# Patient Record
Sex: MALE | Race: WHITE | NOT HISPANIC OR LATINO | URBAN - METROPOLITAN AREA
[De-identification: names, ages, dates, MRNs, and addresses within clinical notes are randomized per-mention and may not be internally consistent; named-entity substitution may affect disease eponyms.]

---

## 2017-04-09 ENCOUNTER — INPATIENT (INPATIENT)
Facility: HOSPITAL | Age: 82
LOS: 2 days | Discharge: HOME HEALTH | DRG: 309 | End: 2017-04-12
Attending: FAMILY MEDICINE | Admitting: HOSPITALIST
Payer: MEDICARE

## 2017-04-09 VITALS
HEART RATE: 61 BPM | DIASTOLIC BLOOD PRESSURE: 82 MMHG | HEIGHT: 68 IN | TEMPERATURE: 99 F | OXYGEN SATURATION: 97 % | RESPIRATION RATE: 16 BRPM | WEIGHT: 149.91 LBS | SYSTOLIC BLOOD PRESSURE: 183 MMHG

## 2017-04-09 DIAGNOSIS — E03.9 HYPOTHYROIDISM, UNSPECIFIED: ICD-10-CM

## 2017-04-09 DIAGNOSIS — F41.9 ANXIETY DISORDER, UNSPECIFIED: ICD-10-CM

## 2017-04-09 DIAGNOSIS — C85.90 NON-HODGKIN LYMPHOMA, UNSPECIFIED, UNSPECIFIED SITE: ICD-10-CM

## 2017-04-09 DIAGNOSIS — R42 DIZZINESS AND GIDDINESS: ICD-10-CM

## 2017-04-09 DIAGNOSIS — C61 MALIGNANT NEOPLASM OF PROSTATE: ICD-10-CM

## 2017-04-09 DIAGNOSIS — I10 ESSENTIAL (PRIMARY) HYPERTENSION: ICD-10-CM

## 2017-04-09 DIAGNOSIS — I48.91 UNSPECIFIED ATRIAL FIBRILLATION: ICD-10-CM

## 2017-04-09 DIAGNOSIS — Z29.9 ENCOUNTER FOR PROPHYLACTIC MEASURES, UNSPECIFIED: ICD-10-CM

## 2017-04-09 DIAGNOSIS — N17.9 ACUTE KIDNEY FAILURE, UNSPECIFIED: ICD-10-CM

## 2017-04-09 DIAGNOSIS — B02.9 ZOSTER WITHOUT COMPLICATIONS: ICD-10-CM

## 2017-04-09 DIAGNOSIS — Z96.653 PRESENCE OF ARTIFICIAL KNEE JOINT, BILATERAL: Chronic | ICD-10-CM

## 2017-04-09 DIAGNOSIS — Z96.619 PRESENCE OF UNSPECIFIED ARTIFICIAL SHOULDER JOINT: Chronic | ICD-10-CM

## 2017-04-09 LAB
ALBUMIN SERPL ELPH-MCNC: 3.5 G/DL — SIGNIFICANT CHANGE UP (ref 3.3–5)
ALP SERPL-CCNC: 54 U/L — SIGNIFICANT CHANGE UP (ref 40–120)
ALT FLD-CCNC: 17 U/L — SIGNIFICANT CHANGE UP (ref 12–78)
ANION GAP SERPL CALC-SCNC: 10 MMOL/L — SIGNIFICANT CHANGE UP (ref 5–17)
APTT BLD: 48.8 SEC — HIGH (ref 27.5–37.4)
AST SERPL-CCNC: 22 U/L — SIGNIFICANT CHANGE UP (ref 15–37)
BASOPHILS # BLD AUTO: 0.1 K/UL — SIGNIFICANT CHANGE UP (ref 0–0.2)
BASOPHILS NFR BLD AUTO: 1.8 % — SIGNIFICANT CHANGE UP (ref 0–2)
BILIRUB SERPL-MCNC: 0.8 MG/DL — SIGNIFICANT CHANGE UP (ref 0.2–1.2)
BUN SERPL-MCNC: 30 MG/DL — HIGH (ref 7–23)
CALCIUM SERPL-MCNC: 8.9 MG/DL — SIGNIFICANT CHANGE UP (ref 8.5–10.1)
CHLORIDE SERPL-SCNC: 106 MMOL/L — SIGNIFICANT CHANGE UP (ref 96–108)
CK SERPL-CCNC: 26 U/L — SIGNIFICANT CHANGE UP (ref 26–308)
CO2 SERPL-SCNC: 29 MMOL/L — SIGNIFICANT CHANGE UP (ref 22–31)
CREAT SERPL-MCNC: 1.6 MG/DL — HIGH (ref 0.5–1.3)
EOSINOPHIL # BLD AUTO: 0.1 K/UL — SIGNIFICANT CHANGE UP (ref 0–0.5)
EOSINOPHIL NFR BLD AUTO: 1.3 % — SIGNIFICANT CHANGE UP (ref 0–6)
GLUCOSE SERPL-MCNC: 95 MG/DL — SIGNIFICANT CHANGE UP (ref 70–99)
HCT VFR BLD CALC: 45.1 % — SIGNIFICANT CHANGE UP (ref 39–50)
HGB BLD-MCNC: 14.8 G/DL — SIGNIFICANT CHANGE UP (ref 13–17)
INR BLD: 4.58 RATIO — HIGH (ref 0.88–1.16)
LYMPHOCYTES # BLD AUTO: 1.9 K/UL — SIGNIFICANT CHANGE UP (ref 1–3.3)
LYMPHOCYTES # BLD AUTO: 34.7 % — SIGNIFICANT CHANGE UP (ref 13–44)
MCHC RBC-ENTMCNC: 32.4 PG — SIGNIFICANT CHANGE UP (ref 27–34)
MCHC RBC-ENTMCNC: 32.8 GM/DL — SIGNIFICANT CHANGE UP (ref 32–36)
MCV RBC AUTO: 98.8 FL — SIGNIFICANT CHANGE UP (ref 80–100)
MONOCYTES # BLD AUTO: 0.4 K/UL — SIGNIFICANT CHANGE UP (ref 0–0.9)
MONOCYTES NFR BLD AUTO: 7 % — SIGNIFICANT CHANGE UP (ref 1–9)
NEUTROPHILS # BLD AUTO: 3 K/UL — SIGNIFICANT CHANGE UP (ref 1.8–7.4)
NEUTROPHILS NFR BLD AUTO: 55.1 % — SIGNIFICANT CHANGE UP (ref 43–77)
PLATELET # BLD AUTO: 157 K/UL — SIGNIFICANT CHANGE UP (ref 150–400)
POTASSIUM SERPL-MCNC: 3.7 MMOL/L — SIGNIFICANT CHANGE UP (ref 3.5–5.3)
POTASSIUM SERPL-SCNC: 3.7 MMOL/L — SIGNIFICANT CHANGE UP (ref 3.5–5.3)
PROT SERPL-MCNC: 5.7 G/DL — LOW (ref 6–8.3)
PROTHROM AB SERPL-ACNC: 51.5 SEC — HIGH (ref 9.8–12.7)
RBC # BLD: 4.56 M/UL — SIGNIFICANT CHANGE UP (ref 4.2–5.8)
RBC # FLD: 13.8 % — SIGNIFICANT CHANGE UP (ref 10.3–14.5)
SODIUM SERPL-SCNC: 145 MMOL/L — SIGNIFICANT CHANGE UP (ref 135–145)
TROPONIN I SERPL-MCNC: <.015 NG/ML — SIGNIFICANT CHANGE UP (ref 0.01–0.04)
TSH SERPL-MCNC: 1.61 UIU/ML — SIGNIFICANT CHANGE UP (ref 0.36–3.74)
WBC # BLD: 5.4 K/UL — SIGNIFICANT CHANGE UP (ref 3.8–10.5)
WBC # FLD AUTO: 5.4 K/UL — SIGNIFICANT CHANGE UP (ref 3.8–10.5)

## 2017-04-09 PROCEDURE — 70450 CT HEAD/BRAIN W/O DYE: CPT | Mod: 26

## 2017-04-09 PROCEDURE — 99285 EMERGENCY DEPT VISIT HI MDM: CPT

## 2017-04-09 PROCEDURE — 93010 ELECTROCARDIOGRAM REPORT: CPT

## 2017-04-09 PROCEDURE — 71010: CPT | Mod: 26

## 2017-04-09 PROCEDURE — 99223 1ST HOSP IP/OBS HIGH 75: CPT | Mod: AI,GC

## 2017-04-09 RX ORDER — ONDANSETRON 8 MG/1
4 TABLET, FILM COATED ORAL EVERY 8 HOURS
Qty: 0 | Refills: 0 | Status: DISCONTINUED | OUTPATIENT
Start: 2017-04-09 | End: 2017-04-12

## 2017-04-09 RX ORDER — SODIUM CHLORIDE 9 MG/ML
1000 INJECTION, SOLUTION INTRAVENOUS
Qty: 0 | Refills: 0 | Status: DISCONTINUED | OUTPATIENT
Start: 2017-04-09 | End: 2017-04-12

## 2017-04-09 RX ORDER — METOCLOPRAMIDE HCL 10 MG
10 TABLET ORAL ONCE
Qty: 0 | Refills: 0 | Status: COMPLETED | OUTPATIENT
Start: 2017-04-09 | End: 2017-04-09

## 2017-04-09 RX ORDER — SODIUM CHLORIDE 9 MG/ML
1000 INJECTION INTRAMUSCULAR; INTRAVENOUS; SUBCUTANEOUS ONCE
Qty: 0 | Refills: 0 | Status: COMPLETED | OUTPATIENT
Start: 2017-04-09 | End: 2017-04-09

## 2017-04-09 RX ORDER — LEVOTHYROXINE SODIUM 125 MCG
50 TABLET ORAL DAILY
Qty: 0 | Refills: 0 | Status: DISCONTINUED | OUTPATIENT
Start: 2017-04-09 | End: 2017-04-12

## 2017-04-09 RX ORDER — FLUOROMETHOLONE 1 MG/ML
1 SOLUTION/ DROPS OPHTHALMIC EVERY 8 HOURS
Qty: 0 | Refills: 0 | Status: DISCONTINUED | OUTPATIENT
Start: 2017-04-09 | End: 2017-04-12

## 2017-04-09 RX ORDER — BACITRACIN 500 [USP'U]/G
1 OINTMENT OPHTHALMIC AT BEDTIME
Qty: 0 | Refills: 0 | Status: DISCONTINUED | OUTPATIENT
Start: 2017-04-09 | End: 2017-04-10

## 2017-04-09 RX ORDER — MECLIZINE HCL 12.5 MG
25 TABLET ORAL ONCE
Qty: 0 | Refills: 0 | Status: COMPLETED | OUTPATIENT
Start: 2017-04-09 | End: 2017-04-09

## 2017-04-09 RX ORDER — CARVEDILOL PHOSPHATE 80 MG/1
12.5 CAPSULE, EXTENDED RELEASE ORAL EVERY 12 HOURS
Qty: 0 | Refills: 0 | Status: DISCONTINUED | OUTPATIENT
Start: 2017-04-09 | End: 2017-04-10

## 2017-04-09 RX ORDER — SODIUM CHLORIDE 9 MG/ML
1000 INJECTION INTRAMUSCULAR; INTRAVENOUS; SUBCUTANEOUS
Qty: 0 | Refills: 0 | Status: DISCONTINUED | OUTPATIENT
Start: 2017-04-09 | End: 2017-04-09

## 2017-04-09 RX ORDER — ONDANSETRON 8 MG/1
4 TABLET, FILM COATED ORAL ONCE
Qty: 0 | Refills: 0 | Status: COMPLETED | OUTPATIENT
Start: 2017-04-09 | End: 2017-04-09

## 2017-04-09 RX ORDER — AMLODIPINE BESYLATE 2.5 MG/1
5 TABLET ORAL DAILY
Qty: 0 | Refills: 0 | Status: DISCONTINUED | OUTPATIENT
Start: 2017-04-09 | End: 2017-04-12

## 2017-04-09 RX ORDER — METOCLOPRAMIDE HCL 10 MG
10 TABLET ORAL EVERY 8 HOURS
Qty: 0 | Refills: 0 | Status: DISCONTINUED | OUTPATIENT
Start: 2017-04-09 | End: 2017-04-09

## 2017-04-09 RX ORDER — MECLIZINE HCL 12.5 MG
25 TABLET ORAL EVERY 8 HOURS
Qty: 0 | Refills: 0 | Status: DISCONTINUED | OUTPATIENT
Start: 2017-04-09 | End: 2017-04-12

## 2017-04-09 RX ADMIN — SODIUM CHLORIDE 1000 MILLILITER(S): 9 INJECTION INTRAMUSCULAR; INTRAVENOUS; SUBCUTANEOUS at 13:54

## 2017-04-09 RX ADMIN — ONDANSETRON 4 MILLIGRAM(S): 8 TABLET, FILM COATED ORAL at 14:00

## 2017-04-09 RX ADMIN — Medication 10 MILLIGRAM(S): at 16:49

## 2017-04-09 RX ADMIN — Medication 25 MILLIGRAM(S): at 14:39

## 2017-04-09 RX ADMIN — ONDANSETRON 4 MILLIGRAM(S): 8 TABLET, FILM COATED ORAL at 20:46

## 2017-04-09 RX ADMIN — ONDANSETRON 4 MILLIGRAM(S): 8 TABLET, FILM COATED ORAL at 13:54

## 2017-04-09 NOTE — H&P ADULT - ATTENDING COMMENTS
89 y.o M w/Pmhx of Afib( on coumadin), anxiety, HTN, hypothyroidism, hx of prostate ca( s/p radiation), hx of chronic diarrhea, basal cell carcinoma( s/p removal), and lymphoma comes in for vertigo, r/o CVA , on doxycycline for possible bacterial superinfection of the rash. Plan as above discussed at length with PGY1. INR ordered, since on coumadin. Recommend optho evaluation if symptoms get worse. Will monitor BP closely. since high in ER, probably because of fluids. Added Norvasc, since HCTZ held secondary to LETICIA. 89 y.o M w/Pmhx of Afib( on coumadin), anxiety, HTN, hypothyroidism, hx of prostate ca( s/p radiation), hx of chronic diarrhea, basal cell carcinoma( s/p removal), and lymphoma comes in for vertigo, r/o CVA , on doxycycline for possible bacterial superinfection of the rash. Plan as above discussed at length with PGY1. INR ordered, since on coumadin. Recommend optho/ Dermatology  evaluation if symptoms get worse. Will monitor BP closely. since high in ER, probably because of fluids and stress. Added Norvasc, since HCTZ held secondary to LETICIA. 89 y.o M w/Pmhx of Afib( on coumadin), anxiety, HTN, hypothyroidism, hx of prostate ca( s/p radiation), hx of chronic diarrhea, basal cell carcinoma( s/p removal), and lymphoma comes in for vertigo, r/o CVA , on doxycycline for possible bacterial superinfection of the rash. Plan as above discussed at length with PGY1. INR ordered, since on coumadin. Recommend optho/ Dermatology  evaluation if symptoms get worse. Will monitor BP closely. since high in ER, probably because of fluids and stress. Added Norvasc, since HCTZ held secondary to LETICIA. Per daughter has h/o chronic diarrhea/ Bowel incontinence,  and has been seen by GI and colorectal surgeon as out patient. Symptoms appear to be stable at the moment.

## 2017-04-09 NOTE — H&P ADULT - PROBLEM SELECTOR PLAN 3
-likely 2/2 infection vs medication side effect  -continue with IVF  -osmolarity study and bmp in am  -consult renal  -likely 2/2 dehydration vs ATN secondary to medication side effect  -continue with IVF  -osmolarity study and bmp in am  -consult renal

## 2017-04-09 NOTE — H&P ADULT - NSHPPHYSICALEXAM_GEN_ALL_CORE
General: Well developed, well nourished, NAD  HEENT: PERRLA, EOMI bl, multiple vesicular crest rashes on Lt forehead, erythematous Lt eye lids and redness of Lt conjunctiva, basal cell carcinoma lesion on top of head   Neck: Supple, nontender, no mass  Neurology: A&Ox3, nonfocal, CN II-XII grossly intact, sensation intact, no gait abnormalities, no nuchal righty   Respiratory: CTA B/L, No W/R/R  CV: RRR, +S1/S2, no murmurs, rubs or gallops  Abdominal: Soft, NT, ND +BSx4  Extremities: No C/C/E, + peripheral pulses  MSK: Normal ROM, no joint erythema or warmth, no joint swelling   Skin: warm, dry, normal color, no rash or abnormal lesions

## 2017-04-09 NOTE — ED ADULT NURSE NOTE - OBJECTIVE STATEMENT
Pt alert and oriented, came to ED with c/o weakness, dizziness, nausea, no active vomiting, afebrile, denies any diarrhea, labs draw and sent, shingles noted to Left side of face, non-weeping, MD aware, placed on cardiac monitor, EKG complete, advised on plan of care/hand hygiene, verbalized understanding, prepared for CT, awaiting dispo

## 2017-04-09 NOTE — H&P ADULT - PROBLEM SELECTOR PLAN 4
-Chronic stable  -recheck INR  -if the INR in therapeutic range or under therapeutic range will c/w home coumadin   -c/w carvedilol  -tele monitor

## 2017-04-09 NOTE — H&P ADULT - ASSESSMENT
89 y.o M w/Pmhx of Afib( on coumadin), anxiety, HTN, hypothyroidism, hx of prostate ca( s/p radiation), hx of chronic diarrhea, basal cell carcinoma( s/p removal), and lymphoma comes in for vertigo, r.o CVA

## 2017-04-09 NOTE — H&P ADULT - NSHPSOCIALHISTORY_GEN_ALL_CORE
Marriage status:   Living condition: live by himself   Physical condition: no walker, no O2 needed  no smoking, social drinker, no recreational drug using  Immunization:       no flushot,                            no  Pneumonia shot,                             no shingle shot

## 2017-04-09 NOTE — H&P ADULT - HISTORY OF PRESENT ILLNESS
89 y.o M w/Pmhx of Afib( on coumadin), anxiety, HTN, hypothyroidism, hx of prostate ca( s/p radiation), hx of chronic diarrhea, basal cell carcinoma( s/p removal), and lymphoma comes in for vertigo. Pt had Lt forehead shingle 10 days ago and was seeing by derm and ophthalmologist and finsihed 1 weeks course of valacyclovir and was also put on cipro and later switch to doxy( 1 days ago) superimposed bacteria infection of his Lt forehead and Lt eyelids. Per Pt the ophthalmologist examed pt and did not think that the shingle was spread to eye tissue. Pt reported this morning, after he woke up, he felt the jan  was spinning around him. He also felt nausea no vomiting. Denies of fever/chills/vision change/cp/sob/palpitation/LOC/ nucha rigidity.   Pt had similar episode 1 yr ago and which was last short period and was told by PCP to drink more water. Pt reported decrease fluid and food intake this 2 weeks 2/2 loss of appetite and lost about 8 pounds during last month.    In ED, pt was hypertensive aroud 180s/80s. Cr elevated at 1.5 and BUN elevated at 30, CT head negative for acute infract. CXR no acute finding  Was given 1x meclizine, 2 x reglan, and 1 x zofran, and 2x bolus NS. ECG showed sinus rhythms with 1st degrees' HB and RBBB consulted neuro( ) 89 y.o M w/Pmhx of Afib( on coumadin), anxiety, HTN, hypothyroidism, hx of prostate ca( s/p radiation), hx of chronic diarrhea, basal cell carcinoma( s/p removal), and lymphoma comes in for vertigo. Pt had Lt forehead shingle 10 days ago and was seeing by derm and ophthalmologist and finsihed 1 weeks course of valacyclovir and was also put on cipro and later switch to doxy( 1 days ago) superimposed bacteria infection of his Lt forehead and Lt eyelids. Per Pt the ophthalmologist examed pt and did not think that the shingle was spread to eye tissue. Pt reported this morning, after he woke up, he felt the jan  was spinning around him. He also felt nausea no vomiting. Denies of fever/chills/vision change/cp/sob/palpitation/LOC/ nucha rigidity.   Pt had similar episode 1 yr ago and which was last short period and was told by PCP to drink more water. Pt reported decrease fluid and food intake this 2 weeks 2/2 loss of appetite and lost about 8 pounds during last month.    In ED, pt was hypertensive aroud 180s/80s. Cr elevated at 1.5 and BUN elevated at 30, TSH wnl, cardiac enzyme wnlx1. CT head negative for acute infract. CXR no acute finding  Was given 1x meclizine, 2 x reglan and 1 x Zofran and 2x bolus NS. ECG showed sinus rhythms with 1st degrees' HB and RBBB consulted neuro( )

## 2017-04-09 NOTE — H&P ADULT - PROBLEM SELECTOR PLAN 5
-c/w carvedilol and start norvasc for better BP control  -hold HCTZ-TRIAMTERENE 2/2 LETICIA -c/w carvedilol and start Norvasc for better BP control  -hold HCTZ-TRIAMTERENE 2/2 LETICIA

## 2017-04-09 NOTE — ED PROVIDER NOTE - OBJECTIVE STATEMENT
89 male presents to ER with daughter states patient awoke this morning feeling nausea, dizziness, vomiting, worse when getting up and moving head, better with lying down, having difficulty walking. Patient finished course of valtrex yesterday for shingles of forehead.

## 2017-04-09 NOTE — CONSULT NOTE ADULT - SUBJECTIVE AND OBJECTIVE BOX
89 years old male admitted with vertigo, nausea, poor oral intake, noted elevated creatinine, possible CKD but no known baseline creatinine at this time. Developed left forehead shingle 10 days ago and was was treated with 1 week of Valacyclovir and Cipro. Pt. reported decrease fluids and food intake for 2 weeks. Has been taking daily Advil. No voiding c/o. No hematuria.    PAST MEDICAL & SURGICAL HISTORY:  Chronic diarrhea  Prostate CA  Basal cell carcinoma  Non-Hodgkins Lymphoma  Anxiety  Hypothyroid  Hypertension  A-fib  H/O shoulder replacement  S/P bilateral unicompartmental knee replacement    FAMILY HISTORY:  No pertinent family history in first degree relatives    No Known Allergies    MEDICATIONS  (STANDING):  amLODIPine   Tablet 5milliGRAM(s) Oral daily  meclizine 25milliGRAM(s) Oral every 8 hours  carvedilol 12.5milliGRAM(s) Oral every 12 hours  BACItracin   Ophthalmic Ointment 1Application(s) Left EYE at bedtime  fluorometholone 0.1% Suspension 1Drop(s) Left EYE every 8 hours  levothyroxine 50MICROGram(s) Oral daily  doxycycline IVPB 100milliGRAM(s) IV Intermittent every 12 hours  sodium chloride 0.45%. 1000milliLiter(s) IV Continuous <Continuous>    MEDICATIONS  (PRN):  ondansetron Injectable 4milliGRAM(s) IV Push every 8 hours PRN Nausea and/or Vomiting    I&O's Detail    Vital Signs Last 24 Hrs  T(C): 36.8, Max: 37.2 (04-09 @ 11:41)  T(F): 98.2, Max: 98.9 (04-09 @ 11:41)  HR: 62 (60 - 62)  BP: 141/89 (141/89 - 183/82)  BP(mean): --  RR: 16 (16 - 16)  SpO2: 99% (97% - 99%)    PHYSICAL EXAM:  General: NAD, on stretcher in E.R. AAO X3, conversant, c/o nausea  Extensive confluent crusted herpetic lesions left forehead  Supine, no JVD  Respiratory: b/l air entry, clear  Cardiovascular: S1 S2 irregular  Gastrointestinal: abdomen soft, not tender, no distended, no bladder distension  Extremities: no edema  No skin rash      CAPILLARY BLOOD GLUCOSE    04-09    145  |  106  |  30<H>  ----------------------------<  95  3.7   |  29  |  1.60<H>    Ca    8.9      09 Apr 2017 12:44    TPro  5.7<L>  /  Alb  3.5  /  TBili  0.8  /  DBili  x   /  AST  22  /  ALT  17  /  AlkPhos  54  04-09    Hemoglobin: 14.8 g/dL (04-09 @ 12:44)  Hematocrit: 45.1 % (04-09 @ 12:44)  Potassium, Serum: 3.7 mmol/L (04-09 @ 12:44)  Blood Urea Nitrogen, Serum: 30 mg/dL (04-09 @ 12:44)  Calcium, Total Serum: 8.9 mg/dL (04-09 @ 12:44)      Creatinine, Serum: 1.60 (04-09 @ 12:44)    CBC Full  -  ( 09 Apr 2017 12:44 )  WBC Count : 5.4 K/uL  Hemoglobin : 14.8 g/dL  Hematocrit : 45.1 %  Platelet Count - Automated : 157 K/uL  Mean Cell Volume : 98.8 fl  Mean Cell Hemoglobin : 32.4 pg  Mean Cell Hemoglobin Concentration : 32.8 gm/dL  Auto Neutrophil # : 3.0 K/uL  Auto Lymphocyte # : 1.9 K/uL  Auto Monocyte # : 0.4 K/uL  Auto Eosinophil # : 0.1 K/uL  Auto Basophil # : 0.1 K/uL  Auto Neutrophil % : 55.1 %  Auto Lymphocyte % : 34.7 %  Auto Monocyte % : 7.0 %  Auto Eosinophil % : 1.3 %  Auto Basophil % : 1.8 %

## 2017-04-09 NOTE — H&P ADULT - NSHPREVIEWOFSYSTEMS_GEN_ALL_CORE
CONSTITUTIONAL: No weakness, fevers or chills  EYES/ENT: No visual changes;  + vertigo, no throat pain, rashes at the Lt forehead and itchiness of Lt forehead and eye   NECK: No pain or stiffness  RESPIRATORY: No cough, wheezing, hemoptysis; No shortness of breath  CARDIOVASCULAR: No chest pain or palpitations  GASTROINTESTINAL: No abdominal or epigastric pain. + nausea, no vomiting, or hematemesis; chronic diarrhea, no constipation. No melena or hematochezia.  GENITOURINARY: No dysuria, frequency or hematuria  NEUROLOGICAL: No numbness or weakness  SKIN: rashes at the Lt forehead and itchiness of Lt forehead and eye  and lesions of the top of head  All other review of systems is negative unless indicated above.

## 2017-04-09 NOTE — H&P ADULT - PROBLEM SELECTOR PLAN 2
-s/p course or valacyclovir  -hold abx for now  -c/w bacitracin opth oiment and fluorometholone drop   -consult ID( ) -s/p course or valacyclovir  -on Po doxycycline for possible bacterial superinfection of the rash/ ID Consult called. Will Switch to IV doxycycline since nauseous. No vision changes.   -c/w bacitracin opth oiment and fluorometholone drop   -consult ID( )

## 2017-04-09 NOTE — H&P ADULT - PMH
A-fib    Anxiety    Basal cell carcinoma    Chronic diarrhea    Hypertension    Hypothyroid    Lymphoma    Prostate CA

## 2017-04-09 NOTE — CONSULT NOTE ADULT - ASSESSMENT
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  Agree with IVF 1/2 NS @ 75 CC/hr. Monitor renal indices. Avoid NSAIDS and nephrotoxins. Dose all meds for GFR 30 CC/min. Renal/bladder sono.  Patient's daughter will find out about the baseline creatinine.    Will follow with you.  Thank you!

## 2017-04-09 NOTE — H&P ADULT - PROBLEM SELECTOR PLAN 1
-? dehydration r/o CVA  -Admit to TELE  -IVF  -Meclizine TID for Vertigo  -Zofran prn for n/v  -neuro check Q4hr  -MRI/MRA in AM  - Consulted Neuro( )

## 2017-04-09 NOTE — H&P ADULT - PROBLEM SELECTOR PLAN 10
-possible coumadin for DVT PPX  -clear fluid diet  -aspiration precaution, fall precaution, ambulate with assistant

## 2017-04-10 ENCOUNTER — TRANSCRIPTION ENCOUNTER (OUTPATIENT)
Age: 82
End: 2017-04-10

## 2017-04-10 DIAGNOSIS — B02.22 POSTHERPETIC TRIGEMINAL NEURALGIA: ICD-10-CM

## 2017-04-10 LAB
ALBUMIN SERPL ELPH-MCNC: 2.9 G/DL — LOW (ref 3.3–5)
ALP SERPL-CCNC: 44 U/L — SIGNIFICANT CHANGE UP (ref 40–120)
ALT FLD-CCNC: 16 U/L — SIGNIFICANT CHANGE UP (ref 12–78)
ANION GAP SERPL CALC-SCNC: 8 MMOL/L — SIGNIFICANT CHANGE UP (ref 5–17)
AST SERPL-CCNC: 20 U/L — SIGNIFICANT CHANGE UP (ref 15–37)
BILIRUB SERPL-MCNC: 0.8 MG/DL — SIGNIFICANT CHANGE UP (ref 0.2–1.2)
BUN SERPL-MCNC: 30 MG/DL — HIGH (ref 7–23)
CALCIUM SERPL-MCNC: 7.9 MG/DL — LOW (ref 8.5–10.1)
CHLORIDE SERPL-SCNC: 111 MMOL/L — HIGH (ref 96–108)
CO2 SERPL-SCNC: 26 MMOL/L — SIGNIFICANT CHANGE UP (ref 22–31)
CREAT SERPL-MCNC: 1.3 MG/DL — SIGNIFICANT CHANGE UP (ref 0.5–1.3)
GLUCOSE SERPL-MCNC: 80 MG/DL — SIGNIFICANT CHANGE UP (ref 70–99)
HCT VFR BLD CALC: 38.4 % — LOW (ref 39–50)
HGB BLD-MCNC: 12.6 G/DL — LOW (ref 13–17)
INR BLD: 5.21 RATIO — CRITICAL HIGH (ref 0.88–1.16)
MAGNESIUM SERPL-MCNC: 1.8 MG/DL — SIGNIFICANT CHANGE UP (ref 1.8–2.4)
MCHC RBC-ENTMCNC: 32.4 PG — SIGNIFICANT CHANGE UP (ref 27–34)
MCHC RBC-ENTMCNC: 32.8 GM/DL — SIGNIFICANT CHANGE UP (ref 32–36)
MCV RBC AUTO: 98.8 FL — SIGNIFICANT CHANGE UP (ref 80–100)
OSMOLALITY SERPL: 307 MOS/KG — HIGH (ref 275–300)
OSMOLALITY UR: 675 MOS/KG — SIGNIFICANT CHANGE UP (ref 50–1200)
PHOSPHATE SERPL-MCNC: 2.1 MG/DL — LOW (ref 2.5–4.5)
PLATELET # BLD AUTO: 153 K/UL — SIGNIFICANT CHANGE UP (ref 150–400)
POTASSIUM SERPL-MCNC: 3.4 MMOL/L — LOW (ref 3.5–5.3)
POTASSIUM SERPL-SCNC: 3.4 MMOL/L — LOW (ref 3.5–5.3)
PROT SERPL-MCNC: 4.8 G/DL — LOW (ref 6–8.3)
PROTHROM AB SERPL-ACNC: 58.7 SEC — HIGH (ref 9.8–12.7)
RBC # BLD: 3.88 M/UL — LOW (ref 4.2–5.8)
RBC # FLD: 14 % — SIGNIFICANT CHANGE UP (ref 10.3–14.5)
SODIUM SERPL-SCNC: 145 MMOL/L — SIGNIFICANT CHANGE UP (ref 135–145)
SODIUM UR-SCNC: 76 MMOL/L — SIGNIFICANT CHANGE UP
WBC # BLD: 7.1 K/UL — SIGNIFICANT CHANGE UP (ref 3.8–10.5)
WBC # FLD AUTO: 7.1 K/UL — SIGNIFICANT CHANGE UP (ref 3.8–10.5)

## 2017-04-10 PROCEDURE — 99233 SBSQ HOSP IP/OBS HIGH 50: CPT

## 2017-04-10 PROCEDURE — 70551 MRI BRAIN STEM W/O DYE: CPT | Mod: 26

## 2017-04-10 PROCEDURE — 99223 1ST HOSP IP/OBS HIGH 75: CPT

## 2017-04-10 RX ORDER — CARVEDILOL PHOSPHATE 80 MG/1
6.25 CAPSULE, EXTENDED RELEASE ORAL EVERY 12 HOURS
Qty: 0 | Refills: 0 | Status: DISCONTINUED | OUTPATIENT
Start: 2017-04-10 | End: 2017-04-11

## 2017-04-10 RX ORDER — BACITRACIN 500 [USP'U]/G
1 OINTMENT OPHTHALMIC THREE TIMES A DAY
Qty: 0 | Refills: 0 | Status: DISCONTINUED | OUTPATIENT
Start: 2017-04-10 | End: 2017-04-12

## 2017-04-10 RX ORDER — POTASSIUM PHOSPHATE, MONOBASIC POTASSIUM PHOSPHATE, DIBASIC 236; 224 MG/ML; MG/ML
15 INJECTION, SOLUTION INTRAVENOUS ONCE
Qty: 0 | Refills: 0 | Status: COMPLETED | OUTPATIENT
Start: 2017-04-10 | End: 2017-04-10

## 2017-04-10 RX ADMIN — SODIUM CHLORIDE 75 MILLILITER(S): 9 INJECTION, SOLUTION INTRAVENOUS at 00:24

## 2017-04-10 RX ADMIN — SODIUM CHLORIDE 75 MILLILITER(S): 9 INJECTION, SOLUTION INTRAVENOUS at 11:32

## 2017-04-10 RX ADMIN — FLUOROMETHOLONE 1 DROP(S): 1 SOLUTION/ DROPS OPHTHALMIC at 06:59

## 2017-04-10 RX ADMIN — SODIUM CHLORIDE 75 MILLILITER(S): 9 INJECTION, SOLUTION INTRAVENOUS at 23:52

## 2017-04-10 RX ADMIN — ONDANSETRON 4 MILLIGRAM(S): 8 TABLET, FILM COATED ORAL at 08:19

## 2017-04-10 RX ADMIN — Medication 25 MILLIGRAM(S): at 23:51

## 2017-04-10 RX ADMIN — Medication 25 MILLIGRAM(S): at 08:20

## 2017-04-10 RX ADMIN — BACITRACIN 1 APPLICATION(S): 500 OINTMENT OPHTHALMIC at 21:37

## 2017-04-10 RX ADMIN — FLUOROMETHOLONE 1 DROP(S): 1 SOLUTION/ DROPS OPHTHALMIC at 13:18

## 2017-04-10 RX ADMIN — FLUOROMETHOLONE 1 DROP(S): 1 SOLUTION/ DROPS OPHTHALMIC at 21:37

## 2017-04-10 RX ADMIN — Medication 25 MILLIGRAM(S): at 15:46

## 2017-04-10 RX ADMIN — Medication 50 MICROGRAM(S): at 06:40

## 2017-04-10 RX ADMIN — POTASSIUM PHOSPHATE, MONOBASIC POTASSIUM PHOSPHATE, DIBASIC 63.75 MILLIMOLE(S): 236; 224 INJECTION, SOLUTION INTRAVENOUS at 11:32

## 2017-04-10 NOTE — DISCHARGE NOTE ADULT - PLAN OF CARE
resolved -likely 2/2 bradycardia, 1st degree AV block, RBBB  -d/c carvedilol per cardio due to bradycardia per cardiologist  -possible EP study outpatient  -meclizine prn? -resolving -seen by Dr. Morrison (ID) and no concern for active infection, lesions crusted, no signs of superimposed bacterial infection  -finished course of acyclovir, not on any antibiotics  -cont bacitracin opth ointment and fluorometholone drop -likely 2/2 dehydration improved stable -paroxysmal  -continue coumadin and check INR as outpatient -continue home med xanax 0.25mg BID -continue home med synthroid -continue home med norvasc 5mg po  -d/c carvedilol due to bradycardia per cardiologist -likely 2/2 bradycardia, 1st degree AV block, RBBB  -d/c carvedilol per cardio due to bradycardia   -possible EP study vs holter monitor as an outpatient -paroxysmal  -Hold Coumadin tonight and tomorrow night. Follow up with Dr. Joshi's office Friday and have your INR checked for appropriate dosing. -dose of norvasc increased to 10 mg daily now. Prescription was sent.   -d/c carvedilol due to bradycardia per cardiologist

## 2017-04-10 NOTE — ED ADULT NURSE REASSESSMENT NOTE - NS ED NURSE REASSESS COMMENT FT1
patient up and alert in bed, eating jello and drinking water.   patient refused most of morning medications, Dr Floyd aware.   patient denies chest pain, dizziness, nausea, palpitations.   IV intact.
patient denies dizziness, headache, blurred vision, vision changes, shortness of breath, chest pain, palpitations. respirations even and unlabored. IV intact.  patient urinated 300cc.
received patient from off going RN Moises Amador ( RN ) .  patient presents alert and oriented X4. patient presents with shingles rash on left side of face and redness to left eye, off going SYLVAIN De Leon states this is how patient presented to ED.  patient denies dizziness, headache, blurred vision, vision changes, shortness of breath, chest pain, palpitations. respirations even and unlabored. IV intact.

## 2017-04-10 NOTE — DISCHARGE NOTE ADULT - MEDICATION SUMMARY - MEDICATIONS TO TAKE
I will START or STAY ON the medications listed below when I get home from the hospital:    alfuzosin 10 mg oral tablet, extended release  -- 1 tab(s) by mouth once a day  -- Indication: For BPH    warfarin 5 mg oral tablet  -- HOLD COUMADIN TONIGHT 4/12 and tomorrow 4/13. HAVE YOUR INR CHECKED AND DOSE ADJUSTED BY YOUR DOCTOR FRIDAY 4/14  -- Indication: For Afib    ALPRAZolam 0.25 mg oral tablet  --  by mouth 2 times a day  -- Indication: For Anxiety    amLODIPine 10 mg oral tablet  -- 1 tab(s) by mouth once a day  -- It is very important that you take or use this exactly as directed.  Do not skip doses or discontinue unless directed by your doctor.  Some non-prescription drugs may aggravate your condition.  Read all labels carefully.  If a warning appears, check with your doctor before taking.    -- Indication: For Htn    bacitracin 500 units/g ophthalmic ointment  -- 1 application to each affected eye once a day (at bedtime)  -- Indication: For Blepharitis    fluorometholone 0.1% ophthalmic suspension  -- 1 drop(s) to each affected eye every 8 hours  -- Indication: For Blepharitis    levothyroxine 50 mcg (0.05 mg) oral capsule  -- 1 cap(s) by mouth once a day  -- Indication: For Hypothyroid

## 2017-04-10 NOTE — DISCHARGE NOTE ADULT - CARE PROVIDER_API CALL
Jj Joshi (MD), Cardiovascular Disease; Internal Medicine  [address] [place] [address]  Phone: [phone]  Fax: [phone]

## 2017-04-10 NOTE — ED ADULT NURSE REASSESSMENT NOTE - GENERAL PATIENT STATE
comfortable appearance
cooperative/comfortable appearance

## 2017-04-10 NOTE — PROGRESS NOTE ADULT - PROBLEM SELECTOR PLAN 1
Likely secondary to bradycardia, 1st degree AV block, RBBB.  Decrease dose of Carvedilol, Cardiology ([place]) consulted.  MRI/MRA unremarkable.  Continue Meclizine as needed.  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

## 2017-04-10 NOTE — ED ADULT NURSE REASSESSMENT NOTE - PSYCHOSOCIAL WDL
Alert and oriented x 3, normal mood and affect, no apparent risk to self or others.

## 2017-04-10 NOTE — PROGRESS NOTE ADULT - ASSESSMENT
89 y.o M w/Pmhx of Afib( on Coumadin), anxiety, HTN, hypothyroidism, hx of prostate CA (s/p radiation), hx of chronic diarrhea, basal cell carcinoma (s/p removal), and lymphoma comes in for vertigo.

## 2017-04-10 NOTE — DISCHARGE NOTE ADULT - ADDITIONAL INSTRUCTIONS
-follow up with primary care physician within 1 week. Check INR as outpatient. -follow up with Dr. Joshi's office on Friday 4/14 for INR check and adjustment of Coumadin dose.

## 2017-04-10 NOTE — PROGRESS NOTE ADULT - SUBJECTIVE AND OBJECTIVE BOX
89 y.o M w/Pmhx of Afib( on coumadin), anxiety, HTN, hypothyroidism, hx of prostate ca( s/p radiation), hx of chronic diarrhea, basal cell carcinoma( s/p removal), and lymphoma comes in for vertigo. Recently treated for singles, seen by derm and ophthalmology and finished 1 week course of valacyclovir, and was treated for possible bacterial suprainfection of his Lt forehead and Lt eyelids. Patient reports persistent vertigo today. Denies any chest pain, palpitations.     REVIEW OF SYSTEMS:    CONSTITUTIONAL: No weakness, fevers or chills  EYES/ENT: No visual changes, no throat pain   RESPIRATORY: No cough, wheezing, hemoptysis; No shortness of breath  CARDIOVASCULAR: No chest pain or palpitations  GASTROINTESTINAL: No abdominal, nausea, vomiting, or hematemesis; No diarrhea or constipation. No melena or hematochezia.  GENITOURINARY: No dysuria, frequency or hematuria  NEUROLOGICAL: No numbness, or weakness, +dizziness  SKIN: Crusted zoster rash on forehead  All other review of systems is negative unless indicated above.    Vital Signs Last 24 Hrs  T(C): 36.6, Max: 37.3 (04-10 @ 06:30)  T(F): 97.8, Max: 99.2 (04-10 @ 06:30)  HR: 48 (48 - 64)  BP: 143/65 (138/43 - 155/69)  BP(mean): --  RR: 16 (16 - 18)  SpO2: 96% (96% - 99%)        PHYSICAL EXAM:     GENERAL: no acute distress  HEENT: NC/AT, EOMI, neck supple, MMM  RESPIRATORY: LCTAB/L, no rhonchi, rales, or wheezing  CARDIOVASCULAR: RRR, no murmurs, gallops, rubs  ABDOMINAL: soft, non-tender, non-distended, positive bowel sounds   EXTREMITIES: no clubbing, cyanosis, or edema  NEUROLOGICAL: alert and oriented x 3, non-focal  SKIN: +crusted zoster rash on left side of forehead  MUSCULOSKELETAL: no gross joint deformity                          12.6   7.1   )-----------( 153      ( 10 Apr 2017 06:05 )             38.4     04-10    145  |  111<H>  |  30<H>  ----------------------------<  80  3.4<L>   |  26  |  1.30    Ca    7.9<L>      10 Apr 2017 06:05  Phos  2.1     04-10  Mg     1.8     04-10  TPro  4.8<L>  /  Alb  2.9<L>  /  TBili  0.8  /  DBili  x   /  AST  20  /  ALT  16  /  AlkPhos  44  04-10      EKG:     Ventricular Rate 60 BPM    Atrial Rate 60 BPM    P-R Interval 288 ms    QRS Duration 148 ms    Q-T Interval 462 ms    QTC Calculation(Bezet) 462 ms    P Axis 81 degrees    R Axis -80 degrees    T Axis 85 degrees    Diagnosis Line Sinus rhythm with 1st degree AV block  Right bundle branch block  Left anterior fascicular block  *** Bifascicular block ***  Septal infarct  T wave abnormality, consider lateral ischemia        MEDICATIONS  (STANDING):  amLODIPine   Tablet 5milliGRAM(s) Oral daily  meclizine 25milliGRAM(s) Oral every 8 hours  carvedilol 12.5milliGRAM(s) Oral every 12 hours  BACItracin   Ophthalmic Ointment 1Application(s) Left EYE at bedtime  fluorometholone 0.1% Suspension 1Drop(s) Left EYE every 8 hours  levothyroxine 50MICROGram(s) Oral daily  sodium chloride 0.45%. 1000milliLiter(s) IV Continuous <Continuous>

## 2017-04-10 NOTE — ED ADULT NURSE REASSESSMENT NOTE - NURSING SKIN RASH DESCRIPTION
shingles rash on left side of face, redness of eye; prior RN Moises stated this is how the patient presented to the ED
shingles rash on face remains
shingles rash noted on left side of face

## 2017-04-10 NOTE — PROGRESS NOTE ADULT - PROBLEM SELECTOR PLAN 3
-? secondary to antiviral treatment vs dehydration.   Creatinine improving. Continue with IVF for now.  -osmolarity study and bmp in am  -consult renal

## 2017-04-10 NOTE — PROGRESS NOTE ADULT - SUBJECTIVE AND OBJECTIVE BOX
Neurology follow up note    LIZBETH [id number]      Interval History:    Patient still feels lighthead and sublte vertigo upon movements     MEDICATIONS    amLODIPine   Tablet 5milliGRAM(s) Oral daily  meclizine 25milliGRAM(s) Oral every 8 hours  ondansetron Injectable 4milliGRAM(s) IV Push every 8 hours PRN  carvedilol 12.5milliGRAM(s) Oral every 12 hours  BACItracin   Ophthalmic Ointment 1Application(s) Left EYE at bedtime  fluorometholone 0.1% Suspension 1Drop(s) Left EYE every 8 hours  levothyroxine 50MICROGram(s) Oral daily  sodium chloride 0.45%. 1000milliLiter(s) IV Continuous <Continuous>      Allergies    No Known Allergies    Intolerances        Height (cm): 172.7 (04-09 @ 11:41)  Weight (kg): 68 (04-09 @ 11:41)  BMI (kg/m2): 22.8 (04-09 @ 11:41)    Vital Signs Last 24 Hrs  T(C): 37.1, Max: 37.3 (04-10 @ 06:30)  T(F): 98.8, Max: 99.2 (04-10 @ 06:30)  HR: 57 (55 - 64)  BP: 138/43 (138/43 - 183/82)  BP(mean): --  RR: 16 (16 - 18)  SpO2: 98% (97% - 99%)      REVIEW OF SYSTEMS:     Constitutional: No fever, chills, fatigue, weakness  Eyes: no eye pain, visual disturbances, or discharge  ENT:  No difficulty hearing, tinnitus, vertigo; No sinus or throat pain  Neck: No pain or stiffness  Respiratory: No cough, dyspnea, wheezing   Cardiovascular: No chest pain, palpitations,   Gastrointestinal: + nausea, vomiting  No diarrhea or constipation.   Genitourinary: No dysuria, frequency, hematuria or incontinence  Neurological: No headaches,+ lightheadedness, + vertigo, numbness or tremors  Psychiatric: No depression, anxiety, mood swings or difficulty sleeping  Musculoskeletal: No joint pain or swelling; No muscle, back or extremity pain  Skin: No itching, burning, rashes or lesions   Lymph Nodes: No enlarged glands  Endocrine: No heat or cold intolerance; No hair loss   Allergy and Immunologic: No hives or eczema    On Neurological Examination:    Mental Status - Patient is alert, awake, oriented X3.       Follow simple commands  Follow complex commands      Speech -   Fluent                  Cranial Nerves - Pupils 3 mm equal and reactive to light,   extraocular eye movements intact.   smile symmetric  intact bilateral NLF    Motor Exam -   Right upper 4/5  Left upper 4/5  Right lower 3+/5  Left lower 3+/5      Muscle tone - is normal all over.  No asymmetry is seen.      Sensory    Bilateral intact to light touch      GENERAL Exam: Nontoxic , No Acute Distress + shingles forehead   	  HEENT:  normocephalic, atraumatic  		  LUNGS: Clear bilaterally    	  HEART: Normal S1S2   No murmur RRR        	  GI/ ABDOMEN:  Soft  Non tender    EXTREMITIES:   No Edema  No Clubbing  No Cyanosis No Edema    MUSCULOSKELETAL: Normal Range of Motion  	   SKIN: Normal  No Ecchymosis               LABS:  CBC Full  -  ( 10 Apr 2017 06:05 )  WBC Count : 7.1 K/uL  Hemoglobin : 12.6 g/dL  Hematocrit : 38.4 %  Platelet Count - Automated : 153 K/uL  Mean Cell Volume : 98.8 fl  Mean Cell Hemoglobin : 32.4 pg  Mean Cell Hemoglobin Concentration : 32.8 gm/dL  Auto Neutrophil # : x  Auto Lymphocyte # : x  Auto Monocyte # : x  Auto Eosinophil # : x  Auto Basophil # : x  Auto Neutrophil % : x  Auto Lymphocyte % : x  Auto Monocyte % : x  Auto Eosinophil % : x  Auto Basophil % : x      04-10    145  |  111<H>  |  30<H>  ----------------------------<  80  3.4<L>   |  26  |  1.30    Ca    7.9<L>      10 Apr 2017 06:05  Phos  2.1     04-10  Mg     1.8     04-10    TPro  4.8<L>  /  Alb  2.9<L>  /  TBili  0.8  /  DBili  x   /  AST  20  /  ALT  16  /  AlkPhos  44  04-10    Hemoglobin A1C:     LIVER FUNCTIONS - ( 10 Apr 2017 06:05 )  Alb: 2.9 g/dL / Pro: 4.8 g/dL / ALK PHOS: 44 U/L / ALT: 16 U/L / AST: 20 U/L / GGT: x           Vitamin B12   PT/INR - ( 10 Apr 2017 06:05 )   PT: 58.7 sec;   INR: 5.21 ratio         PTT - ( 09 Apr 2017 17:39 )  PTT:48.8 sec      RADIOLOGY    ANALYSIS AND PLAN:  This is an 89-year-old with episodes upon sitting up of lightheadedness, subtle vertigo, blurry vision, feeling warm, nauseous, and almost passing out versus passing out.    1.	Clinical impression is most likely syncopal event secondary to cerebral hypoperfusion with above constellation of symptoms of lightheadedness, blurry vision, feeling warm, nauseous, and weak all over.  .  2.	We will recommend telemetry evaluation to monitor the patient's atrial fibrillation.  3.	We will recommend to monitor the patient's systolic blood pressure.  4.	We will recommend to check orthostatics with the patient lying down and standing up.  5.	For positive subtle episodes of vertigo, appears to be mostly positional, suspect this could be inner ear dysfunction - h/o of shingles , vestibulopathy but in light of the patient having episodes of almost passing out, would recommend to rule out vertebrobasilar insufficiency.  I will plan for an MRI and MRA of the brain.  6.	For history of atrial fibrillation, continue anticoagulation with a goal INR between 2 and 3.  7.	For history of hypothyroidism, continue the patient on Synthroid.  8.	For subtle anxiety, continue the patient on Xanax.  9.	We will recommend fall precautions.  10.	We will recommend physical therapy evaluation.  11.	Spoke with daughter, Eli,   She understands my reasoning and thought process.  Her contact number is [phone] 4/10/17.        Physical therapy evaluation.  OOB to chair/ambulation with assistance only.  Advanced care planning was discussed with family.  Pain is accessed and addressed.  Pt was screened for signs of clinical depression. Appropriate referral made.  Risk of falls accessed. Fall prevention and plan of care was discussed with family.  Pt is screened for tobacco and alcohol use. Counseling was done for smoking and alcohol cessation.  Use of narcotic pain meds was dicussed. Pt is advised to use narcotic meds wisely and to refrain from over using them.  Plan of care was discussed with family. Questions answered.  Would continue to follow.  32 minutes spent on total encounter; more than 50% of the visit was spent counseling and/or coordinating care by the attending physician.

## 2017-04-10 NOTE — PROGRESS NOTE ADULT - PROBLEM SELECTOR PLAN 10
-no DVT ppx as pt currently has supratherapeutic INR.  -clear fluid diet  -aspiration precaution, fall precaution, ambulate with assistant

## 2017-04-10 NOTE — CONSULT NOTE ADULT - PROBLEM SELECTOR RECOMMENDATION 2
unclear if this is related to abx but see no further indication for abx so would stop.    Thank you for consulting us and involving us in the management of this most interesting and challenging case.     We will follow along in the care of this patient.

## 2017-04-10 NOTE — PROGRESS NOTE ADULT - SUBJECTIVE AND OBJECTIVE BOX
Patient seen in follow up for LETICIA, CKD 3; appears comfortable, no distress.    MEDICATIONS  (STANDING):  amLODIPine   Tablet 5milliGRAM(s) Oral daily  meclizine 25milliGRAM(s) Oral every 8 hours  carvedilol 12.5milliGRAM(s) Oral every 12 hours  BACItracin   Ophthalmic Ointment 1Application(s) Left EYE at bedtime  fluorometholone 0.1% Suspension 1Drop(s) Left EYE every 8 hours  levothyroxine 50MICROGram(s) Oral daily  sodium chloride 0.45%. 1000milliLiter(s) IV Continuous <Continuous>    MEDICATIONS  (PRN):  ondansetron Injectable 4milliGRAM(s) IV Push every 8 hours PRN Nausea and/or Vomiting    PHYSICAL EXAM:      T(C): 36.6, Max: 37.3 (04-10 @ 06:30)  HR: 48 (48 - 64)  BP: 143/65 (138/43 - 155/69)  RR: 16 (16 - 18)  SpO2: 96% (96% - 99%)  Wt(kg): --  HEENT: herpes zoster rash  Respiratory: clear anteriorly, decreased BS at bases  Cardiovascular: S1 S2  Gastrointestinal: soft NT ND +BS  Extremities:  1 + edema                                12.6   7.1   )-----------( 153      ( 10 Apr 2017 06:05 )             38.4     04-10    145  |  111<H>  |  30<H>  ----------------------------<  80  3.4<L>   |  26  |  1.30    Ca    7.9<L>      10 Apr 2017 06:05  Phos  2.1     04-10  Mg     1.8     04-10    TPro  4.8<L>  /  Alb  2.9<L>  /  TBili  0.8  /  DBili  x   /  AST  20  /  ALT  16  /  AlkPhos  44  04-10      LIVER FUNCTIONS - ( 10 Apr 2017 06:05 )  Alb: 2.9 g/dL / Pro: 4.8 g/dL / ALK PHOS: 44 U/L / ALT: 16 U/L / AST: 20 U/L / GGT: x             Assessment and Plan:  LETICIA, CKD 3; pre renal azotemia.  Creatinine from 12/2016 outpatient 1.6.  Judicious IVF, can d/c if po intake adequate.  Discussed with family.

## 2017-04-10 NOTE — DISCHARGE NOTE ADULT - CARE PLAN
Principal Discharge DX:	Vertigo  Goal:	resolved  Instructions for follow-up, activity and diet:	-likely 2/2 bradycardia, 1st degree AV block, RBBB  -d/c carvedilol per cardio due to bradycardia per cardiologist  -possible EP study outpatient  -meclizine prn?  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  Secondary Diagnosis:	Shingles  Goal:	-resolving  Instructions for follow-up, activity and diet:	-seen by Dr. Morrison (ID) and no concern for active infection, lesions crusted, no signs of superimposed bacterial infection  -finished course of acyclovir, not on any antibiotics  -cont bacitracin opth ointment and fluorometholone drop  Secondary Diagnosis:	LETICIA (acute kidney injury)  Goal:	improved  Instructions for follow-up, activity and diet:	-likely 2/2 dehydration  Secondary Diagnosis:	A-fib  Goal:	stable  Instructions for follow-up, activity and diet:	-paroxysmal  -continue coumadin and check INR as outpatient  Secondary Diagnosis:	Anxiety  Goal:	stable  Instructions for follow-up, activity and diet:	-continue home med xanax 0.25mg BID  Secondary Diagnosis:	Hypertension  Goal:	stable  Instructions for follow-up, activity and diet:	-continue home med norvasc 5mg po  -d/c carvedilol due to bradycardia per cardiologist  Secondary Diagnosis:	Hypothyroid  Goal:	stable  Instructions for follow-up, activity and diet:	-continue home med synthroid Principal Discharge DX:	Vertigo  Goal:	resolved  Instructions for follow-up, activity and diet:	-likely 2/2 bradycardia, 1st degree AV block, RBBB  -d/c carvedilol per cardio due to bradycardia   -possible EP study vs holter monitor as an outpatient  Secondary Diagnosis:	Shingles  Goal:	-resolving  Instructions for follow-up, activity and diet:	-seen by Dr. Morrison (ID) and no concern for active infection, lesions crusted, no signs of superimposed bacterial infection  -finished course of acyclovir, not on any antibiotics  -cont bacitracin opth ointment and fluorometholone drop  Secondary Diagnosis:	LETICIA (acute kidney injury)  Goal:	improved  Instructions for follow-up, activity and diet:	-likely 2/2 dehydration  Secondary Diagnosis:	A-fib  Goal:	stable  Instructions for follow-up, activity and diet:	-paroxysmal  -Hold Coumadin tonight and tomorrow night. Follow up with Dr. Joshi's office Friday and have your INR checked for appropriate dosing.  Secondary Diagnosis:	Anxiety  Goal:	stable  Instructions for follow-up, activity and diet:	-continue home med xanax 0.25mg BID  Secondary Diagnosis:	Hypertension  Goal:	stable  Instructions for follow-up, activity and diet:	-dose of norvasc increased to 10 mg daily now. Prescription was sent.   -d/c carvedilol due to bradycardia per cardiologist  Secondary Diagnosis:	Hypothyroid  Goal:	stable  Instructions for follow-up, activity and diet:	-continue home med synthroid

## 2017-04-10 NOTE — DISCHARGE NOTE ADULT - NS AS DC STROKE ED MATERIALS
Stroke Education Booklet/Call 911 for Stroke/Prescribed Medications/Atrial Fibrillation is a risk factor for strokes/Stroke Warning Signs and Symptoms/Risk Factors for Stroke/Need for Followup After Discharge Atrial Fibrillation is a risk factor for strokes

## 2017-04-10 NOTE — PROGRESS NOTE ADULT - ATTENDING COMMENTS
11) Supratherapeutic INR: Coumadin held, no signs of active bleeding. Monitor INR.  12) Hypophosphatemia: repleted, recheck in AM  13) Hypokalemia: repleted, recheck in AM

## 2017-04-10 NOTE — PROGRESS NOTE ADULT - PROBLEM SELECTOR PLAN 4
Paroxysmal  -Holding Coumadin in setting of supratherapeutic INR.  -c/w carvedilol at decreased dose with parameters 2/2 bradycardia.  -remote telemetry ordered

## 2017-04-10 NOTE — CONSULT NOTE ADULT - PROBLEM SELECTOR RECOMMENDATION 9
with this being fully scabbed over would not recommend contact precaustions or further treatment. Appearance consistent with healing and no obvious superinfection. Would not recommend abx

## 2017-04-10 NOTE — DISCHARGE NOTE ADULT - PATIENT PORTAL LINK FT
“You can access the [facility] Patient Portal, offered by [facility], by registering with the following website: [email or web address]”

## 2017-04-10 NOTE — DISCHARGE NOTE ADULT - MEDICATION SUMMARY - MEDICATIONS TO STOP TAKING
I will STOP taking the medications listed below when I get home from the hospital:    carvedilol 12.5 mg oral tablet  -- 1 tab(s) by mouth 2 times a day    triamterene-hydroCHLOROthiazide 37.5mg-25mg oral capsule  --  by mouth every other day    doxycycline hyclate 100 mg oral capsule  -- 1 cap(s) by mouth 2 times a day

## 2017-04-10 NOTE — CONSULT NOTE ADULT - SUBJECTIVE AND OBJECTIVE BOX
CHIEF COMPLAINT: Patient is a 89y old  Male who presents with a chief complaint of vertigo (10 Apr 2017 14:57)      HPI:  This is an 89 y.o man with a history of paroxysmal atrial fibrillation, on Coumadin for stroke risk reduction, anxiety, HTN, hypothyroidism, prostate cancer s/p radiation, chronic diarrhea, basal cell carcinoma( s/p removal), and lymphoma comes in for vertigo.  He reports that he gets very dizzy when changing position, such as standing up.  He reports that the world seems to spin around him.  He recently was treated for an episode of shingles on his forehead, and completed antimicrobial therapy for this.  He was also nauseous, but no vomiting.   fever/chills/vision change/cp/sob/palpitation/LOC/ nucha rigidity.  Interestingly, the patient had a fall a few months ago while walking on steps.  He suddenly felt dizzy and fell.  He does not recall losing consciousness.  He denies chest pain, dyspnea, PND, orthopnea, LE swelling.  He does not have headache.  He was found to have sinus bradycardia, a RBBB, LAFB, and first degree AVB.  He was never told of an abnormal EKG.  He is on Coreg 12.5  BID at home.  His thyroid was normal.    PAST MEDICAL & SURGICAL HISTORY:  Chronic diarrhea  Prostate CA  Basal cell carcinoma  Lymphoma  Anxiety  Hypothyroid  Hypertension  A-fib  H/O shoulder replacement  S/P bilateral unicompartmental knee replacement      SOCIAL HISTORY:  No tobacco, ethanol, or drug abuse.    FAMILY HISTORY:  No pertinent family history in first degree relatives    No family history of acute MI or sudden cardiac death.    MEDICATIONS  (STANDING):  amLODIPine   Tablet 5milliGRAM(s) Oral daily  meclizine 25milliGRAM(s) Oral every 8 hours  BACItracin   Ophthalmic Ointment 1Application(s) Left EYE at bedtime  fluorometholone 0.1% Suspension 1Drop(s) Left EYE every 8 hours  levothyroxine 50MICROGram(s) Oral daily  sodium chloride 0.45%. 1000milliLiter(s) IV Continuous <Continuous>  carvedilol 6.25milliGRAM(s) Oral every 12 hours    MEDICATIONS  (PRN):  ondansetron Injectable 4milliGRAM(s) IV Push every 8 hours PRN Nausea and/or Vomiting      Allergies    No Known Allergies      REVIEW OF SYSTEMS:    CONSTITUTIONAL: Weakness  EYES/ENT: No visual changes;  No vertigo or throat pain   NECK: No pain or stiffness  RESPIRATORY: No cough, wheezing, hemoptysis; No shortness of breath  CARDIOVASCULAR: See HPI  GASTROINTESTINAL: No abdominal pain. No nausea, vomiting, or hematemesis; No diarrhea or constipation. No melena or hematochezia.  GENITOURINARY: No dysuria, frequency or hematuria  NEUROLOGICAL: No numbness or weakness  SKIN: No itching or rash  All other review of systems is negative unless indicated above    VITAL SIGNS:   Vital Signs Last 24 Hrs  T(C): 36.6, Max: 37.3 (04-10 @ 06:30)  T(F): 97.8, Max: 99.2 (04-10 @ 06:30)  HR: 48 (48 - 64)  BP: 143/65 (138/43 - 155/69)  BP(mean): --  RR: 16 (16 - 18)  SpO2: 96% (96% - 99%)    I&O's Summary      On Exam:    Constitutional: NAD, alert and oriented x 3  Lungs:  Non-labored, breath sounds are clear bilaterally, No wheezing, rales or rhonchi  Cardiovascular: RRR. Bradycardia. S1 and S2 positive.  No murmurs, rubs, gallops or clicks  Gastrointestinal: Bowel Sounds present, soft, nontender.   Lymph: No peripheral edema. No cervical lymphadenopathy.  Neurological: Alert, no focal deficits  Skin: No rashes or ulcers   Psych:  Mood & affect appropriate.    LABS: All Labs Reviewed:                        12.6   7.1   )-----------( 153      ( 10 Apr 2017 06:05 )             38.4                         14.8   5.4   )-----------( 157      ( 09 Apr 2017 12:44 )             45.1     10 Apr 2017 06:05    145    |  111    |  30     ----------------------------<  80     3.4     |  26     |  1.30   09 Apr 2017 12:44    145    |  106    |  30     ----------------------------<  95     3.7     |  29     |  1.60     Ca    7.9        10 Apr 2017 06:05  Ca    8.9        09 Apr 2017 12:44  Phos  2.1       10 Apr 2017 06:05  Mg     1.8       10 Apr 2017 06:05    TPro  4.8    /  Alb  2.9    /  TBili  0.8    /  DBili  x      /  AST  20     /  ALT  16     /  AlkPhos  44     10 Apr 2017 06:05  TPro  5.7    /  Alb  3.5    /  TBili  0.8    /  DBili  x      /  AST  22     /  ALT  17     /  AlkPhos  54     09 Apr 2017 12:44    PT/INR - ( 10 Apr 2017 06:05 )   PT: 58.7 sec;   INR: 5.21 ratio         PTT - ( 09 Apr 2017 17:39 )  PTT:48.8 sec  CARDIAC MARKERS ( 09 Apr 2017 12:44 )  <.015 ng/mL / x     / 26 U/L / x     / x        04-09 @ 12:44  TSH: 1.61      RADIOLOGY:  EXAM:  MRA HEAD W O CONTRAST                            PROCEDURE DATE:  04/10/2017        INTERPRETATION:  History: Dizziness.    MRA brain, 3-D time-of-flight, no contrast.    Images are of diagnostic quality.  [place] [facility] [place] is competent. No aneurysms.  The basilar artery intracranial carotids, proximal anterior middle   posterior cerebral arteries patent smooth without EXAM:  PORTABLE CHEST URGENT                            PROCEDURE DATE:  04/09/2017        INTERPRETATION:  Chest, frontal portable view    HISTORY: Dizziness and chest pain    Comparison: 4/25    Findings:    The mediastinal cardiac silhouette is normal. The lungs are clear.   Osseous structures and soft tissues are unremarkable.    IMPRESSION: No acute findings.without significant   narrowing, filling defect or luminal irregularity.    Impression:    Unremarkable MRA.      EXAM:  MRI BRAIN W O CONTRAST                            PROCEDURE DATE:  04/10/2017        INTERPRETATION:  History: Vertigo.    MR brain multisequence. No contrast. Noncontrast head CT of 4/9/2017.    Age-appropriate global central cortical cerebral parenchymal volume loss   without disproportionate ventriculomegaly. Moderate scattered T2   prolongation signal supratentorial white matter consistent with chronic   ischemic gliotic change. No restricted diffusion to suggest acute or   subacute infarct. No bleed or edema. No mass effect.  Parasellar region not remarkable. Posterior fossa structures not   remarkable. Mucosal thickening maxillary sinuses. Polyp/retention cyst   right maxillary sinus.    Impression: Unremarkable brain for age on this noncontrast study. No   acute or focal pathology.       CXR: Negative      EKG:  Sinus bradycardia.  RBBB.  LAFB.  First degree AVB.    Assessment/Plan:  89y man with the above history with vertigo, a recent Zoster infection, an episode of dizziness and fall, and an EKG showing a RBBB, LAFB, first degree AVB:    - Admit to remote telemetry  - Monitor rhythm  - TSH normal  - Decrease carvedilol to 6.25 BID. Hold for HR less than 50.  We may need to reduce the dose further  - check 2 D echocardiogram  - Will need 30 day event monitor after discharge  - ? if this is related to recent Zoster infection.  Work up per primary team.    - Continue amlodipine 5 QD  - Neurology follow up  - To follow with you CHIEF COMPLAINT: Patient is a 89y old  Male who presents with a chief complaint of vertigo (10 Apr 2017 14:57)      HPI:  This is an 89 y.o man with a history of paroxysmal atrial fibrillation, on Coumadin for stroke risk reduction, anxiety, HTN, hypothyroidism, prostate cancer s/p radiation, chronic diarrhea, basal cell carcinoma( s/p removal), and lymphoma comes in for vertigo.  He reports that he gets very dizzy when changing position, such as standing up.  He reports that the world seems to spin around him.  He recently was treated for an episode of shingles on his forehead, and completed antimicrobial therapy for this.  He was also nauseous, but no vomiting.   fever/chills/vision change/cp/sob/palpitation/LOC/ nucha rigidity.  Interestingly, the patient had a fall a few months ago while walking on steps.  He suddenly felt dizzy and fell.  He does not recall losing consciousness.  He denies chest pain, dyspnea, PND, orthopnea, LE swelling.  He does not have headache.  He was found to have sinus bradycardia, a RBBB, LAFB, and first degree AVB.  He was never told of an abnormal EKG.  He is on Coreg 12.5  BID at home.  His thyroid was normal.    PAST MEDICAL & SURGICAL HISTORY:  Chronic diarrhea  Prostate CA  Basal cell carcinoma  Lymphoma  Anxiety  Hypothyroid  Hypertension  A-fib  H/O shoulder replacement  S/P bilateral unicompartmental knee replacement      SOCIAL HISTORY:  No tobacco, ethanol, or drug abuse.    FAMILY HISTORY:  No pertinent family history in first degree relatives    No family history of acute MI or sudden cardiac death.    MEDICATIONS  (STANDING):  amLODIPine   Tablet 5milliGRAM(s) Oral daily  meclizine 25milliGRAM(s) Oral every 8 hours  BACItracin   Ophthalmic Ointment 1Application(s) Left EYE at bedtime  fluorometholone 0.1% Suspension 1Drop(s) Left EYE every 8 hours  levothyroxine 50MICROGram(s) Oral daily  sodium chloride 0.45%. 1000milliLiter(s) IV Continuous <Continuous>  carvedilol 6.25milliGRAM(s) Oral every 12 hours    MEDICATIONS  (PRN):  ondansetron Injectable 4milliGRAM(s) IV Push every 8 hours PRN Nausea and/or Vomiting      Allergies    No Known Allergies      REVIEW OF SYSTEMS:    CONSTITUTIONAL: Weakness  EYES/ENT: No visual changes;  No vertigo or throat pain   NECK: No pain or stiffness  RESPIRATORY: No cough, wheezing, hemoptysis; No shortness of breath  CARDIOVASCULAR: See HPI  GASTROINTESTINAL: No abdominal pain. No nausea, vomiting, or hematemesis; No diarrhea or constipation. No melena or hematochezia.  GENITOURINARY: No dysuria, frequency or hematuria  NEUROLOGICAL: No numbness or weakness  SKIN: No itching or rash  All other review of systems is negative unless indicated above    VITAL SIGNS:   Vital Signs Last 24 Hrs  T(C): 36.6, Max: 37.3 (04-10 @ 06:30)  T(F): 97.8, Max: 99.2 (04-10 @ 06:30)  HR: 48 (48 - 64)  BP: 143/65 (138/43 - 155/69)  BP(mean): --  RR: 16 (16 - 18)  SpO2: 96% (96% - 99%)    I&O's Summary      On Exam:    Constitutional: NAD, alert and oriented x 3  Lungs:  Non-labored, breath sounds are clear bilaterally, No wheezing, rales or rhonchi  Cardiovascular: RRR. Bradycardia. S1 and S2 positive.  No murmurs, rubs, gallops or clicks  Gastrointestinal: Bowel Sounds present, soft, nontender.   Lymph: No peripheral edema. No cervical lymphadenopathy.  Neurological: Alert, no focal deficits  Skin: No rashes or ulcers   Psych:  Mood & affect appropriate.    LABS: All Labs Reviewed:                        12.6   7.1   )-----------( 153      ( 10 Apr 2017 06:05 )             38.4                         14.8   5.4   )-----------( 157      ( 09 Apr 2017 12:44 )             45.1     10 Apr 2017 06:05    145    |  111    |  30     ----------------------------<  80     3.4     |  26     |  1.30   09 Apr 2017 12:44    145    |  106    |  30     ----------------------------<  95     3.7     |  29     |  1.60     Ca    7.9        10 Apr 2017 06:05  Ca    8.9        09 Apr 2017 12:44  Phos  2.1       10 Apr 2017 06:05  Mg     1.8       10 Apr 2017 06:05    TPro  4.8    /  Alb  2.9    /  TBili  0.8    /  DBili  x      /  AST  20     /  ALT  16     /  AlkPhos  44     10 Apr 2017 06:05  TPro  5.7    /  Alb  3.5    /  TBili  0.8    /  DBili  x      /  AST  22     /  ALT  17     /  AlkPhos  54     09 Apr 2017 12:44    PT/INR - ( 10 Apr 2017 06:05 )   PT: 58.7 sec;   INR: 5.21 ratio         PTT - ( 09 Apr 2017 17:39 )  PTT:48.8 sec  CARDIAC MARKERS ( 09 Apr 2017 12:44 )  <.015 ng/mL / x     / 26 U/L / x     / x        04-09 @ 12:44  TSH: 1.61      RADIOLOGY:  EXAM:  MRA HEAD W O CONTRAST                            PROCEDURE DATE:  04/10/2017        INTERPRETATION:  History: Dizziness.    MRA brain, 3-D time-of-flight, no contrast.    Images are of diagnostic quality.  [place] is competent. No aneurysms.  The basilar artery intracranial carotids, proximal anterior middle   posterior cerebral arteries patent smooth without EXAM:  PORTABLE CHEST URGENT                            PROCEDURE DATE:  04/09/2017        INTERPRETATION:  Chest, frontal portable view    HISTORY: Dizziness and chest pain    Comparison: 4/25    Findings:    The mediastinal cardiac silhouette is normal. The lungs are clear.   Osseous structures and soft tissues are unremarkable.    IMPRESSION: No acute findings.without significant   narrowing, filling defect or luminal irregularity.    Impression:    Unremarkable MRA.      EXAM:  MRI BRAIN W O CONTRAST                            PROCEDURE DATE:  04/10/2017        INTERPRETATION:  History: Vertigo.    MR brain multisequence. No contrast. Noncontrast head CT of 4/9/2017.    Age-appropriate global central cortical cerebral parenchymal volume loss   without disproportionate ventriculomegaly. Moderate scattered T2   prolongation signal supratentorial white matter consistent with chronic   ischemic gliotic change. No restricted diffusion to suggest acute or   subacute infarct. No bleed or edema. No mass effect.  Parasellar region not remarkable. Posterior fossa structures not   remarkable. Mucosal thickening maxillary sinuses. Polyp/retention cyst   right maxillary sinus.    Impression: Unremarkable brain for age on this noncontrast study. No   acute or focal pathology.       CXR: Negative      EKG:  Sinus bradycardia.  RBBB.  LAFB.  First degree AVB.    Assessment/Plan:  89y man with the above history with vertigo, a recent Zoster infection, an episode of dizziness and fall, and an EKG showing a RBBB, LAFB, first degree AVB:    - Admit to remote telemetry  - Monitor rhythm  - TSH normal  - Decrease carvedilol to 6.25 BID. Hold for HR less than 50.  We may need to reduce the dose further  - check 2 D echocardiogram  - Will need 30 day event monitor after discharge  - ? if this is related to recent Zoster infection.  Work up per primary team.    - Continue amlodipine 5 QD  - Hold Coumadin as INR supratherapuetic.  Dose for goal 2-3.  - Neurology follow up  - To follow with you

## 2017-04-10 NOTE — DISCHARGE NOTE ADULT - HOSPITAL COURSE
88yo M PMH of Afib (on coumadin), anxiety, HTN, hypothyroidism, hx of prostate ca (s/p radiation), hx of chronic diarrhea, basal cell carcinoma (s/p removal), and lymphoma comes in for vertigo. Pt had L forehead shingle 10 days prior to admission and was seeing by derm and ophthalmologist and completed 1 week course of valacyclovir and was also put on cipro and later switch to doxy due to superimposed bacteria infection of his L forehead and L eyelids. Per Pt, the ophthalmologist did not think that the shingle was spread to eye tissue. Pt reported this morning, after he woke up, he felt the room was spinning around him. He also felt nauseous, but no vomiting. Denied of fever/chills/vision change/cp/sob/palpitation/LOC/ nucha rigidity.   Pt had similar episode 1 yr ago and which was last short period and was told by PCP to drink more water. Pt reported decrease fluid and food intake for 2 weeks prior to admission 2/2 loss of appetite and lost about 8 pounds during last month.    In ED, pt was hypertensive aroud 180s/80s. Cr elevated at 1.5 and BUN elevated at 30, TSH wnl, cardiac enzyme wnlx1. CT head negative for acute infract. CXR no acute finding  Was given 1x meclizine, 2 x reglan and 1 x Zofran and 2x bolus NS. ECG showed sinus rhythms with 1st degree heart block and RBBB.     Pt dizziness improved, however INR was supratherapeutic on 4/10 & 4/11 (5.21 & 5.24, respectively). Pt continued to be bradycardic, but was asymptomatic. No other cardiac events. LETICIA improved, Cr normalized. On day of discharge, pt INR decreased to 3.24.     Pt was seen by Dr. Khan (neuro), Dr. Morrison (infectious diseases), Dr. Alvarado (cardio), Dr. Pack (renal).     Pt was seen and examined at bedside on day of discharge. 89yo M PMH of Afib (on coumadin), anxiety, HTN, hypothyroidism, hx of prostate ca (s/p radiation), hx of chronic diarrhea, basal cell carcinoma (s/p removal), and lymphoma comes in for vertigo. Pt had L forehead shingle 10 days prior to admission and was seeing by derm and ophthalmologist and completed 1 week course of valacyclovir and was also put on cipro and later switch to doxy due to superimposed bacteria infection of his L forehead and L eyelids. Per Pt, the ophthalmologist did not think that the shingle was spread to eye tissue. Pt reported this morning, after he woke up, he felt the room was spinning around him. He also felt nauseous, but no vomiting. Denied of fever/chills/vision change/cp/sob/palpitation/LOC/ nucha rigidity.   Pt had similar episode 1 yr ago and which was last short period and was told by PCP to drink more water. Pt reported decrease fluid and food intake for 2 weeks prior to admission 2/2 loss of appetite and lost about 8 pounds during last month.    In ED, pt was hypertensive aroud 180s/80s. Cr elevated at 1.5 and BUN elevated at 30, TSH wnl, cardiac enzyme wnlx1. CT head negative for acute infract. CXR no acute finding  Was given 1x meclizine, 2 x reglan and 1 x Zofran and 2x bolus NS. ECG showed sinus rhythms with 1st degree heart block and RBBB.     Pt dizziness improved, however INR was supratherapeutic on 4/10 & 4/11 (5.21 & 5.24, respectively). Pt continued to be bradycardic, but was asymptomatic. No other cardiac events. LETICIA improved, Cr normalized. Negative MRI/MRA. On day of discharge, pt INR decreased to 3.24.     Pt was seen by Dr. Khan (neuro), Dr. Morrison (infectious diseases), Dr. Alvarado (cardio), Dr. Pack (renal).     Pt was seen and examined at bedside on day of discharge. Pt was pleasant and in good spirits and wished to go home. Daughter was at bedside.     VITAL SIGNS:  Vital Signs Last 24 Hrs  T(C): 36.9, Max: 36.9 (04-12 @ 05:40)  T(F): 98.4, Max: 98.4 (04-12 @ 05:40)  HR: 56 (50 - 56)  BP: 159/77 (143/66 - 159/77)  RR: 17 (17 - 18)  SpO2: 94% (94% - 97%)    PHYSICAL EXAM:  GENERAL: NAD, well-groomed, well-developed  HEAD:  Atraumatic, Normocephalic  EYES: EOMI, PERRLA  ENMT: intact   NECK: Supple  NERVOUS SYSTEM:  Alert & Oriented X2, Good concentration;  CHEST/LUNG: Clear to percussion bilaterally; No rales, rhonchi, wheezing, or rubs  HEART: RR bradycardia  ABDOMEN: Soft, NT, ND, BSx4   EXTREMITIES:  2+ Peripheral Pulses, No clubbing, cyanosis, or edema  LYMPH: No lymphadenopathy noted  SKIN: L forehead L eyelid scabbing 2/2 herpes zoster infection 89yo M PMH of Afib (on coumadin), anxiety, HTN, hypothyroidism, hx of prostate ca (s/p radiation), hx of chronic diarrhea, basal cell carcinoma (s/p removal), and lymphoma comes in for vertigo. Pt had L forehead shingle 10 days prior to admission and was seeing by derm and ophthalmologist and completed 1 week course of valacyclovir and was also put on cipro and later switch to doxy due to superimposed bacteria infection of his L forehead and L eyelids. Per Pt, the ophthalmologist did not think that the shingle was spread to eye tissue. Pt reported this morning, after he woke up, he felt the room was spinning around him. He also felt nauseous, but no vomiting. Denied of fever/chills/vision change/cp/sob/palpitation/LOC/ nucha rigidity.   Pt had similar episode 1 yr ago and which was last short period and was told by PCP to drink more water. Pt reported decrease fluid and food intake for 2 weeks prior to admission 2/2 loss of appetite and lost about 8 pounds during last month.    In ED, pt was hypertensive aroud 180s/80s. Cr elevated at 1.5 and BUN elevated at 30, TSH wnl, cardiac enzyme wnlx1. CT head negative for acute infract. CXR no acute finding  Was given 1x meclizine, 2 x reglan and 1 x Zofran and 2x bolus NS. ECG showed sinus rhythms with 1st degree heart block and RBBB.     Pt dizziness improved, however INR was supratherapeutic on 4/10 & 4/11 (5.21 & 5.24, respectively). Pt continued to be bradycardic, but was asymptomatic. No other cardiac events. LETICIA improved, Cr normalized. Negative MRI/MRA. On day of discharge, pt INR decreased to 3.24.     Pt was seen by Dr. Khan (neuro), Dr. Morrison (infectious diseases), Dr. Alvarado (cardio), Dr. Pack (renal).     Pt was seen and examined at bedside on day of discharge. Pt was pleasant and in good spirits and wished to go home. Daughter was at bedside. Patient ambulated with both physical therapy and Neurologist and had no vertigo.     VITAL SIGNS:  Vital Signs Last 24 Hrs  T(C): 36.9, Max: 36.9 (04-12 @ 05:40)  T(F): 98.4, Max: 98.4 (04-12 @ 05:40)  HR: 56 (50 - 56)  BP: 159/77 (143/66 - 159/77)  RR: 17 (17 - 18)  SpO2: 94% (94% - 97%)    PHYSICAL EXAM:  GENERAL: NAD, well-groomed, well-developed  HEAD:  Atraumatic, Normocephalic  EYES: EOMI, PERRLA  ENMT: intact   NECK: Supple  NERVOUS SYSTEM:  Alert & Oriented X2, Good concentration;  CHEST/LUNG: Clear to percussion bilaterally; No rales, rhonchi, wheezing, or rubs  HEART: RR bradycardia  ABDOMEN: Soft, NT, ND, BSx4   EXTREMITIES:  2+ Peripheral Pulses, No clubbing, cyanosis, or edema  LYMPH: No lymphadenopathy noted  SKIN: L forehead L eyelid scabbing 2/2 herpes zoster infection    Case discussed with Dr. Luna (covering Dr. Joshi). Advised that Coumadin will be held until INR check with them Friday. Also advised that patient will need an event monitor after discharge. Agrees with this plan.     Time spent: 35 minutes.

## 2017-04-10 NOTE — DISCHARGE NOTE ADULT - MEDICATION SUMMARY - MEDICATIONS TO CHANGE
I will SWITCH the dose or number of times a day I take the medications listed below when I get home from the hospital:    warfarin 5 mg oral tablet  -- 1 tab(s) by mouth once a day

## 2017-04-10 NOTE — CONSULT NOTE ADULT - SUBJECTIVE AND OBJECTIVE BOX
HPI:  89 y.o M w/Pmhx of Afib( on coumadin), anxiety, HTN, hypothyroidism, hx of prostate ca( s/p radiation), hx of chronic diarrhea, basal cell carcinoma( s/p removal), and lymphoma comes in for vertigo. Pt had Lt forehead shingle 10 days ago and was seeing by derm and ophthalmologist s/p 1 weeks course of valacyclovir and was also put on cipro and later switch to doxy with concern for superimposed bacteria infection of his Lt forehead and Lt eyelids. Per Pt the ophthalmologist examed pt and did not think that the shingle was spread to eye tissue. Pt reported, after he woke up, he felt the room  was spinning around him. He also felt nausea no vomiting. Denies of fever/chills/vision change/cp/sob/palpitation/LOC/ nucha rigidity.   Pt had similar episode 1 yr ago and which was last short period and was told by PCP to drink more water. Pt reported decrease fluid and food intake this 2 weeks 2/2 loss of appetite and lost about 8 pounds during last month.    In ED, pt was hypertensive aroud 180s/80s. Cr elevated at 1.5 and BUN elevated at 30, TSH wnl, cardiac enzyme wnlx1. CT head negative for acute infract. CXR no acute finding  Was given 1x meclizine, 2 x reglan and 1 x Zofran and 2x bolus NS. ECG showed sinus rhythms with 1st degrees' HB and RBBB consulted neuro( ) (09 Apr 2017 17:14)      PAST MEDICAL & SURGICAL HISTORY:  Chronic diarrhea  Prostate CA  Basal cell carcinoma  Lymphoma  Anxiety  Hypothyroid  Hypertension  A-fib  H/O shoulder replacement  S/P bilateral unicompartmental knee replacement      MEDICATIONS  (STANDING):  amLODIPine   Tablet 5milliGRAM(s) Oral daily  meclizine 25milliGRAM(s) Oral every 8 hours  carvedilol 12.5milliGRAM(s) Oral every 12 hours  BACItracin   Ophthalmic Ointment 1Application(s) Left EYE at bedtime  fluorometholone 0.1% Suspension 1Drop(s) Left EYE every 8 hours  levothyroxine 50MICROGram(s) Oral daily  sodium chloride 0.45%. 1000milliLiter(s) IV Continuous <Continuous>  potassium phosphate IVPB 15milliMole(s) IV Intermittent once    MEDICATIONS  (PRN):  ondansetron Injectable 4milliGRAM(s) IV Push every 8 hours PRN Nausea and/or Vomiting      Allergies    No Known Allergies    Intolerances        SOCIAL HISTORY:    FAMILY HISTORY:  No pertinent family history in first degree relatives      Vital Signs Last 24 Hrs  T(C): 36.6, Max: 37.3 (04-10 @ 06:30)  T(F): 97.8, Max: 99.2 (04-10 @ 06:30)  HR: 58 (55 - 64)  BP: 142/55 (138/43 - 183/82)  BP(mean): --  RR: 16 (16 - 18)  SpO2: 98% (97% - 99%)    PE:  WDWN in no distress  HEENT:  NC, PERRL, sclerae erythematous on left with some swelling closing the eyelip, conjunctivae clear, EOMI.  Sinuses nontender, no nasal exudate.  No buccal or pharyngeal lesions, erythema or exudate  Neck:  Supple, no adenopathy  Lungs:  Clear to auscultation  Cor:  RRR, S1, S2, no murmur appreciated  Abd:  Symmetric, normoactive BS.  Soft, nontender, no masses, guarding or rebound.  Liver and spleen not enlarged  Extrem:  No cyanosis or edema  Skin:  left forehead with dry crusted scabbed area not crossing midline    LABS:                        12.6   7.1   )-----------( 153      ( 10 Apr 2017 06:05 )             38.4     04-10    145  |  111<H>  |  30<H>  ----------------------------<  80  3.4<L>   |  26  |  1.30    Ca    7.9<L>      10 Apr 2017 06:05  Phos  2.1     04-10  Mg     1.8     04-10    TPro  4.8<L>  /  Alb  2.9<L>  /  TBili  0.8  /  DBili  x   /  AST  20  /  ALT  16  /  AlkPhos  44  04-10        MICROBIOLOGY:      RADIOLOGY & ADDITIONAL STUDIES:    --

## 2017-04-11 LAB
ANION GAP SERPL CALC-SCNC: 9 MMOL/L — SIGNIFICANT CHANGE UP (ref 5–17)
BUN SERPL-MCNC: 28 MG/DL — HIGH (ref 7–23)
CALCIUM SERPL-MCNC: 8.2 MG/DL — LOW (ref 8.5–10.1)
CHLORIDE SERPL-SCNC: 110 MMOL/L — HIGH (ref 96–108)
CO2 SERPL-SCNC: 26 MMOL/L — SIGNIFICANT CHANGE UP (ref 22–31)
CREAT SERPL-MCNC: 1.3 MG/DL — SIGNIFICANT CHANGE UP (ref 0.5–1.3)
GLUCOSE SERPL-MCNC: 80 MG/DL — SIGNIFICANT CHANGE UP (ref 70–99)
HCT VFR BLD CALC: 39.4 % — SIGNIFICANT CHANGE UP (ref 39–50)
HGB BLD-MCNC: 12.5 G/DL — LOW (ref 13–17)
INR BLD: 5.24 RATIO — CRITICAL HIGH (ref 0.88–1.16)
MCHC RBC-ENTMCNC: 31.3 PG — SIGNIFICANT CHANGE UP (ref 27–34)
MCHC RBC-ENTMCNC: 31.7 GM/DL — LOW (ref 32–36)
MCV RBC AUTO: 98.9 FL — SIGNIFICANT CHANGE UP (ref 80–100)
PHOSPHATE SERPL-MCNC: 2 MG/DL — LOW (ref 2.5–4.5)
PLATELET # BLD AUTO: 159 K/UL — SIGNIFICANT CHANGE UP (ref 150–400)
POTASSIUM SERPL-MCNC: 3.7 MMOL/L — SIGNIFICANT CHANGE UP (ref 3.5–5.3)
POTASSIUM SERPL-SCNC: 3.7 MMOL/L — SIGNIFICANT CHANGE UP (ref 3.5–5.3)
PROTHROM AB SERPL-ACNC: 59.1 SEC — HIGH (ref 9.8–12.7)
RBC # BLD: 3.98 M/UL — LOW (ref 4.2–5.8)
RBC # FLD: 14.2 % — SIGNIFICANT CHANGE UP (ref 10.3–14.5)
SODIUM SERPL-SCNC: 145 MMOL/L — SIGNIFICANT CHANGE UP (ref 135–145)
WBC # BLD: 6.3 K/UL — SIGNIFICANT CHANGE UP (ref 3.8–10.5)
WBC # FLD AUTO: 6.3 K/UL — SIGNIFICANT CHANGE UP (ref 3.8–10.5)

## 2017-04-11 PROCEDURE — 99233 SBSQ HOSP IP/OBS HIGH 50: CPT

## 2017-04-11 PROCEDURE — 99233 SBSQ HOSP IP/OBS HIGH 50: CPT | Mod: GC

## 2017-04-11 RX ORDER — SODIUM,POTASSIUM PHOSPHATES 278-250MG
1 POWDER IN PACKET (EA) ORAL EVERY 12 HOURS
Qty: 0 | Refills: 0 | Status: DISCONTINUED | OUTPATIENT
Start: 2017-04-11 | End: 2017-04-12

## 2017-04-11 RX ADMIN — Medication 25 MILLIGRAM(S): at 17:32

## 2017-04-11 RX ADMIN — Medication 1 TABLET(S): at 17:32

## 2017-04-11 RX ADMIN — FLUOROMETHOLONE 1 DROP(S): 1 SOLUTION/ DROPS OPHTHALMIC at 13:22

## 2017-04-11 RX ADMIN — FLUOROMETHOLONE 1 DROP(S): 1 SOLUTION/ DROPS OPHTHALMIC at 06:17

## 2017-04-11 RX ADMIN — BACITRACIN 1 APPLICATION(S): 500 OINTMENT OPHTHALMIC at 13:22

## 2017-04-11 RX ADMIN — BACITRACIN 1 APPLICATION(S): 500 OINTMENT OPHTHALMIC at 06:17

## 2017-04-11 RX ADMIN — CARVEDILOL PHOSPHATE 6.25 MILLIGRAM(S): 80 CAPSULE, EXTENDED RELEASE ORAL at 06:17

## 2017-04-11 RX ADMIN — Medication 25 MILLIGRAM(S): at 08:23

## 2017-04-11 RX ADMIN — AMLODIPINE BESYLATE 5 MILLIGRAM(S): 2.5 TABLET ORAL at 06:17

## 2017-04-11 RX ADMIN — Medication 50 MICROGRAM(S): at 06:17

## 2017-04-11 NOTE — PROGRESS NOTE ADULT - PROBLEM SELECTOR PLAN 3
- dehydration.   Creatinine improving. Continue with IVF for now.  -osmolarity study and bmp in am  -consult renal

## 2017-04-11 NOTE — PROGRESS NOTE ADULT - ATTENDING COMMENTS
11) Supratherapeutic INR: Coumadin held, no signs of active bleeding. Monitor INR.  12) Hypophosphatemia: replated , will watch pt off coumadin / cont hydration ,  fu electrolyte . pt will need out pt loop recorder out pt for  bifascicular block/ bradycardia.pt evaluation.

## 2017-04-11 NOTE — PROGRESS NOTE ADULT - ASSESSMENT
89 y.o M w/Pmhx of Afib( on Coumadin), anxiety, HTN, hypothyroidism, hx of prostate CA (s/p radiation), hx of chronic diarrhea, basal cell carcinoma (s/p removal), and lymphoma comes in for vertigo  / dizziness .

## 2017-04-11 NOTE — PROGRESS NOTE ADULT - SUBJECTIVE AND OBJECTIVE BOX
Neurology follow up note    LIZBETH [id number]      Interval History:    Patient feels ok no new complaints. seen with daughter no  vertigo     MEDICATIONS    amLODIPine   Tablet 5milliGRAM(s) Oral daily  meclizine 25milliGRAM(s) Oral every 8 hours  ondansetron Injectable 4milliGRAM(s) IV Push every 8 hours PRN  fluorometholone 0.1% Suspension 1Drop(s) Left EYE every 8 hours  levothyroxine 50MICROGram(s) Oral daily  sodium chloride 0.45%. 1000milliLiter(s) IV Continuous <Continuous>  carvedilol 6.25milliGRAM(s) Oral every 12 hours  BACItracin   Ophthalmic Ointment 1Application(s) Left EYE three times a day  potassium acid phosphate/sodium acid phosphate tablet (K-PHOS No. 2) 1Tablet(s) Oral every 12 hours      Allergies    No Known Allergies    Intolerances        Height (cm): 172.7 (04-10 @ 17:08)    Vital Signs Last 24 Hrs  T(C): 36.6, Max: 36.8 (04-10 @ 22:02)  T(F): 97.8, Max: 98.2 (04-10 @ 22:02)  HR: 50 (48 - 51)  BP: 147/76 (143/60 - 147/76)  BP(mean): --  RR: 17 (16 - 17)  SpO2: 95% (95% - 97%)      REVIEW OF SYSTEMS:     Constitutional: No fever, chills, fatigue, weakness  Eyes: no eye pain, visual disturbances, or discharge  ENT:  No difficulty hearing, tinnitus, vertigo; No sinus or throat pain  Neck: No pain or stiffness  Respiratory: No cough, dyspnea, wheezing   Cardiovascular: No chest pain, palpitations,   Gastrointestinal: + nausea, vomiting  No diarrhea or constipation.   Genitourinary: No dysuria, frequency, hematuria or incontinence  Neurological: No headaches,+ lightheadedness,  no vertigo, numbness or tremors  Psychiatric: No depression, anxiety, mood swings or difficulty sleeping  Musculoskeletal: No joint pain or swelling; No muscle, back or extremity pain  Skin: No itching, burning, rashes or lesions   Lymph Nodes: No enlarged glands  Endocrine: No heat or cold intolerance; No hair loss   Allergy and Immunologic: No hives or eczema    On Neurological Examination:    Mental Status - Patient is alert, awake, oriented X3.       Follow simple commands  Follow complex commands      Speech -   Fluent                  Cranial Nerves - Pupils 3 mm equal and reactive to light,   extraocular eye movements intact.   smile symmetric  intact bilateral NLF    Motor Exam -   Right upper 4/5  Left upper 4/5  Right lower 3+/5  Left lower 3+/5      Muscle tone - is normal all over.  No asymmetry is seen.      Sensory    Bilateral intact to light touch      GENERAL Exam: Nontoxic , No Acute Distress + shingles forehead   	  HEENT:  normocephalic, atraumatic  		  LUNGS: Clear bilaterally    	  HEART: Normal S1S2   No murmur RRR        	  GI/ ABDOMEN:  Soft  Non tender    EXTREMITIES:   No Edema  No Clubbing  No Cyanosis No Edema    MUSCULOSKELETAL: Normal Range of Motion  	   SKIN: Normal  No Ecchymosis               LABS:  CBC Full  -  ( 11 Apr 2017 08:59 )  WBC Count : 6.3 K/uL  Hemoglobin : 12.5 g/dL  Hematocrit : 39.4 %  Platelet Count - Automated : 159 K/uL  Mean Cell Volume : 98.9 fl  Mean Cell Hemoglobin : 31.3 pg  Mean Cell Hemoglobin Concentration : 31.7 gm/dL  Auto Neutrophil # : x  Auto Lymphocyte # : x  Auto Monocyte # : x  Auto Eosinophil # : x  Auto Basophil # : x  Auto Neutrophil % : x  Auto Lymphocyte % : x  Auto Monocyte % : x  Auto Eosinophil % : x  Auto Basophil % : x      04-11    145  |  110<H>  |  28<H>  ----------------------------<  80  3.7   |  26  |  1.30    Ca    8.2<L>      11 Apr 2017 08:59  Phos  2.0     04-11  Mg     1.8     04-10    TPro  4.8<L>  /  Alb  2.9<L>  /  TBili  0.8  /  DBili  x   /  AST  20  /  ALT  16  /  AlkPhos  44  04-10    Hemoglobin A1C:     LIVER FUNCTIONS - ( 10 Apr 2017 06:05 )  Alb: 2.9 g/dL / Pro: 4.8 g/dL / ALK PHOS: 44 U/L / ALT: 16 U/L / AST: 20 U/L / GGT: x           Vitamin B12   PT/INR - ( 11 Apr 2017 08:59 )   PT: 59.1 sec;   INR: 5.24 ratio         PTT - ( 09 Apr 2017 17:39 )  PTT:48.8 sec      RADIOLOGY    ANALYSIS AND PLAN:  This is an 89-year-old with episodes upon sitting up of lightheadedness, subtle vertigo, blurry vision, feeling warm, nauseous, and almost passing out versus passing out.    1.	Clinical impression is most likely syncopal event secondary to cerebral hypoperfusion with above constellation of symptoms of lightheadedness, blurry vision, feeling warm, nauseous, and weak all over.   possible secondary to cardiac issues .  2.	We will recommend telemetry evaluation to monitor   3.	We will recommend to monitor the patient's systolic blood pressure.  4.	For positive subtle episodes of vertigo, appears to be mostly positional, suspect this could be inner ear dysfunction - h/o of shingles , vestibulopathy but in light of the patient having episodes of almost passing out, would recommend to rule out vertebrobasilar insufficiency.  I will plan for an MRI and MRA was normal   5.	For history of atrial fibrillation, continue anticoagulation with a goal INR between 2 and 3.  6.	For history of hypothyroidism, continue the patient on Synthroid.  7.	For subtle anxiety, continue the patient on Xanax.  8.	We will recommend fall precautions.  9.	We will recommend physical therapy evaluation.  10.	Spoke with daughter, Eli,   She understands my reasoning and thought process.  Her contact number is [phone] 4/10/17. spoke to other daughter at bedside today 4/11/17        Physical therapy evaluation.  OOB to chair/ambulation with assistance only.  Advanced care planning was discussed with family.  Pain is accessed and addressed.  Pt was screened for signs of clinical depression. Appropriate referral made.  Risk of falls accessed. Fall prevention and plan of care was discussed with family.  Pt is screened for tobacco and alcohol use. Counseling was done for smoking and alcohol cessation.  Use of narcotic pain meds was dicussed. Pt is advised to use narcotic meds wisely and to refrain from over using them.  Plan of care was discussed with family. Questions answered.  Would continue to follow.  25 minutes spent on total encounter; more than 50% of the visit was spent counseling and/or coordinating care by the attending physician.       Physical therapy evaluation.  OOB to chair/ambulation with assistance only.  Advanced care planning was discussed with family.  Pain is accessed and addressed.  Pt was screened for signs of clinical depression. Appropriate referral made.  Risk of falls accessed. Fall prevention and plan of care was discussed with family.  Pt is screened for tobacco and alcohol use. Counseling was done for smoking and alcohol cessation.  Use of narcotic pain meds was dicussed. Pt is advised to use narcotic meds wisely and to refrain from over using them.  Plan of care was discussed with family. Questions answered.  Would continue to follow.  32 minutes spent on total encounter; more than 50% of the visit was spent counseling and/or coordinating care by the attending physician. Neurology follow up note    LIZBETH [id number]      Interval History:    Patient feels ok no new complaints. seen with daughter no  vertigo     MEDICATIONS    amLODIPine   Tablet 5milliGRAM(s) Oral daily  meclizine 25milliGRAM(s) Oral every 8 hours  ondansetron Injectable 4milliGRAM(s) IV Push every 8 hours PRN  fluorometholone 0.1% Suspension 1Drop(s) Left EYE every 8 hours  levothyroxine 50MICROGram(s) Oral daily  sodium chloride 0.45%. 1000milliLiter(s) IV Continuous <Continuous>  carvedilol 6.25milliGRAM(s) Oral every 12 hours  BACItracin   Ophthalmic Ointment 1Application(s) Left EYE three times a day  potassium acid phosphate/sodium acid phosphate tablet (K-PHOS No. 2) 1Tablet(s) Oral every 12 hours      Allergies    No Known Allergies    Intolerances        Height (cm): 172.7 (04-10 @ 17:08)    Vital Signs Last 24 Hrs  T(C): 36.6, Max: 36.8 (04-10 @ 22:02)  T(F): 97.8, Max: 98.2 (04-10 @ 22:02)  HR: 50 (48 - 51)  BP: 147/76 (143/60 - 147/76)  BP(mean): --  RR: 17 (16 - 17)  SpO2: 95% (95% - 97%)      REVIEW OF SYSTEMS:     Constitutional: No fever, chills, fatigue, weakness  Eyes: no eye pain, visual disturbances, or discharge  ENT:  No difficulty hearing, tinnitus, vertigo; No sinus or throat pain  Neck: No pain or stiffness  Respiratory: No cough, dyspnea, wheezing   Cardiovascular: No chest pain, palpitations,   Gastrointestinal: + nausea, vomiting  No diarrhea or constipation.   Genitourinary: No dysuria, frequency, hematuria or incontinence  Neurological: No headaches,+ lightheadedness,  no vertigo, numbness or tremors  Psychiatric: No depression, anxiety, mood swings or difficulty sleeping  Musculoskeletal: No joint pain or swelling; No muscle, back or extremity pain  Skin: No itching, burning, rashes or lesions   Lymph Nodes: No enlarged glands  Endocrine: No heat or cold intolerance; No hair loss   Allergy and Immunologic: No hives or eczema    On Neurological Examination:    Mental Status - Patient is alert, awake, oriented X3.       Follow simple commands  Follow complex commands      Speech -   Fluent                  Cranial Nerves - Pupils 3 mm equal and reactive to light,   extraocular eye movements intact.   smile symmetric  intact bilateral NLF    Motor Exam -   Right upper 4/5  Left upper 4/5  Right lower 3+/5  Left lower 3+/5      Muscle tone - is normal all over.  No asymmetry is seen.      Sensory    Bilateral intact to light touch      GENERAL Exam: Nontoxic , No Acute Distress + shingles forehead   	  HEENT:  normocephalic, atraumatic  		  LUNGS: Clear bilaterally    	  HEART: Normal S1S2   No murmur RRR        	  GI/ ABDOMEN:  Soft  Non tender    EXTREMITIES:   No Edema  No Clubbing  No Cyanosis No Edema    MUSCULOSKELETAL: Normal Range of Motion  	   SKIN: Normal  No Ecchymosis               LABS:  CBC Full  -  ( 11 Apr 2017 08:59 )  WBC Count : 6.3 K/uL  Hemoglobin : 12.5 g/dL  Hematocrit : 39.4 %  Platelet Count - Automated : 159 K/uL  Mean Cell Volume : 98.9 fl  Mean Cell Hemoglobin : 31.3 pg  Mean Cell Hemoglobin Concentration : 31.7 gm/dL  Auto Neutrophil # : x  Auto Lymphocyte # : x  Auto Monocyte # : x  Auto Eosinophil # : x  Auto Basophil # : x  Auto Neutrophil % : x  Auto Lymphocyte % : x  Auto Monocyte % : x  Auto Eosinophil % : x  Auto Basophil % : x      04-11    145  |  110<H>  |  28<H>  ----------------------------<  80  3.7   |  26  |  1.30    Ca    8.2<L>      11 Apr 2017 08:59  Phos  2.0     04-11  Mg     1.8     04-10    TPro  4.8<L>  /  Alb  2.9<L>  /  TBili  0.8  /  DBili  x   /  AST  20  /  ALT  16  /  AlkPhos  44  04-10    Hemoglobin A1C:     LIVER FUNCTIONS - ( 10 Apr 2017 06:05 )  Alb: 2.9 g/dL / Pro: 4.8 g/dL / ALK PHOS: 44 U/L / ALT: 16 U/L / AST: 20 U/L / GGT: x           Vitamin B12   PT/INR - ( 11 Apr 2017 08:59 )   PT: 59.1 sec;   INR: 5.24 ratio         PTT - ( 09 Apr 2017 17:39 )  PTT:48.8 sec      RADIOLOGY    ANALYSIS AND PLAN:  This is an 89-year-old with episodes upon sitting up of lightheadedness, subtle vertigo, blurry vision, feeling warm, nauseous, and almost passing out versus passing out.    1.	Clinical impression is most likely syncopal event secondary to cerebral hypoperfusion with above constellation of symptoms of lightheadedness, blurry vision, feeling warm, nauseous, and weak all over.   possible secondary to cardiac issues .  2.	We will recommend telemetry evaluation to monitor   3.	We will recommend to monitor the patient's systolic blood pressure.  4.	For positive subtle episodes of vertigo, appears to be mostly positional, suspect this could be inner ear dysfunction - h/o of shingles , vestibulopathy but in light of the patient having episodes of almost passing out, would recommend to rule out vertebrobasilar insufficiency.  I will plan for an MRI and MRA was normal   5.	For history of atrial fibrillation, continue anticoagulation with a goal INR between 2 and 3.  6.	For history of hypothyroidism, continue the patient on Synthroid.  7.	For subtle anxiety, continue the patient on Xanax.  8.	We will recommend fall precautions.  9.	We will recommend physical therapy evaluation.  10.	Spoke with daughter, Eli,   She understands my reasoning and thought process.  Her contact number is [phone] 4/10/17. spoke to other daughter at bedside today 4/11/17        Physical therapy evaluation.  OOB to chair/ambulation with assistance only.  Advanced care planning was discussed with family.  Pain is accessed and addressed.  Pt was screened for signs of clinical depression. Appropriate referral made.  Risk of falls accessed. Fall prevention and plan of care was discussed with family.  Pt is screened for tobacco and alcohol use. Counseling was done for smoking and alcohol cessation.  Use of narcotic pain meds was dicussed. Pt is advised to use narcotic meds wisely and to refrain from over using them.  Plan of care was discussed with family. Questions answered.  Would continue to follow.  25 minutes spent on total encounter; more than 50% of the visit was spent counseling and/or coordinating care by the attending physician.       .

## 2017-04-11 NOTE — PROGRESS NOTE ADULT - PROBLEM SELECTOR PLAN 1
no further abx or antivirals, topical eucerin cream. Fine to transition to outpt seeting per ID issues

## 2017-04-11 NOTE — PROGRESS NOTE ADULT - SUBJECTIVE AND OBJECTIVE BOX
infectious diseases progress note:  LIZBETH MADDOX is a 89y y. o. Male patient        Patient reports: feeling better with resolved vertigo      ROS:    EYES:  Negative  blurry vision or double vision  GASTROINTESTINAL:  Negative for nausea, vomiting, diarrhea  -otherwise negative except for subjective    Allergies    No Known Allergies    Intolerances        ANTIBIOTICS/RELEVANT:  antimicrobials    immunologic:    OTHER:  amLODIPine   Tablet 5milliGRAM(s) Oral daily  meclizine 25milliGRAM(s) Oral every 8 hours  ondansetron Injectable 4milliGRAM(s) IV Push every 8 hours PRN  fluorometholone 0.1% Suspension 1Drop(s) Left EYE every 8 hours  levothyroxine 50MICROGram(s) Oral daily  sodium chloride 0.45%. 1000milliLiter(s) IV Continuous <Continuous>  carvedilol 6.25milliGRAM(s) Oral every 12 hours  BACItracin   Ophthalmic Ointment 1Application(s) Left EYE three times a day      Objective:  Vital Signs Last 24 Hrs  T(C): 36.6, Max: 36.8 (04-10 @ 22:02)  T(F): 97.8, Max: 98.2 (04-10 @ 22:02)  HR: 50 (48 - 58)  BP: 147/76 (142/55 - 147/76)  BP(mean): --  RR: 17 (16 - 17)  SpO2: 95% (95% - 98%)    PHYSICAL EXAM:  Constitutional:Well-developed, well nourished  Eyes:PERRLA, EOMI  Ear/Nose/Throat: oropharynx normal	  Neck:no JVD, no lymphadenopathy, supple  Respiratory: no accessory muscle use  Cardiovascular:RRR,   Gastrointestinal:soft, NT  Extremities:no clubbing, no cyanosis, edema absent  Skin-scabbing on left forehead, left eye erythema      LABS:                        12.6   7.1   )-----------( 153      ( 10 Apr 2017 06:05 )             38.4     04-10    145  |  111<H>  |  30<H>  ----------------------------<  80  3.4<L>   |  26  |  1.30    Ca    7.9<L>      10 Apr 2017 06:05  Phos  2.1     04-10  Mg     1.8     04-10    TPro  4.8<L>  /  Alb  2.9<L>  /  TBili  0.8  /  DBili  x   /  AST  20  /  ALT  16  /  AlkPhos  44  04-10    PT/INR - ( 10 Apr 2017 06:05 )   PT: 58.7 sec;   INR: 5.21 ratio         PTT - ( 09 Apr 2017 17:39 )  PTT:48.8 sec        MICROBIOLOGY:        RADIOLOGY & ADDITIONAL STUDIES:

## 2017-04-11 NOTE — PROGRESS NOTE ADULT - PROBLEM SELECTOR PLAN 1
Likely secondary to bradycardia, 1st degree AV block, RBBB.  Decrease dose of Carvedilol, Cardiology ([facility]) consulted.  MRI/MRA unremarkable.  Continue Meclizine as needed.

## 2017-04-11 NOTE — PROGRESS NOTE ADULT - ASSESSMENT
89 y.o M w/Pmhx of Afib( on Coumadin), anxiety, HTN, hypothyroidism, hx of prostate CA (s/p radiation), hx of chronic diarrhea, basal cell carcinoma (s/p removal), and lymphoma comes in for vertigo  / dizziness  and found to have LETICIA. Renal indices are stable and improved with IVF.  Spoke to family at bed side. continue gentle hydration for now. can stop when patient starts to eat better.

## 2017-04-11 NOTE — PROGRESS NOTE ADULT - SUBJECTIVE AND OBJECTIVE BOX
LIZBETH MADDOX  89y  Male    Patient is a 89y old  Male who presents with a chief complaint of dizziness and nausea     patient says he feels better. ate better today. family at bed side.      PAST MEDICAL & SURGICAL HISTORY:  Chronic diarrhea  Prostate CA  Basal cell carcinoma  Lymphoma  Anxiety  Hypothyroid  Hypertension  A-fib  H/O shoulder replacement  S/P bilateral unicompartmental knee replacement      PHYSICAL EXAM:    T(C): 36.6, Max: 36.8 (04-10 @ 22:02)  HR: 50 (48 - 51)  BP: 147/76 (143/60 - 147/76)  RR: 17 (16 - 17)  SpO2: 95% (95% - 97%)  Wt(kg): --    I&O's Detail    I & Os for current day (as of 11 Apr 2017 13:05)  =============================================  IN:    sodium chloride 0.45%.: 150 ml    Total IN: 150 ml  ---------------------------------------------  OUT:    Total OUT: 0 ml  ---------------------------------------------  Total NET: 150 ml      Respiratory: clear anteriorly, decreased BS at bases  Cardiovascular: S1 S2  Gastrointestinal: soft NT ND +BS  Extremities: edema   Neuro: Awake and alert    MEDICATIONS  (STANDING):  amLODIPine   Tablet 5milliGRAM(s) Oral daily  meclizine 25milliGRAM(s) Oral every 8 hours  fluorometholone 0.1% Suspension 1Drop(s) Left EYE every 8 hours  levothyroxine 50MICROGram(s) Oral daily  sodium chloride 0.45%. 1000milliLiter(s) IV Continuous <Continuous>  carvedilol 6.25milliGRAM(s) Oral every 12 hours  BACItracin   Ophthalmic Ointment 1Application(s) Left EYE three times a day  potassium acid phosphate/sodium acid phosphate tablet (K-PHOS No. 2) 1Tablet(s) Oral every 12 hours    MEDICATIONS  (PRN):  ondansetron Injectable 4milliGRAM(s) IV Push every 8 hours PRN Nausea and/or Vomiting                            12.5   6.3   )-----------( 159      ( 11 Apr 2017 08:59 )             39.4       04-11    145  |  110<H>  |  28<H>  ----------------------------<  80  3.7   |  26  |  1.30    Ca    8.2<L>      11 Apr 2017 08:59  Phos  2.0     04-11  Mg     1.8     04-10    TPro  4.8<L>  /  Alb  2.9<L>  /  TBili  0.8  /  DBili  x   /  AST  20  /  ALT  16  /  AlkPhos  44  04-10

## 2017-04-11 NOTE — GOALS OF CARE CONVERSATION - PERSONAL ADVANCE DIRECTIVE - CONVERSATION DETAILS
met pt w daughter Ching, no hcp available, form reviewed, pt completed hcp form. family to discuss directives. pt is full code at present

## 2017-04-11 NOTE — PROGRESS NOTE ADULT - PROBLEM SELECTOR PLAN 4
Paroxysmal  -Holding Coumadin in setting of supratherapeutic INR.  -c/w carvedilol at decreased dose with parameters 2/2 bradycardia.  -remote telemetry continue

## 2017-04-11 NOTE — PROGRESS NOTE ADULT - SUBJECTIVE AND OBJECTIVE BOX
Patient is a 89y old  Male who presents with a chief complaint of dizziness and nausea (10 Apr 2017 20:04)      HPI:  · c/o high inr	  89 y.o M w/Pmhx of Afib( on coumadin), anxiety, HTN, hypothyroidism, hx of prostate ca( s/p radiation), hx of chronic diarrhea, basal cell carcinoma( s/p removal), and lymphoma comes in for vertigo. Recently treated for singles, seen by derm and ophthalmology and finished 1 week course of valacyclovir, and was treated for possible bacterial suprainfection of his Lt forehead and Lt eyelids. pt denies dizziness , no vertigo  / no cp , no palpitation .       T(F): 97.8, Max: 97.8 (04-11 @ 05:35)  HR: 50 (50 - 50)  BP: 147/76 (147/76 - 147/76)  RR: 17 (17 - 17)  SpO2: 95% (95% - 95%)  Wt(kg): --    I & Os for current day (as of 11 Apr 2017 10:46)  =============================================  IN: 150 ml / OUT: 0 ml / NET: 150 ml      REVIEW OF SYSTEMS:  CONSTITUTIONAL: No fever, weight loss, or fatigue  EYES: No eye pain, visual disturbances, or discharge  ENMT:  No difficulty hearing, tinnitus, vertigo; No sinus or throat pain  NECK: No pain or stiffness  RESPIRATORY: No cough, wheezing, chills or hemoptysis; No shortness of breath  CARDIOVASCULAR: No chest pain, palpitations, dizziness, or leg swelling  GASTROINTESTINAL: No abdominal or epigastric pain , no diarrhea   GENITOURINARY: No dysuria, frequency, hematuria, or incontinence  NEUROLOGICAL: No headaches, memory loss, loss of strength, numbness, or tremors  SKIN: No itching, burning, rashes, or lesions     MUSCULOSKELETAL: No joint pain or swelling; No muscle, back, or extremity pain    PHYSICAL EXAM:  GENERAL: NAD, well-groomed, well-developed  HEAD:  Atraumatic, Normocephalic  EYES: EOMI, PERRLA, conjunctiva and sclera clear  ENMT: intact   NECK: Supple,  NERVOUS SYSTEM:  Alert & Oriented X2, Good concentration;  CHEST/LUNG: Clear to percussion bilaterally; No rales, rhonchi, wheezing, or rubs  HEART: Regular rate bradycardia   ABDOMEN: Soft, Nontender, Nondistended; Bowel sounds present  EXTREMITIES:  2+ Peripheral Pulses, No clubbing, cyanosis, or edema  LYMPH: No lymphadenopathy noted  SKIN: No rashes or lesions    MEDICATIONS  (STANDING):  amLODIPine   Tablet 5milliGRAM(s) Oral daily  meclizine 25milliGRAM(s) Oral every 8 hours  fluorometholone 0.1% Suspension 1Drop(s) Left EYE every 8 hours  levothyroxine 50MICROGram(s) Oral daily  sodium chloride 0.45%. 1000milliLiter(s) IV Continuous <Continuous>  carvedilol 6.25milliGRAM(s) Oral every 12 hours  BACItracin   Ophthalmic Ointment 1Application(s) Left EYE three times a day    MEDICATIONS  (PRN):  ondansetron Injectable 4milliGRAM(s) IV Push every 8 hours PRN Nausea and/or Vomiting      LABS:                        12.5   6.3   )-----------( 159      ( 11 Apr 2017 08:59 )             39.4     04-11    145  |  110<H>  |  28<H>  ----------------------------<  80  3.7   |  26  |  1.30    Ca    8.2<L>      11 Apr 2017 08:59  Phos  2.0     04-11  Mg     1.8     04-10    TPro  4.8<L>  /  Alb  2.9<L>  /  TBili  0.8  /  DBili  x   /  AST  20  /  ALT  16  /  AlkPhos  44  04-10    PT/INR - ( 11 Apr 2017 08:59 )   PT: 59.1 sec;   INR: 5.24 ratio         PTT - ( 09 Apr 2017 17:39 )  PTT:48.8 sec    CAPILLARY BLOOD GLUCOSE              RADIOLOGY & ADDITIONAL TESTS:    Imaging Personally Reviewed:       Advance Directives: full code

## 2017-04-11 NOTE — PROVIDER CONTACT NOTE (CRITICAL VALUE NOTIFICATION) - SITUATION
Kimberley in the lab called me with INR results, call out to Dr Landeros, left message on cell
INR 5.24

## 2017-04-11 NOTE — PROGRESS NOTE ADULT - ASSESSMENT
89y man with the above history with vertigo, a recent Zoster infection, an episode of dizziness and fall, and an EKG showing a RBBB, LAFB, first degree AVB:  Still signifcantly bradycardic. Would dc coreg and monitor. If continues to be husam may need EP eval.   - Continue remote telemetry  - Just had recent echo as oupt. no need to repeat.   - ? if this is related to recent Zoster infection.  Work up per primary team.    - Continue amlodipine 5 QD  - Hold Coumadin as INR supratherapuetic.  Dose for goal 2-3.  - Neurology follow up  - To follow with you

## 2017-04-11 NOTE — PROGRESS NOTE ADULT - SUBJECTIVE AND OBJECTIVE BOX
[facility] -- Sandy Alvarado, Kt, Lizett, Babak Hoffman      Follow Up:  bradycardia    Subjective/Observations: Patient seen and examined. Events noted. No complaints of chest pain, dyspnea, or palpitations reported. No more dizziness. i reviewed outpt records. Normal stress echo recently.     PAST MEDICAL & SURGICAL HISTORY:  Chronic diarrhea  Prostate CA  Basal cell carcinoma  Lymphoma  Anxiety  Hypothyroid  Hypertension  A-fib  H/O shoulder replacement  S/P bilateral unicompartmental knee replacement      MEDICATIONS  (STANDING):  amLODIPine   Tablet 5milliGRAM(s) Oral daily  meclizine 25milliGRAM(s) Oral every 8 hours  fluorometholone 0.1% Suspension 1Drop(s) Left EYE every 8 hours  levothyroxine 50MICROGram(s) Oral daily  sodium chloride 0.45%. 1000milliLiter(s) IV Continuous <Continuous>  BACItracin   Ophthalmic Ointment 1Application(s) Left EYE three times a day  potassium acid phosphate/sodium acid phosphate tablet (K-PHOS No. 2) 1Tablet(s) Oral every 12 hours    MEDICATIONS  (PRN):  ondansetron Injectable 4milliGRAM(s) IV Push every 8 hours PRN Nausea and/or Vomiting      Allergies    No Known Allergies    Intolerances        REVIEW OF SYSTEMS: All other review of systems is negative unless indicated above    Vital Signs Last 24 Hrs  T(C): 36.8, Max: 36.8 (04-10 @ 22:02)  T(F): 98.2, Max: 98.2 (04-10 @ 22:02)  HR: 50 (50 - 51)  BP: 143/66 (143/66 - 147/76)  BP(mean): --  RR: 17 (17 - 17)  SpO2: 96% (95% - 96%)    I&O's Summary    I & Os for current day (as of 11 Apr 2017 19:34)  =============================================  IN: 900 ml / OUT: 0 ml / NET: 900 ml        PHYSICAL EXAM:  TELE: SB 40-60  Constitutional: NAD, awake and alert, well-developed  HEENT: Moist Mucous Membranes, Anicteric  Pulmonary: Non-labored, breath sounds are clear bilaterally, No wheezing, rales or rhonchi  Cardiovascular: Regular, husam, S1 and S2, 2/6 SM  Gastrointestinal: Bowel Sounds present, soft, nontender.   Lymph: No peripheral edema. No lymphadenopathy.  Skin: healing Zoster over left eye  Psych:  Mood & affect appropriate    LABS: All Labs Reviewed:                        12.5   6.3   )-----------( 159      ( 11 Apr 2017 08:59 )             39.4                         12.6   7.1   )-----------( 153      ( 10 Apr 2017 06:05 )             38.4                         14.8   5.4   )-----------( 157      ( 09 Apr 2017 12:44 )             45.1     11 Apr 2017 08:59    145    |  110    |  28     ----------------------------<  80     3.7     |  26     |  1.30   10 Apr 2017 06:05    145    |  111    |  30     ----------------------------<  80     3.4     |  26     |  1.30   09 Apr 2017 12:44    145    |  106    |  30     ----------------------------<  95     3.7     |  29     |  1.60     Ca    8.2        11 Apr 2017 08:59  Ca    7.9        10 Apr 2017 06:05  Ca    8.9        09 Apr 2017 12:44  Phos  2.0       11 Apr 2017 08:59  Phos  2.1       10 Apr 2017 06:05  Mg     1.8       10 Apr 2017 06:05    TPro  4.8    /  Alb  2.9    /  TBili  0.8    /  DBili  x      /  AST  20     /  ALT  16     /  AlkPhos  44     10 Apr 2017 06:05  TPro  5.7    /  Alb  3.5    /  TBili  0.8    /  DBili  x      /  AST  22     /  ALT  17     /  AlkPhos  54     09 Apr 2017 12:44    PT/INR - ( 11 Apr 2017 08:59 )   PT: 59.1 sec;   INR: 5.24 ratio                  04-09 @ 12:44  TSH: 1.61

## 2017-04-12 VITALS
TEMPERATURE: 99 F | RESPIRATION RATE: 18 BRPM | HEART RATE: 58 BPM | SYSTOLIC BLOOD PRESSURE: 153 MMHG | OXYGEN SATURATION: 95 % | DIASTOLIC BLOOD PRESSURE: 78 MMHG

## 2017-04-12 DIAGNOSIS — N17.9 ACUTE KIDNEY FAILURE, UNSPECIFIED: ICD-10-CM

## 2017-04-12 DIAGNOSIS — E83.39 OTHER DISORDERS OF PHOSPHORUS METABOLISM: ICD-10-CM

## 2017-04-12 LAB
HCT VFR BLD CALC: 41.3 % — SIGNIFICANT CHANGE UP (ref 39–50)
HGB BLD-MCNC: 13.6 G/DL — SIGNIFICANT CHANGE UP (ref 13–17)
INR BLD: 3.26 RATIO — HIGH (ref 0.88–1.16)
MAGNESIUM SERPL-MCNC: 1.8 MG/DL — SIGNIFICANT CHANGE UP (ref 1.8–2.4)
MCHC RBC-ENTMCNC: 32.2 PG — SIGNIFICANT CHANGE UP (ref 27–34)
MCHC RBC-ENTMCNC: 32.9 GM/DL — SIGNIFICANT CHANGE UP (ref 32–36)
MCV RBC AUTO: 98.1 FL — SIGNIFICANT CHANGE UP (ref 80–100)
PHOSPHATE SERPL-MCNC: 2.1 MG/DL — LOW (ref 2.5–4.5)
PLATELET # BLD AUTO: 177 K/UL — SIGNIFICANT CHANGE UP (ref 150–400)
PROTHROM AB SERPL-ACNC: 36.4 SEC — HIGH (ref 9.8–12.7)
RBC # BLD: 4.21 M/UL — SIGNIFICANT CHANGE UP (ref 4.2–5.8)
RBC # FLD: 13.8 % — SIGNIFICANT CHANGE UP (ref 10.3–14.5)
WBC # BLD: 8 K/UL — SIGNIFICANT CHANGE UP (ref 3.8–10.5)
WBC # FLD AUTO: 8 K/UL — SIGNIFICANT CHANGE UP (ref 3.8–10.5)

## 2017-04-12 PROCEDURE — 70450 CT HEAD/BRAIN W/O DYE: CPT

## 2017-04-12 PROCEDURE — 70551 MRI BRAIN STEM W/O DYE: CPT

## 2017-04-12 PROCEDURE — 83935 ASSAY OF URINE OSMOLALITY: CPT

## 2017-04-12 PROCEDURE — 84100 ASSAY OF PHOSPHORUS: CPT

## 2017-04-12 PROCEDURE — 83930 ASSAY OF BLOOD OSMOLALITY: CPT

## 2017-04-12 PROCEDURE — 80048 BASIC METABOLIC PNL TOTAL CA: CPT

## 2017-04-12 PROCEDURE — 84443 ASSAY THYROID STIM HORMONE: CPT

## 2017-04-12 PROCEDURE — 99233 SBSQ HOSP IP/OBS HIGH 50: CPT

## 2017-04-12 PROCEDURE — 83735 ASSAY OF MAGNESIUM: CPT

## 2017-04-12 PROCEDURE — 84484 ASSAY OF TROPONIN QUANT: CPT

## 2017-04-12 PROCEDURE — 99239 HOSP IP/OBS DSCHRG MGMT >30: CPT

## 2017-04-12 PROCEDURE — 82550 ASSAY OF CK (CPK): CPT

## 2017-04-12 PROCEDURE — 80053 COMPREHEN METABOLIC PANEL: CPT

## 2017-04-12 PROCEDURE — 93005 ELECTROCARDIOGRAM TRACING: CPT

## 2017-04-12 PROCEDURE — 96374 THER/PROPH/DIAG INJ IV PUSH: CPT

## 2017-04-12 PROCEDURE — 71045 X-RAY EXAM CHEST 1 VIEW: CPT

## 2017-04-12 PROCEDURE — 85027 COMPLETE CBC AUTOMATED: CPT

## 2017-04-12 PROCEDURE — 97162 PT EVAL MOD COMPLEX 30 MIN: CPT

## 2017-04-12 PROCEDURE — 96375 TX/PRO/DX INJ NEW DRUG ADDON: CPT

## 2017-04-12 PROCEDURE — 85610 PROTHROMBIN TIME: CPT

## 2017-04-12 PROCEDURE — 70544 MR ANGIOGRAPHY HEAD W/O DYE: CPT

## 2017-04-12 PROCEDURE — 85730 THROMBOPLASTIN TIME PARTIAL: CPT

## 2017-04-12 PROCEDURE — 99285 EMERGENCY DEPT VISIT HI MDM: CPT | Mod: 25

## 2017-04-12 PROCEDURE — 84300 ASSAY OF URINE SODIUM: CPT

## 2017-04-12 RX ORDER — CARVEDILOL PHOSPHATE 80 MG/1
1 CAPSULE, EXTENDED RELEASE ORAL
Qty: 0 | Refills: 0 | COMMUNITY

## 2017-04-12 RX ORDER — WARFARIN SODIUM 2.5 MG/1
1 TABLET ORAL
Qty: 0 | Refills: 0 | COMMUNITY

## 2017-04-12 RX ORDER — ALPRAZOLAM 0.25 MG
0.25 TABLET ORAL
Qty: 0 | Refills: 0 | Status: DISCONTINUED | OUTPATIENT
Start: 2017-04-12 | End: 2017-04-12

## 2017-04-12 RX ORDER — AMLODIPINE BESYLATE 2.5 MG/1
1 TABLET ORAL
Qty: 30 | Refills: 0 | OUTPATIENT
Start: 2017-04-12 | End: 2017-05-12

## 2017-04-12 RX ADMIN — Medication 50 MICROGRAM(S): at 07:12

## 2017-04-12 RX ADMIN — SODIUM CHLORIDE 75 MILLILITER(S): 9 INJECTION, SOLUTION INTRAVENOUS at 08:15

## 2017-04-12 RX ADMIN — BACITRACIN 1 APPLICATION(S): 500 OINTMENT OPHTHALMIC at 07:12

## 2017-04-12 RX ADMIN — Medication 25 MILLIGRAM(S): at 00:13

## 2017-04-12 RX ADMIN — FLUOROMETHOLONE 1 DROP(S): 1 SOLUTION/ DROPS OPHTHALMIC at 07:11

## 2017-04-12 RX ADMIN — Medication 1 TABLET(S): at 07:12

## 2017-04-12 RX ADMIN — AMLODIPINE BESYLATE 5 MILLIGRAM(S): 2.5 TABLET ORAL at 07:12

## 2017-04-12 NOTE — PROGRESS NOTE ADULT - ASSESSMENT
89yo m PMH Afib (on coumadin), anxiety, HTN, hypothyroidism, hx of prostate ca (s/p radiation), hx of chronic diarrhea, basal cell carcinoma (s/p removal), and lymphoma a/w vertigo likely 2/2 bradycardia, 1st degree AV block, RBBB. 88yo m PMH Afib (on coumadin), anxiety, HTN, hypothyroidism, hx of prostate ca (s/p radiation), hx of chronic diarrhea, basal cell carcinoma (s/p removal), and lymphoma a/w vertigo likely 2/2 bradycardia, 1st degree AV block, RBBB.

## 2017-04-12 NOTE — PROGRESS NOTE ADULT - PROBLEM SELECTOR PLAN 1
Improved renal function. Avoid nephrotoxic meds as possible.   Will follow lytes and renal function trend. Encourage PO intake.

## 2017-04-12 NOTE — PROGRESS NOTE ADULT - SUBJECTIVE AND OBJECTIVE BOX
[facility] - Sandy Alvarado, tK, Lizett, Babak Hoffman    Patient resting comfortably in bed in NAD.  Laying flat with no respiratory distress.  No complaints of chest pain, dyspnea, palpitations, PND, or orthopnea.    Telemetry:  Sinus bradycardia.  No significant events.    MEDICATIONS  (STANDING):  amLODIPine   Tablet 5milliGRAM(s) Oral daily  meclizine 25milliGRAM(s) Oral every 8 hours  fluorometholone 0.1% Suspension 1Drop(s) Left EYE every 8 hours  levothyroxine 50MICROGram(s) Oral daily  BACItracin   Ophthalmic Ointment 1Application(s) Left EYE three times a day  potassium acid phosphate/sodium acid phosphate tablet (K-PHOS No. 2) 1Tablet(s) Oral every 12 hours  ALPRAZolam 0.25milliGRAM(s) Oral two times a day    MEDICATIONS  (PRN):  ondansetron Injectable 4milliGRAM(s) IV Push every 8 hours PRN Nausea and/or Vomiting      Allergies    No Known Allergies        Vital Signs Last 24 Hrs  T(C): 36.9, Max: 36.9 (04-12 @ 05:40)  T(F): 98.4, Max: 98.4 (04-12 @ 05:40)  HR: 56 (51 - 56)  BP: 159/77 (147/71 - 159/77)  BP(mean): --  RR: 17 (17 - 18)  SpO2: 94% (94% - 97%)    I&O's Summary    I & Os for current day (as of 12 Apr 2017 14:01)  =============================================  IN: 900 ml / OUT: 251 ml / NET: 649 ml      ON EXAM:    Constitutional: NAD, awake and alert, well-developed  HEENT: Moist Mucous Membranes, Anicteric  Pulmonary: Non-labored, breath sounds are clear bilaterally, No wheezing, rales or rhonchi  Cardiovascular: Regular, husam, S1 and S2, 2/6 SM  Gastrointestinal: Bowel Sounds present, soft, nontender.   Lymph: No peripheral edema. No lymphadenopathy.  Skin: healing Zoster over left eye  Psych:  Mood & affect appropriate  LABS: All Labs Reviewed:                        13.6   8.0   )-----------( 177      ( 12 Apr 2017 08:17 )             41.3                         12.5   6.3   )-----------( 159      ( 11 Apr 2017 08:59 )             39.4                         12.6   7.1   )-----------( 153      ( 10 Apr 2017 06:05 )             38.4     12 Apr 2017 08:17    145    |  111    |  25     ----------------------------<  78     3.5     |  25     |  1.20   11 Apr 2017 08:59    145    |  110    |  28     ----------------------------<  80     3.7     |  26     |  1.30   10 Apr 2017 06:05    145    |  111    |  30     ----------------------------<  80     3.4     |  26     |  1.30     Ca    8.2        12 Apr 2017 08:17  Ca    8.2        11 Apr 2017 08:59  Ca    7.9        10 Apr 2017 06:05  Phos  2.1       12 Apr 2017 08:17  Phos  2.0       11 Apr 2017 08:59  Phos  2.1       10 Apr 2017 06:05  Mg     1.8       12 Apr 2017 08:17  Mg     1.8       10 Apr 2017 06:05    TPro  4.8    /  Alb  2.9    /  TBili  0.8    /  DBili  x      /  AST  20     /  ALT  16     /  AlkPhos  44     10 Apr 2017 06:05    PT/INR - ( 12 Apr 2017 08:17 )   PT: 36.4 sec;   INR: 3.26 ratio        Assessment/Plan:  89y man with PAF, AC, HTN with vertigo, a recent Zoster infection, an episode of dizziness and fall, and an EKG showing a RBBB, LAFB, first degree AVB:    - Stable for dc from cardiac point of view  - Outpatient 30 day monitor   - Increase amlodipine to 10 QD  - Continue amlodipine 5 QD  - Coumadin for INR 2-3  - Outpatient follow up Dr. Joshi

## 2017-04-12 NOTE — PHYSICAL THERAPY INITIAL EVALUATION ADULT - CRITERIA FOR SKILLED THERAPEUTIC INTERVENTIONS
risk reduction/prevention/predicted duration of therapy intervention/anticipated discharge recommendation/anticipated equipment needs at discharge/rehab potential

## 2017-04-12 NOTE — PHYSICAL THERAPY INITIAL EVALUATION ADULT - ADDITIONAL COMMENTS
Pt lives in a private home with 10 steps to enter with one handrail. Pt states that his daughter and staff ate at his house working every day. Pt lives in a private home with 10 steps to enter with one handrail. Pt states that his daughter and staff are at his house working every day.

## 2017-04-12 NOTE — PROGRESS NOTE ADULT - SUBJECTIVE AND OBJECTIVE BOX
Neurology follow up note    LIZBETH MADDOX89yMale      Interval History:    Patient feels ok no new complaints. seen with daughter     MEDICATIONS    amLODIPine   Tablet 5milliGRAM(s) Oral daily  meclizine 25milliGRAM(s) Oral every 8 hours  ondansetron Injectable 4milliGRAM(s) IV Push every 8 hours PRN  fluorometholone 0.1% Suspension 1Drop(s) Left EYE every 8 hours  levothyroxine 50MICROGram(s) Oral daily  BACItracin   Ophthalmic Ointment 1Application(s) Left EYE three times a day  potassium acid phosphate/sodium acid phosphate tablet (K-PHOS No. 2) 1Tablet(s) Oral every 12 hours  REVIEW OF SYSTEMS:     Constitutional: No fever, chills, fatigue, weakness  Eyes: no eye pain, visual disturbances, or discharge  ENT:  No difficulty hearing, tinnitus, vertigo; No sinus or throat pain  Neck: No pain or stiffness  Respiratory: No cough, dyspnea, wheezing   Cardiovascular: No chest pain, palpitations,   Gastrointestinal: + nausea, vomiting  No diarrhea or constipation.   Genitourinary: No dysuria, frequency, hematuria or incontinence  Neurological: No headaches,+ lightheadedness,  no vertigo, numbness or tremors  Psychiatric: No depression, anxiety, mood swings or difficulty sleeping  Musculoskeletal: No joint pain or swelling; No muscle, back or extremity pain  Skin: No itching, burning, rashes or lesions   Lymph Nodes: No enlarged glands  Endocrine: No heat or cold intolerance; No hair loss   Allergy and Immunologic: No hives or eczema    On Neurological Examination:    Mental Status - Patient is alert, awake, oriented X3.       Follow simple commands  Follow complex commands      Speech -   Fluent                  Cranial Nerves - Pupils 3 mm equal and reactive to light,   extraocular eye movements intact.   smile symmetric  intact bilateral NLF    Motor Exam -   Right upper 4/5  Left upper 4/5  Right lower 3+/5  Left lower 3+/5      Muscle tone - is normal all over.  No asymmetry is seen.      Sensory    Bilateral intact to light touch      GENERAL Exam: Nontoxic , No Acute Distress + shingles forehead   	  HEENT:  normocephalic, atraumatic  		  LUNGS: Clear bilaterally    	  HEART: Normal S1S2   No murmur RRR        	  GI/ ABDOMEN:  Soft  Non tender    EXTREMITIES:   No Edema  No Clubbing  No Cyanosis No Edema    MUSCULOSKELETAL: Normal Range of Motion  	   SKIN: Normal  No Ecchymosis ALPRAZolam 0.25milliGRAM(s) Oral two times a day      Allergies    No Known Allergies    Intolerances            Vital Signs Last 24 Hrs  T(C): 36.9, Max: 36.9 (04-12 @ 05:40)  T(F): 98.4, Max: 98.4 (04-12 @ 05:40)  HR: 56 (51 - 56)  BP: 159/77 (147/71 - 159/77)  BP(mean): --  RR: 17 (17 - 18)  SpO2: 94% (94% - 97%)                  LABS:  CBC Full  -  ( 12 Apr 2017 08:17 )  WBC Count : 8.0 K/uL  Hemoglobin : 13.6 g/dL  Hematocrit : 41.3 %  Platelet Count - Automated : 177 K/uL  Mean Cell Volume : 98.1 fl  Mean Cell Hemoglobin : 32.2 pg  Mean Cell Hemoglobin Concentration : 32.9 gm/dL  Auto Neutrophil # : x  Auto Lymphocyte # : x  Auto Monocyte # : x  Auto Eosinophil # : x  Auto Basophil # : x  Auto Neutrophil % : x  Auto Lymphocyte % : x  Auto Monocyte % : x  Auto Eosinophil % : x  Auto Basophil % : x      04-12    145  |  111<H>  |  25<H>  ----------------------------<  78  3.5   |  25  |  1.20    Ca    8.2<L>      12 Apr 2017 08:17  Phos  2.1     04-12  Mg     1.8     04-12      Hemoglobin A1C:       Vitamin B12   PT/INR - ( 12 Apr 2017 08:17 )   PT: 36.4 sec;   INR: 3.26 ratio               RADIOLOGY    ANALYSIS AND PLAN:  This is an 89-year-old with episodes upon sitting up of lightheadedness, subtle vertigo, blurry vision, feeling warm, nauseous, and almost passing out versus passing out.    1.	Clinical impression is most likely syncopal event secondary to cerebral hypoperfusion with above constellation of symptoms of lightheadedness, blurry vision, feeling warm, nauseous, and weak all over.   possible secondary to cardiac issues .  2.	We will recommend telemetry evaluation to monitor   3.	We will recommend to monitor the patient's systolic blood pressure.  4.	For positive subtle episodes of vertigo, appears to be mostly positional, suspect this could be inner ear dysfunction - h/o of shingles , vestibulopathy but in light of the patient having episodes of almost passing out, would recommend to rule out vertebrobasilar insufficiency.  I will plan for an MRI and MRA was normal   5.	For history of atrial fibrillation, continue anticoagulation with a goal INR between 2 and 3.  6.	For history of hypothyroidism, continue the patient on Synthroid.  7.	For subtle anxiety, continue the patient on Xanax.  8.	We will recommend fall precautions.  9.	We will recommend physical therapy evaluation.  10.	Spoke with daughter, Eli,   She understands my reasoning and thought process.  Her contact number is [phone] 4/10/17. spoke to other daughter at bedside today 4/12/17  11.	no new events         Physical therapy evaluation.  OOB to chair/ambulation with assistance only.  Advanced care planning was discussed with family.  Pain is accessed and addressed.  Pt was screened for signs of clinical depression. Appropriate referral made.  Risk of falls accessed. Fall prevention and plan of care was discussed with family.  Pt is screened for tobacco and alcohol use. Counseling was done for smoking and alcohol cessation.  Use of narcotic pain meds was dicussed. Pt is advised to use narcotic meds wisely and to refrain from over using them.  Plan of care was discussed with family. Questions answered.  Would continue to follow.  25 minutes spent on total encounter; more than 50% of the visit was spent counseling and/or coordinating care by the attending physician

## 2017-04-12 NOTE — PROGRESS NOTE ADULT - SUBJECTIVE AND OBJECTIVE BOX
Patient seen in follow up for LETICIA. Improved renal function.     PAST MEDICAL HISTORY:  Chronic diarrhea  Prostate CA  Basal cell carcinoma  Lymphoma  Anxiety  Hypothyroid  Hypertension  A-fib    MEDICATIONS  (STANDING):  amLODIPine   Tablet 5milliGRAM(s) Oral daily  meclizine 25milliGRAM(s) Oral every 8 hours  fluorometholone 0.1% Suspension 1Drop(s) Left EYE every 8 hours  levothyroxine 50MICROGram(s) Oral daily  BACItracin   Ophthalmic Ointment 1Application(s) Left EYE three times a day  potassium acid phosphate/sodium acid phosphate tablet (K-PHOS No. 2) 1Tablet(s) Oral every 12 hours  ALPRAZolam 0.25milliGRAM(s) Oral two times a day    MEDICATIONS  (PRN):  ondansetron Injectable 4milliGRAM(s) IV Push every 8 hours PRN Nausea and/or Vomiting    T(C): 36.9, Max: 36.9 (04-12 @ 05:40)  HR: 56 (50 - 56)  BP: 159/77 (143/66 - 159/77)  RR: 17 (17 - 18)  SpO2: 94% (94% - 97%)  Wt(kg): --  I&O's Detail    I & Os for current day (as of 12 Apr 2017 10:10)  =============================================  IN:    sodium chloride 0.45%: 900 ml    Total IN: 900 ml  ---------------------------------------------  OUT:    Voided: 251 ml    Total OUT: 251 ml  ---------------------------------------------  Total NET: 649 ml      PHYSICAL EXAM:  General: NAD  Respiratory: b/l air entry  Cardiovascular: S1 S2  Gastrointestinal: soft  Extremities:  no edema    CBC Full  -  ( 12 Apr 2017 08:17 )  WBC Count : 8.0 K/uL  Hemoglobin : 13.6 g/dL  Hematocrit : 41.3 %  Platelet Count - Automated : 177 K/uL  Mean Cell Volume : 98.1 fl  Mean Cell Hemoglobin : 32.2 pg  Mean Cell Hemoglobin Concentration : 32.9 gm/dL  Auto Neutrophil # : x  Auto Lymphocyte # : x  Auto Monocyte # : x  Auto Eosinophil # : x  Auto Basophil # : x  Auto Neutrophil % : x  Auto Lymphocyte % : x  Auto Monocyte % : x  Auto Eosinophil % : x  Auto Basophil % : x    04-12    145  |  111<H>  |  25<H>  ----------------------------<  78  3.5   |  25  |  1.20    Ca    8.2<L>      12 Apr 2017 08:17  Phos  2.1     04-12  Mg     1.8     04-12          Sodium, Serum: 145 (04-12 @ 08:17)  Sodium, Serum: 145 (04-11 @ 08:59)  Sodium, Serum: 145 (04-10 @ 06:05)  Sodium, Serum: 145 (04-09 @ 12:44)    Creatinine, Serum: 1.20 (04-12 @ 08:17)  Creatinine, Serum: 1.30 (04-11 @ 08:59)  Creatinine, Serum: 1.30 (04-10 @ 06:05)  Creatinine, Serum: 1.60 (04-09 @ 12:44)    Potassium, Serum: 3.5 (04-12 @ 08:17)  Potassium, Serum: 3.7 (04-11 @ 08:59)  Potassium, Serum: 3.4 (04-10 @ 06:05)  Potassium, Serum: 3.7 (04-09 @ 12:44)    Hemoglobin: 13.6 (04-12 @ 08:17)  Hemoglobin: 12.5 (04-11 @ 08:59)  Hemoglobin: 12.6 (04-10 @ 06:05)  Hemoglobin: 14.8 (04-09 @ 12:44)

## 2017-04-12 NOTE — PROGRESS NOTE ADULT - PROBLEM SELECTOR PLAN 6
-stable  -cont carvedilol at decreased dose with hold parameters -stable  -cont norvasc 5mg with hold parameters  -d/c carvedilol due to bradycardia

## 2017-04-12 NOTE — PROGRESS NOTE ADULT - SUBJECTIVE AND OBJECTIVE BOX
89yo m PMH Afib (on coumadin), anxiety, HTN, hypothyroidism, hx of prostate ca (s/p radiation), hx of chronic diarrhea, basal cell carcinoma (s/p removal), and lymphoma a/w vertigo. Recently treated for singles, seen by derm and ophthalmology and finished 1 week course of valacyclovir, and was treated for possible bacterial suprainfection of his L forehead and L eyelids. Pt was seen and examined at bedside. Pt was pleasant and in good spirits. Wishes to go home. Denies dizziness or vertigo. Denies CP, palpitations.    VITAL SIGNS:  Vital Signs Last 24 Hrs  T(C): 36.9, Max: 36.9 (04-12 @ 05:40)  T(F): 98.4, Max: 98.4 (04-12 @ 05:40)  HR: 56 (50 - 56)  BP: 159/77 (143/66 - 159/77)  RR: 17 (17 - 18)  SpO2: 94% (94% - 97%)      REVIEW OF SYSTEMS:  CONSTITUTIONAL: No fever, weight loss, or fatigue  EYES: No eye pain, visual disturbances, or discharge  ENMT:  No difficulty hearing, tinnitus, vertigo; No sinus or throat pain  NECK: No pain or stiffness  RESPIRATORY: No cough, wheezing, chills or hemoptysis; No shortness of breath  CARDIOVASCULAR: No chest pain, palpitations, dizziness, or leg swelling  GASTROINTESTINAL: No abdominal or epigastric pain , no diarrhea   GENITOURINARY: No dysuria, frequency, hematuria, or incontinence  NEUROLOGICAL: No headaches, memory loss, loss of strength, numbness, or tremors  SKIN: No itching, burning, rashes, or lesions. L forehead and L eyelids scabbing s/p shingles  MUSCULOSKELETAL: No joint pain or swelling; No muscle, back, or extremity pain      PHYSICAL EXAM:  GENERAL: NAD, well-groomed, well-developed  HEAD:  Atraumatic, Normocephalic  EYES: EOMI, PERRLA, conjunctiva and sclera clear  ENMT: intact   NECK: Supple,  NERVOUS SYSTEM:  Alert & Oriented X2, Good concentration;  CHEST/LUNG: Clear to percussion bilaterally; No rales, rhonchi, wheezing, or rubs  HEART: Regular rate bradycardia   ABDOMEN: Soft, Nontender, Nondistended; Bowel sounds present  EXTREMITIES:  2+ Peripheral Pulses, No clubbing, cyanosis, or edema  LYMPH: No lymphadenopathy noted  SKIN: No rashes or lesions      MEDICATIONS  (STANDING):  amLODIPine   Tablet 5milliGRAM(s) Oral daily  meclizine 25milliGRAM(s) Oral every 8 hours  fluorometholone 0.1% Suspension 1Drop(s) Left EYE every 8 hours  levothyroxine 50MICROGram(s) Oral daily  sodium chloride 0.45%. 1000milliLiter(s) IV Continuous <Continuous>  carvedilol 6.25milliGRAM(s) Oral every 12 hours  BACItracin   Ophthalmic Ointment 1Application(s) Left EYE three times a day    MEDICATIONS  (PRN):  ondansetron Injectable 4milliGRAM(s) IV Push every 8 hours PRN Nausea and/or Vomiting      LABS:                         13.6   8.0   )-----------( 177      ( 12 Apr 2017 08:17 )             41.3       145  |  111<H>  |  25<H>  ----------------------------<  78  3.5   |  25  |  1.20    Ca    8.2<L>      12 Apr 2017 08:17  Phos  2.1     04-12  Mg     1.8     04-12      PT/INR - ( 12 Apr 2017 08:17 )   PT: 36.4 sec;   INR: 3.26 ratio        Advance Directives: full code 88yo m PMH Afib (on coumadin), anxiety, HTN, hypothyroidism, hx of prostate ca (s/p radiation), hx of chronic diarrhea, basal cell carcinoma (s/p removal), and lymphoma a/w vertigo. Recently treated for singles, seen by derm and ophthalmology and finished 1 week course of valacyclovir, and was treated for possible bacterial suprainfection of his L forehead and L eyelids. Pt was seen and examined at bedside. Pt was pleasant and in good spirits. States working with PT today. Wishes to go home. Denies dizziness or vertigo. Denies CP, palpitations.    VITAL SIGNS:  Vital Signs Last 24 Hrs  T(C): 36.9, Max: 36.9 (04-12 @ 05:40)  T(F): 98.4, Max: 98.4 (04-12 @ 05:40)  HR: 56 (50 - 56)  BP: 159/77 (143/66 - 159/77)  RR: 17 (17 - 18)  SpO2: 94% (94% - 97%)      REVIEW OF SYSTEMS:  CONSTITUTIONAL: No fever, weight loss, or fatigue  EYES: No eye pain, visual disturbances, or discharge  ENMT:  No difficulty hearing, tinnitus, vertigo; No sinus or throat pain  NECK: No pain or stiffness  RESPIRATORY: No cough, wheezing, chills or hemoptysis; No shortness of breath  CARDIOVASCULAR: No chest pain, palpitations, dizziness, or leg swelling  GASTROINTESTINAL: No abdominal or epigastric pain , no diarrhea   GENITOURINARY: No dysuria, frequency, hematuria, or incontinence  NEUROLOGICAL: No headaches, memory loss, loss of strength, numbness, or tremors  SKIN: No itching, burning, rashes, or lesions. L forehead and L eyelids scabbing s/p shingles  MUSCULOSKELETAL: No joint pain or swelling; No muscle, back, or extremity pain      PHYSICAL EXAM:  GENERAL: NAD, well-groomed, well-developed  HEAD:  Atraumatic, Normocephalic  EYES: EOMI, PERRLA  ENMT: intact   NECK: Supple  NERVOUS SYSTEM:  Alert & Oriented X2, Good concentration;  CHEST/LUNG: Clear to percussion bilaterally; No rales, rhonchi, wheezing, or rubs  HEART: RR bradycardia   ABDOMEN: Soft, NT, ND, BSx4   EXTREMITIES:  2+ Peripheral Pulses, No clubbing, cyanosis, or edema  LYMPH: No lymphadenopathy noted  SKIN: No rashes or lesions      MEDICATIONS  (STANDING):  amLODIPine   Tablet 5milliGRAM(s) Oral daily  meclizine 25milliGRAM(s) Oral every 8 hours  fluorometholone 0.1% Suspension 1Drop(s) Left EYE every 8 hours  levothyroxine 50MICROGram(s) Oral daily  BACItracin   Ophthalmic Ointment 1Application(s) Left EYE three times a day  potassium acid phosphate/sodium acid phosphate tablet (K-PHOS No. 2) 1Tablet(s) Oral every 12 hours  ALPRAZolam 0.25milliGRAM(s) Oral two times a day    MEDICATIONS  (PRN):  ondansetron Injectable 4milliGRAM(s) IV Push every 8 hours PRN Nausea and/or Vomiting      LABS:                         13.6   8.0   )-----------( 177      ( 12 Apr 2017 08:17 )             41.3       145  |  111<H>  |  25<H>  ----------------------------<  78  3.5   |  25  |  1.20    Ca    8.2<L>      12 Apr 2017 08:17  Phos  2.1     04-12  Mg     1.8     04-12      PT/INR - ( 12 Apr 2017 08:17 )   PT: 36.4 sec;   INR: 3.26 ratio        Advance Directives: full code

## 2017-04-12 NOTE — PROGRESS NOTE ADULT - PROBLEM SELECTOR PLAN 3
-2/2 dehydration   -improved  -cont IVF NS@75  -Dr. Pack (renal) consulted -2/2 dehydration   -improved  -d/c IVF NS@75 per renal  -Dr. Pack (renal) consulted -2/2 dehydration   -improved  -d/c IVF NS@75 per renal (Dr. Pack)

## 2017-04-12 NOTE — PROGRESS NOTE ADULT - PROBLEM SELECTOR PLAN 5
-paroxysmal  -INR improved from 5.24 --> 3.26  -hold coumadin again in setting of supratherapeutic INR  -cont carvedilol at decreased dose with hold parameters  -remote tele

## 2017-04-12 NOTE — PROGRESS NOTE ADULT - PROBLEM SELECTOR PLAN 1
-likely 2/2 bradycardia, 1st degree AV block, RBBB  -cont decrease dose of carvedilol per cardio (Jenny)   -cont meclizine prn  -will try to have patient work with physical therapy today as INR has improved -likely 2/2 bradycardia, 1st degree AV block, RBBB  -d/c carvedilol per cardio (Jenny)   -cont meclizine prn  -cont norvasc 5 qd  -will try to have patient work with physical therapy today as INR has improved (3.26)

## 2017-04-12 NOTE — PHYSICAL THERAPY INITIAL EVALUATION ADULT - RANGE OF MOTION EXAMINATION, REHAB EVAL
bilateral lower extremity was ROM was WNL (within normal limits)/bilateral upper extremity ROM was WNL (within normal limits) bilateral upper extremity ROM was WFL (within functional limits)/bilateral lower extremity ROM was WFL (within functional limits)

## 2017-04-12 NOTE — PROGRESS NOTE ADULT - PROBLEM SELECTOR PLAN 2
-seen by Dr. Morrison (ID) and no concern for active infection, lesions crusted, no signs of superimposed bacterial infection  -finished course of acyclovir, not on any antibiotics  -cont bacitracin opth ointment and fluorometholone drop
Phos supps.
Seen by ID Dr. Morrison and no concern for active infection, lesions crusted, no signs of superimposed bacterial infection. Antibiotics discontinued.   Continue bacitracin opth ointment and fluorometholone drop
Seen by ID Dr. Morrison and no concern for active infection, lesions crusted, no signs of superimposed bacterial infection. Antibiotics discontinued.   Continue bacitracin opth ointment and fluorometholone drop
resolved.    Thank you for consulting us and involving us in the management of this most interesting and challenging case.     Please Call with any further questions

## 2017-04-12 NOTE — PHYSICAL THERAPY INITIAL EVALUATION ADULT - GAIT TRAINING, PT EVAL
In 3-5 sessions pt will amb 100 feet x 1 with a rolling walker independently, and climb up stairs with one handrail

## 2017-04-14 DIAGNOSIS — Z96.619 PRESENCE OF UNSPECIFIED ARTIFICIAL SHOULDER JOINT: ICD-10-CM

## 2017-04-14 DIAGNOSIS — C61 MALIGNANT NEOPLASM OF PROSTATE: ICD-10-CM

## 2017-04-14 DIAGNOSIS — I48.91 UNSPECIFIED ATRIAL FIBRILLATION: ICD-10-CM

## 2017-04-14 DIAGNOSIS — R42 DIZZINESS AND GIDDINESS: ICD-10-CM

## 2017-04-14 DIAGNOSIS — E05.90 THYROTOXICOSIS, UNSPECIFIED WITHOUT THYROTOXIC CRISIS OR STORM: ICD-10-CM

## 2017-04-14 DIAGNOSIS — Z79.01 LONG TERM (CURRENT) USE OF ANTICOAGULANTS: ICD-10-CM

## 2017-04-14 DIAGNOSIS — Z96.653 PRESENCE OF ARTIFICIAL KNEE JOINT, BILATERAL: ICD-10-CM

## 2017-04-14 DIAGNOSIS — I10 ESSENTIAL (PRIMARY) HYPERTENSION: ICD-10-CM

## 2017-04-14 DIAGNOSIS — R00.1 BRADYCARDIA, UNSPECIFIED: ICD-10-CM

## 2017-04-14 DIAGNOSIS — I45.10 UNSPECIFIED RIGHT BUNDLE-BRANCH BLOCK: ICD-10-CM

## 2017-04-14 DIAGNOSIS — B02.9 ZOSTER WITHOUT COMPLICATIONS: ICD-10-CM

## 2017-04-14 DIAGNOSIS — K52.9 NONINFECTIVE GASTROENTERITIS AND COLITIS, UNSPECIFIED: ICD-10-CM

## 2017-04-14 DIAGNOSIS — N17.9 ACUTE KIDNEY FAILURE, UNSPECIFIED: ICD-10-CM

## 2017-04-14 DIAGNOSIS — F41.9 ANXIETY DISORDER, UNSPECIFIED: ICD-10-CM

## 2017-04-14 DIAGNOSIS — C85.90 NON-HODGKIN LYMPHOMA, UNSPECIFIED, UNSPECIFIED SITE: ICD-10-CM

## 2017-04-20 DIAGNOSIS — I44.0 ATRIOVENTRICULAR BLOCK, FIRST DEGREE: ICD-10-CM

## 2017-04-20 DIAGNOSIS — E87.6 HYPOKALEMIA: ICD-10-CM

## 2017-04-20 DIAGNOSIS — E83.39 OTHER DISORDERS OF PHOSPHORUS METABOLISM: ICD-10-CM

## 2017-04-20 DIAGNOSIS — I48.0 PAROXYSMAL ATRIAL FIBRILLATION: ICD-10-CM

## 2017-04-20 DIAGNOSIS — E86.0 DEHYDRATION: ICD-10-CM

## 2017-04-28 ENCOUNTER — EMERGENCY (EMERGENCY)
Facility: HOSPITAL | Age: 82
LOS: 0 days | Discharge: HOME | End: 2017-04-28
Attending: EMERGENCY MEDICINE | Admitting: EMERGENCY MEDICINE
Payer: MEDICARE

## 2017-04-28 VITALS
WEIGHT: 151.9 LBS | SYSTOLIC BLOOD PRESSURE: 140 MMHG | OXYGEN SATURATION: 99 % | TEMPERATURE: 98 F | RESPIRATION RATE: 16 BRPM | DIASTOLIC BLOOD PRESSURE: 70 MMHG | HEIGHT: 68 IN | HEART RATE: 66 BPM

## 2017-04-28 VITALS — DIASTOLIC BLOOD PRESSURE: 66 MMHG | RESPIRATION RATE: 18 BRPM | SYSTOLIC BLOOD PRESSURE: 130 MMHG | HEART RATE: 62 BPM

## 2017-04-28 DIAGNOSIS — I10 ESSENTIAL (PRIMARY) HYPERTENSION: ICD-10-CM

## 2017-04-28 DIAGNOSIS — Z96.619 PRESENCE OF UNSPECIFIED ARTIFICIAL SHOULDER JOINT: Chronic | ICD-10-CM

## 2017-04-28 DIAGNOSIS — Z96.653 PRESENCE OF ARTIFICIAL KNEE JOINT, BILATERAL: Chronic | ICD-10-CM

## 2017-04-28 DIAGNOSIS — I48.91 UNSPECIFIED ATRIAL FIBRILLATION: ICD-10-CM

## 2017-04-28 DIAGNOSIS — C61 MALIGNANT NEOPLASM OF PROSTATE: ICD-10-CM
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
## 2017-04-28 DIAGNOSIS — Z79.01 LONG TERM (CURRENT) USE OF ANTICOAGULANTS: ICD-10-CM

## 2017-04-28 DIAGNOSIS — S09.90XA UNSPECIFIED INJURY OF HEAD, INITIAL ENCOUNTER: ICD-10-CM

## 2017-04-28 DIAGNOSIS — F41.9 ANXIETY DISORDER, UNSPECIFIED: ICD-10-CM

## 2017-04-28 DIAGNOSIS — R55 SYNCOPE AND COLLAPSE: ICD-10-CM

## 2017-04-28 DIAGNOSIS — S01.01XA LACERATION WITHOUT FOREIGN BODY OF SCALP, INITIAL ENCOUNTER: ICD-10-CM
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
## 2017-04-28 DIAGNOSIS — C85.90 NON-HODGKIN LYMPHOMA, UNSPECIFIED, UNSPECIFIED SITE: ICD-10-CM
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
## 2017-04-28 LAB
ALBUMIN SERPL ELPH-MCNC: 3 G/DL — LOW (ref 3.3–5)
ALP SERPL-CCNC: 40 U/L — SIGNIFICANT CHANGE UP (ref 40–120)
ALT FLD-CCNC: 16 U/L — SIGNIFICANT CHANGE UP (ref 12–78)
ANION GAP SERPL CALC-SCNC: 8 MMOL/L — SIGNIFICANT CHANGE UP (ref 5–17)
APTT BLD: 38.5 SEC — HIGH (ref 27.5–37.4)
AST SERPL-CCNC: 21 U/L — SIGNIFICANT CHANGE UP (ref 15–37)
BASOPHILS # BLD AUTO: 0.1 K/UL — SIGNIFICANT CHANGE UP (ref 0–0.2)
BASOPHILS NFR BLD AUTO: 1.2 % — SIGNIFICANT CHANGE UP (ref 0–2)
BILIRUB SERPL-MCNC: 0.5 MG/DL — SIGNIFICANT CHANGE UP (ref 0.2–1.2)
BLD GP AB SCN SERPL QL: SIGNIFICANT CHANGE UP
BUN SERPL-MCNC: 22 MG/DL — SIGNIFICANT CHANGE UP (ref 7–23)
CALCIUM SERPL-MCNC: 8.9 MG/DL — SIGNIFICANT CHANGE UP (ref 8.5–10.1)
CHLORIDE SERPL-SCNC: 106 MMOL/L — SIGNIFICANT CHANGE UP (ref 96–108)
CK SERPL-CCNC: 100 U/L — SIGNIFICANT CHANGE UP (ref 26–308)
CO2 SERPL-SCNC: 30 MMOL/L — SIGNIFICANT CHANGE UP (ref 22–31)
CREAT SERPL-MCNC: 1.29 MG/DL — SIGNIFICANT CHANGE UP (ref 0.5–1.3)
EOSINOPHIL # BLD AUTO: 0.2 K/UL — SIGNIFICANT CHANGE UP (ref 0–0.5)
EOSINOPHIL NFR BLD AUTO: 3.9 % — SIGNIFICANT CHANGE UP (ref 0–6)
GLUCOSE SERPL-MCNC: 90 MG/DL — SIGNIFICANT CHANGE UP (ref 70–99)
HCT VFR BLD CALC: 37.8 % — LOW (ref 39–50)
HGB BLD-MCNC: 12.6 G/DL — LOW (ref 13–17)
INR BLD: 2.2 RATIO — HIGH (ref 0.88–1.16)
LYMPHOCYTES # BLD AUTO: 2.4 K/UL — SIGNIFICANT CHANGE UP (ref 1–3.3)
LYMPHOCYTES # BLD AUTO: 39.4 % — SIGNIFICANT CHANGE UP (ref 13–44)
MCHC RBC-ENTMCNC: 32.2 PG — SIGNIFICANT CHANGE UP (ref 27–34)
MCHC RBC-ENTMCNC: 33.5 GM/DL — SIGNIFICANT CHANGE UP (ref 32–36)
MCV RBC AUTO: 96.4 FL — SIGNIFICANT CHANGE UP (ref 80–100)
MONOCYTES # BLD AUTO: 0.6 K/UL — SIGNIFICANT CHANGE UP (ref 0–0.9)
MONOCYTES NFR BLD AUTO: 10.3 % — SIGNIFICANT CHANGE UP (ref 2–14)
NEUTROPHILS # BLD AUTO: 2.7 K/UL — SIGNIFICANT CHANGE UP (ref 1.8–7.4)
NEUTROPHILS NFR BLD AUTO: 45.2 % — SIGNIFICANT CHANGE UP (ref 43–77)
PLATELET # BLD AUTO: 134 K/UL — LOW (ref 150–400)
POTASSIUM SERPL-MCNC: 3.9 MMOL/L — SIGNIFICANT CHANGE UP (ref 3.5–5.3)
POTASSIUM SERPL-SCNC: 3.9 MMOL/L — SIGNIFICANT CHANGE UP (ref 3.5–5.3)
PROT SERPL-MCNC: 5 GM/DL — LOW (ref 6–8.3)
PROTHROM AB SERPL-ACNC: 24.2 SEC — HIGH (ref 9.8–12.7)
RBC # BLD: 3.92 M/UL — LOW (ref 4.2–5.8)
RBC # FLD: 14.7 % — HIGH (ref 10.3–14.5)
SODIUM SERPL-SCNC: 144 MMOL/L — SIGNIFICANT CHANGE UP (ref 135–145)
TROPONIN I SERPL-MCNC: 0.03 NG/ML — SIGNIFICANT CHANGE UP (ref 0.01–0.04)
TYPE + AB SCN PNL BLD: SIGNIFICANT CHANGE UP
WBC # BLD: 6 K/UL — SIGNIFICANT CHANGE UP (ref 3.8–10.5)
WBC # FLD AUTO: 6 K/UL — SIGNIFICANT CHANGE UP (ref 3.8–10.5)

## 2017-04-28 PROCEDURE — 72125 CT NECK SPINE W/O DYE: CPT | Mod: 26

## 2017-04-28 PROCEDURE — 74176 CT ABD & PELVIS W/O CONTRAST: CPT | Mod: 26

## 2017-04-28 PROCEDURE — 70450 CT HEAD/BRAIN W/O DYE: CPT | Mod: 26

## 2017-04-28 PROCEDURE — 93010 ELECTROCARDIOGRAM REPORT: CPT

## 2017-04-28 PROCEDURE — 71250 CT THORAX DX C-: CPT | Mod: 26

## 2017-04-28 PROCEDURE — 99284 EMERGENCY DEPT VISIT MOD MDM: CPT | Mod: 25

## 2017-04-28 PROCEDURE — 12002 RPR S/N/AX/GEN/TRNK2.6-7.5CM: CPT

## 2017-04-28 NOTE — H&P ADULT - NSHPPHYSICALEXAM_GEN_ALL_CORE
PHYSICAL EXAM:  Constitutional: NAD, awake and alert, well-developed  Neurological: AAO x 3, no focal deficits  HEENT: PERRLA, EOMI, MMM  Neck: Soft and supple, No LAD, No JVD  Respiratory: Breath sounds are clear bilaterally, No wheezing, rales or rhonchi  Cardiovascular: S1 and S2, regular rate and rhythm; no Murmurs, gallops or rubs  Gastrointestinal: Bowel Sounds present, soft, nontender, nondistended, no guarding, no rebound tenderness  Back: No CVA tenderness   Extremities: No peripheral edema  Vascular: 2+ peripheral pulses  Musculoskeletal: 5/5 strength b/l upper and lower extremities  Skin: No rashes  Breast: Deferred  Rectal: Deferred PHYSICAL EXAM:  Constitutional: NAD, awake and alert, well-developed  Neurological: AAO x 3, no focal deficits  HEENT: PERRLA, EOMI, MMM, Staples and dressing on head  Neck: Soft and supple, No LAD, No JVD  Respiratory: Breath sounds are clear bilaterally, No wheezing, rales or rhonchi  Cardiovascular: S1 and S2, regular rate and rhythm; no Murmurs, gallops or rubs  Gastrointestinal: Bowel Sounds present, soft, nontender, nondistended, no guarding, no rebound tenderness  Back: No CVA tenderness   Extremities: No peripheral edema  Vascular: 2+ peripheral pulses  Musculoskeletal: 5/5 strength b/l upper and lower extremities  Skin: No rashes  Breast: Deferred  Rectal: Deferred

## 2017-04-28 NOTE — H&P ADULT - PROBLEM SELECTOR PLAN 4
-paroxysmal  -therapeutic INR  -continue coumadin and Monitor INR  -cont carvedilol at decreased dose with hold parameters  -remote tele -stable  -cont norvasc 5mg with hold parameters  -d/c carvedilol due to bradycardia

## 2017-04-28 NOTE — H&P ADULT - ASSESSMENT
88yo m PMH Afib (on coumadin), anxiety, HTN, hypothyroidism, hx of prostate ca (s/p radiation), hx of chronic diarrhea, basal cell carcinoma (s/p removal), and lymphoma a/w vertigo likely 2/2 bradycardia, 1st degree AV block, RBBB.

## 2017-04-28 NOTE — ED ADULT TRIAGE NOTE - CHIEF COMPLAINT QUOTE
Pt reports walking to bathroom and feeling his "legs give out."  Pt fell and hit head. Denies dizziness or LOC.  Pt takes coumadin daily.

## 2017-04-28 NOTE — H&P ADULT - PROBLEM SELECTOR PLAN 1
-Recurrent Syncope  -CT head/C-Spine - neg  -recently admitted at [facility] and workup neg including MRA/MRI of Brain  -Admit to tele  -Neuro checks  -orthostatic vitals  -IVF  -cardio consult  -Neuro consult  -Fall precautions -Recurrent Syncope  -CT head/C-Spine - neg  -recently admitted at [facility] and workup neg including MRA/MRI of Brain  -Neuro checks  -orthostatic vitals  -IVF  -cardio consult  -Fall precautions  -Patient and family would like to go home. Advised to monitor for any change in mental status since he had head trauma on coumadin.  Fall precuations.  Would avoid Xanax and diuretics. Fu with PCP, Dr Joshi; Remove staples in 1 week; Patient will be discharged by the ER physician.

## 2017-04-28 NOTE — ED PROVIDER NOTE - CARE PLAN
Principal Discharge DX:	Syncope, unspecified syncope type  Secondary Diagnosis:	Laceration of scalp, initial encounter

## 2017-04-28 NOTE — H&P ADULT - PROBLEM SELECTOR PLAN 3
-no concern for active infection, lesions crusted, no signs of superimposed bacterial infection  -finished course of acyclovir, not on any antibiotics  -cont bacitracin opth ointment and fluorometholone drop -paroxysmal  -therapeutic INR  -continue coumadin and Monitor INR  -cont carvedilol at decreased dose with hold parameters  -remote tele

## 2017-04-28 NOTE — H&P ADULT - NSHPLABSRESULTS_GEN_ALL_CORE
Lab Results:  CBC  CBC Full  -  ( 28 Apr 2017 11:13 )  WBC Count : 6.0 K/uL  Hemoglobin : 12.6 g/dL  Hematocrit : 37.8 %  Platelet Count - Automated : 134 K/uL  Mean Cell Volume : 96.4 fl  Mean Cell Hemoglobin : 32.2 pg  Mean Cell Hemoglobin Concentration : 33.5 gm/dL  Auto Neutrophil # : 2.7 K/uL  Auto Lymphocyte # : 2.4 K/uL  Auto Monocyte # : 0.6 K/uL  Auto Eosinophil # : 0.2 K/uL  Auto Basophil # : 0.1 K/uL  Auto Neutrophil % : 45.2 %  Auto Lymphocyte % : 39.4 %  Auto Monocyte % : 10.3 %  Auto Eosinophil % : 3.9 %  Auto Basophil % : 1.2 %    .		Differential:	[] Automated		[] Manual  Chemistry                        12.6   6.0   )-----------( 134      ( 28 Apr 2017 11:13 )             37.8     04-28    144  |  106  |  22  ----------------------------<  90  3.9   |  30  |  1.29    Ca    8.9      28 Apr 2017 11:13    TPro  5.0<L>  /  Alb  3.0<L>  /  TBili  0.5  /  DBili  x   /  AST  21  /  ALT  16  /  AlkPhos  40  04-28    LIVER FUNCTIONS - ( 28 Apr 2017 11:13 )  Alb: 3.0 g/dL / Pro: 5.0 gm/dL / ALK PHOS: 40 U/L / ALT: 16 U/L / AST: 21 U/L / GGT: x           PT/INR - ( 28 Apr 2017 11:13 )   PT: 24.2 sec;   INR: 2.20 ratio         PTT - ( 28 Apr 2017 11:13 )  PTT:38.5 sec      RADIOLOGY RESULTS:    EXAM:  CT CHEST        04/28/2017    IMPRESSION: No traumatic injury. Diffuse lymphadenopathy as detailed   above compatible with lymphoma, worsened since March of 2013.    EXAM:  CT BRAIN      04/28/2017      IMPRESSION:       No evidence of acute intracranial abnormality.  No evidence of   hemorrhage.      Age-related involutional changes as above.    EXAM:  CT CERVICAL SPINE   04/28/2017     IMPRESSION:   No vertebral fracture is recognized.   Multilevel   degenerative changes of the cervical spine are present. Moderate to   severe spinal canal stenosis and severe left foraminal stenosis at C5/C6,   on a degenerative basis.

## 2017-04-28 NOTE — H&P ADULT - PROBLEM SELECTOR PLAN 2
-likely 2/2 bradycardia, 1st degree AV block, RBBB  -d/c carvedilol per cardio (Jenny)   -cont meclizine prn  -cont norvasc 5 qd  -will try to have patient work with physical therapy today as INR has improved (3.26) -no concern for active infection, lesions crusted, no signs of superimposed bacterial infection  -finished course of acyclovir, not on any antibiotics  -cont bacitracin opth ointment and fluorometholone drop

## 2017-04-28 NOTE — H&P ADULT - HISTORY OF PRESENT ILLNESS
88 yo male w/ PMH of Afib on coumadin, Anxiety, HTN, hypothyroidism, hx of prostate ca s/p radiation, hx of chronic diarrhea, basal cell carcinoma and hx of lymphoma bib daughter for fall last night.  pt had gotten up to go to  the bathroom, he urinated and felt weak, then he said his knees buckled and he fell. Unknown if there is any loc. patient was recently discharged from [facility] for Vertigo, neuro workup at that time was negative. cardiology d/c carvedilol 2ndry to bradycardia and patient was placed on meclizine.     In the ER patient received IVF.     Past Medical History:  A-fib    Anxiety    Basal cell carcinoma    Chronic diarrhea    Hypertension    Hypothyroid    Lymphoma    Prostate CA.    Past Surgical History:  H/O shoulder replacement    S/P bilateral unicompartmental knee replacement.    Family History:  No pertinent family history in first degree relatives. 89 yo male w/ PMH of Afib on coumadin, Anxiety, HTN, hypothyroidism, hx of prostate ca s/p radiation, hx of chronic diarrhea, basal cell carcinoma and hx of lymphoma bib daughter for fall last night.  pt had gotten up to go to  the bathroom, he urinated and felt weak, then he said his knees buckled and he fell. Unknown if there is any loc. patient was recently discharged from [facility] for Vertigo, neuro workup at that time was negative. cardiology d/c carvedilol 2ndry to bradycardia and patient was placed on meclizine.     In the ER patient received IVF. Patient and family member wants to go home.  The ER physician discussed the case with Dr Joshi who advised to D/C the patient.  Discussed with Dr lou    Past Medical History:  A-fib    Anxiety    Basal cell carcinoma    Chronic diarrhea    Hypertension    Hypothyroid    Lymphoma    Prostate CA.    Past Surgical History:  H/O shoulder replacement    S/P bilateral unicompartmental knee replacement.    Family History:  No pertinent family history in first degree relatives.

## 2017-04-28 NOTE — ED PROVIDER NOTE - OBJECTIVE STATEMENT
89 yo male bib daughter for fall last night, pt with afib on coumadin, aao x 3, ambulatory. maria isabel any c/o pain 89 yo male bib daughter for fall last night, pt with afib on coumadin, aao x 3, ambulatory. maria isabel any c/o pain. pt had gotten up to go to  the bathroom, he urinated and felt weak, then he said his knee buckled and he fell, does no know if he loc

## 2017-04-28 NOTE — ED ADULT NURSE REASSESSMENT NOTE - NS ED NURSE REASSESS COMMENT FT1
Spoke with MD to confirm that pt is ok to be d/c at this time. As per Dr. Solano , pt ok to be d/c.  Pt verbalizes understanding that he could stay in the ED for further testing. Pt cont to decline at this time. Pt and daughter verbalize understanding of signs and symptoms of reasons to return to the ED. Pt alert and appropriate and denies any dizziness or resp difficulty at this time. D/C with daughter.

## 2017-04-28 NOTE — H&P ADULT - PROBLEM SELECTOR PLAN 5
-stable  -cont norvasc 5mg with hold parameters  -d/c carvedilol due to bradycardia -chronic, stable  -cont xanax 0.25 BID

## 2017-04-28 NOTE — ED PROVIDER NOTE - DETAILS:
I, Bree Ruiz, performed the initial face to face bedside interview with this patient regarding history of present illness, review of symptoms and relevant past medical, social and family history.  I completed an independent physical examination.  I was the initial provider who evaluated this patient. The history, relevant review of systems, past medical and surgical history, medical decision making, and physical examination was documented by the scribe in my presence and I attest to the accuracy of the documentation.

## 2017-04-28 NOTE — ED PROVIDER NOTE - PROGRESS NOTE DETAILS
Jagjit Cosby: Spoke with pt and family, pt does not want to be admitted at this time and daughter requesting to call Dr. Joshi. Jagjit Cosby: Case discussed with Dr. Joshi. Jagjit Cosby: Case discussed with Dr. Joshi. if pt is able to walk he may be d/c tohome, he had workup last week in [place] for synvcope. dw family, daughter and pt want to go home

## 2017-05-01 PROBLEM — F41.9 ANXIETY DISORDER, UNSPECIFIED: Chronic | Status: ACTIVE | Noted: 2017-04-09

## 2017-05-01 PROBLEM — I10 ESSENTIAL (PRIMARY) HYPERTENSION: Chronic | Status: ACTIVE | Noted: 2017-04-09

## 2017-05-01 PROBLEM — E03.9 HYPOTHYROIDISM, UNSPECIFIED: Chronic | Status: ACTIVE | Noted: 2017-04-09

## 2018-03-22 NOTE — PROGRESS NOTE ADULT - PROVIDER SPECIALTY LIST ADULT
Cardiology Invasive Procedure Safety Checklist:    Procedure: cysto     Lidocaine NDC [id number] LOT [id number] EXP     Action: Complete sections and checkboxes as appropriate.    Pre-procedure:  1. Patient ID Verified with 2 identifiers (Simi and  or MRN) : YES    2. Procedure and site verified with patient/designee (when able) : YES    3. Accurate consent documentation in medical record : YES    4. H&P (or appropriate assessment) documented in medical record : NO  H&P must be up to 30 days prior to procedure an updated within 24 hours of                 Procedure as applicable.     5. Relevant diagnostic and radiology test results appropriately labeled and displayed as applicable : NO    6. Blood products, implants, devices, and/or special equipment available for the procedure as applicable : YES    7. Procedure site(s) marked with provider initials [Exclusions: none] : NO    8. Marking not required. Reason : Yes  Procedure does not require site marking    Time Out:     Time-Out performed immediately prior to starting procedure, including verbal and active participation of all team members addressing: YES    1. Correct patient identity.  2. Confirmed that the correct side and site are marked.  3. An accurate procedure to be done.  4. Agreement on the procedure to be done.  5. Correct patient position.  6. Relevant images and results are properly labeled and appropriately displayed.  7. The need to administer antibiotics or fluids for irrigation purposes during the procedure as applicable.  8. Safety precautions based on patient history or medication use.    During Procedure: Verification of correct person, site, and procedure occurs any time the responsibility for care of the patient is transferred to another member of the care team.

## 2018-05-15 ENCOUNTER — APPOINTMENT (OUTPATIENT)
Dept: NEUROSURGERY | Facility: CLINIC | Age: 83
End: 2018-05-15
Payer: MEDICARE

## 2018-05-15 VITALS
HEART RATE: 108 BPM | HEIGHT: 68 IN | BODY MASS INDEX: 23.64 KG/M2 | SYSTOLIC BLOOD PRESSURE: 97 MMHG | TEMPERATURE: 97.6 F | WEIGHT: 156 LBS | RESPIRATION RATE: 15 BRPM | DIASTOLIC BLOOD PRESSURE: 67 MMHG

## 2018-05-15 DIAGNOSIS — Z85.9 PERSONAL HISTORY OF MALIGNANT NEOPLASM, UNSPECIFIED: ICD-10-CM

## 2018-05-15 PROCEDURE — 99204 OFFICE O/P NEW MOD 45 MIN: CPT

## 2018-05-18 ENCOUNTER — APPOINTMENT (OUTPATIENT)
Dept: HEMATOLOGY ONCOLOGY | Facility: CLINIC | Age: 83
End: 2018-05-18
Payer: MEDICARE

## 2018-05-18 VITALS
BODY MASS INDEX: 24.86 KG/M2 | HEIGHT: 68 IN | TEMPERATURE: 98 F | HEART RATE: 87 BPM | SYSTOLIC BLOOD PRESSURE: 125 MMHG | WEIGHT: 164 LBS | DIASTOLIC BLOOD PRESSURE: 71 MMHG

## 2018-05-18 PROCEDURE — 99215 OFFICE O/P EST HI 40 MIN: CPT | Mod: 25

## 2018-05-20 ENCOUNTER — FORM ENCOUNTER (OUTPATIENT)
Age: 83
End: 2018-05-20

## 2018-05-21 ENCOUNTER — OUTPATIENT (OUTPATIENT)
Dept: OUTPATIENT SERVICES | Facility: HOSPITAL | Age: 83
LOS: 1 days | End: 2018-05-21
Payer: MEDICARE

## 2018-05-21 ENCOUNTER — APPOINTMENT (OUTPATIENT)
Dept: MRI IMAGING | Facility: CLINIC | Age: 83
End: 2018-05-21
Payer: MEDICARE

## 2018-05-21 DIAGNOSIS — Z96.619 PRESENCE OF UNSPECIFIED ARTIFICIAL SHOULDER JOINT: Chronic | ICD-10-CM

## 2018-05-21 DIAGNOSIS — Z00.8 ENCOUNTER FOR OTHER GENERAL EXAMINATION: ICD-10-CM

## 2018-05-21 DIAGNOSIS — Z96.653 PRESENCE OF ARTIFICIAL KNEE JOINT, BILATERAL: Chronic | ICD-10-CM

## 2018-05-21 PROCEDURE — 70551 MRI BRAIN STEM W/O DYE: CPT | Mod: 26

## 2018-05-21 PROCEDURE — 70551 MRI BRAIN STEM W/O DYE: CPT

## 2018-06-07 ENCOUNTER — INPATIENT (INPATIENT)
Facility: HOSPITAL | Age: 83
LOS: 0 days | Discharge: HOME HEALTH | End: 2018-06-08
Attending: STUDENT IN AN ORGANIZED HEALTH CARE EDUCATION/TRAINING PROGRAM | Admitting: STUDENT IN AN ORGANIZED HEALTH CARE EDUCATION/TRAINING PROGRAM
Payer: MEDICARE

## 2018-06-07 VITALS
WEIGHT: 149.91 LBS | HEART RATE: 128 BPM | OXYGEN SATURATION: 98 % | RESPIRATION RATE: 16 BRPM | DIASTOLIC BLOOD PRESSURE: 51 MMHG | HEIGHT: 68 IN | TEMPERATURE: 97 F | SYSTOLIC BLOOD PRESSURE: 95 MMHG

## 2018-06-07 DIAGNOSIS — Z96.619 PRESENCE OF UNSPECIFIED ARTIFICIAL SHOULDER JOINT: Chronic | ICD-10-CM

## 2018-06-07 DIAGNOSIS — Z96.653 PRESENCE OF ARTIFICIAL KNEE JOINT, BILATERAL: Chronic | ICD-10-CM

## 2018-06-07 LAB
ALBUMIN SERPL ELPH-MCNC: 3.1 G/DL — LOW (ref 3.3–5)
ALP SERPL-CCNC: 54 U/L — SIGNIFICANT CHANGE UP (ref 40–120)
ALT FLD-CCNC: 22 U/L — SIGNIFICANT CHANGE UP (ref 12–78)
ANION GAP SERPL CALC-SCNC: 7 MMOL/L — SIGNIFICANT CHANGE UP (ref 5–17)
ANISOCYTOSIS BLD QL: SIGNIFICANT CHANGE UP
APPEARANCE UR: CLEAR — SIGNIFICANT CHANGE UP
APTT BLD: 34.1 SEC — SIGNIFICANT CHANGE UP (ref 27.5–37.4)
AST SERPL-CCNC: 23 U/L — SIGNIFICANT CHANGE UP (ref 15–37)
BACTERIA # UR AUTO: ABNORMAL
BASOPHILS # BLD AUTO: 0.06 K/UL — SIGNIFICANT CHANGE UP (ref 0–0.2)
BASOPHILS NFR BLD AUTO: 1 % — SIGNIFICANT CHANGE UP (ref 0–2)
BILIRUB SERPL-MCNC: 0.3 MG/DL — SIGNIFICANT CHANGE UP (ref 0.2–1.2)
BILIRUB UR-MCNC: NEGATIVE — SIGNIFICANT CHANGE UP
BLD GP AB SCN SERPL QL: SIGNIFICANT CHANGE UP
BUN SERPL-MCNC: 34 MG/DL — HIGH (ref 7–23)
CALCIUM SERPL-MCNC: 8.3 MG/DL — LOW (ref 8.5–10.1)
CHLORIDE SERPL-SCNC: 109 MMOL/L — HIGH (ref 96–108)
CK SERPL-CCNC: 50 U/L — SIGNIFICANT CHANGE UP (ref 26–308)
CO2 SERPL-SCNC: 27 MMOL/L — SIGNIFICANT CHANGE UP (ref 22–31)
COLOR SPEC: YELLOW — SIGNIFICANT CHANGE UP
CREAT SERPL-MCNC: 1.83 MG/DL — HIGH (ref 0.5–1.3)
DIFF PNL FLD: NEGATIVE — SIGNIFICANT CHANGE UP
EOSINOPHIL # BLD AUTO: 0.17 K/UL — SIGNIFICANT CHANGE UP (ref 0–0.5)
EOSINOPHIL NFR BLD AUTO: 2.9 % — SIGNIFICANT CHANGE UP (ref 0–6)
EPI CELLS # UR: SIGNIFICANT CHANGE UP
GLUCOSE SERPL-MCNC: 120 MG/DL — HIGH (ref 70–99)
GLUCOSE UR QL: NEGATIVE MG/DL — SIGNIFICANT CHANGE UP
HCT VFR BLD CALC: 36.1 % — LOW (ref 39–50)
HGB BLD-MCNC: 12.2 G/DL — LOW (ref 13–17)
HYPOCHROMIA BLD QL: SLIGHT — SIGNIFICANT CHANGE UP
IMM GRANULOCYTES NFR BLD AUTO: 0.3 % — SIGNIFICANT CHANGE UP (ref 0–1.5)
INR BLD: 2.24 RATIO — HIGH (ref 0.88–1.16)
KETONES UR-MCNC: NEGATIVE — SIGNIFICANT CHANGE UP
LACTATE SERPL-SCNC: 0.7 MMOL/L — SIGNIFICANT CHANGE UP (ref 0.7–2)
LEUKOCYTE ESTERASE UR-ACNC: NEGATIVE — SIGNIFICANT CHANGE UP
LIDOCAIN IGE QN: 98 U/L — SIGNIFICANT CHANGE UP (ref 73–393)
LYMPHOCYTES # BLD AUTO: 1.22 K/UL — SIGNIFICANT CHANGE UP (ref 1–3.3)
LYMPHOCYTES # BLD AUTO: 20.7 % — SIGNIFICANT CHANGE UP (ref 13–44)
MACROCYTES BLD QL: SIGNIFICANT CHANGE UP
MAGNESIUM SERPL-MCNC: 1.9 MG/DL — SIGNIFICANT CHANGE UP (ref 1.6–2.6)
MANUAL SMEAR VERIFICATION: SIGNIFICANT CHANGE UP
MCHC RBC-ENTMCNC: 33.8 GM/DL — SIGNIFICANT CHANGE UP (ref 32–36)
MCHC RBC-ENTMCNC: 35.8 PG — HIGH (ref 27–34)
MCV RBC AUTO: 105.9 FL — HIGH (ref 80–100)
MONOCYTES # BLD AUTO: 0.46 K/UL — SIGNIFICANT CHANGE UP (ref 0–0.9)
MONOCYTES NFR BLD AUTO: 7.8 % — SIGNIFICANT CHANGE UP (ref 2–14)
NEUTROPHILS # BLD AUTO: 3.97 K/UL — SIGNIFICANT CHANGE UP (ref 1.8–7.4)
NEUTROPHILS NFR BLD AUTO: 67.3 % — SIGNIFICANT CHANGE UP (ref 43–77)
NITRITE UR-MCNC: NEGATIVE — SIGNIFICANT CHANGE UP
NRBC # BLD: 0 /100 WBCS — SIGNIFICANT CHANGE UP (ref 0–0)
NT-PROBNP SERPL-SCNC: HIGH PG/ML (ref 0–450)
OVALOCYTES BLD QL SMEAR: SIGNIFICANT CHANGE UP
PH UR: 5 — SIGNIFICANT CHANGE UP (ref 5–8)
PLAT MORPH BLD: NORMAL — SIGNIFICANT CHANGE UP
PLATELET # BLD AUTO: 154 K/UL — SIGNIFICANT CHANGE UP (ref 150–400)
POIKILOCYTOSIS BLD QL AUTO: SIGNIFICANT CHANGE UP
POTASSIUM SERPL-MCNC: 3.9 MMOL/L — SIGNIFICANT CHANGE UP (ref 3.5–5.3)
POTASSIUM SERPL-SCNC: 3.9 MMOL/L — SIGNIFICANT CHANGE UP (ref 3.5–5.3)
PROT SERPL-MCNC: 5.3 GM/DL — LOW (ref 6–8.3)
PROT UR-MCNC: 30 MG/DL
PROTHROM AB SERPL-ACNC: 24.6 SEC — HIGH (ref 9.8–12.7)
RBC # BLD: 3.41 M/UL — LOW (ref 4.2–5.8)
RBC # FLD: 13.8 % — SIGNIFICANT CHANGE UP (ref 10.3–14.5)
RBC BLD AUTO: ABNORMAL
RBC CASTS # UR COMP ASSIST: NEGATIVE /HPF — SIGNIFICANT CHANGE UP (ref 0–4)
SCHISTOCYTES BLD QL AUTO: SLIGHT — SIGNIFICANT CHANGE UP
SODIUM SERPL-SCNC: 143 MMOL/L — SIGNIFICANT CHANGE UP (ref 135–145)
SP GR SPEC: 1.02 — SIGNIFICANT CHANGE UP (ref 1.01–1.02)
TROPONIN I SERPL-MCNC: 0.03 NG/ML — SIGNIFICANT CHANGE UP (ref 0.01–0.04)
TROPONIN I SERPL-MCNC: <0.015 NG/ML — SIGNIFICANT CHANGE UP (ref 0.01–0.04)
TSH SERPL-MCNC: 1.14 UU/ML — SIGNIFICANT CHANGE UP (ref 0.36–3.74)
TYPE + AB SCN PNL BLD: SIGNIFICANT CHANGE UP
UROBILINOGEN FLD QL: NEGATIVE MG/DL — SIGNIFICANT CHANGE UP
WBC # BLD: 5.9 K/UL — SIGNIFICANT CHANGE UP (ref 3.8–10.5)
WBC # FLD AUTO: 5.9 K/UL — SIGNIFICANT CHANGE UP (ref 3.8–10.5)
WBC UR QL: SIGNIFICANT CHANGE UP

## 2018-06-07 PROCEDURE — 70450 CT HEAD/BRAIN W/O DYE: CPT | Mod: 26

## 2018-06-07 PROCEDURE — 99285 EMERGENCY DEPT VISIT HI MDM: CPT

## 2018-06-07 PROCEDURE — 71045 X-RAY EXAM CHEST 1 VIEW: CPT | Mod: 26

## 2018-06-07 PROCEDURE — 93010 ELECTROCARDIOGRAM REPORT: CPT

## 2018-06-07 RX ORDER — ALFUZOSIN HYDROCHLORIDE 10 MG/1
1 TABLET, EXTENDED RELEASE ORAL
Qty: 0 | Refills: 0 | COMMUNITY

## 2018-06-07 RX ORDER — METOPROLOL TARTRATE 50 MG
1 TABLET ORAL
Qty: 0 | Refills: 0 | COMMUNITY

## 2018-06-07 RX ORDER — LEVOTHYROXINE SODIUM 125 MCG
100 TABLET ORAL DAILY
Qty: 0 | Refills: 0 | Status: DISCONTINUED | OUTPATIENT
Start: 2018-06-07 | End: 2018-06-08

## 2018-06-07 RX ORDER — GABAPENTIN 400 MG/1
200 CAPSULE ORAL THREE TIMES A DAY
Qty: 0 | Refills: 0 | Status: DISCONTINUED | OUTPATIENT
Start: 2018-06-07 | End: 2018-06-08

## 2018-06-07 RX ORDER — WARFARIN SODIUM 2.5 MG/1
1 TABLET ORAL
Qty: 0 | Refills: 0 | COMMUNITY

## 2018-06-07 RX ORDER — METOPROLOL TARTRATE 50 MG
25 TABLET ORAL
Qty: 0 | Refills: 0 | Status: DISCONTINUED | OUTPATIENT
Start: 2018-06-07 | End: 2018-06-08

## 2018-06-07 RX ORDER — GABAPENTIN 400 MG/1
200 CAPSULE ORAL ONCE
Qty: 0 | Refills: 0 | Status: COMPLETED | OUTPATIENT
Start: 2018-06-07 | End: 2018-06-07

## 2018-06-07 RX ORDER — ACETAMINOPHEN 500 MG
650 TABLET ORAL EVERY 8 HOURS
Qty: 0 | Refills: 0 | Status: DISCONTINUED | OUTPATIENT
Start: 2018-06-07 | End: 2018-06-08

## 2018-06-07 RX ORDER — SODIUM CHLORIDE 9 MG/ML
1000 INJECTION INTRAMUSCULAR; INTRAVENOUS; SUBCUTANEOUS ONCE
Qty: 0 | Refills: 0 | Status: COMPLETED | OUTPATIENT
Start: 2018-06-07 | End: 2018-06-07

## 2018-06-07 RX ORDER — ALPRAZOLAM 0.25 MG
0.25 TABLET ORAL
Qty: 0 | Refills: 0 | Status: DISCONTINUED | OUTPATIENT
Start: 2018-06-07 | End: 2018-06-08

## 2018-06-07 RX ORDER — BACITRACIN 500 [USP'U]/G
1 OINTMENT OPHTHALMIC
Qty: 0 | Refills: 0 | COMMUNITY

## 2018-06-07 RX ORDER — FLUOROMETHOLONE 1 MG/ML
1 SOLUTION/ DROPS OPHTHALMIC
Qty: 0 | Refills: 0 | COMMUNITY

## 2018-06-07 RX ORDER — LEVOTHYROXINE SODIUM 125 MCG
1 TABLET ORAL
Qty: 0 | Refills: 0 | COMMUNITY

## 2018-06-07 RX ORDER — ALPRAZOLAM 0.25 MG
0 TABLET ORAL
Qty: 0 | Refills: 0 | COMMUNITY

## 2018-06-07 RX ORDER — WARFARIN SODIUM 2.5 MG/1
5 TABLET ORAL DAILY
Qty: 0 | Refills: 0 | Status: DISCONTINUED | OUTPATIENT
Start: 2018-06-07 | End: 2018-06-08

## 2018-06-07 RX ADMIN — Medication 0.25 MILLIGRAM(S): at 23:09

## 2018-06-07 RX ADMIN — GABAPENTIN 200 MILLIGRAM(S): 400 CAPSULE ORAL at 22:09

## 2018-06-07 RX ADMIN — SODIUM CHLORIDE 1000 MILLILITER(S): 9 INJECTION INTRAMUSCULAR; INTRAVENOUS; SUBCUTANEOUS at 17:50

## 2018-06-07 RX ADMIN — GABAPENTIN 200 MILLIGRAM(S): 400 CAPSULE ORAL at 21:58

## 2018-06-07 NOTE — ED ADULT TRIAGE NOTE - CHIEF COMPLAINT QUOTE
Patient presents with tachycardia and hypotension. Patient reports weakness over the past few days. History of Afib

## 2018-06-07 NOTE — H&P ADULT - HISTORY OF PRESENT ILLNESS
89 y/o male with a pmhx of HTN, remote hx of shingles; AFib on Coumadin, lymphoma, prostate CA, basal cell carcinoma presents to the ED c/o weakness x3 days, worsening this AM. +HA. Pt notes he was tachycardic and hypotensive at home. Pt visited his PCP Dr. Joshi for sx yesterday, started on Metoprolol. Pt came to ED for worsening sx this morning. Denies cough, fevers, or chills, abd pain, NVD, painful urination, or sick contacts 89 y/o male with a pmhx of HTN, remote hx of shingles; AFib on Coumadin, lymphoma, prostate CA, basal cell carcinoma presents to the ED c/o weakness x3 days, worsening this AM. +HA. Pt notes he was tachycardic and hypotensive at home. Pt visited his PCP Dr. Joshi for sx yesterday, started on Metoprolol. Pt came to ED for worsening sx this morning. Denies cough, fevers, or chills, abd pain, NVD, painful urination, or sick contacts.     Family Hx:  Father: NO CAD, No DM  Mother: No CAD, No DM, No HTN

## 2018-06-07 NOTE — H&P ADULT - ASSESSMENT
89 yo male presented with weakness.    A/P:    1.  Dizziness  Generalized weakness  A fib  Elevated BNP  Dehydration   -patient seemed slightly dehydrated, got 1L fluid in ED, BP seems stable with the IVF, HR is also better controlled  -will follow clinically in telemetry  -repeat troponin  -patient denies any h/o CHF  -due to elevated BNP, will get Echo and also hold off anymore IVF for now  -continue coumadin  -follow cardiology consult     2.  Acute Kidney injury on CKD stage 3  -got IVF  -will f/u labs    3.  Hypothyroidism  -on levothyroxine    4.  Anxiety  -on Xanax

## 2018-06-07 NOTE — ED PROVIDER NOTE - MEDICAL DECISION MAKING DETAILS
89 y/o male presents with generalized weakness, found to be tachycardic and hypotensive, r/o anemia, electrolyte abnormality, and cardiac abnormality, plan for ekg, cxr and labs 89 y/o male presents with generalized weakness, found to be tachycardic and hypotensive, r/o anemia, electrolyte abnormality, and cardiac abnormality, plan for ekg, cxr, CT scan head, labs, UA; admit

## 2018-06-07 NOTE — H&P ADULT - NSHPPHYSICALEXAM_GEN_ALL_CORE
Vital Signs Last 24 Hrs  T(C): 36.8 (07 Jun 2018 16:54), Max: 36.8 (07 Jun 2018 16:54)  T(F): 98.2 (07 Jun 2018 16:54), Max: 98.2 (07 Jun 2018 16:54)  HR: 96 (07 Jun 2018 19:40) (87 - 128)  BP: 139/82 (07 Jun 2018 19:40) (95/51 - 139/82)  RR: 17 (07 Jun 2018 19:40) (16 - 18)  SpO2: 100% (07 Jun 2018 19:40) (98% - 100%)

## 2018-06-07 NOTE — ED PROVIDER NOTE - OBJECTIVE STATEMENT
89 y/o male with a pmhx of HTN, shingles on Neurontin, AFib on Coumadin, lymphoma, prostate CA, basal cell carcinoma presents to the ED c/o weakness x3 days, worsening this AM. +HA. Pt notes he was tachycardic and hypotensive at home. Pt visited his PCP Dr. Wheeler for sx yesterday, started on Metoprolol. Pt came to ED for worsening sx this morning. Denies cough, fevers, or chills, abd pain, NVD, painful urination, or sick contacts. Lives by himself, relief with laying down. Cardio and PCP Dr. Wheeler. 89 y/o male with a pmhx of HTN, remote hx of shingles; AFib on Coumadin, lymphoma, prostate CA, basal cell carcinoma presents to the ED c/o weakness x3 days, worsening this AM. +HA. Pt notes he was tachycardic and hypotensive at home. Pt visited his PCP Dr. Joshi for sx yesterday, started on Metoprolol. Pt came to ED for worsening sx this morning. Denies cough, fevers, or chills, abd pain, NVD, painful urination, or sick contacts. Lives by himself, relief with laying down. Cardio and PCP Dr. Joshi.

## 2018-06-07 NOTE — ED PROVIDER NOTE - CONSTITUTIONAL, MLM
normal... ill appearing, well nourished, awake, alert, oriented to person, place, time/situation and in no apparent distress. well nourished, awake, alert, oriented to person, place, time/situation and in no apparent distress.

## 2018-06-07 NOTE — H&P ADULT - FAMILY HISTORY
Patient notified.   
Pt stated she started taking Wellbutrin and since then she has been itching terribly and is having severe mood swings. Pt would like to know what she should do? I advised pt to stop taking the medication and that I would speak with Lynsey and contact her back with her recommendations. Pt verbalized understanding and had no further questions.   
Pt would like to speak to a nurse regarding having trouble with her medications    Phone [phone]  
No pertinent family history in first degree relatives

## 2018-06-07 NOTE — ED PROVIDER NOTE - PROGRESS NOTE DETAILS
[place] DO: In setting of tachycardia, hypotension; generalized weakness, to be admitted at this time; UA pending; endorsed to hospitalist at this time. Family in agreement with plan at this time.

## 2018-06-07 NOTE — ED ADULT NURSE NOTE - OBJECTIVE STATEMENT
Pt reports that he had an episode of near syncope with tachycardia. Pt reports that he no longer feels near syncope or dizziness, but still feels fatigued and generally weak. Denies focal weakness and daughter reports pt is at normal mental status.

## 2018-06-08 ENCOUNTER — TRANSCRIPTION ENCOUNTER (OUTPATIENT)
Age: 83
End: 2018-06-08

## 2018-06-08 VITALS
HEART RATE: 72 BPM | DIASTOLIC BLOOD PRESSURE: 72 MMHG | RESPIRATION RATE: 18 BRPM | OXYGEN SATURATION: 98 % | SYSTOLIC BLOOD PRESSURE: 121 MMHG | TEMPERATURE: 98 F

## 2018-06-08 LAB
ANION GAP SERPL CALC-SCNC: 7 MMOL/L — SIGNIFICANT CHANGE UP (ref 5–17)
BASOPHILS # BLD AUTO: 0.09 K/UL — SIGNIFICANT CHANGE UP (ref 0–0.2)
BASOPHILS NFR BLD AUTO: 1.5 % — SIGNIFICANT CHANGE UP (ref 0–2)
BUN SERPL-MCNC: 32 MG/DL — HIGH (ref 7–23)
CALCIUM SERPL-MCNC: 8.8 MG/DL — SIGNIFICANT CHANGE UP (ref 8.5–10.1)
CHLORIDE SERPL-SCNC: 109 MMOL/L — HIGH (ref 96–108)
CO2 SERPL-SCNC: 27 MMOL/L — SIGNIFICANT CHANGE UP (ref 22–31)
CREAT SERPL-MCNC: 1.67 MG/DL — HIGH (ref 0.5–1.3)
EOSINOPHIL # BLD AUTO: 0.35 K/UL — SIGNIFICANT CHANGE UP (ref 0–0.5)
EOSINOPHIL NFR BLD AUTO: 5.9 % — SIGNIFICANT CHANGE UP (ref 0–6)
GLUCOSE SERPL-MCNC: 82 MG/DL — SIGNIFICANT CHANGE UP (ref 70–99)
HCT VFR BLD CALC: 38 % — LOW (ref 39–50)
HGB BLD-MCNC: 12.4 G/DL — LOW (ref 13–17)
IMM GRANULOCYTES NFR BLD AUTO: 0.3 % — SIGNIFICANT CHANGE UP (ref 0–1.5)
INR BLD: 2.64 RATIO — HIGH (ref 0.88–1.16)
LYMPHOCYTES # BLD AUTO: 1.33 K/UL — SIGNIFICANT CHANGE UP (ref 1–3.3)
LYMPHOCYTES # BLD AUTO: 22.5 % — SIGNIFICANT CHANGE UP (ref 13–44)
MCHC RBC-ENTMCNC: 32.6 GM/DL — SIGNIFICANT CHANGE UP (ref 32–36)
MCHC RBC-ENTMCNC: 34.5 PG — HIGH (ref 27–34)
MCV RBC AUTO: 105.8 FL — HIGH (ref 80–100)
MONOCYTES # BLD AUTO: 0.59 K/UL — SIGNIFICANT CHANGE UP (ref 0–0.9)
MONOCYTES NFR BLD AUTO: 10 % — SIGNIFICANT CHANGE UP (ref 2–14)
NEUTROPHILS # BLD AUTO: 3.52 K/UL — SIGNIFICANT CHANGE UP (ref 1.8–7.4)
NEUTROPHILS NFR BLD AUTO: 59.8 % — SIGNIFICANT CHANGE UP (ref 43–77)
NRBC # BLD: 0 /100 WBCS — SIGNIFICANT CHANGE UP (ref 0–0)
PLATELET # BLD AUTO: 167 K/UL — SIGNIFICANT CHANGE UP (ref 150–400)
POTASSIUM SERPL-MCNC: 3.8 MMOL/L — SIGNIFICANT CHANGE UP (ref 3.5–5.3)
POTASSIUM SERPL-SCNC: 3.8 MMOL/L — SIGNIFICANT CHANGE UP (ref 3.5–5.3)
PROTHROM AB SERPL-ACNC: 29.1 SEC — HIGH (ref 9.8–12.7)
RBC # BLD: 3.59 M/UL — LOW (ref 4.2–5.8)
RBC # FLD: 14.1 % — SIGNIFICANT CHANGE UP (ref 10.3–14.5)
SODIUM SERPL-SCNC: 143 MMOL/L — SIGNIFICANT CHANGE UP (ref 135–145)
WBC # BLD: 5.9 K/UL — SIGNIFICANT CHANGE UP (ref 3.8–10.5)
WBC # FLD AUTO: 5.9 K/UL — SIGNIFICANT CHANGE UP (ref 3.8–10.5)

## 2018-06-08 RX ORDER — GABAPENTIN 400 MG/1
200 CAPSULE ORAL EVERY 4 HOURS
Qty: 0 | Refills: 0 | Status: DISCONTINUED | OUTPATIENT
Start: 2018-06-08 | End: 2018-06-08

## 2018-06-08 RX ORDER — FUROSEMIDE 40 MG
40 TABLET ORAL ONCE
Qty: 0 | Refills: 0 | Status: COMPLETED | OUTPATIENT
Start: 2018-06-08 | End: 2018-06-08

## 2018-06-08 RX ADMIN — Medication 40 MILLIGRAM(S): at 08:58

## 2018-06-08 RX ADMIN — GABAPENTIN 200 MILLIGRAM(S): 400 CAPSULE ORAL at 14:21

## 2018-06-08 RX ADMIN — Medication 25 MILLIGRAM(S): at 05:36

## 2018-06-08 RX ADMIN — Medication 650 MILLIGRAM(S): at 09:50

## 2018-06-08 RX ADMIN — GABAPENTIN 200 MILLIGRAM(S): 400 CAPSULE ORAL at 05:36

## 2018-06-08 RX ADMIN — Medication 100 MICROGRAM(S): at 05:36

## 2018-06-08 RX ADMIN — Medication 0.25 MILLIGRAM(S): at 05:36

## 2018-06-08 RX ADMIN — GABAPENTIN 200 MILLIGRAM(S): 400 CAPSULE ORAL at 10:24

## 2018-06-08 RX ADMIN — Medication 650 MILLIGRAM(S): at 10:20

## 2018-06-08 NOTE — PHYSICAL THERAPY INITIAL EVALUATION ADULT - LIVES WITH, PROFILE
Asthma
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

## 2018-06-08 NOTE — DISCHARGE NOTE ADULT - HOSPITAL COURSE
89 y/o male with a pmhx of HTN, remote hx of shingles; AFib on Coumadin, lymphoma, prostate CA, basal cell carcinoma presents to the ED c/o weakness x3 days, worsening this AM. +HA. Pt notes he was tachycardic and hypotensive at home. Pt visited his PCP Dr. Joshi for sx yesterday, started on Metoprolol. Pt came to ED for worsening sx this morning. Denies cough, fevers, or chills, abd pain, NVD, painful urination, or sick contacts.     Pt was admitted and given lasix x 1 with great improvement. Oxygen was off. He ambulated with physical therapy and rolling walker was recommended along with home care. Pt and daughter would like patient to return home on day of discharge. Both admit to excess salt intake by the patient and will make improvements with his diet as an outpatient. They are hopeful to get him into good shape for cyber knife treatment of trigeminal neuralgia.      Dizziness  Generalized weakness  A fib  Elevated BNP  Dehydration   -patient seemed slightly dehydrated, got 1L fluid in ED, BP seems stable with the IVF, HR is also better controlled  -repeat troponin ok  -patient denies any h/o CHF, thought suspected component of diastolic failure. outpatient follow up.  -continue coumadin  -follow cardiology consult     2.  Acute Kidney injury on CKD stage 3  improving to 1.67. repeat as outpatinet  -will f/u labs    3.  Hypothyroidism  -on levothyroxine    4.  Anxiety  -on Xanax      total time, including coordination of care: 40 minutes  GEN: NAD  EYES: eomi  CV: no edema  RESP: unlabored

## 2018-06-08 NOTE — CONSULT NOTE ADULT - ASSESSMENT
89 year old man with known paroxysmal atrial fibrillation/flutter on coumadin with recent return to atrial flutter/fib and put on metoprolol ER which he was not taking as told who then comes in with symptomatic atrial flutter with variable block whose ventricular rates are now controlled on metoprolol ER 25 Q 12.  No evidence for ACS.  Would ambulate today and if asymptomatic he could be discharged home from a cardiac standpoint and follow up with me in my office in 1-2 weeks. 89 year old man with known paroxysmal atrial fibrillation/flutter on coumadin with recent return to atrial flutter/fib and put on metoprolol ER which he was not taking as told who then comes in with symptomatic atrial flutter with variable block whose ventricular rates are now controlled on metoprolol ER 25 Q 12.  No evidence for ACS.  + evidence for HFpEF.  Will give furosemide 40 mg X1 and then re-evaluate.

## 2018-06-08 NOTE — CONSULT NOTE ADULT - SUBJECTIVE AND OBJECTIVE BOX
CHIEF COMPLAINT: Patient is a 89y old  Male who presents with a chief complaint of complain of weakness (07 Jun 2018 21:04)      HPI: HPI:  89 y/o male with a pmhx of HTN, remote hx of shingles; AFib on Coumadin, lymphoma, prostate CA, basal cell carcinoma presents to the ED c/o weakness x3 days, worsening this AM. +HA. Pt notes he was tachycardic and hypotensive at home. Pt visited his PCP Dr. Joshi for sx yesterday, started on Metoprolol. Pt came to ED for worsening sx this morning. Denies cough, fevers, or chills, abd pain, NVD, painful urination, or sick contacts.     Family Hx:  Father: NO CAD, No DM  Mother: No CAD, No DM, No HTN (07 Jun 2018 21:04)      PMHx: PAST MEDICAL & SURGICAL HISTORY:  Chronic diarrhea  Prostate CA  Basal cell carcinoma  Lymphoma  Anxiety  Hypothyroid  Hypertension  A-fib  H/O shoulder replacement  S/P bilateral unicompartmental knee replacement        Soc Hx:     FAMILY HISTORY:  No pertinent family history in first degree relatives      Allergies: Allergies    No Known Allergies    Intolerances          REVIEW OF SYSTEMS:    As above  No chest pain or shortness of breath  + lightheadeness   no syncope  No leg swelling  No palpitations  No claudication-like symptoms    Vital Signs Last 24 Hrs  T(C): 36.4 (08 Jun 2018 04:45), Max: 36.8 (07 Jun 2018 16:54)  T(F): 97.6 (08 Jun 2018 04:45), Max: 98.2 (07 Jun 2018 16:54)  HR: 72 (08 Jun 2018 04:45) (71 - 128)  BP: 147/81 (08 Jun 2018 04:45) (95/51 - 147/81)  BP(mean): --  RR: 18 (07 Jun 2018 23:20) (16 - 18)  SpO2: 99% (08 Jun 2018 04:45) (97% - 100%)    I&O's Summary      CAPILLARY BLOOD GLUCOSE          PHYSICAL EXAM:   Patient in NAD  Neck: No JVD; Carotids:  2+ without bruits  Respiratory:  Clear to A&P  Cardiovascular: S1 and S2, irregular rate and rhythm, no Murmurs, gallops or rubs  Gastrointestinal:  Soft, non-tender; BS positive  Extremities: No peripheral edema  Vascular: 2+ peripheral pulses  Neurological: A/O x 3, no focal deficits      MEDICATIONS:  MEDICATIONS  (STANDING):  gabapentin 200 milliGRAM(s) Oral three times a day  levothyroxine 100 MICROGram(s) Oral daily  metoprolol succinate ER 25 milliGRAM(s) Oral two times a day  warfarin 5 milliGRAM(s) Oral daily      LABS: All Labs Reviewed:  Blood Culture:   BNP Serum Pro-Brain Natriuretic Peptide: 71220 pg/mL (06-07 @ 17:28)    CBC             WBC Count: 5.90 K/uL (06-08 @ 06:54)  WBC Count: 5.90 K/uL (06-07 @ 17:28)              Hemoglobin: 12.4 g/dL (06-08 @ 06:54)  Hemoglobin: 12.2 g/dL (06-07 @ 17:28)              Hematocrit: 38.0 % (06-08 @ 06:54)  Hematocrit: 36.1 % (06-07 @ 17:28)              Mean Cell Volume: 105.8 fl (06-08 @ 06:54)  Mean Cell Volume: 105.9 fl (06-07 @ 17:28)              Platelet Count - Automated: 167 K/uL (06-08 @ 06:54)  Platelet Count - Automated: 154 K/uL (06-07 @ 17:28)                            Cardiac markers             Troponin I, Serum: 0.025 ng/mL (06-07 @ 22:24)  Troponin I, Serum: <0.015 ng/mL (06-07 @ 17:28)                             Chems        Sodium, Serum: 143 mmol/L (06-08 @ 06:54)  Sodium, Serum: 143 mmol/L (06-07 @ 17:28)          Potassium, Serum: 3.8 mmol/L (06-08 @ 06:54)  Potassium, Serum: 3.9 mmol/L (06-07 @ 17:28)          Blood Urea Nitrogen, Serum: 32 mg/dL (06-08 @ 06:54)  Blood Urea Nitrogen, Serum: 34 mg/dL (06-07 @ 17:28)          Creatinine 1.67  Creatinine 1.83          Magnesium, Serum: 1.9 mg/dL (06-07 @ 17:28)          Protein Total, Serum: 5.3 gm/dL (06-07 @ 17:28)                  Calcium, Total Serum: 8.8 mg/dL (06-08 @ 06:54)  Calcium, Total Serum: 8.3 mg/dL (06-07 @ 17:28)                  Bilirubin Total, Serum: 0.3 mg/dL (06-07 @ 17:28)          Alanine Aminotransferase (ALT/SGPT): 22 U/L (06-07 @ 17:28)          Aspartate Aminotransferase (AST/SGOT): 23 U/L (06-07 @ 17:28)                 INR: 2.64 ratio (06-08 @ 06:54)  INR: 2.24 ratio (06-07 @ 17:28)             RADIOLOGY:    EKG:  Atrial flutter with variable block; RBBB; LAFB; anteroseptal Q's-unchanged    Telemetry:  V rates controlled    ECHO: CHIEF COMPLAINT: Patient is a 89y old  Male who presents with a chief complaint of complain of weakness (07 Jun 2018 21:04)      HPI: HPI:  89 y/o male with a pmhx of HTN, remote hx of shingles; AFib on Coumadin, lymphoma, prostate CA, basal cell carcinoma presents to the ED c/o weakness x3 days, worsening this AM. +HA. Pt notes he was tachycardic and hypotensive at home. Pt visited his PCP Dr. Joshi for sx yesterday, started on Metoprolol. Pt came to ED for worsening sx this morning. Denies cough, fevers, or chills, abd pain, NVD, painful urination, or sick contacts.     Family Hx:  Father: NO CAD, No DM  Mother: No CAD, No DM, No HTN (07 Jun 2018 21:04)      PMHx: PAST MEDICAL & SURGICAL HISTORY:  Chronic diarrhea  Prostate CA  Basal cell carcinoma  Lymphoma  Anxiety  Hypothyroid  Hypertension  A-fib  H/O shoulder replacement  S/P bilateral unicompartmental knee replacement        Soc Hx:     FAMILY HISTORY:  No pertinent family history in first degree relatives      Allergies: Allergies    No Known Allergies    Intolerances          REVIEW OF SYSTEMS:    As above  No chest pain or shortness of breath  + lightheadeness   no syncope  No leg swelling  No palpitations  No claudication-like symptoms    Vital Signs Last 24 Hrs  T(C): 36.4 (08 Jun 2018 04:45), Max: 36.8 (07 Jun 2018 16:54)  T(F): 97.6 (08 Jun 2018 04:45), Max: 98.2 (07 Jun 2018 16:54)  HR: 72 (08 Jun 2018 04:45) (71 - 128)  BP: 147/81 (08 Jun 2018 04:45) (95/51 - 147/81)  BP(mean): --  RR: 18 (07 Jun 2018 23:20) (16 - 18)  SpO2: 99% (08 Jun 2018 04:45) (97% - 100%)    I&O's Summary      CAPILLARY BLOOD GLUCOSE          PHYSICAL EXAM:   Patient in NAD  Neck: No JVD; Carotids:  2+ without bruits  Respiratory:  Crackles  Cardiovascular: S1 and S2, irregular rate and rhythm, no Murmurs, gallops or rubs  Gastrointestinal:  Soft, non-tender; BS positive  Extremities: No peripheral edema  Vascular: 2+ peripheral pulses  Neurological: A/O x 3, no focal deficits      MEDICATIONS:  MEDICATIONS  (STANDING):  gabapentin 200 milliGRAM(s) Oral three times a day  levothyroxine 100 MICROGram(s) Oral daily  metoprolol succinate ER 25 milliGRAM(s) Oral two times a day  warfarin 5 milliGRAM(s) Oral daily      LABS: All Labs Reviewed:  Blood Culture:   BNP Serum Pro-Brain Natriuretic Peptide: 87056 pg/mL (06-07 @ 17:28)    CBC             WBC Count: 5.90 K/uL (06-08 @ 06:54)  WBC Count: 5.90 K/uL (06-07 @ 17:28)              Hemoglobin: 12.4 g/dL (06-08 @ 06:54)  Hemoglobin: 12.2 g/dL (06-07 @ 17:28)              Hematocrit: 38.0 % (06-08 @ 06:54)  Hematocrit: 36.1 % (06-07 @ 17:28)              Mean Cell Volume: 105.8 fl (06-08 @ 06:54)  Mean Cell Volume: 105.9 fl (06-07 @ 17:28)              Platelet Count - Automated: 167 K/uL (06-08 @ 06:54)  Platelet Count - Automated: 154 K/uL (06-07 @ 17:28)                            Cardiac markers             Troponin I, Serum: 0.025 ng/mL (06-07 @ 22:24)  Troponin I, Serum: <0.015 ng/mL (06-07 @ 17:28)                             Chems        Sodium, Serum: 143 mmol/L (06-08 @ 06:54)  Sodium, Serum: 143 mmol/L (06-07 @ 17:28)          Potassium, Serum: 3.8 mmol/L (06-08 @ 06:54)  Potassium, Serum: 3.9 mmol/L (06-07 @ 17:28)          Blood Urea Nitrogen, Serum: 32 mg/dL (06-08 @ 06:54)  Blood Urea Nitrogen, Serum: 34 mg/dL (06-07 @ 17:28)          Creatinine 1.67  Creatinine 1.83          Magnesium, Serum: 1.9 mg/dL (06-07 @ 17:28)          Protein Total, Serum: 5.3 gm/dL (06-07 @ 17:28)                  Calcium, Total Serum: 8.8 mg/dL (06-08 @ 06:54)  Calcium, Total Serum: 8.3 mg/dL (06-07 @ 17:28)                  Bilirubin Total, Serum: 0.3 mg/dL (06-07 @ 17:28)          Alanine Aminotransferase (ALT/SGPT): 22 U/L (06-07 @ 17:28)          Aspartate Aminotransferase (AST/SGOT): 23 U/L (06-07 @ 17:28)                 INR: 2.64 ratio (06-08 @ 06:54)  INR: 2.24 ratio (06-07 @ 17:28)             RADIOLOGY:    EKG:  Atrial flutter with variable block; RBBB; LAFB; anteroseptal Q's-unchanged    Telemetry:  V rates controlled    ECHO:

## 2018-06-08 NOTE — DISCHARGE NOTE ADULT - CARE PLAN
Principal Discharge DX:	Weakness generalized  Goal:	to feel like your normal self  Assessment and plan of treatment:	Use walker at all times.     PCP- follow up in 1 week. Bring these papers with you to that appointment so your PCP can request records from your hospital stay.

## 2018-06-08 NOTE — DISCHARGE NOTE ADULT - PATIENT PORTAL LINK FT
You can access the [email or web address], offered by [facility], by registering with the following website: [email or web address]

## 2018-06-08 NOTE — PHYSICAL THERAPY INITIAL EVALUATION ADULT - LEVEL OF CONSCIOUSNESS, REHAB EVAL
----- Message from Domenico Delacruz DO sent at 12/8/2017  6:56 AM EST -----  Please let pt know that update scan shows nodules, otherwise was ok. Radiologist recommending we get f/u thyroid US to better look at this nodules. Let me know if questions, thanks!
Left detailed message on machine. Waiting for pt to return call to see where she would like US done  [facility] per signed hipaa. US order placed in ph tray 2.
alert

## 2018-06-08 NOTE — PHYSICAL THERAPY INITIAL EVALUATION ADULT - PERTINENT HX OF CURRENT PROBLEM, REHAB EVAL
89 y/o male with a pmhx of HTN, remote hx of shingles; AFib on Coumadin, lymphoma, prostate CA, basal cell carcinoma presents to the ED c/o weakness x3 days, worsening this AM. +HA. Pt notes he was tachycardic and hypotensive at home. Pt visited his PCP Dr. Joshi for sx yesterday, started on Metoprolol. Pt came to ED for worsening sx this morning.

## 2018-06-08 NOTE — DISCHARGE NOTE ADULT - MEDICATION SUMMARY - MEDICATIONS TO TAKE
I will START or STAY ON the medications listed below when I get home from the hospital:    alfuzosin 10 mg oral tablet, extended release  -- 1 tab(s) by mouth once a day  -- Indication: For prostate    warfarin 5 mg oral tablet  -- 1 tab(s) by mouth once a day (at bedtime)  -- Indication: For blood thinner    carBAMazepine 100 mg oral tablet, extended release  -- 1 tab(s) by mouth 2 times a day ** stopped two days ago **  -- Indication: For pain control    gabapentin 100 mg oral capsule  -- 2 cap(s) by mouth 3 times a day  -- Indication: For pain control    Valtrex 1 g oral tablet  -- 1 tab(s) by mouth once a day  -- Indication: For suppression    Xanax 0.25 mg oral tablet  -- 1 tab(s) by mouth 4 times a day, As Needed  -- Indication: For mood    Toprol-XL 25 mg oral tablet, extended release  -- 1 tab(s) by mouth 2 times a day  -- Indication: For blood pressure    dicyclomine 10 mg oral capsule  -- 1 cap(s) by mouth once a day, As Needed  -- Indication: For bowel health    Refresh ophthalmic solution  -- 1 drop(s) to each affected eye 2 times a day, As Needed  -- Indication: For supplement    Lotemax 0.5% ophthalmic ointment  -- 1 application in the left eye 2 times a day  -- Indication: For eye health    Probiotic Formula oral capsule  -- 1 cap(s) by mouth once a day  -- Indication: For supplement    Synthroid 100 mcg (0.1 mg) oral tablet  -- 1 tab(s) by mouth once a day  -- Indication: For thyroid    B Complex 50 oral tablet, extended release  -- 1 tab(s) by mouth once a day  -- Indication: For supplement    folic acid 1 mg oral tablet  -- 0.5 tab(s) by mouth once a day  -- Indication: For supplement

## 2018-06-08 NOTE — DISCHARGE NOTE ADULT - PLAN OF CARE
to feel like your normal self Use walker at all times.     PCP- follow up in 1 week. Bring these papers with you to that appointment so your PCP can request records from your hospital stay.

## 2018-06-09 LAB
CULTURE RESULTS: NO GROWTH — SIGNIFICANT CHANGE UP
SPECIMEN SOURCE: SIGNIFICANT CHANGE UP

## 2018-06-12 DIAGNOSIS — Z85.828 PERSONAL HISTORY OF OTHER MALIGNANT NEOPLASM OF SKIN: ICD-10-CM

## 2018-06-12 DIAGNOSIS — F41.9 ANXIETY DISORDER, UNSPECIFIED: ICD-10-CM

## 2018-06-12 DIAGNOSIS — Z79.01 LONG TERM (CURRENT) USE OF ANTICOAGULANTS: ICD-10-CM

## 2018-06-12 DIAGNOSIS — E03.9 HYPOTHYROIDISM, UNSPECIFIED: ICD-10-CM

## 2018-06-12 DIAGNOSIS — Z85.72 PERSONAL HISTORY OF NON-HODGKIN LYMPHOMAS: ICD-10-CM

## 2018-06-12 DIAGNOSIS — I12.9 HYPERTENSIVE CHRONIC KIDNEY DISEASE WITH STAGE 1 THROUGH STAGE 4 CHRONIC KIDNEY DISEASE, OR UNSPECIFIED CHRONIC KIDNEY DISEASE: ICD-10-CM

## 2018-06-12 DIAGNOSIS — N18.3 CHRONIC KIDNEY DISEASE, STAGE 3 (MODERATE): ICD-10-CM

## 2018-06-12 DIAGNOSIS — I48.0 PAROXYSMAL ATRIAL FIBRILLATION: ICD-10-CM

## 2018-06-12 DIAGNOSIS — R53.1 WEAKNESS: ICD-10-CM

## 2018-06-12 DIAGNOSIS — E86.0 DEHYDRATION: ICD-10-CM

## 2018-06-12 DIAGNOSIS — Z79.899 OTHER LONG TERM (CURRENT) DRUG THERAPY: ICD-10-CM

## 2018-06-12 DIAGNOSIS — Z85.46 PERSONAL HISTORY OF MALIGNANT NEOPLASM OF PROSTATE: ICD-10-CM

## 2018-06-24 ENCOUNTER — INPATIENT (INPATIENT)
Facility: HOSPITAL | Age: 83
LOS: 2 days | Discharge: HOME HEALTH | End: 2018-06-27
Attending: FAMILY MEDICINE | Admitting: FAMILY MEDICINE
Payer: MEDICARE

## 2018-06-24 VITALS
OXYGEN SATURATION: 100 % | RESPIRATION RATE: 18 BRPM | WEIGHT: 162.04 LBS | HEART RATE: 73 BPM | TEMPERATURE: 98 F | SYSTOLIC BLOOD PRESSURE: 160 MMHG | HEIGHT: 66 IN | DIASTOLIC BLOOD PRESSURE: 132 MMHG

## 2018-06-24 DIAGNOSIS — Z96.653 PRESENCE OF ARTIFICIAL KNEE JOINT, BILATERAL: Chronic | ICD-10-CM

## 2018-06-24 DIAGNOSIS — Z96.619 PRESENCE OF UNSPECIFIED ARTIFICIAL SHOULDER JOINT: Chronic | ICD-10-CM

## 2018-06-24 LAB
APTT BLD: 37.7 SEC — HIGH (ref 27.5–37.4)
HCT VFR BLD CALC: 36.8 % — LOW (ref 39–50)
HGB BLD-MCNC: 12.1 G/DL — LOW (ref 13–17)
INR BLD: 2.13 RATIO — HIGH (ref 0.88–1.16)
MCHC RBC-ENTMCNC: 32.9 GM/DL — SIGNIFICANT CHANGE UP (ref 32–36)
MCHC RBC-ENTMCNC: 35.4 PG — HIGH (ref 27–34)
MCV RBC AUTO: 107.6 FL — HIGH (ref 80–100)
PLATELET # BLD AUTO: 132 K/UL — LOW (ref 150–400)
PROTHROM AB SERPL-ACNC: 23.4 SEC — HIGH (ref 9.8–12.7)
RBC # BLD: 3.42 M/UL — LOW (ref 4.2–5.8)
RBC # FLD: 14 % — SIGNIFICANT CHANGE UP (ref 10.3–14.5)
WBC # BLD: 5.86 K/UL — SIGNIFICANT CHANGE UP (ref 3.8–10.5)
WBC # FLD AUTO: 5.86 K/UL — SIGNIFICANT CHANGE UP (ref 3.8–10.5)

## 2018-06-24 PROCEDURE — 71045 X-RAY EXAM CHEST 1 VIEW: CPT | Mod: 26

## 2018-06-24 PROCEDURE — 93010 ELECTROCARDIOGRAM REPORT: CPT | Mod: 76

## 2018-06-24 RX ORDER — NITROGLYCERIN 6.5 MG
1 CAPSULE, EXTENDED RELEASE ORAL ONCE
Qty: 0 | Refills: 0 | Status: COMPLETED | OUTPATIENT
Start: 2018-06-24 | End: 2018-06-24

## 2018-06-24 RX ORDER — FUROSEMIDE 40 MG
40 TABLET ORAL ONCE
Qty: 0 | Refills: 0 | Status: COMPLETED | OUTPATIENT
Start: 2018-06-24 | End: 2018-06-24

## 2018-06-24 NOTE — ED PROVIDER NOTE - SECONDARY DIAGNOSIS.
Acute renal failure superimposed on stage 1 chronic kidney disease, unspecified acute renal failure type

## 2018-06-24 NOTE — ED PROVIDER NOTE - CARE PLAN
Principal Discharge DX:	Other congestive heart failure  Secondary Diagnosis:	Acute renal failure superimposed on stage 1 chronic kidney disease, unspecified acute renal failure type

## 2018-06-24 NOTE — ED PROVIDER NOTE - OBJECTIVE STATEMENT
Pt. is a 89 yo M with a hx of atrial fibrillation on coumadin, hypothyroidism, HTN, post herpetic neuralgia of left eye, anxiety, BPH, prostate cancer, lymphoma BIB daughter for SOB and "feeling like I am going to collapse" walking short distances.  Pts daughter states he was just discharged about a week ago from the hospital after another stay for SOB.  Pt. states outpatient visit revealed renal insufficiency so lasix was changed to every other day.  Pt. now c/o right leg swelling worse than usual and SOB.  Pt. denies fever, chills, sweats, productive cough, vomiting, diarrhea, trouble urinating, falls, blood in stools or urine.  Pt. also denies chest or back pain.  Pt. denies fatigue with eating or speaking.  States his shortness of breath worsened tonight and felt extremely weak with walking 10 yds. PMD Adi

## 2018-06-24 NOTE — ED ADULT TRIAGE NOTE - CHIEF COMPLAINT QUOTE
shortness of breath worsening tonight. Denies cough, fever/chills, chest pain, nausea, vomiting. shortness of breath x couple of days worsening tonight. Denies cough, fever/chills, chest pain, nausea, vomiting.

## 2018-06-24 NOTE — ED PROVIDER NOTE - CARDIAC, MLM
slightly tachycardic with irregularly irregular rhythm.  Heart sounds S1, S2.  No murmurs, rubs or gallops.

## 2018-06-24 NOTE — ED PROVIDER NOTE - CONSTITUTIONAL, MLM
normal... Well appearing, well nourished, awake, alert, oriented to person, place, time/situation with slight conversational dyspnea.

## 2018-06-24 NOTE — ED PROVIDER NOTE - HEME LYMPH, MLM
No adenopathy or splenomegaly. No cervical or inguinal lymphadenopathy. No adenopathy or splenomegaly. No cervical or inguinal lymphadenopathy. +unilateral +1 pitting edema of right lower extremity.

## 2018-06-24 NOTE — ED ADULT NURSE NOTE - CHIEF COMPLAINT QUOTE
shortness of breath x couple of days worsening tonight. Denies cough, fever/chills, chest pain, nausea, vomiting.

## 2018-06-24 NOTE — ED ADULT NURSE NOTE - OBJECTIVE STATEMENT
Pt presents to the ED with complaints of shortness of breath x 2 weeks. Pt respirations shallow but O2 sat 98% on room air. Pt able to speak and answer questions without desating. Pt denies any recent fevers.

## 2018-06-24 NOTE — ED PROVIDER NOTE - ENMT, MLM
Airway patent, Nasal mucosa clear. Mouth with normal mucosa. Lips pink without cyanosis. Throat has no vesicles, no oropharyngeal exudates and uvula is midline.

## 2018-06-24 NOTE — ED PROVIDER NOTE - MEDICAL DECISION MAKING DETAILS
SOB with atrial fibrillation already on coumadin with worse leg swelling; will check cardiac labs, EKG, CXR, US of lower extremity, BP control and lasix.  Likely pleural effusions or CHF from afib with renal insufficiency.  If INR therapeutic, will admit on diuretics only.  If INR subtherapeutic will admit on diuretics and heparin drip.

## 2018-06-25 LAB
ALBUMIN SERPL ELPH-MCNC: 3.4 G/DL — SIGNIFICANT CHANGE UP (ref 3.3–5)
ALP SERPL-CCNC: 63 U/L — SIGNIFICANT CHANGE UP (ref 40–120)
ALT FLD-CCNC: 30 U/L — SIGNIFICANT CHANGE UP (ref 12–78)
ANION GAP SERPL CALC-SCNC: 5 MMOL/L — SIGNIFICANT CHANGE UP (ref 5–17)
ANION GAP SERPL CALC-SCNC: 6 MMOL/L — SIGNIFICANT CHANGE UP (ref 5–17)
ANISOCYTOSIS BLD QL: SLIGHT — SIGNIFICANT CHANGE UP
APPEARANCE UR: CLEAR — SIGNIFICANT CHANGE UP
AST SERPL-CCNC: 29 U/L — SIGNIFICANT CHANGE UP (ref 15–37)
BASOPHILS # BLD AUTO: 0.08 K/UL — SIGNIFICANT CHANGE UP (ref 0–0.2)
BASOPHILS NFR BLD AUTO: 1.4 % — SIGNIFICANT CHANGE UP (ref 0–2)
BILIRUB SERPL-MCNC: 0.5 MG/DL — SIGNIFICANT CHANGE UP (ref 0.2–1.2)
BILIRUB UR-MCNC: NEGATIVE — SIGNIFICANT CHANGE UP
BUN SERPL-MCNC: 40 MG/DL — HIGH (ref 7–23)
BUN SERPL-MCNC: 41 MG/DL — HIGH (ref 7–23)
CALCIUM SERPL-MCNC: 8.3 MG/DL — LOW (ref 8.5–10.1)
CALCIUM SERPL-MCNC: 8.4 MG/DL — LOW (ref 8.5–10.1)
CHLORIDE SERPL-SCNC: 106 MMOL/L — SIGNIFICANT CHANGE UP (ref 96–108)
CHLORIDE SERPL-SCNC: 109 MMOL/L — HIGH (ref 96–108)
CK SERPL-CCNC: 53 U/L — SIGNIFICANT CHANGE UP (ref 26–308)
CK SERPL-CCNC: 58 U/L — SIGNIFICANT CHANGE UP (ref 26–308)
CO2 SERPL-SCNC: 30 MMOL/L — SIGNIFICANT CHANGE UP (ref 22–31)
CO2 SERPL-SCNC: 34 MMOL/L — HIGH (ref 22–31)
COLOR SPEC: YELLOW — SIGNIFICANT CHANGE UP
CREAT ?TM UR-MCNC: 10 MG/DL — SIGNIFICANT CHANGE UP
CREAT SERPL-MCNC: 1.88 MG/DL — HIGH (ref 0.5–1.3)
CREAT SERPL-MCNC: 2 MG/DL — HIGH (ref 0.5–1.3)
DIFF PNL FLD: NEGATIVE — SIGNIFICANT CHANGE UP
ELLIPTOCYTES BLD QL SMEAR: SLIGHT — SIGNIFICANT CHANGE UP
EOSINOPHIL # BLD AUTO: 0.26 K/UL — SIGNIFICANT CHANGE UP (ref 0–0.5)
EOSINOPHIL NFR BLD AUTO: 4.4 % — SIGNIFICANT CHANGE UP (ref 0–6)
GLUCOSE SERPL-MCNC: 102 MG/DL — HIGH (ref 70–99)
GLUCOSE SERPL-MCNC: 99 MG/DL — SIGNIFICANT CHANGE UP (ref 70–99)
GLUCOSE UR QL: NEGATIVE MG/DL — SIGNIFICANT CHANGE UP
HCT VFR BLD CALC: 34.7 % — LOW (ref 39–50)
HGB BLD-MCNC: 11.3 G/DL — LOW (ref 13–17)
HYPOCHROMIA BLD QL: SLIGHT — SIGNIFICANT CHANGE UP
IMM GRANULOCYTES NFR BLD AUTO: 0.2 % — SIGNIFICANT CHANGE UP (ref 0–1.5)
KETONES UR-MCNC: NEGATIVE — SIGNIFICANT CHANGE UP
LEUKOCYTE ESTERASE UR-ACNC: NEGATIVE — SIGNIFICANT CHANGE UP
LYMPHOCYTES # BLD AUTO: 1.49 K/UL — SIGNIFICANT CHANGE UP (ref 1–3.3)
LYMPHOCYTES # BLD AUTO: 25.4 % — SIGNIFICANT CHANGE UP (ref 13–44)
MACROCYTES BLD QL: SIGNIFICANT CHANGE UP
MAGNESIUM SERPL-MCNC: 2 MG/DL — SIGNIFICANT CHANGE UP (ref 1.6–2.6)
MAGNESIUM SERPL-MCNC: 2.1 MG/DL — SIGNIFICANT CHANGE UP (ref 1.6–2.6)
MANUAL SMEAR VERIFICATION: SIGNIFICANT CHANGE UP
MCHC RBC-ENTMCNC: 32.6 GM/DL — SIGNIFICANT CHANGE UP (ref 32–36)
MCHC RBC-ENTMCNC: 35.3 PG — HIGH (ref 27–34)
MCV RBC AUTO: 108.4 FL — HIGH (ref 80–100)
MONOCYTES # BLD AUTO: 0.62 K/UL — SIGNIFICANT CHANGE UP (ref 0–0.9)
MONOCYTES NFR BLD AUTO: 10.6 % — SIGNIFICANT CHANGE UP (ref 2–14)
NEUTROPHILS # BLD AUTO: 3.4 K/UL — SIGNIFICANT CHANGE UP (ref 1.8–7.4)
NEUTROPHILS NFR BLD AUTO: 58 % — SIGNIFICANT CHANGE UP (ref 43–77)
NITRITE UR-MCNC: NEGATIVE — SIGNIFICANT CHANGE UP
NRBC # BLD: 0 /100 WBCS — SIGNIFICANT CHANGE UP (ref 0–0)
NRBC # BLD: 0 /100 WBCS — SIGNIFICANT CHANGE UP (ref 0–0)
NT-PROBNP SERPL-SCNC: HIGH PG/ML (ref 0–450)
OVALOCYTES BLD QL SMEAR: SLIGHT — SIGNIFICANT CHANGE UP
PH UR: 6.5 — SIGNIFICANT CHANGE UP (ref 5–8)
PHOSPHATE SERPL-MCNC: 3 MG/DL — SIGNIFICANT CHANGE UP (ref 2.5–4.5)
PLAT MORPH BLD: NORMAL — SIGNIFICANT CHANGE UP
PLATELET # BLD AUTO: 132 K/UL — LOW (ref 150–400)
POIKILOCYTOSIS BLD QL AUTO: SLIGHT — SIGNIFICANT CHANGE UP
POTASSIUM SERPL-MCNC: 3.6 MMOL/L — SIGNIFICANT CHANGE UP (ref 3.5–5.3)
POTASSIUM SERPL-MCNC: 4.2 MMOL/L — SIGNIFICANT CHANGE UP (ref 3.5–5.3)
POTASSIUM SERPL-SCNC: 3.6 MMOL/L — SIGNIFICANT CHANGE UP (ref 3.5–5.3)
POTASSIUM SERPL-SCNC: 4.2 MMOL/L — SIGNIFICANT CHANGE UP (ref 3.5–5.3)
PROT ?TM UR-MCNC: 11 MG/DL — SIGNIFICANT CHANGE UP (ref 0–12)
PROT SERPL-MCNC: 5.9 GM/DL — LOW (ref 6–8.3)
PROT UR-MCNC: NEGATIVE MG/DL — SIGNIFICANT CHANGE UP
PROT/CREAT UR-RTO: 1.1 RATIO — HIGH (ref 0–0.2)
RBC # BLD: 3.2 M/UL — LOW (ref 4.2–5.8)
RBC # FLD: 14 % — SIGNIFICANT CHANGE UP (ref 10.3–14.5)
RBC BLD AUTO: ABNORMAL
SODIUM SERPL-SCNC: 145 MMOL/L — SIGNIFICANT CHANGE UP (ref 135–145)
SODIUM SERPL-SCNC: 145 MMOL/L — SIGNIFICANT CHANGE UP (ref 135–145)
SP GR SPEC: 1.01 — SIGNIFICANT CHANGE UP (ref 1.01–1.02)
TROPONIN I SERPL-MCNC: 0.02 NG/ML — SIGNIFICANT CHANGE UP (ref 0.01–0.04)
TROPONIN I SERPL-MCNC: 0.02 NG/ML — SIGNIFICANT CHANGE UP (ref 0.01–0.04)
TROPONIN I SERPL-MCNC: 0.03 NG/ML — SIGNIFICANT CHANGE UP (ref 0.01–0.04)
UROBILINOGEN FLD QL: NEGATIVE MG/DL — SIGNIFICANT CHANGE UP
WBC # BLD: 5.47 K/UL — SIGNIFICANT CHANGE UP (ref 3.8–10.5)
WBC # FLD AUTO: 5.47 K/UL — SIGNIFICANT CHANGE UP (ref 3.8–10.5)

## 2018-06-25 PROCEDURE — 93010 ELECTROCARDIOGRAM REPORT: CPT

## 2018-06-25 PROCEDURE — 71250 CT THORAX DX C-: CPT | Mod: 26

## 2018-06-25 PROCEDURE — 99285 EMERGENCY DEPT VISIT HI MDM: CPT

## 2018-06-25 PROCEDURE — 93971 EXTREMITY STUDY: CPT | Mod: 26,RT

## 2018-06-25 RX ORDER — GABAPENTIN 400 MG/1
200 CAPSULE ORAL ONCE
Qty: 0 | Refills: 0 | Status: COMPLETED | OUTPATIENT
Start: 2018-06-25 | End: 2018-06-25

## 2018-06-25 RX ORDER — FOLIC ACID 0.8 MG
1 TABLET ORAL DAILY
Qty: 0 | Refills: 0 | Status: DISCONTINUED | OUTPATIENT
Start: 2018-06-25 | End: 2018-06-27

## 2018-06-25 RX ORDER — WARFARIN SODIUM 2.5 MG/1
5 TABLET ORAL ONCE
Qty: 0 | Refills: 0 | Status: COMPLETED | OUTPATIENT
Start: 2018-06-25 | End: 2018-06-25

## 2018-06-25 RX ORDER — METOPROLOL TARTRATE 50 MG
25 TABLET ORAL ONCE
Qty: 0 | Refills: 0 | Status: COMPLETED | OUTPATIENT
Start: 2018-06-25 | End: 2018-06-25

## 2018-06-25 RX ORDER — GABAPENTIN 400 MG/1
200 CAPSULE ORAL EVERY 4 HOURS
Qty: 0 | Refills: 0 | Status: DISCONTINUED | OUTPATIENT
Start: 2018-06-25 | End: 2018-06-27

## 2018-06-25 RX ORDER — FUROSEMIDE 40 MG
40 TABLET ORAL DAILY
Qty: 0 | Refills: 0 | Status: DISCONTINUED | OUTPATIENT
Start: 2018-06-25 | End: 2018-06-27

## 2018-06-25 RX ORDER — ONDANSETRON 8 MG/1
4 TABLET, FILM COATED ORAL EVERY 6 HOURS
Qty: 0 | Refills: 0 | Status: DISCONTINUED | OUTPATIENT
Start: 2018-06-25 | End: 2018-06-27

## 2018-06-25 RX ORDER — FOLIC ACID 0.8 MG
0.8 TABLET ORAL DAILY
Qty: 0 | Refills: 0 | Status: DISCONTINUED | OUTPATIENT
Start: 2018-06-25 | End: 2018-06-25

## 2018-06-25 RX ORDER — TAMSULOSIN HYDROCHLORIDE 0.4 MG/1
0.8 CAPSULE ORAL AT BEDTIME
Qty: 0 | Refills: 0 | Status: DISCONTINUED | OUTPATIENT
Start: 2018-06-25 | End: 2018-06-27

## 2018-06-25 RX ORDER — ALPRAZOLAM 0.25 MG
0.25 TABLET ORAL THREE TIMES A DAY
Qty: 0 | Refills: 0 | Status: DISCONTINUED | OUTPATIENT
Start: 2018-06-25 | End: 2018-06-27

## 2018-06-25 RX ORDER — DOCUSATE SODIUM 100 MG
100 CAPSULE ORAL
Qty: 0 | Refills: 0 | Status: DISCONTINUED | OUTPATIENT
Start: 2018-06-25 | End: 2018-06-27

## 2018-06-25 RX ORDER — LACTOBACILLUS ACIDOPHILUS 100MM CELL
1 CAPSULE ORAL
Qty: 0 | Refills: 0 | Status: DISCONTINUED | OUTPATIENT
Start: 2018-06-25 | End: 2018-06-27

## 2018-06-25 RX ORDER — METOPROLOL TARTRATE 50 MG
25 TABLET ORAL
Qty: 0 | Refills: 0 | Status: DISCONTINUED | OUTPATIENT
Start: 2018-06-25 | End: 2018-06-27

## 2018-06-25 RX ORDER — CARBAMAZEPINE 200 MG
1 TABLET ORAL
Qty: 0 | Refills: 0 | COMMUNITY

## 2018-06-25 RX ORDER — GABAPENTIN 400 MG/1
200 CAPSULE ORAL THREE TIMES A DAY
Qty: 0 | Refills: 0 | Status: DISCONTINUED | OUTPATIENT
Start: 2018-06-25 | End: 2018-06-25

## 2018-06-25 RX ORDER — LEVOTHYROXINE SODIUM 125 MCG
100 TABLET ORAL DAILY
Qty: 0 | Refills: 0 | Status: DISCONTINUED | OUTPATIENT
Start: 2018-06-25 | End: 2018-06-27

## 2018-06-25 RX ADMIN — Medication 1 MILLIGRAM(S): at 13:39

## 2018-06-25 RX ADMIN — Medication 1 TABLET(S): at 17:50

## 2018-06-25 RX ADMIN — GABAPENTIN 200 MILLIGRAM(S): 400 CAPSULE ORAL at 13:39

## 2018-06-25 RX ADMIN — Medication 40 MILLIGRAM(S): at 00:40

## 2018-06-25 RX ADMIN — TAMSULOSIN HYDROCHLORIDE 0.8 MILLIGRAM(S): 0.4 CAPSULE ORAL at 22:19

## 2018-06-25 RX ADMIN — Medication 100 MICROGRAM(S): at 11:54

## 2018-06-25 RX ADMIN — Medication 25 MILLIGRAM(S): at 17:50

## 2018-06-25 RX ADMIN — Medication 1 INCH(S): at 00:40

## 2018-06-25 RX ADMIN — GABAPENTIN 200 MILLIGRAM(S): 400 CAPSULE ORAL at 09:57

## 2018-06-25 RX ADMIN — Medication 1 DROP(S): at 19:02

## 2018-06-25 RX ADMIN — GABAPENTIN 200 MILLIGRAM(S): 400 CAPSULE ORAL at 22:20

## 2018-06-25 RX ADMIN — WARFARIN SODIUM 5 MILLIGRAM(S): 2.5 TABLET ORAL at 22:19

## 2018-06-25 RX ADMIN — Medication 25 MILLIGRAM(S): at 02:12

## 2018-06-25 RX ADMIN — GABAPENTIN 200 MILLIGRAM(S): 400 CAPSULE ORAL at 17:49

## 2018-06-25 RX ADMIN — Medication 40 MILLIGRAM(S): at 17:50

## 2018-06-25 RX ADMIN — GABAPENTIN 200 MILLIGRAM(S): 400 CAPSULE ORAL at 02:12

## 2018-06-25 RX ADMIN — Medication 1 INCH(S): at 12:07

## 2018-06-25 RX ADMIN — Medication 1 TABLET(S): at 17:49

## 2018-06-25 NOTE — H&P ADULT - NSHPLABSRESULTS_GEN_ALL_CORE
EXAM:  XR CHEST AP OR PA 1V                        PROCEDURE DATE:  06/24/2018    COMPARISON:  June 07, 2018    FINDINGS:  There is patchy airspace disease in the left lower lung and a   probable small effusion. There is no pneumothorax. The cardiac and   mediastinal contours are unremarkable. There is no acute bony abnormality.    IMPRESSION: Patchy left lower lobe airspace disease suggestive of   pneumonia.

## 2018-06-25 NOTE — H&P ADULT - FAMILY HISTORY
Father  Still living? No  Family history of ischemic heart disease, Age at diagnosis: Age Unknown     Mother  Still living? Unknown  Family history of ischemic heart disease, Age at diagnosis: Age Unknown

## 2018-06-25 NOTE — PATIENT PROFILE ADULT. - FALL HARM RISK
coagulation(Bleeding disorder R/T clinical cond/anti-coags) age(85 years old or older)/coagulation(Bleeding disorder R/T clinical cond/anti-coags)

## 2018-06-25 NOTE — H&P ADULT - PMH
A-fib    Anxiety    Basal cell carcinoma    BPH (benign prostatic hyperplasia)    Chronic diarrhea    Hypertension    Hypothyroid    Lymphoma    Postherpetic neuralgia    Prostate CA

## 2018-06-25 NOTE — H&P ADULT - ASSESSMENT
89 yo male w/ PMH of Afib on coumadin, Anxiety, HTN, hypothyroidism, BPH, hx of prostate ca s/p radiation, hx of chronic diarrhea, basal cell carcinoma and  lymphoma s/p Rituxan, shingles with postherpetic neuralgia admitted with     1. Acute on Chronic CHF exacerbation  Unknown EF  admit to telemetry   ALVIN neg   BNP elevated  CXR small L pleural effusion   daily weight/ I's and O's   Will repeat renal Fx before decide on further diuresis   Monitor pulse ox and supplement O2 PRN  ECHO   CArdio eval       2. LLL infiltrate possible  PNA   will check CT chest   start IV ABXs   ID eval       3. AFIB/Flutter  with RVR   EKG: AFlutter at 118 RBBB   C/w Toprol 25mg BID  Check INR  C/w coumadin if INR <3.0      4. LETICIA/CKD stage III  Hold Valtrex for now  repeat labs   monitor UO  Renal eval         5. R Inguinal adenopathy. H/o Lymphoma  RLE doppler + enlarged lymph nodes   Needs further F/u with Dr Mckeon       6. Postherpetic neuralgia  c/w gabapentin    Hold valtrex due to renal Fx       7. DVT PPx: INR Tx       POC d/w Pt and daughter at bedside

## 2018-06-25 NOTE — H&P ADULT - HISTORY OF PRESENT ILLNESS
89 yo male w/ PMH of Afib on coumadin, Anxiety, HTN, hypothyroidism, BPH, hx of prostate ca s/p radiation, hx of chronic diarrhea, basal cell carcinoma and  lymphoma s/p Rituxan, shingles with postherpetic neuralgia  brought to ED by EMS due to SOB and increased weakness past few days. Denies fevers or chills, no cough, no CP. But noticed weight gain about 12lbs and also LE swelling and intermittent palpitations.  As per Pt spoke to PA at PCP office and also had CXR last week, unaware about results, also cant report on lasix dose, daughter not sure as well.   ROS also + for burning pain and itching of scalp and eyes, started since had episode of shingles   Pts daughter at bedside reluctant to go over PMH and meds as " Pt was here just recently, nothing changed!"      In ED labs showed decreased Renal Fx and elevated BNP. CXR suggestive of LLL infiltrate.  Pt was given 40mg IV lasix and had good response: 700ml voided

## 2018-06-25 NOTE — H&P ADULT - NSHPPHYSICALEXAM_GEN_ALL_CORE
Vital Signs Last 24 Hrs  T(C): 36.5 (25 Jun 2018 07:56), Max: 36.8 (24 Jun 2018 22:53)  T(F): 97.7 (25 Jun 2018 07:56), Max: 98.3 (24 Jun 2018 22:53)  HR: 71 (25 Jun 2018 07:56) (71 - 86)  BP: 134/82 (25 Jun 2018 07:56) (124/90 - 160/132)  RR: 20 (25 Jun 2018 07:56) (18 - 20)  SpO2: 100% (25 Jun 2018 07:56) (100% - 100%)    PHYSICAL EXAM:  General: Well developed; frail elderly mail,  in no acute distress  Eyes: PERRLA, EOMI; conjunctiva and sclera clear  Head: Normocephalic; atraumatic, + excoriations on scalp, no  discharge, no edema   ENMT: No nasal discharge; airway clear  Neck: Supple; non tender; no masses  Respiratory: Decreased BS b/l, + basilar rales, No wheezes  Cardiovascular: Irregular rate and rhythm. S1 and S2 Normal; + II/VI  murmur  Gastrointestinal: Soft non-tender non-distended; Normal bowel sounds  Genitourinary: No costovertebral angle tenderness  Extremities: Preserved range of motion, + R>l LE edema   Vascular: Peripheral pulses palpable 2+ bilaterally  Neurological: Alert and oriented x4, non focal   Skin: Warm and dry. No acute rash  Lymph Nodes: + inguinal  adenopathy  Musculoskeletal: Normal muscle tone, without deformities  Psychiatric: Cooperative and appropriate

## 2018-06-25 NOTE — ED ADULT NURSE REASSESSMENT NOTE - NS ED NURSE REASSESS COMMENT FT1
Pt c/o SOB, brought in by daughter.  Labs drawn. BNP elevated, RT LE swelling noted. Lasix given. voided 700cc s/p lasix, vss. Will monitor.

## 2018-06-26 LAB
ALBUMIN SERPL ELPH-MCNC: 3.2 G/DL — LOW (ref 3.3–5)
ANION GAP SERPL CALC-SCNC: 5 MMOL/L — SIGNIFICANT CHANGE UP (ref 5–17)
BUN SERPL-MCNC: 39 MG/DL — HIGH (ref 7–23)
CALCIUM SERPL-MCNC: 8.6 MG/DL — SIGNIFICANT CHANGE UP (ref 8.5–10.1)
CHLORIDE SERPL-SCNC: 106 MMOL/L — SIGNIFICANT CHANGE UP (ref 96–108)
CO2 SERPL-SCNC: 33 MMOL/L — HIGH (ref 22–31)
CREAT SERPL-MCNC: 1.69 MG/DL — HIGH (ref 0.5–1.3)
GLUCOSE SERPL-MCNC: 96 MG/DL — SIGNIFICANT CHANGE UP (ref 70–99)
INR BLD: 1.78 RATIO — HIGH (ref 0.88–1.16)
NT-PROBNP SERPL-SCNC: HIGH PG/ML (ref 0–450)
PHOSPHATE SERPL-MCNC: 2.5 MG/DL — SIGNIFICANT CHANGE UP (ref 2.5–4.5)
POTASSIUM SERPL-MCNC: 3.4 MMOL/L — LOW (ref 3.5–5.3)
POTASSIUM SERPL-SCNC: 3.4 MMOL/L — LOW (ref 3.5–5.3)
PROTHROM AB SERPL-ACNC: 19.5 SEC — HIGH (ref 9.8–12.7)
SODIUM SERPL-SCNC: 144 MMOL/L — SIGNIFICANT CHANGE UP (ref 135–145)

## 2018-06-26 PROCEDURE — 93306 TTE W/DOPPLER COMPLETE: CPT | Mod: 26

## 2018-06-26 PROCEDURE — 76770 US EXAM ABDO BACK WALL COMP: CPT | Mod: 26,59

## 2018-06-26 PROCEDURE — 93976 VASCULAR STUDY: CPT | Mod: 26

## 2018-06-26 RX ORDER — WARFARIN SODIUM 2.5 MG/1
7.5 TABLET ORAL ONCE
Qty: 0 | Refills: 0 | Status: COMPLETED | OUTPATIENT
Start: 2018-06-26 | End: 2018-06-26

## 2018-06-26 RX ORDER — POTASSIUM CHLORIDE 20 MEQ
40 PACKET (EA) ORAL ONCE
Qty: 0 | Refills: 0 | Status: COMPLETED | OUTPATIENT
Start: 2018-06-26 | End: 2018-06-26

## 2018-06-26 RX ORDER — FLUOROURACIL/ADHESIVE BANDAGE 5 %
1 KIT TOPICAL
Qty: 0 | Refills: 0 | Status: DISCONTINUED | OUTPATIENT
Start: 2018-06-26 | End: 2018-06-27

## 2018-06-26 RX ADMIN — Medication 1 TABLET(S): at 11:47

## 2018-06-26 RX ADMIN — Medication 1 TABLET(S): at 17:53

## 2018-06-26 RX ADMIN — Medication 1 MILLIGRAM(S): at 11:47

## 2018-06-26 RX ADMIN — Medication 25 MILLIGRAM(S): at 05:57

## 2018-06-26 RX ADMIN — GABAPENTIN 200 MILLIGRAM(S): 400 CAPSULE ORAL at 17:54

## 2018-06-26 RX ADMIN — WARFARIN SODIUM 7.5 MILLIGRAM(S): 2.5 TABLET ORAL at 22:34

## 2018-06-26 RX ADMIN — Medication 0.25 MILLIGRAM(S): at 11:54

## 2018-06-26 RX ADMIN — Medication 40 MILLIEQUIVALENT(S): at 17:52

## 2018-06-26 RX ADMIN — Medication 1 DROP(S): at 05:57

## 2018-06-26 RX ADMIN — Medication 40 MILLIGRAM(S): at 05:56

## 2018-06-26 RX ADMIN — GABAPENTIN 200 MILLIGRAM(S): 400 CAPSULE ORAL at 13:41

## 2018-06-26 RX ADMIN — TAMSULOSIN HYDROCHLORIDE 0.8 MILLIGRAM(S): 0.4 CAPSULE ORAL at 22:34

## 2018-06-26 RX ADMIN — GABAPENTIN 200 MILLIGRAM(S): 400 CAPSULE ORAL at 09:00

## 2018-06-26 RX ADMIN — Medication 1 TABLET(S): at 08:03

## 2018-06-26 RX ADMIN — Medication 1 DROP(S): at 17:53

## 2018-06-26 RX ADMIN — Medication 1 APPLICATION(S): at 18:06

## 2018-06-26 RX ADMIN — GABAPENTIN 200 MILLIGRAM(S): 400 CAPSULE ORAL at 22:35

## 2018-06-26 RX ADMIN — Medication 25 MILLIGRAM(S): at 17:54

## 2018-06-26 RX ADMIN — Medication 0.25 MILLIGRAM(S): at 22:36

## 2018-06-26 RX ADMIN — Medication 100 MICROGRAM(S): at 05:57

## 2018-06-26 NOTE — DIETITIAN INITIAL EVALUATION ADULT. - ORAL INTAKE PTA
Pt's PO intake has been poor for the past month. Pt states he has been skipping meals daily (eating about two meals a day at most)/poor

## 2018-06-26 NOTE — CONSULT NOTE ADULT - SUBJECTIVE AND OBJECTIVE BOX
CHIEF COMPLAINT: Patient is a 89y old  Male who presents with a chief complaint of     HPI: HPI:  89 yo male w/ PMH of Afib on coumadin, Anxiety, HTN, hypothyroidism, BPH, hx of prostate ca s/p radiation, hx of chronic diarrhea, basal cell carcinoma and  lymphoma s/p Rituxan, shingles with postherpetic neuralgia  brought to ED by EMS due to SOB and increased weakness past few days. Denies fevers or chills, no cough, no CP. But noticed weight gain about 12lbs and also LE swelling and intermittent palpitations.  As per Pt spoke to PA at PCP office and also had CXR last week, unaware about results, also cant report on lasix dose, daughter not sure as well.   ROS also + for burning pain and itching of scalp and eyes, started since had episode of shingles   Pts daughter at bedside reluctant to go over PMH and meds as " Pt was here just recently, nothing changed!"  Outpatient blood work revealed worsening renal function so diuretics were held.    In ED labs showed decreased Renal Fx and elevated BNP. CXR suggestive of LLL infiltrate.  Pt was given 40mg IV lasix and had good response: 700ml voided (25 Jun 2018 11:26)      PMHx: PAST MEDICAL & SURGICAL HISTORY:  BPH (benign prostatic hyperplasia)  Postherpetic neuralgia  Chronic diarrhea  Prostate CA  Basal cell carcinoma  Lymphoma  Anxiety  Hypothyroid  Hypertension  A-fib  H/O shoulder replacement  S/P bilateral unicompartmental knee replacement        Soc Hx:     FAMILY HISTORY:  Family history of ischemic heart disease (Father, Mother)      Allergies: Allergies    No Known Allergies    Intolerances          REVIEW OF SYSTEMS:    As above  No chest pain  + shortness of breath  No lightheadeness or syncope  No leg swelling  No palpitations  No claudication-like symptoms    Vital Signs Last 24 Hrs  T(C): 36.7 (26 Jun 2018 04:33), Max: 36.9 (25 Jun 2018 20:00)  T(F): 98.1 (26 Jun 2018 04:33), Max: 98.5 (25 Jun 2018 20:00)  HR: 73 (26 Jun 2018 04:33) (71 - 98)  BP: 139/72 (26 Jun 2018 04:33) (134/82 - 159/84)  BP(mean): --  RR: 17 (26 Jun 2018 04:33) (17 - 20)  SpO2: 95% (26 Jun 2018 04:33) (95% - 100%)    I&O's Summary    25 Jun 2018 07:01  -  26 Jun 2018 07:00  --------------------------------------------------------  IN: 0 mL / OUT: 1515 mL / NET: -1515 mL        CAPILLARY BLOOD GLUCOSE          PHYSICAL EXAM:   Patient in NAD  Neck: No JVD; Carotids:  2+ without bruits  Respiratory:  Clear to A&P  Cardiovascular: S1 and S2, irregular rate and rhythm, no Murmurs, gallops or rubs  Gastrointestinal:  Soft, non-tender; BS positive  Extremities: No peripheral edema  Vascular: 2+ peripheral pulses  Neurological: A/O x 3, no focal deficits      MEDICATIONS:  MEDICATIONS  (STANDING):  artificial  tears Solution 1 Drop(s) Both EYES two times a day  folic acid 1 milliGRAM(s) Oral daily  furosemide   Injectable 40 milliGRAM(s) IV Push daily  lactobacillus acidophilus 1 Tablet(s) Oral two times a day with meals  levothyroxine 100 MICROGram(s) Oral daily  metoprolol succinate ER 25 milliGRAM(s) Oral two times a day  tamsulosin 0.8 milliGRAM(s) Oral at bedtime  vitamin B complex with vitamin C 1 Tablet(s) Oral daily    Home Medications:  alfuzosin 10 mg oral tablet, extended release: 1 tab(s) orally once a day (25 Jun 2018 11:39)  B Complex 50 oral tablet, extended release: 1 tab(s) orally once a day (25 Jun 2018 11:39)  dicyclomine 10 mg oral capsule: 1 cap(s) orally once a day, As Needed (25 Jun 2018 11:39)  Efudex 5% topical cream: Apply topically to affected area once a day (at bedtime) (25 Jun 2018 11:39)  folic acid 1 mg oral tablet: 0.5 tab(s) orally once a day (25 Jun 2018 11:39)  gabapentin 100 mg oral capsule: 2 cap(s) orally 3 times a day (25 Jun 2018 11:39)  Lotemax 0.5% ophthalmic ointment: 1 application in the left eye 2 times a day (25 Jun 2018 11:39)  Probiotic Formula oral capsule: 1 cap(s) orally once a day (25 Jun 2018 11:39)  Refresh ophthalmic solution: 1 drop(s) to each affected eye 2 times a day, As Needed (25 Jun 2018 11:39)  Synthroid 100 mcg (0.1 mg) oral tablet: 1 tab(s) orally once a day (25 Jun 2018 11:39)  Toprol-XL 25 mg oral tablet, extended release: 1 tab(s) orally 2 times a day (25 Jun 2018 11:39)  Valtrex 1 g oral tablet: 1 tab(s) orally once a day (25 Jun 2018 11:39)  warfarin 5 mg oral tablet: 1 tab(s) orally once a day (at bedtime) (25 Jun 2018 11:39)  Xanax 0.25 mg oral tablet: 1 tab(s) orally 4 times a day, As Needed (25 Jun 2018 11:39)        LABS: All Labs Reviewed:  Blood Culture:   BNP Serum Pro-Brain Natriuretic Peptide: 91676 pg/mL (06-24 @ 23:16)    CBC             WBC Count: 5.47 K/uL (06-25 @ 12:11)              Hemoglobin: 11.3 g/dL (06-25 @ 12:11)  Hemoglobin: 12.1 g/dL (06-24 @ 23:16)              Hematocrit: 34.7 % (06-25 @ 12:11)  Hematocrit: 36.8 % (06-24 @ 23:16)              Mean Cell Volume: 108.4 fl (06-25 @ 12:11)  Mean Cell Volume: 107.6 fl (06-24 @ 23:16)              Platelet Count - Automated: 132 K/uL (06-25 @ 12:11)  Platelet Count - Automated: 132 K/uL (06-24 @ 23:16)                            Cardiac markers             Troponin I, Serum: 0.027 ng/mL (06-25 @ 05:40)  Troponin I, Serum: 0.017 ng/mL (06-25 @ 02:12)  Troponin I, Serum: 0.019 ng/mL (06-24 @ 23:16)                             Chems        Sodium, Serum: 145 mmol/L (06-25 @ 12:11)  Sodium, Serum: 145 mmol/L (06-24 @ 23:16)          Potassium, Serum: 3.6 mmol/L (06-25 @ 12:11)  Potassium, Serum: 4.2 mmol/L (06-24 @ 23:16)          Blood Urea Nitrogen, Serum: 40 mg/dL (06-25 @ 12:11)  Blood Urea Nitrogen, Serum: 41 mg/dL (06-24 @ 23:16)          Creatinine 1.88  Creatinine 2.00          Magnesium, Serum: 2.1 mg/dL (06-25 @ 12:11)  Magnesium, Serum: 2.0 mg/dL (06-24 @ 23:16)          Protein Total, Serum: 5.9 gm/dL (06-24 @ 23:16)                  Calcium, Total Serum: 8.3 mg/dL (06-25 @ 12:11)  Calcium, Total Serum: 8.4 mg/dL (06-24 @ 23:16)          Phosphorus Level, Serum: 3.0 mg/dL (06-25 @ 12:11)          Bilirubin Total, Serum: 0.5 mg/dL (06-24 @ 23:16)          Alanine Aminotransferase (ALT/SGPT): 30 U/L (06-24 @ 23:16)          Aspartate Aminotransferase (AST/SGOT): 29 U/L (06-24 @ 23:16)                 INR: 2.13 ratio (06-24 @ 23:16)             RADIOLOGY:  < from: CT Chest No Cont (06.25.18 @ 11:44) >  IMPRESSION:     Small bilateral pleural effusions and bibasilar atelectasis have   developed.    Stable diffuse lymphadenopathy consistent with lymphoma.      < end of copied text >      EKG:  Atrial flutter/variable block; RBBB; LAFB    Telemetry:    ECHO:
Chief complaints.: Reports worsening SOB with activity for last 2 weeks.    HPI:  89 yo man with Known hx of HTN, A FIB and CKD admitted with increasing SOB with activity for about 2 weeks.  Pt reported weight gain of about 12 lbs over several days and increasing   LE edema.  Pt is somewhat vague about his symptoms and time frame.  Unclear dietary and fluid intake at this point.  S/p HH admission for weakness and rapid afib.   Had IVF due to volume depletion on exam.    PMHX and PSHX.  1.CKD ( Seen by . last office visit in 12/2015--Creat 1.63)  2.A FIB  3.HTN  4.Hx of Prostate CA ( Post RT)  5.Hx of Lymphoma Post Rituxan therapy.  6.Hx of Postherpetic Shingles.  7.Hypothyroidism  8.Hx of Chronic Diarrhea.    FAMILY HISTORY:  Family history of ischemic heart disease (Father, Mother)      SOCIAL HISTORY : Former smoker, No ETOH.  Allergies    No Known Allergies    Intolerances  Home Medications:   * Patient Currently Takes Medications as of 25-Jun-2018 11:39 documented in Structured Notes  · 	Refresh ophthalmic solution: 1 drop(s) to each affected eye 2 times a day, As Needed, Last Dose Taken:    · 	B Complex 50 oral tablet, extended release: 1 tab(s) orally once a day, Last Dose Taken:    · 	Probiotic Formula oral capsule: 1 cap(s) orally once a day, Last Dose Taken:    · 	folic acid 1 mg oral tablet: 0.5 tab(s) orally once a day, Last Dose Taken:    · 	Toprol-XL 25 mg oral tablet, extended release: 1 tab(s) orally 2 times a day, Last Dose Taken:    · 	gabapentin 100 mg oral capsule: 2 cap(s) orally 3 times a day, Last Dose Taken:    · 	Valtrex 1 g oral tablet: 1 tab(s) orally once a day, Last Dose Taken:    · 	Xanax 0.25 mg oral tablet: 1 tab(s) orally 4 times a day, As Needed, Last Dose Taken:    · 	dicyclomine 10 mg oral capsule: 1 cap(s) orally once a day, As Needed, Last Dose Taken:    · 	alfuzosin 10 mg oral tablet, extended release: 1 tab(s) orally once a day, Last Dose Taken:    · 	Lotemax 0.5% ophthalmic ointment: 1 application in the left eye 2 times a day, Last Dose Taken:    · 	Synthroid 100 mcg (0.1 mg) oral tablet: 1 tab(s) orally once a day, Last Dose Taken:    · 	warfarin 5 mg oral tablet: 1 tab(s) orally once a day (at bedtime), Last Dose Taken:    · 	Efudex 5% topical cream: Apply topically to affected area once a day (at bedtime), Last Dose Taken:        MEDICATIONS  (STANDING):  artificial  tears Solution 1 Drop(s) Both EYES two times a day  folic acid 1 milliGRAM(s) Oral daily  gabapentin 200 milliGRAM(s) Oral three times a day  lactobacillus acidophilus 1 Tablet(s) Oral two times a day with meals  levothyroxine 100 MICROGram(s) Oral daily  metoprolol succinate ER 25 milliGRAM(s) Oral two times a day  tamsulosin 0.8 milliGRAM(s) Oral at bedtime  vitamin B complex with vitamin C 1 Tablet(s) Oral daily  warfarin 5 milliGRAM(s) Oral once    MEDICATIONS  (PRN):  ALPRAZolam 0.25 milliGRAM(s) Oral three times a day PRN anxiety  dicyclomine 10 milliGRAM(s) Oral daily PRN abd cramps  docusate sodium 100 milliGRAM(s) Oral two times a day PRN Constipation  ondansetron Injectable 4 milliGRAM(s) IV Push every 6 hours PRN Nausea         Vital Signs Last 24 Hrs  T(C): 36.8 (25 Jun 2018 13:21), Max: 36.8 (24 Jun 2018 22:53)  T(F): 98.2 (25 Jun 2018 13:21), Max: 98.3 (24 Jun 2018 22:53)  HR: 71 (25 Jun 2018 13:21) (71 - 86)  BP: 138/70 (25 Jun 2018 13:21) (124/90 - 160/132)  BP(mean): --  RR: 18 (25 Jun 2018 13:21) (18 - 20)  SpO2: 100% (25 Jun 2018 13:21) (100% - 100%)  Daily Height in cm: 167.64 (24 Jun 2018 22:53)    Daily   I&O's Summary    24 Jun 2018 07:01  -  25 Jun 2018 07:00  --------------------------------------------------------  IN: 0 mL / OUT: 700 mL / NET: -700 mL    25 Jun 2018 07:01  -  25 Jun 2018 15:11  --------------------------------------------------------  IN: 0 mL / OUT: 500 mL / NET: -500 mL        PHYSICAL EXAM: Alert and comfortable.  GEN: No acute distress  HEENT: WNL  NECK : supple  CV: S1S2 RRR  LUNGS: basilar crackles  ABD: soft  EXT: trace to 1+ edema    LABS:                        11.3   5.47  )-----------( 132      ( 25 Jun 2018 12:11 )             34.7     06-25    145  |  106  |  40<H>  ----------------------------<  99  3.6   |  34<H>  |  1.88<H>    Ca    8.3<L>      25 Jun 2018 12:11  Phos  3.0     06-25  Mg     2.1     06-25    TPro  5.9<L>  /  Alb  3.4  /  TBili  0.5  /  DBili  x   /  AST  29  /  ALT  30  /  AlkPhos  63  06-24    PT/INR - ( 24 Jun 2018 23:16 )   PT: 23.4 sec;   INR: 2.13 ratio         PTT - ( 24 Jun 2018 23:16 )  PTT:37.7 sec    Magnesium, Serum: 2.1 mg/dL (06-25 @ 12:11)  Phosphorus Level, Serum: 3.0 mg/dL (06-25 @ 12:11)  Magnesium, Serum: 2.0 mg/dL (06-24 @ 23:16)

## 2018-06-26 NOTE — DIETITIAN INITIAL EVALUATION ADULT. - OTHER INFO
Consult for CHF education, screened for malnutrition due to PO intake. Pt pmh/x noted below. Sig for Prostate CA, hypothyroidism, Diarrhea, HTN. Pt states PO intake has been bad (noticed it was low about a month ago). Pt states he started skipping meals and not eating as much. Pt denies chewing or swallowing difficulty. Pt denies n/v/d/c. Pt noted with 2+ edema right foot (current wt matching UBW suggesting some weight loss masked by edema.) Pt flash score 17, no skin breakdown. Pt NFPE showed clear signs of mild to moderate wasting in temple, clavicle, deltoid and scapula region. Pt noted with mild wasting in calves. Pt noted with moderate fat wasting in triceps and ribs. Pt meets criteria for moderate protein-calorie malnutriiton in the context of chronic illness. Recommend ensure enlive BID, Encourage PO intake, Monitor weight. Will continue to monitor pt's nutritional status.

## 2018-06-26 NOTE — PROGRESS NOTE ADULT - ASSESSMENT
89M.  admitted 06/25/18.  presented to ED c/o SOB and increasing weakness.  onset a few days prior to adimssion.  a/w 12 pound weight gain, LE swelling and intermittent palpitations.  In ED labs showed decreased Renal Fx and elevated BNP. CXR suggestive of LLL infiltrate.  Pt was given 40mg IV lasix and had good response: 700ml voided      PMHx:  AF (w);  HTN;  hypothyroidism;  BPH;  hx prostate CA-RT;  chronic diarrhea;  basal cell CA;  lymphoma-Rituxan;  VZV-PHN.      1. Acute on Chronic CHF exacerbation  Unknown EF  admit to telemetry   ALVIN neg   BNP elevated  CXR small L pleural effusion   daily weight/ I's and O's   Will repeat renal Fx before decide on further diuresis   Monitor pulse ox and supplement O2 PRN  ECHO   CArdio eval       2. LLL infiltrate possible  PNA   will check CT chest   start IV ABXs   ID eval       3. AFIB/Flutter  with RVR   EKG: AFlutter at 118 RBBB   C/w Toprol 25mg BID  Check INR  C/w coumadin if INR <3.0    4. LETICIA/CKD stage III  Hold Valtrex for now  repeat labs   monitor UO  Renal eval     5. R Inguinal adenopathy. H/o Lymphoma  RLE doppler + enlarged lymph nodes   Needs further F/u with Dr Mckeon     6. Postherpetic neuralgia  c/w gabapentin    Hold valtrex due to renal Fx     7. DVT PPx: INR Tx

## 2018-06-26 NOTE — PHYSICAL THERAPY INITIAL EVALUATION ADULT - PERTINENT HX OF CURRENT PROBLEM, REHAB EVAL
89 yo male w/ PMH of Afib on coumadin, Anxiety, HTN, hypothyroidism, BPH, hx of prostate ca s/p radiation, hx of chronic diarrhea, basal cell carcinoma and  lymphoma s/p Rituxan, shingles with postherpetic neuralgia  brought to ED by EMS due to SOB and increased weakness past few days.

## 2018-06-26 NOTE — DIETITIAN INITIAL EVALUATION ADULT. - NS AS NUTRI DX NUTRIENT
Malnutrition/Meets criteria for moderate protein-calorie malnutrition in the context of chronic disease

## 2018-06-26 NOTE — CHART NOTE - NSCHARTNOTEFT_GEN_A_CORE
Upon Nutritional Assessment by the Registered Dietitian your patient was determined to meet criteria / has evidence of the following diagnosis/diagnoses:          [ ]  Mild Protein Calorie Malnutrition        [x]  Moderate Protein Calorie Malnutrition        [ ] Severe Protein Calorie Malnutrition        [ ] Unspecified Protein Calorie Malnutrition        [ ] Underweight / BMI <19        [ ] Morbid Obesity / BMI > 40      Findings as based on:  •  Comprehensive nutrition assessment and consultation  •  Calorie counts (nutrient intake analysis)  •  Food acceptance and intake status from observations by staff  •  Follow up  •  Patient education  •  Intervention secondary to interdisciplinary rounds  •   concerns      Treatment:    The following diet has been recommended:  -Encourage PO intake  -Ensure enlive BID    PROVIDER Section:     By signing this assessment you are acknowledging and agree with the diagnosis/diagnoses assigned by the Registered Dietitian    Comments:

## 2018-06-26 NOTE — DIETITIAN INITIAL EVALUATION ADULT. - PERTINENT MEDS FT
Efudex, Xanax, Bentyl, colace, Folic acid, Lasix, Neurontin, Lactobacillus, Synthroid, Toprol XL, Zofran, Flomax, Allbee-c

## 2018-06-26 NOTE — PROGRESS NOTE ADULT - SUBJECTIVE AND OBJECTIVE BOX
CC:  Patient is a 89y old  Male who presents with a chief complaint of SOB and weakness.    SUBJECTIVE:  offers no new complaints at current time.    ROS:  all other review of systems are negative unless indicated above.    ALPRAZolam 0.25 milliGRAM(s) Oral three times a day PRN  artificial  tears Solution 1 Drop(s) Both EYES two times a day  dicyclomine 10 milliGRAM(s) Oral daily PRN  docusate sodium 100 milliGRAM(s) Oral two times a day PRN  fluorouracil 5% Cream 1 Application(s) Topical two times a day  folic acid 1 milliGRAM(s) Oral daily  furosemide   Injectable 40 milliGRAM(s) IV Push daily  gabapentin 200 milliGRAM(s) Oral every 4 hours PRN  lactobacillus acidophilus 1 Tablet(s) Oral two times a day with meals  levothyroxine 100 MICROGram(s) Oral daily  metoprolol succinate ER 25 milliGRAM(s) Oral two times a day  ondansetron Injectable 4 milliGRAM(s) IV Push every 6 hours PRN  tamsulosin 0.8 milliGRAM(s) Oral at bedtime  vitamin B complex with vitamin C 1 Tablet(s) Oral daily    T(C): 36.6 (06-26-18 @ 11:15), Max: 36.9 (06-25-18 @ 20:00)  HR: 74 (06-26-18 @ 11:15) (73 - 98)  BP: 118/68 (06-26-18 @ 11:15) (118/68 - 159/84)  RR: 18 (06-26-18 @ 11:15) (17 - 18)  SpO2: 98% (06-26-18 @ 11:15) (95% - 98%)    General: Well developed; frail elderly mail,  in no acute distress  	Eyes: PERRLA, EOMI; conjunctiva and sclera clear  	Head: Normocephalic; atraumatic, + excoriations on scalp, no  discharge, no edema   	ENMT: No nasal discharge; airway clear  	Neck: Supple; non tender; no masses  	Respiratory: Decreased BS b/l, + basilar rales, No wheezes  	Cardiovascular: Irregular rate and rhythm. S1 and S2 Normal; + II/VI  murmur  	Gastrointestinal: Soft non-tender non-distended; Normal bowel sounds  	Genitourinary: No costovertebral angle tenderness  	Extremities: Preserved range of motion, + R>l LE edema   	Vascular: Peripheral pulses palpable 2+ bilaterally  	Neurological: Alert and oriented x4, non focal   	Skin: Warm and dry. No acute rash  	Lymph Nodes: + inguinal  adenopathy  	Musculoskeletal: Normal muscle tone, without deformities  Psychiatric: Cooperative and appropriate                        11.3   5.47  )-----------( 132      ( 25 Jun 2018 12:11 )             34.7     PT/INR - ( 26 Jun 2018 06:23 )   PT: 19.5 sec;   INR: 1.78 ratio         PTT - ( 24 Jun 2018 23:16 )  PTT:37.7 sec  06-26    144  |  106  |  39<H>  ----------------------------<  96  3.4<L>   |  33<H>  |  1.69<H>    Ca    8.6      26 Jun 2018 06:23  Phos  2.5     06-26  Mg     2.1     06-25    TPro  x   /  Alb  3.2<L>  /  TBili  x   /  DBili  x   /  AST  x   /  ALT  x   /  AlkPhos  x   06-26

## 2018-06-26 NOTE — CONSULT NOTE ADULT - ASSESSMENT
89 year old man with the above history admitted with recurrent CHF which to some extent is related to his worsening renal function.  Due to the elevated creatinine he will require at least 40 mg of furosemide daily.  Will follow renal function.
89 yo man with known CKD ( no recent follow up with Dr. Larson--appts not kept) --creat was 1.63 in 12/2015, Creat 1.2-1.3 in 2017) admitted with resp insuff.   Unclear hx of CHF or cardiac disease except A FIB.  Unclear baseline cardiac function.    LETIICA superimposed  on CKD of unclear etiology at this point.  Cardiology evaluation.  Check Renal USG.

## 2018-06-27 ENCOUNTER — TRANSCRIPTION ENCOUNTER (OUTPATIENT)
Age: 83
End: 2018-06-27

## 2018-06-27 VITALS — WEIGHT: 152.34 LBS

## 2018-06-27 DIAGNOSIS — I50.33 ACUTE ON CHRONIC DIASTOLIC (CONGESTIVE) HEART FAILURE: ICD-10-CM

## 2018-06-27 LAB
ANION GAP SERPL CALC-SCNC: 4 MMOL/L — LOW (ref 5–17)
BUN SERPL-MCNC: 42 MG/DL — HIGH (ref 7–23)
CALCIUM SERPL-MCNC: 9 MG/DL — SIGNIFICANT CHANGE UP (ref 8.5–10.1)
CHLORIDE SERPL-SCNC: 107 MMOL/L — SIGNIFICANT CHANGE UP (ref 96–108)
CO2 SERPL-SCNC: 35 MMOL/L — HIGH (ref 22–31)
CREAT SERPL-MCNC: 1.7 MG/DL — HIGH (ref 0.5–1.3)
GLUCOSE SERPL-MCNC: 106 MG/DL — HIGH (ref 70–99)
HCT VFR BLD CALC: 36.8 % — LOW (ref 39–50)
HGB BLD-MCNC: 12 G/DL — LOW (ref 13–17)
INR BLD: 1.83 RATIO — HIGH (ref 0.88–1.16)
MCHC RBC-ENTMCNC: 32.6 GM/DL — SIGNIFICANT CHANGE UP (ref 32–36)
MCHC RBC-ENTMCNC: 35 PG — HIGH (ref 27–34)
MCV RBC AUTO: 107.3 FL — HIGH (ref 80–100)
NRBC # BLD: 0 /100 WBCS — SIGNIFICANT CHANGE UP (ref 0–0)
PLATELET # BLD AUTO: 154 K/UL — SIGNIFICANT CHANGE UP (ref 150–400)
POTASSIUM SERPL-MCNC: 4 MMOL/L — SIGNIFICANT CHANGE UP (ref 3.5–5.3)
POTASSIUM SERPL-SCNC: 4 MMOL/L — SIGNIFICANT CHANGE UP (ref 3.5–5.3)
PROTHROM AB SERPL-ACNC: 20 SEC — HIGH (ref 9.8–12.7)
RBC # BLD: 3.43 M/UL — LOW (ref 4.2–5.8)
RBC # FLD: 13.8 % — SIGNIFICANT CHANGE UP (ref 10.3–14.5)
SODIUM SERPL-SCNC: 146 MMOL/L — HIGH (ref 135–145)
WBC # BLD: 6.45 K/UL — SIGNIFICANT CHANGE UP (ref 3.8–10.5)
WBC # FLD AUTO: 6.45 K/UL — SIGNIFICANT CHANGE UP (ref 3.8–10.5)

## 2018-06-27 RX ORDER — FUROSEMIDE 40 MG
1 TABLET ORAL
Qty: 0 | Refills: 0 | COMMUNITY
Start: 2018-06-27 | End: 2018-07-03

## 2018-06-27 RX ORDER — LOTEPREDNOL ETABONATE 2 MG/ML
1 SUSPENSION/ DROPS OPHTHALMIC
Qty: 0 | Refills: 0 | DISCHARGE
Start: 2018-06-27

## 2018-06-27 RX ORDER — ALPRAZOLAM 0.25 MG
1 TABLET ORAL
Qty: 0 | Refills: 0 | COMMUNITY
Start: 2018-06-27

## 2018-06-27 RX ORDER — GABAPENTIN 400 MG/1
2 CAPSULE ORAL
Qty: 42 | Refills: 0 | OUTPATIENT
Start: 2018-06-27 | End: 2018-07-03

## 2018-06-27 RX ORDER — FUROSEMIDE 40 MG
1 TABLET ORAL
Qty: 7 | Refills: 0 | OUTPATIENT
Start: 2018-06-27 | End: 2018-07-03

## 2018-06-27 RX ORDER — GABAPENTIN 400 MG/1
2 CAPSULE ORAL
Qty: 0 | Refills: 0 | COMMUNITY
Start: 2018-06-27 | End: 2018-07-03

## 2018-06-27 RX ORDER — VALACYCLOVIR 500 MG/1
1 TABLET, FILM COATED ORAL
Qty: 0 | Refills: 0 | COMMUNITY

## 2018-06-27 RX ORDER — LOTEPREDNOL ETABONATE 2 MG/ML
1 SUSPENSION/ DROPS OPHTHALMIC
Qty: 1 | Refills: 0 | OUTPATIENT
Start: 2018-06-27

## 2018-06-27 RX ORDER — FUROSEMIDE 40 MG
40 TABLET ORAL DAILY
Qty: 0 | Refills: 0 | Status: DISCONTINUED | OUTPATIENT
Start: 2018-06-27 | End: 2018-06-27

## 2018-06-27 RX ORDER — ALPRAZOLAM 0.25 MG
1 TABLET ORAL
Qty: 0 | Refills: 0 | COMMUNITY

## 2018-06-27 RX ORDER — GABAPENTIN 400 MG/1
2 CAPSULE ORAL
Qty: 0 | Refills: 0 | COMMUNITY

## 2018-06-27 RX ORDER — LOTEPREDNOL ETABONATE 2 MG/ML
1 SUSPENSION/ DROPS OPHTHALMIC
Qty: 0 | Refills: 0 | COMMUNITY

## 2018-06-27 RX ADMIN — Medication 1 DROP(S): at 05:12

## 2018-06-27 RX ADMIN — Medication 1 TABLET(S): at 08:38

## 2018-06-27 RX ADMIN — Medication 40 MILLIGRAM(S): at 05:11

## 2018-06-27 RX ADMIN — Medication 100 MICROGRAM(S): at 05:12

## 2018-06-27 RX ADMIN — GABAPENTIN 200 MILLIGRAM(S): 400 CAPSULE ORAL at 05:14

## 2018-06-27 RX ADMIN — Medication 1 APPLICATION(S): at 05:12

## 2018-06-27 RX ADMIN — Medication 1 MILLIGRAM(S): at 11:45

## 2018-06-27 RX ADMIN — GABAPENTIN 200 MILLIGRAM(S): 400 CAPSULE ORAL at 11:45

## 2018-06-27 RX ADMIN — Medication 1 TABLET(S): at 11:45

## 2018-06-27 RX ADMIN — Medication 25 MILLIGRAM(S): at 05:12

## 2018-06-27 NOTE — DISCHARGE NOTE ADULT - HOSPITAL COURSE
CC:  Patient is a 89y old  Male who presents with a chief complaint of SOB and weakness.    SUBJECTIVE:  offers no new complaints at current time.    ROS:  all other review of systems are negative unless indicated above.    Vital Signs Last 24 Hrs  T(C): 36.5 (27 Jun 2018 09:48), Max: 36.8 (26 Jun 2018 19:35)  T(F): 97.7 (27 Jun 2018 09:48), Max: 98.3 (26 Jun 2018 19:35)  HR: 89 (27 Jun 2018 09:48) (59 - 89)  BP: 132/91 (27 Jun 2018 09:48) (132/91 - 146/77)  BP(mean): --  RR: 17 (27 Jun 2018 09:48) (16 - 18)  SpO2: 95% (27 Jun 2018 09:48) (94% - 97%)    General: Well developed; frail elderly mail,  in no acute distress  	Eyes: PERRLA, EOMI; conjunctiva and sclera clear  	Head: Normocephalic; atraumatic, + excoriations on scalp, no  discharge, no edema   	ENMT: No nasal discharge; airway clear  	Neck: Supple; non tender; no masses  	Respiratory: Decreased BS b/l, + basilar rales, No wheezes  	Cardiovascular: Irregular rate and rhythm. S1 and S2 Normal; + II/VI  murmur  	Gastrointestinal: Soft non-tender non-distended; Normal bowel sounds  	Genitourinary: No costovertebral angle tenderness  	Extremities: Preserved range of motion, + R>l LE edema   	Vascular: Peripheral pulses palpable 2+ bilaterally  	Neurological: Alert and oriented x4, non focal   	Skin: Warm and dry. No acute rash  	Lymph Nodes: + inguinal  adenopathy  	Musculoskeletal: Normal muscle tone, without deformities  Psychiatric: Cooperative and appropriate                        12.0   6.45  )-----------( 154      ( 27 Jun 2018 05:30 )             36.8     06-27    146<H>  |  107  |  42<H>  ----------------------------<  106<H>  4.0   |  35<H>  |  1.70<H>    Ca    9.0      27 Jun 2018 05:30  Phos  2.5     06-26    TPro  x   /  Alb  3.2<L>  /  TBili  x   /  DBili  x   /  AST  x   /  ALT  x   /  AlkPhos  x   06-26    89M.  admitted 06/25/18.  presented to ED c/o SOB and increasing weakness.  onset a few days prior to adimssion.  a/w 12 pound weight gain, LE swelling and intermittent palpitations.  In ED labs showed decreased Renal Fx and elevated BNP. CXR suggestive of LLL infiltrate.  Pt was given 40mg IV lasix and had good response: 700ml voided      PMHx:  AF (w);  HTN;  hypothyroidism;  BPH;  hx prostate CA-RT;  chronic diarrhea;  basal cell CA;  lymphoma-Rituxan;  VZV-PHN.    acute upon chronic HFpEF.  -06/26 TTE LVEF 55-60%.  -monitor daily weights.  -Lasix 40mg po qd.  -Toprol XL 25mg po bid.  -Cardiology f/u outpatient.    AF.  -telemetry reviewed, atrial flutter HR 80.  -BB.  -INR subtherapeutic.  repeat INR 1-2 days.  -warfarin 5mg po qPM (given 7.5mg po on 06/26/18).    LETICIA upon CKD-3.  -Cr at baseline.  -Nephrology f/u outpatient.    R Inguinal adenopathy.  hx lymphoma.  -RLE doppler, + enlarged lymph nodes.  -Hematology/Oncology f/u outpatient w/ Dr. Mckeon.    postherpetic neuralgia.  -c/w gabapentin      disposition.  -may discharge home w/ home care.  -d/c > 35 minutes.    communication.  -d/w RN.  -d/w Case Management.  -d/w patient's daughter, above results and follow up instructions reviewed in layman's terms.  -PMD/Cardiology aware.

## 2018-06-27 NOTE — PROGRESS NOTE ADULT - ASSESSMENT
89 yo man with known CKD ( no recent follow up with Dr. Larson--appts not kept) --creat was 1.63 in 12/2015, Creat 1.2-1.3 in 2017) admitted with resp insuff.   Unclear hx of CHF or cardiac disease except A FIB.  Unclear baseline cardiac function.    LETICIA superimposed  on CKD of unclear etiology at this point.  Cardiology evaluation.  Check Renal USG.    6/27 SY  --LETICIA/CKD ; improved, likely at baseline  --Renal USG c/w CKD  --CHF : fluid status much improved.  Continue po Lasix at current dose.  Pt aware of need for outpatient follow up with Dr Larson.  --From renal standpoint, ok for d/c.

## 2018-06-27 NOTE — DISCHARGE NOTE ADULT - MEDICATION SUMMARY - MEDICATIONS TO TAKE
I will START or STAY ON the medications listed below when I get home from the hospital:    freetext medication  -  -- 1 drop(s) to each affected eye once a day  -- Indication: For .    alfuzosin 10 mg oral tablet, extended release  -- 1 tab(s) by mouth once a day  -- Indication: For .    warfarin 5 mg oral tablet  -- 1 tab(s) by mouth once a day (at bedtime)  -- Indication: For .    gabapentin 100 mg oral capsule  -- 2 cap(s) by mouth 3 times a day  -- Indication: For .    ALPRAZolam 0.25 mg oral tablet  -- 1 tab(s) by mouth 3 times a day, As needed, anxiety  -- Indication: For .    Toprol-XL 25 mg oral tablet, extended release  -- 1 tab(s) by mouth 2 times a day  -- Indication: For .    Efudex 5% topical cream  -- Apply on skin to affected area once a day (at bedtime)  -- Indication: For .    furosemide 40 mg oral tablet  -- 1 tab(s) by mouth once a day  -- Indication: For .    dicyclomine 10 mg oral capsule  -- 1 cap(s) by mouth once a day, As Needed  -- Indication: For .    Refresh ophthalmic solution  -- 1 drop(s) to each affected eye 2 times a day, As Needed  -- Indication: For .    Lotemax 0.5% ophthalmic ointment  -- 1 application in the left eye 2 times a day  -- Indication: For .    Probiotic Formula oral capsule  -- 1 cap(s) by mouth once a day  -- Indication: For .    Synthroid 100 mcg (0.1 mg) oral tablet  -- 1 tab(s) by mouth once a day  -- Indication: For .    B Complex 50 oral tablet, extended release  -- 1 tab(s) by mouth once a day  -- Indication: For .    folic acid 1 mg oral tablet  -- 0.5 tab(s) by mouth once a day  -- Indication: For .

## 2018-06-27 NOTE — DISCHARGE NOTE ADULT - MEDICATION SUMMARY - MEDICATIONS TO STOP TAKING
I will STOP taking the medications listed below when I get home from the hospital:    Valtrex 1 g oral tablet  -- 1 tab(s) by mouth once a day

## 2018-06-27 NOTE — GOALS OF CARE CONVERSATION - PERSONAL ADVANCE DIRECTIVE - WHAT MATTERS MOST
Pt wants to be able to continue to perform ADL's independently, not feel tired when ambulating, and identify the early signs and symptoms of heart failure.  His daughter will make his medical decisions if he is unable to.

## 2018-06-27 NOTE — DISCHARGE NOTE ADULT - MEDICATION SUMMARY - MEDICATIONS TO CHANGE
I will SWITCH the dose or number of times a day I take the medications listed below when I get home from the hospital:    Xanax 0.25 mg oral tablet  -- 1 tab(s) by mouth 4 times a day, As Needed

## 2018-06-27 NOTE — DISCHARGE NOTE ADULT - CARE PLAN
Principal Discharge DX:	Other congestive heart failure  Goal:	see below.  Assessment and plan of treatment:	continue medical management.  check daily weights.  cardiology follow up outpatient.

## 2018-06-27 NOTE — DISCHARGE NOTE ADULT - PATIENT PORTAL LINK FT
You can access the [facility] Patient Portal, offered by [facility], by registering with the following website: [email or web address]

## 2018-06-27 NOTE — PROGRESS NOTE ADULT - ASSESSMENT
89 year old man with the above history admitted with recurrent CHF which to some extent is related to his worsening renal function.  Due to the elevated creatinine he will require at least 40 mg of furosemide daily.  Will follow renal function.    06/27/2018:  Patient appears clinically improved.  Renal function stable despite diuresis.  CXR indicates possible LLL pneumonia but the CT(which is more accurate) does not confirm this and he has no other evidence consistent with pneumonia..  Will therefore treat for CHF only.  Will change furosemide to PO.  Increase ambulation.  Hopefully discharge soon.

## 2018-06-27 NOTE — PROGRESS NOTE ADULT - PROBLEM SELECTOR PLAN 1
CHF education done with patient and daughter:  Educational handouts provided including:  Signs and symptoms of CHF exacerbation   Daily Weights  What to do if symptoms worsen  How, when, and why to take medication  lifestyle changes  limiting sodium intake to 1500mg-2000mg daily  when to contact HCP  Ejection Fraction 55-60%      Post d/c follow up appointment to be made by unit clerk when medically stable

## 2018-06-27 NOTE — DISCHARGE NOTE ADULT - PROVIDER TOKENS
TOKFARHAN:'[id number]',TOKEN:'[id number]' TOKEN:'[id number]:'[id number]:'[id number]'

## 2018-06-27 NOTE — PROGRESS NOTE ADULT - ASSESSMENT
PHYSICAL EXAM:  GENERAL: NAD, well-developed  HEAD:  Atraumatic, Normocephalic  EYES: EOMI, PERRLA, conjunctiva and sclera clear  NECK: Supple, No JVD  CHEST/LUNG: Clear to auscultation bilaterally  HEART: Regular rate and rhythm; Murmurs heard in all cardiac landmarks  Abdomen: Soft, Nondistended; Bowel sounds present  EXTREMITIES:  2+ Peripheral Pulses, +2 edema on right LE.    PSYCH: AAOx3  NEUROLOGY: non-focal  SKIN: No rashes or lesions

## 2018-06-27 NOTE — DISCHARGE NOTE ADULT - ADDITIONAL INSTRUCTIONS
check INR 1-2 days.  warfarin dose as directed by Cardiology.  right inguinal lymphadenopathy.  Hematology/Oncology outpatient follow up.

## 2018-06-27 NOTE — PROGRESS NOTE ADULT - SUBJECTIVE AND OBJECTIVE BOX
NEPHROLOGY INTERVAL HPI/OVERNIGHT EVENTS:   SY  Feeling fairly well today.  No acute events.  No new complaints.    HPI:  89 yo man with Known hx of HTN, A FIB and CKD admitted with increasing SOB with activity for about 2 weeks.  Pt reported weight gain of about 12 lbs over several days and increasing   LE edema.  Pt is somewhat vague about his symptoms and time frame.  Unclear dietary and fluid intake at this point.  S/p HH admission for weakness and rapid afib.   Had IVF due to volume depletion on exam.    PMHX and PSHX.  1.CKD ( Seen by . last office visit in 2015--Creat 1.63)  2.A FIB  3.HTN  4.Hx of Prostate CA ( Post RT)  5.Hx of Lymphoma Post Rituxan therapy.  6.Hx of Postherpetic Shingles.  7.Hypothyroidism  8.Hx of Chronic Diarrhea.    MEDICATIONS  (STANDING):  artificial  tears Solution 1 Drop(s) Both EYES two times a day  fluorouracil 5% Cream 1 Application(s) Topical two times a day  folic acid 1 milliGRAM(s) Oral daily  furosemide    Tablet 40 milliGRAM(s) Oral daily  lactobacillus acidophilus 1 Tablet(s) Oral two times a day with meals  levothyroxine 100 MICROGram(s) Oral daily  metoprolol succinate ER 25 milliGRAM(s) Oral two times a day  PAZEO OPTHALMIC DROPS 1 Drop(s) 1 Drop(s) Both EYES daily  tamsulosin 0.8 milliGRAM(s) Oral at bedtime  vitamin B complex with vitamin C 1 Tablet(s) Oral daily    MEDICATIONS  (PRN):  ALPRAZolam 0.25 milliGRAM(s) Oral three times a day PRN anxiety  dicyclomine 10 milliGRAM(s) Oral daily PRN abd cramps  docusate sodium 100 milliGRAM(s) Oral two times a day PRN Constipation  gabapentin 200 milliGRAM(s) Oral every 4 hours PRN Left eye pain  ondansetron Injectable 4 milliGRAM(s) IV Push every 6 hours PRN Nausea          Vital Signs Last 24 Hrs  T(C): 36.5 (2018 09:48), Max: 36.8 (2018 19:35)  T(F): 97.7 (2018 09:48), Max: 98.3 (2018 19:35)  HR: 89 (2018 09:48) (59 - 89)  BP: 132/91 (2018 09:48) (118/68 - 146/77)  BP(mean): --  RR: 17 (2018 09:48) (16 - 18)  SpO2: 95% (2018 09:48) (94% - 98%)  Daily     Daily Weight in k.1 (2018 09:48)     @ 07:01  -   @ 07:00  --------------------------------------------------------  IN: 0 mL / OUT: 550 mL / NET: -550 mL        PHYSICAL EXAM:  Alert and appropriate  GENERAL: No distress  CHEST/LUNG: fair air entry  HEART: S1S12 RRR  ABDOMEN: soft  EXTREMITIES: decreased edema  SKIN:     LABS:                        12.0   6.45  )-----------( 154      ( 2018 05:30 )             36.8     27    146<H>  |  107  |  42<H>  ----------------------------<  106<H>  4.0   |  35<H>  |  1.70<H>    Ca    9.0      2018 05:30  Phos  2.5     06-26  Mg     2.1     06-25    TPro  x   /  Alb  3.2<L>  /  TBili  x   /  DBili  x   /  AST  x   /  ALT  x   /  AlkPhos  x   06-26    PT/INR - ( 2018 05:30 )   PT: 20.0 sec;   INR: 1.83 ratio           Urinalysis Basic - ( 2018 20:00 )    Color: Yellow / Appearance: Clear / S.010 / pH: x  Gluc: x / Ketone: Negative  / Bili: Negative / Urobili: Negative mg/dL   Blood: x / Protein: Negative mg/dL / Nitrite: Negative   Leuk Esterase: Negative / RBC: x / WBC x   Sq Epi: x / Non Sq Epi: x / Bacteria: x              RADIOLOGY & ADDITIONAL TESTS:

## 2018-06-27 NOTE — DISCHARGE NOTE ADULT - CARE PROVIDER_API CALL
Jj Joshi), Cardiovascular Disease; Internal Medicine  [address]  [address]  [place] [address]  Phone: [phone]  Fax: [phone]    Selam Mckeon), Medical Oncology  [address]  [place] [address]  Phone: [phone]  Fax: [phone] Jj Joshi (MD), Cardiovascular Disease; Internal Medicine  [address] [address]  [address]  [place] [address]  Phone: [phone]  Fax: [phone]    Selam Mckeon), Medical Oncology  [address]  [place] [address]  Phone: [phone]  Fax: [phone]    Jean Paul Larson (DO), Nephrology  [address]  [address]  [place] [address]  Phone: [phone]  Fax: [phone]

## 2018-06-27 NOTE — PROGRESS NOTE ADULT - SUBJECTIVE AND OBJECTIVE BOX
Patient is a 89y old  Male who presents with a chief complaint of   HPI:  89 yo male w/ PMH of Afib on coumadin, Anxiety, HTN, hypothyroidism, BPH, hx of prostate ca s/p radiation, hx of chronic diarrhea, basal cell carcinoma and  lymphoma s/p Rituxan, shingles with postherpetic neuralgia  brought to ED by EMS due to SOB and increased weakness past few days. Denies fevers or chills, no cough, no CP. But noticed weight gain about 12lbs and also LE swelling and intermittent palpitations.  As per Pt spoke to PA at PCP office and also had CXR last week, unaware about results, also cant report on lasix dose, daughter not sure as well.   ROS also + for burning pain and itching of scalp and eyes, started since had episode of shingles   Pts daughter at bedside reluctant to go over PMH and meds as " Pt was here just recently, nothing changed!"      In ED labs showed decreased Renal Fx and elevated BNP. CXR suggestive of LLL infiltrate.  Pt was given 40mg IV lasix and had good response: 700ml voided (25 Jun 2018 11:26)    6/27/18- pt states he is feeling better, denies CP/SOB but states he feels tired when ambulating, without SOB.  Probably secondary to deconditioning.  Went over CHF education with patient and daughter, both very engaged and receptive.  Pt was unaware of CHF etiology and symptoms, educated with teach back.  Plan as per Dr. D'Amico is for DC today.        Transthoracic Echocardiogram:    EXAM:  2D ECHOCARDIOGRAM AD         PROCEDURE DATE:  06/26/2018        INTERPRETATION:  Transthoracic Echocardiography Report (TTE)     Demographics     Patient name        VARSHA NIEVES    Age           89 year(s)     Med Rec #           [id number]         Gender        Male     Account #           [id number]           Date of Birth 07/03/1927     Interpreting        Sina Trent MD  Room Number   0013   Physician     Referring Physician stephanie lugo     Sonographer   Taylor Arteaga RDCS     Date of study       06/26/2018 09:37                       AM     Height              65.75 in          Weight        160.94 pounds    Type of Study:     TTE procedure: 2D echocardiogram AD     BP: 139/72 mmHg     Study Location: NTechnical Quality: Technically Difficullt    Indications   1) I50.9 - Heart failure    M-Mode Measurements (cm)     LVEDd: 5.06 cm            LVESd: 3.72 cm   IVSEd: 1.34 cm   LVPWd: 1.2 cm             AO Root Dimension: 2.6 cm                             LA: 4.5 cm                             LVOT: 2 cm    Doppler Measurements:     AV Velocity:341 cm/s   AV Peak Gradient: 46.51 mmHg   AV Mean Gradient: 19 mmHg   AV Area (Continuity):1.21 cm^2   TR Velocity:330 cm/s   TR Gradient:43.56 mmHg   Estimated RAP:10 mmHg   RVSP:43 mmHg     Findings     Mitral Valve   Moderate (2+) mitral regurgitation is present.   Mild mitral annular calcification is present.     Aortic Valve   Significant fibrocalcific changes noted to the aortic valve leaflets with   restriction in leaflet excursion. Peak transaortic gradient is 45 mmHg;   this finding is consistent with mild to moderate aortic stenosis.   Trace aortic regurgitation is present.     Tricuspid Valve   Mild to moderate tricuspid valve regurgitation is present.     Pulmonic Valve   Normal appearing pulmonic valve structure and function.     Left Atrium   The left atrium is mildly dilated.     Left Ventricle   The left ventricle is normal in size, wall motion and contractility.   Mild concentric left ventricular hypertrophy is present.   Estimated left ventricular ejection fraction is 55-60 %.     Right Atrium   A PFO is noted with color doppler.     Right Ventricle   Normal appearing right ventricle structure and function.     Pericardial Effusion   No evidence of pericardial effusion.     Pleural Effusion   No evidence of pleural effusion.     Miscellaneous   All visualized extra cardiac structures appears to be normal.     Impression     Summary     Moderate (2+) mitral regurgitation is present.   Mild mitral annular calcification is present.   Significant fibrocalcific changes noted to the aortic valve leaflets with   restriction in leaflet excursion. Peak transaortic gradient is 45 mmHg;   this finding is consistent with mild to moderate aortic stenosis.   Trace aortic regurgitation is present.   Mild to moderate tricuspid valve regurgitation is present.   Normal appearing pulmonic valve structure and function.   The left atrium is mildly dilated.   The left ventricle is normal in size, wall motion and contractility.   Mild concentric left ventricular hypertrophy is present.   Estimated left ventricular ejection fraction is 55-60 %.   A PFO is noted with color doppler.   Normal appearing right ventricle structure and function.   All visualized extra cardiac structures appears to be normal.   No evidence of pericardial effusion.     Signature     ----------------------------------------------------------------   Electronically signed by Sina Trent MD(Interpreting   physician) on 06/27/2018 07:41 AM   ----------------------------------------------------------------    Valves     Aortic Valve     Peak Velocity: 341 cm/s             Area (continuity): 1.21 cm^2   Peak Gradient: 46.51 mmHg           Mean Gradient: 19 mmHg                                       AV VTI: 57.4 cm     Tricuspid Valve     TR Velocity: 330 cm/s                Estimated RAP: 10 mmHg   TR Gradient: 43.56 mmHg              Estimated RVSP: 43 mmHg    Pulmonic Valve              Estimated PASP: 53.56 mmHg     LVOT     Peak Velocity: 29.1 cm/s                Mean Gradient: 3 mmHg   Peak Gradient: 0 mmHg                   LVOT VTI: 22.2 cm   LVOT Diameter: 2 cm    Structures     Left Atrium     LA Dimension: 4.5 cm          LA Area: 24.7 cm^2   LA/Aorta: 1.73                LA Volume/Index: 79 ml /43m^2     Left Ventricle     Diastolic Dimension: 5.06 cm          Systolic Dimension: 3.72 cm   Septum Diastolic: 1.34 cm   PW Diastolic: 1.2 cm     FS: 26.48 %   LVOT Diameter: 2 cm     Right Atrium     RA Systolic Pressure: 10 mmHg     Right Ventricle              RV Systolic Pressure: 53.56 mmHg     Miscellaneous     Aorta     Aortic Root: 2.6 cm   LVOT Diameter: 2 cm                    DESIREE NY, ATTENDING CARDIOLOGIST  This document has been electronically signed. Jun 27 2018  7:41AM             (06-26-18 @ 10:03)

## 2018-07-03 DIAGNOSIS — E03.9 HYPOTHYROIDISM, UNSPECIFIED: ICD-10-CM

## 2018-07-03 DIAGNOSIS — Z92.3 PERSONAL HISTORY OF IRRADIATION: ICD-10-CM

## 2018-07-03 DIAGNOSIS — Z96.619 PRESENCE OF UNSPECIFIED ARTIFICIAL SHOULDER JOINT: ICD-10-CM

## 2018-07-03 DIAGNOSIS — I48.92 UNSPECIFIED ATRIAL FLUTTER: ICD-10-CM

## 2018-07-03 DIAGNOSIS — R79.1 ABNORMAL COAGULATION PROFILE: ICD-10-CM

## 2018-07-03 DIAGNOSIS — Z85.46 PERSONAL HISTORY OF MALIGNANT NEOPLASM OF PROSTATE: ICD-10-CM

## 2018-07-03 DIAGNOSIS — N40.0 BENIGN PROSTATIC HYPERPLASIA WITHOUT LOWER URINARY TRACT SYMPTOMS: ICD-10-CM

## 2018-07-03 DIAGNOSIS — C85.90 NON-HODGKIN LYMPHOMA, UNSPECIFIED, UNSPECIFIED SITE: ICD-10-CM

## 2018-07-03 DIAGNOSIS — B02.29 OTHER POSTHERPETIC NERVOUS SYSTEM INVOLVEMENT: ICD-10-CM

## 2018-07-03 DIAGNOSIS — Z79.01 LONG TERM (CURRENT) USE OF ANTICOAGULANTS: ICD-10-CM

## 2018-07-03 DIAGNOSIS — N17.9 ACUTE KIDNEY FAILURE, UNSPECIFIED: ICD-10-CM

## 2018-07-03 DIAGNOSIS — Z96.653 PRESENCE OF ARTIFICIAL KNEE JOINT, BILATERAL: ICD-10-CM

## 2018-07-03 DIAGNOSIS — I45.2 BIFASCICULAR BLOCK: ICD-10-CM

## 2018-07-03 DIAGNOSIS — F41.9 ANXIETY DISORDER, UNSPECIFIED: ICD-10-CM

## 2018-07-03 DIAGNOSIS — N18.3 CHRONIC KIDNEY DISEASE, STAGE 3 (MODERATE): ICD-10-CM

## 2018-07-03 DIAGNOSIS — Q21.1 ATRIAL SEPTAL DEFECT: ICD-10-CM

## 2018-07-03 DIAGNOSIS — E44.0 MODERATE PROTEIN-CALORIE MALNUTRITION: ICD-10-CM

## 2018-07-03 DIAGNOSIS — I50.33 ACUTE ON CHRONIC DIASTOLIC (CONGESTIVE) HEART FAILURE: ICD-10-CM

## 2018-07-03 DIAGNOSIS — I08.3 COMBINED RHEUMATIC DISORDERS OF MITRAL, AORTIC AND TRICUSPID VALVES: ICD-10-CM

## 2018-07-03 DIAGNOSIS — Z85.828 PERSONAL HISTORY OF OTHER MALIGNANT NEOPLASM OF SKIN: ICD-10-CM

## 2018-07-03 DIAGNOSIS — Z87.891 PERSONAL HISTORY OF NICOTINE DEPENDENCE: ICD-10-CM

## 2018-07-03 DIAGNOSIS — I48.91 UNSPECIFIED ATRIAL FIBRILLATION: ICD-10-CM

## 2018-07-03 DIAGNOSIS — I13.0 HYPERTENSIVE HEART AND CHRONIC KIDNEY DISEASE WITH HEART FAILURE AND STAGE 1 THROUGH STAGE 4 CHRONIC KIDNEY DISEASE, OR UNSPECIFIED CHRONIC KIDNEY DISEASE: ICD-10-CM

## 2018-07-03 DIAGNOSIS — R06.02 SHORTNESS OF BREATH: ICD-10-CM

## 2018-07-09 ENCOUNTER — APPOINTMENT (OUTPATIENT)
Dept: NEUROSURGERY | Facility: CLINIC | Age: 83
End: 2018-07-09
Payer: MEDICARE

## 2018-07-09 VITALS
TEMPERATURE: 98.1 F | RESPIRATION RATE: 17 BRPM | SYSTOLIC BLOOD PRESSURE: 126 MMHG | HEART RATE: 79 BPM | HEIGHT: 68 IN | BODY MASS INDEX: 22.88 KG/M2 | DIASTOLIC BLOOD PRESSURE: 81 MMHG | WEIGHT: 151 LBS

## 2018-07-09 PROCEDURE — 99214 OFFICE O/P EST MOD 30 MIN: CPT

## 2018-07-09 RX ORDER — VALACYCLOVIR HYDROCHLORIDE 1 G/1
1 TABLET, FILM COATED ORAL DAILY
Refills: 0 | Status: DISCONTINUED | COMMUNITY
Start: 2018-05-18 | End: 2018-07-09

## 2018-07-09 RX ORDER — PREGABALIN 50 MG/1
50 CAPSULE ORAL
Refills: 0 | Status: COMPLETED | COMMUNITY

## 2018-07-12 ENCOUNTER — APPOINTMENT (OUTPATIENT)
Dept: MRI IMAGING | Facility: CLINIC | Age: 83
End: 2018-07-12
Payer: MEDICARE

## 2018-07-12 ENCOUNTER — OUTPATIENT (OUTPATIENT)
Dept: OUTPATIENT SERVICES | Facility: HOSPITAL | Age: 83
LOS: 1 days | End: 2018-07-12
Payer: MEDICARE

## 2018-07-12 DIAGNOSIS — Z00.8 ENCOUNTER FOR OTHER GENERAL EXAMINATION: ICD-10-CM

## 2018-07-12 DIAGNOSIS — Z96.619 PRESENCE OF UNSPECIFIED ARTIFICIAL SHOULDER JOINT: Chronic | ICD-10-CM

## 2018-07-12 DIAGNOSIS — Z96.653 PRESENCE OF ARTIFICIAL KNEE JOINT, BILATERAL: Chronic | ICD-10-CM

## 2018-07-12 PROCEDURE — 82565 ASSAY OF CREATININE: CPT

## 2018-07-12 PROCEDURE — 70551 MRI BRAIN STEM W/O DYE: CPT

## 2018-07-13 PROCEDURE — 70551 MRI BRAIN STEM W/O DYE: CPT | Mod: 26

## 2018-07-17 PROBLEM — K52.9 NONINFECTIVE GASTROENTERITIS AND COLITIS, UNSPECIFIED: Chronic | Status: ACTIVE | Noted: 2017-04-09

## 2018-07-17 PROBLEM — C61 MALIGNANT NEOPLASM OF PROSTATE: Chronic | Status: ACTIVE | Noted: 2017-04-09

## 2018-07-17 PROBLEM — N40.0 BENIGN PROSTATIC HYPERPLASIA WITHOUT LOWER URINARY TRACT SYMPTOMS: Chronic | Status: ACTIVE | Noted: 2018-06-25

## 2018-07-17 PROBLEM — C44.91 BASAL CELL CARCINOMA OF SKIN, UNSPECIFIED: Chronic | Status: ACTIVE | Noted: 2017-04-09

## 2018-07-20 ENCOUNTER — APPOINTMENT (OUTPATIENT)
Dept: HEMATOLOGY ONCOLOGY | Facility: CLINIC | Age: 83
End: 2018-07-20
Payer: MEDICARE

## 2018-07-20 ENCOUNTER — LABORATORY RESULT (OUTPATIENT)
Age: 83
End: 2018-07-20

## 2018-07-20 VITALS
DIASTOLIC BLOOD PRESSURE: 66 MMHG | HEIGHT: 68 IN | HEART RATE: 65 BPM | BODY MASS INDEX: 23.38 KG/M2 | TEMPERATURE: 97.4 F | SYSTOLIC BLOOD PRESSURE: 95 MMHG | WEIGHT: 154.25 LBS

## 2018-07-20 PROCEDURE — 36415 COLL VENOUS BLD VENIPUNCTURE: CPT

## 2018-07-20 PROCEDURE — 99215 OFFICE O/P EST HI 40 MIN: CPT | Mod: 25

## 2018-07-20 PROCEDURE — 85025 COMPLETE CBC W/AUTO DIFF WBC: CPT

## 2018-07-20 RX ORDER — METOPROLOL SUCCINATE 25 MG/1
25 TABLET, EXTENDED RELEASE ORAL
Refills: 0 | Status: DISCONTINUED | COMMUNITY
End: 2018-07-20

## 2018-07-20 RX ORDER — LEVOTHYROXINE SODIUM 0.07 MG/1
75 TABLET ORAL
Qty: 90 | Refills: 0 | Status: DISCONTINUED | COMMUNITY
Start: 2017-09-29 | End: 2018-07-20

## 2018-07-20 RX ORDER — CALCIUM CITRATE/VITAMIN D3 315MG-6.25
TABLET ORAL
Refills: 0 | Status: DISCONTINUED | COMMUNITY
End: 2018-07-20

## 2018-07-20 RX ORDER — HYDROCHLOROTHIAZIDE AND TRIAMTERENE 25; 37.5 MG/1; MG/1
37.5-25 CAPSULE ORAL
Refills: 0 | Status: DISCONTINUED | COMMUNITY
End: 2018-07-20

## 2018-07-20 RX ORDER — WARFARIN SODIUM 2.5 MG/1
2.5 TABLET ORAL
Refills: 0 | Status: DISCONTINUED | COMMUNITY
End: 2018-07-20

## 2018-07-20 RX ORDER — MUPIROCIN 20 MG/G
2 OINTMENT TOPICAL
Qty: 22 | Refills: 0 | Status: DISCONTINUED | COMMUNITY
Start: 2018-02-15 | End: 2018-07-20

## 2018-07-20 RX ORDER — LOTEPREDNOL ETABONATE 5 MG/G
0.5 OINTMENT OPHTHALMIC
Qty: 35 | Refills: 0 | Status: DISCONTINUED | COMMUNITY
Start: 2017-12-21 | End: 2018-07-20

## 2018-07-20 RX ORDER — CARBAMAZEPINE 100 MG/1
100 CAPSULE, EXTENDED RELEASE ORAL
Qty: 60 | Refills: 0 | Status: DISCONTINUED | COMMUNITY
Start: 2018-05-15 | End: 2018-07-20

## 2018-07-20 RX ORDER — FUROSEMIDE 20 MG/1
20 TABLET ORAL
Refills: 0 | Status: DISCONTINUED | COMMUNITY
End: 2018-07-20

## 2018-07-20 RX ORDER — WARFARIN SODIUM 3 MG/1
3 TABLET ORAL
Refills: 0 | Status: DISCONTINUED | COMMUNITY
End: 2018-07-20

## 2018-07-20 RX ORDER — VALACYCLOVIR HYDROCHLORIDE 500 MG/1
500 TABLET, FILM COATED ORAL
Refills: 0 | Status: DISCONTINUED | COMMUNITY
End: 2018-07-20

## 2018-07-20 RX ORDER — ALFUZOSIN HCL 10 MG/1
10 TABLET, EXTENDED RELEASE ORAL
Refills: 0 | Status: DISCONTINUED | COMMUNITY
End: 2018-07-20

## 2018-07-23 LAB
ALBUMIN SERPL ELPH-MCNC: 4.4 G/DL
ALP BLD-CCNC: 65 U/L
ALT SERPL-CCNC: 11 U/L
ANION GAP SERPL CALC-SCNC: 13 MMOL/L
AST SERPL-CCNC: 19 U/L
BILIRUB SERPL-MCNC: 0.4 MG/DL
BUN SERPL-MCNC: 48 MG/DL
CALCIUM SERPL-MCNC: 9.3 MG/DL
CHLORIDE SERPL-SCNC: 101 MMOL/L
CO2 SERPL-SCNC: 32 MMOL/L
CREAT SERPL-MCNC: 2.24 MG/DL
CRP SERPL-MCNC: 0.29 MG/DL
DEPRECATED KAPPA LC FREE/LAMBDA SER: 2.97 RATIO
ERYTHROCYTE [SEDIMENTATION RATE] IN BLOOD BY WESTERGREN METHOD: 4 MM/HR
FOLATE SERPL-MCNC: 19.7 NG/ML
GLUCOSE SERPL-MCNC: 129 MG/DL
HCT VFR BLD CALC: 42.2 %
HGB BLD-MCNC: 13.5 G/DL
KAPPA LC CSF-MCNC: 1.5 MG/DL
KAPPA LC SERPL-MCNC: 4.46 MG/DL
M PROTEIN SPEC IFE-MCNC: NORMAL
MCHC RBC-ENTMCNC: 32 GM/DL
MCHC RBC-ENTMCNC: 34.9 PG
MCV RBC AUTO: 109 FL
PLATELET # BLD AUTO: 156 K/UL
POTASSIUM SERPL-SCNC: 3.9 MMOL/L
PROT SERPL-MCNC: 5.9 G/DL
PSA FREE FLD-MCNC: 7.5
PSA FREE SERPL-MCNC: 3.77 NG/ML
PSA SERPL-MCNC: 50.12 NG/ML
RBC # BLD: 3.87 M/UL
RBC # FLD: 13.5 %
SODIUM SERPL-SCNC: 146 MMOL/L
VIT B12 SERPL-MCNC: 800 PG/ML
WBC # FLD AUTO: 6.3 K/UL

## 2018-07-27 ENCOUNTER — APPOINTMENT (OUTPATIENT)
Dept: NEUROSURGERY | Facility: AMBULATORY SURGERY CENTER | Age: 83
End: 2018-07-27
Payer: MEDICARE

## 2018-07-27 PROCEDURE — 61796 SRS CRANIAL LESION SIMPLE: CPT

## 2018-10-22 ENCOUNTER — INPATIENT (INPATIENT)
Facility: HOSPITAL | Age: 83
LOS: 9 days | Discharge: NURSING, REHAB OR LONG-TERM CARE | End: 2018-11-01
Attending: FAMILY MEDICINE | Admitting: FAMILY MEDICINE
Payer: MEDICARE

## 2018-10-22 VITALS — WEIGHT: 154.98 LBS | HEIGHT: 65 IN

## 2018-10-22 DIAGNOSIS — Z96.619 PRESENCE OF UNSPECIFIED ARTIFICIAL SHOULDER JOINT: Chronic | ICD-10-CM

## 2018-10-22 DIAGNOSIS — Z96.653 PRESENCE OF ARTIFICIAL KNEE JOINT, BILATERAL: Chronic | ICD-10-CM

## 2018-10-22 LAB
ALBUMIN SERPL ELPH-MCNC: 3.4 G/DL — SIGNIFICANT CHANGE UP (ref 3.3–5)
ALP SERPL-CCNC: 68 U/L — SIGNIFICANT CHANGE UP (ref 40–120)
ALT FLD-CCNC: 20 U/L — SIGNIFICANT CHANGE UP (ref 12–78)
ANION GAP SERPL CALC-SCNC: 7 MMOL/L — SIGNIFICANT CHANGE UP (ref 5–17)
ANISOCYTOSIS BLD QL: SLIGHT — SIGNIFICANT CHANGE UP
APPEARANCE UR: CLEAR — SIGNIFICANT CHANGE UP
APTT BLD: 34.1 SEC — SIGNIFICANT CHANGE UP (ref 27.5–37.4)
AST SERPL-CCNC: 22 U/L — SIGNIFICANT CHANGE UP (ref 15–37)
BACTERIA # UR AUTO: ABNORMAL
BASOPHILS # BLD AUTO: 0.03 K/UL — SIGNIFICANT CHANGE UP (ref 0–0.2)
BASOPHILS NFR BLD AUTO: 0.5 % — SIGNIFICANT CHANGE UP (ref 0–2)
BILIRUB SERPL-MCNC: 0.8 MG/DL — SIGNIFICANT CHANGE UP (ref 0.2–1.2)
BILIRUB UR-MCNC: NEGATIVE — SIGNIFICANT CHANGE UP
BUN SERPL-MCNC: 34 MG/DL — HIGH (ref 7–23)
CALCIUM SERPL-MCNC: 9.1 MG/DL — SIGNIFICANT CHANGE UP (ref 8.5–10.1)
CHLORIDE SERPL-SCNC: 105 MMOL/L — SIGNIFICANT CHANGE UP (ref 96–108)
CO2 SERPL-SCNC: 32 MMOL/L — HIGH (ref 22–31)
COLOR SPEC: YELLOW — SIGNIFICANT CHANGE UP
CREAT SERPL-MCNC: 2.17 MG/DL — HIGH (ref 0.5–1.3)
DIFF PNL FLD: ABNORMAL
ELLIPTOCYTES BLD QL SMEAR: SLIGHT — SIGNIFICANT CHANGE UP
EOSINOPHIL # BLD AUTO: 0.2 K/UL — SIGNIFICANT CHANGE UP (ref 0–0.5)
EOSINOPHIL NFR BLD AUTO: 3.5 % — SIGNIFICANT CHANGE UP (ref 0–6)
EPI CELLS # UR: SIGNIFICANT CHANGE UP
GLUCOSE SERPL-MCNC: 124 MG/DL — HIGH (ref 70–99)
GLUCOSE UR QL: NEGATIVE MG/DL — SIGNIFICANT CHANGE UP
HCT VFR BLD CALC: 40.9 % — SIGNIFICANT CHANGE UP (ref 39–50)
HGB BLD-MCNC: 12.9 G/DL — LOW (ref 13–17)
IMM GRANULOCYTES NFR BLD AUTO: 0.2 % — SIGNIFICANT CHANGE UP (ref 0–1.5)
INR BLD: 1.83 RATIO — HIGH (ref 0.88–1.16)
KETONES UR-MCNC: NEGATIVE — SIGNIFICANT CHANGE UP
LEUKOCYTE ESTERASE UR-ACNC: NEGATIVE — SIGNIFICANT CHANGE UP
LYMPHOCYTES # BLD AUTO: 1.31 K/UL — SIGNIFICANT CHANGE UP (ref 1–3.3)
LYMPHOCYTES # BLD AUTO: 22.9 % — SIGNIFICANT CHANGE UP (ref 13–44)
MACROCYTES BLD QL: SLIGHT — SIGNIFICANT CHANGE UP
MANUAL SMEAR VERIFICATION: SIGNIFICANT CHANGE UP
MCHC RBC-ENTMCNC: 31.5 GM/DL — LOW (ref 32–36)
MCHC RBC-ENTMCNC: 31.8 PG — SIGNIFICANT CHANGE UP (ref 27–34)
MCV RBC AUTO: 100.7 FL — HIGH (ref 80–100)
MICROCYTES BLD QL: SLIGHT — SIGNIFICANT CHANGE UP
MONOCYTES # BLD AUTO: 0.62 K/UL — SIGNIFICANT CHANGE UP (ref 0–0.9)
MONOCYTES NFR BLD AUTO: 10.8 % — SIGNIFICANT CHANGE UP (ref 2–14)
NEUTROPHILS # BLD AUTO: 3.55 K/UL — SIGNIFICANT CHANGE UP (ref 1.8–7.4)
NEUTROPHILS NFR BLD AUTO: 62.1 % — SIGNIFICANT CHANGE UP (ref 43–77)
NITRITE UR-MCNC: NEGATIVE — SIGNIFICANT CHANGE UP
NRBC # BLD: 0 /100 WBCS — SIGNIFICANT CHANGE UP (ref 0–0)
NT-PROBNP SERPL-SCNC: HIGH PG/ML (ref 0–450)
OVALOCYTES BLD QL SMEAR: SLIGHT — SIGNIFICANT CHANGE UP
PH UR: 6 — SIGNIFICANT CHANGE UP (ref 5–8)
PLAT MORPH BLD: NORMAL — SIGNIFICANT CHANGE UP
PLATELET # BLD AUTO: 117 K/UL — LOW (ref 150–400)
POIKILOCYTOSIS BLD QL AUTO: SLIGHT — SIGNIFICANT CHANGE UP
POTASSIUM SERPL-MCNC: 3.7 MMOL/L — SIGNIFICANT CHANGE UP (ref 3.5–5.3)
POTASSIUM SERPL-SCNC: 3.7 MMOL/L — SIGNIFICANT CHANGE UP (ref 3.5–5.3)
PROT SERPL-MCNC: 6.1 GM/DL — SIGNIFICANT CHANGE UP (ref 6–8.3)
PROT UR-MCNC: 15 MG/DL
PROTHROM AB SERPL-ACNC: 20 SEC — HIGH (ref 9.8–12.7)
RAPID RVP RESULT: SIGNIFICANT CHANGE UP
RBC # BLD: 4.06 M/UL — LOW (ref 4.2–5.8)
RBC # FLD: 15.5 % — HIGH (ref 10.3–14.5)
RBC BLD AUTO: ABNORMAL
RBC CASTS # UR COMP ASSIST: ABNORMAL /HPF (ref 0–4)
ROULEAUX BLD QL SMEAR: PRESENT
SODIUM SERPL-SCNC: 144 MMOL/L — SIGNIFICANT CHANGE UP (ref 135–145)
SP GR SPEC: 1.01 — SIGNIFICANT CHANGE UP (ref 1.01–1.02)
SPHEROCYTES BLD QL SMEAR: SLIGHT — SIGNIFICANT CHANGE UP
TROPONIN I SERPL-MCNC: 0.03 NG/ML — SIGNIFICANT CHANGE UP (ref 0.01–0.04)
TROPONIN I SERPL-MCNC: 0.03 NG/ML — SIGNIFICANT CHANGE UP (ref 0.01–0.04)
UROBILINOGEN FLD QL: NEGATIVE MG/DL — SIGNIFICANT CHANGE UP
WBC # BLD: 5.72 K/UL — SIGNIFICANT CHANGE UP (ref 3.8–10.5)
WBC # FLD AUTO: 5.72 K/UL — SIGNIFICANT CHANGE UP (ref 3.8–10.5)
WBC UR QL: SIGNIFICANT CHANGE UP

## 2018-10-22 PROCEDURE — 71045 X-RAY EXAM CHEST 1 VIEW: CPT | Mod: 26

## 2018-10-22 PROCEDURE — 99285 EMERGENCY DEPT VISIT HI MDM: CPT

## 2018-10-22 PROCEDURE — 93010 ELECTROCARDIOGRAM REPORT: CPT

## 2018-10-22 RX ORDER — WARFARIN SODIUM 2.5 MG/1
5 TABLET ORAL ONCE
Qty: 0 | Refills: 0 | Status: DISCONTINUED | OUTPATIENT
Start: 2018-10-22 | End: 2018-10-22

## 2018-10-22 RX ORDER — ALFUZOSIN HYDROCHLORIDE 10 MG/1
10 TABLET, EXTENDED RELEASE ORAL AT BEDTIME
Qty: 0 | Refills: 0 | Status: DISCONTINUED | OUTPATIENT
Start: 2018-10-22 | End: 2018-10-22

## 2018-10-22 RX ORDER — METOPROLOL TARTRATE 50 MG
50 TABLET ORAL
Qty: 0 | Refills: 0 | Status: DISCONTINUED | OUTPATIENT
Start: 2018-10-22 | End: 2018-10-28

## 2018-10-22 RX ORDER — SODIUM CHLORIDE 9 MG/ML
3 INJECTION INTRAMUSCULAR; INTRAVENOUS; SUBCUTANEOUS ONCE
Qty: 0 | Refills: 0 | Status: COMPLETED | OUTPATIENT
Start: 2018-10-22 | End: 2018-10-22

## 2018-10-22 RX ORDER — VANCOMYCIN HCL 1 G
1000 VIAL (EA) INTRAVENOUS EVERY 12 HOURS
Qty: 0 | Refills: 0 | Status: DISCONTINUED | OUTPATIENT
Start: 2018-10-22 | End: 2018-10-23

## 2018-10-22 RX ORDER — L.ACIDOPH/B.ANIMALIS/B.LONGUM 15B CELL
1 CAPSULE ORAL
Qty: 0 | Refills: 0 | COMMUNITY

## 2018-10-22 RX ORDER — LACTOBACILLUS ACIDOPHILUS 100MM CELL
1 CAPSULE ORAL DAILY
Qty: 0 | Refills: 0 | Status: DISCONTINUED | OUTPATIENT
Start: 2018-10-22 | End: 2018-11-01

## 2018-10-22 RX ORDER — LEVOTHYROXINE SODIUM 125 MCG
100 TABLET ORAL DAILY
Qty: 0 | Refills: 0 | Status: DISCONTINUED | OUTPATIENT
Start: 2018-10-22 | End: 2018-11-01

## 2018-10-22 RX ORDER — ALPRAZOLAM 0.25 MG
0.25 TABLET ORAL
Qty: 0 | Refills: 0 | Status: DISCONTINUED | OUTPATIENT
Start: 2018-10-22 | End: 2018-10-28

## 2018-10-22 RX ORDER — FUROSEMIDE 40 MG
40 TABLET ORAL DAILY
Qty: 0 | Refills: 0 | Status: DISCONTINUED | OUTPATIENT
Start: 2018-10-22 | End: 2018-10-23

## 2018-10-22 RX ORDER — DOCUSATE SODIUM 100 MG
100 CAPSULE ORAL THREE TIMES A DAY
Qty: 0 | Refills: 0 | Status: DISCONTINUED | OUTPATIENT
Start: 2018-10-22 | End: 2018-11-01

## 2018-10-22 RX ORDER — FOLIC ACID 0.8 MG
0.5 TABLET ORAL
Qty: 0 | Refills: 0 | COMMUNITY

## 2018-10-22 RX ORDER — POTASSIUM CHLORIDE 20 MEQ
20 PACKET (EA) ORAL DAILY
Qty: 0 | Refills: 0 | Status: DISCONTINUED | OUTPATIENT
Start: 2018-10-22 | End: 2018-10-27

## 2018-10-22 RX ORDER — LEVOTHYROXINE SODIUM 125 MCG
1 TABLET ORAL
Qty: 0 | Refills: 0 | COMMUNITY

## 2018-10-22 RX ORDER — GABAPENTIN 400 MG/1
200 CAPSULE ORAL THREE TIMES A DAY
Qty: 0 | Refills: 0 | Status: DISCONTINUED | OUTPATIENT
Start: 2018-10-22 | End: 2018-10-31

## 2018-10-22 RX ORDER — METOPROLOL TARTRATE 50 MG
1 TABLET ORAL
Qty: 0 | Refills: 0 | COMMUNITY

## 2018-10-22 RX ORDER — IPRATROPIUM/ALBUTEROL SULFATE 18-103MCG
3 AEROSOL WITH ADAPTER (GRAM) INHALATION EVERY 6 HOURS
Qty: 0 | Refills: 0 | Status: DISCONTINUED | OUTPATIENT
Start: 2018-10-22 | End: 2018-10-26

## 2018-10-22 RX ORDER — FLUOROURACIL/ADHESIVE BANDAGE 5 %
1 KIT TOPICAL
Qty: 0 | Refills: 0 | COMMUNITY

## 2018-10-22 RX ORDER — NITROGLYCERIN 6.5 MG
0.5 CAPSULE, EXTENDED RELEASE ORAL ONCE
Qty: 0 | Refills: 0 | Status: COMPLETED | OUTPATIENT
Start: 2018-10-22 | End: 2018-10-22

## 2018-10-22 RX ORDER — WARFARIN SODIUM 2.5 MG/1
5 TABLET ORAL ONCE
Qty: 0 | Refills: 0 | Status: COMPLETED | OUTPATIENT
Start: 2018-10-22 | End: 2018-10-22

## 2018-10-22 RX ORDER — PIPERACILLIN AND TAZOBACTAM 4; .5 G/20ML; G/20ML
3.38 INJECTION, POWDER, LYOPHILIZED, FOR SOLUTION INTRAVENOUS EVERY 8 HOURS
Qty: 0 | Refills: 0 | Status: DISCONTINUED | OUTPATIENT
Start: 2018-10-22 | End: 2018-10-23

## 2018-10-22 RX ORDER — SENNA PLUS 8.6 MG/1
2 TABLET ORAL AT BEDTIME
Qty: 0 | Refills: 0 | Status: DISCONTINUED | OUTPATIENT
Start: 2018-10-22 | End: 2018-11-01

## 2018-10-22 RX ORDER — ACETAMINOPHEN 500 MG
650 TABLET ORAL EVERY 6 HOURS
Qty: 0 | Refills: 0 | Status: DISCONTINUED | OUTPATIENT
Start: 2018-10-22 | End: 2018-11-01

## 2018-10-22 RX ORDER — ALFUZOSIN HYDROCHLORIDE 10 MG/1
10 TABLET, EXTENDED RELEASE ORAL DAILY
Qty: 0 | Refills: 0 | Status: DISCONTINUED | OUTPATIENT
Start: 2018-10-23 | End: 2018-10-26

## 2018-10-22 RX ORDER — FUROSEMIDE 40 MG
40 TABLET ORAL ONCE
Qty: 0 | Refills: 0 | Status: COMPLETED | OUTPATIENT
Start: 2018-10-22 | End: 2018-10-22

## 2018-10-22 RX ADMIN — GABAPENTIN 200 MILLIGRAM(S): 400 CAPSULE ORAL at 23:56

## 2018-10-22 RX ADMIN — WARFARIN SODIUM 5 MILLIGRAM(S): 2.5 TABLET ORAL at 21:59

## 2018-10-22 RX ADMIN — Medication 0.5 INCH(S): at 19:23

## 2018-10-22 RX ADMIN — SODIUM CHLORIDE 3 MILLILITER(S): 9 INJECTION INTRAMUSCULAR; INTRAVENOUS; SUBCUTANEOUS at 18:50

## 2018-10-22 RX ADMIN — PIPERACILLIN AND TAZOBACTAM 25 GRAM(S): 4; .5 INJECTION, POWDER, LYOPHILIZED, FOR SOLUTION INTRAVENOUS at 23:56

## 2018-10-22 RX ADMIN — Medication 40 MILLIGRAM(S): at 19:23

## 2018-10-22 NOTE — ED ADULT NURSE REASSESSMENT NOTE - NS ED NURSE REASSESS COMMENT FT1
received report from SYLVAIN FLETCHER @ [id number]. Patient comfortable in bed with son at bedside, patient has orders and bed placement. Patient has no complaints or questions at this time. Will continue to monitor

## 2018-10-22 NOTE — H&P ADULT - ATTENDING COMMENTS
Pt seen and examined by myself at bedside.   Pt's presentation, diagnostic data, and assessment and plan reviewed in detail with Family Medicine PGY 3, Dr. Solo Huddleston.  Agree with plan of care as detailed.    Pt is a 89 year old male w/PMHx of CHF , anxiety, basal cell carcinoma, bph, ckd III, htn, hypothyroid, lymphoma, postherpetic neuralgia, prostate ca admitted w/ acute on CKD III,  increasing SOB, acute on chronic CHF exac with preserved EF and suspected HCAP,  given recent respiratory illness and persistent cough.  - lasix iv given in the ED, strict I and Os  - CT chest  - HCAP coverage   - nebs  - follow blood and sputum cx  - ID consult, and nephrology

## 2018-10-22 NOTE — ED PROVIDER NOTE - ENMT, MLM
Airway patent, Nasal mucosa clear. Mouth with mild to moderately dry mucosa. Throat has no vesicles, no oropharyngeal exudates and uvula is midline.

## 2018-10-22 NOTE — ED PROVIDER NOTE - OBJECTIVE STATEMENT
89 y/o m with PMHx of atrial fibrillation on coumadin, CHF on Lasix 40mg QIID, hypothyroidism, HTN, post herpetic neuralgia of left eye, anxiety, BPH, prostate cancer, lymphoma, basal cell carcinoma, CKD presenting to the ED BIB daughter per cardio MD referral c/o gradually worsening dyspnea, productive cough, weakness, slight swelling to lower extremities x3-4 days. Dyspnea moderate to severe, exacerbated by exertion. +orthopnea, +PND. Denies fever, chills, CP. O2 sat found to be 91% in triage. PMD/cardio: Dr. Joshi. NKDA.

## 2018-10-22 NOTE — ED PROVIDER NOTE - MUSCULOSKELETAL, MLM
Spine appears normal, range of motion is not limited, no muscle or joint tenderness, CROFT x4, no focal swelling, tenderness.

## 2018-10-22 NOTE — ED PROVIDER NOTE - CARE PLAN
Principal Discharge DX:	Acute on chronic congestive heart failure, unspecified heart failure type  Secondary Diagnosis:	SVT (supraventricular tachycardia)

## 2018-10-22 NOTE — ED ADULT NURSE REASSESSMENT NOTE - NS ED NURSE REASSESS COMMENT FT1
received report from Verna MARTINEZ @ [id number]. Patient comfortable in bed awaiting bed placement. No questions or concerns at this time. Will continue to monitor

## 2018-10-22 NOTE — H&P ADULT - NSHPPHYSICALEXAM_GEN_ALL_CORE
Vital Signs Last 24 Hrs  T(C): 36.4 (22 Oct 2018 17:10), Max: 36.4 (22 Oct 2018 17:10)  T(F): 97.6 (22 Oct 2018 17:10), Max: 97.6 (22 Oct 2018 17:10)  HR: 81 (22 Oct 2018 20:30) (81 - 89)  BP: 132/82 (22 Oct 2018 20:30) (128/75 - 139/89)  BP(mean): --  RR: 20 (22 Oct 2018 20:30) (20 - 26)  SpO2: 100% (22 Oct 2018 20:30) (97% - 100%)    Physical Exam:  Constitutional: NAD, awake and alert, well-developed; elderly  Neck: Soft and supple +JVD  Respiratory: +b/l rales, wheezing, decreased breath sounds rml  Cardiovascular: +s1/s2  Gastrointestinal: soft nt nd bs+  Extremities: +2 edema b/l le  Vascular: 2+ peripheral pulses  Neurological: A/O x 3, no focal deficits  Musculoskeletal: 5/5 strength b/l upper and lower extremities

## 2018-10-22 NOTE — H&P ADULT - ASSESSMENT
89 year old male as above     #shortness of breath with acute on chronic congestive heart failure lvef 06/2018 55-60%  - admit to tele  - cardiac monitor  - bnp on admit as above   - trop x 1 negative   - cxr- ? rml pna vs vascular congestion w/ pulmonary congestion  - strict i and o  - lasix 40mg ivp  - continue bb and potassium  - dash diet with fluid restriction  - chest pt   - duoneb  - not on acei due to cr being >2    #?hcap  - ct chest r/o pna -> patient was on azithromycin and another medication unsure name 2 weeks ago w/ chronic cough productive; patient recently admit to  1 month prior  - start zosyn   - start vancomycin  - id consult   - cxr - pulmonary congestion w/ possible rml pna ? vs vascular thickening  - rvp pending  - check legionella ag, mycoplasma  - cultures - blood and sputum pending     #anxiety  - stable  - continue xanax bid prn    #neno on ckd   - cr today 2.17  - monitor cr  - nephro consult- Dr Skelton  - may be secondary to dehydration in setting of acute chf     #hypothyroidism  - stable   - continue levothyroxine     #afib   - monitor inr- currently subtherapuetic  - continue coumadin, betablocker    #post herpetic neuralgia  - stable  - continue home meds including gabapentin  - refresh tears    #bph  - stable   - continue alfuzosin     #dvt prophylaxis   - on coumadin for afib    IMPROVE VTE Individual Risk Assessment    RISK                                                                Points    [  ] Previous VTE                                                  3    [  ] Thrombophilia                                               2    [  ] Lower limb paralysis                                      2        (unable to hold up >15 seconds)      [  ] Current Cancer                                              2         (within 6 months)    [ x ] Immobilization > 24 hrs                                1    [  ] ICU/CCU stay > 24 hours                              1    [ x ] Age > 60                                                      1    IMPROVE VTE Score ____2_____ 89 year old male as above     #shortness of breath with acute on chronic congestive heart failure lvef 06/2018 55-60%  - admit to tele  - cardiac monitor  - bnp on admit as above   - trop x 1 negative   - cxr- ? rml pna vs vascular congestion w/ pulmonary congestion  - strict i and o  - lasix 40mg ivp  - continue bb and potassium  - dash diet with fluid restriction  - chest pt   - duoneb  - not on acei due to cr being >2    #?hcap  - ct chest r/o pna -> patient was on azithromycin and another medication unsure name 2 weeks ago w/ chronic cough productive; patient recently admit to  1 month prior  - start zosyn   - start vancomycin  - id consult   - cxr - pulmonary congestion w/ possible rml pna ? vs vascular thickening  - rvp pending  - check legionella ag, mycoplasma  - cultures - blood and sputum pending     #anxiety  - stable  - continue xanax bid prn    #neno on ckd   - cr today 2.17  - monitor cr  - nephro consult- Dr Skelton  - may be secondary to dehydration in setting of acute chf     #hypothyroidism  - stable   - continue levothyroxine     #afib   - monitor inr- currently subtherapuetic  - continue coumadin, betablocker    #post herpetic neuralgia  - stable  - continue home meds including gabapentin  - refresh tears  - advised daughter to bring in ointment as not in stock at     #bph  - stable   - continue alfuzosin - spoke to pharmacy resident will order who will order as non formulary    #dvt prophylaxis   - on coumadin for afib    IMPROVE VTE Individual Risk Assessment    RISK                                                                Points    [  ] Previous VTE                                                  3    [  ] Thrombophilia                                               2    [  ] Lower limb paralysis                                      2        (unable to hold up >15 seconds)      [  ] Current Cancer                                              2         (within 6 months)    [ x ] Immobilization > 24 hrs                                1    [  ] ICU/CCU stay > 24 hours                              1    [ x ] Age > 60                                                      1    IMPROVE VTE Score ____2_____ 89 year old male as above     #shortness of breath with acute on chronic congestive heart failure lvef 06/2018 55-60%  - admit to tele  - cardiac monitor  - bnp on admit as above   - trop x 1 negative   - cxr- ? rml pna vs vascular congestion w/ pulmonary congestion  - strict i and o  - lasix 40mg ivp  - continue bb and potassium  - dash diet with fluid restriction  - chest pt   - duoneb  - not on acei due to cr being >2    #?hcap  - ct chest r/o pna -> patient was on azithromycin and another medication unsure name 2 weeks ago w/ chronic cough productive; patient recently admit to  1 month prior  - start zosyn   - start vancomycin  - id consult   - cxr - pulmonary congestion w/ possible rml pna ? vs vascular thickening  - rvp pending  - check legionella ag, mycoplasma  - cultures - blood and sputum pending     #anxiety  - stable  - continue xanax bid prn    #neno on ckd   - cr today 2.17  - monitor cr  - nephro consult- Dr Skelton  - may be secondary to dehydration in setting of acute chf     #hypothyroidism  - stable   - continue levothyroxine     #afib   - monitor inr- currently subtherapuetic  - continue coumadin, betablocker    #post herpetic neuralgia  - stable  - continue home meds including gabapentin  - refresh tears  - advised daughter to bring in ointment as not in stock at     #bph  - stable   - continue alfuzosin - spoke to pharmacy resident will order who will order as non formulary    #dvt prophylaxis   - on coumadin for afib    IMPROVE VTE Individual Risk Assessment    RISK                                                                Points    [  ] Previous VTE                                                  3    [  ] Thrombophilia                                               2    [  ] Lower limb paralysis                                      2        (unable to hold up >15 seconds)      [ ? ] Current Cancer                                              2         (within 6 months)    [ x ] Immobilization > 24 hrs                                1    [  ] ICU/CCU stay > 24 hours                              1    [ x ] Age > 60                                                      1    IMPROVE VTE Score ____2-4_____

## 2018-10-22 NOTE — ED PROVIDER NOTE - PMH
A-fib    Anxiety    Basal cell carcinoma    BPH (benign prostatic hyperplasia)    Chronic diarrhea    CKD (chronic kidney disease)    Hypertension    Hypothyroid    Lymphoma    Postherpetic neuralgia    Prostate CA

## 2018-10-22 NOTE — H&P ADULT - NSHPSOCIALHISTORY_GEN_ALL_CORE
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     Former Smoker cigars quit >40 years prior     no illicit drug use

## 2018-10-22 NOTE — H&P ADULT - HISTORY OF PRESENT ILLNESS
89 year old male with pmh of anxiety, basal cell carcinoma, bph, ckd, htn, hypothyroid, lymphoma, postherpetic neuralgia, prostate ca coming to  ED with complaints of worsening shortness of breath x 3 days.  was seen in Dr Joshi office on Friday and was given lasix 40mg iv push however sob improved mildly. Labs were checked out patient and as patient sob not markedly improved and patient found to have neno on ckd was sent to hospital for further eval. Patient also stating has been having a decreased po intake for the past week.  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  was given azithromycin and another abx about two weeks ago for cough as had a suspected pna. States the abx he does not know name of gave him diarrhea however that resolved. patient was also admitted 1 month prior for chf exacerbation. Denies fevers, chills, chest pain. 89 year old male with pmh of anxiety, basal cell carcinoma, bph, ckd, htn, hypothyroid, lymphoma, postherpetic neuralgia, prostate ca coming to  ED with complaints of worsening shortness of breath x 3 days.    Pt is SOB with lying supine.   was seen in Dr Joshi office on Friday and was given lasix 40mg iv push however sob improved mildly.   Labs were checked out,   patient and as patient sob not markedly improved and patient found to have neno on ckd was sent to hospital for further eval. Patient also stating has been having a decreased po intake for the past week.  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    was given azithromycin and another abx about two weeks ago for cough as he had a suspected pna.   States the abx he does not know name of gave him diarrhea however that resolved.   Patient was also admitted 1 month prior for chf exacerbation. Denies fevers, chills, chest pain.

## 2018-10-22 NOTE — H&P ADULT - NSHPREVIEWOFSYSTEMS_GEN_ALL_CORE
Review of Systems:  CONSTITUTIONAL: No weakness, fevers or chills  EYES/ENT: No visual changes;  No vertigo or throat pain   NECK: No pain or stiffness  RESPIRATORY: + cough and shortness of breath  CARDIOVASCULAR: No chest pain or palpitations  GASTROINTESTINAL: No abdominal or epigastric pain. No nausea, vomiting, or hematemesis; No diarrhea or constipation.   GENITOURINARY: No dysuria, frequency or hematuria  NEUROLOGICAL: No numbness or weakness  SKIN: +edema b/l legs  All other review of systems is negative unless indicated above

## 2018-10-22 NOTE — ED ADULT NURSE NOTE - NSIMPLEMENTINTERV_GEN_ALL_ED
Implemented All Fall with Harm Risk Interventions:  [place] to call system. Call bell, personal items and telephone within reach. Instruct patient to call for assistance. Room bathroom lighting operational. Non-slip footwear when patient is off stretcher. Physically safe environment: no spills, clutter or unnecessary equipment. Stretcher in lowest position, wheels locked, appropriate side rails in place. Provide visual cue, wrist band, yellow gown, etc. Monitor gait and stability. Monitor for mental status changes and reorient to person, place, and time. Review medications for side effects contributing to fall risk. Reinforce activity limits and safety measures with patient and family. Provide visual clues: red socks.

## 2018-10-22 NOTE — ED PROVIDER NOTE - MEDICAL DECISION MAKING DETAILS
89 y/o m with PMHx of HTN, CHF on Coumadin, Lasix 40 QIID, BIB daughter per cardio MD referral regarding outpt treatment failure for CHF, pt sent for Tele admission. Plan for CXR, labs including BMP, serial troponin, EKG, monitor, observe, reevaluate. Expect Tele admission. 89 y/o m with PMHx of HTN, CHF on Coumadin, Lasix 40 QIID, BIB daughter per cardio MD referral regarding outpt treatment failure for CHF, pt sent for Tele admission. Pt hypoxic upon arrival, O2 sat of 91% in triage. Plan for CXR, labs including BMP, serial troponin, EKG, monitor, observe, reevaluate. Expect Tele admission.

## 2018-10-22 NOTE — H&P ADULT - FAMILY HISTORY
Father  Still living? No  Family history of ischemic heart disease, Age at diagnosis: Age Unknown     Mother  Still living? Unknown  Family history of ischemic heart disease, Age at diagnosis: Age Unknown Father  Still living? No  Family history of ischemic heart disease, Age at diagnosis: Age Unknown     Mother  Still living? No  Family history of ischemic heart disease, Age at diagnosis: Age Unknown

## 2018-10-22 NOTE — ED ADULT TRIAGE NOTE - CHIEF COMPLAINT QUOTE
c/o difficulty breathing for past week, pt has history of chf, c/o weakness, O2 sat in triage 91%, pulse 78 , c/o lower abdominal pain

## 2018-10-23 LAB
ANION GAP SERPL CALC-SCNC: 6 MMOL/L — SIGNIFICANT CHANGE UP (ref 5–17)
APTT BLD: 36.8 SEC — SIGNIFICANT CHANGE UP (ref 27.5–37.4)
BUN SERPL-MCNC: 32 MG/DL — HIGH (ref 7–23)
CALCIUM SERPL-MCNC: 9 MG/DL — SIGNIFICANT CHANGE UP (ref 8.5–10.1)
CHLORIDE SERPL-SCNC: 105 MMOL/L — SIGNIFICANT CHANGE UP (ref 96–108)
CO2 SERPL-SCNC: 36 MMOL/L — HIGH (ref 22–31)
CREAT SERPL-MCNC: 2.02 MG/DL — HIGH (ref 0.5–1.3)
GLUCOSE SERPL-MCNC: 98 MG/DL — SIGNIFICANT CHANGE UP (ref 70–99)
HCT VFR BLD CALC: 38.3 % — LOW (ref 39–50)
HGB BLD-MCNC: 11.8 G/DL — LOW (ref 13–17)
INR BLD: 2.46 RATIO — HIGH (ref 0.88–1.16)
MCHC RBC-ENTMCNC: 30.8 GM/DL — LOW (ref 32–36)
MCHC RBC-ENTMCNC: 31.8 PG — SIGNIFICANT CHANGE UP (ref 27–34)
MCV RBC AUTO: 103.2 FL — HIGH (ref 80–100)
NRBC # BLD: 0 /100 WBCS — SIGNIFICANT CHANGE UP (ref 0–0)
PLATELET # BLD AUTO: 114 K/UL — LOW (ref 150–400)
POTASSIUM SERPL-MCNC: 3.4 MMOL/L — LOW (ref 3.5–5.3)
POTASSIUM SERPL-SCNC: 3.4 MMOL/L — LOW (ref 3.5–5.3)
PROTHROM AB SERPL-ACNC: 27.1 SEC — HIGH (ref 9.8–12.7)
RBC # BLD: 3.71 M/UL — LOW (ref 4.2–5.8)
RBC # FLD: 15.5 % — HIGH (ref 10.3–14.5)
SODIUM SERPL-SCNC: 147 MMOL/L — HIGH (ref 135–145)
TROPONIN I SERPL-MCNC: 0.03 NG/ML — SIGNIFICANT CHANGE UP (ref 0.01–0.04)
WBC # BLD: 5.31 K/UL — SIGNIFICANT CHANGE UP (ref 3.8–10.5)
WBC # FLD AUTO: 5.31 K/UL — SIGNIFICANT CHANGE UP (ref 3.8–10.5)

## 2018-10-23 PROCEDURE — 93010 ELECTROCARDIOGRAM REPORT: CPT

## 2018-10-23 PROCEDURE — 71250 CT THORAX DX C-: CPT | Mod: 26

## 2018-10-23 RX ORDER — WARFARIN SODIUM 2.5 MG/1
2.5 TABLET ORAL ONCE
Qty: 0 | Refills: 0 | Status: COMPLETED | OUTPATIENT
Start: 2018-10-23 | End: 2018-10-23

## 2018-10-23 RX ORDER — CEFEPIME 1 G/1
INJECTION, POWDER, FOR SOLUTION INTRAMUSCULAR; INTRAVENOUS
Qty: 0 | Refills: 0 | Status: DISCONTINUED | OUTPATIENT
Start: 2018-10-23 | End: 2018-10-26

## 2018-10-23 RX ORDER — ISOSORBIDE DINITRATE 5 MG/1
5 TABLET ORAL THREE TIMES A DAY
Qty: 0 | Refills: 0 | Status: DISCONTINUED | OUTPATIENT
Start: 2018-10-23 | End: 2018-10-24

## 2018-10-23 RX ORDER — CEFEPIME 1 G/1
1000 INJECTION, POWDER, FOR SOLUTION INTRAMUSCULAR; INTRAVENOUS EVERY 12 HOURS
Qty: 0 | Refills: 0 | Status: DISCONTINUED | OUTPATIENT
Start: 2018-10-23 | End: 2018-10-26

## 2018-10-23 RX ORDER — CEFEPIME 1 G/1
INJECTION, POWDER, FOR SOLUTION INTRAMUSCULAR; INTRAVENOUS
Qty: 0 | Refills: 0 | Status: DISCONTINUED | OUTPATIENT
Start: 2018-10-23 | End: 2018-10-23

## 2018-10-23 RX ORDER — WARFARIN SODIUM 2.5 MG/1
5 TABLET ORAL ONCE
Qty: 0 | Refills: 0 | Status: COMPLETED | OUTPATIENT
Start: 2018-10-24 | End: 2018-10-24

## 2018-10-23 RX ORDER — CEFEPIME 1 G/1
1000 INJECTION, POWDER, FOR SOLUTION INTRAMUSCULAR; INTRAVENOUS ONCE
Qty: 0 | Refills: 0 | Status: COMPLETED | OUTPATIENT
Start: 2018-10-23 | End: 2018-10-23

## 2018-10-23 RX ADMIN — Medication 1 DROP(S): at 17:59

## 2018-10-23 RX ADMIN — ISOSORBIDE DINITRATE 5 MILLIGRAM(S): 5 TABLET ORAL at 21:04

## 2018-10-23 RX ADMIN — WARFARIN SODIUM 2.5 MILLIGRAM(S): 2.5 TABLET ORAL at 21:04

## 2018-10-23 RX ADMIN — GABAPENTIN 200 MILLIGRAM(S): 400 CAPSULE ORAL at 13:59

## 2018-10-23 RX ADMIN — ISOSORBIDE DINITRATE 5 MILLIGRAM(S): 5 TABLET ORAL at 13:59

## 2018-10-23 RX ADMIN — Medication 3 MILLILITER(S): at 07:52

## 2018-10-23 RX ADMIN — Medication 50 MILLIGRAM(S): at 17:59

## 2018-10-23 RX ADMIN — Medication 3 MILLILITER(S): at 01:58

## 2018-10-23 RX ADMIN — Medication 1 TABLET(S): at 11:36

## 2018-10-23 RX ADMIN — Medication 100 MILLIGRAM(S): at 07:00

## 2018-10-23 RX ADMIN — Medication 20 MILLIEQUIVALENT(S): at 11:36

## 2018-10-23 RX ADMIN — CEFEPIME 1000 MILLIGRAM(S): 1 INJECTION, POWDER, FOR SOLUTION INTRAMUSCULAR; INTRAVENOUS at 17:59

## 2018-10-23 RX ADMIN — GABAPENTIN 200 MILLIGRAM(S): 400 CAPSULE ORAL at 21:04

## 2018-10-23 RX ADMIN — GABAPENTIN 200 MILLIGRAM(S): 400 CAPSULE ORAL at 07:00

## 2018-10-23 RX ADMIN — Medication 40 MILLIGRAM(S): at 11:36

## 2018-10-23 RX ADMIN — Medication 50 MILLIGRAM(S): at 07:00

## 2018-10-23 RX ADMIN — Medication 0.25 MILLIGRAM(S): at 09:30

## 2018-10-23 RX ADMIN — Medication 3 MILLILITER(S): at 20:40

## 2018-10-23 RX ADMIN — Medication 3 MILLILITER(S): at 13:23

## 2018-10-23 RX ADMIN — ALFUZOSIN HYDROCHLORIDE 10 MILLIGRAM(S): 10 TABLET, EXTENDED RELEASE ORAL at 11:37

## 2018-10-23 RX ADMIN — CEFEPIME 1000 MILLIGRAM(S): 1 INJECTION, POWDER, FOR SOLUTION INTRAMUSCULAR; INTRAVENOUS at 11:36

## 2018-10-23 RX ADMIN — Medication 100 MICROGRAM(S): at 06:59

## 2018-10-23 NOTE — PROGRESS NOTE ADULT - SUBJECTIVE AND OBJECTIVE BOX
HOSPITALIST PROGRESS NOTE:  SUBJECTIVE:  PCP: PCP/Cardio- Dr Joshi  Chief Complaint: Patient is a 89y old  Male who presents with a chief complaint of CHF Exacerbation w/ possible hcap (23 Oct 2018 06:42)      HPI:  89 year old male with pmh of anxiety, basal cell carcinoma, bph, ckd, htn, hypothyroid, lymphoma, postherpetic neuralgia, prostate ca coming to  ED with complaints of worsening shortness of breath x 3 days.    Pt is SOB with lying supine.   was seen in Dr Joshi office on Friday and was given lasix 40mg iv push however sob improved mildly.   Labs were checked out,   patient and as patient sob not markedly improved and patient found to have neno on ckd was sent to hospital for further eval. Patient also stating has been having a decreased po intake for the past week.  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    was given azithromycin and another abx about two weeks ago for cough as he had a suspected pna.   States the abx he does not know name of gave him diarrhea however that resolved.   Patient was also admitted 1 month prior for chf exacerbation. Denies fevers, chills, chest pain. (22 Oct 2018 21:23)    10/23:  Above Reviewed.       Allergies:  No Known Allergies    REVIEW OF SYSTEMS:  See HPI. All other review of systems is negative unless indicated above.     OBJECTIVE  Physical Exam:  Vital Signs Last 24 Hrs  T(C): 37 (23 Oct 2018 04:39), Max: 37 (23 Oct 2018 04:39)  T(F): 98.6 (23 Oct 2018 04:39), Max: 98.6 (23 Oct 2018 04:39)  HR: 108 (23 Oct 2018 04:39) (80 - 108)  BP: 97/59 (23 Oct 2018 04:39) (97/59 - 144/92)  BP(mean): --  RR: 18 (23 Oct 2018 04:39) (18 - 26)  SpO2: 98% (23 Oct 2018 04:39) (96% - 100%)      Constitutional: NAD, awake and alert, well-developed  Neurological: AAO x 3, no focal deficits  HEENT: PERRLA, EOMI, MMM  Neck: Soft and supple, No LAD, No JVD  Respiratory: Breath sounds are clear bilaterally, No wheezing, or rhonchi +rales B/L  Cardiovascular: S1 and S2, +irregular rate and rhythm; no Murmurs, gallops or rubs  Gastrointestinal: Bowel Sounds present, soft, nontender, nondistended, no guarding, no rebound tenderness  Back: No CVA tenderness   Extremities: 2+ pitting edema  Vascular: 2+ peripheral pulses  Musculoskeletal: 5/5 strength b/l upper and lower extremities  Skin: No rashes  Breast: Deferred  Rectal: Deferred    MEDICATIONS  (STANDING):  ALBUTerol/ipratropium for Nebulization 3 milliLiter(s) Nebulizer every 6 hours  alfuzosin 10 milliGRAM(s) Oral daily  artificial  tears Solution 1 Drop(s) Both EYES two times a day  docusate sodium 100 milliGRAM(s) Oral three times a day  furosemide   Injectable 40 milliGRAM(s) IV Push daily  gabapentin 200 milliGRAM(s) Oral three times a day  isosorbide   dinitrate Tablet (ISORDIL) 5 milliGRAM(s) Oral three times a day  lactobacillus acidophilus 1 Tablet(s) Oral daily  levothyroxine 100 MICROGram(s) Oral daily  Loteprednol 1 Drop(s)   Left EYE two times a day  metoprolol succinate ER 50 milliGRAM(s) Oral two times a day  piperacillin/tazobactam IVPB. 3.375 Gram(s) IV Intermittent every 8 hours  potassium chloride    Tablet ER 20 milliEquivalent(s) Oral daily  vancomycin  IVPB 1000 milliGRAM(s) IV Intermittent every 12 hours      LABS: All Labs Reviewed:                        11.8   5.31  )-----------( 114      ( 23 Oct 2018 07:06 )             38.3     10-    147<H>  |  105  |  32<H>  ----------------------------<  98  3.4<L>   |  36<H>  |  2.02<H>    Ca    9.0      23 Oct 2018 07:06    TPro  6.1  /  Alb  3.4  /  TBili  0.8  /  DBili  x   /  AST  22  /  ALT  20  /  AlkPhos  68  10-22    PT/INR - ( 23 Oct 2018 07:06 )   PT: 27.1 sec;   INR: 2.46 ratio         PTT - ( 23 Oct 2018 07:06 )  PTT:36.8 sec  CARDIAC MARKERS ( 23 Oct 2018 00:21 )  .030 ng/mL / x     / x     / x     / x      CARDIAC MARKERS ( 22 Oct 2018 21:45 )  0.032 ng/mL / x     / x     / x     / x      CARDIAC MARKERS ( 22 Oct 2018 17:32 )  0.025 ng/mL / x     / x     / x     / x            Urinalysis Basic - ( 22 Oct 2018 19:20 )    Color: Yellow / Appearance: Clear / S.015 / pH: x  Gluc: x / Ketone: Negative  / Bili: Negative / Urobili: Negative mg/dL   Blood: x / Protein: 15 mg/dL / Nitrite: Negative   Leuk Esterase: Negative / RBC: 6-10 /HPF / WBC 0-2   Sq Epi: x / Non Sq Epi: Occasional / Bacteria: Occasional      RADIOLOGY/EKG: HOSPITALIST PROGRESS NOTE:  SUBJECTIVE:  PCP: PCP/Cardio- Dr Joshi  Chief Complaint: Patient is a 89y old  Male who presents with a chief complaint of CHF Exacerbation w/ possible hcap (23 Oct 2018 06:42)    HPI:  89 year old male with pmh of anxiety, basal cell carcinoma, bph, ckd, htn, hypothyroid, lymphoma, postherpetic neuralgia, prostate ca coming to  ED with complaints of worsening shortness of breath x 3 days.    Pt is SOB with lying supine.   was seen in Dr Joshi office on Friday and was given lasix 40mg iv push however sob improved mildly.   Labs were checked out,   patient and as patient sob not markedly improved and patient found to have neno on ckd was sent to hospital for further eval. Patient also stating has been having a decreased po intake for the past week.  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    was given azithromycin and another abx about two weeks ago for cough as he had a suspected pna.   States the abx he does not know name of gave him diarrhea however that resolved.   Patient was also admitted 1 month prior for chf exacerbation. Denies fevers, chills, chest pain. (22 Oct 2018 21:23)    10/23:  Above Reviewed. dyspnea improving. No other complaints    Allergies:  No Known Allergies    REVIEW OF SYSTEMS:  See HPI. All other review of systems is negative unless indicated above.     OBJECTIVE  Physical Exam:  Vital Signs Last 24 Hrs  T(C): 37.2 (23 Oct 2018 10:47), Max: 37.2 (23 Oct 2018 10:47)  T(F): 98.9 (23 Oct 2018 10:47), Max: 98.9 (23 Oct 2018 10:47)  HR: 108 (23 Oct 2018 14:04) (73 - 108)  BP: 109/67 (23 Oct 2018 14:04) (97/59 - 144/92)  BP(mean): --  RR: 18 (23 Oct 2018 10:47) (18 - 26)  SpO2: 100% (23 Oct 2018 10:47) (96% - 100%)      Constitutional: NAD, awake and alert, well-developed  Neurological: AAO x 3, no focal deficits  HEENT: PERRLA, EOMI, MMM  Neck: Soft and supple, No LAD, No JVD  Respiratory: Breath sounds are clear bilaterally, No wheezing, or rhonchi +rales B/L  Cardiovascular: S1 and S2, +irregular rate and rhythm; no Murmurs, gallops or rubs  Gastrointestinal: Bowel Sounds present, soft, nontender, nondistended, no guarding, no rebound tenderness  Back: No CVA tenderness   Extremities: 2+ pitting edema  Vascular: 2+ peripheral pulses  Musculoskeletal: 5/5 strength b/l upper and lower extremities  Skin: No rashes  Breast: Deferred  Rectal: Deferred    MEDICATIONS  (STANDING):  ALBUTerol/ipratropium for Nebulization 3 milliLiter(s) Nebulizer every 6 hours  alfuzosin 10 milliGRAM(s) Oral daily  artificial  tears Solution 1 Drop(s) Both EYES two times a day  docusate sodium 100 milliGRAM(s) Oral three times a day  furosemide   Injectable 40 milliGRAM(s) IV Push daily  gabapentin 200 milliGRAM(s) Oral three times a day  isosorbide   dinitrate Tablet (ISORDIL) 5 milliGRAM(s) Oral three times a day  lactobacillus acidophilus 1 Tablet(s) Oral daily  levothyroxine 100 MICROGram(s) Oral daily  Loteprednol 1 Drop(s)   Left EYE two times a day  metoprolol succinate ER 50 milliGRAM(s) Oral two times a day  piperacillin/tazobactam IVPB. 3.375 Gram(s) IV Intermittent every 8 hours  potassium chloride    Tablet ER 20 milliEquivalent(s) Oral daily  vancomycin  IVPB 1000 milliGRAM(s) IV Intermittent every 12 hours      LABS: All Labs Reviewed:                        11.8   5.31  )-----------( 114      ( 23 Oct 2018 07:06 )             38.3     10-    147<H>  |  105  |  32<H>  ----------------------------<  98  3.4<L>   |  36<H>  |  2.02<H>    Ca    9.0      23 Oct 2018 07:06    TPro  6.1  /  Alb  3.4  /  TBili  0.8  /  DBili  x   /  AST  22  /  ALT  20  /  AlkPhos  68  10-22    PT/INR - ( 23 Oct 2018 07:06 )   PT: 27.1 sec;   INR: 2.46 ratio         PTT - ( 23 Oct 2018 07:06 )  PTT:36.8 sec  CARDIAC MARKERS ( 23 Oct 2018 00:21 )  .030 ng/mL / x     / x     / x     / x      CARDIAC MARKERS ( 22 Oct 2018 21:45 )  0.032 ng/mL / x     / x     / x     / x      CARDIAC MARKERS ( 22 Oct 2018 17:32 )  0.025 ng/mL / x     / x     / x     / x            Urinalysis Basic - ( 22 Oct 2018 19:20 )    Color: Yellow / Appearance: Clear / S.015 / pH: x  Gluc: x / Ketone: Negative  / Bili: Negative / Urobili: Negative mg/dL   Blood: x / Protein: 15 mg/dL / Nitrite: Negative   Leuk Esterase: Negative / RBC: 6-10 /HPF / WBC 0-2   Sq Epi: x / Non Sq Epi: Occasional / Bacteria: Occasional      RADIOLOGY/EKG:    < from: Xray Chest 1 View AP/PA. (10.22.18 @ 18:32) >    IMPRESSION:  Findings are likely indicative of mild pneumonia involving the right lung   for which clinical correlation is recommended.    < end of copied text >      < from: CT Chest No Cont (10.23.18 @ 08:44) >    IMPRESSION:  The findings are suggestive of multifocal pneumonia in the right lung,   for which clinical correlation is recommended. Interval resolution of the   left pleural effusion.  Extensive lymphadenopathies in the chest and upper abdomen are again,   noted unchanged.  Other findings as above.    < end of copied text >

## 2018-10-23 NOTE — PROVIDER CONTACT NOTE (OTHER) - SITUATION
Consult left with Dr. Joshi's answering service.
Consult left with Dr. Skelton's answering service.
consult called and left with answering service

## 2018-10-23 NOTE — CONSULT NOTE ADULT - SUBJECTIVE AND OBJECTIVE BOX
CHIEF COMPLAINT: Patient is a 89y old  Male who presents with a chief complaint of CHF Exacerbation w/ possible hcap (22 Oct 2018 21:23)      HPI:  89 year old male with pmh of anxiety, basal cell carcinoma, bph, ckd, htn, hypothyroid, lymphoma, postherpetic neuralgia, prostate ca coming to  ED with complaints of worsening shortness of breath x 3 days.    Pt is SOB with lying supine.   was seen in Dr Joshi office on Friday and was given lasix 40mg iv push however sob improved mildly.   Labs were checked out,   patient and as patient sob not markedly improved and patient found to have neno on ckd was sent to hospital for further eval. Patient also stating has been having a decreased po intake for the past week.  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    was given azithromycin and another abx about two weeks ago for cough as he had a suspected pna.   States the abx he does not know name of gave him diarrhea however that resolved.   Patient was also admitted 1 month prior for chf exacerbation. Denies fevers, chills, chest pain. (22 Oct 2018 21:23)      PMHx: PAST MEDICAL & SURGICAL HISTORY:  CKD (chronic kidney disease)  BPH (benign prostatic hyperplasia)  Postherpetic neuralgia  Chronic diarrhea  Prostate CA  Basal cell carcinoma  Lymphoma  Anxiety  Hypothyroid  Hypertension  A-fib  H/O shoulder replacement  S/P bilateral unicompartmental knee replacement        Soc Hx:       Allergies: Allergies    No Known Allergies    Intolerances          REVIEW OF SYSTEMS:    CONSTITUTIONAL: No weakness, fevers or chills  EYES/ENT: No visual changes;  No vertigo or throat pain   NECK: No pain or stiffness  RESPIRATORY: No cough, wheezing, hemoptysis; No shortness of breath  CARDIOVASCULAR: No chest pain or palpitations  GASTROINTESTINAL: No abdominal or epigastric pain. No nausea, vomiting, or hematemesis; No diarrhea or constipation. No melena or hematochezia.  GENITOURINARY: No dysuria, frequency or hematuria  NEUROLOGICAL: No numbness or weakness  SKIN: No itching, burning, rashes, or lesions   All other review of systems is negative unless indicated above    Vital Signs Last 24 Hrs  T(C): 37 (23 Oct 2018 04:39), Max: 37 (23 Oct 2018 04:39)  T(F): 98.6 (23 Oct 2018 04:39), Max: 98.6 (23 Oct 2018 04:39)  HR: 108 (23 Oct 2018 04:39) (80 - 108)  BP: 97/59 (23 Oct 2018 04:39) (97/59 - 144/92)  BP(mean): --  RR: 18 (23 Oct 2018 04:39) (18 - 26)  SpO2: 98% (23 Oct 2018 04:39) (96% - 100%)    I&O's Summary          PHYSICAL EXAM:   Constitutional: NAD, awake and alert, well-developed  HEENT: PERR, EOMI, Normal Hearing, MMM  Neck: Soft and supple, No LAD, No JVD  Respiratory: Breath sounds are clear bilaterally, No wheezing, rales or rhonchi  Cardiovascular: S1 and S2, regular rate and rhythm, no Murmurs, gallops or rubs  Gastrointestinal: Bowel Sounds present, soft, nontender, nondistended, no guarding, no rebound  Extremities: No peripheral edema  Vascular: 2+ peripheral pulses  Neurological: A/O x 3, no focal deficits  Musculoskeletal: 5/5 strength b/l upper and lower extremities  Skin: No rashes    MEDICATIONS:  MEDICATIONS  (STANDING):  ALBUTerol/ipratropium for Nebulization 3 milliLiter(s) Nebulizer every 6 hours  alfuzosin 10 milliGRAM(s) Oral daily  artificial  tears Solution 1 Drop(s) Both EYES two times a day  docusate sodium 100 milliGRAM(s) Oral three times a day  furosemide   Injectable 40 milliGRAM(s) IV Push daily  gabapentin 200 milliGRAM(s) Oral three times a day  lactobacillus acidophilus 1 Tablet(s) Oral daily  levothyroxine 100 MICROGram(s) Oral daily  Loteprednol 1 Drop(s)   Left EYE two times a day  metoprolol succinate ER 50 milliGRAM(s) Oral two times a day  piperacillin/tazobactam IVPB. 3.375 Gram(s) IV Intermittent every 8 hours  potassium chloride    Tablet ER 20 milliEquivalent(s) Oral daily  vancomycin  IVPB 1000 milliGRAM(s) IV Intermittent every 12 hours      LABS: All Labs Reviewed:                        12.9   5.72  )-----------( 117      ( 22 Oct 2018 17:32 )             40.9     10-22    144  |  105  |  34<H>  ----------------------------<  124<H>  3.7   |  32<H>  |  2.17<H>    Ca    9.1      22 Oct 2018 17:32    TPro  6.1  /  Alb  3.4  /  TBili  0.8  /  DBili  x   /  AST  22  /  ALT  20  /  AlkPhos  68  10-22    PT/INR - ( 22 Oct 2018 17:32 )   PT: 20.0 sec;   INR: 1.83 ratio         PTT - ( 22 Oct 2018 17:32 )  PTT:34.1 sec  CARDIAC MARKERS ( 23 Oct 2018 00:21 )  .030 ng/mL / x     / x     / x     / x      CARDIAC MARKERS ( 22 Oct 2018 21:45 )  0.032 ng/mL / x     / x     / x     / x      CARDIAC MARKERS ( 22 Oct 2018 17:32 )  0.025 ng/mL / x     / x     / x     / x          Serum Pro-Brain Natriuretic Peptide: [id number] pg/mL (10-22 @ 17:32)      EKG: AFlutter with varible block, RBBB, LAFB    Telemetry: AFlutter with rate rapid rates     ECHO:< from: Transthoracic Echocardiogram (06.26.18 @ 10:03) >  mmary     Moderate (2+) mitral regurgitation is present.   Mild mitral annular calcification is present.   Significant fibrocalcific changes noted to the aortic valve leaflets with   restriction in leaflet excursion. Peak transaortic gradient is 45 mmHg;   this finding is consistent with mild to moderate aortic stenosis.   Trace aortic regurgitation is present.   Mild to moderate tricuspid valve regurgitation is present.   Normal appearing pulmonic valve structure and function.   The left atrium is mildly dilated.   The left ventricle is normal in size, wall motion and contractility.   Mild concentric left ventricular hypertrophy is present.   Estimated left ventricular ejection fraction is 55-60 %.   A PFO is noted with color doppler.   Normal appearing right ventricle structure and function.   All visualized extra cardiac structures appears to be normal.   No evidence of pericardial effusion.     Signature     ----------------------------------------------------------------   Electronically signed by Sina Trent MD(Interpreting   physician) on 06/27/2018 07:41 AM   ----------------------------------------------------------------    < end of copied text >

## 2018-10-23 NOTE — CONSULT NOTE ADULT - ASSESSMENT
89 year old male with pmh of anxiety, basal cell carcinoma, bph, ckd, htn, hypothyroid, lymphoma, postherpetic neuralgia, prostate ca coming to  ED with complaints of worsening shortness of breath x 3 days. Pt is SOB with lying supine.   was seen in Dr Joshi office on 10/19 and was given lasix 40mg iv push however sob improved mildly.   Labs were checked out,   patient and as patient sob not markedly improved and patient found to have neno on ckd was sent to hospital for further eval. Patient also stating has been having a decreased po intake for the past week.  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   was given azithromycin and another abx about two weeks ago for cough as he had a suspected pna. States the abx he does not know name of gave him diarrhea however that resolved.   Patient was also admitted 1 month prior for chf exacerbation. Denies fevers, chills, chest pain. Here noted to have R multifocal pna, LN in chest/abd, elevated bnp was given lasix/abx for chf/pna    1. hcap/chf/neno on ckd/immunocompromised host  - CT chest reviewed  - s/p vanco/zosyn  - dc both and start cefepime 1mg12h for gnr resistant coverage   - will avoid zosyn d/t salt load and hx chf  - f/u cultures  - diuresis for chf  - monitor temps  - tolerating abx well so far; no side effects noted  - reason for abx use and side effects reviewed with patient  - supportive care  - f/u cbc    2. other issues - care per medicine
89 M with shortness of breath - multifactorial    Diastolic failure- mild. Legs are without edema and mild crackles on bases- recommend low dose lasix with close monitering of renal function and electrolytes- if Creatine continues to rise, would hold lasix for a day . will add low dose nitrate to reduce preload    Pulm- PNA- on abx, recommend neblizer treatment, mucolytic and consideration for steroids      Aflutter- metoprolol for rate control, continue with coumadin dosing goal INR 2-2.5     No evidnece for ACS
89 yo man with multiple medical problems including CKD admitted with multifocal PNA.     LETICIA superimposed on CKD.  CT with no evidence of CHF.  Left Pleural effusion resolved.    Follow Right effusion with multifocal PNA.  Will hold diuretics for 2-3 days and allow stabilization of CKD while PNA treated with abtx.

## 2018-10-23 NOTE — PROGRESS NOTE ADULT - ASSESSMENT
89 year old male as above     #shortness of breath with acute on chronic congestive heart failure lvef 06/2018 55-60%  - continue tele  - bnp elevated >19k  - trop x 1 negative   - cxr- ? rml pna vs vascular congestion w/ pulmonary congestion  - strict i and o, Daily weight  - lasix 40mg IV QD  - continue bb and potassium  - not on acei due to cr being >2    #?hcap  - cxr - pulmonary congestion w/ possible rml pna ? vs vascular thickening  - ct chest r/o pna -> patient was on azithromycin and another medication unsure name 2 weeks ago w/ chronic cough productive; patient recently admit to  1 month prior  - RVP_ neg  - S/P vanco continue zosyn   - id consult   - check legionella ag, mycoplasma  - cultures - blood and sputum pending     #neno on ckd   - monitor cr cloely with lasix  - nephro consult- Dr Skelton    #afib with RVR  - monitor inr- currently therapuetic  - continue coumadin, betablocker    #anxiety- stable  - continue xanax bid prn    #hypothyroidism- stable   - continue levothyroxine     #post herpetic neuralgia- stable  - continue home meds including gabapentin  - refresh tears  - advised daughter to bring in ointment as not in stock at     #bph- stable   - continue alfuzosin - spoke to pharmacy resident will order who will order as non formulary    #dvt prophylaxis   - on coumadin for afib 89 year old male as above     #Dyspnea  #Multifocal PNA (HCAP)  #acute on chronic congestive heart failure - lvef 06/2018 55-60%  - continue tele  - bnp elevated >19k  - trop x 3 negative   - CT chest Multifocal PNA  - RVP Neg  - S/P lasix 40mg IV QD - hold diuretics for 2-3 days and allow stabilization of CKD  - S/P Vanco and Cefepime  - continue cefepime 1mg12h suspected gram negative, gram positive and other resistant organisms  - not on acei due to cr being >2  - ID consult appreciated  - Pulm Consult   - FU legionella ag and mycoplasma  - cultures - blood and sputum pending     #neno on ckd   - monitor cr closely   - nephro consult appreciated - Dr Skelton  - hold diuretics for 2-3 days and allow stabilization of CKD    #afib with RVR  - monitor inr- currently therapuetic  - continue coumadin, betablocker    #anxiety- stable  - continue xanax bid prn    #hypothyroidism- stable   - continue levothyroxine     #post herpetic neuralgia- stable  - continue home meds including gabapentin  - refresh tears  - advised daughter to bring in ointment as not in stock at     #bph- stable   - continue alfuzosin - spoke to pharmacy resident will order who will order as non formulary    #dvt prophylaxis   - on coumadin for afib

## 2018-10-23 NOTE — CONSULT NOTE ADULT - REASON FOR ADMISSION
CHF Exacerbation w/ possible hcap

## 2018-10-23 NOTE — CONSULT NOTE ADULT - SUBJECTIVE AND OBJECTIVE BOX
Chief complaints.  Presented with several day hx of increasing SOB.    HPI:  89 yo man with PMHX significant for CHF, HTN, Hx of Lymphoma treated with Rituxan.  Notable for  admission in 2018 for CHF and LETICIA.   Creat stabilized to 1.7 prior to d/c.  Developed sob for several days.  Given IV lasix by Dr. Joshiwith mild improvement in symptoms.  Pt admitted due to worsened renal function with persistent SOB.  CT chest now c/w multifocal PNA.  Renal evaluation requested for LETICIA superimposed on CKD.    PMHX and PSHX.  1.CKD ( 2015--Creat 1.63), Post recent hosp for CHF.  Creat 1.7 on d/c 2018.  2.A FIB  3.HTN  4.Hx of Prostate CA ( Post RT)  5.Hx of Lymphoma Post Rituxan therapy.  6.Hx of Postherpetic Shingles.  7.Hypothyroidism  8.Hx of Chronic Diarrhea.  9.Recent admission for CHF.    FAMILY HISTORY:  Family history of ischemic heart disease (Father, Mother)      SOCIAL HISTORY :  Former smoker. No ETOH.  Allergies    No Known Allergies    Home Medications:   * Patient Currently Takes Medications as of 22-Oct-2018 21:11 documented in Structured Notes  · 	Lotemax 0.5% ophthalmic ointment: 1 application in the left eye 2 times a day, Last Dose Taken:    · 	furosemide 40 mg oral tablet: 1 tab(s) orally once a day, Last Dose Taken:    · 	ALPRAZolam 0.25 mg oral tablet: 1 tab(s) orally 2 times a day, As Needed, Last Dose Taken:    · 	gabapentin 100 mg oral capsule: 2 cap(s) orally 3 times a day, Last Dose Taken:    · 	Refresh ophthalmic solution: 1 drop(s) to each affected eye 2 times a day, As Needed, Last Dose Taken:    · 	alfuzosin 10 mg oral tablet, extended release: 1 tab(s) orally once a day, Last Dose Taken:    · 	warfarin 5 mg oral tablet: 1 tab(s) orally once a day (at bedtime), Last Dose Taken:    · 	potassium chloride 20 mEq oral tablet, extended release: 1 tab(s) orally once a day, Last Dose Taken:    · 	Toprol-XL 50 mg oral tablet, extended release: 1 tab(s) orally 2 times a day, Last Dose Taken:    · 	levothyroxine 100 mcg (0.1 mg) oral tablet: 1 tab(s) orally once a day, Last Dose Taken:    · 	Efudex 5% topical cream: Apply topically to affected area 2 times a day  	*Pt stopped using. Was previously using for crusty scalp, Last Dose Taken:          MEDICATIONS  (STANDING):  ALBUTerol/ipratropium for Nebulization 3 milliLiter(s) Nebulizer every 6 hours  alfuzosin 10 milliGRAM(s) Oral daily  artificial  tears Solution 1 Drop(s) Both EYES two times a day  cefepime  Injectable. 1000 milliGRAM(s) IV Push every 12 hours  cefepime  Injectable.      docusate sodium 100 milliGRAM(s) Oral three times a day  furosemide   Injectable 40 milliGRAM(s) IV Push daily  gabapentin 200 milliGRAM(s) Oral three times a day  isosorbide   dinitrate Tablet (ISORDIL) 5 milliGRAM(s) Oral three times a day  lactobacillus acidophilus 1 Tablet(s) Oral daily  levothyroxine 100 MICROGram(s) Oral daily  Loteprednol 1 Drop(s)   Left EYE two times a day  metoprolol succinate ER 50 milliGRAM(s) Oral two times a day  potassium chloride    Tablet ER 20 milliEquivalent(s) Oral daily  warfarin 2.5 milliGRAM(s) Oral once    MEDICATIONS  (PRN):  acetaminophen   Tablet .. 650 milliGRAM(s) Oral every 6 hours PRN Temp greater or equal to 38C (100.4F), Mild Pain (1 - 3), Moderate Pain (4 - 6)  ALPRAZolam 0.25 milliGRAM(s) Oral two times a day PRN anxiety  senna 2 Tablet(s) Oral at bedtime PRN Constipation         Vital Signs Last 24 Hrs  T(C): 37.2 (23 Oct 2018 10:47), Max: 37.2 (23 Oct 2018 10:47)  T(F): 98.9 (23 Oct 2018 10:47), Max: 98.9 (23 Oct 2018 10:47)  HR: 84 (23 Oct 2018 13:39) (73 - 108)  BP: 116/- (23 Oct 2018 10:47) (97/59 - 144/92)  BP(mean): --  RR: 18 (23 Oct 2018 10:47) (18 - 26)  SpO2: 100% (23 Oct 2018 10:47) (96% - 100%)  Daily Height in cm: 165.1 (22 Oct 2018 17:03)    Daily Weight in k.4 (23 Oct 2018 09:07)  I&O's Summary      PHYSICAL EXAM:  GEN:   HEENT:   NECK   CV:   LUNGS:   ABD:   EXT:     LABS:                        11.8   5.31  )-----------( 114      ( 23 Oct 2018 07:06 )             38.3     10-    147<H>  |  105  |  32<H>  ----------------------------<  98  3.4<L>   |  36<H>  |  2.02<H>    Ca    9.0      23 Oct 2018 07:06    TPro  6.1  /  Alb  3.4  /  TBili  0.8  /  DBili  x   /  AST  22  /  ALT  20  /  AlkPhos  68  10-22    PT/INR - ( 23 Oct 2018 07:06 )   PT: 27.1 sec;   INR: 2.46 ratio         PTT - ( 23 Oct 2018 07:06 )  PTT:36.8 sec  Urinalysis Basic - ( 22 Oct 2018 19:20 )    Color: Yellow / Appearance: Clear / S.015 / pH: x  Gluc: x / Ketone: Negative  / Bili: Negative / Urobili: Negative mg/dL   Blood: x / Protein: 15 mg/dL / Nitrite: Negative   Leuk Esterase: Negative / RBC: 6-10 /HPF / WBC 0-2   Sq Epi: x / Non Sq Epi: Occasional / Bacteria: Occasional Chief complaints.  Presented with several day hx of increasing SOB.    HPI:  89 yo man with PMHX significant for CHF, HTN, Hx of Lymphoma treated with Rituxan.  Notable for HH admission in 2018 for CHF and LETICIA.   Creat stabilized to 1.7 prior to d/c.  Developed sob for several days.  Given IV lasix by Dr. Joshiwith mild improvement in symptoms.  Pt admitted due to worsened renal function with persistent SOB.  CT chest now c/w multifocal PNA.  Renal evaluation requested for LETICIA superimposed on CKD.  Pt denies any fever at home.    PMHX and PSHX.  1.CKD ( 2015--Creat 1.63), Post recent hosp for CHF.  Creat 1.7 on d/c 2018.  2.A FIB  3.HTN  4.Hx of Prostate CA ( Post RT)  5.Hx of Lymphoma Post Rituxan therapy.  6.Hx of Postherpetic Shingles.  7.Hypothyroidism  8.Hx of Chronic Diarrhea.  9.Recent admission for CHF.    FAMILY HISTORY:  Family history of ischemic heart disease (Father, Mother)      SOCIAL HISTORY :  Former smoker. No ETOH.  lives alone at home.  Allergies    No Known Allergies    Review of Systems:  Positive SOB.  Improved today.  No fever.  Denies chest discomfort  Denies dysuria.  No noticeable change in urination.    Home Medications:   * Patient Currently Takes Medications as of 22-Oct-2018 21:11 documented in Structured Notes  · 	Lotemax 0.5% ophthalmic ointment: 1 application in the left eye 2 times a day, Last Dose Taken:    · 	furosemide 40 mg oral tablet: 1 tab(s) orally once a day, Last Dose Taken:    · 	ALPRAZolam 0.25 mg oral tablet: 1 tab(s) orally 2 times a day, As Needed, Last Dose Taken:    · 	gabapentin 100 mg oral capsule: 2 cap(s) orally 3 times a day, Last Dose Taken:    · 	Refresh ophthalmic solution: 1 drop(s) to each affected eye 2 times a day, As Needed, Last Dose Taken:    · 	alfuzosin 10 mg oral tablet, extended release: 1 tab(s) orally once a day, Last Dose Taken:    · 	warfarin 5 mg oral tablet: 1 tab(s) orally once a day (at bedtime), Last Dose Taken:    · 	potassium chloride 20 mEq oral tablet, extended release: 1 tab(s) orally once a day, Last Dose Taken:    · 	Toprol-XL 50 mg oral tablet, extended release: 1 tab(s) orally 2 times a day, Last Dose Taken:    · 	levothyroxine 100 mcg (0.1 mg) oral tablet: 1 tab(s) orally once a day, Last Dose Taken:    · 	Efudex 5% topical cream: Apply topically to affected area 2 times a day  	*Pt stopped using. Was previously using for crusty scalp, Last Dose Taken:          MEDICATIONS  (STANDING):  ALBUTerol/ipratropium for Nebulization 3 milliLiter(s) Nebulizer every 6 hours  alfuzosin 10 milliGRAM(s) Oral daily  artificial  tears Solution 1 Drop(s) Both EYES two times a day  cefepime  Injectable. 1000 milliGRAM(s) IV Push every 12 hours  cefepime  Injectable.      docusate sodium 100 milliGRAM(s) Oral three times a day  furosemide   Injectable 40 milliGRAM(s) IV Push daily  gabapentin 200 milliGRAM(s) Oral three times a day  isosorbide   dinitrate Tablet (ISORDIL) 5 milliGRAM(s) Oral three times a day  lactobacillus acidophilus 1 Tablet(s) Oral daily  levothyroxine 100 MICROGram(s) Oral daily  Loteprednol 1 Drop(s)   Left EYE two times a day  metoprolol succinate ER 50 milliGRAM(s) Oral two times a day  potassium chloride    Tablet ER 20 milliEquivalent(s) Oral daily  warfarin 2.5 milliGRAM(s) Oral once    MEDICATIONS  (PRN):  acetaminophen   Tablet .. 650 milliGRAM(s) Oral every 6 hours PRN Temp greater or equal to 38C (100.4F), Mild Pain (1 - 3), Moderate Pain (4 - 6)  ALPRAZolam 0.25 milliGRAM(s) Oral two times a day PRN anxiety  senna 2 Tablet(s) Oral at bedtime PRN Constipation         Vital Signs Last 24 Hrs  T(C): 37.2 (23 Oct 2018 10:47), Max: 37.2 (23 Oct 2018 10:47)  T(F): 98.9 (23 Oct 2018 10:47), Max: 98.9 (23 Oct 2018 10:47)  HR: 84 (23 Oct 2018 13:39) (73 - 108)  BP: 116/- (23 Oct 2018 10:47) (97/59 - 144/92)  BP(mean): --  RR: 18 (23 Oct 2018 10:47) (18 - 26)  SpO2: 100% (23 Oct 2018 10:47) (96% - 100%)  Daily Height in cm: 165.1 (22 Oct 2018 17:03)    Daily Weight in k.4 (23 Oct 2018 09:07)  I&O's Summary      PHYSICAL EXAM:  Alert and appropriate  GEN: no distress  HEENT: WNL  NECK : supple  CV: S1S2 RRR  LUNGS: Bilateral rhonchi  ABD: soft  EXT: no edema    LABS:                        11.8   5.31  )-----------( 114      ( 23 Oct 2018 07:06 )             38.3     10    147<H>  |  105  |  32<H>  ----------------------------<  98  3.4<L>   |  36<H>  |  2.02<H>    Ca    9.0      23 Oct 2018 07:06    TPro  6.1  /  Alb  3.4  /  TBili  0.8  /  DBili  x   /  AST  22  /  ALT  20  /  AlkPhos  68  10-22    PT/INR - ( 23 Oct 2018 07:06 )   PT: 27.1 sec;   INR: 2.46 ratio         PTT - ( 23 Oct 2018 07:06 )  PTT:36.8 sec  Urinalysis Basic - ( 22 Oct 2018 19:20 )    Color: Yellow / Appearance: Clear / S.015 / pH: x  Gluc: x / Ketone: Negative  / Bili: Negative / Urobili: Negative mg/dL   Blood: x / Protein: 15 mg/dL / Nitrite: Negative   Leuk Esterase: Negative / RBC: 6-10 /HPF / WBC 0-2   Sq Epi: x / Non Sq Epi: Occasional / Bacteria: Occasional          < from: CT Chest No Cont (10.23.18 @ 08:44) >  EXAM:  CT CHEST                            *** ADDENDUM 10/23/2018  ***    Status post replacement of the proximal right humerus is again noted.      *** END OF ADDENDUM 10/23/2018  ***      PROCEDURE DATE:  10/23/2018          INTERPRETATION:  Chest CT without contrast dated 10/23/2018.    COMPARISON: 2018.    CLINICAL INFORMATION: Evaluate for pneumonia, history of lymphoma.    TECHNIQUE: Contiguous axial 2.5 mm slice thickness images of the chest   were obtained without intravenous contrast administration.    FINDINGS:    The airway shows normal caliber and contour with patent lumen.    The right pleural effusion is unchanged, but there has been resolution of   the left pleural effusion. Interval development of fine nodular   infiltrates in the right upper lobe. Some infiltrates in the postero   lateral right lower lobe. Findings are suggestive of multifocal pneumonia   for which clinical correlation is recommended. Also noted are some   atelectatic changes in the lateral segment of the right middle lobe. The   left lower lobe is better expanded at this time.    Again noted are extensive bilateral axillary, pretracheal precarinal and   aorticopulmonary window and left posterior mediastinal lymphadenopathies,   consistent with patient's history of lymphoma, unchanged.    The mediastinum great vessels , atherosclerotic changes in the thoracic   aorta are again noted as well as calcification in the aortic valve.,he   heart is normal. There is no pericardial effusion.    Cardiomegaly is unchanged. No evidence of a pericardial effusion.      Limited evaluation of the upper abdomen, multiple liver lesions are again   noted as well as large left upper retroperitoneal lymphadenopathies.   Stones in the gallbladder are again noted   No evidence of acute cholecystitis. Multiple lesions in the left kidney   are again noted cannot be evaluated due to the noncontrast technique.    The bones , hypertrophic degenerative changes in the thoracic and upper   lumbar spine are againnoted.     IMPRESSION:  The findings are suggestive of multifocal pneumonia in the right lung,   for which clinical correlation is recommended. Interval resolution of the   left pleural effusion.  Extensive lymphadenopathies in the chest and upper abdomen are again,   noted unchanged.  Other findings as above.      ***Please see the addendum at the top of this report. It may contain   additional important information or changes.****          KIM DE LA CRUZ M.D., ATTENDING RADIOLOGIST  This document has been electronically signed. Oct 23 2018  9:18AM  Addend:  KIM DE LA CRUZ M.D., ATTENDING RADIOLOGIST  This addendum was electronically signed on: Oct 23 2018  9:25AM.          < end of copied text >

## 2018-10-23 NOTE — CONSULT NOTE ADULT - SUBJECTIVE AND OBJECTIVE BOX
Patient is a 89y old  Male who presents with a chief complaint of CHF Exacerbation w/ possible hcap (23 Oct 2018 09:03)    HPI:  89 year old male with pmh of anxiety, basal cell carcinoma, bph, ckd, htn, hypothyroid, lymphoma, postherpetic neuralgia, prostate ca coming to  ED with complaints of worsening shortness of breath x 3 days. Pt is SOB with lying supine.   was seen in Dr Joshi office on 10/19 and was given lasix 40mg iv push however sob improved mildly.   Labs were checked out,   patient and as patient sob not markedly improved and patient found to have neno on ckd was sent to hospital for further eval. Patient also stating has been having a decreased po intake for the past week.  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   was given azithromycin and another abx about two weeks ago for cough as he had a suspected pna. States the abx he does not know name of gave him diarrhea however that resolved.   Patient was also admitted 1 month prior for chf exacerbation. Denies fevers, chills, chest pain. Here noted to have R multifocal pna, LN in chest/abd, elevated bnp was given lasix/abx for chf/pna.     PMH: as above  PSH: as above  Meds: per reconciliation sheet, noted below  MEDICATIONS  (STANDING):  ALBUTerol/ipratropium for Nebulization 3 milliLiter(s) Nebulizer every 6 hours  alfuzosin 10 milliGRAM(s) Oral daily  artificial  tears Solution 1 Drop(s) Both EYES two times a day  cefepime   IVPB      docusate sodium 100 milliGRAM(s) Oral three times a day  furosemide   Injectable 40 milliGRAM(s) IV Push daily  gabapentin 200 milliGRAM(s) Oral three times a day  isosorbide   dinitrate Tablet (ISORDIL) 5 milliGRAM(s) Oral three times a day  lactobacillus acidophilus 1 Tablet(s) Oral daily  levothyroxine 100 MICROGram(s) Oral daily  Loteprednol 1 Drop(s)   Left EYE two times a day  metoprolol succinate ER 50 milliGRAM(s) Oral two times a day  potassium chloride    Tablet ER 20 milliEquivalent(s) Oral daily    Allergies    No Known Allergies    Intolerances      Social: no smoking, no alcohol, no illegal drugs; no recent travel, no exposure to TB  FAMILY HISTORY:  Family history of ischemic heart disease (Father, Mother)     no history of premature cardiovascular disease in first degree relatives    ROS:  no HA, no dizziness, no sore throat, no blurry vision, no CP, no palpitations, no abdominal pain, no diarrhea, no N/V, no dysuria, no leg pain, no claudication, no rash, no joint aches, no rectal pain or bleeding, no night sweats    All other systems reviewed and are negative    Vital Signs Last 24 Hrs  T(C): 37 (23 Oct 2018 04:39), Max: 37 (23 Oct 2018 04:39)  T(F): 98.6 (23 Oct 2018 04:39), Max: 98.6 (23 Oct 2018 04:39)  HR: 93 (23 Oct 2018 09:07) (73 - 108)  BP: 116/71 (23 Oct 2018 09:07) (97/59 - 144/92)  BP(mean): --  RR: 18 (23 Oct 2018 09:07) (18 - 26)  SpO2: 98% (23 Oct 2018 09:07) (96% - 100%)  Daily Height in cm: 165.1 (22 Oct 2018 17:03)    Daily Weight in k.3 (23 Oct 2018 01:57)    PE:    Constitutional: frail looking  HEENT: NC/AT, EOMI, PERRLA, conjunctivae clear; ears and nose atraumatic; pharynx benign  Neck: supple; thyroid not palpable  Back: no tenderness  Respiratory: decreased breath sounds, rales   Cardiovascular: S1S2 regular, no murmurs  Abdomen: soft, not tender, not distended, positive BS; liver and spleen WNL  Genitourinary: no suprapubic tenderness  Lymphatic: no LN palpable  Musculoskeletal: no muscle tenderness, no joint swelling or tenderness  Extremities: no pedal edema  Neurological/ Psychiatric: moving all extremities  Skin: no rashes; no palpable lesions    Labs: all available labs reviewed                        11.8   5.31  )-----------( 114      ( 23 Oct 2018 07:06 )             38.3     10-    147<H>  |  105  |  32<H>  ----------------------------<  98  3.4<L>   |  36<H>  |  2.02<H>    Ca    9.0      23 Oct 2018 07:06    TPro  6.1  /  Alb  3.4  /  TBili  0.8  /  DBili  x   /  AST  22  /  ALT  20  /  AlkPhos  68  10-22     LIVER FUNCTIONS - ( 22 Oct 2018 17:32 )  Alb: 3.4 g/dL / Pro: 6.1 gm/dL / ALK PHOS: 68 U/L / ALT: 20 U/L / AST: 22 U/L / GGT: x           Urinalysis Basic - ( 22 Oct 2018 19:20 )    Color: Yellow / Appearance: Clear / S.015 / pH: x  Gluc: x / Ketone: Negative  / Bili: Negative / Urobili: Negative mg/dL   Blood: x / Protein: 15 mg/dL / Nitrite: Negative   Leuk Esterase: Negative / RBC: 6-10 /HPF / WBC 0-2   Sq Epi: x / Non Sq Epi: Occasional / Bacteria: Occasional          Radiology: all available radiological tests reviewed  < from: CT Chest No Cont (10.23.18 @ 08:44) >  EXAM:  CT CHEST                              Status post replacement of the proximal right humerus is again noted.      *** END OF ADDENDUM 10/23/2018  ***      PROCEDURE DATE:  10/23/2018          INTERPRETATION:  Chest CT without contrast dated 10/23/2018.    COMPARISON: 2018.    CLINICAL INFORMATION: Evaluate for pneumonia, history of lymphoma.    TECHNIQUE: Contiguous axial 2.5 mm slice thickness images of the chest   were obtained without intravenous contrast administration.    FINDINGS:    The airway shows normal caliber and contour with patent lumen.    The right pleural effusion is unchanged, but there has been resolution of   the left pleural effusion. Interval development of fine nodular   infiltrates in the right upper lobe. Some infiltrates in the postero   lateral right lower lobe. Findings are suggestive of multifocal pneumonia   for which clinical correlation is recommended. Also noted are some   atelectatic changes in the lateral segment of the right middle lobe. The   left lower lobe is better expanded at this time.    Again noted are extensive bilateral axillary, pretracheal precarinal and   aorticopulmonary window and left posterior mediastinal lymphadenopathies,   consistent with patient's history of lymphoma, unchanged.    The mediastinum great vessels , atherosclerotic changes in the thoracic   aorta are again noted as well as calcification in the aortic valve.,he   heart is normal. There is no pericardial effusion.    Cardiomegaly is unchanged. No evidence of a pericardial effusion.      Limited evaluation of the upper abdomen, multiple liver lesions are again   noted as well as large left upper retroperitoneal lymphadenopathies.   Stones in the gallbladder are again noted   No evidence of acute cholecystitis. Multiple lesions in the left kidney   are again noted cannot be evaluated due to the noncontrast technique.    The bones , hypertrophic degenerative changes in the thoracic and upper   lumbar spine are againnoted.     IMPRESSION:  The findings are suggestive of multifocal pneumonia in the right lung,   for which clinical correlation is recommended. Interval resolution of the   left pleural effusion.  Extensive lymphadenopathies in the chest and upper abdomen are again,   noted unchanged.  Other findings as above.        Advanced directives addressed: full resuscitation

## 2018-10-24 LAB
ANION GAP SERPL CALC-SCNC: 6 MMOL/L — SIGNIFICANT CHANGE UP (ref 5–17)
APTT BLD: 38.3 SEC — HIGH (ref 27.5–37.4)
BUN SERPL-MCNC: 31 MG/DL — HIGH (ref 7–23)
CALCIUM SERPL-MCNC: 8.4 MG/DL — LOW (ref 8.5–10.1)
CHLORIDE SERPL-SCNC: 103 MMOL/L — SIGNIFICANT CHANGE UP (ref 96–108)
CO2 SERPL-SCNC: 36 MMOL/L — HIGH (ref 22–31)
CREAT SERPL-MCNC: 1.85 MG/DL — HIGH (ref 0.5–1.3)
GLUCOSE SERPL-MCNC: 89 MG/DL — SIGNIFICANT CHANGE UP (ref 70–99)
HCT VFR BLD CALC: 34.8 % — LOW (ref 39–50)
HGB BLD-MCNC: 10.9 G/DL — LOW (ref 13–17)
INR BLD: 3.01 RATIO — HIGH (ref 0.88–1.16)
M PNEUMO IGM SER-ACNC: 32 UNITS/ML — SIGNIFICANT CHANGE UP
MAGNESIUM SERPL-MCNC: 2 MG/DL — SIGNIFICANT CHANGE UP (ref 1.6–2.6)
MCHC RBC-ENTMCNC: 31.3 GM/DL — LOW (ref 32–36)
MCHC RBC-ENTMCNC: 31.5 PG — SIGNIFICANT CHANGE UP (ref 27–34)
MCV RBC AUTO: 100.6 FL — HIGH (ref 80–100)
MYCOPLASMA AG SPEC QL: NEGATIVE — SIGNIFICANT CHANGE UP
NRBC # BLD: 0 /100 WBCS — SIGNIFICANT CHANGE UP (ref 0–0)
NT-PROBNP SERPL-SCNC: HIGH PG/ML (ref 0–450)
PHOSPHATE SERPL-MCNC: 3.2 MG/DL — SIGNIFICANT CHANGE UP (ref 2.5–4.5)
PLATELET # BLD AUTO: 105 K/UL — LOW (ref 150–400)
POTASSIUM SERPL-MCNC: 3 MMOL/L — LOW (ref 3.5–5.3)
POTASSIUM SERPL-SCNC: 3 MMOL/L — LOW (ref 3.5–5.3)
PROTHROM AB SERPL-ACNC: 33.2 SEC — HIGH (ref 9.8–12.7)
RBC # BLD: 3.46 M/UL — LOW (ref 4.2–5.8)
RBC # FLD: 15.4 % — HIGH (ref 10.3–14.5)
SODIUM SERPL-SCNC: 145 MMOL/L — SIGNIFICANT CHANGE UP (ref 135–145)
WBC # BLD: 4.83 K/UL — SIGNIFICANT CHANGE UP (ref 3.8–10.5)
WBC # FLD AUTO: 4.83 K/UL — SIGNIFICANT CHANGE UP (ref 3.8–10.5)

## 2018-10-24 PROCEDURE — 93306 TTE W/DOPPLER COMPLETE: CPT | Mod: 26

## 2018-10-24 RX ORDER — SODIUM CHLORIDE 9 MG/ML
250 INJECTION INTRAMUSCULAR; INTRAVENOUS; SUBCUTANEOUS ONCE
Qty: 0 | Refills: 0 | Status: COMPLETED | OUTPATIENT
Start: 2018-10-24 | End: 2018-10-24

## 2018-10-24 RX ORDER — POTASSIUM CHLORIDE 20 MEQ
40 PACKET (EA) ORAL ONCE
Qty: 0 | Refills: 0 | Status: COMPLETED | OUTPATIENT
Start: 2018-10-24 | End: 2018-10-24

## 2018-10-24 RX ADMIN — SODIUM CHLORIDE 250 MILLILITER(S): 9 INJECTION INTRAMUSCULAR; INTRAVENOUS; SUBCUTANEOUS at 19:00

## 2018-10-24 RX ADMIN — GABAPENTIN 200 MILLIGRAM(S): 400 CAPSULE ORAL at 21:03

## 2018-10-24 RX ADMIN — Medication 1 TABLET(S): at 11:57

## 2018-10-24 RX ADMIN — ALFUZOSIN HYDROCHLORIDE 10 MILLIGRAM(S): 10 TABLET, EXTENDED RELEASE ORAL at 11:58

## 2018-10-24 RX ADMIN — Medication 0.25 MILLIGRAM(S): at 08:08

## 2018-10-24 RX ADMIN — Medication 40 MILLIEQUIVALENT(S): at 11:57

## 2018-10-24 RX ADMIN — CEFEPIME 1000 MILLIGRAM(S): 1 INJECTION, POWDER, FOR SOLUTION INTRAMUSCULAR; INTRAVENOUS at 05:34

## 2018-10-24 RX ADMIN — GABAPENTIN 200 MILLIGRAM(S): 400 CAPSULE ORAL at 11:58

## 2018-10-24 RX ADMIN — Medication 3 MILLILITER(S): at 01:45

## 2018-10-24 RX ADMIN — Medication 3 MILLILITER(S): at 19:30

## 2018-10-24 RX ADMIN — ISOSORBIDE DINITRATE 5 MILLIGRAM(S): 5 TABLET ORAL at 05:35

## 2018-10-24 RX ADMIN — Medication 0.25 MILLIGRAM(S): at 18:28

## 2018-10-24 RX ADMIN — Medication 100 MILLIGRAM(S): at 21:03

## 2018-10-24 RX ADMIN — Medication 3 MILLILITER(S): at 11:12

## 2018-10-24 RX ADMIN — CEFEPIME 1000 MILLIGRAM(S): 1 INJECTION, POWDER, FOR SOLUTION INTRAMUSCULAR; INTRAVENOUS at 16:48

## 2018-10-24 RX ADMIN — Medication 1 DROP(S): at 05:35

## 2018-10-24 RX ADMIN — Medication 50 MILLIGRAM(S): at 16:49

## 2018-10-24 RX ADMIN — Medication 1 DROP(S): at 16:48

## 2018-10-24 RX ADMIN — Medication 100 MILLIGRAM(S): at 11:58

## 2018-10-24 RX ADMIN — Medication 100 MICROGRAM(S): at 05:34

## 2018-10-24 RX ADMIN — GABAPENTIN 200 MILLIGRAM(S): 400 CAPSULE ORAL at 05:34

## 2018-10-24 RX ADMIN — Medication 20 MILLIEQUIVALENT(S): at 14:21

## 2018-10-24 RX ADMIN — Medication 50 MILLIGRAM(S): at 05:35

## 2018-10-24 NOTE — PHYSICAL THERAPY INITIAL EVALUATION ADULT - ADDITIONAL COMMENTS
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   States was given azithromycin and another abx about two weeks ago for cough as he had a suspected pna.   States the abx he does not know name of gave him diarrhea however that resolved.   Patient was also admitted 1 month prior for chf exacerbation. 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   States was given azithromycin and another abx about two weeks ago for cough as he had a suspected pna.   States the abx he does not know name of gave him diarrhea however that resolved.   Patient was also admitted 1 month prior for chf exacerbation.    Lives in house alone. access from garage to lower level. 1 flight of stairs with 1 HR to access main floor where bedroom and bathroom are located. Family helps with IADL's and transport to MD appt.

## 2018-10-24 NOTE — PHYSICAL THERAPY INITIAL EVALUATION ADULT - GENERAL OBSERVATIONS, REHAB EVAL
pre session: seated in chair with o2 via nc 5 l/min. call bell and phone in reach. dtr present. Post session: supine in bed with call bell and phone in place. O2 intact/monitor in place. No c/o pain. c/o dizziness upon amb to toilet and back. Nsg aware. HR at rest 110bpm. HR during amb 120BPM. HR at rest supine 98 bpm. No c/o pain or SOB. Nsg states the dizziness may be due to change in meds yesterday. MD was alerted.

## 2018-10-24 NOTE — PROGRESS NOTE ADULT - SUBJECTIVE AND OBJECTIVE BOX
Date of service: 10-24-18 @ 10:51    pt seen and examined  feels weak but better  less cough, no resp distress      ROS: no fever or chills; denies dizziness, no HA,  no abdominal pain, no diarrhea or constipation; no dysuria, no urinary frequency, no legs pain, no rashes    MEDICATIONS  (STANDING):  ALBUTerol/ipratropium for Nebulization 3 milliLiter(s) Nebulizer every 6 hours  alfuzosin 10 milliGRAM(s) Oral daily  artificial  tears Solution 1 Drop(s) Both EYES two times a day  cefepime  Injectable. 1000 milliGRAM(s) IV Push every 12 hours  cefepime  Injectable.      docusate sodium 100 milliGRAM(s) Oral three times a day  gabapentin 200 milliGRAM(s) Oral three times a day  lactobacillus acidophilus 1 Tablet(s) Oral daily  levothyroxine 100 MICROGram(s) Oral daily  Loteprednol 1 Drop(s)   Left EYE two times a day  metoprolol succinate ER 50 milliGRAM(s) Oral two times a day  potassium chloride    Tablet ER 40 milliEquivalent(s) Oral once  potassium chloride    Tablet ER 20 milliEquivalent(s) Oral daily  warfarin 5 milliGRAM(s) Oral once      Vital Signs Last 24 Hrs  T(C): 36.8 (24 Oct 2018 05:29), Max: 36.9 (23 Oct 2018 20:38)  T(F): 98.3 (24 Oct 2018 05:29), Max: 98.4 (23 Oct 2018 20:38)  HR: 74 (24 Oct 2018 05:29) (74 - 108)  BP: 102/63 (24 Oct 2018 05:29) (102/63 - 118/65)  BP(mean): --  RR: 18 (24 Oct 2018 05:29) (18 - 18)  SpO2: 93% (24 Oct 2018 05:29) (93% - 99%)      PE:  Constitutional: frail looking  HEENT: NC/AT, EOMI, PERRLA, conjunctivae clear; ears and nose atraumatic; pharynx benign  Neck: supple; thyroid not palpable  Back: no tenderness  Respiratory: decreased breath sounds, rales   Cardiovascular: S1S2 regular, no murmurs  Abdomen: soft, not tender, not distended, positive BS; liver and spleen WNL  Genitourinary: no suprapubic tenderness  Lymphatic: no LN palpable  Musculoskeletal: no muscle tenderness, no joint swelling or tenderness  Extremities: no pedal edema  Neurological/ Psychiatric: moving all extremities  Skin: no rashes; no palpable lesions    Labs: all available labs reviewed                                   10.9   4.83  )-----------( 105      ( 24 Oct 2018 06:37 )             34.8     10-    145  |  103  |  31<H>  ----------------------------<  89  3.0<L>   |  36<H>  |  1.85<H>    Ca    8.4<L>      24 Oct 2018 06:37  Phos  3.2     10-24  Mg     2.0     10-24    TPro  6.1  /  Alb  3.4  /  TBili  0.8  /  DBili  x   /  AST  22  /  ALT  20  /  AlkPhos  68  10-22           Urinalysis Basic - ( 22 Oct 2018 19:20 )    Color: Yellow / Appearance: Clear / S.015 / pH: x  Gluc: x / Ketone: Negative  / Bili: Negative / Urobili: Negative mg/dL   Blood: x / Protein: 15 mg/dL / Nitrite: Negative   Leuk Esterase: Negative / RBC: 6-10 /HPF / WBC 0-2   Sq Epi: x / Non Sq Epi: Occasional / Bacteria: Occasional    Culture - Blood (10.22.18 @ 19:38)    Specimen Source: .Blood None    Culture Results:   No growth to date.    Culture - Blood (10.22.18 @ 19:38)    Specimen Source: .Blood None    Culture Results:   No growth to date.        Radiology: all available radiological tests reviewed  < from: CT Chest No Cont (10.23.18 @ 08:44) >  EXAM:  CT CHEST                              Status post replacement of the proximal right humerus is again noted.          *** END OF ADDENDUM 10/23/2018  ***      PROCEDURE DATE:  10/23/2018          INTERPRETATION:  Chest CT without contrast dated 10/23/2018.    COMPARISON: 2018.    CLINICAL INFORMATION: Evaluate for pneumonia, history of lymphoma.    TECHNIQUE: Contiguous axial 2.5 mm slice thickness images of the chest   were obtained without intravenous contrast administration.    FINDINGS:    The airway shows normal caliber and contour with patent lumen.    The right pleural effusion is unchanged, but there has been resolution of   the left pleural effusion. Interval development of fine nodular   infiltrates in the right upper lobe. Some infiltrates in the postero   lateral right lower lobe. Findings are suggestive of multifocal pneumonia   for which clinical correlation is recommended. Also noted are some   atelectatic changes in the lateral segment of the right middle lobe. The   left lower lobe is better expanded at this time.    Again noted are extensive bilateral axillary, pretracheal precarinal and   aorticopulmonary window and left posterior mediastinal lymphadenopathies,   consistent with patient's history of lymphoma, unchanged.    The mediastinum great vessels , atherosclerotic changes in the thoracic   aorta are again noted as well as calcification in the aortic valve.,he   heart is normal. There is no pericardial effusion.    Cardiomegaly is unchanged. No evidence of a pericardial effusion.      Limited evaluation of the upper abdomen, multiple liver lesions are again   noted as well as large left upper retroperitoneal lymphadenopathies.   Stones in the gallbladder are again noted   No evidence of acute cholecystitis. Multiple lesions in the left kidney   are again noted cannot be evaluated due to the noncontrast technique.    The bones , hypertrophic degenerative changes in the thoracic and upper   lumbar spine are againnoted.     IMPRESSION:  The findings are suggestive of multifocal pneumonia in the right lung,   for which clinical correlation is recommended. Interval resolution of the   left pleural effusion.  Extensive lymphadenopathies in the chest and upper abdomen are again,   noted unchanged.  Other findings as above.        Advanced directives addressed: full resuscitation

## 2018-10-24 NOTE — PROGRESS NOTE ADULT - SUBJECTIVE AND OBJECTIVE BOX
NEPHROLOGY INTERVAL HPI/OVERNIGHT EVENTS:    Date of Service: 10-24-18 @ 10:42    10/24 SY  No acute event overnight.  Reports slept fairly well.  no new complaints.  No acute distress    HPI:  89 yo man with PMHX significant for CHF, HTN, Hx of Lymphoma treated with Rituxan.  Notable for HH admission in 2018 for CHF and LETICIA.   Creat stabilized to 1.7 prior to d/c.  Developed sob for several days.  Given IV lasix by Dr. Joshiwith mild improvement in symptoms.  Pt admitted due to worsened renal function with persistent SOB.  CT chest now c/w multifocal PNA.  Renal evaluation requested for LETICIA superimposed on CKD.  Pt denies any fever at home.    PMHX and PSHX.  1.CKD ( 2015--Creat 1.63), Post recent hosp for CHF.  Creat 1.7 on d/c 2018.  2.A FIB  3.HTN  4.Hx of Prostate CA ( Post RT)  5.Hx of Lymphoma Post Rituxan therapy.  6.Hx of Postherpetic Shingles.  7.Hypothyroidism  8.Hx of Chronic Diarrhea.  9.Recent admission for CHF.        MEDICATIONS  (STANDING):  ALBUTerol/ipratropium for Nebulization 3 milliLiter(s) Nebulizer every 6 hours  alfuzosin 10 milliGRAM(s) Oral daily  artificial  tears Solution 1 Drop(s) Both EYES two times a day  cefepime  Injectable. 1000 milliGRAM(s) IV Push every 12 hours  cefepime  Injectable.      docusate sodium 100 milliGRAM(s) Oral three times a day  gabapentin 200 milliGRAM(s) Oral three times a day  lactobacillus acidophilus 1 Tablet(s) Oral daily  levothyroxine 100 MICROGram(s) Oral daily  Loteprednol 1 Drop(s)   Left EYE two times a day  metoprolol succinate ER 50 milliGRAM(s) Oral two times a day  potassium chloride    Tablet ER 40 milliEquivalent(s) Oral once  potassium chloride    Tablet ER 20 milliEquivalent(s) Oral daily  warfarin 5 milliGRAM(s) Oral once    MEDICATIONS  (PRN):  acetaminophen   Tablet .. 650 milliGRAM(s) Oral every 6 hours PRN Temp greater or equal to 38C (100.4F), Mild Pain (1 - 3), Moderate Pain (4 - 6)  ALPRAZolam 0.25 milliGRAM(s) Oral two times a day PRN anxiety  senna 2 Tablet(s) Oral at bedtime PRN Constipation          Vital Signs Last 24 Hrs  T(C): 36.8 (24 Oct 2018 05:29), Max: 37.2 (23 Oct 2018 10:47)  T(F): 98.3 (24 Oct 2018 05:29), Max: 98.9 (23 Oct 2018 10:47)  HR: 74 (24 Oct 2018 05:29) (73 - 108)  BP: 102/63 (24 Oct 2018 05:29) (102/63 - 118/65)  BP(mean): --  RR: 18 (24 Oct 2018 05:29) (18 - 18)  SpO2: 93% (24 Oct 2018 05:29) (93% - 100%)  Daily     Daily       PHYSICAL EXAM:  Alert and comfortable  GENERAL: No distress  CHEST/LUNG: Bilateral rhonchi  HEART: S1S2 RRR  ABDOMEN: soft  EXTREMITIES: no edema  SKIN:     LABS:                        10.9   4.83  )-----------( 105      ( 24 Oct 2018 06:37 )             34.8     10-24    145  |  103  |  31<H>  ----------------------------<  89  3.0<L>   |  36<H>  |  1.85<H>    Ca    8.4<L>      24 Oct 2018 06:37  Phos  3.2     10-24  Mg     2.0     10-24    TPro  6.1  /  Alb  3.4  /  TBili  0.8  /  DBili  x   /  AST  22  /  ALT  20  /  AlkPhos  68  10-22    PT/INR - ( 23 Oct 2018 07:06 )   PT: 27.1 sec;   INR: 2.46 ratio         PTT - ( 23 Oct 2018 07:06 )  PTT:36.8 sec  Urinalysis Basic - ( 22 Oct 2018 19:20 )    Color: Yellow / Appearance: Clear / S.015 / pH: x  Gluc: x / Ketone: Negative  / Bili: Negative / Urobili: Negative mg/dL   Blood: x / Protein: 15 mg/dL / Nitrite: Negative   Leuk Esterase: Negative / RBC: 6-10 /HPF / WBC 0-2   Sq Epi: x / Non Sq Epi: Occasional / Bacteria: Occasional      Magnesium, Serum: 2.0 mg/dL (10-24 @ 06:37)  Phosphorus Level, Serum: 3.2 mg/dL (10-24 @ 06:37)          RADIOLOGY & ADDITIONAL TESTS:

## 2018-10-24 NOTE — PROGRESS NOTE ADULT - ASSESSMENT
89 M with shortness of breath - multifactorial    Diastolic failure- mild. Legs are without edema and mild crackles on bases- recommend low dose lasix with close monitering of renal function and electrolytes- if Creatine continues to rise, would hold lasix for a day . will add low dose nitrate to reduce preload    Pulm- PNA- on abx, recommend neblizer treatment, mucolytic and consideration for steroids      Aflutter- metoprolol for rate control, continue with coumadin dosing goal INR 2-2.5     No evidence for ACS     10/24/18:  Cardiac status stable.  Off diuretics and on antibiotics.  Renal function stable.  May need to add back furosemide.  Will watch closely.

## 2018-10-24 NOTE — PHYSICAL THERAPY INITIAL EVALUATION ADULT - PERTINENT HX OF CURRENT PROBLEM, REHAB EVAL
complaints of worsening shortness of breath x 3 days.    Pt is SOB with lying supine.  States was seen in Dr Joshi office on Friday and was given lasix 40mg iv push however sob improved mildly.   Labs were checked out,   patient and as patient sob not markedly improved and patient found to have neno on ckd was sent to hospital for further eval. Patient also stating has been having a decreased po intake for the past week.

## 2018-10-24 NOTE — PROGRESS NOTE ADULT - ASSESSMENT
89 year old male as above     #Dyspnea  #Multifocal PNA (HCAP)  #acute on chronic congestive heart failure - lvef 06/2018 55-60%  - continue tele  - bnp elevated >19k trending down  - trop x 3 negative   - CT chest Multifocal PNA  - RVP Neg  - S/P lasix 40mg IV QD - hold diuretics for today and resume Oral lasix randall if cr improving   - S/P Vanco and zosyn  - continue cefepime 1mg12h#2 suspected gram negative, gram positive and other resistant organisms  - not on acei due to cr being >2  - ID consult appreciated   - Pulm Consult appreciated   - FU legionella ag and mycoplasma  - cultures - blood and sputum pending     #neno on ckd   - monitor cr closely   - nephro consult appreciated - Monitor off diuretics for another day and resume tomorrow.   - hold diuretics for 2-3 days and allow stabilization of CKD    #Hypotension  -continue BB  -hold lasix and Isordil    #Hypokalemia  -replete with IV and PO  -continue to monitor     #afib with RVR/Alfutter  - monitor inr- currently therapuetic  - continue coumadin, betablocker    #anxiety- stable  - continue xanax bid prn    #hypothyroidism- stable   - continue levothyroxine     #post herpetic neuralgia- stable  - continue home meds including gabapentin  - refresh tears  - advised daughter to bring in ointment as not in stock at     #bph- stable   - continue alfuzosin - non formulary    #dvt prophylaxis   - on coumadin for afib    Advanced Care Planning 35 minutes.  Discussion with patient and family regarding advance directives. ; It seems like hes leaning toward DNR/DNI but would like to speak with his family;

## 2018-10-24 NOTE — PROGRESS NOTE ADULT - ASSESSMENT
89 yo man with multiple medical problems including CKD admitted with multifocal PNA.     LETICIA superimposed on CKD.  CT with no evidence of CHF.  Left Pleural effusion resolved.    Follow Right effusion with multifocal PNA.  Will hold diuretics for 2-3 days and allow stabilization of CKD while PNA treated with abtx.    10/24 SY  --LETICIA/CKD : slightly improved.  Monitor off diuretics for another day and resume tomorrow.  --PNA : continue abtx.

## 2018-10-24 NOTE — PROGRESS NOTE ADULT - ASSESSMENT
Patient is seen and consult dictated      - multifocal infiltrates with nodular and peribronchial likely infectious in etiology   - chf with small effusion   - afib on anticoagulation .  - lymphoma with the mediastinal adenopathy   - acute on chronic ckd     PLAN     - continue with the cefipime so far cultures were negative   - follow up cultures   - check the sat on room air and with ambulation   - oncology follow up for the lymphoma   - less clinical suspicion for the pulmonary embolism given the anticoagualtion

## 2018-10-24 NOTE — PROGRESS NOTE ADULT - SUBJECTIVE AND OBJECTIVE BOX
CHIEF COMPLAINT: Patient is a 89y old  Male who presents with a chief complaint of CHF Exacerbation w/ possible hcap (22 Oct 2018 21:23)      HPI:  89 year old male with pmh of anxiety, basal cell carcinoma, bph, ckd, htn, hypothyroid, lymphoma, postherpetic neuralgia, prostate ca coming to  ED with complaints of worsening shortness of breath x 3 days.    Pt is SOB with lying supine.   was seen in Dr Joshi office on Friday and was given lasix 40mg iv push however sob improved mildly.   Labs were checked out,   patient and as patient sob not markedly improved and patient found to have neno on ckd was sent to hospital for further eval. Patient also stating has been having a decreased po intake for the past week.  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    was given azithromycin and another abx about two weeks ago for cough as he had a suspected pna.   States the abx he does not know name of gave him diarrhea however that resolved.   Patient was also admitted 1 month prior for chf exacerbation. Denies fevers, chills, chest pain. (22 Oct 2018 21:23)      PMHx: PAST MEDICAL & SURGICAL HISTORY:  CKD (chronic kidney disease)  BPH (benign prostatic hyperplasia)  Postherpetic neuralgia  Chronic diarrhea  Prostate CA  Basal cell carcinoma  Lymphoma  Anxiety  Hypothyroid  Hypertension  A-fib  H/O shoulder replacement  S/P bilateral unicompartmental knee replacement        Soc Hx:       Allergies: Allergies    No Known Allergies    Intolerances          REVIEW OF SYSTEMS:    CONSTITUTIONAL: No weakness, fevers or chills  EYES/ENT: No visual changes;  No vertigo or throat pain   NECK: No pain or stiffness  RESPIRATORY: No cough, wheezing, hemoptysis; No shortness of breath  CARDIOVASCULAR: No chest pain or palpitations  GASTROINTESTINAL: No abdominal or epigastric pain. No nausea, vomiting, or hematemesis; No diarrhea or constipation. No melena or hematochezia.  GENITOURINARY: No dysuria, frequency or hematuria  NEUROLOGICAL: No numbness or weakness  SKIN: No itching, burning, rashes, or lesions   All other review of systems is negative unless indicated above    ICU Vital Signs Last 24 Hrs  T(C): 36.8 (24 Oct 2018 05:29), Max: 37.2 (23 Oct 2018 10:47)  T(F): 98.3 (24 Oct 2018 05:29), Max: 98.9 (23 Oct 2018 10:47)  HR: 74 (24 Oct 2018 05:29) (73 - 108)  BP: 102/63 (24 Oct 2018 05:29) (102/63 - 118/65)  BP(mean): --  ABP: --  ABP(mean): --  RR: 18 (24 Oct 2018 05:29) (18 - 18)  SpO2: 93% (24 Oct 2018 05:29) (93% - 100%)      I&O's Summary          PHYSICAL EXAM:   Constitutional: NAD, awake and alert, well-developed  HEENT: PERR, EOMI, Normal Hearing, MMM  Neck: Soft and supple, No LAD, No JVD  Respiratory: Breath sounds are clear bilaterally, No wheezing, rales or rhonchi  Cardiovascular: S1 and S2, regular rate and rhythm, no Murmurs, gallops or rubs  Gastrointestinal: Bowel Sounds present, soft, nontender, nondistended, no guarding, no rebound  Extremities: No peripheral edema  Vascular: 2+ peripheral pulses  Neurological: A/O x 3, no focal deficits  Musculoskeletal: 5/5 strength b/l upper and lower extremities  Skin: No rashes    MEDICATIONS  (STANDING):  ALBUTerol/ipratropium for Nebulization 3 milliLiter(s) Nebulizer every 6 hours  alfuzosin 10 milliGRAM(s) Oral daily  artificial  tears Solution 1 Drop(s) Both EYES two times a day  cefepime  Injectable. 1000 milliGRAM(s) IV Push every 12 hours  cefepime  Injectable.      docusate sodium 100 milliGRAM(s) Oral three times a day  gabapentin 200 milliGRAM(s) Oral three times a day  isosorbide   dinitrate Tablet (ISORDIL) 5 milliGRAM(s) Oral three times a day  lactobacillus acidophilus 1 Tablet(s) Oral daily  levothyroxine 100 MICROGram(s) Oral daily  Loteprednol 1 Drop(s)   Left EYE two times a day  metoprolol succinate ER 50 milliGRAM(s) Oral two times a day  potassium chloride    Tablet ER 20 milliEquivalent(s) Oral daily  warfarin 5 milliGRAM(s) Oral once        LABS: All Labs Reviewed:                        12.9   5.72  )-----------( 117      ( 22 Oct 2018 17:32 )             40.9     10-22    144  |  105  |  34<H>  ----------------------------<  124<H>  3.7   |  32<H>  |  2.17<H>    Ca    9.1      22 Oct 2018 17:32    TPro  6.1  /  Alb  3.4  /  TBili  0.8  /  DBili  x   /  AST  22  /  ALT  20  /  AlkPhos  68  10-22    10-23    147<H>  |  105  |  32<H>  ----------------------------<  98  3.4<L>   |  36<H>  |  2.02<H>    Ca    9.0      23 Oct 2018 07:06    TPro  6.1  /  Alb  3.4  /  TBili  0.8  /  DBili  x   /  AST  22  /  ALT  20  /  AlkPhos  68  10-22      PT/INR - ( 22 Oct 2018 17:32 )   PT: 20.0 sec;   INR: 1.83 ratio         PTT - ( 22 Oct 2018 17:32 )  PTT:34.1 sec  CARDIAC MARKERS ( 23 Oct 2018 00:21 )  .030 ng/mL / x     / x     / x     / x      CARDIAC MARKERS ( 22 Oct 2018 21:45 )  0.032 ng/mL / x     / x     / x     / x      CARDIAC MARKERS ( 22 Oct 2018 17:32 )  0.025 ng/mL / x     / x     / x     / x          Serum Pro-Brain Natriuretic Peptide: 85742 pg/mL (10-22 @ 17:32)      EKG: AFlutter with varible block, RBBB, LAFB    Telemetry: AFlutter with rate rapid rates     ECHO:< from: Transthoracic Echocardiogram (06.26.18 @ 10:03) >  mmary     Moderate (2+) mitral regurgitation is present.   Mild mitral annular calcification is present.   Significant fibrocalcific changes noted to the aortic valve leaflets with   restriction in leaflet excursion. Peak transaortic gradient is 45 mmHg;   this finding is consistent with mild to moderate aortic stenosis.   Trace aortic regurgitation is present.   Mild to moderate tricuspid valve regurgitation is present.   Normal appearing pulmonic valve structure and function.   The left atrium is mildly dilated.   The left ventricle is normal in size, wall motion and contractility.   Mild concentric left ventricular hypertrophy is present.   Estimated left ventricular ejection fraction is 55-60 %.   A PFO is noted with color doppler.   Normal appearing right ventricle structure and function.   All visualized extra cardiac structures appears to be normal.   No evidence of pericardial effusion.     Signature     ----------------------------------------------------------------   Electronically signed by Sina Trent MD(Interpreting   physician) on 06/27/2018 07:41 AM   ----------------------------------------------------------------    < end of copied text >

## 2018-10-24 NOTE — PROGRESS NOTE ADULT - ASSESSMENT
89 year old male with pmh of anxiety, basal cell carcinoma, bph, ckd, htn, hypothyroid, lymphoma, postherpetic neuralgia, prostate ca coming to  ED with complaints of worsening shortness of breath x 3 days. Pt is SOB with lying supine.   was seen in Dr Joshi office on 10/19 and was given lasix 40mg iv push however sob improved mildly.   Labs were checked out,   patient and as patient sob not markedly improved and patient found to have neno on ckd was sent to hospital for further eval. Patient also stating has been having a decreased po intake for the past week.  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   was given azithromycin and another abx about two weeks ago for cough as he had a suspected pna. States the abx he does not know name of gave him diarrhea however that resolved.   Patient was also admitted 1 month prior for chf exacerbation. Denies fevers, chills, chest pain. Here noted to have R multifocal pna, LN in chest/abd, elevated bnp was given lasix/abx for chf/pna    1. hcap/chf/neno on ckd/immunocompromised host  - slowly improving  - CT chest reviewed  - s/p vanco/zosyn  - on cefepime 1mg12h for gnr resistant coverage #2  - continue with abx coverage  - will avoid zosyn d/t salt load and hx chf  - cx no growth  - monitor temps  - tolerating abx well so far; no side effects noted  - reason for abx use and side effects reviewed with patient  - on dc plan for po ceftin 250mg BID to complete 7 day course  - supportive care  - f/u cbc    2. other issues - care per medicine

## 2018-10-24 NOTE — PROGRESS NOTE ADULT - SUBJECTIVE AND OBJECTIVE BOX
HOSPITALIST PROGRESS NOTE:  SUBJECTIVE:  PCP: PCP/Cardio- Dr Joshi  Chief Complaint: Patient is a 89y old  Male who presents with a chief complaint of CHF Exacerbation w/ possible hcap (23 Oct 2018 06:42)    HPI:  89 year old male with pmh of anxiety, basal cell carcinoma, bph, ckd, htn, hypothyroid, lymphoma, postherpetic neuralgia, prostate ca coming to  ED with complaints of worsening shortness of breath x 3 days.    Pt is SOB with lying supine.   was seen in Dr Joshi office on Friday and was given lasix 40mg iv push however sob improved mildly.   Labs were checked out,   patient and as patient sob not markedly improved and patient found to have neno on ckd was sent to hospital for further eval. Patient also stating has been having a decreased po intake for the past week.  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    was given azithromycin and another abx about two weeks ago for cough as he had a suspected pna.   States the abx he does not know name of gave him diarrhea however that resolved.   Patient was also admitted 1 month prior for chf exacerbation. Denies fevers, chills, chest pain. (22 Oct 2018 21:23)    10/23:  Above Reviewed. dyspnea improving. No other complaints  10/24: Daughter at bedside; still has dyspnea; discussed advance directives with patient and daughter; It seems like hes leaning toward DNR/DNI but would like to speak with his family; Tele still in flutter; also found to have low BP this morning     Allergies:  No Known Allergies    REVIEW OF SYSTEMS:  See HPI. All other review of systems is negative unless indicated above.     OBJECTIVE  Physical Exam:  Vital Signs Last 24 Hrs  T(C): 37 (24 Oct 2018 10:08), Max: 37 (24 Oct 2018 10:08)  T(F): 98.6 (24 Oct 2018 10:08), Max: 98.6 (24 Oct 2018 10:08)  HR: 81 (24 Oct 2018 11:12) (74 - 93)  BP: 100/67 (24 Oct 2018 10:08) (100/67 - 118/65)  BP(mean): --  RR: 18 (24 Oct 2018 10:08) (18 - 18)  SpO2: 100% (24 Oct 2018 10:08) (93% - 100%)    Constitutional: NAD, awake and alert, well-developed  Neurological: AAO x 3, no focal deficits  HEENT: PERRLA, EOMI, MMM  Neck: Soft and supple, No LAD, No JVD  Respiratory: Breath sounds are clear bilaterally, No wheezing, or rhonchi +rales B/L  Cardiovascular: S1 and S2, +irregular rate and rhythm; no Murmurs, gallops or rubs  Gastrointestinal: Bowel Sounds present, soft, nontender, nondistended, no guarding, no rebound tenderness  Back: No CVA tenderness   Extremities: 2+ pitting edema  Vascular: 2+ peripheral pulses  Musculoskeletal: 5/5 strength b/l upper and lower extremities  Skin: No rashes  Breast: Deferred  Rectal: Deferred    MEDICATIONS  (STANDING):  ALBUTerol/ipratropium for Nebulization 3 milliLiter(s) Nebulizer every 6 hours  alfuzosin 10 milliGRAM(s) Oral daily  artificial  tears Solution 1 Drop(s) Both EYES two times a day  docusate sodium 100 milliGRAM(s) Oral three times a day  furosemide   Injectable 40 milliGRAM(s) IV Push daily  gabapentin 200 milliGRAM(s) Oral three times a day  isosorbide   dinitrate Tablet (ISORDIL) 5 milliGRAM(s) Oral three times a day  lactobacillus acidophilus 1 Tablet(s) Oral daily  levothyroxine 100 MICROGram(s) Oral daily  Loteprednol 1 Drop(s)   Left EYE two times a day  metoprolol succinate ER 50 milliGRAM(s) Oral two times a day  piperacillin/tazobactam IVPB. 3.375 Gram(s) IV Intermittent every 8 hours  potassium chloride    Tablet ER 20 milliEquivalent(s) Oral daily  vancomycin  IVPB 1000 milliGRAM(s) IV Intermittent every 12 hours    Lab Results:  CBC  CBC Full  -  ( 24 Oct 2018 06:37 )  WBC Count : 4.83 K/uL  Hemoglobin : 10.9 g/dL  Hematocrit : 34.8 %  Platelet Count - Automated : 105 K/uL  Mean Cell Volume : 100.6 fl  Mean Cell Hemoglobin : 31.5 pg  Mean Cell Hemoglobin Concentration : 31.3 gm/dL  Auto Neutrophil # : x  Auto Lymphocyte # : x  Auto Monocyte # : x  Auto Eosinophil # : x  Auto Basophil # : x  Auto Neutrophil % : x  Auto Lymphocyte % : x  Auto Monocyte % : x  Auto Eosinophil % : x  Auto Basophil % : x    .		Differential:	[] Automated		[] Manual  Chemistry                        10.9   4.83  )-----------( 105      ( 24 Oct 2018 06:37 )             34.8     10-24    145  |  103  |  31<H>  ----------------------------<  89  3.0<L>   |  36<H>  |  1.85<H>    Ca    8.4<L>      24 Oct 2018 06:37  Phos  3.2     10-24  Mg     2.0     10-24    TPro  6.1  /  Alb  3.4  /  TBili  0.8  /  DBili  x   /  AST  22  /  ALT  20  /  AlkPhos  68  10-22    LIVER FUNCTIONS - ( 22 Oct 2018 17:32 )  Alb: 3.4 g/dL / Pro: 6.1 gm/dL / ALK PHOS: 68 U/L / ALT: 20 U/L / AST: 22 U/L / GGT: x           PT/INR - ( 23 Oct 2018 07:06 )   PT: 27.1 sec;   INR: 2.46 ratio         PTT - ( 23 Oct 2018 07:06 )  PTT:36.8 sec  Urinalysis Basic - ( 22 Oct 2018 19:20 )    Color: Yellow / Appearance: Clear / S.015 / pH: x  Gluc: x / Ketone: Negative  / Bili: Negative / Urobili: Negative mg/dL   Blood: x / Protein: 15 mg/dL / Nitrite: Negative   Leuk Esterase: Negative / RBC: 6-10 /HPF / WBC 0-2   Sq Epi: x / Non Sq Epi: Occasional / Bacteria: Occasional    MICROBIOLOGY/CULTURES:  Culture Results:   No growth to date. (10-22 @ 19:38)  Culture Results:   No growth to date. (10-22 @ 19:38)    CARDIAC MARKERS ( 23 Oct 2018 00:21 )  .030 ng/mL / x     / x     / x     / x      CARDIAC MARKERS ( 22 Oct 2018 21:45 )  0.032 ng/mL / x     / x     / x     / x      CARDIAC MARKERS ( 22 Oct 2018 17:32 )  0.025 ng/mL / x     / x     / x     / x            Urinalysis Basic - ( 22 Oct 2018 19:20 )    Color: Yellow / Appearance: Clear / S.015 / pH: x  Gluc: x / Ketone: Negative  / Bili: Negative / Urobili: Negative mg/dL   Blood: x / Protein: 15 mg/dL / Nitrite: Negative   Leuk Esterase: Negative / RBC: 6-10 /HPF / WBC 0-2   Sq Epi: x / Non Sq Epi: Occasional / Bacteria: Occasional      RADIOLOGY/EKG:    < from: Xray Chest 1 View AP/PA. (10.22.18 @ 18:32) >    IMPRESSION:  Findings are likely indicative of mild pneumonia involving the right lung   for which clinical correlation is recommended.    < end of copied text >      < from: CT Chest No Cont (10.23.18 @ 08:44) >    IMPRESSION:  The findings are suggestive of multifocal pneumonia in the right lung,   for which clinical correlation is recommended. Interval resolution of the   left pleural effusion.  Extensive lymphadenopathies in the chest and upper abdomen are again,   noted unchanged.  Other findings as above.    < end of copied text >

## 2018-10-25 DIAGNOSIS — I50.33 ACUTE ON CHRONIC DIASTOLIC (CONGESTIVE) HEART FAILURE: ICD-10-CM

## 2018-10-25 LAB
ANION GAP SERPL CALC-SCNC: 4 MMOL/L — LOW (ref 5–17)
BUN SERPL-MCNC: 30 MG/DL — HIGH (ref 7–23)
CALCIUM SERPL-MCNC: 8.4 MG/DL — LOW (ref 8.5–10.1)
CHLORIDE SERPL-SCNC: 104 MMOL/L — SIGNIFICANT CHANGE UP (ref 96–108)
CO2 SERPL-SCNC: 35 MMOL/L — HIGH (ref 22–31)
CREAT SERPL-MCNC: 1.8 MG/DL — HIGH (ref 0.5–1.3)
GLUCOSE SERPL-MCNC: 94 MG/DL — SIGNIFICANT CHANGE UP (ref 70–99)
HCT VFR BLD CALC: 37.4 % — LOW (ref 39–50)
HGB BLD-MCNC: 11.3 G/DL — LOW (ref 13–17)
INR BLD: 2.83 RATIO — HIGH (ref 0.88–1.16)
MAGNESIUM SERPL-MCNC: 2.2 MG/DL — SIGNIFICANT CHANGE UP (ref 1.6–2.6)
MCHC RBC-ENTMCNC: 30.2 GM/DL — LOW (ref 32–36)
MCHC RBC-ENTMCNC: 31.4 PG — SIGNIFICANT CHANGE UP (ref 27–34)
MCV RBC AUTO: 103.9 FL — HIGH (ref 80–100)
NRBC # BLD: 0 /100 WBCS — SIGNIFICANT CHANGE UP (ref 0–0)
PHOSPHATE SERPL-MCNC: 2.7 MG/DL — SIGNIFICANT CHANGE UP (ref 2.5–4.5)
PLATELET # BLD AUTO: 98 K/UL — LOW (ref 150–400)
POTASSIUM SERPL-MCNC: 3.6 MMOL/L — SIGNIFICANT CHANGE UP (ref 3.5–5.3)
POTASSIUM SERPL-SCNC: 3.6 MMOL/L — SIGNIFICANT CHANGE UP (ref 3.5–5.3)
PROTHROM AB SERPL-ACNC: 31.2 SEC — HIGH (ref 9.8–12.7)
RBC # BLD: 3.6 M/UL — LOW (ref 4.2–5.8)
RBC # FLD: 15.3 % — HIGH (ref 10.3–14.5)
SODIUM SERPL-SCNC: 143 MMOL/L — SIGNIFICANT CHANGE UP (ref 135–145)
WBC # BLD: 4.37 K/UL — SIGNIFICANT CHANGE UP (ref 3.8–10.5)
WBC # FLD AUTO: 4.37 K/UL — SIGNIFICANT CHANGE UP (ref 3.8–10.5)

## 2018-10-25 RX ORDER — WARFARIN SODIUM 2.5 MG/1
1 TABLET ORAL ONCE
Qty: 0 | Refills: 0 | Status: COMPLETED | OUTPATIENT
Start: 2018-10-25 | End: 2018-10-25

## 2018-10-25 RX ORDER — FUROSEMIDE 40 MG
40 TABLET ORAL DAILY
Qty: 0 | Refills: 0 | Status: DISCONTINUED | OUTPATIENT
Start: 2018-10-25 | End: 2018-10-27

## 2018-10-25 RX ADMIN — ALFUZOSIN HYDROCHLORIDE 10 MILLIGRAM(S): 10 TABLET, EXTENDED RELEASE ORAL at 12:31

## 2018-10-25 RX ADMIN — Medication 3 MILLILITER(S): at 11:21

## 2018-10-25 RX ADMIN — Medication 100 MILLIGRAM(S): at 05:50

## 2018-10-25 RX ADMIN — Medication 1 DROP(S): at 05:49

## 2018-10-25 RX ADMIN — CEFEPIME 1000 MILLIGRAM(S): 1 INJECTION, POWDER, FOR SOLUTION INTRAMUSCULAR; INTRAVENOUS at 05:49

## 2018-10-25 RX ADMIN — Medication 40 MILLIGRAM(S): at 12:32

## 2018-10-25 RX ADMIN — GABAPENTIN 200 MILLIGRAM(S): 400 CAPSULE ORAL at 05:50

## 2018-10-25 RX ADMIN — Medication 50 MILLIGRAM(S): at 05:50

## 2018-10-25 RX ADMIN — WARFARIN SODIUM 1 MILLIGRAM(S): 2.5 TABLET ORAL at 20:43

## 2018-10-25 RX ADMIN — Medication 20 MILLIEQUIVALENT(S): at 12:31

## 2018-10-25 RX ADMIN — GABAPENTIN 200 MILLIGRAM(S): 400 CAPSULE ORAL at 20:42

## 2018-10-25 RX ADMIN — CEFEPIME 1000 MILLIGRAM(S): 1 INJECTION, POWDER, FOR SOLUTION INTRAMUSCULAR; INTRAVENOUS at 17:05

## 2018-10-25 RX ADMIN — Medication 50 MILLIGRAM(S): at 17:05

## 2018-10-25 RX ADMIN — Medication 3 MILLILITER(S): at 20:05

## 2018-10-25 RX ADMIN — Medication 1 DROP(S): at 17:09

## 2018-10-25 RX ADMIN — Medication 1 TABLET(S): at 12:31

## 2018-10-25 RX ADMIN — Medication 100 MILLIGRAM(S): at 12:32

## 2018-10-25 RX ADMIN — Medication 100 MICROGRAM(S): at 05:50

## 2018-10-25 RX ADMIN — Medication 100 MILLIGRAM(S): at 20:43

## 2018-10-25 RX ADMIN — GABAPENTIN 200 MILLIGRAM(S): 400 CAPSULE ORAL at 12:32

## 2018-10-25 RX ADMIN — Medication 0.25 MILLIGRAM(S): at 05:49

## 2018-10-25 NOTE — PROGRESS NOTE ADULT - ASSESSMENT
89 year old male as above     #Dyspnea  #Multifocal PNA (HCAP)  #acute on chronic congestive heart failure - lvef 06/2018 55-60%  - continue tele  - bnp elevated >19k trending down  - trop x 3 negative   - CT chest Multifocal PNA  - RVP Neg  - S/P lasix 40mg IV QD - hold diuretics for today and resume Oral lasix randall if cr improving   - S/P Vanco and zosyn  - continue cefepime 1mg12h#2 suspected gram negative, gram positive and other resistant organisms  - not on acei due to cr being >2  - ID consult appreciated   - Pulm Consult appreciated   - FU legionella ag and mycoplasma  - cultures - blood and sputum pending     #neno on ckd   - monitor cr closely   - nephro consult appreciated - Monitor off diuretics for another day and resume tomorrow.   - hold diuretics for 2-3 days and allow stabilization of CKD    #Hypotension  -continue BB  -hold lasix and Isordil    #Hypokalemia  -replete with IV and PO  -continue to monitor     #afib with RVR/Alfutter  - monitor inr- currently therapuetic  - continue coumadin, betablocker    #anxiety- stable  - continue xanax bid prn    #hypothyroidism- stable   - continue levothyroxine     #post herpetic neuralgia- stable  - continue home meds including gabapentin  - refresh tears  - advised daughter to bring in ointment as not in stock at     #bph- stable   - continue alfuzosin - non formulary    #dvt prophylaxis   - on coumadin for afib    Advanced Care Planning 35 minutes.  Discussion with patient and family regarding advance directives. ; It seems like hes leaning toward DNR/DNI but would like to speak with his family; 89 year old male as above     #Dyspnea  #Multifocal PNA (HCAP)  #acute on chronic congestive heart failure - lvef 06/2018 55-60%  - continue tele  - bnp elevated >19k trending down  - trop x 3 negative   - CT chest Multifocal PNA  - RVP Neg  - S/P lasix 40mg IV QD and change po lasix  - S/P Vanco and zosyn  - continue cefepime 1mg12h#2 suspected gram negative, gram positive and other resistant organisms  - not on acei due to cr being >2  - ID consult appreciated   - Pulm Consult appreciated   - FU legionella ag and mycoplasma  - cultures - blood and sputum pending     #neno on ckd   - monitor cr closely   - nephro consult appreciated - Monitor off diuretics for another day and resume tomorrow.   - hold diuretics for 2-3 days and allow stabilization of CKD    #Hypotension - resolved  - resume lasix.  continue BB    #Hypokalemia - resolved     #afib with RVR/Alfutter  - monitor inr- currently therapeutic  - continue coumadin, betablocker    #anxiety- stable  - continue xanax bid prn    #hypothyroidism- stable   - continue levothyroxine     #post herpetic neuralgia- stable  - continue home meds including gabapentin  - refresh tears  - advised daughter to bring in ointment as not in stock at     #bph- stable   - continue alfuzosin - non formulary    # r/o basal cell carcinoma of scalp - follows up with dr jain and had bx 5 days before admission  - outpt f/u     #dvt prophylaxis   - on coumadin for afib     Discussion with patient and family regarding advance directives. ; It seems like hes leaning toward DNR/DNI but would like to speak with his family;

## 2018-10-25 NOTE — GOALS OF CARE CONVERSATION - PERSONAL ADVANCE DIRECTIVE - WHAT MATTERS MOST
the patient does not 'think' he would want to be resuscitated but states that he would leave that situation up to his daughter when the time comes.

## 2018-10-25 NOTE — PROGRESS NOTE ADULT - SUBJECTIVE AND OBJECTIVE BOX
Patient is a 89y old  Male who presents with a chief complaint of CHF Exacerbation w/ possible hcap (25 Oct 2018 09:46)    HPI:  89 year old male with pmh of anxiety, basal cell carcinoma, bph, ckd, htn, hypothyroid, lymphoma, postherpetic neuralgia, prostate ca coming to  ED with complaints of worsening shortness of breath x 3 days.    Pt is SOB with lying supine.   was seen in Dr Joshi office on Friday and was given lasix 40mg iv push however sob improved mildly.   Labs were checked out,   patient and as patient sob not markedly improved and patient found to have neno on ckd was sent to hospital for further eval. Patient also stating has been having a decreased po intake for the past week.  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    was given azithromycin and another abx about two weeks ago for cough as he had a suspected pna.   States the abx he does not know name of gave him diarrhea however that resolved.   Patient was also admitted 1 month prior for chf exacerbation. Denies fevers, chills, chest pain. (22 Oct 2018 21:23)    10/25/18- pt denies any current CP/SOB.  Audible expiratory wheezing heard during conversation, appears in NAD.  Pt states he is feeling better, although he says he has not ambulated with PT.  Lives at home alone but daughter helps.  He eats a lot of chinese food, and the week prior to admission, he said he ate more chinese food than usual, and then started to become SOB.  Doesn't generally cook his own meals, spoke with pt and SW and provided him with numbers for meals on wheels and mom's meals.  Currently also being treated for pneumonia.  CHF education done with pt, verb understanding and was able to teach back.  Hx of elevated kidney function, unable to add spironolactone to medication regimen due to elevated creatinine.       Transthoracic Echocardiogram:    EXAM:  ECHO TTE WO CON COMP W DOP         PROCEDURE DATE:  10/24/2018        INTERPRETATION:  Transthoracic Echocardiography Report (TTE)     Demographics     Patient name        VARSHA NIEVES    Age           89 year(s)     Med Rec #           [id number]         Gender        Male     Account #           [id number]           Date of Birth 07/03/1927     Interpreting        Brittany Ruiz MD Room Number   0004   Physician     Referring Physician Solo [email or web address]        Sonographer   Alber Huffman RDCS     Date of study       10/24/2018 08:21                       AM     Height              65 in             Weight        154.99 pounds    Type of Study:     TTE procedure: ECHO TTE WO CON COMP W DOP     BP: 102/63 mmHg     Study Location: 3NTechnical Quality: Technically Difficullt    Indications   1) I50.9 - Heart failure    M-Mode Measurements (cm)     LVEDd: 4.08 cm            LVESd: 2.8 cm   IVSEd: 1.39 cm   LVPWd: 1.31 cm            AO Root Dimension: 3.5 cm                             ACS: 1.3 cm                             LA: 5.6 cm                             LVOT: 1.9 cm    Doppler Measurements:     AV Velocity:345 cm/s                 MV Peak E-Wave: 73.1 cm/s   AV Peak Gradient: 47.61 mmHg         MV Peak A-Wave: 24.7 cm/s   AVMean Gradient: 29 mmHg            MV E/A Ratio: 2.96 %   AV Area (Continuity):0.59 cm^2       MV Peak Gradient: 2.14 mmHg   TR Velocity:297 cm/s   TR Gradient:35.2836 mmHg   Estimated RAP:10 mmHg   RVSP:45 mmHg     Findings     Mitral Valve   Fibrocalcific changes noted to the mitral valve leaflets with preserved   leaflet excursion.   Mild mitral annular calcification is present.   Moderate (2+) mitral regurgitation is present.     Aortic Valve   Significant fibrocalcific changes noted to the aortic valve leaflets with   restriction in leaflet excursion. Peak transaortic gradient is 48 mmHg;   this finding is consistent with moderate aortic stenosis.   DI = .2   Continuity equation AGUSTIN is .59 cm which likely overestimates the degree   of   aortic stenosis     Tricuspid Valve   Mild to Moderate Tricuspid regurgitation is present. Mild pulmonary   hypertension.     Pulmonic Valve   Pulmonic valve not well seen.     Left Atrium   The left atrium is moderately dilated.     Left Ventricle   Mild concentric left ventricular hypertrophy is present.   Estimated left ventricular ejection fraction is 55-60 %.     Right Atrium   The right atrium appears moderately dilated.     Right Ventricle   Normal appearing right ventricle structure and function.     Pericardial Effusion   No evidence of pericardial effusion.     Pleural Effusion   No evidence of pleural effusion.     Miscellaneous   The IVC is dilated.     Impression     Summary     Fibrocalcific changes noted to the mitral valve leaflets with preserved   leaflet excursion.   Mild mitral annular calcification is present.   Moderate (2+) mitral regurgitation is present.   Significant fibrocalcific changes noted to the aortic valve leaflets with   restriction in leaflet excursion. Peak transaortic gradient is 48 mmHg;   this finding is consistent with moderate aortic stenosis.   DI = .2   Continuity equation AGUSTIN is .59 cm which likely overestimates the degree   of   aortic stenosis   Mild to Moderate Tricuspid regurgitation is present. Mild pulmonary   hypertension.   Mild concentric left ventricular hypertrophy is present.   Estimated left ventricular ejection fraction is 55-60 %.   The right atrium appears moderately dilated.   Normal appearing right ventricle structure andfunction.     Signature     ----------------------------------------------------------------   Electronically signed by Brittany Ruiz MD(Interpreting   physician) on 10/24/2018 06:04 PM   ----------------------------------------------------------------    Valves     Mitral Valve     Peak E-Wave: 73.1 cm/s   Peak A-Wave: 24.7 cm/s   Peak Gradient: 2.14 mmHg                                                 E/A Ratio: 2.96     Aortic Valve     Peak Velocity: 345 cm/s            Area (continuity): 0.59 cm^2   Peak Gradient: 47.61 mmHg          Mean Gradient: 29 mmHg                                      AV VTI: 67.9 cm   Cusp Separation: 1.3 cm     Tricuspid Valve     TR Velocity: 297 cm/s                  Estimated RAP: 10 mmHg   TR Gradient: 35.2836 mmHg              Estimated RVSP: 45 mmHg     Pulmonic Valve              Estimated PASP: 45.28 mmHg     LVOT     Peak Velocity: 72 cm/s                Mean Gradient: 1 mmHg   LVOT Diameter: 1.9 cm                 LVOT VTI: 14.1 cm    Structures     Left Atrium     LA Dimension: 5.6 cm         LA Area: 30 cm^2   LA/Aorta: 1.6                LA Volume/Index: 113 ml /64m^2     Left Ventricle     Diastolic Dimension: 4.08 cm           Systolic Dimension: 2.8 cm   Septum Diastolic: 1.39 cm   PW Diastolic: 1.31 cm     FS: 31.37 %   LVOT Diameter: 1.9 cm     Right Atrium     RA Systolic Pressure: 10 mmHg     Right Ventricle              RV Systolic Pressure: 45.28 mmHg     Miscellaneous     Aorta     Aortic Root: 3.5 cm   LVOT Diameter: 1.9 cm                    BRITTANY RUIZ M.D., ATTENDING CARDIOLOGIST  This document has been electronically signed. Oct 24 2018  6:04PM             (10-24-18 @ 09:06)

## 2018-10-25 NOTE — DIETITIAN INITIAL EVALUATION ADULT. - PERTINENT LABORATORY DATA
10-25 Na143 mmol/L Glu 94 mg/dL K+ 3.6 mmol/L Cr  1.80 mg/dL<H> BUN 30 mg/dL<H> Phos 2.7 mg/dL Alb n/a   PAB n/a

## 2018-10-25 NOTE — DIETITIAN INITIAL EVALUATION ADULT. - PERTINENT MEDS FT
MEDICATIONS  (STANDING):  ALBUTerol/ipratropium for Nebulization 3 milliLiter(s) Nebulizer every 6 hours  alfuzosin 10 milliGRAM(s) Oral daily  artificial  tears Solution 1 Drop(s) Both EYES two times a day  cefepime  Injectable. 1000 milliGRAM(s) IV Push every 12 hours  cefepime  Injectable.      docusate sodium 100 milliGRAM(s) Oral three times a day  furosemide    Tablet 40 milliGRAM(s) Oral daily  gabapentin 200 milliGRAM(s) Oral three times a day  lactobacillus acidophilus 1 Tablet(s) Oral daily  levothyroxine 100 MICROGram(s) Oral daily  Lotemax ointment 1 Application(s) 1 Application(s) Both EYES daily  metoprolol succinate ER 50 milliGRAM(s) Oral two times a day  potassium chloride    Tablet ER 20 milliEquivalent(s) Oral daily    MEDICATIONS  (PRN):  acetaminophen   Tablet .. 650 milliGRAM(s) Oral every 6 hours PRN Temp greater or equal to 38C (100.4F), Mild Pain (1 - 3), Moderate Pain (4 - 6)  ALPRAZolam 0.25 milliGRAM(s) Oral two times a day PRN anxiety  diltiazem Injectable 10 milliGRAM(s) IV Push every 6 hours PRN for HR > 100 bpm  senna 2 Tablet(s) Oral at bedtime PRN Constipation

## 2018-10-25 NOTE — PROGRESS NOTE ADULT - ASSESSMENT
89 M with shortness of breath - multifactorial    Diastolic failure- mild. Legs are without edema and mild crackles on bases- recommend low dose lasix with close monitering of renal function and electrolytes- if Creatine continues to rise, would hold lasix for a day . will add low dose nitrate to reduce preload    Pulm- PNA- on abx, recommend neblizer treatment, mucolytic and consideration for steroids      Aflutter- metoprolol for rate control, continue with coumadin dosing goal INR 2-2.5     No evidence for ACS     10/24/18:  Cardiac status stable.  Off diuretics and on antibiotics.  Renal function stable.  May need to add back furosemide.  Will watch closely.    10/25/18:  Patient remains stable.  Renal function somewhat improved off diuretics.  Continue antibiotics for right sided multifocal pneumonia

## 2018-10-25 NOTE — DIETITIAN INITIAL EVALUATION ADULT. - PHYSICAL APPEARANCE
emaciated/pt shows signs of severe muscle wasting in temporal, clavicle, deltoid, interosseous, patellar, thigh, and calf region and mild-moderate signs of fat wasting in orpital, triceps, and ribs.

## 2018-10-25 NOTE — PROGRESS NOTE ADULT - ASSESSMENT
PHYSICAL EXAM:  GENERAL: NAD, well-developed  HEAD:  Atraumatic, Normocephalic  EYES: EOMI, PERRLA, conjunctiva and sclera clear  NECK: Supple, mild JVD  CHEST/LUNG: expiratory wheezing throughout all lung fields  HEART: IRRegular rate and rhythm; Murmurs heard in all cardiac landmarks  ABDOMEN: Soft, Nontender, Nondistended; Bowel sounds present  EXTREMITIES:  2+ Peripheral Pulses, No clubbing, cyanosis, or edema  PSYCH: AAOx3  NEUROLOGY: non-focal  SKIN: wound on top of head, in healing stages

## 2018-10-25 NOTE — PROGRESS NOTE ADULT - SUBJECTIVE AND OBJECTIVE BOX
HOSPITALIST PROGRESS NOTE:  SUBJECTIVE:  PCP: PCP/Cardio- Dr Joshi  Chief Complaint: Patient is a 89y old  Male who presents with a chief complaint of CHF Exacerbation w/ possible hcap (23 Oct 2018 06:42)    HPI:  89 year old male with pmh of anxiety, basal cell carcinoma, bph, ckd, htn, hypothyroid, lymphoma, postherpetic neuralgia, prostate ca coming to  ED with complaints of worsening shortness of breath x 3 days.    Pt is SOB with lying supine.   was seen in Dr Joshi office on Friday and was given lasix 40mg iv push however sob improved mildly.   Labs were checked out,   patient and as patient sob not markedly improved and patient found to have neno on ckd was sent to hospital for further eval. Patient also stating has been having a decreased po intake for the past week.  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    was given azithromycin and another abx about two weeks ago for cough as he had a suspected pna.   States the abx he does not know name of gave him diarrhea however that resolved.   Patient was also admitted 1 month prior for chf exacerbation. Denies fevers, chills, chest pain. (22 Oct 2018 21:23)    10/23:  Above Reviewed. dyspnea improving. No other complaints  10/24: Daughter at bedside; still has dyspnea; discussed advance directives with patient and daughter; It seems like hes leaning toward DNR/DNI but would like to speak with his family; Tele still in flutter; also found to have low BP this morning   10/25 - pt is still wheezing but is feeling less SOB.    ROS:   All 10 systems reviewed and found to be negative with the exception of what has been described above.    GEN: lying in bed, NAD  HEENT:   NC/AT, pupils equal and reactive, EOMI  CV:  +S1, +S2, RRR  RESP:   lungs clear to auscultation bilaterally, no wheeze, rales, rhonchi   BREAST:  not examined  GI:  abdomen soft, non-tender, non-distended, normoactive BS  RECTAL:  not examined  :  not examined  MSK:   normal muscle tone  EXT:  no edema  NEURO:  AAOX3, no focal neurological deficits  SKIN:  no rashes                 11.3   4.37  )-----------( 98       ( 25 Oct 2018 06:23 )             37.4     10-25    143  |  104  |  30<H>  ----------------------------<  94  3.6   |  35<H>  |  1.80<H>    Ca    8.4<L>      25 Oct 2018 06:23  Phos  2.7     10-25  Mg     2.2     10-25      PT/INR - ( 25 Oct 2018 06:23 )   PT: 31.2 sec;   INR: 2.83 ratio         PTT - ( 24 Oct 2018 15:26 )  PTT:38.3 sec    RADIOLOGY/EKG:    < from: Xray Chest 1 View AP/PA. (10.22.18 @ 18:32) >    IMPRESSION:  Findings are likely indicative of mild pneumonia involving the right lung   for which clinical correlation is recommended.    < end of copied text >  < from: CT Chest No Cont (10.23.18 @ 08:44) >    IMPRESSION:  The findings are suggestive of multifocal pneumonia in the right lung,   for which clinical correlation is recommended. Interval resolution of the   left pleural effusion.  Extensive lymphadenopathies in the chest and upper abdomen are again,   noted unchanged.  Other findings as above.    < end of copied text > PCP: PCP/Cardio- Dr Joshi    Chief Complaint: Patient is a 89y old  Male who presents with a chief complaint of CHF Exacerbation w/ possible hcap (23 Oct 2018 06:42)    HPI:  89 year old male with pmh of anxiety, basal cell carcinoma, bph, ckd, htn, hypothyroid, lymphoma, postherpetic neuralgia, prostate ca coming to  ED with complaints of worsening shortness of breath x 3 days.    Pt is SOB with lying supine.   was seen in Dr Joshi office on Friday and was given lasix 40mg iv push however sob improved mildly.   Labs were checked out,   patient and as patient sob not markedly improved and patient found to have neno on ckd was sent to hospital for further eval. Patient also stating has been having a decreased po intake for the past week.  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    was given azithromycin and another abx about two weeks ago for cough as he had a suspected pna.   States the abx he does not know name of gave him diarrhea however that resolved.   Patient was also admitted 1 month prior for chf exacerbation. Denies fevers, chills, chest pain. (22 Oct 2018 21:23)    10/23:  Above Reviewed. dyspnea improving. No other complaints  10/24: Daughter at bedside; still has dyspnea; discussed advance directives with patient and daughter; It seems like hes leaning toward DNR/DNI but would like to speak with his family; Tele still in flutter; also found to have low BP this morning   10/25 - pt is still wheezing but is feeling less SOB.    ROS:   All 10 systems reviewed and found to be negative with the exception of what has been described above.    Vital Signs Last 24 Hrs  T(C): 36.2 (25 Oct 2018 10:02), Max: 37.3 (24 Oct 2018 17:28)  T(F): 97.1 (25 Oct 2018 10:02), Max: 99.1 (24 Oct 2018 17:28)  HR: 76 (25 Oct 2018 11:21) (73 - 145)  BP: 109/63 (25 Oct 2018 10:02) (90/69 - 112/57)  BP(mean): --  RR: 18 (25 Oct 2018 10:02) (17 - 18)  SpO2: 100% (25 Oct 2018 10:02) (98% - 100%)    GEN: lying in bed, NAD  HEENT:  bx scar right scalp healing well , pupils equal and reactive, EOMI  CV:  +S1, +S2, RRR  RESP:   few scattered rhonchi right with scattered wheeze right  BREAST:  not examined  GI:  abdomen soft, non-tender, non-distended, normoactive BS  RECTAL:  not examined  :  not examined  MSK:   normal muscle tone  EXT:  no edema  NEURO:  AAOX3, no focal neurological deficits  SKIN:  no rashes                 11.3   4.37  )-----------( 98       ( 25 Oct 2018 06:23 )             37.4     10-25    143  |  104  |  30<H>  ----------------------------<  94  3.6   |  35<H>  |  1.80<H>    Ca    8.4<L>      25 Oct 2018 06:23  Phos  2.7     10-25  Mg     2.2     10-25      PT/INR - ( 25 Oct 2018 06:23 )   PT: 31.2 sec;   INR: 2.83 ratio         PTT - ( 24 Oct 2018 15:26 )  PTT:38.3 sec    RADIOLOGY/EKG:    < from: Xray Chest 1 View AP/PA. (10.22.18 @ 18:32) >    IMPRESSION:  Findings are likely indicative of mild pneumonia involving the right lung   for which clinical correlation is recommended.    < end of copied text >  < from: CT Chest No Cont (10.23.18 @ 08:44) >    IMPRESSION:  The findings are suggestive of multifocal pneumonia in the right lung,   for which clinical correlation is recommended. Interval resolution of the   left pleural effusion.  Extensive lymphadenopathies in the chest and upper abdomen are again,   noted unchanged.  Other findings as above.    < end of copied text >    MEDICATIONS  (STANDING):  ALBUTerol/ipratropium for Nebulization 3 milliLiter(s) Nebulizer every 6 hours  alfuzosin 10 milliGRAM(s) Oral daily  artificial  tears Solution 1 Drop(s) Both EYES two times a day  cefepime  Injectable. 1000 milliGRAM(s) IV Push every 12 hours  cefepime  Injectable.      docusate sodium 100 milliGRAM(s) Oral three times a day  furosemide    Tablet 40 milliGRAM(s) Oral daily  gabapentin 200 milliGRAM(s) Oral three times a day  lactobacillus acidophilus 1 Tablet(s) Oral daily  levothyroxine 100 MICROGram(s) Oral daily  Lotemax ointment 1 Application(s) 1 Application(s) Both EYES daily  metoprolol succinate ER 50 milliGRAM(s) Oral two times a day  potassium chloride    Tablet ER 20 milliEquivalent(s) Oral daily    MEDICATIONS  (PRN):  acetaminophen   Tablet .. 650 milliGRAM(s) Oral every 6 hours PRN Temp greater or equal to 38C (100.4F), Mild Pain (1 - 3), Moderate Pain (4 - 6)  ALPRAZolam 0.25 milliGRAM(s) Oral two times a day PRN anxiety  diltiazem Injectable 10 milliGRAM(s) IV Push every 6 hours PRN for HR > 100 bpm  senna 2 Tablet(s) Oral at bedtime PRN Constipation

## 2018-10-25 NOTE — DIETITIAN INITIAL EVALUATION ADULT. - ORAL INTAKE PTA
poor/pt normally has poor intake (normally eats ~2 meals/day) and dietary recall shows that pt has been eating about 1.5 meals/day

## 2018-10-25 NOTE — DIETITIAN INITIAL EVALUATION ADULT. - OTHER INFO
89 yo M seen as consult for CHF edu admitted with sob 2/2 acute on chronic CHF and supraventricular tachycardia. PHM Afib on coumadin, Anxiety, HTN, hypothyroidism, BPH, hx of prostate ca s/p radiation, hx of chronic diarrhea, basal cell carcinoma and  lymphoma s/p Rituxan, shingles with postherpetic neuralgia. Pt is noted in past admission for moderate malnutrition 2/2 to muscle/fat wasting and limited PO intake. On observation pt appears emaciated and NFPE shows signs of severe muscle wasting in temporal, clavicle, deltoid, interosseous, patellar, thigh, and calf region and mild-moderate signs of fat wasting in orpital, triceps, and ribs. Pt is noted for decreased PO intake for 1 week PTA although during assessment pt states that he has been eating about 1.5 meals on average for about 3 weeks. Prior to this admission in June 2018 pt was c/o limited appetite and eating ~2 meals per day. Pt reports that he doesn't have the appetite to eat more. Based on dietary recall pt likely eating less than 75% of needs for over 1 month. Pt likely with increased energy needs 2/2 hx of CA and noted for PU stage 1 on saccrum. Based on above pt meets criteria for severe malnutriton in the context of chronic illness (chronic CHF). Pt states he does not follow a diet at home however daughter prepares all meals. Provided pt with CHF diet education left at bedside for daughter. Daughter was not present at time of assessment but pt states she will be here later today. Will make effort to check in with her. Pt does not monitor wts but states when he goes home he states he will start limiting salt and monitoring wt with daughter. Pt reports no chewing or swallowing difficulties. Last BM 10/23 currently on bowel regimen. No c/o n/v/c/d. Alfie 17. No noted edema. Recommendations: 1. Provide ensure BID. 2. consider appetite stimulant? 3. monitor wts daily 4. provide MVI to help meet 100% of RDI 5. provide maximum assistance with meals and supplements to encourage PO intake.

## 2018-10-25 NOTE — PROGRESS NOTE ADULT - ASSESSMENT
89 yo man with multiple medical problems including CKD admitted with multifocal PNA.     LETICIA superimposed on CKD.  CT with no evidence of CHF.  Left Pleural effusion resolved.    Follow Right effusion with multifocal PNA.  Will hold diuretics for 2-3 days and allow stabilization of CKD while PNA treated with abtx.    10/24 SY  --LETICIA/CKD : slightly improved.  Monitor off diuretics for another day and resume tomorrow.  --PNA : continue abtx.    10/25 SY  --LETICIA/CKD : fairly stable for now.  Will resume outpt dose of diuretic to maintain stable fluid status.  --PNA : continue current abtx.  Monitor response.

## 2018-10-25 NOTE — PROGRESS NOTE ADULT - SUBJECTIVE AND OBJECTIVE BOX
NEPHROLOGY INTERVAL HPI/OVERNIGHT EVENTS:    Date of Service: 10-25-18 @ 09:47  10/25 SY  No acute events overnight.  Feeling weak today --Was unable to sleep well due to floor activities.    10/24 SY  No acute event overnight.  Reports slept fairly well.  no new complaints.  No acute distress    HPI:  89 yo man with PMHX significant for CHF, HTN, Hx of Lymphoma treated with Rituxan.  Notable for HH admission in 6/2018 for CHF and LETICIA.   Creat stabilized to 1.7 prior to d/c.  Developed sob for several days.  Given IV lasix by Dr. Joshiwith mild improvement in symptoms.  Pt admitted due to worsened renal function with persistent SOB.  CT chest now c/w multifocal PNA.  Renal evaluation requested for LETICIA superimposed on CKD.  Pt denies any fever at home.    PMHX and PSHX.  1.CKD ( 12/2015--Creat 1.63), Post recent hosp for CHF.  Creat 1.7 on d/c 6/2018.  2.A FIB  3.HTN  4.Hx of Prostate CA ( Post RT)  5.Hx of Lymphoma Post Rituxan therapy.  6.Hx of Postherpetic Shingles.  7.Hypothyroidism  8.Hx of Chronic Diarrhea.  9.Recent admission for CHF.    MEDICATIONS  (STANDING):  ALBUTerol/ipratropium for Nebulization 3 milliLiter(s) Nebulizer every 6 hours  alfuzosin 10 milliGRAM(s) Oral daily  artificial  tears Solution 1 Drop(s) Both EYES two times a day  cefepime  Injectable. 1000 milliGRAM(s) IV Push every 12 hours  cefepime  Injectable.      docusate sodium 100 milliGRAM(s) Oral three times a day  gabapentin 200 milliGRAM(s) Oral three times a day  lactobacillus acidophilus 1 Tablet(s) Oral daily  levothyroxine 100 MICROGram(s) Oral daily  Lotemax ointment 1 Application(s) 1 Application(s) Both EYES daily  metoprolol succinate ER 50 milliGRAM(s) Oral two times a day  potassium chloride    Tablet ER 20 milliEquivalent(s) Oral daily    MEDICATIONS  (PRN):  acetaminophen   Tablet .. 650 milliGRAM(s) Oral every 6 hours PRN Temp greater or equal to 38C (100.4F), Mild Pain (1 - 3), Moderate Pain (4 - 6)  ALPRAZolam 0.25 milliGRAM(s) Oral two times a day PRN anxiety  diltiazem Injectable 10 milliGRAM(s) IV Push every 6 hours PRN for HR > 100 bpm  senna 2 Tablet(s) Oral at bedtime PRN Constipation          Vital Signs Last 24 Hrs  T(C): 36.4 (25 Oct 2018 05:00), Max: 37.3 (24 Oct 2018 17:28)  T(F): 97.6 (25 Oct 2018 05:00), Max: 99.1 (24 Oct 2018 17:28)  HR: 84 (25 Oct 2018 05:00) (75 - 145)  BP: 112/57 (25 Oct 2018 05:00) (90/69 - 112/57)  BP(mean): --  RR: 17 (25 Oct 2018 05:00) (17 - 18)  SpO2: 100% (25 Oct 2018 05:00) (98% - 100%)  Daily     Daily       PHYSICAL EXAM:  Alert and appropriate  GENERAL: No distress  CHEST/LUNG: Right diffuse rhonchi with end exp wheezing  HEART: S1S2 RRR  ABDOMEN: soft  EXTREMITIES: no edema  SKIN:     LABS:                        11.3   4.37  )-----------( 98       ( 25 Oct 2018 06:23 )             37.4     10-25    143  |  104  |  30<H>  ----------------------------<  94  3.6   |  35<H>  |  1.80<H>    Ca    8.4<L>      25 Oct 2018 06:23  Phos  2.7     10-25  Mg     2.2     10-25      PT/INR - ( 25 Oct 2018 06:23 )   PT: 31.2 sec;   INR: 2.83 ratio         PTT - ( 24 Oct 2018 15:26 )  PTT:38.3 sec    Magnesium, Serum: 2.2 mg/dL (10-25 @ 06:23)  Phosphorus Level, Serum: 2.7 mg/dL (10-25 @ 06:23)          RADIOLOGY & ADDITIONAL TESTS:

## 2018-10-25 NOTE — PROGRESS NOTE ADULT - PROBLEM SELECTOR PLAN 1
CHF education done with patient   Educational handouts provided including:  Signs and symptoms of CHF exacerbation   Daily Weights  What to do if symptoms worsen  How, when, and why to take medication  lifestyle changes  limiting sodium intake to 1500mg-2000mg daily  when to contact HCP  Ejection Fraction 55-60%      Post d/c follow up appointment to be made by unit clerk when medically stable

## 2018-10-25 NOTE — PROGRESS NOTE ADULT - SUBJECTIVE AND OBJECTIVE BOX
Subjective:   Afebrile  Feels like breathing has improved    Review of Systems:  All 10 systems reviewed in detailed and found to be negative with the exception of what has already been described above    Allergies:  No Known Allergies    Meds  MEDICATIONS  (STANDING):  ALBUTerol/ipratropium for Nebulization 3 milliLiter(s) Nebulizer every 6 hours  alfuzosin 10 milliGRAM(s) Oral daily  artificial  tears Solution 1 Drop(s) Both EYES two times a day  cefepime  Injectable. 1000 milliGRAM(s) IV Push every 12 hours  cefepime  Injectable.      docusate sodium 100 milliGRAM(s) Oral three times a day  furosemide    Tablet 40 milliGRAM(s) Oral daily  gabapentin 200 milliGRAM(s) Oral three times a day  lactobacillus acidophilus 1 Tablet(s) Oral daily  levothyroxine 100 MICROGram(s) Oral daily  Lotemax ointment 1 Application(s) 1 Application(s) Both EYES daily  metoprolol succinate ER 50 milliGRAM(s) Oral two times a day  potassium chloride    Tablet ER 20 milliEquivalent(s) Oral daily  warfarin 1 milliGRAM(s) Oral once    MEDICATIONS  (PRN):  acetaminophen   Tablet .. 650 milliGRAM(s) Oral every 6 hours PRN Temp greater or equal to 38C (100.4F), Mild Pain (1 - 3), Moderate Pain (4 - 6)  ALPRAZolam 0.25 milliGRAM(s) Oral two times a day PRN anxiety  diltiazem Injectable 10 milliGRAM(s) IV Push every 6 hours PRN for HR > 100 bpm  senna 2 Tablet(s) Oral at bedtime PRN Constipation    Physical Exam  Gen: Alert, oriented, no distress  HEENT: Anicteric sclera, moist mucous membranes, no JVD, no lymphadenopathy, scar on scalp  Cardio: Regular rhythm and rate, normal S1S2, no murmurs  Resp: Slightly rhoncherous  GI: Nontender, nondistended, normoactive bowel sounds  Ext: No cyanosis, clubbing or edema  Neuro: Nonfocal    Labs:                        11.3   4.37  )-----------( 98       ( 25 Oct 2018 06:23 )             37.4     10-25    143  |  104  |  30<H>  ----------------------------<  94  3.6   |  35<H>  |  1.80<H>    Ca    8.4<L>      25 Oct 2018 06:23  Phos  2.7     10-25  Mg     2.2     10-25      PT/INR - ( 25 Oct 2018 06:23 )   PT: 31.2 sec;   INR: 2.83 ratio      The airway shows normal caliber and contour with patent lumen.    The right pleural effusion is unchanged, but there has been resolution of   the left pleural effusion. Interval development of fine nodular   infiltrates in the right upper lobe. Some infiltrates in the postero   lateral right lower lobe. Findings are suggestive of multifocal pneumonia   for which clinical correlation is recommended. Also noted are some   atelectatic changes in the lateral segment of the right middle lobe. The   left lower lobe is better expanded at this time.    Again noted are extensive bilateral axillary, pretracheal precarinal and   aorticopulmonary window and left posterior mediastinal lymphadenopathies,   consistent with patient's history of lymphoma, unchanged.    The mediastinum great vessels , atherosclerotic changes in the thoracic   aorta are again noted as well as calcification in the aortic valve.,he   heart is normal. There is no pericardial effusion.    Cardiomegaly is unchanged. No evidence of a pericardial effusion.      Limited evaluation of the upper abdomen, multiple liver lesions are again   noted as well as large left upper retroperitoneal lymphadenopathies.   Stones in the gallbladder are again noted   No evidence of acute cholecystitis. Multiple lesions in the left kidney   are again noted cannot be evaluated due to the noncontrast technique.    The bones , hypertrophic degenerative changes in the thoracic and upper   lumbar spine are againnoted.     IMPRESSION:  The findings are suggestive of multifocal pneumonia in the right lung,   for which clinical correlation is recommended. Interval resolution of the   left pleural effusion.  Extensive lymphadenopathies in the chest and upper abdomen are again,   noted unchanged.  Other findings as above.       PTT - ( 24 Oct 2018 15:26 )  PTT:38.3 sec

## 2018-10-25 NOTE — PROGRESS NOTE ADULT - ASSESSMENT
89 year old male with pmh of anxiety, basal cell carcinoma, bph, ckd, htn, hypothyroid, lymphoma, postherpetic neuralgia, prostate ca coming to  ED with complaints of worsening shortness of breath x 3 days. Pt is SOB with lying supine.   was seen in Dr Joshi office on 10/19 and was given lasix 40mg iv push however sob improved mildly.   Labs were checked out,   patient and as patient sob not markedly improved and patient found to have neno on ckd was sent to hospital for further eval. Patient also stating has been having a decreased po intake for the past week.  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   was given azithromycin and another abx about two weeks ago for cough as he had a suspected pna. States the abx he does not know name of gave him diarrhea however that resolved.   Patient was also admitted 1 month prior for chf exacerbation. Denies fevers, chills, chest pain. Here noted to have R multifocal pna, LN in chest/abd, elevated bnp was given lasix/abx for chf/pna    1. 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 other issues - care per medicine

## 2018-10-25 NOTE — PROGRESS NOTE ADULT - SUBJECTIVE AND OBJECTIVE BOX
Date of service: 10-24-18 @ 10:51    pt seen and examined  sitting up in chair   less cough  feeling better   tired      ROS: no fever or chills; denies dizziness, no HA,  no abdominal pain, no diarrhea or constipation; no dysuria, no urinary frequency, no legs pain, no rashes      PE:  Constitutional: frail looking  HEENT: NC/AT, EOMI, PERRLA, conjunctivae clear; ears and nose atraumatic; pharynx benign  Neck: supple; thyroid not palpable  Back: no tenderness  Respiratory: decreased breath sounds, rales R lung base  Cardiovascular: S1S2 regular, no murmurs  Abdomen: soft, not tender, not distended, positive BS; liver and spleen WNL  Genitourinary: no suprapubic tenderness  Lymphatic: no LN palpable  Musculoskeletal: no muscle tenderness, no joint swelling or tenderness  Extremities: no pedal edema  Neurological/ Psychiatric: moving all extremities  Skin: no rashes; no palpable lesions    Labs: all available labs reviewed                                    11.3   4.37  )-----------( 98       ( 25 Oct 2018 06:23 )             37.4     10-    143  |  104  |  30<H>  ----------------------------<  94  3.6   |  35<H>  |  1.80<H>    Ca    8.4<L>      25 Oct 2018 06:23  Phos  2.7     10-25  Mg     2.2     10-25               Urinalysis Basic - ( 22 Oct 2018 19:20 )    Color: Yellow / Appearance: Clear / S.015 / pH: x  Gluc: x / Ketone: Negative  / Bili: Negative / Urobili: Negative mg/dL   Blood: x / Protein: 15 mg/dL / Nitrite: Negative   Leuk Esterase: Negative / RBC: 6-10 /HPF / WBC 0-2   Sq Epi: x / Non Sq Epi: Occasional / Bacteria: Occasional    Culture - Blood (10.22.18 @ 19:38)    Specimen Source: .Blood None    Culture Results:   No growth to date.    Culture - Blood (10.22.18 @ 19:38)    Specimen Source: .Blood None    Culture Results:   No growth to date.        Radiology: all available radiological tests reviewed  < from: CT Chest No Cont (10.23.18 @ 08:44) >  EXAM:  CT CHEST                              Status post replacement of the proximal right humerus is again noted.          *** END OF ADDENDUM 10/23/2018  ***      PROCEDURE DATE:  10/23/2018          INTERPRETATION:  Chest CT without contrast dated 10/23/2018.    COMPARISON: 2018.    CLINICAL INFORMATION: Evaluate for pneumonia, history of lymphoma.    TECHNIQUE: Contiguous axial 2.5 mm slice thickness images of the chest   were obtained without intravenous contrast administration.    FINDINGS:    The airway shows normal caliber and contour with patent lumen.    The right pleural effusion is unchanged, but there has been resolution of   the left pleural effusion. Interval development of fine nodular   infiltrates in the right upper lobe. Some infiltrates in the postero   lateral right lower lobe. Findings are suggestive of multifocal pneumonia   for which clinical correlation is recommended. Also noted are some   atelectatic changes in the lateral segment of the right middle lobe. The   left lower lobe is better expanded at this time.    Again noted are extensive bilateral axillary, pretracheal precarinal and   aorticopulmonary window and left posterior mediastinal lymphadenopathies,   consistent with patient's history of lymphoma, unchanged.    The mediastinum great vessels , atherosclerotic changes in the thoracic   aorta are again noted as well as calcification in the aortic valve.,he   heart is normal. There is no pericardial effusion.    Cardiomegaly is unchanged. No evidence of a pericardial effusion.      Limited evaluation of the upper abdomen, multiple liver lesions are again   noted as well as large left upper retroperitoneal lymphadenopathies.   Stones in the gallbladder are again noted   No evidence of acute cholecystitis. Multiple lesions in the left kidney   are again noted cannot be evaluated due to the noncontrast technique.    The bones , hypertrophic degenerative changes in the thoracic and upper   lumbar spine are againnoted.     IMPRESSION:  The findings are suggestive of multifocal pneumonia in the right lung,   for which clinical correlation is recommended. Interval resolution of the   left pleural effusion.  Extensive lymphadenopathies in the chest and upper abdomen are again,   noted unchanged.  Other findings as above.        Advanced directives addressed: full resuscitation Date of service: 10-24-18 @ 10:51    pt seen and examined  sitting up in chair   less cough  feeling better   tired      ROS: no fever or chills; denies dizziness, no HA,  no abdominal pain, no diarrhea or constipation; no dysuria, no urinary frequency, no legs pain, no rashes    T(C): 36.2 (25 Oct 2018 10:02), Max: 37.3 (24 Oct 2018 17:28)  T(F): 97.1 (25 Oct 2018 10:02), Max: 99.1 (24 Oct 2018 17:28)  HR: 76 (25 Oct 2018 11:21) (73 - 145)  BP: 109/63 (25 Oct 2018 10:02) (90/69 - 112/57)  BP(mean): --  RR: 18 (25 Oct 2018 10:02) (17 - 18)  SpO2: 100% (25 Oct 2018 10:02) (98% - 100%)      PE:  Constitutional: frail looking  HEENT: NC/AT, EOMI, PERRLA, conjunctivae clear; ears and nose atraumatic; pharynx benign  Neck: supple; thyroid not palpable  Back: no tenderness  Respiratory: decreased breath sounds, rales R lung base  Cardiovascular: S1S2 regular, no murmurs  Abdomen: soft, not tender, not distended, positive BS; liver and spleen WNL  Genitourinary: no suprapubic tenderness  Lymphatic: no LN palpable  Musculoskeletal: no muscle tenderness, no joint swelling or tenderness  Extremities: no pedal edema  Neurological/ Psychiatric: moving all extremities  Skin: no rashes; no palpable lesions    Labs: all available labs reviewed                                    11.3   4.37  )-----------( 98       ( 25 Oct 2018 06:23 )             37.4     10-    143  |  104  |  30<H>  ----------------------------<  94  3.6   |  35<H>  |  1.80<H>    Ca    8.4<L>      25 Oct 2018 06:23  Phos  2.7     10-25  Mg     2.2     10-25               Urinalysis Basic - ( 22 Oct 2018 19:20 )    Color: Yellow / Appearance: Clear / S.015 / pH: x  Gluc: x / Ketone: Negative  / Bili: Negative / Urobili: Negative mg/dL   Blood: x / Protein: 15 mg/dL / Nitrite: Negative   Leuk Esterase: Negative / RBC: 6-10 /HPF / WBC 0-2   Sq Epi: x / Non Sq Epi: Occasional / Bacteria: Occasional    Culture - Blood (10.22.18 @ 19:38)    Specimen Source: .Blood None    Culture Results:   No growth to date.    Culture - Blood (10.22.18 @ 19:38)    Specimen Source: .Blood None    Culture Results:   No growth to date.        Radiology: all available radiological tests reviewed  < from: CT Chest No Cont (10.23.18 @ 08:44) >  EXAM:  CT CHEST                              Status post replacement of the proximal right humerus is again noted.          *** END OF ADDENDUM 10/23/2018  ***      PROCEDURE DATE:  10/23/2018          INTERPRETATION:  Chest CT without contrast dated 10/23/2018.    COMPARISON: 2018.    CLINICAL INFORMATION: Evaluate for pneumonia, history of lymphoma.    TECHNIQUE: Contiguous axial 2.5 mm slice thickness images of the chest   were obtained without intravenous contrast administration.    FINDINGS:    The airway shows normal caliber and contour with patent lumen.    The right pleural effusion is unchanged, but there has been resolution of   the left pleural effusion. Interval development of fine nodular   infiltrates in the right upper lobe. Some infiltrates in the postero   lateral right lower lobe. Findings are suggestive of multifocal pneumonia   for which clinical correlation is recommended. Also noted are some   atelectatic changes in the lateral segment of the right middle lobe. The   left lower lobe is better expanded at this time.    Again noted are extensive bilateral axillary, pretracheal precarinal and   aorticopulmonary window and left posterior mediastinal lymphadenopathies,   consistent with patient's history of lymphoma, unchanged.    The mediastinum great vessels , atherosclerotic changes in the thoracic   aorta are again noted as well as calcification in the aortic valve.,he   heart is normal. There is no pericardial effusion.    Cardiomegaly is unchanged. No evidence of a pericardial effusion.      Limited evaluation of the upper abdomen, multiple liver lesions are again   noted as well as large left upper retroperitoneal lymphadenopathies.   Stones in the gallbladder are again noted   No evidence of acute cholecystitis. Multiple lesions in the left kidney   are again noted cannot be evaluated due to the noncontrast technique.    The bones , hypertrophic degenerative changes in the thoracic and upper   lumbar spine are againnoted.     IMPRESSION:  The findings are suggestive of multifocal pneumonia in the right lung,   for which clinical correlation is recommended. Interval resolution of the   left pleural effusion.  Extensive lymphadenopathies in the chest and upper abdomen are again,   noted unchanged.  Other findings as above.        Advanced directives addressed: full resuscitation

## 2018-10-25 NOTE — GOALS OF CARE CONVERSATION - PERSONAL ADVANCE DIRECTIVE - CONVERSATION DETAILS
Discussed diagnosis of CHF with patient, and it is a disease that will not be cured, but can be managed with continuous effort by the patient.  Discussed patient's understanding of the disease process, and what they are currently doing to control their exacerbations of CHF, what to do if they begin to have symptoms of CHF, and who to call if they have an exacerbation.  Discussed importance of medication regimen and follow up with cardiologist.

## 2018-10-25 NOTE — PROGRESS NOTE ADULT - SUBJECTIVE AND OBJECTIVE BOX
CHIEF COMPLAINT: Patient is a 89y old  Male who presents with a chief complaint of CHF Exacerbation w/ possible hcap (22 Oct 2018 21:23)      HPI:  89 year old male with pmh of anxiety, basal cell carcinoma, bph, ckd, htn, hypothyroid, lymphoma, postherpetic neuralgia, prostate ca coming to  ED with complaints of worsening shortness of breath x 3 days.    Pt is SOB with lying supine.   was seen in Dr Joshi office on Friday and was given lasix 40mg iv push however sob improved mildly.   Labs were checked out,   patient and as patient sob not markedly improved and patient found to have neno on ckd was sent to hospital for further eval. Patient also stating has been having a decreased po intake for the past week.  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    was given azithromycin and another abx about two weeks ago for cough as he had a suspected pna.   States the abx he does not know name of gave him diarrhea however that resolved.   Patient was also admitted 1 month prior for chf exacerbation. Denies fevers, chills, chest pain. (22 Oct 2018 21:23)      PMHx: PAST MEDICAL & SURGICAL HISTORY:  CKD (chronic kidney disease)  BPH (benign prostatic hyperplasia)  Postherpetic neuralgia  Chronic diarrhea  Prostate CA  Basal cell carcinoma  Lymphoma  Anxiety  Hypothyroid  Hypertension  A-fib  H/O shoulder replacement  S/P bilateral unicompartmental knee replacement        Soc Hx:       Allergies: Allergies    No Known Allergies    Intolerances          REVIEW OF SYSTEMS:    CONSTITUTIONAL: No weakness, fevers or chills  EYES/ENT: No visual changes;  No vertigo or throat pain   NECK: No pain or stiffness  RESPIRATORY: No cough, wheezing, hemoptysis; No shortness of breath  CARDIOVASCULAR: No chest pain or palpitations  GASTROINTESTINAL: No abdominal or epigastric pain. No nausea, vomiting, or hematemesis; No diarrhea or constipation. No melena or hematochezia.  GENITOURINARY: No dysuria, frequency or hematuria  NEUROLOGICAL: No numbness or weakness  SKIN: No itching, burning, rashes, or lesions   All other review of systems is negative unless indicated above    ICU Vital Signs Last 24 Hrs  T(C): 36.4 (25 Oct 2018 05:00), Max: 37.3 (24 Oct 2018 17:28)  T(F): 97.6 (25 Oct 2018 05:00), Max: 99.1 (24 Oct 2018 17:28)  HR: 84 (25 Oct 2018 05:00) (75 - 145)  BP: 112/57 (25 Oct 2018 05:00) (90/69 - 112/57)  BP(mean): --  ABP: --  ABP(mean): --  RR: 17 (25 Oct 2018 05:00) (17 - 18)  SpO2: 100% (25 Oct 2018 05:00) (98% - 100%)        I&O's Summary          PHYSICAL EXAM:   Constitutional: NAD, awake and alert, well-developed  HEENT: PERR, EOMI, Normal Hearing, MMM  Neck: Soft and supple, No LAD, No JVD  Respiratory: Breath sounds are clear bilaterally, No wheezing, rales or rhonchi  Cardiovascular: S1 and S2, regular rate and rhythm, no Murmurs, gallops or rubs  Gastrointestinal: Bowel Sounds present, soft, nontender, nondistended, no guarding, no rebound  Extremities: No peripheral edema  Vascular: 2+ peripheral pulses  Neurological: A/O x 3, no focal deficits  Musculoskeletal: 5/5 strength b/l upper and lower extremities  Skin: No rashes    MEDICATIONS  (STANDING):  ALBUTerol/ipratropium for Nebulization 3 milliLiter(s) Nebulizer every 6 hours  alfuzosin 10 milliGRAM(s) Oral daily  artificial  tears Solution 1 Drop(s) Both EYES two times a day  cefepime  Injectable. 1000 milliGRAM(s) IV Push every 12 hours  cefepime  Injectable.      docusate sodium 100 milliGRAM(s) Oral three times a day  gabapentin 200 milliGRAM(s) Oral three times a day  lactobacillus acidophilus 1 Tablet(s) Oral daily  levothyroxine 100 MICROGram(s) Oral daily  Lotemax ointment 1 Application(s) 1 Application(s) Both EYES daily  metoprolol succinate ER 50 milliGRAM(s) Oral two times a day  potassium chloride    Tablet ER 20 milliEquivalent(s) Oral daily          LABS: All Labs Reviewed:                        12.9   5.72  )-----------( 117      ( 22 Oct 2018 17:32 )             40.9     10-22    144  |  105  |  34<H>  ----------------------------<  124<H>  3.7   |  32<H>  |  2.17<H>    Ca    9.1      22 Oct 2018 17:32    TPro  6.1  /  Alb  3.4  /  TBili  0.8  /  DBili  x   /  AST  22  /  ALT  20  /  AlkPhos  68  10-22    10-23    147<H>  |  105  |  32<H>  ----------------------------<  98  3.4<L>   |  36<H>  |  2.02<H>    Ca    9.0      23 Oct 2018 07:06    TPro  6.1  /  Alb  3.4  /  TBili  0.8  /  DBili  x   /  AST  22  /  ALT  20  /  AlkPhos  68  10-22    10-25    143  |  104  |  30<H>  ----------------------------<  94  3.6   |  35<H>  |  1.80<H>    Ca    8.4<L>      25 Oct 2018 06:23  Phos  2.7     10-25  Mg     2.2     10-25          PT/INR - ( 22 Oct 2018 17:32 )   PT: 20.0 sec;   INR: 1.83 ratio         PTT - ( 22 Oct 2018 17:32 )  PTT:34.1 sec  CARDIAC MARKERS ( 23 Oct 2018 00:21 )  .030 ng/mL / x     / x     / x     / x      CARDIAC MARKERS ( 22 Oct 2018 21:45 )  0.032 ng/mL / x     / x     / x     / x      CARDIAC MARKERS ( 22 Oct 2018 17:32 )  0.025 ng/mL / x     / x     / x     / x        Prothrombin Time and INR, Plasma (10.25.18 @ 06:23)    Prothrombin Time, Plasma: 31.2 sec    INR: 2.83 ratio        Serum Pro-Brain Natriuretic Peptide: 32818 pg/mL (10-22 @ 17:32)      EKG: AFlutter with varible block, RBBB, LAFB    Telemetry: AFlutter with rate rapid rates     ECHO:< from: Transthoracic Echocardiogram (06.26.18 @ 10:03) >  mmary     Moderate (2+) mitral regurgitation is present.   Mild mitral annular calcification is present.   Significant fibrocalcific changes noted to the aortic valve leaflets with   restriction in leaflet excursion. Peak transaortic gradient is 45 mmHg;   this finding is consistent with mild to moderate aortic stenosis.   Trace aortic regurgitation is present.   Mild to moderate tricuspid valve regurgitation is present.   Normal appearing pulmonic valve structure and function.   The left atrium is mildly dilated.   The left ventricle is normal in size, wall motion and contractility.   Mild concentric left ventricular hypertrophy is present.   Estimated left ventricular ejection fraction is 55-60 %.   A PFO is noted with color doppler.   Normal appearing right ventricle structure and function.   All visualized extra cardiac structures appears to be normal.   No evidence of pericardial effusion.     Signature     ----------------------------------------------------------------   Electronically signed by Sina Trent MD(Interpreting   physician) on 06/27/2018 07:41 AM   ----------------------------------------------------------------    < end of copied text >    < from: CT Chest No Cont (10.23.18 @ 08:44) >  IMPRESSION:  The findings are suggestive of multifocal pneumonia in the right lung,   for which clinical correlation is recommended. Interval resolution of the   left pleural effusion.  Extensive lymphadenopathies in the chest and upper abdomen are again,   noted unchanged.  Other findings as above.    < end of copied text >

## 2018-10-25 NOTE — CHART NOTE - NSCHARTNOTEFT_GEN_A_CORE
Upon Nutritional Assessment by the Registered Dietitian your patient was determined to meet criteria / has evidence of the following diagnosis/diagnoses:          [ ]  Mild Protein Calorie Malnutrition        [ ]  Moderate Protein Calorie Malnutrition        [ x] Severe Protein Calorie Malnutrition        [ ] Unspecified Protein Calorie Malnutrition        [ ] Underweight / BMI <19        [ ] Morbid Obesity / BMI > 40      Findings:  Findings as based on:    NFPE shows signs of severe muscle wasting in temporal, clavicle, deltoid, interosseous, patellar, thigh, and calf region  Pt is noted for decreased PO intake for 1 week PTA although during assessment pt states that he has been eating about 1.5 meals on average for about 3 weeks. Prior to this admission in June 2018 pt was c/o limited appetite and eating ~2 meals per day. Pt reports that he doesn't have the appetite to eat more. Based on dietary recall pt likely eating less than 75% of needs for over 1 month. Pt likely with increased energy needs 2/2 hx of CA and noted for PU stage 1 on saccrum.      •  Comprehensive nutrition assessment and consultation  •  Calorie counts (nutrient intake analysis)  •  Food acceptance and intake status from observations by staff  •  Follow up  •  Patient education  •  Intervention secondary to interdisciplinary rounds  •   concerns      Treatment:    The following diet has been recommended:    1. Provide ensure BID.   2. consider appetite stimulant?   3. monitor wts daily   4. provide MVI to help meet 100% of RDI   5. provide maximum assistance with meals and supplements to encourage PO intake.      PROVIDER Section:     By signing this assessment you are acknowledging and agree with the diagnosis/diagnoses assigned by the Registered Dietitian    Comments: Upon Nutritional Assessment by the Registered Dietitian your patient was determined to meet criteria / has evidence of the following diagnosis/diagnoses:          [ ]  Mild Protein Calorie Malnutrition        [ ]  Moderate Protein Calorie Malnutrition        [ x] Severe Protein Calorie Malnutrition        [ ] Unspecified Protein Calorie Malnutrition        [ ] Underweight / BMI <19        [ ] Morbid Obesity / BMI > 40        Findings:  Findings as based on:    NFPE shows signs of severe muscle wasting in temporal, clavicle, deltoid, interosseous, patellar, thigh, and calf region  Pt is noted for decreased PO intake for 1 week PTA although during assessment pt states that he has been eating about 1.5 meals on average for about 3 weeks. Prior to this admission in June 2018 pt was c/o limited appetite and eating ~2 meals per day. Pt reports that he doesn't have the appetite to eat more. Based on dietary recall pt likely eating less than 75% of needs for over 1 month. Pt likely with increased energy needs 2/2 hx of CA and noted for PU stage 1 on sacrum.      •  Comprehensive nutrition assessment and consultation  •  Calorie counts (nutrient intake analysis)  •  Food acceptance and intake status from observations by staff  •  Follow up  •  Patient education  •  Intervention secondary to interdisciplinary rounds  •   concerns      Treatment:    The following diet has been recommended:    1. Provide ensure BID.   2. consider appetite stimulant?   3. monitor wts daily   4. provide MVI to help meet 100% of RDI   5. provide maximum assistance with meals and supplements to encourage PO intake.      PROVIDER Section:     By signing this assessment you are acknowledging and agree with the diagnosis/diagnoses assigned by the Registered Dietitian    Comments: agree with above

## 2018-10-25 NOTE — PROGRESS NOTE ADULT - ASSESSMENT
- multifocal infiltrates with nodular and peribronchial likely infectious in etiology   - chf with small effusion   - afib on anticoagulation .  - lymphoma with the mediastinal adenopathy   - acute on chronic ckd     PLAN     - continue with the cefipime   - follow up cultures   - oncology follow up for the lymphoma   - less clinical suspicion for the pulmonary embolism given the anticoagualtion

## 2018-10-26 LAB
ANION GAP SERPL CALC-SCNC: 4 MMOL/L — LOW (ref 5–17)
BUN SERPL-MCNC: 33 MG/DL — HIGH (ref 7–23)
CALCIUM SERPL-MCNC: 9.3 MG/DL — SIGNIFICANT CHANGE UP (ref 8.5–10.1)
CHLORIDE SERPL-SCNC: 106 MMOL/L — SIGNIFICANT CHANGE UP (ref 96–108)
CO2 SERPL-SCNC: 36 MMOL/L — HIGH (ref 22–31)
CREAT SERPL-MCNC: 1.77 MG/DL — HIGH (ref 0.5–1.3)
GLUCOSE SERPL-MCNC: 89 MG/DL — SIGNIFICANT CHANGE UP (ref 70–99)
INR BLD: 2.29 RATIO — HIGH (ref 0.88–1.16)
POTASSIUM SERPL-MCNC: 3.7 MMOL/L — SIGNIFICANT CHANGE UP (ref 3.5–5.3)
POTASSIUM SERPL-SCNC: 3.7 MMOL/L — SIGNIFICANT CHANGE UP (ref 3.5–5.3)
PROTHROM AB SERPL-ACNC: 25.1 SEC — HIGH (ref 9.8–12.7)
SODIUM SERPL-SCNC: 146 MMOL/L — HIGH (ref 135–145)

## 2018-10-26 PROCEDURE — 93010 ELECTROCARDIOGRAM REPORT: CPT

## 2018-10-26 RX ORDER — WARFARIN SODIUM 2.5 MG/1
1 TABLET ORAL
Qty: 0 | Refills: 0 | COMMUNITY

## 2018-10-26 RX ORDER — CEFUROXIME AXETIL 250 MG
250 TABLET ORAL EVERY 12 HOURS
Qty: 0 | Refills: 0 | Status: COMPLETED | OUTPATIENT
Start: 2018-10-26 | End: 2018-10-29

## 2018-10-26 RX ORDER — METOPROLOL TARTRATE 50 MG
50 TABLET ORAL ONCE
Qty: 0 | Refills: 0 | Status: COMPLETED | OUTPATIENT
Start: 2018-10-26 | End: 2018-10-26

## 2018-10-26 RX ORDER — IPRATROPIUM BROMIDE 0.2 MG/ML
500 SOLUTION, NON-ORAL INHALATION EVERY 6 HOURS
Qty: 0 | Refills: 0 | Status: DISCONTINUED | OUTPATIENT
Start: 2018-10-26 | End: 2018-11-01

## 2018-10-26 RX ORDER — FLUOROURACIL/ADHESIVE BANDAGE 5 %
1 KIT TOPICAL
Qty: 0 | Refills: 0 | COMMUNITY

## 2018-10-26 RX ADMIN — Medication 650 MILLIGRAM(S): at 20:43

## 2018-10-26 RX ADMIN — Medication 100 MILLIGRAM(S): at 20:42

## 2018-10-26 RX ADMIN — Medication 20 MILLIEQUIVALENT(S): at 13:15

## 2018-10-26 RX ADMIN — Medication 50 MILLIGRAM(S): at 18:09

## 2018-10-26 RX ADMIN — Medication 40 MILLIGRAM(S): at 13:15

## 2018-10-26 RX ADMIN — GABAPENTIN 200 MILLIGRAM(S): 400 CAPSULE ORAL at 18:09

## 2018-10-26 RX ADMIN — Medication 50 MILLIGRAM(S): at 05:28

## 2018-10-26 RX ADMIN — Medication 50 MILLIGRAM(S): at 09:18

## 2018-10-26 RX ADMIN — Medication 500 MICROGRAM(S): at 20:04

## 2018-10-26 RX ADMIN — Medication 3 MILLILITER(S): at 07:48

## 2018-10-26 RX ADMIN — Medication 500 MICROGRAM(S): at 13:33

## 2018-10-26 RX ADMIN — Medication 1 DROP(S): at 18:11

## 2018-10-26 RX ADMIN — Medication 100 MILLIGRAM(S): at 13:16

## 2018-10-26 RX ADMIN — Medication 100 MILLIGRAM(S): at 05:28

## 2018-10-26 RX ADMIN — CEFEPIME 1000 MILLIGRAM(S): 1 INJECTION, POWDER, FOR SOLUTION INTRAMUSCULAR; INTRAVENOUS at 05:28

## 2018-10-26 RX ADMIN — Medication 1 DROP(S): at 06:01

## 2018-10-26 RX ADMIN — Medication 1 TABLET(S): at 13:15

## 2018-10-26 RX ADMIN — GABAPENTIN 200 MILLIGRAM(S): 400 CAPSULE ORAL at 13:16

## 2018-10-26 RX ADMIN — ALFUZOSIN HYDROCHLORIDE 10 MILLIGRAM(S): 10 TABLET, EXTENDED RELEASE ORAL at 13:14

## 2018-10-26 RX ADMIN — Medication 650 MILLIGRAM(S): at 21:37

## 2018-10-26 RX ADMIN — GABAPENTIN 200 MILLIGRAM(S): 400 CAPSULE ORAL at 05:28

## 2018-10-26 RX ADMIN — Medication 100 MICROGRAM(S): at 05:28

## 2018-10-26 RX ADMIN — Medication 250 MILLIGRAM(S): at 18:10

## 2018-10-26 NOTE — PROGRESS NOTE ADULT - SUBJECTIVE AND OBJECTIVE BOX
Date of service: 10-24-18 @ 10:51    pt seen and examined  sitting up in chair   less cough  feeling better   tired      ROS: no fever or chills; denies dizziness, no HA,  no abdominal pain, no diarrhea or constipation; no dysuria, no urinary frequency, no legs pain, no rashes      PE:  Constitutional: frail looking  HEENT: NC/AT, EOMI, PERRLA, conjunctivae clear; ears and nose atraumatic; pharynx benign  Neck: supple; thyroid not palpable  Back: no tenderness  Respiratory: decreased breath sounds, rales R lung base  Cardiovascular: S1S2 regular, no murmurs  Abdomen: soft, not tender, not distended, positive BS; liver and spleen WNL  Genitourinary: no suprapubic tenderness  Lymphatic: no LN palpable  Musculoskeletal: no muscle tenderness, no joint swelling or tenderness  Extremities: no pedal edema  Neurological/ Psychiatric: moving all extremities  Skin: no rashes; no palpable lesions    Labs: all available labs reviewed                                    11.3   4.37  )-----------( 98       ( 25 Oct 2018 06:23 )             37.4     10-    143  |  104  |  30<H>  ----------------------------<  94  3.6   |  35<H>  |  1.80<H>    Ca    8.4<L>      25 Oct 2018 06:23  Phos  2.7     10-25  Mg     2.2     10-25               Urinalysis Basic - ( 22 Oct 2018 19:20 )    Color: Yellow / Appearance: Clear / S.015 / pH: x  Gluc: x / Ketone: Negative  / Bili: Negative / Urobili: Negative mg/dL   Blood: x / Protein: 15 mg/dL / Nitrite: Negative   Leuk Esterase: Negative / RBC: 6-10 /HPF / WBC 0-2   Sq Epi: x / Non Sq Epi: Occasional / Bacteria: Occasional    Culture - Blood (10.22.18 @ 19:38)    Specimen Source: .Blood None    Culture Results:   No growth to date.    Culture - Blood (10.22.18 @ 19:38)    Specimen Source: .Blood None    Culture Results:   No growth to date.        Radiology: all available radiological tests reviewed  < from: CT Chest No Cont (10.23.18 @ 08:44) >  EXAM:  CT CHEST                              Status post replacement of the proximal right humerus is again noted.          *** END OF ADDENDUM 10/23/2018  ***      PROCEDURE DATE:  10/23/2018          INTERPRETATION:  Chest CT without contrast dated 10/23/2018.    COMPARISON: 2018.    CLINICAL INFORMATION: Evaluate for pneumonia, history of lymphoma.    TECHNIQUE: Contiguous axial 2.5 mm slice thickness images of the chest   were obtained without intravenous contrast administration.    FINDINGS:    The airway shows normal caliber and contour with patent lumen.    The right pleural effusion is unchanged, but there has been resolution of   the left pleural effusion. Interval development of fine nodular   infiltrates in the right upper lobe. Some infiltrates in the postero   lateral right lower lobe. Findings are suggestive of multifocal pneumonia   for which clinical correlation is recommended. Also noted are some   atelectatic changes in the lateral segment of the right middle lobe. The   left lower lobe is better expanded at this time.    Again noted are extensive bilateral axillary, pretracheal precarinal and   aorticopulmonary window and left posterior mediastinal lymphadenopathies,   consistent with patient's history of lymphoma, unchanged.    The mediastinum great vessels , atherosclerotic changes in the thoracic   aorta are again noted as well as calcification in the aortic valve.,he   heart is normal. There is no pericardial effusion.    Cardiomegaly is unchanged. No evidence of a pericardial effusion.      Limited evaluation of the upper abdomen, multiple liver lesions are again   noted as well as large left upper retroperitoneal lymphadenopathies.   Stones in the gallbladder are again noted   No evidence of acute cholecystitis. Multiple lesions in the left kidney   are again noted cannot be evaluated due to the noncontrast technique.    The bones , hypertrophic degenerative changes in the thoracic and upper   lumbar spine are againnoted.     IMPRESSION:  The findings are suggestive of multifocal pneumonia in the right lung,   for which clinical correlation is recommended. Interval resolution of the   left pleural effusion.  Extensive lymphadenopathies in the chest and upper abdomen are again,   noted unchanged.  Other findings as above.        Advanced directives addressed: full resuscitation Date of service: 10-26-18 @ 11:51    pt seen and examined  sitting up in chair   some cp this am  BP low noted with afib/rvr       ROS: no fever or chills; denies dizziness, no HA,  no abdominal pain, no diarrhea or constipation; no dysuria, no urinary frequency, no legs pain, no rashes      MEDICATIONS  (STANDING):  alfuzosin 10 milliGRAM(s) Oral daily  artificial  tears Solution 1 Drop(s) Both EYES two times a day  cefepime  Injectable. 1000 milliGRAM(s) IV Push every 12 hours  cefepime  Injectable.      docusate sodium 100 milliGRAM(s) Oral three times a day  furosemide    Tablet 40 milliGRAM(s) Oral daily  gabapentin 200 milliGRAM(s) Oral three times a day  ipratropium    for Nebulization 500 MICROGram(s) Nebulizer every 6 hours  lactobacillus acidophilus 1 Tablet(s) Oral daily  levothyroxine 100 MICROGram(s) Oral daily  Lotemax ointment 1 Application(s) 1 Application(s) Both EYES daily  metoprolol succinate ER 50 milliGRAM(s) Oral two times a day  potassium chloride    Tablet ER 20 milliEquivalent(s) Oral daily      Vital Signs Last 24 Hrs  T(C): 36.5 (26 Oct 2018 10:10), Max: 36.9 (25 Oct 2018 20:00)  T(F): 97.7 (26 Oct 2018 10:10), Max: 98.4 (25 Oct 2018 20:00)  HR: 93 (26 Oct 2018 10:10) (72 - 152)  BP: 91/63 (26 Oct 2018 10:10) (82/59 - 113/80)  BP(mean): --  RR: 18 (26 Oct 2018 10:10) (18 - 18)  SpO2: 100% (26 Oct 2018 10:10) (94% - 100%)      PE:  Constitutional: frail looking  HEENT: NC/AT, EOMI, PERRLA, conjunctivae clear; ears and nose atraumatic; pharynx benign  Neck: supple; thyroid not palpable  Back: no tenderness  Respiratory: decreased breath sounds, rales R lung base  Cardiovascular: S1S2 regular, no murmurs  Abdomen: soft, not tender, not distended, positive BS; liver and spleen WNL  Genitourinary: no suprapubic tenderness  Lymphatic: no LN palpable  Musculoskeletal: no muscle tenderness, no joint swelling or tenderness  Extremities: no pedal edema  Neurological/ Psychiatric: moving all extremities  Skin: no rashes; no palpable lesions    Labs: all available labs reviewed                                            11.3   4.37  )-----------( 98       ( 25 Oct 2018 06:23 )             37.4     10-    146<H>  |  106  |  33<H>  ----------------------------<  89  3.7   |  36<H>  |  1.77<H>    Ca    9.3      26 Oct 2018 06:14  Phos  2.7     10-  Mg     2.2     10-      Urinalysis Basic - ( 22 Oct 2018 19:20 )    Color: Yellow / Appearance: Clear / S.015 / pH: x  Gluc: x / Ketone: Negative  / Bili: Negative / Urobili: Negative mg/dL   Blood: x / Protein: 15 mg/dL / Nitrite: Negative   Leuk Esterase: Negative / RBC: 6-10 /HPF / WBC 0-2   Sq Epi: x / Non Sq Epi: Occasional / Bacteria: Occasional    Culture - Blood (10.22.18 @ 19:38)    Specimen Source: .Blood None    Culture Results:   No growth to date.    Culture - Blood (10.22.18 @ 19:38)    Specimen Source: .Blood None    Culture Results:   No growth to date.        Radiology: all available radiological tests reviewed    EXAM:  CT CHEST                              Status post replacement of the proximal right humerus is again noted.          *** END OF ADDENDUM 10/23/2018  ***      PROCEDURE DATE:  10/23/2018          INTERPRETATION:  Chest CT without contrast dated 10/23/2018.    COMPARISON: 2018.    CLINICAL INFORMATION: Evaluate for pneumonia, history of lymphoma.    TECHNIQUE: Contiguous axial 2.5 mm slice thickness images of the chest   were obtained without intravenous contrast administration.    FINDINGS:    The airway shows normal caliber and contour with patent lumen.    The right pleural effusion is unchanged, but there has been resolution of   the left pleural effusion. Interval development of fine nodular   infiltrates in the right upper lobe. Some infiltrates in the postero   lateral right lower lobe. Findings are suggestive of multifocal pneumonia   for which clinical correlation is recommended. Also noted are some   atelectatic changes in the lateral segment of the right middle lobe. The   left lower lobe is better expanded at this time.    Again noted are extensive bilateral axillary, pretracheal precarinal and   aorticopulmonary window and left posterior mediastinal lymphadenopathies,   consistent with patient's history of lymphoma, unchanged.    The mediastinum great vessels , atherosclerotic changes in the thoracic   aorta are again noted as well as calcification in the aortic valve.,he   heart is normal. There is no pericardial effusion.    Cardiomegaly is unchanged. No evidence of a pericardial effusion.      Limited evaluation of the upper abdomen, multiple liver lesions are again   noted as well as large left upper retroperitoneal lymphadenopathies.   Stones in the gallbladder are again noted   No evidence of acute cholecystitis. Multiple lesions in the left kidney   are again noted cannot be evaluated due to the noncontrast technique.    The bones , hypertrophic degenerative changes in the thoracic and upper   lumbar spine are againnoted.     IMPRESSION:  The findings are suggestive of multifocal pneumonia in the right lung,   for which clinical correlation is recommended. Interval resolution of the   left pleural effusion.  Extensive lymphadenopathies in the chest and upper abdomen are again,   noted unchanged.  Other findings as above.        Advanced directives addressed: full resuscitation

## 2018-10-26 NOTE — PHARMACOTHERAPY INTERVENTION NOTE - OUTCOME
[facility]    [address] [place] [address]    Phone:  [phone]    Fax:  [phone]       Thank You for choosing us for your health care visit. We are glad to serve you and happy to provide you with this summary of your visit. Please help us to ensure we have accurate records. If you find anything that needs to be changed, please let our staff know as soon as possible.          Your Demographic Information     Patient Name Sex Jolly Camejo Female 1997       Ethnic Group Patient Race    Not of  or  Origin Black/      Your Visit Details     Date & Time Provider Department    2017 10:50 AM Kalia Bailey MD [facility]      Conditions Discussed Today or Order-Related Diagnoses        Comments    Aphthous ulcer    -  Primary       Your Vitals Were     BP Pulse Temp Weight LMP SpO2    110/70 103 98.7 °F (37.1 °C) (Tympanic) 116 lb 8 oz (52.8 kg) 2017 (Approximate) 99%    Smoking Status                   Never Smoker           Medications Prescribed or Re-Ordered Today     lidocaine viscous (XYLOCAINE) 2 % solution    Sig - Route: Take 15 mLs by mouth as needed for Pain. - Mouth/Throat    Class: Eprescribe    Pharmacy: [facility] [address] [place] [address] [address] AT [address] Ph #: [phone]      Your Current Medications Are        Disp Refills Start End    Norgestimate-Ethinyl Estradiol 0.18/0.215/0.25 MG-35 MCG tablet        Sig - Route: Take 1 tablet by mouth daily. - Oral    Class: Historical Med    lidocaine viscous (XYLOCAINE) 2 % solution 100 mL 0 2017     Sig - Route: Take 15 mLs by mouth as needed for Pain. - Mouth/Throat    Class: Eprescribe      Allergies     Amoxicillin HIVES, PRURITUS    Penicillins HIVES      Patient Portal Signup     Manage health care for you and your family anytime, anywhere with the new [facility], your free online resource for quick and easy  access to personal health information, scheduling appointments, refilling prescriptions, viewing test results, paying bills and more.  Sign up for a free and secure account. Please follow the instructions below to securely complete your enrollment.     1. Go to [email or web address]  2. Click Sign Up Now   3. Enter the Activation Code when prompted     Activation Code: Activation code not generated  Current myA[facility] Status: Account disabled    If you have questions related to [facility]ora, you can email [email or web address] or call [phone] to talk to our [facility] staff.  For questions related to your health, contact your physician’s office.  Please remember to dial 911 for medical emergencies.                Patient Instructions      Canker Sore    A canker sore (also called an aphthous ulcer) is a painful sore on the lining of the mouth. It is most painful during the first few days, and it lasts about 7 to 14 days before going away.  Causes   Canker sores are not cold sores or fever blisters. They are not contagious, so they are not spread by contact. The exact cause of canker sores is not clear, but there are a number of things that can trigger them in different people.  · Mild injury, such as biting the inside of the mouth, lip, or cheek, or dental procedures  · Stress  · Poor diet, or lack of certain nutrients, including B vitamins and iron  · Foods that can irritate the mouth, including tomatoes, citrus fruits, and some nuts (foods that are acidic or contain bitter substances called tannins)  · Irritating chemicals, such as those in some toothpastes and mouthwashes  · Certain chronic illnesses  Symptoms   Canker sores are found on the lining of the mouth. They can be inside the cheeks or lips, on the roof of the mouth, at the base of the gums, on the tongue, or in the back of the throat. Canker sores typically have these characteristics:  · Small, flat (not raised) sores  · Can be white or yellowish  bumps that are red around the edges or have a red halo  · Usually small in size, roundish, and in groups  · Accompanied by pain or burning   Canker sores do not leave a scar. But they usually come back.  Home care  The goals of canker sore treatment are to decrease the pain, speed healing, and prevent recurrence. No single treatment works for everyone. Try a number of techniques to see what works best.  General care  · You may find that soft, easy-to-chew foods cause less pain. Use a straw to direct liquids away from the sore.  · Use a soft-bristle toothbrush, and brush your teeth gently.  · Avoid acidic, salty, or spicy foods.  · Avoid injuring the inside of your mouth, or scraping your existing canker sores, by avoiding crusty and crunchy foods like french bread and chips.  Medicines  You can try over-the-counter medicines that cover the sores and numb them. This protects the sores while they heal and helps reduce pain.  Homemade rinses and solutions  You can use these solutions as mouth rinses. Spit them out after using them. You can also dab them on the sores. You can repeat these treatments as often as needed.  · Rinse your mouth with saltwater.  · Mix equal amounts of hydrogen peroxide and water. You can use it as a mouthwash or dab it on spots with a cotton swab. You can also add sodium bicarbonate to this to make a paste, and then dab it on spots.  Follow-up care  Follow up with your healthcare provider, or as advised.  · If a culture was done, you will be notified if the treatment needs to be changed. You can call as directed for the results.  Call 911  Contact emergency services if any of these occur:  · Trouble breathing  · Inability to swallow  · Extreme drowsiness or trouble awakening  · Fainting or loss of consciousness  · Rapid heart rate  · Seizure  · Stiff neck  When to seek medical advice  Call your healthcare provider right away if any of these occur:  · You have a fever of 100.4°F (38°C) or  accepted higher.  · You are pregnant.  · You just had surgery or another medical procedure, or you were just discharged from the hospital.  · You are unable to eat or swallow due to pain.  © 5930-5847 [employer]. [address] [place] [address], [place] [address]. All rights reserved. This information is not intended as a substitute for professional medical care. Always follow your healthcare professional's instructions.

## 2018-10-26 NOTE — PROGRESS NOTE ADULT - ASSESSMENT
- multifocal infiltrates with nodular and peribronchial likely infectious in etiology   - chf with small effusion   - afib with the RVR  - lymphoma with the mediastinal adenopathy   - acute on chronic ckd back on lasix     PLAN     - continue with the cefipime and change to po as the patient continue to improve   - follow up cultures   - oncology follow up for the lymphoma   - would discontinue duo neb and use Atrovent with the recurrent afib with the rvr and cardiology follow up

## 2018-10-26 NOTE — PROGRESS NOTE ADULT - ASSESSMENT
89 year old male as above     #Dyspnea  #Multifocal PNA (HCAP)  #acute on chronic congestive heart failure - lvef 06/2018 55-60%  - continue tele  - bnp elevated >19k trending down  - trop x 3 negative   - CT chest Multifocal PNA  - RVP Neg  - S/P lasix 40mg IV QD and change po lasix  - S/P Vanco and zosyn  - continue cefepime 1mg12h#2 suspected gram negative, gram positive and other resistant organisms  -dc plan PO ceftin for 7 day course 250 mg BID  - not on acei due to cr being >2  - ID consult appreciated   - Pulm Consult appreciated to dc duoneb and start atrovent with recurrent afib  -Cardio consult appreciated   - FU legionella ag and mycoplasma  - Blood cultures negative  -sputum cultures pending    #neno on ckd - resolved  - monitor cr closely       #Hypotension - resolved  - resume lasix.  continue BB    #Hypokalemia - resolved     #afib with RVR/Alfutter  - monitor inr- currently therapeutic  - continue coumadin, betablocker    #anxiety- stable  - continue xanax bid prn    #hypothyroidism- stable   - continue levothyroxine     #post herpetic neuralgia- stable  - continue home meds including gabapentin  - refresh tears  - advised daughter to bring in ointment as not in stock at     #bph- stable   - continue alfuzosin - non formulary    # r/o basal cell carcinoma of scalp - follows up with dr jain and had bx 5 days before admission  - outpt f/u     #dvt prophylaxis   - on coumadin for afib     Discussion with patient and family regarding advance directives. ; It seems like hes leaning toward DNR/DNI but would like to speak with his family; 89 year old male as above     #Dyspnea  #Multifocal PNA (HCAP)  #acute on chronic congestive heart failure - lvef 06/2018 55-60%  - continue tele  - bnp elevated >19k trending down  - trop x 3 negative   - CT chest Multifocal PNA  - RVP Neg  - S/P lasix 40mg IV QD and change po lasix  - S/P Vanco and zosyn  - continue cefepime 1mg12h#2 suspected gram negative, gram positive and other resistant organisms  -dc plan PO ceftin for 7 day course 250 mg BID  - not on acei due to resolving LETICIA  - ID consult appreciated   - Pulm Consult appreciated to dc duoneb and start atrovent with recurrent afib  -Cardio consult appreciated   - FU legionella ag and mycoplasma  - Blood cultures negative  -sputum cultures pending    #leticia on ckd - resolved  - monitor cr closely       #Hypotension - resolved  - resume lasix.  continue BB    #Hypokalemia - resolved     #afib with RVR/Alfutter  - monitor inr- currently therapeutic  - continue coumadin, betablocker    #anxiety- stable  - continue xanax bid prn    #hypothyroidism- stable   - continue levothyroxine     #post herpetic neuralgia- stable  - continue home meds including gabapentin  - refresh tears  - advised daughter to bring in ointment as not in stock at     #bph- stable   - continue alfuzosin - non formulary    # r/o basal cell carcinoma of scalp - follows up with dr jain and had bx 5 days before admission  - outpt f/u     #dvt prophylaxis   - on coumadin for afib     Discussion with patient and family regarding advance directives. ; It seems like hes leaning toward DNR/DNI but would like to speak with his family; spoke with dtr she will bring in HCP and then will sign dnr/dni as pt defers to his daughter and prefers to have her sign this.  he has capacity to make decisions.

## 2018-10-26 NOTE — PROGRESS NOTE ADULT - SUBJECTIVE AND OBJECTIVE BOX
NEPHROLOGY INTERVAL HPI/OVERNIGHT EVENTS:    Date of Service: 10-25-18 @ 09:47    10/26  HCAP  creat slightly improving  in good spirits  daughter at bedside    10/25 SY  No acute events overnight.  Feeling weak today --Was unable to sleep well due to floor activities.    10/24 SY  No acute event overnight.  Reports slept fairly well.  no new complaints.  No acute distress    HPI:  89 yo man with PMHX significant for CHF, HTN, Hx of Lymphoma treated with Rituxan.  Notable for HH admission in 6/2018 for CHF and LETICIA.   Creat stabilized to 1.7 prior to d/c.  Developed sob for several days.  Given IV lasix by Dr. Joshiwith mild improvement in symptoms.  Pt admitted due to worsened renal function with persistent SOB.  CT chest now c/w multifocal PNA.  Renal evaluation requested for LETICIA superimposed on CKD.  Pt denies any fever at home.    PMHX and PSHX.  1.CKD ( 12/2015--Creat 1.63), Post recent hosp for CHF.  Creat 1.7 on d/c 6/2018.  2.A FIB  3.HTN  4.Hx of Prostate CA ( Post RT)  5.Hx of Lymphoma Post Rituxan therapy.  6.Hx of Postherpetic Shingles.  7.Hypothyroidism  8.Hx of Chronic Diarrhea.  9.Recent admission for CHF.    MEDICATIONS  (STANDING):  alfuzosin 10 milliGRAM(s) Oral daily  artificial  tears Solution 1 Drop(s) Both EYES two times a day  cefuroxime   Tablet 250 milliGRAM(s) Oral every 12 hours  docusate sodium 100 milliGRAM(s) Oral three times a day  furosemide    Tablet 40 milliGRAM(s) Oral daily  gabapentin 200 milliGRAM(s) Oral three times a day  ipratropium    for Nebulization 500 MICROGram(s) Nebulizer every 6 hours  lactobacillus acidophilus 1 Tablet(s) Oral daily  levothyroxine 100 MICROGram(s) Oral daily  Lotemax ointment 1 Application(s) 1 Application(s) Both EYES daily  metoprolol succinate ER 50 milliGRAM(s) Oral two times a day  potassium chloride    Tablet ER 20 milliEquivalent(s) Oral daily    MEDICATIONS  (PRN):  acetaminophen   Tablet .. 650 milliGRAM(s) Oral every 6 hours PRN Temp greater or equal to 38C (100.4F), Mild Pain (1 - 3), Moderate Pain (4 - 6)  ALPRAZolam 0.25 milliGRAM(s) Oral two times a day PRN anxiety  diltiazem Injectable 10 milliGRAM(s) IV Push every 6 hours PRN for HR > 100 bpm  senna 2 Tablet(s) Oral at bedtime PRN Constipation    MEDICATIONS  (STANDING):  alfuzosin 10 milliGRAM(s) Oral daily  artificial  tears Solution 1 Drop(s) Both EYES two times a day  cefuroxime   Tablet 250 milliGRAM(s) Oral every 12 hours  docusate sodium 100 milliGRAM(s) Oral three times a day  furosemide    Tablet 40 milliGRAM(s) Oral daily  gabapentin 200 milliGRAM(s) Oral three times a day  ipratropium    for Nebulization 500 MICROGram(s) Nebulizer every 6 hours  lactobacillus acidophilus 1 Tablet(s) Oral daily  levothyroxine 100 MICROGram(s) Oral daily  Lotemax ointment 1 Application(s) 1 Application(s) Both EYES daily  metoprolol succinate ER 50 milliGRAM(s) Oral two times a day  potassium chloride    Tablet ER 20 milliEquivalent(s) Oral daily    MEDICATIONS  (PRN):  acetaminophen   Tablet .. 650 milliGRAM(s) Oral every 6 hours PRN Temp greater or equal to 38C (100.4F), Mild Pain (1 - 3), Moderate Pain (4 - 6)  ALPRAZolam 0.25 milliGRAM(s) Oral two times a day PRN anxiety  diltiazem Injectable 10 milliGRAM(s) IV Push every 6 hours PRN for HR > 100 bpm  senna 2 Tablet(s) Oral at bedtime PRN Constipation          PHYSICAL EXAM:  Alert and appropriate  GENERAL: No distress  CHEST/LUNG: Right diffuse rhonchi with end exp wheezing  HEART: S1S2 RRR  ABDOMEN: soft  EXTREMITIES: no edema  SKIN:     LABS:                                   11.3   4.37  )-----------( 98       ( 25 Oct 2018 06:23 )             37.4       10-26    146<H>  |  106  |  33<H>  ----------------------------<  89  3.7   |  36<H>  |  1.77<H>    Ca    9.3      26 Oct 2018 06:14  Phos  2.7     10-25  Mg     2.2     10-25

## 2018-10-26 NOTE — PROGRESS NOTE ADULT - ASSESSMENT
89 yo man with multiple medical problems including CKD admitted with multifocal PNA.     LETICIA superimposed on CKD.  CT with no evidence of CHF.  Left Pleural effusion resolved.    Follow Right effusion with multifocal PNA.  Will hold diuretics for 2-3 days and allow stabilization of CKD while PNA treated with abtx.    10/24 SY  --LETICIA/CKD : slightly improved.  Monitor off diuretics for another day and resume tomorrow.  --PNA : continue abtx.    10/25 SY  --LETICIA/CKD : fairly stable for now.  Will resume outpt dose of diuretic to maintain stable fluid status.  --PNA : continue current abtx.  Monitor response.    10/26  HCAP  creat improving   Na level elevated  most likely due to Natriuresis from Lasix,  sodium load from NS  d/w pt and daughter

## 2018-10-26 NOTE — PROGRESS NOTE ADULT - SUBJECTIVE AND OBJECTIVE BOX
CHIEF COMPLAINT: Patient is a 89y old  Male who presents with a chief complaint of CHF Exacerbation w/ possible hcap (22 Oct 2018 21:23)      HPI:  89 year old male with pmh of anxiety, basal cell carcinoma, bph, ckd, htn, hypothyroid, lymphoma, postherpetic neuralgia, prostate ca coming to  ED with complaints of worsening shortness of breath x 3 days.    Pt is SOB with lying supine.   was seen in Dr Joshi office on Friday and was given lasix 40mg iv push however sob improved mildly.   Labs were checked out,   patient and as patient sob not markedly improved and patient found to have neno on ckd was sent to hospital for further eval. Patient also stating has been having a decreased po intake for the past week.  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    was given azithromycin and another abx about two weeks ago for cough as he had a suspected pna.   States the abx he does not know name of gave him diarrhea however that resolved.   Patient was also admitted 1 month prior for chf exacerbation. Denies fevers, chills, chest pain. (22 Oct 2018 21:23)    10/26/18:  Patient is back on furosemide 40 per day    PMHx: PAST MEDICAL & SURGICAL HISTORY:  CKD (chronic kidney disease)  BPH (benign prostatic hyperplasia)  Postherpetic neuralgia  Chronic diarrhea  Prostate CA  Basal cell carcinoma  Lymphoma  Anxiety  Hypothyroid  Hypertension  A-fib  H/O shoulder replacement  S/P bilateral unicompartmental knee replacement        Soc Hx:       Allergies: Allergies    No Known Allergies    Intolerances          REVIEW OF SYSTEMS:    CONSTITUTIONAL: No weakness, fevers or chills  EYES/ENT: No visual changes;  No vertigo or throat pain   NECK: No pain or stiffness  RESPIRATORY: No cough, wheezing, hemoptysis; No shortness of breath  CARDIOVASCULAR: No chest pain or palpitations  GASTROINTESTINAL: No abdominal or epigastric pain. No nausea, vomiting, or hematemesis; No diarrhea or constipation. No melena or hematochezia.  GENITOURINARY: No dysuria, frequency or hematuria  NEUROLOGICAL: No numbness or weakness  SKIN: No itching, burning, rashes, or lesions   All other review of systems is negative unless indicated above    ICU Vital Signs Last 24 Hrs  T(C): 36.7 (26 Oct 2018 04:54), Max: 36.9 (25 Oct 2018 20:00)  T(F): 98.1 (26 Oct 2018 04:54), Max: 98.4 (25 Oct 2018 20:00)  HR: 87 (26 Oct 2018 04:54) (72 - 106)  BP: 103/65 (26 Oct 2018 04:54) (103/65 - 113/80)  BP(mean): --  ABP: --  ABP(mean): --  RR: 18 (26 Oct 2018 04:54) (18 - 18)  SpO2: 98% (26 Oct 2018 04:54) (94% - 100%)          I&O's Summary          PHYSICAL EXAM:   Constitutional: NAD, awake and alert, well-developed  HEENT: PERR, EOMI, Normal Hearing, MMM  Neck: Soft and supple, No LAD, No JVD  Respiratory: Breath sounds are clear bilaterally, No wheezing, rales or rhonchi  Cardiovascular: S1 and S2, regular rate and rhythm, no Murmurs, gallops or rubs  Gastrointestinal: Bowel Sounds present, soft, nontender, nondistended, no guarding, no rebound  Extremities: No peripheral edema  Vascular: 2+ peripheral pulses  Neurological: A/O x 3, no focal deficits  Musculoskeletal: 5/5 strength b/l upper and lower extremities  Skin: No rashes    MEDICATIONS  (STANDING):  ALBUTerol/ipratropium for Nebulization 3 milliLiter(s) Nebulizer every 6 hours  alfuzosin 10 milliGRAM(s) Oral daily  artificial  tears Solution 1 Drop(s) Both EYES two times a day  cefepime  Injectable. 1000 milliGRAM(s) IV Push every 12 hours  cefepime  Injectable.      docusate sodium 100 milliGRAM(s) Oral three times a day  furosemide    Tablet 40 milliGRAM(s) Oral daily  gabapentin 200 milliGRAM(s) Oral three times a day  lactobacillus acidophilus 1 Tablet(s) Oral daily  levothyroxine 100 MICROGram(s) Oral daily  Lotemax ointment 1 Application(s) 1 Application(s) Both EYES daily  metoprolol succinate ER 50 milliGRAM(s) Oral two times a day  potassium chloride    Tablet ER 20 milliEquivalent(s) Oral daily  daily          LABS: All Labs Reviewed:                        12.9   5.72  )-----------( 117      ( 22 Oct 2018 17:32 )             40.9     10-22    144  |  105  |  34<H>  ----------------------------<  124<H>  3.7   |  32<H>  |  2.17<H>    Ca    9.1      22 Oct 2018 17:32    TPro  6.1  /  Alb  3.4  /  TBili  0.8  /  DBili  x   /  AST  22  /  ALT  20  /  AlkPhos  68  10-22    10-23    147<H>  |  105  |  32<H>  ----------------------------<  98  3.4<L>   |  36<H>  |  2.02<H>    Ca    9.0      23 Oct 2018 07:06    TPro  6.1  /  Alb  3.4  /  TBili  0.8  /  DBili  x   /  AST  22  /  ALT  20  /  AlkPhos  68  10-22    10-25    143  |  104  |  30<H>  ----------------------------<  94  3.6   |  35<H>  |  1.80<H>    Ca    8.4<L>      25 Oct 2018 06:23  Phos  2.7     10-25  Mg     2.2     10-25    10-26    146<H>  |  106  |  33<H>  ----------------------------<  89  3.7   |  36<H>  |  1.77<H>    Ca    9.3      26 Oct 2018 06:14  Phos  2.7     10-25  Mg     2.2     10-25              PT/INR - ( 22 Oct 2018 17:32 )   PT: 20.0 sec;   INR: 1.83 ratio         PTT - ( 22 Oct 2018 17:32 )  PTT:34.1 sec  CARDIAC MARKERS ( 23 Oct 2018 00:21 )  .030 ng/mL / x     / x     / x     / x      CARDIAC MARKERS ( 22 Oct 2018 21:45 )  0.032 ng/mL / x     / x     / x     / x      CARDIAC MARKERS ( 22 Oct 2018 17:32 )  0.025 ng/mL / x     / x     / x     / x        Prothrombin Time and INR, Plasma (10.25.18 @ 06:23)    Prothrombin Time, Plasma: 31.2 sec    INR: 2.83 ratio        Serum Pro-Brain Natriuretic Peptide: 83323 pg/mL (10-22 @ 17:32)      EKG: AFlutter with varible block, RBBB, LAFB    Telemetry: AFlutter with rate rapid rates     ECHO:< from: Transthoracic Echocardiogram (06.26.18 @ 10:03) >  mmary     Moderate (2+) mitral regurgitation is present.   Mild mitral annular calcification is present.   Significant fibrocalcific changes noted to the aortic valve leaflets with   restriction in leaflet excursion. Peak transaortic gradient is 45 mmHg;   this finding is consistent with mild to moderate aortic stenosis.   Trace aortic regurgitation is present.   Mild to moderate tricuspid valve regurgitation is present.   Normal appearing pulmonic valve structure and function.   The left atrium is mildly dilated.   The left ventricle is normal in size, wall motion and contractility.   Mild concentric left ventricular hypertrophy is present.   Estimated left ventricular ejection fraction is 55-60 %.   A PFO is noted with color doppler.   Normal appearing right ventricle structure and function.   All visualized extra cardiac structures appears to be normal.   No evidence of pericardial effusion.     Signature     ----------------------------------------------------------------   Electronically signed by Sina Trent MD(Interpreting   physician) on 06/27/2018 07:41 AM   ----------------------------------------------------------------    < end of copied text >    < from: CT Chest No Cont (10.23.18 @ 08:44) >  IMPRESSION:  The findings are suggestive of multifocal pneumonia in the right lung,   for which clinical correlation is recommended. Interval resolution of the   left pleural effusion.  Extensive lymphadenopathies in the chest and upper abdomen are again,   noted unchanged.  Other findings as above.    < end of copied text >

## 2018-10-26 NOTE — PROGRESS NOTE ADULT - ASSESSMENT
89 year old male with pmh of anxiety, basal cell carcinoma, bph, ckd, htn, hypothyroid, lymphoma, postherpetic neuralgia, prostate ca coming to  ED with complaints of worsening shortness of breath x 3 days. Pt is SOB with lying supine.   was seen in Dr Joshi office on 10/19 and was given lasix 40mg iv push however sob improved mildly.   Labs were checked out,   patient and as patient sob not markedly improved and patient found to have neno on ckd was sent to hospital for further eval. Patient also stating has been having a decreased po intake for the past week.  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   was given azithromycin and another abx about two weeks ago for cough as he had a suspected pna. States the abx he does not know name of gave him diarrhea however that resolved.   Patient was also admitted 1 month prior for chf exacerbation. Denies fevers, chills, chest pain. Here noted to have R multifocal pna, LN in chest/abd, elevated bnp was given lasix/abx for chf/pna    1. 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 other issues - care per medicine 89 year old male with pmh of anxiety, basal cell carcinoma, bph, ckd, htn, hypothyroid, lymphoma, postherpetic neuralgia, prostate ca coming to  ED with complaints of worsening shortness of breath x 3 days. Pt is SOB with lying supine.   was seen in Dr Joshi office on 10/19 and was given lasix 40mg iv push however sob improved mildly.   Labs were checked out,   patient and as patient sob not markedly improved and patient found to have neno on ckd was sent to hospital for further eval. Patient also stating has been having a decreased po intake for the past week.  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   was given azithromycin and another abx about two weeks ago for cough as he had a suspected pna. States the abx he does not know name of gave him diarrhea however that resolved.   Patient was also admitted 1 month prior for chf exacerbation. Denies fevers, chills, chest pain. Here noted to have R multifocal pna, LN in chest/abd, elevated bnp was given lasix/abx for chf/pna    1. hcap/chf/neno on ckd/immunocompromised host  - low bp, afib w/ rvr this am, resp status though better  - CT chest reviewed  - s/p vanco/zosyn  - completed 3 days of cefepime  - switch to po ceftin 250mg BID for 4 more days  - continue with abx coverage  - will avoid zosyn d/t salt load and hx chf  - cx no growth  - monitor temps  - tolerating abx well so far; no side effects noted  - reason for abx use and side effects reviewed with patient  - supportive care  - f/u cbc    2. other issues - care per medicine

## 2018-10-26 NOTE — PROGRESS NOTE ADULT - ASSESSMENT
89 M with shortness of breath - multifactorial    Diastolic failure- mild. Legs are without edema and mild crackles on bases- recommend low dose lasix with close monitering of renal function and electrolytes- if Creatine continues to rise, would hold lasix for a day . will add low dose nitrate to reduce preload    Pulm- PNA- on abx, recommend neblizer treatment, mucolytic and consideration for steroids      Aflutter- metoprolol for rate control, continue with coumadin dosing goal INR 2-2.5     No evidence for ACS     10/24/18:  Cardiac status stable.  Off diuretics and on antibiotics.  Renal function stable.  May need to add back furosemide.  Will watch closely.    10/25/18:  Patient remains stable.  Renal function somewhat improved off diuretics.  Continue antibiotics for right sided multifocal pneumonia    10/26/18:  Cardiac status stable.  Back on furosemide.  Follow renal function.  Continue treatment for pneumonia

## 2018-10-26 NOTE — PROGRESS NOTE ADULT - SUBJECTIVE AND OBJECTIVE BOX
SUBJECTIVE     patient says he has mild chest discomfort   noted afib with the AVR   over all feels better with the cough and wheeze     PAST MEDICAL & SURGICAL HISTORY:  CKD (chronic kidney disease)  BPH (benign prostatic hyperplasia)  Postherpetic neuralgia  Chronic diarrhea  Prostate CA  Basal cell carcinoma  Lymphoma  Anxiety  Hypothyroid  Hypertension  A-fib  H/O shoulder replacement  S/P bilateral unicompartmental knee replacement    OBJECTIVE   Vital Signs Last 24 Hrs  T(C): 36.7 (26 Oct 2018 04:54), Max: 36.9 (25 Oct 2018 20:00)  T(F): 98.1 (26 Oct 2018 04:54), Max: 98.4 (25 Oct 2018 20:00)  HR: 151 (26 Oct 2018 09:09) (72 - 152)  BP: 86/58 (26 Oct 2018 09:09) (82/59 - 113/80)  BP(mean): --  RR: 18 (26 Oct 2018 04:54) (18 - 18)  SpO2: 98% (26 Oct 2018 04:54) (94% - 100%)    PHYSICAL EXAM:  Constitutional: , awake and alert, not in distress.  HEENT: Normo cephalic atraumatic  Neck: Soft and supple, No J.V.D   Respiratory: vesicular breathing has mild occasional wheezing   Cardiovascular: S1 and S2, regular rate .   Gastrointestinal:  soft, nontender,   Extremities: No  edema or calf tenderness .  Neurological: No new  focal deficits.    MEDICATIONS  (STANDING):  ALBUTerol/ipratropium for Nebulization 3 milliLiter(s) Nebulizer every 6 hours  alfuzosin 10 milliGRAM(s) Oral daily  artificial  tears Solution 1 Drop(s) Both EYES two times a day  cefepime  Injectable. 1000 milliGRAM(s) IV Push every 12 hours  cefepime  Injectable.      docusate sodium 100 milliGRAM(s) Oral three times a day  furosemide    Tablet 40 milliGRAM(s) Oral daily  gabapentin 200 milliGRAM(s) Oral three times a day  lactobacillus acidophilus 1 Tablet(s) Oral daily  levothyroxine 100 MICROGram(s) Oral daily  Lotemax ointment 1 Application(s) 1 Application(s) Both EYES daily  metoprolol succinate ER 50 milliGRAM(s) Oral two times a day  potassium chloride    Tablet ER 20 milliEquivalent(s) Oral daily                            11.3   4.37  )-----------( 98       ( 25 Oct 2018 06:23 )             37.4     10-26    146<H>  |  106  |  33<H>  ----------------------------<  89  3.7   |  36<H>  |  1.77<H>    Ca    9.3      26 Oct 2018 06:14  Phos  2.7     10-25  Mg     2.2     10-25         < from: CT Chest No Cont (10.23.18 @ 08:44) >  INDINGS:    The airway shows normal caliber and contour with patent lumen.    The right pleural effusion is unchanged, but there has been resolution of   the left pleural effusion. Interval development of fine nodular   infiltrates in the right upper lobe. Some infiltrates in the postero   lateral right lower lobe. Findings are suggestive of multifocal pneumonia   for which clinical correlation is recommended. Also noted are some   atelectatic changes in the lateral segment of the right middle lobe. The   left lower lobe is better expanded at this time.    Again noted are extensive bilateral axillary, pretracheal precarinal and   aorticopulmonary window and left posterior mediastinal lymphadenopathies,   consistent with patient's history of lymphoma, unchanged.    The mediastinum great vessels , atherosclerotic changes in the thoracic   aorta are again noted as well as calcification in the aortic valve.,he   heart is normal. There is no pericardial effusion.    Cardiomegaly is unchanged. No evidence of a pericardial effusion.      Limited evaluation of the upper abdomen, multiple liver lesions are again   noted as well as large left upper retroperitoneal lymphadenopathies.   Stones in the gallbladder are again noted   No evidence of acute cholecystitis. Multiple lesions in the left kidney   are again noted cannot be evaluated due to the noncontrast technique.    The bones , hypertrophic degenerative changes in the thoracic and upper   lumbar spine are againnoted.     IMPRESSION:  The findings are suggestive of multifocal pneumonia in the right lung,   for which clinical correlation is recommended. Interval resolution of the     < end of copied text >

## 2018-10-26 NOTE — PHARMACOTHERAPY INTERVENTION NOTE - COMMENTS
Recommended renally adjusting Gabapentin from 200 TID to once daily dosing due to prolonged half life in renal impairment, will taper down from TID to BID as per MD

## 2018-10-26 NOTE — PROGRESS NOTE ADULT - SUBJECTIVE AND OBJECTIVE BOX
PCP: PCP/Cardio- Dr Joshi    Chief Complaint: Patient is a 89y old  Male who presents with a chief complaint of CHF Exacerbation w/ possible hcap (23 Oct 2018 06:42)    HPI:  89 year old male with pmh of anxiety, basal cell carcinoma, bph, ckd, htn, hypothyroid, lymphoma, postherpetic neuralgia, prostate ca coming to  ED with complaints of worsening shortness of breath x 3 days.    Pt is SOB with lying supine.   was seen in Dr Joshi office on Friday and was given lasix 40mg iv push however sob improved mildly.   Labs were checked out,   patient and as patient sob not markedly improved and patient found to have neno on ckd was sent to hospital for further eval. Patient also stating has been having a decreased po intake for the past week.  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    was given azithromycin and another abx about two weeks ago for cough as he had a suspected pna.   States the abx he does not know name of gave him diarrhea however that resolved.   Patient was also admitted 1 month prior for chf exacerbation. Denies fevers, chills, chest pain. (22 Oct 2018 21:23)    10/23:  Above Reviewed. dyspnea improving. No other complaints  10/24: Daughter at bedside; still has dyspnea; discussed advance directives with patient and daughter; It seems like hes leaning toward DNR/DNI but would like to speak with his family; Tele still in flutter; also found to have low BP this morning   10/25 - pt is still wheezing but is feeling less SOB.  10/26 - pt still feeling SOB and tired    ROS:   All 10 systems reviewed and found to be negative with the exception of what has been described above.  Vital Signs Last 24 Hrs  T(C): 36.5 (26 Oct 2018 10:10), Max: 36.9 (25 Oct 2018 20:00)  T(F): 97.7 (26 Oct 2018 10:10), Max: 98.4 (25 Oct 2018 20:00)  HR: 93 (26 Oct 2018 10:10) (72 - 152)  BP: 91/63 (26 Oct 2018 10:10) (82/59 - 113/80)  BP(mean): --  RR: 18 (26 Oct 2018 10:10) (18 - 18)  SpO2: 100% (26 Oct 2018 10:10) (94% - 100%)    GEN: lying in bed, NAD  HEENT:   NC/AT, pupils equal and reactive, EOMI  CV:  +S1, +S2, RRR  RESP:   lungs clear to auscultation bilaterally, no wheeze, rales, rhonchi   BREAST:  not examined  GI:  abdomen soft, non-tender, non-distended, normoactive BS  RECTAL:  not examined  :  not examined  MSK:   normal muscle tone  EXT:  no edema  NEURO:  AAOX3, no focal neurological deficits  SKIN:  no rashes                            11.3   4.37  )-----------( 98       ( 25 Oct 2018 06:23 )             37.4     10-26    146<H>  |  106  |  33<H>  ----------------------------<  89  3.7   |  36<H>  |  1.77<H>    Ca    9.3      26 Oct 2018 06:14  Phos  2.7     10-25  Mg     2.2     10-25      PT/INR - ( 26 Oct 2018 06:14 )   PT: 25.1 sec;   INR: 2.29 ratio         PTT - ( 24 Oct 2018 15:26 )  PTT:38.3 sec      RADIOLOGY/EKG:    < from: Xray Chest 1 View AP/PA. (10.22.18 @ 18:32) >    IMPRESSION:  Findings are likely indicative of mild pneumonia involving the right lung   for which clinical correlation is recommended.    < end of copied text >  < from: CT Chest No Cont (10.23.18 @ 08:44) >    IMPRESSION:  The findings are suggestive of multifocal pneumonia in the right lung,   for which clinical correlation is recommended. Interval resolution of the   left pleural effusion.  Extensive lymphadenopathies in the chest and upper abdomen are again,   noted unchanged.  Other findings as above.    < end of copied text >    Culture - Blood (10.22.18 @ 19:38)    Specimen Source: .Blood None    Culture Results:   No growth to date.    Culture - Urine (06.07.18 @ 20:40)    Specimen Source: .Urine None    Culture Results:   No growth PCP: PCP/Cardio- Dr Joshi    Chief Complaint: Patient is a 89y old  Male who presents with a chief complaint of CHF Exacerbation w/ possible hcap (23 Oct 2018 06:42)    HPI:  89 year old male with pmh of anxiety, basal cell carcinoma, bph, ckd, htn, hypothyroid, lymphoma, postherpetic neuralgia, prostate ca coming to  ED with complaints of worsening shortness of breath x 3 days.    Pt is SOB with lying supine.   was seen in Dr Joshi office on Friday and was given lasix 40mg iv push however sob improved mildly.   Labs were checked out,   patient and as patient sob not markedly improved and patient found to have neno on ckd was sent to hospital for further eval. Patient also stating has been having a decreased po intake for the past week.  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    was given azithromycin and another abx about two weeks ago for cough as he had a suspected pna.   States the abx he does not know name of gave him diarrhea however that resolved.   Patient was also admitted 1 month prior for chf exacerbation. Denies fevers, chills, chest pain. (22 Oct 2018 21:23)    10/23:  Above Reviewed. dyspnea improving. No other complaints  10/24: Daughter at bedside; still has dyspnea; discussed advance directives with patient and daughter; It seems like hes leaning toward DNR/DNI but would like to speak with his family; Tele still in flutter; also found to have low BP this morning   10/25 - pt is still wheezing but is feeling less SOB.  10/26 - pt still feeling SOB and tired.  rapid afib HR 150s on tele     ROS:   All 10 systems reviewed and found to be negative with the exception of what has been described above.  Vital Signs Last 24 Hrs  T(C): 36.5 (26 Oct 2018 10:10), Max: 36.9 (25 Oct 2018 20:00)  T(F): 97.7 (26 Oct 2018 10:10), Max: 98.4 (25 Oct 2018 20:00)  HR: 93 (26 Oct 2018 10:10) (72 - 152)  BP: 91/63 (26 Oct 2018 10:10) (82/59 - 113/80)  BP(mean): --  RR: 18 (26 Oct 2018 10:10) (18 - 18)  SpO2: 100% (26 Oct 2018 10:10) (94% - 100%)    GEN: lying in bed, NAD  HEENT:   NC/AT, pupils equal and reactive, EOMI  CV:  +S1, +S2, RRR  RESP:   lungs clear to auscultation bilaterally, no wheeze, rales, rhonchi   BREAST:  not examined  GI:  abdomen soft, non-tender, non-distended, normoactive BS  RECTAL:  not examined  :  not examined  MSK:   normal muscle tone  EXT:  no edema  NEURO:  AAOX3, no focal neurological deficits  SKIN:  no rashes                            11.3   4.37  )-----------( 98       ( 25 Oct 2018 06:23 )             37.4     10-26    146<H>  |  106  |  33<H>  ----------------------------<  89  3.7   |  36<H>  |  1.77<H>    Ca    9.3      26 Oct 2018 06:14  Phos  2.7     10-25  Mg     2.2     10-25      PT/INR - ( 26 Oct 2018 06:14 )   PT: 25.1 sec;   INR: 2.29 ratio         PTT - ( 24 Oct 2018 15:26 )  PTT:38.3 sec      RADIOLOGY/EKG:    < from: Xray Chest 1 View AP/PA. (10.22.18 @ 18:32) >    IMPRESSION:  Findings are likely indicative of mild pneumonia involving the right lung   for which clinical correlation is recommended.    < end of copied text >  < from: CT Chest No Cont (10.23.18 @ 08:44) >    IMPRESSION:  The findings are suggestive of multifocal pneumonia in the right lung,   for which clinical correlation is recommended. Interval resolution of the   left pleural effusion.  Extensive lymphadenopathies in the chest and upper abdomen are again,   noted unchanged.  Other findings as above.    < end of copied text >    Culture - Blood (10.22.18 @ 19:38)    Specimen Source: .Blood None    Culture Results:   No growth to date.    Culture - Urine (06.07.18 @ 20:40)    Specimen Source: .Urine None    Culture Results:   No growth

## 2018-10-27 LAB
ANION GAP SERPL CALC-SCNC: 6 MMOL/L — SIGNIFICANT CHANGE UP (ref 5–17)
BUN SERPL-MCNC: 37 MG/DL — HIGH (ref 7–23)
CALCIUM SERPL-MCNC: 9 MG/DL — SIGNIFICANT CHANGE UP (ref 8.5–10.1)
CHLORIDE SERPL-SCNC: 104 MMOL/L — SIGNIFICANT CHANGE UP (ref 96–108)
CO2 SERPL-SCNC: 34 MMOL/L — HIGH (ref 22–31)
CREAT SERPL-MCNC: 1.88 MG/DL — HIGH (ref 0.5–1.3)
GLUCOSE SERPL-MCNC: 99 MG/DL — SIGNIFICANT CHANGE UP (ref 70–99)
HCT VFR BLD CALC: 36.1 % — LOW (ref 39–50)
HGB BLD-MCNC: 11.3 G/DL — LOW (ref 13–17)
INR BLD: 1.91 RATIO — HIGH (ref 0.88–1.16)
MCHC RBC-ENTMCNC: 31.3 GM/DL — LOW (ref 32–36)
MCHC RBC-ENTMCNC: 31.7 PG — SIGNIFICANT CHANGE UP (ref 27–34)
MCV RBC AUTO: 101.4 FL — HIGH (ref 80–100)
NRBC # BLD: 0 /100 WBCS — SIGNIFICANT CHANGE UP (ref 0–0)
PLATELET # BLD AUTO: 93 K/UL — LOW (ref 150–400)
POTASSIUM SERPL-MCNC: 3.8 MMOL/L — SIGNIFICANT CHANGE UP (ref 3.5–5.3)
POTASSIUM SERPL-SCNC: 3.8 MMOL/L — SIGNIFICANT CHANGE UP (ref 3.5–5.3)
PROTHROM AB SERPL-ACNC: 20.9 SEC — HIGH (ref 9.8–12.7)
RBC # BLD: 3.56 M/UL — LOW (ref 4.2–5.8)
RBC # FLD: 15.2 % — HIGH (ref 10.3–14.5)
SODIUM SERPL-SCNC: 144 MMOL/L — SIGNIFICANT CHANGE UP (ref 135–145)
TSH SERPL-MCNC: 1.29 UU/ML — SIGNIFICANT CHANGE UP (ref 0.34–4.82)
WBC # BLD: 4.96 K/UL — SIGNIFICANT CHANGE UP (ref 3.8–10.5)
WBC # FLD AUTO: 4.96 K/UL — SIGNIFICANT CHANGE UP (ref 3.8–10.5)

## 2018-10-27 PROCEDURE — 71045 X-RAY EXAM CHEST 1 VIEW: CPT | Mod: 26

## 2018-10-27 RX ORDER — SODIUM CHLORIDE 9 MG/ML
250 INJECTION INTRAMUSCULAR; INTRAVENOUS; SUBCUTANEOUS
Qty: 0 | Refills: 0 | Status: DISCONTINUED | OUTPATIENT
Start: 2018-10-27 | End: 2018-10-30

## 2018-10-27 RX ORDER — POTASSIUM CHLORIDE 20 MEQ
20 PACKET (EA) ORAL DAILY
Qty: 0 | Refills: 0 | Status: DISCONTINUED | OUTPATIENT
Start: 2018-10-27 | End: 2018-10-29

## 2018-10-27 RX ORDER — WARFARIN SODIUM 2.5 MG/1
5 TABLET ORAL DAILY
Qty: 0 | Refills: 0 | Status: COMPLETED | OUTPATIENT
Start: 2018-10-27 | End: 2018-10-29

## 2018-10-27 RX ORDER — METOPROLOL TARTRATE 50 MG
25 TABLET ORAL ONCE
Qty: 0 | Refills: 0 | Status: COMPLETED | OUTPATIENT
Start: 2018-10-27 | End: 2018-10-27

## 2018-10-27 RX ADMIN — WARFARIN SODIUM 5 MILLIGRAM(S): 2.5 TABLET ORAL at 21:37

## 2018-10-27 RX ADMIN — Medication 500 MICROGRAM(S): at 22:39

## 2018-10-27 RX ADMIN — Medication 1 DROP(S): at 17:50

## 2018-10-27 RX ADMIN — Medication 100 MILLIGRAM(S): at 13:08

## 2018-10-27 RX ADMIN — Medication 250 MILLIGRAM(S): at 17:50

## 2018-10-27 RX ADMIN — Medication 100 MILLIGRAM(S): at 05:24

## 2018-10-27 RX ADMIN — Medication 1 DROP(S): at 05:24

## 2018-10-27 RX ADMIN — Medication 1 TABLET(S): at 13:08

## 2018-10-27 RX ADMIN — GABAPENTIN 200 MILLIGRAM(S): 400 CAPSULE ORAL at 17:49

## 2018-10-27 RX ADMIN — Medication 100 MICROGRAM(S): at 05:24

## 2018-10-27 RX ADMIN — Medication 100 MILLIGRAM(S): at 21:37

## 2018-10-27 RX ADMIN — GABAPENTIN 200 MILLIGRAM(S): 400 CAPSULE ORAL at 05:24

## 2018-10-27 RX ADMIN — Medication 250 MILLIGRAM(S): at 05:24

## 2018-10-27 RX ADMIN — SODIUM CHLORIDE 75 MILLILITER(S): 9 INJECTION INTRAMUSCULAR; INTRAVENOUS; SUBCUTANEOUS at 11:05

## 2018-10-27 RX ADMIN — Medication 500 MICROGRAM(S): at 08:02

## 2018-10-27 RX ADMIN — Medication 500 MICROGRAM(S): at 14:34

## 2018-10-27 RX ADMIN — Medication 50 MILLIGRAM(S): at 05:24

## 2018-10-27 RX ADMIN — Medication 25 MILLIGRAM(S): at 17:52

## 2018-10-27 RX ADMIN — Medication 20 MILLIEQUIVALENT(S): at 14:37

## 2018-10-27 NOTE — PROGRESS NOTE ADULT - SUBJECTIVE AND OBJECTIVE BOX
CHIEF COMPLAINT: Patient is a 89y old  Male who presents with a chief complaint of CHF Exacerbation w/ possible hcap (22 Oct 2018 21:23)      HPI:  89 year old male with pmh of anxiety, basal cell carcinoma, bph, ckd, htn, hypothyroid, lymphoma, postherpetic neuralgia, prostate ca coming to  ED with complaints of worsening shortness of breath x 3 days.    Pt is SOB with lying supine.   was seen in Dr Joshi office on Friday and was given lasix 40mg iv push however sob improved mildly.   Labs were checked out,   patient and as patient sob not markedly improved and patient found to have neno on ckd was sent to hospital for further eval. Patient also stating has been having a decreased po intake for the past week.  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    was given azithromycin and another abx about two weeks ago for cough as he had a suspected pna.   States the abx he does not know name of gave him diarrhea however that resolved.   Patient was also admitted 1 month prior for chf exacerbation. Denies fevers, chills, chest pain. (22 Oct 2018 21:23)    10/26/18:  Patient is back on furosemide 40 per day    10/27/18:  Had slightly more rapid AFib/flutter yesterday treated with extra meds, now with better rate control.  No anginal chest pains or increased SOB.  Having a resp Rx during the exam with reduced BS but no wheezing or increased rales.  Tolerating current meds and treatments so far.        PMHx:  PAST MEDICAL & SURGICAL HISTORY:  CKD (chronic kidney disease)  BPH (benign prostatic hyperplasia)  Postherpetic neuralgia  Chronic diarrhea  Prostate CA  Basal cell carcinoma  Lymphoma  Anxiety  Hypothyroid  Hypertension  A-fib  H/O shoulder replacement  S/P bilateral unicompartmental knee replacement      FAMILY HISTORY:   FAMILY HISTORY:  Family history of ischemic heart disease (Father, Mother)      ALLERGIES:  Allergies  No Known Allergies      REVIEW OF SYSTEMS:    CONSTITUTIONAL: No fevers or chills  EYES/ENT: No visual changes;  No vertigo or throat pain   NECK: No pain or stiffness  RESPIRATORY: No hemoptysis;  CARDIOVASCULAR: No chest pain  GASTROINTESTINAL: No abdominal or epigastric pain. No nausea, vomiting, or hematemesis; No diarrhea or constipation. No melena or hematochezia.  GENITOURINARY: No dysuria, frequency or hematuria  NEUROLOGICAL: No numbness   SKIN: No itching, burning, rashes, or lesions   All other review of systems is negative unless indicated above    Vital Signs Last 24 Hrs  T(C): 36.3 (27 Oct 2018 04:19), Max: 37.1 (26 Oct 2018 20:35)  T(F): 97.4 (27 Oct 2018 04:19), Max: 98.7 (26 Oct 2018 20:35)  HR: 87 (27 Oct 2018 04:19) (72 - 152)  BP: 106/65 (27 Oct 2018 04:19) (82/59 - 106/65)  BP(mean): --  RR: 18 (27 Oct 2018 04:19) (16 - 18)  SpO2: 95% (27 Oct 2018 04:19) (92% - 100%)        PHYSICAL EXAM:   Constitutional: NAD, awake and alert, well-developed  HEENT: PERR, EOMI, Normal Hearing, MMM  Neck: Soft and supple, No LAD, No JVD  Respiratory: Breath sounds are clear bilaterally, No wheezing, rales or rhonchi  Cardiovascular: S1 and S2, regular rate and rhythm, soft KENNY at LLSB and base as before, no gallops or rubs  Gastrointestinal: Bowel Sounds present, soft, nontender, nondistended, no guarding, no rebound  Extremities: No peripheral edema  Vascular: 2+ peripheral pulses  Neurological: A/O x 3, no focal deficits  Musculoskeletal: 5/5 strength b/l upper and lower extremities  Skin: No rashes      MEDICATIONS  (STANDING):  artificial  tears Solution 1 Drop(s) Both EYES two times a day  cefuroxime   Tablet 250 milliGRAM(s) Oral every 12 hours  docusate sodium 100 milliGRAM(s) Oral three times a day  furosemide    Tablet 40 milliGRAM(s) Oral daily  gabapentin 200 milliGRAM(s) Oral two times a day  ipratropium    for Nebulization 500 MICROGram(s) Nebulizer every 6 hours  lactobacillus acidophilus 1 Tablet(s) Oral daily  levothyroxine 100 MICROGram(s) Oral daily  Lotemax ointment 1 Application(s) 1 Application(s) Both EYES daily  metoprolol succinate ER 50 milliGRAM(s) Oral two times a day  potassium chloride    Tablet ER 20 milliEquivalent(s) Oral daily      LABS: All Labs Reviewed:                        11.3   4.96  )-----------( x        ( 27 Oct 2018 06:12 )             36.1                         12.9   5.72  )-----------( 117      ( 22 Oct 2018 17:32 )             40.9     10-22    144  |  105  |  34<H>  ----------------------------<  124<H>  3.7   |  32<H>  |  2.17<H>      10-23    147<H>  |  105  |  32<H>  ----------------------------<  98  3.4<L>   |  36<H>  |  2.02<H>      10-25    143  |  104  |  30<H>  ----------------------------<  94  3.6   |  35<H>  |  1.80<H>      10-26    146<H>  |  106  |  33<H>  ----------------------------<  89  3.7   |  36<H>  |  1.77<H>      10-27    144  |  104  |  37<H>  ----------------------------<  99  3.8   |  34<H>  |  1.88<H>      Ca    9.1      22 Oct 2018 17:32  Ca    9.0      23 Oct 2018 07:06  Ca    8.4<L>      25 Oct 2018 06:23  Ca    9.3      26 Oct 2018 06:14  Ca    9.0      27 Oct 2018 06:12    Phos  2.7     10-25  Mg     2.2     10-25    TPro  6.1  /  Alb  3.4  /  TBili  0.8  /  DBili  x   /  AST  22  /  ALT  20  /  AlkPhos  68  10-22    PT/INR - ( 27 Oct 2018 06:12 )   PT: 20.9 sec;   INR: 1.91 ratio    PT/INR - ( 22 Oct 2018 17:32 )   PT: 20.0 sec;   INR: 1.83 ratio      PTT - ( 22 Oct 2018 17:32 )  PTT:34.1 sec      Serum Pro-Brain Natriuretic Peptide: 52982 pg/mL (10-24 @ 06:37)  Serum Pro-Brain Natriuretic Peptide: 64842 pg/mL (10-22 @ 17:32)    BLOOD CULTURES: Organism --  Gram Stain Blood -- Gram Stain --  Specimen Source .Blood None  Culture-Blood --Culture Results:   No growth to date. (10.22.18 @ 19:38)      CARDIAC MARKERS ( 23 Oct 2018 00:21 )  .030 ng/mL / x     / x     / x     / x      CARDIAC MARKERS ( 22 Oct 2018 21:45 )  0.032 ng/mL / x     / x     / x     / x      CARDIAC MARKERS ( 22 Oct 2018 17:32 )  0.025 ng/mL / x     / x     / x     / x          EKG: AFlutter with varible block, RBBB, LAFB    Telemetry: AFlutter with controlled rates     ECHO:< from: Transthoracic Echocardiogram (06.26.18 @ 10:03) >     Moderate (2+) mitral regurgitation is present.   Mild mitral annular calcification is present.   Significant fibrocalcific changes noted to the aortic valve leaflets with   restriction in leaflet excursion. Peak transaortic gradient is 45 mmHg;   this finding is consistent with mild to moderate aortic stenosis.   Trace aortic regurgitation is present.   Mild to moderate tricuspid valve regurgitation is present.   Normal appearing pulmonic valve structure and function.   The left atrium is mildly dilated.   The left ventricle is normal in size, wall motion and contractility.   Mild concentric left ventricular hypertrophy is present.   Estimated left ventricular ejection fraction is 55-60 %.   A PFO is noted with color doppler.   Normal appearing right ventricle structure and function.   All visualized extra cardiac structures appears to be normal.   No evidence of pericardial effusion.     Signature     ----------------------------------------------------------------   Electronically signed by Sina Trent MD(Interpreting   physician) on 06/27/2018 07:41 AM   ----------------------------------------------------------------    < end of copied text >    < from: CT Chest No Cont (10.23.18 @ 08:44) >  IMPRESSION:  The findings are suggestive of multifocal pneumonia in the right lung,   for which clinical correlation is recommended. Interval resolution of the   left pleural effusion.  Extensive lymphadenopathies in the chest and upper abdomen are again,   noted unchanged.  Other findings as above.    < end of copied text >

## 2018-10-27 NOTE — PROGRESS NOTE ADULT - ASSESSMENT
- multifocal infiltrates with nodular and peribronchial likely infectious in etiology on po antibiotics now   - chf with small effusions seems clinically compensated    - afib with the RVR  - lymphoma with the mediastinal adenopathy   - acute on chronic ckd back on lasix     PLAN     - complete po antibiotics .  - continue with the Atrovent only prn for the symptoms as the wheezing seems to be improved   - management of afib  as per the cardiology   - follow up of lymphoma as per the oncology as an out patient .  - check the sat on room air and with ambulation .  - patient being planned for the discharge to rehab   - f

## 2018-10-27 NOTE — PROGRESS NOTE ADULT - SUBJECTIVE AND OBJECTIVE BOX
SUBJECTIVE     Patient feeling better today . rate better controlled   no cough and wheezing resolved and antibiotics changed to po by the I.D     PAST MEDICAL & SURGICAL HISTORY:  CKD (chronic kidney disease)  BPH (benign prostatic hyperplasia)  Postherpetic neuralgia  Chronic diarrhea  Prostate CA  Basal cell carcinoma  Lymphoma  Anxiety  Hypothyroid  Hypertension  A-fib  H/O shoulder replacement  S/P bilateral unicompartmental knee replacement    OBJECTIVE   Vital Signs Last 24 Hrs  T(C): 36.9 (27 Oct 2018 10:51), Max: 37.1 (26 Oct 2018 20:35)  T(F): 98.4 (27 Oct 2018 10:51), Max: 98.7 (26 Oct 2018 20:35)  HR: 86 (27 Oct 2018 10:51) (72 - 100)  BP: 106/65 (27 Oct 2018 04:19) (100/66 - 106/65)  BP(mean): --  RR: 18 (27 Oct 2018 10:51) (16 - 18)  SpO2: 95% (27 Oct 2018 10:51) (92% - 97%)    PHYSICAL EXAM:  Constitutional: , awake and alert, not in distress.  HEENT: Normo cephalic atraumatic  Neck: Soft and supple, No J.V.D   Respiratory: vesicular breathing ,has few occasional rales over the bases   Cardiovascular: S1 and S2, regular rate .   Gastrointestinal:  soft, nontender,   Extremities: No  edema or calf tenderness .  Neurological: No new  focal deficits.    MEDICATIONS  (STANDING):  artificial  tears Solution 1 Drop(s) Both EYES two times a day  cefuroxime   Tablet 250 milliGRAM(s) Oral every 12 hours  docusate sodium 100 milliGRAM(s) Oral three times a day  furosemide    Tablet 40 milliGRAM(s) Oral daily  gabapentin 200 milliGRAM(s) Oral two times a day  ipratropium    for Nebulization 500 MICROGram(s) Nebulizer every 6 hours  lactobacillus acidophilus 1 Tablet(s) Oral daily  levothyroxine 100 MICROGram(s) Oral daily  Lotemax ointment 1 Application(s) 1 Application(s) Both EYES daily  metoprolol succinate ER 50 milliGRAM(s) Oral two times a day  potassium chloride    Tablet ER 20 milliEquivalent(s) Oral daily  sodium chloride 0.9%. 250 milliLiter(s) (75 mL/Hr) IV Continuous <Continuous>                            11.3   4.96  )-----------( 93       ( 27 Oct 2018 06:12 )             36.1     10-27    144  |  104  |  37<H>  ----------------------------<  99  3.8   |  34<H>  |  1.88<H>    Ca    9.0      27 Oct 2018 06:12

## 2018-10-27 NOTE — PROGRESS NOTE ADULT - ASSESSMENT
89 yo man with multiple medical problems including CKD admitted with multifocal PNA.     LETICIA superimposed on CKD.  CT with no evidence of CHF.  Left Pleural effusion resolved.    Follow Right effusion with multifocal PNA.  Will hold diuretics for 2-3 days and allow stabilization of CKD while PNA treated with abtx.    10/24 SY  --LETICIA/CKD : slightly improved.  Monitor off diuretics for another day and resume tomorrow.  --PNA : continue abtx.    10/25 SY  --LETICIA/CKD : fairly stable for now.  Will resume outpt dose of diuretic to maintain stable fluid status.  --PNA : continue current abtx.  Monitor response.    10/26  HCAP  creat improving   Na level elevated  most likely due to Natriuresis from Lasix,  sodium load from NS  d/w pt and daughter    10/27  Incentive spirometry ordered  Serum creatinine stable  Chest x-ray ordered  Has more expiratory wheezing today

## 2018-10-27 NOTE — PROGRESS NOTE ADULT - ASSESSMENT
89 year old male as above     #Dyspnea  #Multifocal PNA (HCAP)  #acute on chronic congestive heart failure - lvef 06/2018 55-60%  - continue tele  - bnp elevated >19k trending down  - trop x 3 negative   - CT chest Multifocal PNA  - RVP Neg  - S/P lasix 40mg IV QD and change po lasix  - S/P Vanco and zosyn  - continue cefepime 1mg12h#4 suspected gram negative, gram positive and other resistant organisms  -dc plan PO ceftin for 7 day course 250 mg BID  - not on acei due to resolving LETICIA  - ID consult appreciated   - FU legionella ag and mycoplasma  - Blood cultures negative  -Pulm note appreciated for continuing atrovent PRN  -check saturation on room air with ambulation  -cardio note appreciated      #leticia on ckd - resolved  - monitor cr closely         #Hypotension - resolved  - resume lasix.    - continue BB    #Hypokalemia - resolved     #afib with RVR/Alfutter  - monitor inr- currently therapeutic  - continue coumadin, betablocker    #anxiety- stable  - continue xanax bid prn    #hypothyroidism- stable   - continue levothyroxine     #post herpetic neuralgia- stable  - continue home meds including gabapentin  - refresh tears  - advised daughter to bring in ointment as not in stock at     #bph- stable   - continue alfuzosin - non formulary    # r/o basal cell carcinoma of scalp - follows up with dr jain and had bx 5 days before admission  - outpt f/u     #dvt prophylaxis   - on coumadin for afib     Discussion with patient and family regarding advance directives. ; It seems like hes leaning toward DNR/DNI but would like to speak with his family; spoke with dtr she will bring in HCP and then will sign dnr/dni as pt defers to his daughter and prefers to have her sign this.  he has capacity to make decisions. 89 year old male as above     #Dyspnea  #Multifocal PNA (HCAP)  #acute on chronic congestive heart failure - lvef 06/2018 55-60%  - continue tele  - bnp elevated >19k trending down  - trop x 3 negative   - CT chest Multifocal PNA  - RVP Neg  - S/P lasix 40mg IV QD and change po lasix  - S/P Vanco and zosyn  - continue cefepime 1mg12h#4 suspected gram negative, gram positive and other resistant organisms  -dc plan PO ceftin for 7 day course 250 mg BID  - not on acei due to resolving LETICIA  - ID consult appreciated   -  Blood cultures negative  -Pulm note appreciated for continuing atrovent PRN  -check saturation on room air with ambulation  -cardio note appreciated      #leticia on ckd - resolved  - monitor cr closely     #Hypotension - resolved  - resume lasix.    - continue BB    #afib with RVR/Alfutter  - monitor inr- currently therapeutic  - continue coumadin, betablocker    #anxiety- stable  - continue xanax bid prn    #hypothyroidism- stable   - continue levothyroxine     #post herpetic neuralgia- stable  - continue home meds including gabapentin  - refresh tears  - advised daughter to bring in ointment as not in stock at     #bph- stable   - continue alfuzosin - non formulary    # r/o basal cell carcinoma of scalp - follows up with dr jain and had bx 5 days before admission  - outpt f/u     #dvt prophylaxis   - on coumadin for afib     Discussion with patient and family regarding advance directives again yesterday. pt is now DNR/DNI

## 2018-10-27 NOTE — PROGRESS NOTE ADULT - SUBJECTIVE AND OBJECTIVE BOX
PCP: PCP/Cardio- Dr Joshi    Chief Complaint: Patient is a 89y old  Male who presents with a chief complaint of CHF Exacerbation w/ possible hcap (23 Oct 2018 06:42)    HPI:  89 year old male with pmh of anxiety, basal cell carcinoma, bph, ckd, htn, hypothyroid, lymphoma, postherpetic neuralgia, prostate ca coming to  ED with complaints of worsening shortness of breath x 3 days.    Pt is SOB with lying supine.   was seen in Dr Joshi office on Friday and was given lasix 40mg iv push however sob improved mildly.   Labs were checked out,   patient and as patient sob not markedly improved and patient found to have neno on ckd was sent to hospital for further eval. Patient also stating has been having a decreased po intake for the past week.  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    was given azithromycin and another abx about two weeks ago for cough as he had a suspected pna.   States the abx he does not know name of gave him diarrhea however that resolved.   Patient was also admitted 1 month prior for chf exacerbation. Denies fevers, chills, chest pain. (22 Oct 2018 21:23)    10/23:  Above Reviewed. dyspnea improving. No other complaints  10/24: Daughter at bedside; still has dyspnea; discussed advance directives with patient and daughter; It seems like hes leaning toward DNR/DNI but would like to speak with his family; Tele still in flutter; also found to have low BP this morning   10/25 - pt is still wheezing but is feeling less SOB.  10/26 - pt still feeling SOB and tired.  rapid afib HR 150s on tele   10/27 - pt feeling better today, still tired but less SOB. pt has episodes of a flutter through night    ROS:   All 10 systems reviewed and found to be negative with the exception of what has been described above.    Vital Signs Last 24 Hrs  T(C): 36.9 (27 Oct 2018 10:51), Max: 37.1 (26 Oct 2018 20:35)  T(F): 98.4 (27 Oct 2018 10:51), Max: 98.7 (26 Oct 2018 20:35)  HR: 86 (27 Oct 2018 10:51) (72 - 100)  BP: 102/68 (27 Oct 2018 13:00) (100/66 - 106/65)  BP(mean): --  RR: 18 (27 Oct 2018 10:51) (16 - 18)  SpO2: 95% (27 Oct 2018 10:51) (92% - 97%)    GEN: lying in bed, NAD  HEENT:   NC/AT, pupils equal and reactive, EOMI  CV:  +S1, +S2, RRR  RESP:   lungs clear to auscultation bilaterally, no wheeze, rales, rhonchi   BREAST:  not examined  GI:  abdomen soft, non-tender, non-distended, normoactive BS  RECTAL:  not examined  :  not examined  MSK:   normal muscle tone  EXT:  no edema  NEURO:  AAOX3, no focal neurological deficits  SKIN:  no rashes               11.3   4.96  )-----------( 93       ( 27 Oct 2018 06:12 )             36.1     10-27    144  |  104  |  37<H>  ----------------------------<  99  3.8   |  34<H>  |  1.88<H>    Ca    9.0      27 Oct 2018 06:12      PT/INR - ( 27 Oct 2018 06:12 )   PT: 20.9 sec;   INR: 1.91 ratio         RADIOLOGY/EKG:    < from: Xray Chest 1 View AP/PA. (10.22.18 @ 18:32) >    IMPRESSION:  Findings are likely indicative of mild pneumonia involving the right lung   for which clinical correlation is recommended.    < end of copied text >  < from: CT Chest No Cont (10.23.18 @ 08:44) >    IMPRESSION:  The findings are suggestive of multifocal pneumonia in the right lung,   for which clinical correlation is recommended. Interval resolution of the   left pleural effusion.  Extensive lymphadenopathies in the chest and upper abdomen are again,   noted unchanged.  Other findings as above.    < end of copied text >    Culture - Blood (10.22.18 @ 19:38)    Specimen Source: .Blood None    Culture Results:   No growth to date.    Culture - Urine (06.07.18 @ 20:40)    Specimen Source: .Urine None    Culture Results:   No growth    < from: Transthoracic Echocardiogram (10.24.18 @ 09:06) >  Summary     Fibrocalcific changes noted to the mitral valve leaflets with preserved   leaflet excursion.   Mild mitral annular calcification is present.   Moderate (2+) mitral regurgitation is present.   Significant fibrocalcific changes noted to the aortic valve leaflets with   restriction in leaflet excursion. Peak transaortic gradient is 48 mmHg;   this finding is consistent with moderate aortic stenosis.   DI = .2   Continuity equation AGUSTIN is .59 cm which likely overestimates the degree   of   aortic stenosis   Mild to Moderate Tricuspid regurgitation is present. Mild pulmonary   hypertension.   Mild concentric left ventricular hypertrophy is present.   Estimated left ventricular ejection fraction is 55-60 %.   The right atrium appears moderately dilated.   Normal appearing right ventricle structure andfunction.    < end of copied text >

## 2018-10-27 NOTE — PROGRESS NOTE ADULT - ASSESSMENT
89 M with shortness of breath - multifactorial    Diastolic failure- mild. Legs are without edema and mild crackles on bases- recommend low dose Lasix with close monitoring of renal function and electrolytes- if Creatine continues to rise, would hold Lasix for a day . Will add low dose nitrate to reduce preload    Pulm- PNA- on abx, recommend nebulizer treatment, mucolytic and consideration for steroids      Aflutter- metoprolol for rate control, continue with coumadin dosing goal INR 2-2.5     No evidence for ACS     10/24/18:  Cardiac status stable.  Off diuretics and on antibiotics.  Renal function stable.  May need to add back furosemide.  Will watch closely.    10/25/18:  Patient remains stable.  Renal function somewhat improved off diuretics.  Continue antibiotics for right sided multifocal pneumonia    10/26/18:  Cardiac status stable.  Back on furosemide.  Follow renal function.  Continue treatment for pneumonia    10/27/18:  Had slightly more rapid AFib/flutter yesterday treated with extra meds, now with better rate control.  No anginal chest pains or increased SOB.  Having a resp Rx during the exam with reduced BS but no wheezing or increased rales.  Tolerating current meds and treatments so far.  Cardiac status stable.  Back on furosemide.  Follow renal function.  Continue treatment for pneumonia.  Will follow intermittently prn.

## 2018-10-27 NOTE — PROGRESS NOTE ADULT - SUBJECTIVE AND OBJECTIVE BOX
NEPHROLOGY INTERVAL HPI/OVERNIGHT EVENTS:    Date of Service: 10-25-18 @ 09:47    10/27  Patient somewhat feels tired today  his daughter is at the bedside  Questions answered  Has wheezing today we will order an x-ray  Incentive spirometry ordered      10/26  HCAP  creat slightly improving  in good spirits  daughter at bedside    10/25 SY  No acute events overnight.  Feeling weak today --Was unable to sleep well due to floor activities.    10/24 SY  No acute event overnight.  Reports slept fairly well.  no new complaints.  No acute distress    HPI:  89 yo man with PMHX significant for CHF, HTN, Hx of Lymphoma treated with Rituxan.  Notable for HH admission in 6/2018 for CHF and LETICIA.   Creat stabilized to 1.7 prior to d/c.  Developed sob for several days.  Given IV lasix by Dr. Joshiwith mild improvement in symptoms.  Pt admitted due to worsened renal function with persistent SOB.  CT chest now c/w multifocal PNA.  Renal evaluation requested for LETICIA superimposed on CKD.  Pt denies any fever at home.    PMHX and PSHX.  1.CKD ( 12/2015--Creat 1.63), Post recent hosp for CHF.  Creat 1.7 on d/c 6/2018.  2.A FIB  3.HTN  4.Hx of Prostate CA ( Post RT)  5.Hx of Lymphoma Post Rituxan therapy.  6.Hx of Postherpetic Shingles.  7.Hypothyroidism  8.Hx of Chronic Diarrhea.  9.Recent admission for CHF.    MEDICATIONS  (STANDING):  alfuzosin 10 milliGRAM(s) Oral daily  artificial  tears Solution 1 Drop(s) Both EYES two times a day  cefuroxime   Tablet 250 milliGRAM(s) Oral every 12 hours  docusate sodium 100 milliGRAM(s) Oral three times a day  furosemide    Tablet 40 milliGRAM(s) Oral daily  gabapentin 200 milliGRAM(s) Oral three times a day  ipratropium    for Nebulization 500 MICROGram(s) Nebulizer every 6 hours  lactobacillus acidophilus 1 Tablet(s) Oral daily  levothyroxine 100 MICROGram(s) Oral daily  Lotemax ointment 1 Application(s) 1 Application(s) Both EYES daily  metoprolol succinate ER 50 milliGRAM(s) Oral two times a day  potassium chloride    Tablet ER 20 milliEquivalent(s) Oral daily    MEDICATIONS  (PRN):  acetaminophen   Tablet .. 650 milliGRAM(s) Oral every 6 hours PRN Temp greater or equal to 38C (100.4F), Mild Pain (1 - 3), Moderate Pain (4 - 6)  ALPRAZolam 0.25 milliGRAM(s) Oral two times a day PRN anxiety  diltiazem Injectable 10 milliGRAM(s) IV Push every 6 hours PRN for HR > 100 bpm  senna 2 Tablet(s) Oral at bedtime PRN Constipation        PHYSICAL EXAM:  Alert and appropriate  GENERAL: No distress  CHEST/LUNG: Right diffuse rhonchi with end exp wheezing  HEART: S1S2 RRR  ABDOMEN: soft  EXTREMITIES: no edema  SKIN:     LABS:                        10-27    144  |  104  |  37<H>  ----------------------------<  99  3.8   |  34<H>  |  1.88<H>    Ca    9.0      27 Oct 2018 06:12

## 2018-10-28 LAB
CULTURE RESULTS: SIGNIFICANT CHANGE UP
CULTURE RESULTS: SIGNIFICANT CHANGE UP
INR BLD: 1.65 RATIO — HIGH (ref 0.88–1.16)
PROTHROM AB SERPL-ACNC: 18 SEC — HIGH (ref 9.8–12.7)
SPECIMEN SOURCE: SIGNIFICANT CHANGE UP
SPECIMEN SOURCE: SIGNIFICANT CHANGE UP

## 2018-10-28 RX ORDER — ALPRAZOLAM 0.25 MG
0.25 TABLET ORAL
Qty: 0 | Refills: 0 | Status: DISCONTINUED | OUTPATIENT
Start: 2018-10-28 | End: 2018-11-01

## 2018-10-28 RX ORDER — FUROSEMIDE 40 MG
20 TABLET ORAL DAILY
Qty: 0 | Refills: 0 | Status: DISCONTINUED | OUTPATIENT
Start: 2018-10-29 | End: 2018-10-31

## 2018-10-28 RX ORDER — AMIODARONE HYDROCHLORIDE 400 MG/1
200 TABLET ORAL
Qty: 0 | Refills: 0 | Status: DISCONTINUED | OUTPATIENT
Start: 2018-10-28 | End: 2018-10-31

## 2018-10-28 RX ADMIN — Medication 1 TABLET(S): at 12:41

## 2018-10-28 RX ADMIN — Medication 100 MICROGRAM(S): at 05:40

## 2018-10-28 RX ADMIN — Medication 1 DROP(S): at 05:40

## 2018-10-28 RX ADMIN — Medication 500 MICROGRAM(S): at 13:40

## 2018-10-28 RX ADMIN — WARFARIN SODIUM 5 MILLIGRAM(S): 2.5 TABLET ORAL at 22:12

## 2018-10-28 RX ADMIN — Medication 500 MICROGRAM(S): at 20:48

## 2018-10-28 RX ADMIN — Medication 250 MILLIGRAM(S): at 17:27

## 2018-10-28 RX ADMIN — Medication 100 MILLIGRAM(S): at 22:12

## 2018-10-28 RX ADMIN — AMIODARONE HYDROCHLORIDE 200 MILLIGRAM(S): 400 TABLET ORAL at 10:50

## 2018-10-28 RX ADMIN — GABAPENTIN 200 MILLIGRAM(S): 400 CAPSULE ORAL at 05:39

## 2018-10-28 RX ADMIN — Medication 100 MILLIGRAM(S): at 05:40

## 2018-10-28 RX ADMIN — GABAPENTIN 200 MILLIGRAM(S): 400 CAPSULE ORAL at 17:28

## 2018-10-28 RX ADMIN — Medication 250 MILLIGRAM(S): at 05:40

## 2018-10-28 RX ADMIN — Medication 500 MICROGRAM(S): at 08:29

## 2018-10-28 RX ADMIN — Medication 50 MILLIGRAM(S): at 05:40

## 2018-10-28 RX ADMIN — AMIODARONE HYDROCHLORIDE 200 MILLIGRAM(S): 400 TABLET ORAL at 17:28

## 2018-10-28 RX ADMIN — Medication 1 DROP(S): at 17:28

## 2018-10-28 RX ADMIN — Medication 100 MILLIGRAM(S): at 12:40

## 2018-10-28 RX ADMIN — Medication 0.25 MILLIGRAM(S): at 17:28

## 2018-10-28 NOTE — PROGRESS NOTE ADULT - SUBJECTIVE AND OBJECTIVE BOX
CHIEF COMPLAINT: Patient is a 89y old  Male who presents with a chief complaint of CHF Exacerbation w/ possible hcap (22 Oct 2018 21:23)      HPI:  89 year old male with pmh of anxiety, basal cell carcinoma, bph, ckd, htn, hypothyroid, lymphoma, postherpetic neuralgia, prostate ca coming to  ED with complaints of worsening shortness of breath x 3 days.    Pt is SOB with lying supine.   was seen in Dr Joshi office on Friday and was given lasix 40mg iv push however sob improved mildly.   Labs were checked out,   patient and as patient sob not markedly improved and patient found to have neno on ckd was sent to hospital for further eval. Patient also stating has been having a decreased po intake for the past week.  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    was given azithromycin and another abx about two weeks ago for cough as he had a suspected pna.   States the abx he does not know name of gave him diarrhea however that resolved.   Patient was also admitted 1 month prior for chf exacerbation. Denies fevers, chills, chest pain. (22 Oct 2018 21:23)    10/26/18:  Patient is back on furosemide 40 per day    10/27/18:  Had slightly more rapid AFib/flutter yesterday treated with extra meds, now with better rate control.  No anginal chest pains or increased SOB.  Having a resp Rx during the exam with reduced BS but no wheezing or increased rales.  Tolerating current meds and treatments so far.    10/28/18:  AFib/Flutter rate labile and occasionally up to 140-150's but with increased meds develops hypotension but rate increases when Metoprolol dose reduced.  Will try Amiodarone for rate control.        PMHx:  PAST MEDICAL & SURGICAL HISTORY:  CKD (chronic kidney disease)  BPH (benign prostatic hyperplasia)  Postherpetic neuralgia  Chronic diarrhea  Prostate CA  Basal cell carcinoma  Lymphoma  Anxiety  Hypothyroid  Hypertension  A-fib  H/O shoulder replacement  S/P bilateral unicompartmental knee replacement      FAMILY HISTORY:   FAMILY HISTORY:  Family history of ischemic heart disease (Father, Mother)      ALLERGIES:  Allergies  No Known Allergies      REVIEW OF SYSTEMS:    CONSTITUTIONAL: No fevers or chills  EYES/ENT: No visual changes;  No vertigo or throat pain   NECK: No pain or stiffness  RESPIRATORY: No hemoptysis;  CARDIOVASCULAR: No chest pain  GASTROINTESTINAL: No abdominal or epigastric pain. No nausea, vomiting, or hematemesis; No diarrhea or constipation. No melena or hematochezia.  GENITOURINARY: No dysuria, frequency or hematuria  NEUROLOGICAL: No numbness   SKIN: No itching, burning, rashes, or lesions   All other review of systems is negative unless indicated above    Vital Signs Last 24 Hrs  T(C): 36.9 (28 Oct 2018 04:46), Max: 36.9 (27 Oct 2018 10:51)  T(F): 98.5 (28 Oct 2018 04:46), Max: 98.5 (27 Oct 2018 17:45)  HR: 150 (28 Oct 2018 09:17) (80 - 150)  BP: 108/73 (28 Oct 2018 09:17) (102/68 - 129/69)  BP(mean): --  RR: 16 (28 Oct 2018 04:46) (16 - 18)  SpO2: 91% (28 Oct 2018 04:46) (89% - 95%)      PHYSICAL EXAM:   Constitutional: NAD, awake and alert, well-developed  HEENT: PERR, EOMI, Normal Hearing, MMM  Neck: Soft and supple, No LAD, No JVD  Respiratory: Breath sounds are diminished but clear bilaterally, No wheezing, rales or rhonchi  Cardiovascular: S1 and S2, regular rate and rhythm, soft KENNY at LLSB and base as before, no gallops or rubs  Gastrointestinal: Bowel Sounds present, soft, nontender, nondistended, no guarding, no rebound  Extremities: No peripheral edema  Vascular: 2+ peripheral pulses  Neurological: no focal deficits        MEDICATIONS  (STANDING):  artificial  tears Solution 1 Drop(s) Both EYES two times a day  cefuroxime   Tablet 250 milliGRAM(s) Oral every 12 hours  docusate sodium 100 milliGRAM(s) Oral three times a day  gabapentin 200 milliGRAM(s) Oral two times a day  ipratropium    for Nebulization 500 MICROGram(s) Nebulizer every 6 hours  lactobacillus acidophilus 1 Tablet(s) Oral daily  levothyroxine 100 MICROGram(s) Oral daily  Lotemax ointment 1 Application(s) 1 Application(s) Both EYES daily  metoprolol succinate ER 50 milliGRAM(s) Oral two times a day  potassium chloride   Powder 20 milliEquivalent(s) Oral daily  sodium chloride 0.9%. 250 milliLiter(s) (75 mL/Hr) IV Continuous <Continuous>  warfarin 5 milliGRAM(s) Oral daily      LABS: All Labs Reviewed:                        11.3   4.96  )-----------( 93       ( 27 Oct 2018 06:12 )             36.1                           12.9   5.72  )-----------( 117      ( 22 Oct 2018 17:32 )             40.9     10-27    144  |  104  |  37<H>  ----------------------------<  99  3.8   |  34<H>  |  1.88<H>    10-26    146<H>  |  106  |  33<H>  ----------------------------<  89  3.7   |  36<H>  |  1.77<H>    10-25    143  |  104  |  30<H>  ----------------------------<  94  3.6   |  35<H>  |  1.80<H>    10-23    147<H>  |  105  |  32<H>  ----------------------------<  98  3.4<L>   |  36<H>  |  2.02<H>    10-22    144  |  105  |  34<H>  ----------------------------<  124<H>  3.7   |  32<H>  |  2.17<H>    Ca    9.1      22 Oct 2018 17:32  Ca    9.0      23 Oct 2018 07:06  Ca    8.4      25 Oct 2018 06:23  Ca    9.3      26 Oct 2018 06:14  Ca    9.0      27 Oct 2018 06:12    Phos  2.7     10-25  Mg     2.2     10-25    TPro  6.1  /  Alb  3.4  /  TBili  0.8  /  DBili  x   /  AST  22  /  ALT  20  /  AlkPhos  68  10-22    PT/INR - ( 28 Oct 2018 06:09 )   PT: 18.0 sec;   INR: 1.65 ratio    PT/INR - ( 27 Oct 2018 06:12 )   PT: 20.9 sec;   INR: 1.91 ratio    PT/INR - ( 22 Oct 2018 17:32 )   PT: 20.0 sec;   INR: 1.83 ratio      PTT - ( 22 Oct 2018 17:32 )  PTT:34.1 sec      Serum Pro-Brain Natriuretic Peptide: 48599 pg/mL (10-24 @ 06:37)  Serum Pro-Brain Natriuretic Peptide: 76350 pg/mL (10-22 @ 17:32)    BLOOD CULTURES: Organism --  Gram Stain Blood -- Gram Stain --  Specimen Source .Blood None  Culture-Blood --Culture Results:   No growth to date. (10.22.18 @ 19:38)      CARDIAC MARKERS ( 23 Oct 2018 00:21 )  .030 ng/mL / x     / x     / x     / x      CARDIAC MARKERS ( 22 Oct 2018 21:45 )  0.032 ng/mL / x     / x     / x     / x      CARDIAC MARKERS ( 22 Oct 2018 17:32 )  0.025 ng/mL / x     / x     / x     / x          RADIOLOGY:  CT of Chest w/o contrast: 10/23/18  IMPRESSION:  The findings are suggestive of multifocal pneumonia in the right lung,   for which clinical correlation is recommended. Interval resolution of the   left pleural effusion.  Extensive lymphadenopathies in the chest and upper abdomen are again,   noted unchanged.  Other findings as above.      EKG: AFlutter with varible block, RBBB, LAFB    Telemetry: AFlutter with labile rates (80's-150's)    ECHO:Transthoracic Echocardiogram: 6/26/18   Moderate (2+) mitral regurgitation is present.   Mild mitral annular calcification is present.   Significant fibrocalcific changes noted to the aortic valve leaflets with restriction in leaflet excursion. Peak transaortic gradient is 45 mmHg; this finding is consistent with mild to moderate aortic stenosis.   Trace aortic regurgitation is present.   Mild to moderate tricuspid valve regurgitation is present.   Normal appearing pulmonic valve structure and function.   The left atrium is mildly dilated.   The left ventricle is normal in size, wall motion and contractility.   Mild concentric left ventricular hypertrophy is present.   Estimated left ventricular ejection fraction is 55-60 %.   A PFO is noted with color doppler.   Normal appearing right ventricle structure and function.   All visualized extra cardiac structures appears to be normal.   No evidence of pericardial effusion.     Signature   ----------------------------------------------------------------   Electronically signed by Sina COLLIERInterpreting   physician) on 06/27/2018 07:41 AM   ----------------------------------------------------------------

## 2018-10-28 NOTE — PROGRESS NOTE ADULT - ASSESSMENT
- multifocal infiltrates with nodular and peribronchial likely infectious in etiology on po antibiotics now and follow up cxr noted   - chf with small effusions noted in the follow up cxr .  - afib with the RVR  - lymphoma with the mediastinal adenopathy   - acute on chronic ckd back on lasix     PLAN     - complete po antibiotics .   - continue with the Atrovent only prn for the symptoms as the wheezing seems to be improved   - management of afib  as per the cardiology started on amiodarone and would need to have baseline pft and monitor for the symptoms of toxicity   - follow up of lymphoma as per the oncology as an out patient .  - need to have follow up cxr and would avoid fluids further   - - f

## 2018-10-28 NOTE — PROGRESS NOTE ADULT - ASSESSMENT
89 year old male as above     #Dyspnea  #Multifocal PNA (HCAP)  #acute on chronic congestive heart failure - lvef 06/2018 55-60%  - continue tele  - bnp elevated >19k trending down  - trop x 3 negative   - CT chest Multifocal PNA  - RVP Neg  - S/P lasix 40mg IV QD   - S/P Vanco and zosyn  - continue cefepime 1mg12h#4 suspected gram negative, gram positive and other resistant organisms  - dc plan PO ceftin for 7 day course 250 mg BID  - not on acei due to resolving LETICIA  - ID consult appreciated   - Blood cultures negative  - Pulm note appreciated for continuing atrovent PRN  - check saturation on room air with ambulation  - cardio note appreciated  - switch patient to amiodarone for better rate control of Afib/Aflutter  - decrease lasix to 20 mg daily    #leticia on ckd - resolved  - monitor cr closely     #Hypotension - resolved  - resume lasix.    - continue BB    #afib with RVR/Alfutter  - monitor inr- currently therapeutic  - continue coumadin, betablocker    #anxiety- stable  - continue xanax bid prn    #hypothyroidism- stable   - continue levothyroxine     #post herpetic neuralgia- stable  - continue home meds including gabapentin  - refresh tears  - advised daughter to bring in ointment as not in stock at     #bph- stable   - continue alfuzosin - non formulary    # r/o basal cell carcinoma of scalp - follows up with dr jain and had bx 5 days before admission  - outpt f/u     #dvt prophylaxis   - on coumadin for afib     Discussion with patient and family regarding advance directives again yesterday. pt is now DNR/DNI 89 year old male as above     #Dyspnea  #Multifocal PNA (HCAP)  #acute on chronic diastolic congestive heart failure - lvef 06/2018 55-60%  - will resume lasix 20 mg daily now that BP is improved   - continue tele given ky of rapid afib   - bnp elevated >19k trending down  - trop x 3 negative   - CT chest Multifocal PNA  - RVP Neg  - S/P lasix 40mg IV QD   - S/P Vanco and zosyn ahanged to po ceftin til 10/29   - not on acei due to resolving LETICIA  - ID consult appreciated   - Blood cultures negative  - Pulm note appreciated for continuing atrovent PRN  - check saturation on room air with ambulation  - cardio note appreciated  - switch patient to amiodarone for better rate control of Afib/Aflutter    #leticia on ckd 3-4- resolved  - monitor cr closely     #Hypotension - resolved  - resume lasix.    - continue BB    #afib with RVR/Alfutter - uncontrolled rate. case d/w dr chowdary and will start him on amio 200 mg bid and plan to change to amio 200 mg daily by 10/31  - pt aware to f/u with dr corral for outpt PFTs  - monitor inr- currently therapeutic  - continue coumadin, betablocker    #anxiety- stable  - continue xanax bid prn    #hypothyroidism- stable   - continue levothyroxine     #post herpetic neuralgia- stable  - continue home meds including gabapentin  - refresh tears  - advised daughter to bring in ointment as not in stock at     #bph- stable   - continue alfuzosin - non formulary    # r/o basal cell carcinoma of scalp - follows up with dr jain and had bx 5 days before admission  - outpt f/u     #dvt prophylaxis   - on coumadin for afib     Discussion with patient and family regarding advance directives again yesterday. pt is now DNR/DNI 89 year old male as above     #Dyspnea  #Multifocal PNA (HCAP)  #acute on chronic diastolic congestive heart failure - lvef 06/2018 55-60%  - will resume lasix 20 mg daily now that BP is improved   - continue tele given ky of rapid afib   - bnp elevated >19k trending down  - trop x 3 negative   - CT chest Multifocal PNA  - RVP Neg  - S/P lasix 40mg IV QD   - S/P Vanco and zosyn ahanged to po ceftin til 10/29   - not on acei due to resolving LETICIA  - ID consult appreciated   - Blood cultures negative  - Pulm note appreciated for continuing atrovent PRN  - check saturation on room air with ambulation  - cardio note appreciated  - switch patient to amiodarone for better rate control of Afib/Aflutter    #leticia on ckd 3-4- resolved  - monitor cr closely     #afib with RVR/Alfutter - uncontrolled rate. case d/w dr chowdary and will start him on amio 200 mg bid and plan to change to amio 200 mg daily by 10/31  - pt aware to f/u with dr corral for outpt PFTs  - monitor inr- currently therapeutic  - continue coumadin, stop toprol xl and start amio    #anxiety- stable  - continue xanax bid prn    #hypothyroidism- stable   - continue levothyroxine     #post herpetic neuralgia- stable  - continue home meds including gabapentin  - refresh tears  - advised daughter to bring in ointment as not in stock at     #bph- stable   - continue alfuzosin - non formulary    # r/o basal cell carcinoma of scalp - follows up with dr jain and had bx 5 days before admission  - outpt f/u     #dvt prophylaxis   - on coumadin for afib     Discussion with patient and family regarding advance directives again yesterday. pt is now DNR/DNI

## 2018-10-28 NOTE — PROGRESS NOTE ADULT - ASSESSMENT
89 yo man with multiple medical problems including CKD admitted with multifocal PNA.     LETICIA superimposed on CKD.  CT with no evidence of CHF.  Left Pleural effusion resolved.    Follow Right effusion with multifocal PNA.  Will hold diuretics for 2-3 days and allow stabilization of CKD while PNA treated with abtx.    10/24 SY  --LETICIA/CKD : slightly improved.  Monitor off diuretics for another day and resume tomorrow.  --PNA : continue abtx.    10/25 SY  --LETICIA/CKD : fairly stable for now.  Will resume outpt dose of diuretic to maintain stable fluid status.  --PNA : continue current abtx.  Monitor response.    10/26  HCAP  creat improving   Na level elevated  most likely due to Natriuresis from Lasix,  sodium load from NS  d/w pt and daughter    10/27  Incentive spirometry ordered  Serum creatinine stable  Chest x-ray ordered  Has more expiratory wheezing today    10/28  Chest x-ray shows no new worsening  Right hilar infiltrate, bilateral small pleural effusions persist  No wheezing audible today  Patient continues to use the incentive spirometer  Creatinine stable  Lasix discontinued yesterday due to hypotension.  IV bolus of fluids was given as well

## 2018-10-28 NOTE — PROGRESS NOTE ADULT - SUBJECTIVE AND OBJECTIVE BOX
NEPHROLOGY INTERVAL HPI/OVERNIGHT EVENTS:    Date of Service: 10-25-18 @ 09:47    10/28  Chest x-ray from 10/27 without worsening.  Right hilar infiltrates and bilateral small pleural effusions  Patient doing the incentive spirometer as prescribed  The patient's daughter is at the bedside questions answered  10/27  Patient somewhat feels tired today  his daughter is at the bedside  Questions answered  Has wheezing today we will order an x-ray  Incentive spirometry ordered      10/26  HCAP  creat slightly improving  in good spirits  daughter at bedside    10/25 SY  No acute events overnight.  Feeling weak today --Was unable to sleep well due to floor activities.    10/24 SY  No acute event overnight.  Reports slept fairly well.  no new complaints.  No acute distress    HPI:  89 yo man with PMHX significant for CHF, HTN, Hx of Lymphoma treated with Rituxan.  Notable for HH admission in 6/2018 for CHF and LETICIA.   Creat stabilized to 1.7 prior to d/c.  Developed sob for several days.  Given IV lasix by Dr. Joshiwith mild improvement in symptoms.  Pt admitted due to worsened renal function with persistent SOB.  CT chest now c/w multifocal PNA.  Renal evaluation requested for LETICIA superimposed on CKD.  Pt denies any fever at home.    PMHX and PSHX.  1.CKD ( 12/2015--Creat 1.63), Post recent hosp for CHF.  Creat 1.7 on d/c 6/2018.  2.A FIB  3.HTN  4.Hx of Prostate CA ( Post RT)  5.Hx of Lymphoma Post Rituxan therapy.  6.Hx of Postherpetic Shingles.  7.Hypothyroidism  8.Hx of Chronic Diarrhea.  9.Recent admission for CHF.    MEDICATIONS  (STANDING):  alfuzosin 10 milliGRAM(s) Oral daily  artificial  tears Solution 1 Drop(s) Both EYES two times a day  cefuroxime   Tablet 250 milliGRAM(s) Oral every 12 hours  docusate sodium 100 milliGRAM(s) Oral three times a day  furosemide    Tablet 40 milliGRAM(s) Oral daily  gabapentin 200 milliGRAM(s) Oral three times a day  ipratropium    for Nebulization 500 MICROGram(s) Nebulizer every 6 hours  lactobacillus acidophilus 1 Tablet(s) Oral daily  levothyroxine 100 MICROGram(s) Oral daily  Lotemax ointment 1 Application(s) 1 Application(s) Both EYES daily  metoprolol succinate ER 50 milliGRAM(s) Oral two times a day  potassium chloride    Tablet ER 20 milliEquivalent(s) Oral daily    MEDICATIONS  (PRN):  acetaminophen   Tablet .. 650 milliGRAM(s) Oral every 6 hours PRN Temp greater or equal to 38C (100.4F), Mild Pain (1 - 3), Moderate Pain (4 - 6)  ALPRAZolam 0.25 milliGRAM(s) Oral two times a day PRN anxiety  diltiazem Injectable 10 milliGRAM(s) IV Push every 6 hours PRN for HR > 100 bpm  senna 2 Tablet(s) Oral at bedtime PRN Constipation    Vital Signs Last 24 Hrs  T(C): 36.9 (28 Oct 2018 17:22), Max: 36.9 (27 Oct 2018 17:45)  T(F): 98.4 (28 Oct 2018 17:22), Max: 98.5 (27 Oct 2018 17:45)  HR: 111 (28 Oct 2018 17:22) (80 - 150)  BP: 105/79 (28 Oct 2018 17:22) (93/57 - 129/69)  BP(mean): --  RR: 17 (28 Oct 2018 17:22) (16 - 18)  SpO2: 98% (28 Oct 2018 17:22) (89% - 98%)        PHYSICAL EXAM:  Alert and appropriate  GENERAL: No distress  CHEST/LUNG: Right diffuse rhonchi no wheezing  HEART: S1S2 RRR  ABDOMEN: soft  EXTREMITIES: no edema  SKIN:     LABS:             10-27    144  |  104  |  37<H>  ----------------------------<  99  3.8   |  34<H>  |  1.88<H>    Ca    9.0      27 Oct 2018 06:12

## 2018-10-28 NOTE — PROGRESS NOTE ADULT - SUBJECTIVE AND OBJECTIVE BOX
PCP: PCP/Cardio- Dr Joshi    Chief Complaint: Patient is a 89y old  Male who presents with a chief complaint of CHF Exacerbation w/ possible hcap (23 Oct 2018 06:42)    HPI:  89 year old male with pmh of anxiety, basal cell carcinoma, bph, ckd, htn, hypothyroid, lymphoma, postherpetic neuralgia, prostate ca coming to  ED with complaints of worsening shortness of breath x 3 days.    Pt is SOB with lying supine.   was seen in Dr Joshi office on Friday and was given lasix 40mg iv push however sob improved mildly.   Labs were checked out,   patient and as patient sob not markedly improved and patient found to have neno on ckd was sent to hospital for further eval. Patient also stating has been having a decreased po intake for the past week.  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    was given azithromycin and another abx about two weeks ago for cough as he had a suspected pna.   States the abx he does not know name of gave him diarrhea however that resolved.   Patient was also admitted 1 month prior for chf exacerbation. Denies fevers, chills, chest pain. (22 Oct 2018 21:23)    10/23:  Above Reviewed. dyspnea improving. No other complaints  10/24: Daughter at bedside; still has dyspnea; discussed advance directives with patient and daughter; It seems like hes leaning toward DNR/DNI but would like to speak with his family; Tele still in flutter; also found to have low BP this morning   10/25 - pt is still wheezing but is feeling less SOB.  10/26 - pt still feeling SOB and tired.  rapid afib HR 150s on tele   10/27 - pt feeling better today, still tired but less SOB. pt has episodes of a flutter through night  10/28 - pt had episodes of flutter in the morning and was given 10mL of cardizem. No CP or SOB this morning.    ROS:   All 10 systems reviewed and found to be negative with the exception of what has been described above.    Vital Signs Last 24 Hrs  T(C): 36.9 (28 Oct 2018 04:46), Max: 36.9 (27 Oct 2018 10:51)  T(F): 98.5 (28 Oct 2018 04:46), Max: 98.5 (27 Oct 2018 17:45)  HR: 150 (28 Oct 2018 09:17) (80 - 150)  BP: 108/73 (28 Oct 2018 09:17) (102/68 - 129/69)  BP(mean): --  RR: 16 (28 Oct 2018 04:46) (16 - 18)  SpO2: 91% (28 Oct 2018 04:46) (89% - 95%)    GEN: lying in bed, NAD  HEENT:   NC/AT, pupils equal and reactive, EOMI  CV:  +S1, +S2, RRR  RESP:   lungs clear to auscultation bilaterally, no wheeze, rales, rhonchi   BREAST:  not examined  GI:  abdomen soft, non-tender, non-distended, normoactive BS  RECTAL:  not examined  :  not examined  MSK:   normal muscle tone  EXT:  no edema  NEURO:  AAOX3, no focal neurological deficits  SKIN:  no rashes    RADIOLOGY/EK.3   4.96  )-----------( 93       ( 27 Oct 2018 06:12 )             36.1     10-27    144  |  104  |  37<H>  ----------------------------<  99  3.8   |  34<H>  |  1.88<H>    Ca    9.0      27 Oct 2018 06:12      PT/INR - ( 28 Oct 2018 06:09 )   PT: 18.0 sec;   INR: 1.65 ratio           < from: Xray Chest 1 View AP/PA. (10.22.18 @ 18:32) >    IMPRESSION:  Findings are likely indicative of mild pneumonia involving the right lung   for which clinical correlation is recommended.    < end of copied text >  < from: CT Chest No Cont (10.23.18 @ 08:44) >    IMPRESSION:  The findings are suggestive of multifocal pneumonia in the right lung,   for which clinical correlation is recommended. Interval resolution of the   left pleural effusion.  Extensive lymphadenopathies in the chest and upper abdomen are again,   noted unchanged.  Other findings as above.    < end of copied text >    Culture - Blood (10.22.18 @ 19:38)    Specimen Source: .Blood None    Culture Results:   No growth to date.    Culture - Urine (18 @ 20:40)    Specimen Source: .Urine None    Culture Results:   No growth    < from: Transthoracic Echocardiogram (10.24.18 @ 09:06) >  Summary     Fibrocalcific changes noted to the mitral valve leaflets with preserved   leaflet excursion.   Mild mitral annular calcification is present.   Moderate (2+) mitral regurgitation is present.   Significant fibrocalcific changes noted to the aortic valve leaflets with   restriction in leaflet excursion. Peak transaortic gradient is 48 mmHg;   this finding is consistent with moderate aortic stenosis.   DI = .2   Continuity equation AGUSTIN is .59 cm which likely overestimates the degree   of   aortic stenosis   Mild to Moderate Tricuspid regurgitation is present. Mild pulmonary   hypertension.   Mild concentric left ventricular hypertrophy is present.   Estimated left ventricular ejection fraction is 55-60 %.   The right atrium appears moderately dilated.   Normal appearing right ventricle structure andfunction.    < end of copied text > PCP: PCP/Cardio- Dr Joshi    Chief Complaint: Patient is a 89y old  Male who presents with a chief complaint of CHF Exacerbation w/ possible hcap (23 Oct 2018 06:42)    HPI:  89 year old male with pmh of anxiety, basal cell carcinoma, bph, ckd, htn, hypothyroid, lymphoma, postherpetic neuralgia, prostate ca coming to  ED with complaints of worsening shortness of breath x 3 days.    Pt is SOB with lying supine.   was seen in Dr Joshi office on Friday and was given lasix 40mg iv push however sob improved mildly.   Labs were checked out,   patient and as patient sob not markedly improved and patient found to have neno on ckd was sent to hospital for further eval. Patient also stating has been having a decreased po intake for the past week.  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    was given azithromycin and another abx about two weeks ago for cough as he had a suspected pna.   States the abx he does not know name of gave him diarrhea however that resolved.   Patient was also admitted 1 month prior for chf exacerbation. Denies fevers, chills, chest pain. (22 Oct 2018 21:23)    10/23:  Above Reviewed. dyspnea improving. No other complaints  10/24: Daughter at bedside; still has dyspnea; discussed advance directives with patient and daughter; It seems like hes leaning toward DNR/DNI but would like to speak with his family; Tele still in flutter; also found to have low BP this morning   10/25 - pt is still wheezing but is feeling less SOB.  10/26 - pt still feeling SOB and tired.  rapid afib HR 150s on tele   10/27 - pt feeling better today, still tired but less SOB. pt has episodes of a flutter through night  10/28 - pt had episodes of flutter in the morning and was given 10mg of cardizem. No CP or SOB this morning.    ROS:   All 10 systems reviewed and found to be negative with the exception of what has been described above.    Vital Signs Last 24 Hrs  T(C): 36.9 (28 Oct 2018 04:46), Max: 36.9 (27 Oct 2018 10:51)  T(F): 98.5 (28 Oct 2018 04:46), Max: 98.5 (27 Oct 2018 17:45)  HR: 150 (28 Oct 2018 09:17) (80 - 150)  BP: 108/73 (28 Oct 2018 09:17) (102/68 - 129/69)  BP(mean): --  RR: 16 (28 Oct 2018 04:46) (16 - 18)  SpO2: 91% (28 Oct 2018 04:46) (89% - 95%)    GEN: lying in bed, NAD  HEENT:   NC/AT, pupils equal and reactive, EOMI  CV:  +S1, +S2, RRR  RESP:   lungs clear to auscultation bilaterally, no wheeze, rales, rhonchi   BREAST:  not examined  GI:  abdomen soft, non-tender, non-distended, normoactive BS  RECTAL:  not examined  :  not examined  MSK:   normal muscle tone  EXT:  no edema  NEURO:  AAOX3, no focal neurological deficits  SKIN:  no rashes    RADIOLOGY/EK.3   4.96  )-----------( 93       ( 27 Oct 2018 06:12 )             36.1     10-27    144  |  104  |  37<H>  ----------------------------<  99  3.8   |  34<H>  |  1.88<H>    Ca    9.0      27 Oct 2018 06:12      PT/INR - ( 28 Oct 2018 06:09 )   PT: 18.0 sec;   INR: 1.65 ratio           < from: Xray Chest 1 View AP/PA. (10.22.18 @ 18:32) >    IMPRESSION:  Findings are likely indicative of mild pneumonia involving the right lung   for which clinical correlation is recommended.    < end of copied text >  < from: CT Chest No Cont (10.23.18 @ 08:44) >    IMPRESSION:  The findings are suggestive of multifocal pneumonia in the right lung,   for which clinical correlation is recommended. Interval resolution of the   left pleural effusion.  Extensive lymphadenopathies in the chest and upper abdomen are again,   noted unchanged.  Other findings as above.    < end of copied text >    Culture - Blood (10.22.18 @ 19:38)    Specimen Source: .Blood None    Culture Results:   No growth to date.    Culture - Urine (18 @ 20:40)    Specimen Source: .Urine None    Culture Results:   No growth    < from: Transthoracic Echocardiogram (10.24.18 @ 09:06) >  Summary     Fibrocalcific changes noted to the mitral valve leaflets with preserved   leaflet excursion.   Mild mitral annular calcification is present.   Moderate (2+) mitral regurgitation is present.   Significant fibrocalcific changes noted to the aortic valve leaflets with   restriction in leaflet excursion. Peak transaortic gradient is 48 mmHg;   this finding is consistent with moderate aortic stenosis.   DI = .2   Continuity equation AGUSTIN is .59 cm which likely overestimates the degree   of   aortic stenosis   Mild to Moderate Tricuspid regurgitation is present. Mild pulmonary   hypertension.   Mild concentric left ventricular hypertrophy is present.   Estimated left ventricular ejection fraction is 55-60 %.   The right atrium appears moderately dilated.   Normal appearing right ventricle structure andfunction.    < end of copied text >

## 2018-10-28 NOTE — PROGRESS NOTE ADULT - SUBJECTIVE AND OBJECTIVE BOX
SUBJECTIVE     Patient has difficult to control afib/ flutter   started on amiodarone   follow up cxr with the right perihilar infiltrates pneumonia verus mild chf   patient him self denies any increasing sob or fever spikes or worsening wheezing   on po antibiotic with ceftin for the completion with the recent pneumonia     PAST MEDICAL & SURGICAL HISTORY:  CKD (chronic kidney disease)  BPH (benign prostatic hyperplasia)  Postherpetic neuralgia  Chronic diarrhea  Prostate CA  Basal cell carcinoma  Lymphoma  Anxiety  Hypothyroid  Hypertension  A-fib  H/O shoulder replacement  S/P bilateral unicompartmental knee replacement    OBJECTIVE   Vital Signs Last 24 Hrs  T(C): 36.9 (28 Oct 2018 04:46), Max: 36.9 (27 Oct 2018 10:51)  T(F): 98.5 (28 Oct 2018 04:46), Max: 98.5 (27 Oct 2018 17:45)  HR: 150 (28 Oct 2018 09:17) (80 - 150)  BP: 108/73 (28 Oct 2018 09:17) (102/68 - 129/69)  BP(mean): --  RR: 16 (28 Oct 2018 04:46) (16 - 18)  SpO2: 91% (28 Oct 2018 04:46) (89% - 95%)    PHYSICAL EXAM:  Constitutional: , awake and alert, not in distress.  HEENT: Normo cephalic atraumatic  Neck: Soft and supple, No J.V.D   Respiratory: vesicular breathing with the predominantly transmitted breath sounds from the upper air way with few rales over the bases in the right side    Cardiovascular: S1 and S2, regular rate .   Gastrointestinal:  soft, nontender,   Extremities: No  edema or calf tenderness .  Neurological: No new  focal deficits.    MEDICATIONS  (STANDING):  amiodarone    Tablet 200 milliGRAM(s) Oral two times a day  artificial  tears Solution 1 Drop(s) Both EYES two times a day  cefuroxime   Tablet 250 milliGRAM(s) Oral every 12 hours  docusate sodium 100 milliGRAM(s) Oral three times a day  gabapentin 200 milliGRAM(s) Oral two times a day  ipratropium    for Nebulization 500 MICROGram(s) Nebulizer every 6 hours  lactobacillus acidophilus 1 Tablet(s) Oral daily  levothyroxine 100 MICROGram(s) Oral daily  Lotemax ointment 1 Application(s) 1 Application(s) Both EYES daily  metoprolol succinate ER 50 milliGRAM(s) Oral two times a day  potassium chloride   Powder 20 milliEquivalent(s) Oral daily  sodium chloride 0.9%. 250 milliLiter(s) (75 mL/Hr) IV Continuous <Continuous>  warfarin 5 milliGRAM(s) Oral daily                            11.3   4.96  )-----------( 93       ( 27 Oct 2018 06:12 )             36.1     10-27    144  |  104  |  37<H>  ----------------------------<  99  3.8   |  34<H>  |  1.88<H>    Ca    9.0      27 Oct 2018 06:12

## 2018-10-28 NOTE — PROGRESS NOTE ADULT - ASSESSMENT
89 M with shortness of breath - multifactorial    Diastolic failure- mild. Legs are without edema and mild crackles on bases- recommend low dose Lasix with close monitoring of renal function and electrolytes- if Creatine continues to rise, would hold Lasix for a day . Will add low dose nitrate to reduce preload    Pulm- PNA- on abx, recommend nebulizer treatment, mucolytic and consideration for steroids      Aflutter- metoprolol for rate control, continue with coumadin dosing goal INR 2-2.5     No evidence for ACS     10/24/18:  Cardiac status stable.  Off diuretics and on antibiotics.  Renal function stable.  May need to add back furosemide.  Will watch closely.    10/25/18:  Patient remains stable.  Renal function somewhat improved off diuretics.  Continue antibiotics for right sided multifocal pneumonia    10/26/18:  Cardiac status stable.  Back on furosemide.  Follow renal function.  Continue treatment for pneumonia    10/27/18:  Had slightly more rapid AFib/flutter yesterday treated with extra meds, now with better rate control.  No anginal chest pains or increased SOB.  Having a resp Rx during the exam with reduced BS but no wheezing or increased rales.  Tolerating current meds and treatments so far.  Cardiac status stable.  Back on furosemide.  Follow renal function.  Continue treatment for pneumonia.  Will follow intermittently prn.    10/28/18:  AFib/Flutter rate labile and occasionally up to 140-150's but with increased meds develops hypotension but rate increases when Metoprolol dose reduced.  Will try Amiodarone for rate control and reduce the Lasix to 20 mg daily for now.  Continue as outlined by medicine etal.  Will follow intermittently prn.

## 2018-10-29 ENCOUNTER — TRANSCRIPTION ENCOUNTER (OUTPATIENT)
Age: 83
End: 2018-10-29

## 2018-10-29 LAB
ANION GAP SERPL CALC-SCNC: 6 MMOL/L — SIGNIFICANT CHANGE UP (ref 5–17)
BUN SERPL-MCNC: 42 MG/DL — HIGH (ref 7–23)
CALCIUM SERPL-MCNC: 9.1 MG/DL — SIGNIFICANT CHANGE UP (ref 8.5–10.1)
CHLORIDE SERPL-SCNC: 106 MMOL/L — SIGNIFICANT CHANGE UP (ref 96–108)
CO2 SERPL-SCNC: 34 MMOL/L — HIGH (ref 22–31)
CREAT SERPL-MCNC: 2.14 MG/DL — HIGH (ref 0.5–1.3)
GLUCOSE SERPL-MCNC: 96 MG/DL — SIGNIFICANT CHANGE UP (ref 70–99)
HCT VFR BLD CALC: 37.2 % — LOW (ref 39–50)
HGB BLD-MCNC: 11.5 G/DL — LOW (ref 13–17)
INR BLD: 1.93 RATIO — HIGH (ref 0.88–1.16)
MAGNESIUM SERPL-MCNC: 2.3 MG/DL — SIGNIFICANT CHANGE UP (ref 1.6–2.6)
MCHC RBC-ENTMCNC: 30.9 GM/DL — LOW (ref 32–36)
MCHC RBC-ENTMCNC: 31.5 PG — SIGNIFICANT CHANGE UP (ref 27–34)
MCV RBC AUTO: 101.9 FL — HIGH (ref 80–100)
NRBC # BLD: 0 /100 WBCS — SIGNIFICANT CHANGE UP (ref 0–0)
PLATELET # BLD AUTO: 127 K/UL — LOW (ref 150–400)
POTASSIUM SERPL-MCNC: 3.6 MMOL/L — SIGNIFICANT CHANGE UP (ref 3.5–5.3)
POTASSIUM SERPL-SCNC: 3.6 MMOL/L — SIGNIFICANT CHANGE UP (ref 3.5–5.3)
PROTHROM AB SERPL-ACNC: 21.1 SEC — HIGH (ref 9.8–12.7)
RBC # BLD: 3.65 M/UL — LOW (ref 4.2–5.8)
RBC # FLD: 15.3 % — HIGH (ref 10.3–14.5)
SODIUM SERPL-SCNC: 146 MMOL/L — HIGH (ref 135–145)
WBC # BLD: 5.77 K/UL — SIGNIFICANT CHANGE UP (ref 3.8–10.5)
WBC # FLD AUTO: 5.77 K/UL — SIGNIFICANT CHANGE UP (ref 3.8–10.5)

## 2018-10-29 RX ORDER — WARFARIN SODIUM 2.5 MG/1
1 TABLET ORAL
Qty: 0 | Refills: 0 | COMMUNITY
Start: 2018-10-29

## 2018-10-29 RX ORDER — GABAPENTIN 400 MG/1
2 CAPSULE ORAL
Qty: 0 | Refills: 0 | COMMUNITY
Start: 2018-10-29

## 2018-10-29 RX ORDER — ALPRAZOLAM 0.25 MG
1 TABLET ORAL
Qty: 0 | Refills: 0 | COMMUNITY
Start: 2018-10-29

## 2018-10-29 RX ORDER — WARFARIN SODIUM 2.5 MG/1
7.5 TABLET ORAL
Qty: 0 | Refills: 0 | COMMUNITY
Start: 2018-10-29

## 2018-10-29 RX ORDER — DOCUSATE SODIUM 100 MG
1 CAPSULE ORAL
Qty: 0 | Refills: 0 | COMMUNITY
Start: 2018-10-29

## 2018-10-29 RX ORDER — FUROSEMIDE 40 MG
1 TABLET ORAL
Qty: 0 | Refills: 0 | COMMUNITY

## 2018-10-29 RX ORDER — LANOLIN ALCOHOL/MO/W.PET/CERES
3 CREAM (GRAM) TOPICAL AT BEDTIME
Qty: 0 | Refills: 0 | Status: DISCONTINUED | OUTPATIENT
Start: 2018-10-29 | End: 2018-11-01

## 2018-10-29 RX ORDER — METOPROLOL TARTRATE 50 MG
12.5 TABLET ORAL
Qty: 0 | Refills: 0 | COMMUNITY
Start: 2018-10-29

## 2018-10-29 RX ORDER — METOPROLOL TARTRATE 50 MG
1 TABLET ORAL
Qty: 0 | Refills: 0 | COMMUNITY

## 2018-10-29 RX ORDER — AMIODARONE HYDROCHLORIDE 400 MG/1
1 TABLET ORAL
Qty: 0 | Refills: 0 | COMMUNITY
Start: 2018-10-29

## 2018-10-29 RX ORDER — FUROSEMIDE 40 MG
1 TABLET ORAL
Qty: 0 | Refills: 0 | COMMUNITY
Start: 2018-10-29

## 2018-10-29 RX ORDER — POTASSIUM CHLORIDE 20 MEQ
20 PACKET (EA) ORAL DAILY
Qty: 0 | Refills: 0 | Status: DISCONTINUED | OUTPATIENT
Start: 2018-10-29 | End: 2018-11-01

## 2018-10-29 RX ORDER — METOPROLOL TARTRATE 50 MG
25 TABLET ORAL
Qty: 0 | Refills: 0 | Status: DISCONTINUED | OUTPATIENT
Start: 2018-10-29 | End: 2018-11-01

## 2018-10-29 RX ORDER — WARFARIN SODIUM 2.5 MG/1
1 TABLET ORAL
Qty: 0 | Refills: 0 | COMMUNITY

## 2018-10-29 RX ORDER — ALPRAZOLAM 0.25 MG
1 TABLET ORAL
Qty: 0 | Refills: 0 | DISCHARGE
Start: 2018-10-29

## 2018-10-29 RX ORDER — LACTOBACILLUS ACIDOPHILUS 100MM CELL
1 CAPSULE ORAL
Qty: 0 | Refills: 0 | DISCHARGE
Start: 2018-10-29

## 2018-10-29 RX ORDER — IPRATROPIUM BROMIDE 0.2 MG/ML
2.5 SOLUTION, NON-ORAL INHALATION
Qty: 0 | Refills: 0 | COMMUNITY
Start: 2018-10-29

## 2018-10-29 RX ADMIN — Medication 650 MILLIGRAM(S): at 11:55

## 2018-10-29 RX ADMIN — Medication 12.5 MILLIGRAM(S): at 17:30

## 2018-10-29 RX ADMIN — Medication 100 MILLIGRAM(S): at 14:08

## 2018-10-29 RX ADMIN — Medication 500 MICROGRAM(S): at 08:02

## 2018-10-29 RX ADMIN — Medication 500 MICROGRAM(S): at 13:28

## 2018-10-29 RX ADMIN — Medication 20 MILLIGRAM(S): at 14:08

## 2018-10-29 RX ADMIN — Medication 250 MILLIGRAM(S): at 17:31

## 2018-10-29 RX ADMIN — Medication 0.25 MILLIGRAM(S): at 05:25

## 2018-10-29 RX ADMIN — Medication 650 MILLIGRAM(S): at 11:19

## 2018-10-29 RX ADMIN — Medication 250 MILLIGRAM(S): at 05:25

## 2018-10-29 RX ADMIN — Medication 0.25 MILLIGRAM(S): at 17:30

## 2018-10-29 RX ADMIN — Medication 1 DROP(S): at 17:34

## 2018-10-29 RX ADMIN — AMIODARONE HYDROCHLORIDE 200 MILLIGRAM(S): 400 TABLET ORAL at 05:25

## 2018-10-29 RX ADMIN — WARFARIN SODIUM 5 MILLIGRAM(S): 2.5 TABLET ORAL at 21:09

## 2018-10-29 RX ADMIN — Medication 500 MICROGRAM(S): at 19:58

## 2018-10-29 RX ADMIN — Medication 100 MILLIGRAM(S): at 21:09

## 2018-10-29 RX ADMIN — AMIODARONE HYDROCHLORIDE 200 MILLIGRAM(S): 400 TABLET ORAL at 17:30

## 2018-10-29 RX ADMIN — Medication 20 MILLIEQUIVALENT(S): at 14:09

## 2018-10-29 RX ADMIN — GABAPENTIN 200 MILLIGRAM(S): 400 CAPSULE ORAL at 05:25

## 2018-10-29 RX ADMIN — Medication 100 MICROGRAM(S): at 05:25

## 2018-10-29 RX ADMIN — Medication 100 MILLIGRAM(S): at 05:25

## 2018-10-29 RX ADMIN — Medication 1 TABLET(S): at 11:18

## 2018-10-29 RX ADMIN — GABAPENTIN 200 MILLIGRAM(S): 400 CAPSULE ORAL at 17:30

## 2018-10-29 RX ADMIN — Medication 1 DROP(S): at 05:26

## 2018-10-29 NOTE — PROGRESS NOTE ADULT - SUBJECTIVE AND OBJECTIVE BOX
PCP: PCP/Cardio- Dr Joshi    Chief Complaint: Patient is a 89y old  Male who presents with a chief complaint of CHF Exacerbation w/ possible hcap (23 Oct 2018 06:42)    HPI:  89 year old male with pmh of anxiety, basal cell carcinoma, bph, ckd, htn, hypothyroid, lymphoma, postherpetic neuralgia, prostate ca coming to  ED with complaints of worsening shortness of breath x 3 days.    Pt is SOB with lying supine.   was seen in Dr Joshi office on Friday and was given lasix 40mg iv push however sob improved mildly.   Labs were checked out,   patient and as patient sob not markedly improved and patient found to have neno on ckd was sent to hospital for further eval. Patient also stating has been having a decreased po intake for the past week.  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    was given azithromycin and another abx about two weeks ago for cough as he had a suspected pna.   States the abx he does not know name of gave him diarrhea however that resolved.   Patient was also admitted 1 month prior for chf exacerbation. Denies fevers, chills, chest pain. (22 Oct 2018 21:23)    10/23:  Above Reviewed. dyspnea improving. No other complaints  10/24: Daughter at bedside; still has dyspnea; discussed advance directives with patient and daughter; It seems like hes leaning toward DNR/DNI but would like to speak with his family; Tele still in flutter; also found to have low BP this morning   10/25 - pt is still wheezing but is feeling less SOB.  10/26 - pt still feeling SOB and tired.  rapid afib HR 150s on tele   10/27 - pt feeling better today, still tired but less SOB. pt has episodes of a flutter through night  10/28 - pt had episodes of flutter in the morning and was given 10mg of cardizem. No CP or SOB this morning.  1/29 - pt had heart rates going into 140's during PT consult this morning. No CP, SOB, or abdominal pain.    ROS:   All 10 systems reviewed and found to be negative with the exception of what has been described above.    Vital Signs Last 24 Hrs  T(C): 36.7 (29 Oct 2018 09:53), Max: 36.9 (28 Oct 2018 17:22)  T(F): 98.1 (29 Oct 2018 09:53), Max: 98.4 (28 Oct 2018 17:22)  HR: 70 (29 Oct 2018 09:53) (60 - 124)  BP: 98/76 (29 Oct 2018 09:53) (98/76 - 120/73)  BP(mean): --  RR: 18 (29 Oct 2018 09:53) (16 - 18)  SpO2: 99% (29 Oct 2018 09:53) (98% - 100%)    GEN: lying in bed, NAD  HEENT:   NC/AT, pupils equal and reactive, EOMI  CV:  +S1, +S2, RRR  RESP:   lungs clear to auscultation bilaterally, no wheeze, rales, rhonchi   BREAST:  not examined  GI:  abdomen soft, non-tender, non-distended, normoactive BS  RECTAL:  not examined  :  not examined  MSK:   normal muscle tone  EXT:  no edema  NEURO:  AAOX3, no focal neurological deficits  SKIN:  no rashes                 11.5   5.77  )-----------( 127      ( 29 Oct 2018 06:05 )             37.2     10-29    146<H>  |  106  |  42<H>  ----------------------------<  96  3.6   |  34<H>  |  2.14<H>    Ca    9.1      29 Oct 2018 06:05  Mg     2.3     10-29      PT/INR - ( 29 Oct 2018 06:05 )   PT: 21.1 sec;   INR: 1.93 ratio      < from: Xray Chest 1 View AP/PA. (10.22.18 @ 18:32) >    IMPRESSION:  Findings are likely indicative of mild pneumonia involving the right lung   for which clinical correlation is recommended.    < end of copied text >  < from: CT Chest No Cont (10.23.18 @ 08:44) >    IMPRESSION:  The findings are suggestive of multifocal pneumonia in the right lung,   for which clinical correlation is recommended. Interval resolution of the   left pleural effusion.  Extensive lymphadenopathies in the chest and upper abdomen are again,   noted unchanged.  Other findings as above.    < end of copied text >    Culture - Blood (10.22.18 @ 19:38)    Specimen Source: .Blood None    Culture Results:   No growth to date.    Culture - Urine (06.07.18 @ 20:40)    Specimen Source: .Urine None    Culture Results:   No growth    < from: Transthoracic Echocardiogram (10.24.18 @ 09:06) >  Summary     Fibrocalcific changes noted to the mitral valve leaflets with preserved   leaflet excursion.   Mild mitral annular calcification is present.   Moderate (2+) mitral regurgitation is present.   Significant fibrocalcific changes noted to the aortic valve leaflets with   restriction in leaflet excursion. Peak transaortic gradient is 48 mmHg;   this finding is consistent with moderate aortic stenosis.   DI = .2   Continuity equation AGUSTIN is .59 cm which likely overestimates the degree   of   aortic stenosis   Mild to Moderate Tricuspid regurgitation is present. Mild pulmonary   hypertension.   Mild concentric left ventricular hypertrophy is present.   Estimated left ventricular ejection fraction is 55-60 %.   The right atrium appears moderately dilated.   Normal appearing right ventricle structure andfunction.    < end of copied text > PCP: PCP/Cardio- Dr Joshi    Chief Complaint: Patient is a 89y old  Male who presents with a chief complaint of CHF Exacerbation w/ possible hcap (23 Oct 2018 06:42)    HPI:  89 year old male with pmh of anxiety, basal cell carcinoma, bph, ckd, htn, hypothyroid, lymphoma, postherpetic neuralgia, prostate ca coming to  ED with complaints of worsening shortness of breath x 3 days.    Pt is SOB with lying supine.   was seen in Dr Joshi office on Friday and was given lasix 40mg iv push however sob improved mildly.   Labs were checked out,   patient and as patient sob not markedly improved and patient found to have neno on ckd was sent to hospital for further eval. Patient also stating has been having a decreased po intake for the past week.  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    was given azithromycin and another abx about two weeks ago for cough as he had a suspected pna.   States the abx he does not know name of gave him diarrhea however that resolved.   Patient was also admitted 1 month prior for chf exacerbation. Denies fevers, chills, chest pain. (22 Oct 2018 21:23)    10/23:  Above Reviewed. dyspnea improving. No other complaints  10/24: Daughter at bedside; still has dyspnea; discussed advance directives with patient and daughter; It seems like hes leaning toward DNR/DNI but would like to speak with his family; Tele still in flutter; also found to have low BP this morning   10/25 - pt is still wheezing but is feeling less SOB.  10/26 - pt still feeling SOB and tired.  rapid afib HR 150s on tele   10/27 - pt feeling better today, still tired but less SOB. pt has episodes of a flutter through night  10/28 - pt had episodes of flutter in the morning and was given 10mg of cardizem. No CP or SOB this morning.  10/29 - pt had heart rates going into 140's during PT consult this morning. No CP, SOB, or abdominal pain.    ROS:   All 10 systems reviewed and found to be negative with the exception of what has been described above.    Vital Signs Last 24 Hrs  T(C): 36.7 (29 Oct 2018 09:53), Max: 36.9 (28 Oct 2018 17:22)  T(F): 98.1 (29 Oct 2018 09:53), Max: 98.4 (28 Oct 2018 17:22)  HR: 70 (29 Oct 2018 09:53) (60 - 124)  BP: 98/76 (29 Oct 2018 09:53) (98/76 - 120/73)  BP(mean): --  RR: 18 (29 Oct 2018 09:53) (16 - 18)  SpO2: 99% (29 Oct 2018 09:53) (98% - 100%)    GEN: lying in bed, NAD  HEENT:   NC/AT, pupils equal and reactive, EOMI  CV:  +S1, +S2, RRR  RESP:   lungs clear to auscultation bilaterally, no wheeze, rales, rhonchi   BREAST:  not examined  GI:  abdomen soft, non-tender, non-distended, normoactive BS  RECTAL:  not examined  :  not examined  MSK:   normal muscle tone  EXT:  no edema  NEURO:  AAOX3, no focal neurological deficits  SKIN:  no rashes                 11.5   5.77  )-----------( 127      ( 29 Oct 2018 06:05 )             37.2     10-29    146<H>  |  106  |  42<H>  ----------------------------<  96  3.6   |  34<H>  |  2.14<H>    Ca    9.1      29 Oct 2018 06:05  Mg     2.3     10-29      PT/INR - ( 29 Oct 2018 06:05 )   PT: 21.1 sec;   INR: 1.93 ratio      < from: Xray Chest 1 View AP/PA. (10.22.18 @ 18:32) >    IMPRESSION:  Findings are likely indicative of mild pneumonia involving the right lung   for which clinical correlation is recommended.    < end of copied text >  < from: CT Chest No Cont (10.23.18 @ 08:44) >    IMPRESSION:  The findings are suggestive of multifocal pneumonia in the right lung,   for which clinical correlation is recommended. Interval resolution of the   left pleural effusion.  Extensive lymphadenopathies in the chest and upper abdomen are again,   noted unchanged.  Other findings as above.    < end of copied text >    Culture - Blood (10.22.18 @ 19:38)    Specimen Source: .Blood None    Culture Results:   No growth to date.    Culture - Urine (06.07.18 @ 20:40)    Specimen Source: .Urine None    Culture Results:   No growth    < from: Transthoracic Echocardiogram (10.24.18 @ 09:06) >  Summary     Fibrocalcific changes noted to the mitral valve leaflets with preserved   leaflet excursion.   Mild mitral annular calcification is present.   Moderate (2+) mitral regurgitation is present.   Significant fibrocalcific changes noted to the aortic valve leaflets with   restriction in leaflet excursion. Peak transaortic gradient is 48 mmHg;   this finding is consistent with moderate aortic stenosis.   DI = .2   Continuity equation AGUSTIN is .59 cm which likely overestimates the degree   of   aortic stenosis   Mild to Moderate Tricuspid regurgitation is present. Mild pulmonary   hypertension.   Mild concentric left ventricular hypertrophy is present.   Estimated left ventricular ejection fraction is 55-60 %.   The right atrium appears moderately dilated.   Normal appearing right ventricle structure andfunction.    < end of copied text >

## 2018-10-29 NOTE — DISCHARGE NOTE ADULT - MEDICATION SUMMARY - MEDICATIONS TO CHANGE
I will SWITCH the dose or number of times a day I take the medications listed below when I get home from the hospital:    gabapentin 100 mg oral capsule  -- 2 cap(s) by mouth 3 times a day    Toprol-XL 50 mg oral tablet, extended release  -- 1 tab(s) by mouth 2 times a day    gabapentin 100 mg oral capsule  -- 2 cap(s) by mouth 4 times a day     **Prescribed 2 caps 3x a day but patient fluctuates as needed with pain to a max of 8 caps/day**

## 2018-10-29 NOTE — DISCHARGE NOTE ADULT - MEDICATION SUMMARY - MEDICATIONS TO STOP TAKING
I will STOP taking the medications listed below when I get home from the hospital:    furosemide 40 mg oral tablet  -- 1 tab(s) by mouth once a day    potassium chloride 20 mEq oral tablet, extended release  -- 1 tab(s) by mouth once a day    furosemide 40 mg oral tablet  -- 1 tab(s) by mouth every other day **Alternates w/ Furosemide 20 mg**    furosemide 20 mg oral tablet  -- 1 tab(s) by mouth every other day **Alternates w/ Furosemide 40 mg**

## 2018-10-29 NOTE — PROGRESS NOTE ADULT - ASSESSMENT
89 year old male as above     #Dyspnea  #Multifocal PNA (HCAP)  #acute on chronic diastolic congestive heart failure - lvef 06/2018 55-60%  - will resume lasix 20 mg daily now that BP is improved   - continue tele given ky of rapid afib   - bnp elevated >19k trending down  - trop x 3 negative   - CT chest Multifocal PNA  - RVP Neg  - S/P lasix 40mg IV QD   - S/P Vanco and zosyn ahanged to po ceftin til 10/29   - not on acei due to resolving LETICIA  - ID consult appreciated   - Blood cultures negative  - cont atrovent PRN  - check saturation on room air with ambulation  - cardio note appreciated  - cont amiodarone load for better rate control of Afib/Aflutter - rate not controlled   - pulm pending    #leticia on ckd 3-4- resolved  - monitor cr closely  - raise in cr from 1.88 to 2.14     #afib with RVR/Alfutter - uncontrolled rate. case d/w dr chowdary and will start him on amio 200 mg bid and plan to change to amio 200 mg daily by 10/31  - pt aware to f/u with dr corral for outpt PFTs  - monitor inr- currently therapeutic  - continue coumadin, stop toprol xl  - cont amio load    #anxiety- stable  - continue xanax bid prn    #hypothyroidism- stable   - continue levothyroxine     #post herpetic neuralgia- stable  - continue home meds including gabapentin  - refresh tears  - advised daughter to bring in ointment as not in stock at     #bph- stable   - continue alfuzosin - non formulary    # r/o basal cell carcinoma of scalp - follows up with dr jain and had bx 5 days before admission  - outpt f/u     #dvt prophylaxis   - on coumadin for afib     Discussion with patient and family regarding advance directives again yesterday. pt is now DNR/DNI 89 year old male as above     #Dyspnea  #Multifocal PNA (HCAP)  #acute on chronic diastolic congestive heart failure - lvef 06/2018 55-60%  - will continue lasix 20 mg daily now that BP is improved as per cardio recs   - continue tele given ky of rapid afib   - bnp elevated >19k trending down  - trop x 3 negative   - CT chest Multifocal PNA  - RVP Neg  - S/P Vanco and zosyn ahanged to po ceftin til 10/29   - not on acei due to resolving LETICIA  - ID consult appreciated   - Blood cultures negative  - cont atrovent PRN  - check saturation on room air with ambulation  - cardio note appreciated  - cont amiodarone load for better rate control of Afib/Aflutter - rate not controlled   - pulm pending    #leticia on ckd 3-4- resolved  - monitor cr closely  - raise in cr from 1.88 to 2.14     #afib with RVR/Alfutter - uncontrolled rate. case d/w dr chowdary and will start him on amio 200 mg bid and plan to change to amio 200 mg daily by 10/31  - pt aware to f/u with dr corral for outpt PFTs  - monitor inr- currently therapeutic  - continue coumadin, stop toprol xl  - cont amio load    #anxiety- stable  - continue xanax bid prn    #hypothyroidism- stable   - continue levothyroxine     #post herpetic neuralgia- stable  - continue home meds including gabapentin  - refresh tears  - advised daughter to bring in ointment as not in stock at     #bph- stable   - continue alfuzosin - non formulary    # r/o basal cell carcinoma of scalp - follows up with dr jain and had bx 5 days before admission  - outpt f/u     #dvt prophylaxis   - on coumadin for afib     Discussion with patient and family regarding advance directives again yesterday. pt is now DNR/DNI

## 2018-10-29 NOTE — DISCHARGE NOTE ADULT - CARE PLAN
Principal Discharge DX:	Acute on chronic congestive heart failure, unspecified heart failure type  Goal:	prevent exacerbation  Assessment and plan of treatment:	strict adherence to diet and fluid restriction and close follow up with cardiology/PMD - dr michel  Secondary Diagnosis:	HCAP (healthcare-associated pneumonia)  Goal:	treatment  Assessment and plan of treatment:	you have completed your full course of antibiotics Principal Discharge DX:	Acute on chronic congestive heart failure, unspecified heart failure type  Goal:	prevent exacerbation  Assessment and plan of treatment:	strict adherence to diet and fluid restriction and close follow up with cardiology/PMD - dr michel  Please check PT/INR on 11/5/18 and adjust coumadin accordingly- patient takes 5mg coumadin every night  Secondary Diagnosis:	HCAP (healthcare-associated pneumonia)  Goal:	treatment  Assessment and plan of treatment:	you have completed your full course of antibiotics  Secondary Diagnosis:	CKD (chronic kidney disease)  Assessment and plan of treatment:	you had worsening of your kidney function, lasix stopped and kidney function improved. Stop lasix for now, Follow up with Nephrology and Cardiology within 2 weeks of discharge. Check BMP on 11/5/18 to monitor serum Cr

## 2018-10-29 NOTE — DISCHARGE NOTE ADULT - HOSPITAL COURSE
PCP - marilu ( onconsult)  pulm -  shayna  renal - arben    final dx - acute on chronic diastolic CHF exacerbation  HCAP      89 year old male with pmh of anxiety, basal cell carcinoma, bph, ckd, htn, hypothyroid, lymphoma, postherpetic neuralgia, prostate ca coming to  ED with complaints of worsening shortness of breath x 3 days.    Pt noted to be in rapid aflutter/fib and due to hypotension on cardizem and BB will dc on low dose metoprolol and started on amiodarone 200 mg bid and will chagne to amiodarone 200 mg dialy as of 10/31/18.  pt should have outpt TFTs and PFts with dr michel and dr corral.  pt noted to have HCAP and completed treatment with IV and abx and completed course of po ceftin on 10/29.  nebs pen with supportive care.  LETICIA resolving with cre 1.88-2.1. outpt f/u with dr. theodore with lasix decreased to 20 mg as per cardio recs.< from: CT Chest No Cont (10.23.18 @ 08:44) >    IMPRESSION:  The findings are suggestive of multifocal pneumonia in the right lung,   for which clinical correlation is recommended. Interval resolution of the   left pleural effusion.  Extensive lymphadenopathies in the chest and upper abdomen are again,   noted unchanged.  Other findings as above.  < from: Transthoracic Echocardiogram (10.24.18 @ 09:06) >  Summary     Fibrocalcific changes noted to the mitral valve leaflets with preserved   leaflet excursion.   Mild mitral annular calcification is present.   Moderate (2+) mitral regurgitation is present.   Significant fibrocalcific changes noted to the aortic valve leaflets with   restriction in leaflet excursion. Peak transaortic gradient is 48 mmHg;   this finding is consistent with moderate aortic stenosis.   DI = .2   Continuity equation AGUSTIN is .59 cm which likely overestimates the degree   of   aortic stenosis   Mild to Moderate Tricuspid regurgitation is present. Mild pulmonary   hypertension.   Mild concentric left ventricular hypertrophy is present.   Estimated left ventricular ejection fraction is 55-60 %.   The right atrium appears moderately dilated.   Normal appearing right ventricle structure andfunction.    < end of copied text >    #Dyspnea  #Multifocal PNA (HCAP)  #acute on chronic diastolic congestive heart failure - lvef 06/2018 55-60%  - will continue lasix 20 mg daily now that BP is improved as per cardio recs   - continue tele given ky of rapid afib   - bnp elevated >19k trending down  - trop x 3 negative   - CT chest Multifocal PNA  - RVP Neg  - S/P Vanco and zosyn ahanged to po ceftin til 10/29   - not on acei due to resolving LETICIA  - ID consult appreciated   - Blood cultures negative  - cont atrovent PRN  - check saturation on room air with ambulation  - cardio note appreciated  - cont amiodarone load for better rate control of Afib/Aflutter - rate not controlled   - pulm pending    #leticia on ckd 3-4- resolved  - monitor cr closely  - raise in cr from 1.88 to 2.14     #afib with RVR/Alfutter - uncontrolled rate. case d/w dr chowdary and will start him on amio 200 mg bid and plan to change to amio 200 mg daily by 10/31  - pt aware to f/u with dr corral for outpt PFTs  - monitor inr- currently therapeutic  - continue coumadin, stop toprol xl  - cont amio load    #anxiety- stable  - continue xanax bid prn    #hypothyroidism- stable   - continue levothyroxine     #post herpetic neuralgia- stable  - continue home meds including gabapentin  - refresh tears  - advised daughter to bring in ointment as not in stock at     # r/o basal cell carcinoma of scalp - follows up with dr jain and had bx 5 days before admission  - outpt f/u     #dvt prophylaxis   - on coumadin for afib     Discussion with patient and family regarding advance directives again yesterday. pt is now DNR/DNI PCP - marilu ( onconsult)  pulm -  shayna  renal - arben    final dx - acute on chronic diastolic CHF exacerbation  HCAP      89 year old male with pmh of anxiety, basal cell carcinoma, bph, ckd, htn, hypothyroid, lymphoma, postherpetic neuralgia, prostate ca coming to  ED with complaints of worsening shortness of breath x 3 days.    Pt noted to be in rapid aflutter/fib and due to hypotension on cardizem and BB will dc on low dose metoprolol and started on amiodarone 200 mg bid and will chagne to amiodarone 200 mg dialy as of 10/31/18.  pt should have outpt TFTs and PFts with dr michel and dr corral.  pt noted to have HCAP and completed treatment with IV and abx and completed course of po ceftin on 10/29.  nebs pen with supportive care.  LETICIA resolving with cre 1.88-2.1. outpt f/u with dr. theodore with lasix decreased to 20 mg as per cardio recs.< from: CT Chest No Cont (10.23.18 @ 08:44) >    IMPRESSION:  The findings are suggestive of multifocal pneumonia in the right lung,   for which clinical correlation is recommended. Interval resolution of the   left pleural effusion.  Extensive lymphadenopathies in the chest and upper abdomen are again,   noted unchanged.  Other findings as above.  < from: Transthoracic Echocardiogram (10.24.18 @ 09:06) >  Summary     Fibrocalcific changes noted to the mitral valve leaflets with preserved   leaflet excursion.   Mild mitral annular calcification is present.   Moderate (2+) mitral regurgitation is present.   Significant fibrocalcific changes noted to the aortic valve leaflets with   restriction in leaflet excursion. Peak transaortic gradient is 48 mmHg;   this finding is consistent with moderate aortic stenosis.   DI = .2   Continuity equation AGUSTIN is .59 cm which likely overestimates the degree   of   aortic stenosis   Mild to Moderate Tricuspid regurgitation is present. Mild pulmonary   hypertension.   Mild concentric left ventricular hypertrophy is present.   Estimated left ventricular ejection fraction is 55-60 %.   The right atrium appears moderately dilated.   Normal appearing right ventricle structure andfunction.    < end of copied text >    #Dyspnea  #Multifocal PNA (HCAP)  #acute on chronic diastolic congestive heart failure - lvef 06/2018 55-60%  - lasix stopped 2/2 leticia on ckd, patient well compensated   - continue tele given ky of rapid afib   - bnp elevated >19k trending down  - trop x 3 negative   - CT chest Multifocal PNA  - RVP Neg  - S/P Vanco and zosyn ahanged to po ceftin til 10/29   - not on acei due to resolving LETICIA  - ID consult appreciated   - Blood cultures negative  - cont atrovent PRN  - check saturation on room air with ambulation  - cardio note appreciated  - cont amiodarone load for better rate control of Afib/Aflutter - rate not controlled   - pulm pending    #leticia on ckd 3-4- resolved  - monitor cr closely  - Cr 2.18    #afib with RVR/Alfutter - uncontrolled rate. case d/w dr chowdary and will start him on amio 200 mg bid and plan to change to amio 200 mg daily by 10/31  - pt aware to f/u with dr corral for outpt PFTs  - monitor inr- currently therapeutic  - continue coumadin, stop toprol xl- started metoprolol tartrate  - cont amio     #anxiety- stable  - continue xanax bid prn    #hypothyroidism- stable   - continue levothyroxine     #post herpetic neuralgia- stable  - continue home meds including gabapentin  - refresh tears  - advised daughter to bring in ointment as not in stock at     # r/o basal cell carcinoma of scalp - follows up with dr jain and had bx 5 days before admission  - outpt f/u     #dvt prophylaxis   - on coumadin for afib     Discussion with patient and family regarding advance directives again yesterday. pt is now DNR/DNI    Total time spent on discharge: 42 minutes

## 2018-10-29 NOTE — DISCHARGE NOTE ADULT - NSTOBACCOHOTLINE_GEN_A_CS
[facility] Smokers Quitline ([id number]) [facility] Smokers Quitline ([id number])

## 2018-10-29 NOTE — DISCHARGE NOTE ADULT - ADDITIONAL INSTRUCTIONS
Please check PT/INR on 11/5/18 and adjust coumadin accordingly- patient takes 5mg coumadin every night  Check BMP on 11/5/18 to monitor serum Cr  Stop lasix for now, Follow up with Nephrology and Cardiology within 2 weeks of discharge

## 2018-10-29 NOTE — PROGRESS NOTE ADULT - ASSESSMENT
89 M with shortness of breath - multifactorial    Diastolic failure- mild. Legs are without edema and mild crackles on bases- recommend low dose Lasix with close monitoring of renal function and electrolytes- if Creatine continues to rise, would hold Lasix for a day . Will add low dose nitrate to reduce preload    Pulm- PNA- on abx, recommend nebulizer treatment, mucolytic and consideration for steroids      Aflutter- metoprolol for rate control, continue with coumadin dosing goal INR 2-2.5     No evidence for ACS     10/24/18:  Cardiac status stable.  Off diuretics and on antibiotics.  Renal function stable.  May need to add back furosemide.  Will watch closely.    10/25/18:  Patient remains stable.  Renal function somewhat improved off diuretics.  Continue antibiotics for right sided multifocal pneumonia    10/26/18:  Cardiac status stable.  Back on furosemide.  Follow renal function.  Continue treatment for pneumonia    10/27/18:  Had slightly more rapid AFib/flutter yesterday treated with extra meds, now with better rate control.  No anginal chest pains or increased SOB.  Having a resp Rx during the exam with reduced BS but no wheezing or increased rales.  Tolerating current meds and treatments so far.  Cardiac status stable.  Back on furosemide.  Follow renal function.  Continue treatment for pneumonia.  Will follow intermittently prn.    10/28/18:  AFib/Flutter rate labile and occasionally up to 140-150's but with increased meds develops hypotension but rate increases when Metoprolol dose reduced.  Will try Amiodarone for rate control and reduce the Lasix to 20 mg daily for now.  Continue as outlined by medicine etal.  Will follow intermittently prn.    10/29/18:  Rates appear to be better controlled on amiodarone.  Will continue at 200 bid for now.

## 2018-10-29 NOTE — PROGRESS NOTE ADULT - SUBJECTIVE AND OBJECTIVE BOX
NEPHROLOGY INTERVAL HPI/OVERNIGHT EVENTS:    Date of Service: 10-29-18 @ 16:19  10/29 SY  Laying in bed appearing fairly comfortable  No acute distress.    10/28  Chest x-ray from 10/27 without worsening.  Right hilar infiltrates and bilateral small pleural effusions  Patient doing the incentive spirometer as prescribed  The patient's daughter is at the bedside questions answered    10/27  Patient somewhat feels tired today  his daughter is at the bedside  Questions answered  Has wheezing today we will order an x-ray  Incentive spirometry ordered      10/26  HCAP  creat slightly improving  in good spirits  daughter at bedside    10/25 SY  No acute events overnight.  Feeling weak today --Was unable to sleep well due to floor activities.    10/24 SY  No acute event overnight.  Reports slept fairly well.  no new complaints.  No acute distress    HPI:  89 yo man with PMHX significant for CHF, HTN, Hx of Lymphoma treated with Rituxan.  Notable for HH admission in 2018 for CHF and LETICIA.   Creat stabilized to 1.7 prior to d/c.  Developed sob for several days.  Given IV lasix by Dr. Joshiwith mild improvement in symptoms.  Pt admitted due to worsened renal function with persistent SOB.  CT chest now c/w multifocal PNA.  Renal evaluation requested for LETICIA superimposed on CKD.  Pt denies any fever at home.    PMHX and PSHX.  1.CKD ( 2015--Creat 1.63), Post recent hosp for CHF.  Creat 1.7 on d/c 2018.  2.A FIB  3.HTN  4.Hx of Prostate CA ( Post RT)  5.Hx of Lymphoma Post Rituxan therapy.  6.Hx of Postherpetic Shingles.  7.Hypothyroidism  8.Hx of Chronic Diarrhea.  9.Recent admission for CHF.          MEDICATIONS  (STANDING):  ALPRAZolam 0.25 milliGRAM(s) Oral two times a day  amiodarone    Tablet 200 milliGRAM(s) Oral two times a day  artificial  tears Solution 1 Drop(s) Both EYES two times a day  cefuroxime   Tablet 250 milliGRAM(s) Oral every 12 hours  docusate sodium 100 milliGRAM(s) Oral three times a day  furosemide    Tablet 20 milliGRAM(s) Oral daily  gabapentin 200 milliGRAM(s) Oral two times a day  ipratropium    for Nebulization 500 MICROGram(s) Nebulizer every 6 hours  lactobacillus acidophilus 1 Tablet(s) Oral daily  levothyroxine 100 MICROGram(s) Oral daily  Lotemax ointment 1 Application(s) 1 Application(s) Both EYES daily  metoprolol tartrate 12.5 milliGRAM(s) Oral two times a day  potassium chloride    Tablet ER 20 milliEquivalent(s) Oral daily  sodium chloride 0.9%. 250 milliLiter(s) (75 mL/Hr) IV Continuous <Continuous>  warfarin 5 milliGRAM(s) Oral daily    MEDICATIONS  (PRN):  acetaminophen   Tablet .. 650 milliGRAM(s) Oral every 6 hours PRN Temp greater or equal to 38C (100.4F), Mild Pain (1 - 3), Moderate Pain (4 - 6)  diltiazem Injectable 10 milliGRAM(s) IV Push every 6 hours PRN for HR > 100 bpm  senna 2 Tablet(s) Oral at bedtime PRN Constipation          Vital Signs Last 24 Hrs  T(C): 36.7 (29 Oct 2018 09:53), Max: 36.9 (28 Oct 2018 17:22)  T(F): 98.1 (29 Oct 2018 09:53), Max: 98.4 (28 Oct 2018 17:22)  HR: 92 (29 Oct 2018 13:44) (60 - 124)  BP: 104/83 (29 Oct 2018 14:05) (98/76 - 120/73)  BP(mean): --  RR: 16 (29 Oct 2018 14:05) (16 - 18)  SpO2: 91% (29 Oct 2018 14:05) (91% - 100%)  Daily     Daily Weight in k.3 (29 Oct 2018 15:59)      PHYSICAL EXAM:  Alert and responsive  GENERAL: no acute distress  CHEST/LUNG: Fair air entry bilaterally with persistent   right sided rhonchi and wheezes.  HEART: S1S2 RRR  ABDOMEN: soft  EXTREMITIES: no edema  SKIN:     LABS:                        11.5   5.77  )-----------( 127      ( 29 Oct 2018 06:05 )             37.2     10-29    146<H>  |  106  |  42<H>  ----------------------------<  96  3.6   |  34<H>  |  2.14<H>    Ca    9.1      29 Oct 2018 06:05  Mg     2.3     10-29      PT/INR - ( 29 Oct 2018 06:05 )   PT: 21.1 sec;   INR: 1.93 ratio             Magnesium, Serum: 2.3 mg/dL (10-29 @ 06:05)          RADIOLOGY & ADDITIONAL TESTS:

## 2018-10-29 NOTE — DISCHARGE NOTE ADULT - CARE PROVIDERS DIRECT ADDRESSES
,DirectAddress_Unknown,DirectAddress_Unknown,[email or web address]

## 2018-10-29 NOTE — DISCHARGE NOTE ADULT - PLAN OF CARE
prevent exacerbation strict adherence to diet and fluid restriction and close follow up with cardiology/PMD - dr michel treatment you have completed your full course of antibiotics strict adherence to diet and fluid restriction and close follow up with cardiology/PMD - dr michel  Please check PT/INR on 11/5/18 and adjust coumadin accordingly- patient takes 5mg coumadin every night you had worsening of your kidney function, lasix stopped and kidney function improved. Stop lasix for now, Follow up with Nephrology and Cardiology within 2 weeks of discharge. Check BMP on 11/5/18 to monitor serum Cr

## 2018-10-29 NOTE — PROGRESS NOTE ADULT - ASSESSMENT
89 yo man with multiple medical problems including CKD admitted with multifocal PNA.     LETICIA superimposed on CKD.  CT with no evidence of CHF.  Left Pleural effusion resolved.    Follow Right effusion with multifocal PNA.  Will hold diuretics for 2-3 days and allow stabilization of CKD while PNA treated with abtx.    10/24 SY  --LETICIA/CKD : slightly improved.  Monitor off diuretics for another day and resume tomorrow.  --PNA : continue abtx.    10/25 SY  --LETICIA/CKD : fairly stable for now.  Will resume outpt dose of diuretic to maintain stable fluid status.  --PNA : continue current abtx.  Monitor response.    10/26  HCAP  creat improving   Na level elevated  most likely due to Natriuresis from Lasix,  sodium load from NS  d/w pt and daughter    10/27  Incentive spirometry ordered  Serum creatinine stable  Chest x-ray ordered  Has more expiratory wheezing today    10/28  Chest x-ray shows no new worsening  Right hilar infiltrate, bilateral small pleural effusions persist  No wheezing audible today  Patient continues to use the incentive spirometer  Creatinine stable  Lasix discontinued yesterday due to hypotension.  IV bolus of fluids was given as well    10/29 SY  --LETICIA/CKD  : creat variable with changes in diuretic therapy and fluid.  Pt at risk for decompensated CHF : monitor on resumed Lasix--Restarted today.  --Resp : PNA primarily with a component of CHF ; Continue to monitor.

## 2018-10-29 NOTE — PROGRESS NOTE ADULT - SUBJECTIVE AND OBJECTIVE BOX
CHIEF COMPLAINT: Patient is a 89y old  Male who presents with a chief complaint of CHF Exacerbation w/ possible hcap (22 Oct 2018 21:23)      HPI:  89 year old male with pmh of anxiety, basal cell carcinoma, bph, ckd, htn, hypothyroid, lymphoma, postherpetic neuralgia, prostate ca coming to  ED with complaints of worsening shortness of breath x 3 days.    Pt is SOB with lying supine.   was seen in Dr Joshi office on Friday and was given lasix 40mg iv push however sob improved mildly.   Labs were checked out,   patient and as patient sob not markedly improved and patient found to have neno on ckd was sent to hospital for further eval. Patient also stating has been having a decreased po intake for the past week.  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    was given azithromycin and another abx about two weeks ago for cough as he had a suspected pna.   States the abx he does not know name of gave him diarrhea however that resolved.   Patient was also admitted 1 month prior for chf exacerbation. Denies fevers, chills, chest pain. (22 Oct 2018 21:23)    10/26/18:  Patient is back on furosemide 40 per day    10/27/18:  Had slightly more rapid AFib/flutter yesterday treated with extra meds, now with better rate control.  No anginal chest pains or increased SOB.  Having a resp Rx during the exam with reduced BS but no wheezing or increased rales.  Tolerating current meds and treatments so far.    10/28/18:  AFib/Flutter rate labile and occasionally up to 140-150's but with increased meds develops hypotension but rate increases when Metoprolol dose reduced.  Will try Amiodarone for rate control.        PMHx:  PAST MEDICAL & SURGICAL HISTORY:  CKD (chronic kidney disease)  BPH (benign prostatic hyperplasia)  Postherpetic neuralgia  Chronic diarrhea  Prostate CA  Basal cell carcinoma  Lymphoma  Anxiety  Hypothyroid  Hypertension  A-fib  H/O shoulder replacement  S/P bilateral unicompartmental knee replacement      FAMILY HISTORY:   FAMILY HISTORY:  Family history of ischemic heart disease (Father, Mother)      ALLERGIES:  Allergies  No Known Allergies      REVIEW OF SYSTEMS:    CONSTITUTIONAL: No fevers or chills  EYES/ENT: No visual changes;  No vertigo or throat pain   NECK: No pain or stiffness  RESPIRATORY: No hemoptysis;  CARDIOVASCULAR: No chest pain  GASTROINTESTINAL: No abdominal or epigastric pain. No nausea, vomiting, or hematemesis; No diarrhea or constipation. No melena or hematochezia.  GENITOURINARY: No dysuria, frequency or hematuria  NEUROLOGICAL: No numbness   SKIN: No itching, burning, rashes, or lesions   All other review of systems is negative unless indicated above    ICU Vital Signs Last 24 Hrs  T(C): 36.8 (29 Oct 2018 04:52), Max: 36.9 (28 Oct 2018 17:22)  T(F): 98.3 (29 Oct 2018 04:52), Max: 98.4 (28 Oct 2018 17:22)  HR: 60 (29 Oct 2018 04:52) (60 - 150)  BP: 120/73 (29 Oct 2018 04:52) (93/57 - 120/73)  BP(mean): --  ABP: --  ABP(mean): --  RR: 18 (29 Oct 2018 04:52) (16 - 18)  SpO2: 100% (29 Oct 2018 04:52) (92% - 100%)        PHYSICAL EXAM:   Constitutional: NAD, awake and alert, well-developed  HEENT: PERR, EOMI, Normal Hearing, MMM  Neck: Soft and supple, No LAD, No JVD  Respiratory: Breath sounds are diminished but clear bilaterally, No wheezing, rales or rhonchi  Cardiovascular: S1 and S2, irregular rate and rhythm, soft KENNY at LLSB and base as before, no gallops or rubs  Gastrointestinal: Bowel Sounds present, soft, nontender, nondistended, no guarding, no rebound  Extremities: No peripheral edema  Vascular: 2+ peripheral pulses  Neurological: no focal deficits        MEDICATIONS  (STANDING):  ALPRAZolam 0.25 milliGRAM(s) Oral two times a day  amiodarone    Tablet 200 milliGRAM(s) Oral two times a day  artificial  tears Solution 1 Drop(s) Both EYES two times a day  cefuroxime   Tablet 250 milliGRAM(s) Oral every 12 hours  docusate sodium 100 milliGRAM(s) Oral three times a day  furosemide    Tablet 20 milliGRAM(s) Oral daily  gabapentin 200 milliGRAM(s) Oral two times a day  ipratropium    for Nebulization 500 MICROGram(s) Nebulizer every 6 hours  lactobacillus acidophilus 1 Tablet(s) Oral daily  levothyroxine 100 MICROGram(s) Oral daily  Lotemax ointment 1 Application(s) 1 Application(s) Both EYES daily  potassium chloride   Powder 20 milliEquivalent(s) Oral daily  sodium chloride 0.9%. 250 milliLiter(s) (75 mL/Hr) IV Continuous <Continuous>  warfarin 5 milliGRAM(s) Oral daily        LABS: All Labs Reviewed:                        11.3   4.96  )-----------( 93       ( 27 Oct 2018 06:12 )             36.1                           12.9   5.72  )-----------( 117      ( 22 Oct 2018 17:32 )             40.9     10-27    144  |  104  |  37<H>  ----------------------------<  99  3.8   |  34<H>  |  1.88<H>    10-26    146<H>  |  106  |  33<H>  ----------------------------<  89  3.7   |  36<H>  |  1.77<H>    10-25    143  |  104  |  30<H>  ----------------------------<  94  3.6   |  35<H>  |  1.80<H>    10-23    147<H>  |  105  |  32<H>  ----------------------------<  98  3.4<L>   |  36<H>  |  2.02<H>    10-22    144  |  105  |  34<H>  ----------------------------<  124<H>  3.7   |  32<H>  |  2.17<H>    Ca    9.1      22 Oct 2018 17:32  Ca    9.0      23 Oct 2018 07:06  Ca    8.4      25 Oct 2018 06:23  Ca    9.3      26 Oct 2018 06:14  Ca    9.0      27 Oct 2018 06:12    Phos  2.7     10-25  Mg     2.2     10-25    TPro  6.1  /  Alb  3.4  /  TBili  0.8  /  DBili  x   /  AST  22  /  ALT  20  /  AlkPhos  68  10-22    PT/INR - ( 28 Oct 2018 06:09 )   PT: 18.0 sec;   INR: 1.65 ratio    PT/INR - ( 27 Oct 2018 06:12 )   PT: 20.9 sec;   INR: 1.91 ratio    PT/INR - ( 22 Oct 2018 17:32 )   PT: 20.0 sec;   INR: 1.83 ratio      PTT - ( 22 Oct 2018 17:32 )  PTT:34.1 sec      Serum Pro-Brain Natriuretic Peptide: 29886 pg/mL (10-24 @ 06:37)  Serum Pro-Brain Natriuretic Peptide: 93675 pg/mL (10-22 @ 17:32)    BLOOD CULTURES: Organism --  Gram Stain Blood -- Gram Stain --  Specimen Source .Blood None  Culture-Blood --Culture Results:   No growth to date. (10.22.18 @ 19:38)      CARDIAC MARKERS ( 23 Oct 2018 00:21 )  .030 ng/mL / x     / x     / x     / x      CARDIAC MARKERS ( 22 Oct 2018 21:45 )  0.032 ng/mL / x     / x     / x     / x      CARDIAC MARKERS ( 22 Oct 2018 17:32 )  0.025 ng/mL / x     / x     / x     / x          RADIOLOGY:  CT of Chest w/o contrast: 10/23/18  IMPRESSION:  The findings are suggestive of multifocal pneumonia in the right lung,   for which clinical correlation is recommended. Interval resolution of the   left pleural effusion.  Extensive lymphadenopathies in the chest and upper abdomen are again,   noted unchanged.  Other findings as above.      EKG: AFlutter with varible block, RBBB, LAFB    Telemetry: Highs now in the 120's    ECHO:Transthoracic Echocardiogram: 6/26/18   Moderate (2+) mitral regurgitation is present.   Mild mitral annular calcification is present.   Significant fibrocalcific changes noted to the aortic valve leaflets with restriction in leaflet excursion. Peak transaortic gradient is 45 mmHg; this finding is consistent with mild to moderate aortic stenosis.   Trace aortic regurgitation is present.   Mild to moderate tricuspid valve regurgitation is present.   Normal appearing pulmonic valve structure and function.   The left atrium is mildly dilated.   The left ventricle is normal in size, wall motion and contractility.   Mild concentric left ventricular hypertrophy is present.   Estimated left ventricular ejection fraction is 55-60 %.   A PFO is noted with color doppler.   Normal appearing right ventricle structure and function.   All visualized extra cardiac structures appears to be normal.   No evidence of pericardial effusion.     Signature   ----------------------------------------------------------------   Electronically signed by Sina Trent MD(Interpreting   physician) on 06/27/2018 07:41 AM   ----------------------------------------------------------------

## 2018-10-29 NOTE — DISCHARGE NOTE ADULT - MEDICATION SUMMARY - MEDICATIONS TO TAKE
I will START or STAY ON the medications listed below when I get home from the hospital:    alfuzosin 10 mg oral tablet, extended release  -- 1 tab(s) by mouth once a day  -- Indication: For Home med    Pacerone 200 mg oral tablet  -- 1 tab(s) by mouth once a day  -- Indication: For Afib    warfarin 5 mg oral tablet  -- 1 tab(s) by mouth once a day  -- Indication: For Afib    Neurontin 100 mg oral capsule  -- 2 cap(s) by mouth once a day  -- Indication: For neuropathy    ALPRAZolam 0.25 mg oral tablet  -- 1 tab(s) by mouth 2 times a day  -- Indication: For Anxiety    Metoprolol Tartrate 25 mg oral tablet  -- 1 tab(s) by mouth 2 times a day  -- Indication: For Htn    ipratropium 500 mcg/2.5 mL inhalation solution  -- 2.5 milliliter(s) inhaled every 6 hours  -- Indication: For sob    docusate sodium 100 mg oral capsule  -- 1 cap(s) by mouth 3 times a day  -- Indication: For Constipation    melatonin 3 mg oral tablet  -- 1 tab(s) by mouth once a day (at bedtime), As Needed  -- Indication: For insomnia    Lotemax 0.5% ophthalmic ointment  -- 1 application in each eye 2 times a day  -- Indication: For Home med    Refresh ophthalmic solution  -- 1 drop(s) in each eye 2 times a day  -- Indication: For Home med    lactobacillus acidophilus oral capsule  -- 1 cap(s) by mouth once a day  -- Indication: For probiotic    levothyroxine 100 mcg (0.1 mg) oral tablet  -- 1 tab(s) by mouth once a day  -- Indication: For Hypothyroid I will START or STAY ON the medications listed below when I get home from the hospital:    alfuzosin 10 mg oral tablet, extended release  -- 1 tab(s) by mouth once a day  -- Indication: For Home med    Pacerone 200 mg oral tablet  -- 1 tab(s) by mouth once a day  -- Indication: For Afib    warfarin 5 mg oral tablet  -- 1 tab(s) by mouth once a day  -- Indication: For Afib    Neurontin 100 mg oral capsule  -- 2 cap(s) by mouth once a day  -- Indication: For neuropathy    ALPRAZolam 0.25 mg oral tablet  -- 1 tab(s) by mouth 2 times a day  -- Indication: For Anxiety    Metoprolol Tartrate 25 mg oral tablet  -- 1 tab(s) by mouth 2 times a day  -- Indication: For Afib    ipratropium 500 mcg/2.5 mL inhalation solution  -- 2.5 milliliter(s) inhaled every 6 hours  -- Indication: For sob    docusate sodium 100 mg oral capsule  -- 1 cap(s) by mouth 3 times a day  -- Indication: For Constipation    melatonin 3 mg oral tablet  -- 1 tab(s) by mouth once a day (at bedtime), As Needed  -- Indication: For insomnia    Lotemax 0.5% ophthalmic ointment  -- 1 application in each eye 2 times a day  -- Indication: For Home med    Refresh ophthalmic solution  -- 1 drop(s) in each eye 2 times a day  -- Indication: For Home med    lactobacillus acidophilus oral capsule  -- 1 cap(s) by mouth once a day  -- Indication: For probiotic    levothyroxine 100 mcg (0.1 mg) oral tablet  -- 1 tab(s) by mouth once a day  -- Indication: For Hypothyroid

## 2018-10-29 NOTE — DISCHARGE NOTE ADULT - CONDITIONS AT DISCHARGE
Patient stable for discharge. Pt dx with CHF- An appointment  was scheduled with Dr. Joshi on 1/5/2018 @ 10:15 am. Stressed importance of following up with cardiologist. Pt verbalized understanding.

## 2018-10-29 NOTE — PROGRESS NOTE ADULT - ASSESSMENT
- multifocal infiltrates with nodular and peribronchial likely infectious in etiology on po antibiotics   - chf with small effusions noted in the follow up cxr .  - afib with the rate better controlled   - lymphoma with the mediastinal adenopathy   - acute on chronic ckd back on lasix     PLAN     - complete po antibiotics .   - management of afib  as per the cardiology started on amiodarone and would need to have baseline pft and monitor for the symptoms of toxicity   - follow up of lymphoma as per the oncology as an out patient .  - need to have follow up cxr and would avoid fluids further   - check the sat on room air and with ambulation in anticipation for the discharge    - - f

## 2018-10-29 NOTE — PROGRESS NOTE ADULT - SUBJECTIVE AND OBJECTIVE BOX
SUBJECTIVE     Patient was seen in [facility] and heart rate was better controlled and he denies any sob and cough or wheezing   amiodarone was continued     PAST MEDICAL & SURGICAL HISTORY:  CKD (chronic kidney disease)  BPH (benign prostatic hyperplasia)  Postherpetic neuralgia  Chronic diarrhea  Prostate CA  Basal cell carcinoma  Lymphoma  Anxiety  Hypothyroid  Hypertension  A-fib  H/O shoulder replacement  S/P bilateral unicompartmental knee replacement    OBJECTIVE   Vital Signs Last 24 Hrs  T(C): 36.7 (29 Oct 2018 09:53), Max: 36.9 (28 Oct 2018 17:22)  T(F): 98.1 (29 Oct 2018 09:53), Max: 98.4 (28 Oct 2018 17:22)  HR: 92 (29 Oct 2018 13:44) (60 - 124)  BP: 104/83 (29 Oct 2018 14:05) (98/76 - 120/73)  BP(mean): --  RR: 16 (29 Oct 2018 14:05) (16 - 18)  SpO2: 91% (29 Oct 2018 14:05) (91% - 100%)    PHYSICAL EXAM:  Constitutional: , awake and alert, not in distress on  nasal canula   HEENT: Normo cephalic atraumatic  Neck: Soft and supple, No J.V.D   Respiratory: vesicular breathing has few rales over the bases   Cardiovascular: S1 and S2, regular rate .   Gastrointestinal:  soft, nontender,   Extremities: No  edema or calf tenderness .  Neurological: No new  focal deficits.    MEDICATIONS  (STANDING):  ALPRAZolam 0.25 milliGRAM(s) Oral two times a day  amiodarone    Tablet 200 milliGRAM(s) Oral two times a day  artificial  tears Solution 1 Drop(s) Both EYES two times a day  cefuroxime   Tablet 250 milliGRAM(s) Oral every 12 hours  docusate sodium 100 milliGRAM(s) Oral three times a day  furosemide    Tablet 20 milliGRAM(s) Oral daily  gabapentin 200 milliGRAM(s) Oral two times a day  ipratropium    for Nebulization 500 MICROGram(s) Nebulizer every 6 hours  lactobacillus acidophilus 1 Tablet(s) Oral daily  levothyroxine 100 MICROGram(s) Oral daily  Lotemax ointment 1 Application(s) 1 Application(s) Both EYES daily  metoprolol tartrate 12.5 milliGRAM(s) Oral two times a day  potassium chloride    Tablet ER 20 milliEquivalent(s) Oral daily  sodium chloride 0.9%. 250 milliLiter(s) (75 mL/Hr) IV Continuous <Continuous>  warfarin 5 milliGRAM(s) Oral daily                            11.5   5.77  )-----------( 127      ( 29 Oct 2018 06:05 )             37.2     10-29    146<H>  |  106  |  42<H>  ----------------------------<  96  3.6   |  34<H>  |  2.14<H>    Ca    9.1      29 Oct 2018 06:05  Mg     2.3     10-29

## 2018-10-29 NOTE — DISCHARGE NOTE ADULT - CARE PROVIDER_API CALL
Jj Joshi (MD), Cardiovascular Disease; Internal Medicine  [address] [facility]  [address]  [place] [address]  Phone: [phone]  Fax: [phone]    Jean Paul Larson (), Nephrology  [address] [facility]  [address]  [place] [address]  Phone: [phone]  Fax: [phone]    Merry Franklin), Critical Care Medicine; Internal Medicine; Pulmonary Disease; Sleep Medicine  [address] [place] [address]  Phone: [phone]  Fax: [phone]

## 2018-10-30 LAB
ANION GAP SERPL CALC-SCNC: 6 MMOL/L — SIGNIFICANT CHANGE UP (ref 5–17)
BUN SERPL-MCNC: 47 MG/DL — HIGH (ref 7–23)
CALCIUM SERPL-MCNC: 9.6 MG/DL — SIGNIFICANT CHANGE UP (ref 8.5–10.1)
CHLORIDE SERPL-SCNC: 108 MMOL/L — SIGNIFICANT CHANGE UP (ref 96–108)
CO2 SERPL-SCNC: 33 MMOL/L — HIGH (ref 22–31)
CREAT SERPL-MCNC: 2.33 MG/DL — HIGH (ref 0.5–1.3)
GLUCOSE SERPL-MCNC: 97 MG/DL — SIGNIFICANT CHANGE UP (ref 70–99)
INR BLD: 2.28 RATIO — HIGH (ref 0.88–1.16)
POTASSIUM SERPL-MCNC: 3.9 MMOL/L — SIGNIFICANT CHANGE UP (ref 3.5–5.3)
POTASSIUM SERPL-SCNC: 3.9 MMOL/L — SIGNIFICANT CHANGE UP (ref 3.5–5.3)
PROTHROM AB SERPL-ACNC: 25 SEC — HIGH (ref 9.8–12.7)
SODIUM SERPL-SCNC: 147 MMOL/L — HIGH (ref 135–145)

## 2018-10-30 RX ADMIN — Medication 100 MICROGRAM(S): at 05:46

## 2018-10-30 RX ADMIN — Medication 1 TABLET(S): at 11:35

## 2018-10-30 RX ADMIN — GABAPENTIN 200 MILLIGRAM(S): 400 CAPSULE ORAL at 05:46

## 2018-10-30 RX ADMIN — Medication 3 MILLIGRAM(S): at 00:18

## 2018-10-30 RX ADMIN — AMIODARONE HYDROCHLORIDE 200 MILLIGRAM(S): 400 TABLET ORAL at 05:46

## 2018-10-30 RX ADMIN — Medication 0.25 MILLIGRAM(S): at 16:23

## 2018-10-30 RX ADMIN — Medication 100 MILLIGRAM(S): at 15:18

## 2018-10-30 RX ADMIN — Medication 0.25 MILLIGRAM(S): at 05:47

## 2018-10-30 RX ADMIN — Medication 1 DROP(S): at 16:33

## 2018-10-30 RX ADMIN — Medication 1 DROP(S): at 05:47

## 2018-10-30 RX ADMIN — Medication 100 MILLIGRAM(S): at 21:41

## 2018-10-30 RX ADMIN — Medication 3 MILLIGRAM(S): at 21:41

## 2018-10-30 RX ADMIN — Medication 500 MICROGRAM(S): at 19:46

## 2018-10-30 RX ADMIN — AMIODARONE HYDROCHLORIDE 200 MILLIGRAM(S): 400 TABLET ORAL at 16:32

## 2018-10-30 RX ADMIN — Medication 12.5 MILLIGRAM(S): at 05:46

## 2018-10-30 RX ADMIN — Medication 500 MICROGRAM(S): at 14:40

## 2018-10-30 RX ADMIN — Medication 20 MILLIGRAM(S): at 11:35

## 2018-10-30 RX ADMIN — Medication 20 MILLIEQUIVALENT(S): at 11:35

## 2018-10-30 RX ADMIN — Medication 500 MICROGRAM(S): at 08:11

## 2018-10-30 RX ADMIN — Medication 12.5 MILLIGRAM(S): at 16:32

## 2018-10-30 RX ADMIN — Medication 100 MILLIGRAM(S): at 05:47

## 2018-10-30 RX ADMIN — Medication 500 MICROGRAM(S): at 01:31

## 2018-10-30 RX ADMIN — GABAPENTIN 200 MILLIGRAM(S): 400 CAPSULE ORAL at 16:32

## 2018-10-30 NOTE — PROGRESS NOTE ADULT - ASSESSMENT
89 M with shortness of breath - multifactorial    Diastolic failure- mild. Legs are without edema and mild crackles on bases- recommend low dose Lasix with close monitoring of renal function and electrolytes- if Creatine continues to rise, would hold Lasix for a day . Will add low dose nitrate to reduce preload    Pulm- PNA- on abx, recommend nebulizer treatment, mucolytic and consideration for steroids      Aflutter- metoprolol for rate control, continue with coumadin dosing goal INR 2-2.5     No evidence for ACS     10/24/18:  Cardiac status stable.  Off diuretics and on antibiotics.  Renal function stable.  May need to add back furosemide.  Will watch closely.    10/25/18:  Patient remains stable.  Renal function somewhat improved off diuretics.  Continue antibiotics for right sided multifocal pneumonia    10/26/18:  Cardiac status stable.  Back on furosemide.  Follow renal function.  Continue treatment for pneumonia    10/27/18:  Had slightly more rapid AFib/flutter yesterday treated with extra meds, now with better rate control.  No anginal chest pains or increased SOB.  Having a resp Rx during the exam with reduced BS but no wheezing or increased rales.  Tolerating current meds and treatments so far.  Cardiac status stable.  Back on furosemide.  Follow renal function.  Continue treatment for pneumonia.  Will follow intermittently prn.    10/28/18:  AFib/Flutter rate labile and occasionally up to 140-150's but with increased meds develops hypotension but rate increases when Metoprolol dose reduced.  Will try Amiodarone for rate control and reduce the Lasix to 20 mg daily for now.  Continue as outlined by medicine etal.  Will follow intermittently prn.    10/29/18:  Rates appear to be better controlled on amiodarone.  Will continue at 200 bid for now.    10/30/18:  Patient appears stable at present.  Ok to go to [facility]

## 2018-10-30 NOTE — PROGRESS NOTE ADULT - SUBJECTIVE AND OBJECTIVE BOX
NEPHROLOGY INTERVAL HPI/OVERNIGHT EVENTS:    Date of Service: 10-29-18 @ 16:19    10/30  getting NS @ 75 ml/hr  no new complaints feels well  in bed no sob  no chest pain    10/29 SY  Laying in bed appearing fairly comfortable  No acute distress.    10/28  Chest x-ray from 10/27 without worsening.  Right hilar infiltrates and bilateral small pleural effusions  Patient doing the incentive spirometer as prescribed  The patient's daughter is at the bedside questions answered    10/27  Patient somewhat feels tired today  his daughter is at the bedside  Questions answered  Has wheezing today we will order an x-ray  Incentive spirometry ordered      10/26  HCAP  creat slightly improving  in good spirits  daughter at bedside    10/25 SY  No acute events overnight.  Feeling weak today --Was unable to sleep well due to floor activities.    10/24 SY  No acute event overnight.  Reports slept fairly well.  no new complaints.  No acute distress    HPI:  89 yo man with PMHX significant for CHF, HTN, Hx of Lymphoma treated with Rituxan.  Notable for HH admission in 6/2018 for CHF and LETICIA.   Creat stabilized to 1.7 prior to d/c.  Developed sob for several days.  Given IV lasix by Dr. Joshiwith mild improvement in symptoms.  Pt admitted due to worsened renal function with persistent SOB.  CT chest now c/w multifocal PNA.  Renal evaluation requested for LETICIA superimposed on CKD.  Pt denies any fever at home.    PMHX and PSHX.  1.CKD ( 12/2015--Creat 1.63), Post recent hosp for CHF.  Creat 1.7 on d/c 6/2018.  2.A FIB  3.HTN  4.Hx of Prostate CA ( Post RT)  5.Hx of Lymphoma Post Rituxan therapy.  6.Hx of Postherpetic Shingles.  7.Hypothyroidism  8.Hx of Chronic Diarrhea.  9.Recent admission for CHF.          MEDICATIONS  (STANDING):  ALPRAZolam 0.25 milliGRAM(s) Oral two times a day  amiodarone    Tablet 200 milliGRAM(s) Oral two times a day  artificial  tears Solution 1 Drop(s) Both EYES two times a day  docusate sodium 100 milliGRAM(s) Oral three times a day  furosemide    Tablet 20 milliGRAM(s) Oral daily  gabapentin 200 milliGRAM(s) Oral two times a day  ipratropium    for Nebulization 500 MICROGram(s) Nebulizer every 6 hours  lactobacillus acidophilus 1 Tablet(s) Oral daily  levothyroxine 100 MICROGram(s) Oral daily  Lotemax ointment 1 Application(s) 1 Application(s) Both EYES daily  melatonin 3 milliGRAM(s) Oral at bedtime  metoprolol tartrate 12.5 milliGRAM(s) Oral two times a day  potassium chloride    Tablet ER 20 milliEquivalent(s) Oral daily  sodium chloride 0.9%. 250 milliLiter(s) (75 mL/Hr) IV Continuous <Continuous>    MEDICATIONS  (PRN):  acetaminophen   Tablet .. 650 milliGRAM(s) Oral every 6 hours PRN Temp greater or equal to 38C (100.4F), Mild Pain (1 - 3), Moderate Pain (4 - 6)  diltiazem Injectable 10 milliGRAM(s) IV Push every 6 hours PRN for HR > 100 bpm  senna 2 Tablet(s) Oral at bedtime PRN Constipation          Vital Signs Last 24 Hrs  T(C): 36.7 (30 Oct 2018 09:33), Max: 37.2 (29 Oct 2018 17:47)  T(F): 98 (30 Oct 2018 09:33), Max: 98.9 (29 Oct 2018 17:47)  HR: 88 (30 Oct 2018 11:23) (87 - 140)  BP: 106/66 (30 Oct 2018 11:23) (104/83 - 117/91)  BP(mean): --  RR: 18 (30 Oct 2018 09:33) (16 - 18)  SpO2: 99% (30 Oct 2018 09:33) (91% - 99%)    PHYSICAL EXAM:  Alert and responsive  GENERAL: no acute distress  CHEST/LUNG: Fair air entry bilaterally with persistent   right sided rhonchi and wheezes.  HEART: S1S2 RRR  ABDOMEN: soft  EXTREMITIES: no edema  SKIN:     LABS:                                   11.5   5.77  )-----------( 127      ( 29 Oct 2018 06:05 )             37.2       10-30    147<H>  |  108  |  47<H>  ----------------------------<  97  3.9   |  33<H>  |  2.33<H>    Ca    9.6      30 Oct 2018 05:37  Mg     2.3     10-29

## 2018-10-30 NOTE — PROGRESS NOTE ADULT - ASSESSMENT
89 yo man with multiple medical problems including CKD admitted with multifocal PNA.     LETICIA superimposed on CKD.  CT with no evidence of CHF.  Left Pleural effusion resolved.    Follow Right effusion with multifocal PNA.  Will hold diuretics for 2-3 days and allow stabilization of CKD while PNA treated with abtx.    10/24 SY  --LETICIA/CKD : slightly improved.  Monitor off diuretics for another day and resume tomorrow.  --PNA : continue abtx.    10/25 SY  --LETICIA/CKD : fairly stable for now.  Will resume outpt dose of diuretic to maintain stable fluid status.  --PNA : continue current abtx.  Monitor response.    10/26  HCAP  creat improving   Na level elevated  most likely due to Natriuresis from Lasix,  sodium load from NS  d/w pt and daughter    10/27  Incentive spirometry ordered  Serum creatinine stable  Chest x-ray ordered  Has more expiratory wheezing today    10/28  Chest x-ray shows no new worsening  Right hilar infiltrate, bilateral small pleural effusions persist  No wheezing audible today  Patient continues to use the incentive spirometer  Creatinine stable  Lasix discontinued yesterday due to hypotension.  IV bolus of fluids was given as well    10/29 SY  --LETICIA/CKD  : creat variable with changes in diuretic therapy and fluid.  Pt at risk for decompensated CHF : monitor on resumed Lasix--Restarted today.  --Resp : PNA primarily with a component of CHF ; Continue to monitor.    10/30  on Lasix 20 mg po now  UO in urinal bed-side only ~40 ml  not drinking water  will dc NS, becoming hypernatremic  azotemic , bicarb up  encourage PO fluids  Bladder scan

## 2018-10-30 NOTE — PROGRESS NOTE ADULT - SUBJECTIVE AND OBJECTIVE BOX
CHIEF COMPLAINT: Patient is a 89y old  Male who presents with a chief complaint of CHF Exacerbation w/ possible hcap (22 Oct 2018 21:23)      HPI:  89 year old male with pmh of anxiety, basal cell carcinoma, bph, ckd, htn, hypothyroid, lymphoma, postherpetic neuralgia, prostate ca coming to  ED with complaints of worsening shortness of breath x 3 days.    Pt is SOB with lying supine.   was seen in Dr Joshi office on Friday and was given lasix 40mg iv push however sob improved mildly.   Labs were checked out,   patient and as patient sob not markedly improved and patient found to have neno on ckd was sent to hospital for further eval. Patient also stating has been having a decreased po intake for the past week.  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    was given azithromycin and another abx about two weeks ago for cough as he had a suspected pna.   States the abx he does not know name of gave him diarrhea however that resolved.   Patient was also admitted 1 month prior for chf exacerbation. Denies fevers, chills, chest pain. (22 Oct 2018 21:23)    10/26/18:  Patient is back on furosemide 40 per day    10/27/18:  Had slightly more rapid AFib/flutter yesterday treated with extra meds, now with better rate control.  No anginal chest pains or increased SOB.  Having a resp Rx during the exam with reduced BS but no wheezing or increased rales.  Tolerating current meds and treatments so far.    10/28/18:  AFib/Flutter rate labile and occasionally up to 140-150's but with increased meds develops hypotension but rate increases when Metoprolol dose reduced.  Will try Amiodarone for rate control.    10/30/18:  Patient was started on metoprolol 12.5 bid in addition to the amiodarone 200 bid        PMHx:  PAST MEDICAL & SURGICAL HISTORY:  CKD (chronic kidney disease)  BPH (benign prostatic hyperplasia)  Postherpetic neuralgia  Chronic diarrhea  Prostate CA  Basal cell carcinoma  Lymphoma  Anxiety  Hypothyroid  Hypertension  A-fib  H/O shoulder replacement  S/P bilateral unicompartmental knee replacement      FAMILY HISTORY:   FAMILY HISTORY:  Family history of ischemic heart disease (Father, Mother)      ALLERGIES:  Allergies  No Known Allergies      REVIEW OF SYSTEMS:    CONSTITUTIONAL: No fevers or chills  EYES/ENT: No visual changes;  No vertigo or throat pain   NECK: No pain or stiffness  RESPIRATORY: No hemoptysis;  CARDIOVASCULAR: No chest pain  GASTROINTESTINAL: No abdominal or epigastric pain. No nausea, vomiting, or hematemesis; No diarrhea or constipation. No melena or hematochezia.  GENITOURINARY: No dysuria, frequency or hematuria  NEUROLOGICAL: No numbness   SKIN: No itching, burning, rashes, or lesions   All other review of systems is negative unless indicated above    ICU Vital Signs Last 24 Hrs  T(C): 36.3 (30 Oct 2018 05:20), Max: 37.2 (29 Oct 2018 17:47)  T(F): 97.4 (30 Oct 2018 05:20), Max: 98.9 (29 Oct 2018 17:47)  HR: 97 (30 Oct 2018 05:20) (70 - 140)  BP: 117/91 (30 Oct 2018 05:20) (98/76 - 117/91)  BP(mean): --  ABP: --  ABP(mean): --  RR: 16 (29 Oct 2018 14:05) (16 - 18)  SpO2: 99% (30 Oct 2018 05:20) (91% - 99%)          PHYSICAL EXAM:   Constitutional: NAD, awake and alert, well-developed  HEENT: PERR, EOMI, Normal Hearing, MMM  Neck: Soft and supple, No LAD, No JVD  Respiratory: Breath sounds are diminished but clear bilaterally, No wheezing, rales or rhonchi  Cardiovascular: S1 and S2, irregular rate and rhythm, soft KENNY at LLSB and base as before, no gallops or rubs  Gastrointestinal: Bowel Sounds present, soft, nontender, nondistended, no guarding, no rebound  Extremities: No peripheral edema  Vascular: 2+ peripheral pulses  Neurological: no focal deficits      MEDICATIONS  (STANDING):  ALPRAZolam 0.25 milliGRAM(s) Oral two times a day  amiodarone    Tablet 200 milliGRAM(s) Oral two times a day  artificial  tears Solution 1 Drop(s) Both EYES two times a day  docusate sodium 100 milliGRAM(s) Oral three times a day  furosemide    Tablet 20 milliGRAM(s) Oral daily  gabapentin 200 milliGRAM(s) Oral two times a day  ipratropium    for Nebulization 500 MICROGram(s) Nebulizer every 6 hours  lactobacillus acidophilus 1 Tablet(s) Oral daily  levothyroxine 100 MICROGram(s) Oral daily  Lotemax ointment 1 Application(s) 1 Application(s) Both EYES daily  melatonin 3 milliGRAM(s) Oral at bedtime  metoprolol tartrate 12.5 milliGRAM(s) Oral two times a day  potassium chloride    Tablet ER 20 milliEquivalent(s) Oral daily  sodium chloride 0.9%. 250 milliLiter(s) (75 mL/Hr) IV Continuous <Continuous>          LABS: All Labs Reviewed:                        11.3   4.96  )-----------( 93       ( 27 Oct 2018 06:12 )             36.1                           12.9   5.72  )-----------( 117      ( 22 Oct 2018 17:32 )             40.9     10-27    144  |  104  |  37<H>  ----------------------------<  99  3.8   |  34<H>  |  1.88<H>    10-26    146<H>  |  106  |  33<H>  ----------------------------<  89  3.7   |  36<H>  |  1.77<H>    10-25    143  |  104  |  30<H>  ----------------------------<  94  3.6   |  35<H>  |  1.80<H>    10-23    147<H>  |  105  |  32<H>  ----------------------------<  98  3.4<L>   |  36<H>  |  2.02<H>    10-22    144  |  105  |  34<H>  ----------------------------<  124<H>  3.7   |  32<H>  |  2.17<H>    Ca    9.1      22 Oct 2018 17:32  Ca    9.0      23 Oct 2018 07:06  Ca    8.4      25 Oct 2018 06:23  Ca    9.3      26 Oct 2018 06:14  Ca    9.0      27 Oct 2018 06:12    Phos  2.7     10-25  Mg     2.2     10-25    TPro  6.1  /  Alb  3.4  /  TBili  0.8  /  DBili  x   /  AST  22  /  ALT  20  /  AlkPhos  68  10-22    PT/INR - ( 28 Oct 2018 06:09 )   PT: 18.0 sec;   INR: 1.65 ratio    PT/INR - ( 27 Oct 2018 06:12 )   PT: 20.9 sec;   INR: 1.91 ratio    PT/INR - ( 22 Oct 2018 17:32 )   PT: 20.0 sec;   INR: 1.83 ratio      PTT - ( 22 Oct 2018 17:32 )  PTT:34.1 sec      Serum Pro-Brain Natriuretic Peptide: 55785 pg/mL (10-24 @ 06:37)  Serum Pro-Brain Natriuretic Peptide: 09153 pg/mL (10-22 @ 17:32)    BLOOD CULTURES: Organism --  Gram Stain Blood -- Gram Stain --  Specimen Source .Blood None  Culture-Blood --Culture Results:   No growth to date. (10.22.18 @ 19:38)      CARDIAC MARKERS ( 23 Oct 2018 00:21 )  .030 ng/mL / x     / x     / x     / x      CARDIAC MARKERS ( 22 Oct 2018 21:45 )  0.032 ng/mL / x     / x     / x     / x      CARDIAC MARKERS ( 22 Oct 2018 17:32 )  0.025 ng/mL / x     / x     / x     / x          RADIOLOGY:  CT of Chest w/o contrast: 10/23/18  IMPRESSION:  The findings are suggestive of multifocal pneumonia in the right lung,   for which clinical correlation is recommended. Interval resolution of the   left pleural effusion.  Extensive lymphadenopathies in the chest and upper abdomen are again,   noted unchanged.  Other findings as above.      EKG: AFlutter with varible block, RBBB, LAFB    Telemetry: Highs now in the 120's    ECHO:Transthoracic Echocardiogram: 6/26/18   Moderate (2+) mitral regurgitation is present.   Mild mitral annular calcification is present.   Significant fibrocalcific changes noted to the aortic valve leaflets with restriction in leaflet excursion. Peak transaortic gradient is 45 mmHg; this finding is consistent with mild to moderate aortic stenosis.   Trace aortic regurgitation is present.   Mild to moderate tricuspid valve regurgitation is present.   Normal appearing pulmonic valve structure and function.   The left atrium is mildly dilated.   The left ventricle is normal in size, wall motion and contractility.   Mild concentric left ventricular hypertrophy is present.   Estimated left ventricular ejection fraction is 55-60 %.   A PFO is noted with color doppler.   Normal appearing right ventricle structure and function.   All visualized extra cardiac structures appears to be normal.   No evidence of pericardial effusion.     Signature   ----------------------------------------------------------------   Electronically signed by Sina Trent MD(Interpreting   physician) on 06/27/2018 07:41 AM   ----------------------------------------------------------------

## 2018-10-30 NOTE — PROGRESS NOTE ADULT - SUBJECTIVE AND OBJECTIVE BOX
PCP: PCP/Cardio- Dr Joshi    Chief Complaint: Patient is a 89y old  Male who presents with a chief complaint of CHF Exacerbation w/ possible hcap (23 Oct 2018 06:42)    HPI:  89 year old male with pmh of anxiety, basal cell carcinoma, bph, ckd, htn, hypothyroid, lymphoma, postherpetic neuralgia, prostate ca coming to  ED with complaints of worsening shortness of breath x 3 days.    Pt is SOB with lying supine.   was seen in Dr Joshi office on Friday and was given lasix 40mg iv push however sob improved mildly.   Labs were checked out,   patient and as patient sob not markedly improved and patient found to have neno on ckd was sent to hospital for further eval. Patient also stating has been having a decreased po intake for the past week.  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    was given azithromycin and another abx about two weeks ago for cough as he had a suspected pna.   States the abx he does not know name of gave him diarrhea however that resolved.   Patient was also admitted 1 month prior for chf exacerbation. Denies fevers, chills, chest pain. (22 Oct 2018 21:23)    10/23:  Above Reviewed. dyspnea improving. No other complaints  10/24: Daughter at bedside; still has dyspnea; discussed advance directives with patient and daughter; It seems like hes leaning toward DNR/DNI but would like to speak with his family; Tele still in flutter; also found to have low BP this morning   10/25 - pt is still wheezing but is feeling less SOB.  10/26 - pt still feeling SOB and tired.  rapid afib HR 150s on tele   10/27 - pt feeling better today, still tired but less SOB. pt has episodes of a flutter through night  10/28 - pt had episodes of flutter in the morning and was given 10mg of cardizem. No CP or SOB this morning.  10/29 - pt had heart rates going into 140's during PT consult this morning. No CP, SOB, or abdominal pain.  10/30 - pt feeling good this morning. No CP, SOB, or abdominal pain.    ROS:   All 10 systems reviewed and found to be negative with the exception of what has been described above.    Vital Signs Last 24 Hrs  T(C): 36.7 (30 Oct 2018 09:33), Max: 37.2 (29 Oct 2018 17:47)  T(F): 98 (30 Oct 2018 09:33), Max: 98.9 (29 Oct 2018 17:47)  HR: 88 (30 Oct 2018 11:23) (87 - 140)  BP: 106/66 (30 Oct 2018 11:23) (104/83 - 117/91)  BP(mean): --  RR: 18 (30 Oct 2018 09:33) (16 - 18)  SpO2: 99% (30 Oct 2018 09:33) (91% - 99%)    GEN: lying in bed, NAD  HEENT:   NC/AT, pupils equal and reactive, EOMI  CV:  +S1, +S2, RRR  RESP:   lungs clear to auscultation bilaterally, no wheeze, rales, rhonchi   BREAST:  not examined  GI:  abdomen soft, non-tender, non-distended, normoactive BS  RECTAL:  not examined  :  not examined  MSK:   normal muscle tone  EXT:  no edema  NEURO:  AAOX3, no focal neurological deficits  SKIN:  no rashes                        11.5   5.77  )-----------( 127      ( 29 Oct 2018 06:05 )             37.2     10-30    147<H>  |  108  |  47<H>  ----------------------------<  97  3.9   |  33<H>  |  2.33<H>    Ca    9.6      30 Oct 2018 05:37  Mg     2.3     10-29    PT/INR - ( 30 Oct 2018 05:37 )   PT: 25.0 sec;   INR: 2.28 ratio          < from: Xray Chest 1 View AP/PA. (10.22.18 @ 18:32) >    IMPRESSION:  Findings are likely indicative of mild pneumonia involving the right lung   for which clinical correlation is recommended.    < end of copied text >  < from: CT Chest No Cont (10.23.18 @ 08:44) >    IMPRESSION:  The findings are suggestive of multifocal pneumonia in the right lung,   for which clinical correlation is recommended. Interval resolution of the   left pleural effusion.  Extensive lymphadenopathies in the chest and upper abdomen are again,   noted unchanged.  Other findings as above.    < end of copied text >    Culture - Blood (10.22.18 @ 19:38)    Specimen Source: .Blood None    Culture Results:   No growth to date.    Culture - Urine (06.07.18 @ 20:40)    Specimen Source: .Urine None    Culture Results:   No growth    < from: Transthoracic Echocardiogram (10.24.18 @ 09:06) >  Summary     Fibrocalcific changes noted to the mitral valve leaflets with preserved   leaflet excursion.   Mild mitral annular calcification is present.   Moderate (2+) mitral regurgitation is present.   Significant fibrocalcific changes noted to the aortic valve leaflets with   restriction in leaflet excursion. Peak transaortic gradient is 48 mmHg;   this finding is consistent with moderate aortic stenosis.   DI = .2   Continuity equation AGUSTIN is .59 cm which likely overestimates the degree   of   aortic stenosis   Mild to Moderate Tricuspid regurgitation is present. Mild pulmonary   hypertension.   Mild concentric left ventricular hypertrophy is present.   Estimated left ventricular ejection fraction is 55-60 %.   The right atrium appears moderately dilated.   Normal appearing right ventricle structure andfunction.    < end of copied text > PCP: PCP/Cardio- Dr Joshi    Chief Complaint: Patient is a 89y old  Male who presents with a chief complaint of CHF Exacerbation w/ possible hcap (23 Oct 2018 06:42)    HPI:  89 year old male with pmh of anxiety, basal cell carcinoma, bph, ckd, htn, hypothyroid, lymphoma, postherpetic neuralgia, prostate ca coming to  ED with complaints of worsening shortness of breath x 3 days.    Pt is SOB with lying supine.   was seen in Dr Joshi office on Friday and was given lasix 40mg iv push however sob improved mildly.   Labs were checked out,   patient and as patient sob not markedly improved and patient found to have neno on ckd was sent to hospital for further eval. Patient also stating has been having a decreased po intake for the past week.  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    was given azithromycin and another abx about two weeks ago for cough as he had a suspected pna.   States the abx he does not know name of gave him diarrhea however that resolved.   Patient was also admitted 1 month prior for chf exacerbation. Denies fevers, chills, chest pain. (22 Oct 2018 21:23)    10/23:  Above Reviewed. dyspnea improving. No other complaints  10/24: Daughter at bedside; still has dyspnea; discussed advance directives with patient and daughter; It seems like hes leaning toward DNR/DNI but would like to speak with his family; Tele still in flutter; also found to have low BP this morning   10/25 - pt is still wheezing but is feeling less SOB.  10/26 - pt still feeling SOB and tired.  rapid afib HR 150s on tele   10/27 - pt feeling better today, still tired but less SOB. pt has episodes of a flutter through night  10/28 - pt had episodes of flutter in the morning and was given 10mg of cardizem. No CP or SOB this morning.  10/29 - pt had heart rates going into 140's during PT consult this morning. No CP, SOB, or abdominal pain.  10/30 - pt feeling good this morning. No CP, SOB, or abdominal pain.daughter updated at bedside     ROS:   All 10 systems reviewed and found to be negative with the exception of what has been described above.    Vital Signs Last 24 Hrs  T(C): 36.7 (30 Oct 2018 09:33), Max: 37.2 (29 Oct 2018 17:47)  T(F): 98 (30 Oct 2018 09:33), Max: 98.9 (29 Oct 2018 17:47)  HR: 88 (30 Oct 2018 11:23) (87 - 140)  BP: 106/66 (30 Oct 2018 11:23) (104/83 - 117/91)  BP(mean): --  RR: 18 (30 Oct 2018 09:33) (16 - 18)  SpO2: 99% (30 Oct 2018 09:33) (91% - 99%)    GEN: lying in bed, NAD  HEENT:   NC/AT, pupils equal and reactive, EOMI  CV:  +S1, +S2, RRR  RESP:   lungs clear to auscultation bilaterally, no wheeze, rales, rhonchi   BREAST:  not examined  GI:  abdomen soft, non-tender, non-distended, normoactive BS  RECTAL:  not examined  :  not examined  MSK:   normal muscle tone  EXT:  no edema  NEURO:  AAOX3, no focal neurological deficits  SKIN:  no rashes                        11.5   5.77  )-----------( 127      ( 29 Oct 2018 06:05 )             37.2     10-30    147<H>  |  108  |  47<H>  ----------------------------<  97  3.9   |  33<H>  |  2.33<H>    Ca    9.6      30 Oct 2018 05:37  Mg     2.3     10-29    PT/INR - ( 30 Oct 2018 05:37 )   PT: 25.0 sec;   INR: 2.28 ratio          < from: Xray Chest 1 View AP/PA. (10.22.18 @ 18:32) >    IMPRESSION:  Findings are likely indicative of mild pneumonia involving the right lung   for which clinical correlation is recommended.    < end of copied text >  < from: CT Chest No Cont (10.23.18 @ 08:44) >    IMPRESSION:  The findings are suggestive of multifocal pneumonia in the right lung,   for which clinical correlation is recommended. Interval resolution of the   left pleural effusion.  Extensive lymphadenopathies in the chest and upper abdomen are again,   noted unchanged.  Other findings as above.    < end of copied text >    Culture - Blood (10.22.18 @ 19:38)    Specimen Source: .Blood None    Culture Results:   No growth to date.    Culture - Urine (06.07.18 @ 20:40)    Specimen Source: .Urine None    Culture Results:   No growth    < from: Transthoracic Echocardiogram (10.24.18 @ 09:06) >  Summary     Fibrocalcific changes noted to the mitral valve leaflets with preserved   leaflet excursion.   Mild mitral annular calcification is present.   Moderate (2+) mitral regurgitation is present.   Significant fibrocalcific changes noted to the aortic valve leaflets with   restriction in leaflet excursion. Peak transaortic gradient is 48 mmHg;   this finding is consistent with moderate aortic stenosis.   DI = .2   Continuity equation AGUSTIN is .59 cm which likely overestimates the degree   of   aortic stenosis   Mild to Moderate Tricuspid regurgitation is present. Mild pulmonary   hypertension.   Mild concentric left ventricular hypertrophy is present.   Estimated left ventricular ejection fraction is 55-60 %.   The right atrium appears moderately dilated.   Normal appearing right ventricle structure andfunction.    < end of copied text >    MEDICATIONS  (STANDING):  ALPRAZolam 0.25 milliGRAM(s) Oral two times a day  amiodarone    Tablet 200 milliGRAM(s) Oral two times a day  artificial  tears Solution 1 Drop(s) Both EYES two times a day  docusate sodium 100 milliGRAM(s) Oral three times a day  furosemide    Tablet 20 milliGRAM(s) Oral daily  gabapentin 200 milliGRAM(s) Oral two times a day  ipratropium    for Nebulization 500 MICROGram(s) Nebulizer every 6 hours  lactobacillus acidophilus 1 Tablet(s) Oral daily  levothyroxine 100 MICROGram(s) Oral daily  Lotemax ointment 1 Application(s) 1 Application(s) Both EYES daily  melatonin 3 milliGRAM(s) Oral at bedtime  metoprolol tartrate 12.5 milliGRAM(s) Oral two times a day  potassium chloride    Tablet ER 20 milliEquivalent(s) Oral daily    MEDICATIONS  (PRN):  acetaminophen   Tablet .. 650 milliGRAM(s) Oral every 6 hours PRN Temp greater or equal to 38C (100.4F), Mild Pain (1 - 3), Moderate Pain (4 - 6)  diltiazem Injectable 10 milliGRAM(s) IV Push every 6 hours PRN for HR > 100 bpm  senna 2 Tablet(s) Oral at bedtime PRN Constipation

## 2018-10-30 NOTE — PROGRESS NOTE ADULT - SUBJECTIVE AND OBJECTIVE BOX
SUBJECTIVE     Patient was seen in the [facility] and still in afib with the rate over all better controlled   He offers no new symptoms of sob   receiving saline with the raised cr   mild occasional wheeze noted . patient completed antibiotic therapy     PAST MEDICAL & SURGICAL HISTORY:  CKD (chronic kidney disease)  BPH (benign prostatic hyperplasia)  Postherpetic neuralgia  Chronic diarrhea  Prostate CA  Basal cell carcinoma  Lymphoma  Anxiety  Hypothyroid  Hypertension  A-fib  H/O shoulder replacement  S/P bilateral unicompartmental knee replacement    OBJECTIVE   Vital Signs Last 24 Hrs  T(C): 36.7 (30 Oct 2018 09:33), Max: 37.2 (29 Oct 2018 17:47)  T(F): 98 (30 Oct 2018 09:33), Max: 98.9 (29 Oct 2018 17:47)  HR: 105 (30 Oct 2018 16:29) (87 - 140)  BP: 112/82 (30 Oct 2018 16:29) (106/66 - 117/91)  BP(mean): --  RR: 18 (30 Oct 2018 16:29) (18 - 18)  SpO2: 94% (30 Oct 2018 16:29) (92% - 99%)    PHYSICAL EXAM:  Constitutional: , awake and alert, not in distress on 4 lit nasal canula   HEENT: Normo cephalic atraumatic  Neck: Soft and supple, No J.V.D   Respiratory: vesicular breathing with the mild wheeze with predominantly coming from the upper air way with the rales over the bases   Cardiovascular: S1 and S2, iregular rate .   Gastrointestinal:  soft, nontender,   Extremities: No  edema or calf tenderness .  Neurological: No new  focal deficits.    MEDICATIONS  (STANDING):  ALPRAZolam 0.25 milliGRAM(s) Oral two times a day  amiodarone    Tablet 200 milliGRAM(s) Oral two times a day  artificial  tears Solution 1 Drop(s) Both EYES two times a day  docusate sodium 100 milliGRAM(s) Oral three times a day  furosemide    Tablet 20 milliGRAM(s) Oral daily  gabapentin 200 milliGRAM(s) Oral two times a day  ipratropium    for Nebulization 500 MICROGram(s) Nebulizer every 6 hours  lactobacillus acidophilus 1 Tablet(s) Oral daily  levothyroxine 100 MICROGram(s) Oral daily  Lotemax ointment 1 Application(s) 1 Application(s) Both EYES daily  melatonin 3 milliGRAM(s) Oral at bedtime  metoprolol tartrate 12.5 milliGRAM(s) Oral two times a day  potassium chloride    Tablet ER 20 milliEquivalent(s) Oral daily                            11.5   5.77  )-----------( 127      ( 29 Oct 2018 06:05 )             37.2     10-30    147<H>  |  108  |  47<H>  ----------------------------<  97  3.9   |  33<H>  |  2.33<H>    Ca    9.6      30 Oct 2018 05:37  Mg     2.3     10-29

## 2018-10-30 NOTE — PROGRESS NOTE ADULT - ASSESSMENT
- multifocal infiltrates with nodular and peribronchial likely infectious in etiology patient completed antibiotic therapy   - chf with small effusions with the mild wheezing noted rule out any worsening   - afib with the rate better controlled   - lymphoma with the mediastinal adenopathy   - acute on chronic ckd receiving fluids     PLAN     - management of afib  as per the cardiology started on amiodarone and would need to have baseline pft and monitor for the symptoms of toxicity   - follow up of lymphoma as per the oncology as an out patient .  - will repeat cxr with the wheezing noted decrease the fi02 2 lit by the nasal canula while monitoring sat

## 2018-10-30 NOTE — PHARMACOTHERAPY INTERVENTION NOTE - COMMENTS
Gabapentin reduced per renal function on 10/26/18  Ocular nerve pain well controlled as per patient  Counseled patient on medication changes throughout the hospital course including Gabapentin dose and frequency and discontinuation of Alfuzosin  No other renal adjustments needed at this time

## 2018-10-30 NOTE — PROGRESS NOTE ADULT - ASSESSMENT
89 year old male as above     #Dyspnea  #Multifocal PNA (HCAP)  #acute on chronic diastolic congestive heart failure - lvef 06/2018 55-60%  - will continue lasix 20 mg daily now that BP is improved as per cardio recs   - continue tele given ky of rapid afib   - bnp elevated >19k trending down  - trop x 3 negative   - CT chest Multifocal PNA  - RVP Neg  - S/P Vanco and zosyn ahanged to po ceftin til 10/29   - not on acei due to resolving LETICIA  - ID consult appreciated   - Blood cultures negative  - cont atrovent PRN  - check saturation on room air with ambulation  - cardio note appreciated - optimized for discharge to rehab  - cont amiodarone load for better rate control of Afib/Aflutter - rate not controlled   - pulm note appreciated    #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 case d/w dr chowdary and will start him on amio 200 mg bid and plan to change to amio 200 mg daily by 10/31  - pt aware to f/u with dr corral for outpt PFTs  - monitor inr- currently therapeutic  - continue coumadin, stop toprol xl  - cont amio load    #anxiety- stable  - continue xanax bid prn    #hypothyroidism- stable   - continue levothyroxine     #post herpetic neuralgia- stable  - continue home meds including gabapentin  - refresh tears  - advised daughter to bring in ointment as not in stock at     #bph- stable   - continue alfuzosin - non formulary    # r/o basal cell carcinoma of scalp - follows up with dr jain and had bx 5 days before admission  - outpt f/u     #dvt prophylaxis   - on coumadin for afib    Discussion with patient and family regarding advance directives again yesterday. pt is now DNR/DNI 89 year old male as above     #Dyspnea/#Multifocal PNA (HCAP) - resolved. completed abx tx    #acute on chronic diastolic congestive heart failure - lvef 06/2018 55-60%  - will continue lasix 20 mg daily now that BP is improved as per cardio/renal recs   - continue BB. no ACEI due to renal dysfunction  - pulm input apprecaited    #neno on ckd 3-4/hypernatremia - worsening  - case d/w renal and encourage po intake  - okay to continue lasix 20 mg for now   - hold discharge for raise in cr from 2.14 to 2.33 and check BMP in AM   - off IVF for now    #afib with RVR/Alfutter - better rate controlled.   - case d/w dr michel and continue amio 200 mg bid til 10/31 then 200 mg dialy  - resumed lopressor for better rate control  - continue coumadin with goal INR 2-3    #anxiety- stable  - continue xanax bid prn    #hypothyroidism- stable   - continue levothyroxine     #post herpetic neuralgia- stable  - continue home meds including gabapentin  - refresh tears  - advised daughter to bring in ointment as not in stock at     #bph- stable   - continue alfuzosin - non formulary    # r/o basal cell carcinoma of scalp - follows up with dr jain and had bx 5 days before admission  - outpt f/u     #dvt prophylaxis   - on coumadin for afib    Discussion with patient and family regarding advance directives again yesterday. pt is now DNR/DNI  dispo - rehab tomorrow if cr improves

## 2018-10-31 LAB
ANION GAP SERPL CALC-SCNC: 3 MMOL/L — LOW (ref 5–17)
BUN SERPL-MCNC: 46 MG/DL — HIGH (ref 7–23)
CALCIUM SERPL-MCNC: 9.3 MG/DL — SIGNIFICANT CHANGE UP (ref 8.5–10.1)
CHLORIDE SERPL-SCNC: 105 MMOL/L — SIGNIFICANT CHANGE UP (ref 96–108)
CO2 SERPL-SCNC: 35 MMOL/L — HIGH (ref 22–31)
CREAT SERPL-MCNC: 2.39 MG/DL — HIGH (ref 0.5–1.3)
GLUCOSE SERPL-MCNC: 86 MG/DL — SIGNIFICANT CHANGE UP (ref 70–99)
INR BLD: 2.41 RATIO — HIGH (ref 0.88–1.16)
MAGNESIUM SERPL-MCNC: 2.2 MG/DL — SIGNIFICANT CHANGE UP (ref 1.6–2.6)
POTASSIUM SERPL-MCNC: 3.9 MMOL/L — SIGNIFICANT CHANGE UP (ref 3.5–5.3)
POTASSIUM SERPL-SCNC: 3.9 MMOL/L — SIGNIFICANT CHANGE UP (ref 3.5–5.3)
PROTHROM AB SERPL-ACNC: 27.5 SEC — HIGH (ref 10–12.9)
SODIUM SERPL-SCNC: 143 MMOL/L — SIGNIFICANT CHANGE UP (ref 135–145)

## 2018-10-31 PROCEDURE — 71045 X-RAY EXAM CHEST 1 VIEW: CPT | Mod: 26

## 2018-10-31 RX ORDER — WARFARIN SODIUM 2.5 MG/1
5 TABLET ORAL ONCE
Qty: 0 | Refills: 0 | Status: COMPLETED | OUTPATIENT
Start: 2018-10-31 | End: 2018-10-31

## 2018-10-31 RX ORDER — GABAPENTIN 400 MG/1
200 CAPSULE ORAL DAILY
Qty: 0 | Refills: 0 | Status: DISCONTINUED | OUTPATIENT
Start: 2018-10-31 | End: 2018-11-01

## 2018-10-31 RX ORDER — AMIODARONE HYDROCHLORIDE 400 MG/1
200 TABLET ORAL DAILY
Qty: 0 | Refills: 0 | Status: DISCONTINUED | OUTPATIENT
Start: 2018-10-31 | End: 2018-11-01

## 2018-10-31 RX ADMIN — Medication 1 DROP(S): at 05:38

## 2018-10-31 RX ADMIN — Medication 3 MILLIGRAM(S): at 21:40

## 2018-10-31 RX ADMIN — Medication 500 MICROGRAM(S): at 01:54

## 2018-10-31 RX ADMIN — Medication 500 MICROGRAM(S): at 19:39

## 2018-10-31 RX ADMIN — Medication 1 TABLET(S): at 12:55

## 2018-10-31 RX ADMIN — Medication 100 MILLIGRAM(S): at 14:22

## 2018-10-31 RX ADMIN — Medication 25 MILLIGRAM(S): at 17:29

## 2018-10-31 RX ADMIN — GABAPENTIN 200 MILLIGRAM(S): 400 CAPSULE ORAL at 05:36

## 2018-10-31 RX ADMIN — Medication 100 MILLIGRAM(S): at 21:41

## 2018-10-31 RX ADMIN — Medication 500 MICROGRAM(S): at 08:10

## 2018-10-31 RX ADMIN — Medication 500 MICROGRAM(S): at 15:23

## 2018-10-31 RX ADMIN — Medication 100 MICROGRAM(S): at 05:37

## 2018-10-31 RX ADMIN — Medication 0.25 MILLIGRAM(S): at 05:36

## 2018-10-31 RX ADMIN — Medication 12.5 MILLIGRAM(S): at 05:37

## 2018-10-31 RX ADMIN — Medication 100 MILLIGRAM(S): at 05:36

## 2018-10-31 RX ADMIN — GABAPENTIN 200 MILLIGRAM(S): 400 CAPSULE ORAL at 17:30

## 2018-10-31 RX ADMIN — Medication 20 MILLIEQUIVALENT(S): at 12:55

## 2018-10-31 RX ADMIN — WARFARIN SODIUM 5 MILLIGRAM(S): 2.5 TABLET ORAL at 21:40

## 2018-10-31 RX ADMIN — AMIODARONE HYDROCHLORIDE 200 MILLIGRAM(S): 400 TABLET ORAL at 05:36

## 2018-10-31 RX ADMIN — Medication 0.25 MILLIGRAM(S): at 17:29

## 2018-10-31 RX ADMIN — Medication 1 DROP(S): at 17:29

## 2018-10-31 RX ADMIN — AMIODARONE HYDROCHLORIDE 200 MILLIGRAM(S): 400 TABLET ORAL at 14:22

## 2018-10-31 NOTE — PROGRESS NOTE ADULT - SUBJECTIVE AND OBJECTIVE BOX
NEPHROLOGY INTERVAL HPI/OVERNIGHT EVENTS:    Date of Service: 10-31-18 @ 11:21  10/31 SY  Sitting up in chair appearing fairly comfortable  No apparent distress.  No new complaints.    10/30  getting NS @ 75 ml/hr  no new complaints feels well  in bed no sob  no chest pain    10/29 SY  Laying in bed appearing fairly comfortable  No acute distress.    10/28  Chest x-ray from 10/27 without worsening.  Right hilar infiltrates and bilateral small pleural effusions  Patient doing the incentive spirometer as prescribed  The patient's daughter is at the bedside questions answered    10/27  Patient somewhat feels tired today  his daughter is at the bedside  Questions answered  Has wheezing today we will order an x-ray  Incentive spirometry ordered    HPI:  89 yo man with PMHX significant for CHF, HTN, Hx of Lymphoma treated with Rituxan.  Notable for HH admission in 2018 for CHF and LETICIA.   Creat stabilized to 1.7 prior to d/c.  Developed sob for several days.  Given IV lasix by Dr. Joshiwith mild improvement in symptoms.  Pt admitted due to worsened renal function with persistent SOB.  CT chest now c/w multifocal PNA.  Renal evaluation requested for LETICIA superimposed on CKD.  Pt denies any fever at home.    PMHX and PSHX.  1.CKD ( 2015--Creat 1.63), Post recent hosp for CHF.  Creat 1.7 on d/c 2018.  2.A FIB  3.HTN  4.Hx of Prostate CA ( Post RT)  5.Hx of Lymphoma Post Rituxan therapy.  6.Hx of Postherpetic Shingles.  7.Hypothyroidism  8.Hx of Chronic Diarrhea.  9.Recent admission for CHF.      MEDICATIONS  (STANDING):  ALPRAZolam 0.25 milliGRAM(s) Oral two times a day  amiodarone    Tablet 200 milliGRAM(s) Oral daily  artificial  tears Solution 1 Drop(s) Both EYES two times a day  docusate sodium 100 milliGRAM(s) Oral three times a day  gabapentin 200 milliGRAM(s) Oral two times a day  ipratropium    for Nebulization 500 MICROGram(s) Nebulizer every 6 hours  lactobacillus acidophilus 1 Tablet(s) Oral daily  levothyroxine 100 MICROGram(s) Oral daily  Lotemax ointment 1 Application(s) 1 Application(s) Both EYES daily  melatonin 3 milliGRAM(s) Oral at bedtime  metoprolol tartrate 25 milliGRAM(s) Oral two times a day  potassium chloride    Tablet ER 20 milliEquivalent(s) Oral daily    MEDICATIONS  (PRN):  acetaminophen   Tablet .. 650 milliGRAM(s) Oral every 6 hours PRN Temp greater or equal to 38C (100.4F), Mild Pain (1 - 3), Moderate Pain (4 - 6)  diltiazem Injectable 10 milliGRAM(s) IV Push every 6 hours PRN for HR > 120 bpm  senna 2 Tablet(s) Oral at bedtime PRN Constipation          Vital Signs Last 24 Hrs  T(C): 36.5 (31 Oct 2018 10:04), Max: 36.5 (31 Oct 2018 10:04)  T(F): 97.7 (31 Oct 2018 10:04), Max: 97.7 (31 Oct 2018 10:04)  HR: 105 (31 Oct 2018 10:04) (55 - 140)  BP: 93/70 (31 Oct 2018 10:04) (93/70 - 122/67)  BP(mean): --  RR: 18 (31 Oct 2018 10:04) (18 - 18)  SpO2: 94% (31 Oct 2018 10:04) (90% - 95%)  Daily     Daily Weight in k (31 Oct 2018 10:04)    10-30 @ 07:01  -  10-31 @ 07:00  --------------------------------------------------------  IN: 0 mL / OUT: 125 mL / NET: -125 mL        PHYSICAL EXAM:  Alert and appropriate  GENERAL: no distress  CHEST/LUNG: no significant change in Right diffuse rhonchi  HEART: S1S2 RRR  ABDOMEN: soft  EXTREMITIES: no edema  SKIN:     LABS:    10-31    143  |  105  |  46<H>  ----------------------------<  86  3.9   |  35<H>  |  2.39<H>    Ca    9.3      31 Oct 2018 05:30  Mg     2.2     10-31      PT/INR - ( 31 Oct 2018 05:30 )   PT: 27.5 sec;   INR: 2.41 ratio             Magnesium, Serum: 2.2 mg/dL (10-31 @ 05:30)          RADIOLOGY & ADDITIONAL TESTS:

## 2018-10-31 NOTE — PROGRESS NOTE ADULT - SUBJECTIVE AND OBJECTIVE BOX
CHIEF COMPLAINT: Patient is a 89y old  Male who presents with a chief complaint of CHF Exacerbation w/ possible hcap (22 Oct 2018 21:23)      HPI:  89 year old male with pmh of anxiety, basal cell carcinoma, bph, ckd, htn, hypothyroid, lymphoma, postherpetic neuralgia, prostate ca coming to  ED with complaints of worsening shortness of breath x 3 days.    Pt is SOB with lying supine.   was seen in Dr Joshi office on Friday and was given lasix 40mg iv push however sob improved mildly.   Labs were checked out,   patient and as patient sob not markedly improved and patient found to have neno on ckd was sent to hospital for further eval. Patient also stating has been having a decreased po intake for the past week.  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    was given azithromycin and another abx about two weeks ago for cough as he had a suspected pna.   States the abx he does not know name of gave him diarrhea however that resolved.   Patient was also admitted 1 month prior for chf exacerbation. Denies fevers, chills, chest pain. (22 Oct 2018 21:23)    10/26/18:  Patient is back on furosemide 40 per day    10/27/18:  Had slightly more rapid AFib/flutter yesterday treated with extra meds, now with better rate control.  No anginal chest pains or increased SOB.  Having a resp Rx during the exam with reduced BS but no wheezing or increased rales.  Tolerating current meds and treatments so far.    10/28/18:  AFib/Flutter rate labile and occasionally up to 140-150's but with increased meds develops hypotension but rate increases when Metoprolol dose reduced.  Will try Amiodarone for rate control.    10/30/18:  Patient was started on metoprolol 12.5 bid in addition to the amiodarone 200 bid    10/31/18:  Patient's discharge was held due to a rise in his creatinine.        PMHx:  PAST MEDICAL & SURGICAL HISTORY:  CKD (chronic kidney disease)  BPH (benign prostatic hyperplasia)  Postherpetic neuralgia  Chronic diarrhea  Prostate CA  Basal cell carcinoma  Lymphoma  Anxiety  Hypothyroid  Hypertension  A-fib  H/O shoulder replacement  S/P bilateral unicompartmental knee replacement      FAMILY HISTORY:   FAMILY HISTORY:  Family history of ischemic heart disease (Father, Mother)      ALLERGIES:  Allergies  No Known Allergies      REVIEW OF SYSTEMS:    CONSTITUTIONAL: No fevers or chills  EYES/ENT: No visual changes;  No vertigo or throat pain   NECK: No pain or stiffness  RESPIRATORY: No hemoptysis;  CARDIOVASCULAR: No chest pain  GASTROINTESTINAL: No abdominal or epigastric pain. No nausea, vomiting, or hematemesis; No diarrhea or constipation. No melena or hematochezia.  GENITOURINARY: No dysuria, frequency or hematuria  NEUROLOGICAL: No numbness   SKIN: No itching, burning, rashes, or lesions   All other review of systems is negative unless indicated above    ICU Vital Signs Last 24 Hrs  T(C): 36.3 (31 Oct 2018 04:47), Max: 36.7 (30 Oct 2018 09:33)  T(F): 97.3 (31 Oct 2018 04:47), Max: 98 (30 Oct 2018 09:33)  HR: 98 (31 Oct 2018 05:34) (55 - 140)  BP: 122/67 (31 Oct 2018 04:47) (106/66 - 122/67)  BP(mean): --  ABP: --  ABP(mean): --  RR: 18 (31 Oct 2018 04:47) (18 - 18)  SpO2: 95% (31 Oct 2018 04:47) (90% - 99%)            PHYSICAL EXAM:   Constitutional: NAD, awake and alert, well-developed  HEENT: PERR, EOMI, Normal Hearing, MMM  Neck: Soft and supple, No LAD, No JVD  Respiratory: Breath sounds are diminished but clear bilaterally, No wheezing, rales or rhonchi  Cardiovascular: S1 and S2, irregular rate and rhythm, soft KENNY at LLSB and base as before, no gallops or rubs  Gastrointestinal: Bowel Sounds present, soft, nontender, nondistended, no guarding, no rebound  Extremities: No peripheral edema  Vascular: 2+ peripheral pulses  Neurological: no focal deficits    MEDICATIONS  (STANDING):  ALPRAZolam 0.25 milliGRAM(s) Oral two times a day  amiodarone    Tablet 200 milliGRAM(s) Oral two times a day  artificial  tears Solution 1 Drop(s) Both EYES two times a day  docusate sodium 100 milliGRAM(s) Oral three times a day  furosemide    Tablet 20 milliGRAM(s) Oral daily  gabapentin 200 milliGRAM(s) Oral two times a day  ipratropium    for Nebulization 500 MICROGram(s) Nebulizer every 6 hours  lactobacillus acidophilus 1 Tablet(s) Oral daily  levothyroxine 100 MICROGram(s) Oral daily  Lotemax ointment 1 Application(s) 1 Application(s) Both EYES daily  melatonin 3 milliGRAM(s) Oral at bedtime  metoprolol tartrate 12.5 milliGRAM(s) Oral two times a day  potassium chloride    Tablet ER 20 milliEquivalent(s) Oral daily            LABS: All Labs Reviewed:                        11.3   4.96  )-----------( 93       ( 27 Oct 2018 06:12 )             36.1                           12.9   5.72  )-----------( 117      ( 22 Oct 2018 17:32 )             40.9     10-27    144  |  104  |  37<H>  ----------------------------<  99  3.8   |  34<H>  |  1.88<H>    10-26    146<H>  |  106  |  33<H>  ----------------------------<  89  3.7   |  36<H>  |  1.77<H>    10-25    143  |  104  |  30<H>  ----------------------------<  94  3.6   |  35<H>  |  1.80<H>    10-23    147<H>  |  105  |  32<H>  ----------------------------<  98  3.4<L>   |  36<H>  |  2.02<H>    10-22    144  |  105  |  34<H>  ----------------------------<  124<H>  3.7   |  32<H>  |  2.17<H>    Ca    9.1      22 Oct 2018 17:32  Ca    9.0      23 Oct 2018 07:06  Ca    8.4      25 Oct 2018 06:23  Ca    9.3      26 Oct 2018 06:14  Ca    9.0      27 Oct 2018 06:12    Phos  2.7     10-25  Mg     2.2     10-25    TPro  6.1  /  Alb  3.4  /  TBili  0.8  /  DBili  x   /  AST  22  /  ALT  20  /  AlkPhos  68  10-22    10-31    143  |  105  |  46<H>  ----------------------------<  86  3.9   |  35<H>  |  2.39<H>(was 2.31)    Ca    9.3      31 Oct 2018 05:30  Mg     2.2     10-31        PT/INR - ( 28 Oct 2018 06:09 )   PT: 18.0 sec;   INR: 1.65 ratio    PT/INR - ( 27 Oct 2018 06:12 )   PT: 20.9 sec;   INR: 1.91 ratio    PT/INR - ( 22 Oct 2018 17:32 )   PT: 20.0 sec;   INR: 1.83 ratio      PTT - ( 22 Oct 2018 17:32 )  PTT:34.1 sec      Serum Pro-Brain Natriuretic Peptide: 61464 pg/mL (10-24 @ 06:37)  Serum Pro-Brain Natriuretic Peptide: 86051 pg/mL (10-22 @ 17:32)    BLOOD CULTURES: Organism --  Gram Stain Blood -- Gram Stain --  Specimen Source .Blood None  Culture-Blood --Culture Results:   No growth to date. (10.22.18 @ 19:38)      CARDIAC MARKERS ( 23 Oct 2018 00:21 )  .030 ng/mL / x     / x     / x     / x      CARDIAC MARKERS ( 22 Oct 2018 21:45 )  0.032 ng/mL / x     / x     / x     / x      CARDIAC MARKERS ( 22 Oct 2018 17:32 )  0.025 ng/mL / x     / x     / x     / x          RADIOLOGY:  CT of Chest w/o contrast: 10/23/18  IMPRESSION:  The findings are suggestive of multifocal pneumonia in the right lung,   for which clinical correlation is recommended. Interval resolution of the   left pleural effusion.  Extensive lymphadenopathies in the chest and upper abdomen are again,   noted unchanged.  Other findings as above.      EKG: AFlutter with varible block, RBBB, LAFB    Telemetry: Highs now in the 120's    ECHO:Transthoracic Echocardiogram: 6/26/18   Moderate (2+) mitral regurgitation is present.   Mild mitral annular calcification is present.   Significant fibrocalcific changes noted to the aortic valve leaflets with restriction in leaflet excursion. Peak transaortic gradient is 45 mmHg; this finding is consistent with mild to moderate aortic stenosis.   Trace aortic regurgitation is present.   Mild to moderate tricuspid valve regurgitation is present.   Normal appearing pulmonic valve structure and function.   The left atrium is mildly dilated.   The left ventricle is normal in size, wall motion and contractility.   Mild concentric left ventricular hypertrophy is present.   Estimated left ventricular ejection fraction is 55-60 %.   A PFO is noted with color doppler.   Normal appearing right ventricle structure and function.   All visualized extra cardiac structures appears to be normal.   No evidence of pericardial effusion.     Signature   ----------------------------------------------------------------   Electronically signed by Sina Trent MD(Interpreting   physician) on 06/27/2018 07:41 AM   ----------------------------------------------------------------

## 2018-10-31 NOTE — PROGRESS NOTE ADULT - ASSESSMENT
89 year old male as above     #Dyspnea/#Multifocal PNA (HCAP) - resolved. completed abx tx  - repeat CXR noted for persistent CHF    #acute on chronic diastolic congestive heart failure - lvef 06/2018 55-60%  - d/c lasix per nephro  - inc BB to 25mg. no ACEI due to renal dysfunction  - pulm input apprecaited    #neno on ckd 3-4/hypernatremia - worsening  - case d/w renal and encourage po intake  - nephro note appreciated to dc lasix  - hold discharge for raise in cr from 2.33 to 2.39 and check BMP in AM   - off IVF for now    #afib with RVR/Alfutter - better rate controlled.   - case d/w dr michel and amio changed to 200 mg daily  - cardio note appreciated  - increase lopressor to 25 mg  - continue coumadin with goal INR 2-3    #anxiety- stable  - continue xanax bid prn    #hypothyroidism- stable   - continue levothyroxine     #post herpetic neuralgia- stable  - continue home meds including gabapentin  - further renal dose gabapentin at pt request to 200mg daily  - refresh tears  - advised daughter to bring in ointment as not in stock at     #bph- stable   - continue alfuzosin - non formulary    # r/o basal cell carcinoma of scalp - follows up with dr jain and had bx 5 days before admission  - outpt f/u     #dvt prophylaxis   - on coumadin for afib    Discussion with patient and family regarding advance directives again yesterday. pt is now DNR/DNI  dispo - rehab tomorrow if cr improves 89 year old male as above     #Dyspnea/#Multifocal PNA (HCAP) - resolved. completed abx tx  - repeat CXR noted for persistent CHF    #acute on chronic diastolic congestive heart failure - lvef 06/2018 55-60%  - d/c lasix per renal   - inc BB to 25mg. no ACEI due to renal dysfunction  - pulm input apprecaited    #neno on ckd 3-4/hypernatremia - worsening  - case d/w renal and encourage po intake  - nephro note appreciated to dc lasix  - hold discharge for raise in cr from 2.33 to 2.39 and check BMP in AM   - off IVF for now    #afib with RVR/Alfutter - better rate controlled.   - case d/w dr michel and amio changed to 200 mg daily  - cardio note appreciated  - increase lopressor to 25 mg  - continue coumadin with goal INR 2-3    #anxiety- stable  - continue xanax bid prn    #hypothyroidism- stable   - continue levothyroxine     #post herpetic neuralgia- stable  - renal dose neurontin and as pain is improving will decrease to 200 mg daily    #bph- stable   - continue alfuzosin - non formulary    # r/o basal cell carcinoma of scalp - follows up with dr jain and had bx 5 days before admission  - outpt f/u     #dvt prophylaxis   - on coumadin for afib    Discussion with patient and family regarding advance directives again yesterday. pt is now DNR/DNI  dispo - rehab tomorrow if cr improves

## 2018-10-31 NOTE — PROGRESS NOTE ADULT - ASSESSMENT
89 yo man with multiple medical problems including CKD admitted with multifocal PNA.     LETICIA superimposed on CKD.  CT with no evidence of CHF.  Left Pleural effusion resolved.    Follow Right effusion with multifocal PNA.  Will hold diuretics for 2-3 days and allow stabilization of CKD while PNA treated with abtx.    10/24 SY  --LETICIA/CKD : slightly improved.  Monitor off diuretics for another day and resume tomorrow.  --PNA : continue abtx.    10/25 SY  --LETICIA/CKD : fairly stable for now.  Will resume outpt dose of diuretic to maintain stable fluid status.  --PNA : continue current abtx.  Monitor response.    10/26  HCAP  creat improving   Na level elevated  most likely due to Natriuresis from Lasix,  sodium load from NS  d/w pt and daughter    10/27  Incentive spirometry ordered  Serum creatinine stable  Chest x-ray ordered  Has more expiratory wheezing today    10/28  Chest x-ray shows no new worsening  Right hilar infiltrate, bilateral small pleural effusions persist  No wheezing audible today  Patient continues to use the incentive spirometer  Creatinine stable  Lasix discontinued yesterday due to hypotension.  IV bolus of fluids was given as well    10/29 SY  --LETICIA/CKD  : creat variable with changes in diuretic therapy and fluid.  Pt at risk for decompensated CHF : monitor on resumed Lasix--Restarted today.  --Resp : PNA primarily with a component of CHF ; Continue to monitor.    10/30  on Lasix 20 mg po now  UO in urinal bed-side only ~40 ml  not drinking water  will dc NS, becoming hypernatremic  azotemic , bicarb up  encourage PO fluids  Bladder scan    10/31 SY  --LETICIA/CKD  Variable creat now tending up slowly.    CXR with persistent CHF but clinically  compensated.    Therefore, will continue to monitor off IVF and now off diuretics.    Monitor po intake.  --Resp status  : improved and stable .  Completed abtx course.

## 2018-10-31 NOTE — CHART NOTE - NSCHARTNOTEFT_GEN_A_CORE
Assessment:   89 y/o male admitted with SOB 2/2 to chronic CHF. Nutrition dx of severe protein/calorie malnutrition in context of chronic disease with reduced PO intake and severe muscle and fat wasting. Pt complaining the food is unappetizing because of the salt restriction and dry, encouraged menu selections of more appealing foods within his diet restrictions. Pt willing to try. Multiple unopened ensure bottles noted, pt states he has been trying to drink them but wants to switch supplements. Offered gelatine supplement for extra protein and calories while allowing for extra fluids and pt willing to try it.  Pt c/o slight nausea often after eating, encouraged to eat slowly and offered meds as needed, and constipation (last BM 3 days ago) colace and senna in place.  Dyspnea and PNA have resolved. 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 Encouraged fluid intake while following restrictions.  +4 edema in b/l legs, however lasix currently being held for progressive renal dysfunction and hypernatremia  basal cell carcinoma on head was r/o  Cr levels rising (2.39 this morning), waiting for levels to normalize before DC to rehab  Current weight recorded this morning is 150# showing a 12# weight loss x 1 week likely 2/2 to fluid loss   *inaccurate weight recorded on Initial Dietitian Evaluation (73.6 pounds instead of 73.6 kg) however energy and protein needs were calculated using the correct weight*    Recommendations:  1) Continue current DASH/TLC 1000 FR diet  2) Change supplement from Ensure Enlive BID to Gelatine BID   3) Encourage menu selections  4) Encourage PO food and fluid intake in accordance to restrictions  5) Continue to monitor electrolytes and weights daily.      Diet Presciption: Diet, DASH/TLC:   Sodium & Cholesterol Restricted  1000mL Fluid Restriction ([id number]) (10-22-18 @ 21:22)      Wt Hx:  Height (cm): 165.1 (10-22-18 @ 17:03)  Weight (kg): 70.3 (10-22-18 @ 17:03)  BMI (kg/m2): 25.8 (10-22-18 @ 17:03)        Estimated Needs:   [X ] no change since previous assessment        Nutrition Diagnosis is [X ] ongoing  [ ] resolved [ ] not applicable     Nutrition Diagnostic #1:  · Nutrition Diagnostic Terminology #1: Nutrient  · Nutrient: Malnutrition  · Etiology: inadequate PO intake 2/2 chronic CHF and hx CA  · Signs/Symptoms: severe muscle wasting and decreased PO intake <75% for more than 1 month  · Nutrition Intervention: Meals and Snack; Medical Food Supplements  · Meals and Snacks: DASH diet  · Medical and Food Supplements: Commercial beverage; ensure BID  · Goal/Expected Outcome: Pt will consume and tolerate at least 80% of meals and supplements      New Nutrition Diagnosis: [X ] not applicable         Pertinent Medications: MEDICATIONS  (STANDING):  ALPRAZolam 0.25 milliGRAM(s) Oral two times a day  amiodarone    Tablet 200 milliGRAM(s) Oral daily  artificial  tears Solution 1 Drop(s) Both EYES two times a day  docusate sodium 100 milliGRAM(s) Oral three times a day  gabapentin 200 milliGRAM(s) Oral two times a day  ipratropium    for Nebulization 500 MICROGram(s) Nebulizer every 6 hours  lactobacillus acidophilus 1 Tablet(s) Oral daily  levothyroxine 100 MICROGram(s) Oral daily  Lotemax ointment 1 Application(s) 1 Application(s) Both EYES daily  melatonin 3 milliGRAM(s) Oral at bedtime  metoprolol tartrate 25 milliGRAM(s) Oral two times a day  potassium chloride    Tablet ER 20 milliEquivalent(s) Oral daily    MEDICATIONS  (PRN):  acetaminophen   Tablet .. 650 milliGRAM(s) Oral every 6 hours PRN Temp greater or equal to 38C (100.4F), Mild Pain (1 - 3), Moderate Pain (4 - 6)  diltiazem Injectable 10 milliGRAM(s) IV Push every 6 hours PRN for HR > 120 bpm  senna 2 Tablet(s) Oral at bedtime PRN Constipation    Pertinent Labs: 10-31 Na143 mmol/L Glu 86 mg/dL K+ 3.9 mmol/L Cr  2.39 mg/dL<H> BUN 46 mg/dL<H> 10-25 Phos 2.7 mg/dL     CAPILLARY BLOOD GLUCOSE          Skin: flash score = 15  Stage 1 PU on Sacrum          Monitoring and Evaluation:   [x] PO intake/Nutr support infusion [ x ] Tolerance to Nutr [ x ] weights [ x ] labs[ x ] follow up per protocol  [ ] other:

## 2018-10-31 NOTE — PROGRESS NOTE ADULT - SUBJECTIVE AND OBJECTIVE BOX
PCP: PCP/Cardio- Dr Joshi    Chief Complaint: Patient is a 89y old  Male who presents with a chief complaint of CHF Exacerbation w/ possible hcap (23 Oct 2018 06:42)    HPI:  89 year old male with pmh of anxiety, basal cell carcinoma, bph, ckd, htn, hypothyroid, lymphoma, postherpetic neuralgia, prostate ca coming to  ED with complaints of worsening shortness of breath x 3 days.    Pt is SOB with lying supine.   was seen in Dr Joshi office on Friday and was given lasix 40mg iv push however sob improved mildly.   Labs were checked out,   patient and as patient sob not markedly improved and patient found to have neno on ckd was sent to hospital for further eval. Patient also stating has been having a decreased po intake for the past week.  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    was given azithromycin and another abx about two weeks ago for cough as he had a suspected pna.   States the abx he does not know name of gave him diarrhea however that resolved.   Patient was also admitted 1 month prior for chf exacerbation. Denies fevers, chills, chest pain. (22 Oct 2018 21:23)    10/23:  Above Reviewed. dyspnea improving. No other complaints  10/24: Daughter at bedside; still has dyspnea; discussed advance directives with patient and daughter; It seems like hes leaning toward DNR/DNI but would like to speak with his family; Tele still in flutter; also found to have low BP this morning   10/25 - pt is still wheezing but is feeling less SOB.  10/26 - pt still feeling SOB and tired.  rapid afib HR 150s on tele   10/27 - pt feeling better today, still tired but less SOB. pt has episodes of a flutter through night  10/28 - pt had episodes of flutter in the morning and was given 10mg of cardizem. No CP or SOB this morning.  10/29 - pt had heart rates going into 140's during PT consult this morning. No CP, SOB, or abdominal pain.  10/30 - pt feeling good this morning. No CP, SOB, or abdominal pain.daughter updated at bedside   10/31 - pt feeling good this morning with no CP, SOB, or abdominal pain.    ROS:   All 10 systems reviewed and found to be negative with the exception of what has been described above.    Vital Signs Last 24 Hrs  T(C): 36.5 (31 Oct 2018 10:04), Max: 36.5 (31 Oct 2018 10:04)  T(F): 97.7 (31 Oct 2018 10:04), Max: 97.7 (31 Oct 2018 10:04)  HR: 105 (31 Oct 2018 10:04) (55 - 140)  BP: 93/70 (31 Oct 2018 10:04) (93/70 - 122/67)  BP(mean): --  RR: 18 (31 Oct 2018 10:04) (18 - 18)  SpO2: 94% (31 Oct 2018 10:04) (90% - 95%)    GEN: lying in bed, NAD  HEENT:   NC/AT, pupils equal and reactive, EOMI  CV:  +S1, +S2, RRR  RESP:   lungs clear to auscultation bilaterally, no wheeze, rales, rhonchi   BREAST:  not examined  GI:  abdomen soft, non-tender, non-distended, normoactive BS  RECTAL:  not examined  :  not examined  MSK:   normal muscle tone  EXT:  no edema  NEURO:  AAOX3, no focal neurological deficits  SKIN:  no rashes    10-31    143  |  105  |  46<H>  ----------------------------<  86  3.9   |  35<H>  |  2.39<H>    Ca    9.3      31 Oct 2018 05:30  Mg     2.2     10-31      PT/INR - ( 31 Oct 2018 05:30 )   PT: 27.5 sec;   INR: 2.41 ratio      < from: Xray Chest 1 View AP/PA. (10.22.18 @ 18:32) >    IMPRESSION:  Findings are likely indicative of mild pneumonia involving the right lung   for which clinical correlation is recommended.    < end of copied text >  < from: CT Chest No Cont (10.23.18 @ 08:44) >    IMPRESSION:  The findings are suggestive of multifocal pneumonia in the right lung,   for which clinical correlation is recommended. Interval resolution of the   left pleural effusion.  Extensive lymphadenopathies in the chest and upper abdomen are again,   noted unchanged.  Other findings as above.    < end of copied text >    Culture - Blood (10.22.18 @ 19:38)    Specimen Source: .Blood None    Culture Results:   No growth to date.    Culture - Urine (06.07.18 @ 20:40)    Specimen Source: .Urine None    Culture Results:   No growth    < from: Transthoracic Echocardiogram (10.24.18 @ 09:06) >  Summary     Fibrocalcific changes noted to the mitral valve leaflets with preserved   leaflet excursion.   Mild mitral annular calcification is present.   Moderate (2+) mitral regurgitation is present.   Significant fibrocalcific changes noted to the aortic valve leaflets with   restriction in leaflet excursion. Peak transaortic gradient is 48 mmHg;   this finding is consistent with moderate aortic stenosis.   DI = .2   Continuity equation AGUSTIN is .59 cm which likely overestimates the degree   of   aortic stenosis   Mild to Moderate Tricuspid regurgitation is present. Mild pulmonary   hypertension.   Mild concentric left ventricular hypertrophy is present.   Estimated left ventricular ejection fraction is 55-60 %.   The right atrium appears moderately dilated.   Normal appearing right ventricle structure andfunction.    < end of copied text >    MEDICATIONS  (STANDING):  ALPRAZolam 0.25 milliGRAM(s) Oral two times a day  amiodarone    Tablet 200 milliGRAM(s) Oral two times a day  artificial  tears Solution 1 Drop(s) Both EYES two times a day  docusate sodium 100 milliGRAM(s) Oral three times a day  furosemide    Tablet 20 milliGRAM(s) Oral daily  gabapentin 200 milliGRAM(s) Oral two times a day  ipratropium    for Nebulization 500 MICROGram(s) Nebulizer every 6 hours  lactobacillus acidophilus 1 Tablet(s) Oral daily  levothyroxine 100 MICROGram(s) Oral daily  Lotemax ointment 1 Application(s) 1 Application(s) Both EYES daily  melatonin 3 milliGRAM(s) Oral at bedtime  metoprolol tartrate 12.5 milliGRAM(s) Oral two times a day  potassium chloride    Tablet ER 20 milliEquivalent(s) Oral daily    MEDICATIONS  (PRN):  acetaminophen   Tablet .. 650 milliGRAM(s) Oral every 6 hours PRN Temp greater or equal to 38C (100.4F), Mild Pain (1 - 3), Moderate Pain (4 - 6)  diltiazem Injectable 10 milliGRAM(s) IV Push every 6 hours PRN for HR > 100 bpm  senna 2 Tablet(s) Oral at bedtime PRN Constipation from your child's wound as it starts to heal. This is normal. It is a sign that your child's wound is healing. Other instructions    · Try using a doughnut cushion if sitting is uncomfortable for your child. Follow-up care is a key part of your child's treatment and safety. Be sure to make and go to all appointments, and call your doctor if your child is having problems. It's also a good idea to know your child's test results and keep a list of the medicines your child takes. When should you call for help? Call 911 anytime you think your child may need emergency care. For example, call if:    · Your child passes out (loses consciousness).     · Your child has severe trouble breathing.     · Your child has sudden chest pain and shortness of breath, or coughs up blood.    Call your doctor now or seek immediate medical care if:    · Your child has new or worse nausea or vomiting.     · Your child has pain that does not get better after he or she takes pain medicine.     · Your child's incision was closed with stitches and the stitches come loose, or the incision comes open.     · Bright red blood has soaked through the bandage over your child's incision.     · Your child has signs of infection, such as:  ¨ Increased pain, swelling, warmth, or redness. ¨ Red streaks leading from the incision. ¨ Pus draining from the incision. ¨ A fever.    Watch closely for changes in your child's health, and be sure to contact your doctor if:    · Your child does not get better as expected. Where can you learn more? Go to https://[email or web address] org and sign in to your [employer]de account. Enter H871 in the [employer] box to learn more about \"Pilonidal Cyst Excision in Children: What to Expect at Home. \"     If you do not have an account, please click on the \"Sign Up Now\" link. Current as of: October 5, 2017  Content Version: 11.7  © 8897-5426 [employer].  Care instructions adapted under license by [facility] ([facility]). If you have questions about a medical condition or this instruction, always ask your healthcare professional. [address] any warranty or liability for your use of this information. Patient Education        Learning About Pilonidal Disease  What is pilonidal disease? Pilonidal (say \"me-pzq-AB-dul\") disease is a long-term skin infection. The infection develops in a cyst at the top of or next to the crease between the buttocks. The cyst may look like a small dimple, which is called a \"pit\" or \"sinus. \" Hair may grow from the pit, and you may have several pits. What are the symptoms? · You may have no symptoms. · If the cyst is infected, you may:  ¨ Have redness or swelling in the area. ¨ Have cloudy fluid or blood draining from the cyst.  ¨ Find it hard to walk or sit because of pain. ¨ Have a fever. This is not common. How can you prevent infection? You may be able to prevent infection and symptoms. · Keep the area clean and dry. · Avoid sitting on hard surfaces for long periods of time. How is pilonidal disease treated? If you have a pilonidal cyst that is not causing symptoms, you don't need medical treatment. If a pilonidal cyst is infected:  · You will probably get antibiotics. If your doctor prescribes antibiotics, take them as directed. Do not stop taking them just because you feel better. You need to take the full course of antibiotics. · Soak in a warm tub several times a day. · Ask your doctor if you can take an over-the-counter pain medicine, such as acetaminophen (Tylenol), ibuprofen (Advil, Motrin), or naproxen (Aleve). Read and follow all instructions on the label. · You may need to have the cyst cut open and drained or removed. Follow-up care is a key part of your treatment and safety. Be sure to make and go to all appointments, and call your doctor if you are having problems.  It's also a good idea to know your test results and keep PCP: PCP/Cardio- Dr Joshi    Chief Complaint: Patient is a 89y old  Male who presents with a chief complaint of CHF Exacerbation w/ possible hcap (23 Oct 2018 06:42)    HPI:  89 year old male with pmh of anxiety, basal cell carcinoma, bph, ckd, htn, hypothyroid, lymphoma, postherpetic neuralgia, prostate ca coming to  ED with complaints of worsening shortness of breath x 3 days.    Pt is SOB with lying supine.   was seen in Dr Joshi office on Friday and was given lasix 40mg iv push however sob improved mildly.   Labs were checked out,   patient and as patient sob not markedly improved and patient found to have neno on ckd was sent to hospital for further eval. Patient also stating has been having a decreased po intake for the past week.  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    was given azithromycin and another abx about two weeks ago for cough as he had a suspected pna.   States the abx he does not know name of gave him diarrhea however that resolved.   Patient was also admitted 1 month prior for chf exacerbation. Denies fevers, chills, chest pain. (22 Oct 2018 21:23)    10/23:  Above Reviewed. dyspnea improving. No other complaints  10/24: Daughter at bedside; still has dyspnea; discussed advance directives with patient and daughter; It seems like hes leaning toward DNR/DNI but would like to speak with his family; Tele still in flutter; also found to have low BP this morning   10/25 - pt is still wheezing but is feeling less SOB.  10/26 - pt still feeling SOB and tired.  rapid afib HR 150s on tele   10/27 - pt feeling better today, still tired but less SOB. pt has episodes of a flutter through night  10/28 - pt had episodes of flutter in the morning and was given 10mg of cardizem. No CP or SOB this morning.  10/29 - pt had heart rates going into 140's during PT consult this morning. No CP, SOB, or abdominal pain.  10/30 - pt feeling good this morning. No CP, SOB, or abdominal pain.daughter updated at bedside   10/31 - pt feeling good this morning with no CP, SOB, or abdominal pain.    ROS:   All 10 systems reviewed and found to be negative with the exception of what has been described above.    Vital Signs Last 24 Hrs  T(C): 36.5 (31 Oct 2018 10:04), Max: 36.5 (31 Oct 2018 10:04)  T(F): 97.7 (31 Oct 2018 10:04), Max: 97.7 (31 Oct 2018 10:04)  HR: 105 (31 Oct 2018 10:04) (55 - 140)  BP: 93/70 (31 Oct 2018 10:04) (93/70 - 122/67)  BP(mean): --  RR: 18 (31 Oct 2018 10:04) (18 - 18)  SpO2: 94% (31 Oct 2018 10:04) (90% - 95%)    GEN: lying in bed, NAD  HEENT:   NC/AT, pupils equal and reactive, EOMI  CV:  +S1, +S2, irreg irreg   RESP:   lungs clear to auscultation bilaterally, no wheeze, rales, rhonchi   BREAST:  not examined  GI:  abdomen soft, non-tender, non-distended, normoactive BS  RECTAL:  not examined  :  not examined  MSK:   normal muscle tone  EXT:  no edema  NEURO:  AAOX3, no focal neurological deficits  SKIN:  no rashes    10-31    143  |  105  |  46<H>  ----------------------------<  86  3.9   |  35<H>  |  2.39<H>    Ca    9.3      31 Oct 2018 05:30  Mg     2.2     10-31      PT/INR - ( 31 Oct 2018 05:30 )   PT: 27.5 sec;   INR: 2.41 ratio      < from: Xray Chest 1 View AP/PA. (10.22.18 @ 18:32) >    IMPRESSION:  Findings are likely indicative of mild pneumonia involving the right lung   for which clinical correlation is recommended.    < end of copied text >  < from: CT Chest No Cont (10.23.18 @ 08:44) >    IMPRESSION:  The findings are suggestive of multifocal pneumonia in the right lung,   for which clinical correlation is recommended. Interval resolution of the   left pleural effusion.  Extensive lymphadenopathies in the chest and upper abdomen are again,   noted unchanged.  Other findings as above.    < end of copied text >    Culture - Blood (10.22.18 @ 19:38)    Specimen Source: .Blood None    Culture Results:   No growth to date.    Culture - Urine (06.07.18 @ 20:40)    Specimen Source: .Urine None    Culture Results:   No growth    < from: Transthoracic Echocardiogram (10.24.18 @ 09:06) >  Summary     Fibrocalcific changes noted to the mitral valve leaflets with preserved   leaflet excursion.   Mild mitral annular calcification is present.   Moderate (2+) mitral regurgitation is present.   Significant fibrocalcific changes noted to the aortic valve leaflets with   restriction in leaflet excursion. Peak transaortic gradient is 48 mmHg;   this finding is consistent with moderate aortic stenosis.   DI = .2   Continuity equation AGUSTIN is .59 cm which likely overestimates the degree   of   aortic stenosis   Mild to Moderate Tricuspid regurgitation is present. Mild pulmonary   hypertension.   Mild concentric left ventricular hypertrophy is present.   Estimated left ventricular ejection fraction is 55-60 %.   The right atrium appears moderately dilated.   Normal appearing right ventricle structure andfunction.    < end of copied text >    MEDICATIONS  (STANDING):  ALPRAZolam 0.25 milliGRAM(s) Oral two times a day  amiodarone    Tablet 200 milliGRAM(s) Oral daily  artificial  tears Solution 1 Drop(s) Both EYES two times a day  docusate sodium 100 milliGRAM(s) Oral three times a day  gabapentin 200 milliGRAM(s) Oral daily  ipratropium    for Nebulization 500 MICROGram(s) Nebulizer every 6 hours  lactobacillus acidophilus 1 Tablet(s) Oral daily  levothyroxine 100 MICROGram(s) Oral daily  Lotemax ointment 1 Application(s) 1 Application(s) Both EYES daily  melatonin 3 milliGRAM(s) Oral at bedtime  metoprolol tartrate 25 milliGRAM(s) Oral two times a day  potassium chloride    Tablet ER 20 milliEquivalent(s) Oral daily    MEDICATIONS  (PRN):  acetaminophen   Tablet .. 650 milliGRAM(s) Oral every 6 hours PRN Temp greater or equal to 38C (100.4F), Mild Pain (1 - 3), Moderate Pain (4 - 6)  diltiazem Injectable 10 milliGRAM(s) IV Push every 6 hours PRN for HR > 120 bpm  senna 2 Tablet(s) Oral at bedtime PRN Constipation a list of the medicines you take. Where can you learn more? Go to [email or web address] and sign in to your MESI account. Enter C612 in the [employer] box to learn more about \"Learning About Pilonidal Disease. \"     If you do not have an account, please click on the \"Sign Up Now\" link. Current as of: October 5, 2017  Content Version: 11.7  © 8603-2943 [email or web address]. Care instructions adapted under license by [facility] ([facility]). If you have questions about a medical condition or this instruction, always ask your healthcare professional. [address] any warranty or liability for your use of this information.

## 2018-10-31 NOTE — PROGRESS NOTE ADULT - ASSESSMENT
89 M with shortness of breath - multifactorial    Diastolic failure- mild. Legs are without edema and mild crackles on bases- recommend low dose Lasix with close monitoring of renal function and electrolytes- if Creatine continues to rise, would hold Lasix for a day . Will add low dose nitrate to reduce preload    Pulm- PNA- on abx, recommend nebulizer treatment, mucolytic and consideration for steroids      Aflutter- metoprolol for rate control, continue with coumadin dosing goal INR 2-2.5     No evidence for ACS     10/24/18:  Cardiac status stable.  Off diuretics and on antibiotics.  Renal function stable.  May need to add back furosemide.  Will watch closely.    10/25/18:  Patient remains stable.  Renal function somewhat improved off diuretics.  Continue antibiotics for right sided multifocal pneumonia    10/26/18:  Cardiac status stable.  Back on furosemide.  Follow renal function.  Continue treatment for pneumonia    10/27/18:  Had slightly more rapid AFib/flutter yesterday treated with extra meds, now with better rate control.  No anginal chest pains or increased SOB.  Having a resp Rx during the exam with reduced BS but no wheezing or increased rales.  Tolerating current meds and treatments so far.  Cardiac status stable.  Back on furosemide.  Follow renal function.  Continue treatment for pneumonia.  Will follow intermittently prn.    10/28/18:  AFib/Flutter rate labile and occasionally up to 140-150's but with increased meds develops hypotension but rate increases when Metoprolol dose reduced.  Will try Amiodarone for rate control and reduce the Lasix to 20 mg daily for now.  Continue as outlined by medicine etal.  Will follow intermittently prn.    10/29/18:  Rates appear to be better controlled on amiodarone.  Will continue at 200 bid for now.    10/30/18:  Patient appears stable at present.  Ok to go to [facility]    10/31/18:  Creatinine up slightly again.  Will stop furosemide.  Will change amiodarone to 200 daily.

## 2018-11-01 VITALS
SYSTOLIC BLOOD PRESSURE: 124 MMHG | OXYGEN SATURATION: 95 % | DIASTOLIC BLOOD PRESSURE: 84 MMHG | TEMPERATURE: 98 F | RESPIRATION RATE: 18 BRPM | HEART RATE: 100 BPM

## 2018-11-01 LAB
ALBUMIN SERPL ELPH-MCNC: 3 G/DL — LOW (ref 3.3–5)
ANION GAP SERPL CALC-SCNC: 7 MMOL/L — SIGNIFICANT CHANGE UP (ref 5–17)
BUN SERPL-MCNC: 48 MG/DL — HIGH (ref 7–23)
CALCIUM SERPL-MCNC: 9.8 MG/DL — SIGNIFICANT CHANGE UP (ref 8.5–10.1)
CHLORIDE SERPL-SCNC: 107 MMOL/L — SIGNIFICANT CHANGE UP (ref 96–108)
CO2 SERPL-SCNC: 31 MMOL/L — SIGNIFICANT CHANGE UP (ref 22–31)
CREAT SERPL-MCNC: 2.18 MG/DL — HIGH (ref 0.5–1.3)
GLUCOSE SERPL-MCNC: 90 MG/DL — SIGNIFICANT CHANGE UP (ref 70–99)
HCT VFR BLD CALC: 37.4 % — LOW (ref 39–50)
HGB BLD-MCNC: 11.6 G/DL — LOW (ref 13–17)
INR BLD: 2.16 RATIO — HIGH (ref 0.88–1.16)
MCHC RBC-ENTMCNC: 31 GM/DL — LOW (ref 32–36)
MCHC RBC-ENTMCNC: 31.1 PG — SIGNIFICANT CHANGE UP (ref 27–34)
MCV RBC AUTO: 100.3 FL — HIGH (ref 80–100)
NRBC # BLD: 0 /100 WBCS — SIGNIFICANT CHANGE UP (ref 0–0)
PHOSPHATE SERPL-MCNC: 2.5 MG/DL — SIGNIFICANT CHANGE UP (ref 2.5–4.5)
PLATELET # BLD AUTO: 154 K/UL — SIGNIFICANT CHANGE UP (ref 150–400)
POTASSIUM SERPL-MCNC: 4 MMOL/L — SIGNIFICANT CHANGE UP (ref 3.5–5.3)
POTASSIUM SERPL-SCNC: 4 MMOL/L — SIGNIFICANT CHANGE UP (ref 3.5–5.3)
PROTHROM AB SERPL-ACNC: 24.6 SEC — HIGH (ref 10–12.9)
RBC # BLD: 3.73 M/UL — LOW (ref 4.2–5.8)
RBC # FLD: 15.3 % — HIGH (ref 10.3–14.5)
SODIUM SERPL-SCNC: 145 MMOL/L — SIGNIFICANT CHANGE UP (ref 135–145)
WBC # BLD: 6.37 K/UL — SIGNIFICANT CHANGE UP (ref 3.8–10.5)
WBC # FLD AUTO: 6.37 K/UL — SIGNIFICANT CHANGE UP (ref 3.8–10.5)

## 2018-11-01 RX ORDER — METOPROLOL TARTRATE 50 MG
1 TABLET ORAL
Qty: 0 | Refills: 0 | DISCHARGE
Start: 2018-11-01

## 2018-11-01 RX ORDER — LANOLIN ALCOHOL/MO/W.PET/CERES
1 CREAM (GRAM) TOPICAL
Qty: 0 | Refills: 0 | COMMUNITY
Start: 2018-11-01

## 2018-11-01 RX ORDER — POTASSIUM CHLORIDE 20 MEQ
1 PACKET (EA) ORAL
Qty: 0 | Refills: 0 | COMMUNITY

## 2018-11-01 RX ORDER — GABAPENTIN 400 MG/1
2 CAPSULE ORAL
Qty: 0 | Refills: 0 | COMMUNITY
Start: 2018-11-01

## 2018-11-01 RX ORDER — METOPROLOL TARTRATE 50 MG
1 TABLET ORAL
Qty: 0 | Refills: 0 | COMMUNITY
Start: 2018-11-01

## 2018-11-01 RX ORDER — AMIODARONE HYDROCHLORIDE 400 MG/1
1 TABLET ORAL
Qty: 0 | Refills: 0 | DISCHARGE
Start: 2018-11-01

## 2018-11-01 RX ADMIN — Medication 500 MICROGRAM(S): at 13:51

## 2018-11-01 RX ADMIN — AMIODARONE HYDROCHLORIDE 200 MILLIGRAM(S): 400 TABLET ORAL at 05:20

## 2018-11-01 RX ADMIN — Medication 1 DROP(S): at 05:20

## 2018-11-01 RX ADMIN — Medication 100 MICROGRAM(S): at 05:20

## 2018-11-01 RX ADMIN — Medication 0.25 MILLIGRAM(S): at 17:34

## 2018-11-01 RX ADMIN — Medication 20 MILLIEQUIVALENT(S): at 11:59

## 2018-11-01 RX ADMIN — Medication 0.25 MILLIGRAM(S): at 05:20

## 2018-11-01 RX ADMIN — Medication 500 MICROGRAM(S): at 08:01

## 2018-11-01 RX ADMIN — Medication 25 MILLIGRAM(S): at 05:20

## 2018-11-01 RX ADMIN — Medication 100 MILLIGRAM(S): at 14:31

## 2018-11-01 RX ADMIN — Medication 1 TABLET(S): at 11:59

## 2018-11-01 RX ADMIN — Medication 100 MILLIGRAM(S): at 05:20

## 2018-11-01 RX ADMIN — Medication 25 MILLIGRAM(S): at 17:34

## 2018-11-01 RX ADMIN — GABAPENTIN 200 MILLIGRAM(S): 400 CAPSULE ORAL at 11:59

## 2018-11-01 NOTE — CHART NOTE - NSCHARTNOTEFT_GEN_A_CORE
Patient seen and examined at bedside. States he feels well, no complaints at this time, wants to go home. Please refer to D/C note for hosp course and physical exam. Patient seen and examined at bedside. States he feels well, no complaints at this time, wants to go home. Please refer to D/C note for hosp course.    Physical Exam:  GEN: lying in bed, NAD  HEENT:   NC/AT, pupils equal and reactive, EOMI  CV:  +S1, +S2, irreg irreg   RESP:   CTA B/L  GI:  abdomen soft, non-tender, non-distended, normoactive BS  EXT:  no edema  NEURO:  AAOX3, no focal neurological deficits  SKIN:  warm, dry

## 2018-11-01 NOTE — PROGRESS NOTE ADULT - PROVIDER SPECIALTY LIST ADULT
Cardiology
Heart Failure
Hospitalist
Infectious Disease
Nephrology
Pulmonology
Nephrology
Pulmonology
Cardiology
Nephrology

## 2018-11-01 NOTE — PROGRESS NOTE ADULT - REASON FOR ADMISSION
CHF Exacerbation w/ possible hcap

## 2018-11-01 NOTE — PROGRESS NOTE ADULT - ASSESSMENT
89 M with shortness of breath - multifactorial    Diastolic failure- mild. Legs are without edema and mild crackles on bases- recommend low dose Lasix with close monitoring of renal function and electrolytes- if Creatine continues to rise, would hold Lasix for a day . Will add low dose nitrate to reduce preload    Pulm- PNA- on abx, recommend nebulizer treatment, mucolytic and consideration for steroids      Aflutter- metoprolol for rate control, continue with coumadin dosing goal INR 2-2.5     No evidence for ACS     10/24/18:  Cardiac status stable.  Off diuretics and on antibiotics.  Renal function stable.  May need to add back furosemide.  Will watch closely.    10/25/18:  Patient remains stable.  Renal function somewhat improved off diuretics.  Continue antibiotics for right sided multifocal pneumonia    10/26/18:  Cardiac status stable.  Back on furosemide.  Follow renal function.  Continue treatment for pneumonia    10/27/18:  Had slightly more rapid AFib/flutter yesterday treated with extra meds, now with better rate control.  No anginal chest pains or increased SOB.  Having a resp Rx during the exam with reduced BS but no wheezing or increased rales.  Tolerating current meds and treatments so far.  Cardiac status stable.  Back on furosemide.  Follow renal function.  Continue treatment for pneumonia.  Will follow intermittently prn.    10/28/18:  AFib/Flutter rate labile and occasionally up to 140-150's but with increased meds develops hypotension but rate increases when Metoprolol dose reduced.  Will try Amiodarone for rate control and reduce the Lasix to 20 mg daily for now.  Continue as outlined by medicine etal.  Will follow intermittently prn.    10/29/18:  Rates appear to be better controlled on amiodarone.  Will continue at 200 bid for now.    10/30/18:  Patient appears stable at present.  Ok to go to [facility]    10/31/18:  Creatinine up slightly again.  Will stop furosemide.  Will change amiodarone to 200 daily.    11/1/18:  Patient is stable at present.  Awaiting today's blood work

## 2018-11-01 NOTE — PROGRESS NOTE ADULT - SUBJECTIVE AND OBJECTIVE BOX
CHIEF COMPLAINT: Patient is a 89y old  Male who presents with a chief complaint of CHF Exacerbation w/ possible hcap (22 Oct 2018 21:23)      HPI:  89 year old male with pmh of anxiety, basal cell carcinoma, bph, ckd, htn, hypothyroid, lymphoma, postherpetic neuralgia, prostate ca coming to  ED with complaints of worsening shortness of breath x 3 days.    Pt is SOB with lying supine.   was seen in Dr Joshi office on Friday and was given lasix 40mg iv push however sob improved mildly.   Labs were checked out,   patient and as patient sob not markedly improved and patient found to have neno on ckd was sent to hospital for further eval. Patient also stating has been having a decreased po intake for the past week.  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    was given azithromycin and another abx about two weeks ago for cough as he had a suspected pna.   States the abx he does not know name of gave him diarrhea however that resolved.   Patient was also admitted 1 month prior for chf exacerbation. Denies fevers, chills, chest pain. (22 Oct 2018 21:23)    10/26/18:  Patient is back on furosemide 40 per day    10/27/18:  Had slightly more rapid AFib/flutter yesterday treated with extra meds, now with better rate control.  No anginal chest pains or increased SOB.  Having a resp Rx during the exam with reduced BS but no wheezing or increased rales.  Tolerating current meds and treatments so far.    10/28/18:  AFib/Flutter rate labile and occasionally up to 140-150's but with increased meds develops hypotension but rate increases when Metoprolol dose reduced.  Will try Amiodarone for rate control.    10/30/18:  Patient was started on metoprolol 12.5 bid in addition to the amiodarone 200 bid    10/31/18:  Patient's discharge was held due to a rise in his creatinine.    11/1/18:  Metoprolol increased to 25 bid        PMHx:  PAST MEDICAL & SURGICAL HISTORY:  CKD (chronic kidney disease)  BPH (benign prostatic hyperplasia)  Postherpetic neuralgia  Chronic diarrhea  Prostate CA  Basal cell carcinoma  Lymphoma  Anxiety  Hypothyroid  Hypertension  A-fib  H/O shoulder replacement  S/P bilateral unicompartmental knee replacement      FAMILY HISTORY:   FAMILY HISTORY:  Family history of ischemic heart disease (Father, Mother)      ALLERGIES:  Allergies  No Known Allergies      REVIEW OF SYSTEMS:    CONSTITUTIONAL: No fevers or chills  EYES/ENT: No visual changes;  No vertigo or throat pain   NECK: No pain or stiffness  RESPIRATORY: No hemoptysis;  CARDIOVASCULAR: No chest pain  GASTROINTESTINAL: No abdominal or epigastric pain. No nausea, vomiting, or hematemesis; No diarrhea or constipation. No melena or hematochezia.  GENITOURINARY: No dysuria, frequency or hematuria  NEUROLOGICAL: No numbness   SKIN: No itching, burning, rashes, or lesions   All other review of systems is negative unless indicated above    ICU Vital Signs Last 24 Hrs  T(C): 36.7 (01 Nov 2018 04:29), Max: 36.7 (31 Oct 2018 17:12)  T(F): 98.1 (01 Nov 2018 04:29), Max: 98.1 (01 Nov 2018 04:29)  HR: 85 (01 Nov 2018 04:29) (52 - 105)  BP: 106/79 (01 Nov 2018 04:29) (93/70 - 133/80)  BP(mean): --  ABP: --  ABP(mean): --  RR: 18 (31 Oct 2018 17:12) (18 - 18)  SpO2: 93% (01 Nov 2018 04:29) (93% - 97%)  )  SpO2: 95% (31 Oct 2018 04:47) (90% - 99%)            PHYSICAL EXAM:   Constitutional: NAD, awake and alert, well-developed  HEENT: PERR, EOMI, Normal Hearing, MMM  Neck: Soft and supple, No LAD, No JVD  Respiratory: Breath sounds are diminished but clear bilaterally, No wheezing, rales or rhonchi  Cardiovascular: S1 and S2, irregular rate and rhythm, soft KENNY at LLSB and base as before, no gallops or rubs  Gastrointestinal: Bowel Sounds present, soft, nontender, nondistended, no guarding, no rebound  Extremities: No peripheral edema  Vascular: 2+ peripheral pulses  Neurological: no focal deficits    MEDICATIONS  (STANDING):  ALPRAZolam 0.25 milliGRAM(s) Oral two times a day  amiodarone    Tablet 200 milliGRAM(s) Oral daily  artificial  tears Solution 1 Drop(s) Both EYES two times a day  docusate sodium 100 milliGRAM(s) Oral three times a day  gabapentin 200 milliGRAM(s) Oral daily  ipratropium    for Nebulization 500 MICROGram(s) Nebulizer every 6 hours  lactobacillus acidophilus 1 Tablet(s) Oral daily  levothyroxine 100 MICROGram(s) Oral daily  Lotemax ointment 1 Application(s) 1 Application(s) Both EYES daily  melatonin 3 milliGRAM(s) Oral at bedtime  metoprolol tartrate 25 milliGRAM(s) Oral two times a day  potassium chloride    Tablet ER 20 milliEquivalent(s) Oral daily              LABS: All Labs Reviewed:                        11.3   4.96  )-----------( 93       ( 27 Oct 2018 06:12 )             36.1                           12.9   5.72  )-----------( 117      ( 22 Oct 2018 17:32 )             40.9     10-27    144  |  104  |  37<H>  ----------------------------<  99  3.8   |  34<H>  |  1.88<H>    10-26    146<H>  |  106  |  33<H>  ----------------------------<  89  3.7   |  36<H>  |  1.77<H>    10-25    143  |  104  |  30<H>  ----------------------------<  94  3.6   |  35<H>  |  1.80<H>    10-23    147<H>  |  105  |  32<H>  ----------------------------<  98  3.4<L>   |  36<H>  |  2.02<H>    10-22    144  |  105  |  34<H>  ----------------------------<  124<H>  3.7   |  32<H>  |  2.17<H>    Ca    9.1      22 Oct 2018 17:32  Ca    9.0      23 Oct 2018 07:06  Ca    8.4      25 Oct 2018 06:23  Ca    9.3      26 Oct 2018 06:14  Ca    9.0      27 Oct 2018 06:12    Phos  2.7     10-25  Mg     2.2     10-25    TPro  6.1  /  Alb  3.4  /  TBili  0.8  /  DBili  x   /  AST  22  /  ALT  20  /  AlkPhos  68  10-22    10-31    143  |  105  |  46<H>  ----------------------------<  86  3.9   |  35<H>  |  2.39<H>(was 2.31)    Ca    9.3      31 Oct 2018 05:30  Mg     2.2     10-31            PT/INR - ( 28 Oct 2018 06:09 )   PT: 18.0 sec;   INR: 1.65 ratio    PT/INR - ( 27 Oct 2018 06:12 )   PT: 20.9 sec;   INR: 1.91 ratio    PT/INR - ( 22 Oct 2018 17:32 )   PT: 20.0 sec;   INR: 1.83 ratio      PTT - ( 22 Oct 2018 17:32 )  PTT:34.1 sec      Serum Pro-Brain Natriuretic Peptide: 10223 pg/mL (10-24 @ 06:37)  Serum Pro-Brain Natriuretic Peptide: 05096 pg/mL (10-22 @ 17:32)    BLOOD CULTURES: Organism --  Gram Stain Blood -- Gram Stain --  Specimen Source .Blood None  Culture-Blood --Culture Results:   No growth to date. (10.22.18 @ 19:38)      CARDIAC MARKERS ( 23 Oct 2018 00:21 )  .030 ng/mL / x     / x     / x     / x      CARDIAC MARKERS ( 22 Oct 2018 21:45 )  0.032 ng/mL / x     / x     / x     / x      CARDIAC MARKERS ( 22 Oct 2018 17:32 )  0.025 ng/mL / x     / x     / x     / x          RADIOLOGY:  CT of Chest w/o contrast: 10/23/18  IMPRESSION:  The findings are suggestive of multifocal pneumonia in the right lung,   for which clinical correlation is recommended. Interval resolution of the   left pleural effusion.  Extensive lymphadenopathies in the chest and upper abdomen are again,   noted unchanged.  Other findings as above.      EKG: AFlutter with varible block, RBBB, LAFB    Telemetry: Highs now in the 120's    ECHO:Transthoracic Echocardiogram: 6/26/18   Moderate (2+) mitral regurgitation is present.   Mild mitral annular calcification is present.   Significant fibrocalcific changes noted to the aortic valve leaflets with restriction in leaflet excursion. Peak transaortic gradient is 45 mmHg; this finding is consistent with mild to moderate aortic stenosis.   Trace aortic regurgitation is present.   Mild to moderate tricuspid valve regurgitation is present.   Normal appearing pulmonic valve structure and function.   The left atrium is mildly dilated.   The left ventricle is normal in size, wall motion and contractility.   Mild concentric left ventricular hypertrophy is present.   Estimated left ventricular ejection fraction is 55-60 %.   A PFO is noted with color doppler.   Normal appearing right ventricle structure and function.   All visualized extra cardiac structures appears to be normal.   No evidence of pericardial effusion.     Signature   ----------------------------------------------------------------   Electronically signed by Sina Trent MD(Interpreting   physician) on 06/27/2018 07:41 AM   ----------------------------------------------------------------

## 2018-11-02 RX ORDER — DEXAMETHASONE 2 MG/1
2 TABLET ORAL
Qty: 20 | Refills: 0 | Status: DISCONTINUED | COMMUNITY
Start: 2018-07-12 | End: 2018-11-02

## 2018-11-02 RX ORDER — WARFARIN SODIUM 5 MG/1
5 TABLET ORAL
Refills: 0 | Status: ACTIVE | COMMUNITY

## 2018-11-02 RX ORDER — IPRATROPIUM BROMIDE AND ALBUTEROL SULFATE 2.5; .5 MG/3ML; MG/3ML
0.5-2.5 (3) SOLUTION RESPIRATORY (INHALATION) 4 TIMES DAILY
Refills: 0 | Status: ACTIVE | COMMUNITY
Start: 2018-11-02

## 2018-11-02 RX ORDER — FLUOROURACIL 50 MG/G
5 CREAM TOPICAL
Qty: 40 | Refills: 0 | Status: DISCONTINUED | COMMUNITY
Start: 2018-01-03 | End: 2018-11-02

## 2018-11-02 RX ORDER — POTASSIUM CHLORIDE 1500 MG/1
20 TABLET, EXTENDED RELEASE ORAL
Refills: 0 | Status: DISCONTINUED | COMMUNITY
End: 2018-11-02

## 2018-11-02 RX ORDER — BESIFLOXACIN 6 MG/ML
0.6 SUSPENSION OPHTHALMIC
Refills: 0 | Status: DISCONTINUED | COMMUNITY
End: 2018-11-02

## 2018-11-02 RX ORDER — DOXYCYCLINE HYCLATE 100 MG/1
100 CAPSULE ORAL
Qty: 30 | Refills: 0 | Status: DISCONTINUED | COMMUNITY
Start: 2017-12-27 | End: 2018-11-02

## 2018-11-02 RX ORDER — ALPRAZOLAM 0.25 MG/1
0.25 TABLET ORAL
Refills: 0 | Status: ACTIVE | COMMUNITY
Start: 2018-01-22

## 2018-11-02 RX ORDER — METOPROLOL SUCCINATE 50 MG/1
50 TABLET, EXTENDED RELEASE ORAL
Qty: 180 | Refills: 0 | Status: DISCONTINUED | COMMUNITY
Start: 2018-07-10 | End: 2018-11-02

## 2018-11-02 RX ORDER — LIDOCAINE 5 G/100G
5 OINTMENT TOPICAL
Qty: 3544 | Refills: 0 | Status: DISCONTINUED | COMMUNITY
Start: 2018-01-25 | End: 2018-11-02

## 2018-11-02 RX ORDER — MUPIROCIN 20 MG/G
2 OINTMENT TOPICAL
Qty: 22 | Refills: 0 | Status: DISCONTINUED | COMMUNITY
Start: 2018-02-15 | End: 2018-11-02

## 2018-11-02 RX ORDER — LOTEPREDNOL ETABONATE 5 MG/G
0.5 OINTMENT OPHTHALMIC
Refills: 0 | Status: ACTIVE | COMMUNITY
Start: 2018-11-02

## 2018-11-02 RX ORDER — ALFUZOSIN HCL 10 MG/1
10 TABLET, EXTENDED RELEASE ORAL DAILY
Refills: 0 | Status: ACTIVE | COMMUNITY
Start: 2018-07-20

## 2018-11-02 RX ORDER — OLOPATADINE HYDROCHLORIDE 7 MG/ML
0.7 SOLUTION OPHTHALMIC
Qty: 25 | Refills: 0 | Status: DISCONTINUED | COMMUNITY
Start: 2018-05-23 | End: 2018-11-02

## 2018-11-02 RX ORDER — AMIODARONE HYDROCHLORIDE 200 MG/1
200 TABLET ORAL DAILY
Refills: 0 | Status: ACTIVE | COMMUNITY
Start: 2018-11-02

## 2018-11-02 RX ORDER — WARFARIN SODIUM 7.5 MG/1
7.5 TABLET ORAL DAILY
Refills: 0 | Status: DISCONTINUED | COMMUNITY
Start: 2018-07-20 | End: 2018-11-02

## 2018-11-02 RX ORDER — ALCLOMETASONE DIPROPIONATE 0.5 MG/G
0.05 CREAM TOPICAL
Qty: 60 | Refills: 0 | Status: DISCONTINUED | COMMUNITY
Start: 2018-03-20 | End: 2018-11-02

## 2018-11-02 RX ORDER — FUROSEMIDE 40 MG/1
40 TABLET ORAL
Qty: 90 | Refills: 0 | Status: DISCONTINUED | COMMUNITY
Start: 2018-07-10 | End: 2018-11-02

## 2018-11-02 RX ORDER — METOPROLOL TARTRATE 25 MG/1
25 TABLET, FILM COATED ORAL TWICE DAILY
Refills: 0 | Status: ACTIVE | COMMUNITY
Start: 2018-11-02

## 2018-11-02 RX ORDER — LOTEPREDNOL ETABONATE 5 MG/G
0.5 GEL OPHTHALMIC
Refills: 0 | Status: DISCONTINUED | COMMUNITY
End: 2018-11-02

## 2018-11-02 RX ORDER — LEVOTHYROXINE SODIUM 0.1 MG/1
100 TABLET ORAL
Qty: 90 | Refills: 0 | Status: ACTIVE | COMMUNITY
Start: 2018-03-08

## 2018-11-02 RX ORDER — GABAPENTIN 100 MG/1
100 CAPSULE ORAL DAILY
Refills: 0 | Status: ACTIVE | COMMUNITY
Start: 2018-05-18

## 2018-11-06 DIAGNOSIS — E87.0 HYPEROSMOLALITY AND HYPERNATREMIA: ICD-10-CM

## 2018-11-06 DIAGNOSIS — I13.0 HYPERTENSIVE HEART AND CHRONIC KIDNEY DISEASE WITH HEART FAILURE AND STAGE 1 THROUGH STAGE 4 CHRONIC KIDNEY DISEASE, OR UNSPECIFIED CHRONIC KIDNEY DISEASE: ICD-10-CM

## 2018-11-06 DIAGNOSIS — Z85.46 PERSONAL HISTORY OF MALIGNANT NEOPLASM OF PROSTATE: ICD-10-CM

## 2018-11-06 DIAGNOSIS — C85.90 NON-HODGKIN LYMPHOMA, UNSPECIFIED, UNSPECIFIED SITE: ICD-10-CM

## 2018-11-06 DIAGNOSIS — Z85.828 PERSONAL HISTORY OF OTHER MALIGNANT NEOPLASM OF SKIN: ICD-10-CM

## 2018-11-06 DIAGNOSIS — Z87.891 PERSONAL HISTORY OF NICOTINE DEPENDENCE: ICD-10-CM

## 2018-11-06 DIAGNOSIS — Z66 DO NOT RESUSCITATE: ICD-10-CM

## 2018-11-06 DIAGNOSIS — R59.0 LOCALIZED ENLARGED LYMPH NODES: ICD-10-CM

## 2018-11-06 DIAGNOSIS — I48.92 UNSPECIFIED ATRIAL FLUTTER: ICD-10-CM

## 2018-11-06 DIAGNOSIS — Z96.653 PRESENCE OF ARTIFICIAL KNEE JOINT, BILATERAL: ICD-10-CM
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
## 2018-11-06 DIAGNOSIS — I48.91 UNSPECIFIED ATRIAL FIBRILLATION: ICD-10-CM

## 2018-11-06 DIAGNOSIS — E03.9 HYPOTHYROIDISM, UNSPECIFIED: ICD-10-CM

## 2018-11-06 DIAGNOSIS — N17.9 ACUTE KIDNEY FAILURE, UNSPECIFIED: ICD-10-CM

## 2018-11-06 DIAGNOSIS — I50.33 ACUTE ON CHRONIC DIASTOLIC (CONGESTIVE) HEART FAILURE: ICD-10-CM

## 2018-11-06 DIAGNOSIS — D89.9 DISORDER INVOLVING THE IMMUNE MECHANISM, UNSPECIFIED: ICD-10-CM

## 2018-11-06 DIAGNOSIS — N18.3 CHRONIC KIDNEY DISEASE, STAGE 3 (MODERATE): ICD-10-CM

## 2018-11-06 DIAGNOSIS — B02.29 OTHER POSTHERPETIC NERVOUS SYSTEM INVOLVEMENT: ICD-10-CM

## 2018-11-06 DIAGNOSIS — N40.0 BENIGN PROSTATIC HYPERPLASIA WITHOUT LOWER URINARY TRACT SYMPTOMS: ICD-10-CM

## 2018-11-06 DIAGNOSIS — Z79.01 LONG TERM (CURRENT) USE OF ANTICOAGULANTS: ICD-10-CM

## 2018-11-06 DIAGNOSIS — J15.6 PNEUMONIA DUE TO OTHER GRAM-NEGATIVE BACTERIA: ICD-10-CM

## 2018-11-06 DIAGNOSIS — E87.6 HYPOKALEMIA: ICD-10-CM

## 2018-11-06 DIAGNOSIS — E43 UNSPECIFIED SEVERE PROTEIN-CALORIE MALNUTRITION: ICD-10-CM

## 2018-12-05 NOTE — PHYSICAL THERAPY INITIAL EVALUATION ADULT - WEIGHT-BEARING RESTRICTIONS: GAIT, REHAB EVAL
Operative Report



NAME: SHILOH DELACRUZ

MRN:  [id number]          : 1984 AGE:  34Y

DATE OF SURGERY: 2018                        ROOM:



PREOPERATIVE DIAGNOSIS:

Incomplete AB.



POSTOPERATIVE DIAGNOSIS:

Incomplete AB.



PROCEDURE:

Suction D and C.



SURGEON:

DAVE SONI M.D.



ESTIMATED BLOOD LOSS:

Less than 25 mL.



TISSUE REMOVED OR ALTERED:

Products of conception.



ANESTHESIA:

General.



DESCRIPTION OF PROCEDURE:

The patient was placed in a dorsal lithotomy position, prepped and draped

in sterile fashion.  Speculum was placed.  Cervix was visualized and

grasped with a single-toothed tenaculum.  The uterus sounded to a depth of

9 cm.  The os was opened sufficiently to accept a #8 suction catheter. 

Suction curettage was performed followed by sharp curettage followed by

repeat suction.  The single-toothed tenaculum was then removed and

hemostasis was noted.  Speculum was removed and the procedure was

terminated.  She was taken to recovery room in good condition.



DICTATING PHYSICIAN:  DAVE SONI M.D.





[id number]                  DT: 2018    1008

PHY#: [id number]            DD: 2018    0945

ID:   [id number]           JOB#: [id number]       ACCT: [id number]



cc:DAVE SONI M.D.

> full weight-bearing

## 2018-12-26 NOTE — ED ADULT NURSE NOTE - TEMPLATE LIST FOR HEAD TO TOE ASSESSMENT
Patient called, states she was having problems with the diltiazem white petroleum that was prescribed for her, she states it's causing her a lot of burning and irritation, she can't keep the medication on for longer than 10 minutes, she has to wipe if off because it burns and is painful. Inform patient to stop medication, she states she remember experiencing similar symptoms the first time she was given the same medication, and Linh Bueno NP prescribed her a different topical medication. Review last notes, she was prescribed Nifedipine 0.3% ointment with 1.5% lidocaine. Patient will like to try this again, she states her hemorrhoids are giving her problems also. Inform her, we can call in the nifedipine again, but she will need to follow up with Linh Bueno NP due to not being seen for over a year. Patient agrees with plan, rx called into the [facility]. Patient will keep f/u appt with Linh Bueno NP. She will call to push out appointment if the prescription is working well for her. All questions were answered to patient's satisfaction.    Maria L RODRIGUEZ LPN    
Neuro

## 2018-12-30 ENCOUNTER — INPATIENT (INPATIENT)
Facility: HOSPITAL | Age: 83
LOS: 2 days | Discharge: HOME HEALTH | End: 2019-01-02
Attending: INTERNAL MEDICINE | Admitting: INTERNAL MEDICINE
Payer: MEDICARE

## 2018-12-30 VITALS
HEIGHT: 66 IN | RESPIRATION RATE: 17 BRPM | DIASTOLIC BLOOD PRESSURE: 67 MMHG | SYSTOLIC BLOOD PRESSURE: 125 MMHG | HEART RATE: 71 BPM | OXYGEN SATURATION: 97 % | TEMPERATURE: 98 F | WEIGHT: 149.91 LBS

## 2018-12-30 DIAGNOSIS — Z96.619 PRESENCE OF UNSPECIFIED ARTIFICIAL SHOULDER JOINT: Chronic | ICD-10-CM

## 2018-12-30 DIAGNOSIS — Z96.653 PRESENCE OF ARTIFICIAL KNEE JOINT, BILATERAL: Chronic | ICD-10-CM

## 2018-12-30 LAB
ALBUMIN SERPL ELPH-MCNC: 3.5 G/DL — SIGNIFICANT CHANGE UP (ref 3.3–5)
ALP SERPL-CCNC: 61 U/L — SIGNIFICANT CHANGE UP (ref 40–120)
ALT FLD-CCNC: 21 U/L — SIGNIFICANT CHANGE UP (ref 12–78)
ANION GAP SERPL CALC-SCNC: 6 MMOL/L — SIGNIFICANT CHANGE UP (ref 5–17)
APTT BLD: 42 SEC — HIGH (ref 27.5–36.3)
AST SERPL-CCNC: 19 U/L — SIGNIFICANT CHANGE UP (ref 15–37)
BASOPHILS # BLD AUTO: 0.06 K/UL — SIGNIFICANT CHANGE UP (ref 0–0.2)
BASOPHILS NFR BLD AUTO: 1 % — SIGNIFICANT CHANGE UP (ref 0–2)
BILIRUB SERPL-MCNC: 1.1 MG/DL — SIGNIFICANT CHANGE UP (ref 0.2–1.2)
BUN SERPL-MCNC: 55 MG/DL — HIGH (ref 7–23)
CALCIUM SERPL-MCNC: 9.3 MG/DL — SIGNIFICANT CHANGE UP (ref 8.5–10.1)
CHLORIDE SERPL-SCNC: 108 MMOL/L — SIGNIFICANT CHANGE UP (ref 96–108)
CO2 SERPL-SCNC: 32 MMOL/L — HIGH (ref 22–31)
CREAT SERPL-MCNC: 2.94 MG/DL — HIGH (ref 0.5–1.3)
EOSINOPHIL # BLD AUTO: 0.14 K/UL — SIGNIFICANT CHANGE UP (ref 0–0.5)
EOSINOPHIL NFR BLD AUTO: 2.3 % — SIGNIFICANT CHANGE UP (ref 0–6)
GLUCOSE SERPL-MCNC: 90 MG/DL — SIGNIFICANT CHANGE UP (ref 70–99)
HCT VFR BLD CALC: 31.1 % — LOW (ref 39–50)
HGB BLD-MCNC: 9.7 G/DL — LOW (ref 13–17)
IMM GRANULOCYTES NFR BLD AUTO: 0.5 % — SIGNIFICANT CHANGE UP (ref 0–1.5)
INR BLD: 3.28 RATIO — HIGH (ref 0.88–1.16)
LYMPHOCYTES # BLD AUTO: 1.47 K/UL — SIGNIFICANT CHANGE UP (ref 1–3.3)
LYMPHOCYTES # BLD AUTO: 24.1 % — SIGNIFICANT CHANGE UP (ref 13–44)
MCHC RBC-ENTMCNC: 31.2 GM/DL — LOW (ref 32–36)
MCHC RBC-ENTMCNC: 32.2 PG — SIGNIFICANT CHANGE UP (ref 27–34)
MCV RBC AUTO: 103.3 FL — HIGH (ref 80–100)
MONOCYTES # BLD AUTO: 0.58 K/UL — SIGNIFICANT CHANGE UP (ref 0–0.9)
MONOCYTES NFR BLD AUTO: 9.5 % — SIGNIFICANT CHANGE UP (ref 2–14)
NEUTROPHILS # BLD AUTO: 3.81 K/UL — SIGNIFICANT CHANGE UP (ref 1.8–7.4)
NEUTROPHILS NFR BLD AUTO: 62.6 % — SIGNIFICANT CHANGE UP (ref 43–77)
NRBC # BLD: 0 /100 WBCS — SIGNIFICANT CHANGE UP (ref 0–0)
PLATELET # BLD AUTO: 160 K/UL — SIGNIFICANT CHANGE UP (ref 150–400)
POTASSIUM SERPL-MCNC: 4 MMOL/L — SIGNIFICANT CHANGE UP (ref 3.5–5.3)
POTASSIUM SERPL-SCNC: 4 MMOL/L — SIGNIFICANT CHANGE UP (ref 3.5–5.3)
PROT SERPL-MCNC: 5.9 GM/DL — LOW (ref 6–8.3)
PROTHROM AB SERPL-ACNC: 37.8 SEC — HIGH (ref 10–12.9)
RBC # BLD: 3.01 M/UL — LOW (ref 4.2–5.8)
RBC # FLD: 18.3 % — HIGH (ref 10.3–14.5)
SODIUM SERPL-SCNC: 146 MMOL/L — HIGH (ref 135–145)
WBC # BLD: 6.09 K/UL — SIGNIFICANT CHANGE UP (ref 3.8–10.5)
WBC # FLD AUTO: 6.09 K/UL — SIGNIFICANT CHANGE UP (ref 3.8–10.5)

## 2018-12-30 PROCEDURE — 70450 CT HEAD/BRAIN W/O DYE: CPT | Mod: 26

## 2018-12-30 PROCEDURE — 71045 X-RAY EXAM CHEST 1 VIEW: CPT | Mod: 26

## 2018-12-30 PROCEDURE — 99285 EMERGENCY DEPT VISIT HI MDM: CPT

## 2018-12-30 PROCEDURE — 93010 ELECTROCARDIOGRAM REPORT: CPT

## 2018-12-30 PROCEDURE — 71250 CT THORAX DX C-: CPT | Mod: 26

## 2018-12-30 RX ORDER — METOPROLOL TARTRATE 50 MG
25 TABLET ORAL
Qty: 0 | Refills: 0 | Status: DISCONTINUED | OUTPATIENT
Start: 2018-12-30 | End: 2019-01-02

## 2018-12-30 RX ORDER — INFLUENZA VIRUS VACCINE 15; 15; 15; 15 UG/.5ML; UG/.5ML; UG/.5ML; UG/.5ML
0.5 SUSPENSION INTRAMUSCULAR ONCE
Qty: 0 | Refills: 0 | Status: COMPLETED | OUTPATIENT
Start: 2018-12-30 | End: 2019-01-02

## 2018-12-30 RX ORDER — LEVOTHYROXINE SODIUM 125 MCG
100 TABLET ORAL DAILY
Qty: 0 | Refills: 0 | Status: DISCONTINUED | OUTPATIENT
Start: 2018-12-30 | End: 2019-01-02

## 2018-12-30 RX ORDER — ALPRAZOLAM 0.25 MG
0.5 TABLET ORAL AT BEDTIME
Qty: 0 | Refills: 0 | Status: DISCONTINUED | OUTPATIENT
Start: 2018-12-30 | End: 2019-01-02

## 2018-12-30 RX ORDER — GABAPENTIN 400 MG/1
100 CAPSULE ORAL THREE TIMES A DAY
Qty: 0 | Refills: 0 | Status: DISCONTINUED | OUTPATIENT
Start: 2018-12-30 | End: 2018-12-31

## 2018-12-30 RX ORDER — ACETAMINOPHEN 500 MG
650 TABLET ORAL EVERY 6 HOURS
Qty: 0 | Refills: 0 | Status: DISCONTINUED | OUTPATIENT
Start: 2018-12-30 | End: 2019-01-02

## 2018-12-30 RX ORDER — LACTOBACILLUS ACIDOPHILUS 100MM CELL
1 CAPSULE ORAL DAILY
Qty: 0 | Refills: 0 | Status: DISCONTINUED | OUTPATIENT
Start: 2018-12-30 | End: 2019-01-02

## 2018-12-30 RX ORDER — ALBUTEROL 90 UG/1
1 AEROSOL, METERED ORAL EVERY 4 HOURS
Qty: 0 | Refills: 0 | Status: DISCONTINUED | OUTPATIENT
Start: 2018-12-30 | End: 2019-01-02

## 2018-12-30 RX ORDER — ALPRAZOLAM 0.25 MG
0.25 TABLET ORAL DAILY
Qty: 0 | Refills: 0 | Status: DISCONTINUED | OUTPATIENT
Start: 2018-12-30 | End: 2019-01-02

## 2018-12-30 RX ORDER — IPRATROPIUM/ALBUTEROL SULFATE 18-103MCG
3 AEROSOL WITH ADAPTER (GRAM) INHALATION EVERY 6 HOURS
Qty: 0 | Refills: 0 | Status: DISCONTINUED | OUTPATIENT
Start: 2018-12-30 | End: 2019-01-02

## 2018-12-30 RX ORDER — TIOTROPIUM BROMIDE 18 UG/1
1 CAPSULE ORAL; RESPIRATORY (INHALATION) DAILY
Qty: 0 | Refills: 0 | Status: DISCONTINUED | OUTPATIENT
Start: 2018-12-30 | End: 2019-01-01

## 2018-12-30 RX ORDER — AMIODARONE HYDROCHLORIDE 400 MG/1
200 TABLET ORAL DAILY
Qty: 0 | Refills: 0 | Status: DISCONTINUED | OUTPATIENT
Start: 2018-12-30 | End: 2019-01-02

## 2018-12-30 RX ORDER — SENNA PLUS 8.6 MG/1
2 TABLET ORAL AT BEDTIME
Qty: 0 | Refills: 0 | Status: DISCONTINUED | OUTPATIENT
Start: 2018-12-30 | End: 2019-01-02

## 2018-12-30 RX ADMIN — Medication 3 MILLILITER(S): at 19:55

## 2018-12-30 RX ADMIN — Medication 0.5 MILLIGRAM(S): at 21:03

## 2018-12-30 RX ADMIN — Medication 1 DROP(S): at 21:14

## 2018-12-30 RX ADMIN — Medication 25 MILLIGRAM(S): at 18:17

## 2018-12-30 RX ADMIN — GABAPENTIN 100 MILLIGRAM(S): 400 CAPSULE ORAL at 21:03

## 2018-12-30 RX ADMIN — Medication 650 MILLIGRAM(S): at 18:17

## 2018-12-30 RX ADMIN — Medication 1 TABLET(S): at 18:18

## 2018-12-30 NOTE — ED ADULT NURSE NOTE - NSSISCREENINGQ3_ED_A_ED
Problem: Patient Care Overview  Goal: Plan of Care/Patient Progress Review  Outcome: Therapy, progress toward functional goals is gradual  Pt received from OR post interstitial needle removal. VSS. Denies pain. Able to move lower extremities but still with residual numbness. Sat up on the side of bed but has not ambulated yet. Gauze in perineal area with bloody drainage, will monitor. Pt tolerated regular diet. MD updated on pt's status, awaiting void and ambulation for pt to be dc'd to home this alejandra. Daughter at bedside, very supportive.  Continue to monitor bladder function and ambulation and DC when able.       No

## 2018-12-30 NOTE — INPATIENT CERTIFICATION FOR MEDICARE PATIENTS - RISKS OF ADVERSE EVENTS
Concern for cardiopulmonary deterioration/Concern for neurologic deterioration/Concern for renal deterioration/fall/Other:

## 2018-12-30 NOTE — H&P ADULT - NSHPPHYSICALEXAM_GEN_ALL_CORE
Vital Signs Last 24 Hrs  T(C): 36.4 (30 Dec 2018 11:12), Max: 36.4 (30 Dec 2018 10:27)  T(F): 97.5 (30 Dec 2018 11:12), Max: 97.6 (30 Dec 2018 10:27)  HR: 88 (30 Dec 2018 14:17) (71 - 88)  BP: 104/62 (30 Dec 2018 14:17) (104/62 - 125/67)  BP(mean): --  RR: 17 (30 Dec 2018 11:12) (17 - 17)  SpO2: 97% (30 Dec 2018 14:17) (97% - 98%)

## 2018-12-30 NOTE — ED PROVIDER NOTE - OBJECTIVE STATEMENT
89 y/o M with a PMHx of CHF, Postherpetic neuralgia on Gabapentin presents to the ED for an evaluation of weakness. Pt was dx with PNA x 2 months ago, D/C from rehab x 1 week, since then has not been feeling well. Over the week has been feeling globally weak. Today patient stood up, legs gave out and fell.  Per aid:  pt developed a cough that has progressively worsening; difficulty breathing and wheezing this morning - gave nebulizer treatment this morning. Daughter and Aid at bedside. 89 y/o M with a PMHx of CHF, Postherpetic neuralgia on Gabapentin presents to the ED for an evaluation of weakness. Pt was dx with PNA x 2 months ago, D/C from rehab x 1 week, since then has not been feeling well. Over the week has been feeling globally weak. Today patient stood up, legs gave out and fell.  Per aid:  last night pt developed a cough that has progressively worsened; difficulty breathing and wheezing this morning - gave nebulizer treatment PTA. Daughter and Aid at bedside. 89 y/o M with a PMHx of CHF, Postherpetic neuralgia on Gabapentin presents to the ED for an evaluation of weakness. Pt was dx with PNA x 2 months ago, D/C from rehab x 1 week. Since being D/C home pt has been feeling globally weak.  This morning patient stood up, legs gave out and fell. Per aid:  last night pt developed a cough that has progressively worsened; difficulty breathing and wheezing this morning - gave nebulizer treatment PTA. Daughter and Aid at bedside.
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
## 2018-12-30 NOTE — H&P ADULT - HISTORY OF PRESENT ILLNESS
89 year old male with anxiety, AFIB, BPH, CKD,HTN, hypothyroid,  postherpetic neuralgia, prostate ca , small B cell lymphoma presented to  ED by ambulance c/o weakness        Pt was discharged from rehab 1 week ago and started feeling worse 2 days ago.  Previously had been admitted to  October w SOB and PNA.  Was discarhed to rehab and then was discharged home.  States he felt progressively weaker    PCP/cardio  Dr. Joshi  Onc  Dr. Mckeon  ID Dr. Jeni Franklin  Renal Dr. Larson    PAST MEDICAL HX  A-fib    Anxiety    Basal cell carcinoma   B cell CLL   BPH (benign prostatic hyperplasia)    Chronic diarrhea    CKD (chronic kidney disease)    Hypertension    Hypothyroid    Nephrolithiasis  Postherpetic neuralgia: zoster 1 yr ago;  scalp and left eye; treated w cyberknife   Prostate CA 1999 s/p XRT + casodex, refused lupron: PSA elevated  Pulmonary embolus  SCC squamous cell CA of skin  Small B cell lymphoma; prior treatment w rituxan    PAST SURGICAL HX  Cyberknife treatment for trigeminal neuralgia w Dr. Tillman/ Dr. Levine   Knee replacement s/p bilateral unicompartmental  Shoulder replacement      FAMILY HX  Father    Ischemic heart disease, CHF: father, mother , brother   Malignant neoplasm of thyroid: Daughter         SOCIAL HX  Lives at home with aide   Former smoker quit cigars >40 years prior   no illicit drug use 89 year old male with anxiety, AFIB, BPH, CKD,HTN, hypothyroid,  postherpetic neuralgia, prostate ca , small B cell lymphoma presented to  ED by ambulance c/o weakness        Pt was discharged from rehab 2 weeks ago and started feeling weak. Has not been able to sleep for the past 2 days and has a nonproductive cough w wheezing according to his nurse.  She gives him albuterol nebs w partial relief.  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     Previously had been admitted to  October w SOB and PNA.  Was discharged to rehab and then home.  States he felt progressively weaker and has had difficulty standing x several days.  Today slide to floor. No injury.      In ED labs done, increased creatinine w acute on chronic CKD, weakness, unable to ambulate  Spoke to pt and his nurse about the recent events.  Daughter had requested gabapentin increased as noted in med rec    PCP/cardio  Dr. Joshi  Onc  Dr. Linda Franklin  Renal Dr. Larson    PAST MEDICAL HX  A-fib    Anxiety    Basal cell carcinoma   B cell CLL   BPH (benign prostatic hyperplasia)    Chronic diarrhea    CKD (chronic kidney disease)    Hypertension    Hypothyroid    Nephrolithiasis  Postherpetic neuralgia: zoster 1 yr ago;  scalp and left eye; treated w cyberknife   Prostate CA 1999 s/p XRT + casodex, refused lupron: PSA elevated  Pulmonary embolus  SCC squamous cell CA of skin  Small B cell lymphoma; prior treatment w rituxan    PAST SURGICAL HX  Cyber-knife treatment for trigeminal neuralgia w Dr. Tillman/ Dr. 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

## 2018-12-30 NOTE — PATIENT PROFILE ADULT - NSPROEDALEARNPREF_GEN_A_NUR
audio/group instruction/verbal instruction/computer/internet/individual instruction/skill demonstration/pictorial/video/written material

## 2018-12-30 NOTE — ED PROVIDER NOTE - CONSTITUTIONAL, MLM
normal... frail elderly male, awake, alert, oriented to person, place, time/situation and in no apparent distress.

## 2018-12-30 NOTE — H&P ADULT - ATTENDING COMMENTS
as above  social work re discharge planning    would consider PT once able to stand as above  social work re discharge planning    Follow up  gabapentin level  EKG   consider PT once able to stand

## 2018-12-30 NOTE — H&P ADULT - ASSESSMENT
89 year old male with anxiety, AFIB, BPH, CKD ,HTN, hypothyroid,  postherpetic neuralgia, prostate ca , small B cell lymphoma presented to  ED by ambulance c/o weakness    1) Weakness - likely multifactorial  am suspicious that weakness and fatigue may be due to high doses of gabapentin in setting of declining renal fx  1. admit to hospital  2. gabapentin level  3. reduce dose to Gabapentin 100 mg 3 times a day    2) Acute on chronic renal failure  1. I/O  2. BMP, P  3. hold lasix  4. adjusting yue for renal fx    3) Cough  nonproductive  no evidence of PNA  poss due to compression by mediastinal LN  1. duoneb q 6 hrs    4) AFIB  supratherapeutic INR  1. hold coumadin tonight  2, repeat PT/INR in AM  3. pacerone 200 mg po daily  4.  metoprolol 25 mg BID    5) HTN  1. metoprolol w parameters  2. holding lasix for now    6) Hypothyroid  1. levothyroxine 100 mcg      IMPROVE VTE Individual Risk Assessment    RISK                                                                Points    [ X ] Previous VTE                                                  3    [  ] Thrombophilia                                               2    [  ] Lower limb paralysis                                      2        (unable to hold up >15 seconds)      [ X ] Current Cancer                                              2         (within 6 months)    [  ] Immobilization > 24 hrs                                1    [  ] ICU/CCU stay > 24 hours                              1    [X  ] Age > 60                                                      1    IMPROVE VTE Score __6_______  on coumadin

## 2018-12-30 NOTE — H&P ADULT - NSHPLABSRESULTS_GEN_ALL_CORE
ECHO:< from: Transthoracic Echocardiogram (06.26.18 @ 10:03) >  mmary     Moderate (2+) mitral regurgitation is present.   Mild mitral annular calcification is present.   Significant fibrocalcific changes noted to the aortic valve leaflets with   restriction in leaflet excursion. Peak transaortic gradient is 45 mmHg;   this finding is consistent with mild to moderate aortic stenosis.   Trace aortic regurgitation is present.   Mild to moderate tricuspid valve regurgitation is present.   Normal appearing pulmonic valve structure and function.   The left atrium is mildly dilated.   The left ventricle is normal in size, wall motion and contractility.   Mild concentric left ventricular hypertrophy is present.   Estimated left ventricular ejection fraction is 55-60 %.   A PFO is noted with color doppler.   Normal appearing right ventricle structure and function.   All visualized extra cardiac structures appears to be normal.   No evidence of pericardial effusion.     Signature     ----------------------------------------------------------------   Electronically signed by Sina Trent MD(Interpreting   physician) on 06/27/2018 07:41 AM   ----------------------------------------------------------------    < end of copied text > 1) HEAD CT  COMPARISON: CT head 6/7/2018     FINDINGS:       There is no evidence of intraparenchymal or extraaxial hemorrhage.     There is no CT evidence of large vessel acute infarct. Mild   periventricular chronic microvascular ischemic changes. No mass effect is   found in the brain.  No evidence of midline shift or herniation pattern.    The ventricles, sulci and basal cisterns appear unremarkable.       Visualized paranasal sinuses are clear.  Tiny right lateral scalp hematoma. No fractures.    IMPRESSION:       No acute intracranial findings.        2) CT CHEST - NONCONTRAST    FINDINGS: Comparison is made to CT chest of 10/23/2018.    The thyroid gland appears intact.    Small to moderate bilateral pleural effusions are increased in size since   prior exam.  There is mild underlying atelectasis suggested .  No   definite areas of pneumonia. The trachea and major bronchi are patent.    There are numerous markedly enlarged lymph nodes in the bilateral   mediastinum, hilar and axillary regions, unchanged since prior exam,   compatible with known lymphoma.     The heart size is normal.      Limited evaluation of the upper abdomen demonstrates several scattered hepatic cysts.    There are numerous prominent lymph nodes within the abdominal mesentery and visualized retroperitoneal regions.    Multilevel degenerative changes of the spine are identified.    Right humeral prosthesis is present.      IMPRESSION:   Small to moderate bilateral pleural effusions are increased   in size since prior exam.  There is mild underlying atelectasis suggested.  No definite areas of pneumonia.  There are numerous markedly enlarged   lymph nodes in the bilateral mediastinum, hilar and axillary regions, unchanged since prior exam, compatible with known lymphoma. There are   numerous prominent lymph nodes within the abdominal mesentery and visualized retroperitoneal regions.        CXR (unofficial by me)  small  pleural effusions  nodular changes no clear infiltrate                              __________________________________________________________________________________________________________________________________________________________________________________________________________________________________  ECHO:< from: Transthoracic Echocardiogram (06.26.18 @ 10:03) >  Summary     Moderate (2+) mitral regurgitation is present.   Mild mitral annular calcification is present.   Significant fibrocalcific changes noted to the aortic valve leaflets with   restriction in leaflet excursion. Peak transaortic gradient is 45 mmHg;   this finding is consistent with mild to moderate aortic stenosis.   Trace aortic regurgitation is present.   Mild to moderate tricuspid valve regurgitation is present.   Normal appearing pulmonic valve structure and function.   The left atrium is mildly dilated.   The left ventricle is normal in size, wall motion and contractility.   Mild concentric left ventricular hypertrophy is present.   Estimated left ventricular ejection fraction is 55-60 %.   A PFO is noted with color doppler.   Normal appearing right ventricle structure and function.   All visualized extra cardiac structures appears to be normal.   No evidence of pericardial effusion.     Signature     ----------------------------------------------------------------   Electronically signed by Sina Trent MD(Interpreting   physician) on 06/27/2018 07:41 AM   ----------------------------------------------------------------    < end of copied text >

## 2018-12-30 NOTE — ED ADULT TRIAGE NOTE - CHIEF COMPLAINT QUOTE
generalized weakness since this morning, lowered himself to the ground using his walker secondary to weakness. hx of recent pneumonia/CHF

## 2018-12-30 NOTE — ED ADULT NURSE NOTE - NSIMPLEMENTINTERV_GEN_ALL_ED
Implemented All Fall with Harm Risk Interventions:  [place] to call system. Call bell, personal items and telephone within reach. Instruct patient to call for assistance. Room bathroom lighting operational. Non-slip footwear when patient is off stretcher. Physically safe environment: no spills, clutter or unnecessary equipment. Stretcher in lowest position, wheels locked, appropriate side rails in place. Provide visual cue, wrist band, yellow gown, etc. Monitor gait and stability. Monitor for mental status changes and reorient to person, place, and time. Review medications for side effects contributing to fall risk. Reinforce activity limits and safety measures with patient and family. Provide visual clues: red socks.

## 2018-12-31 LAB
APPEARANCE UR: CLEAR — SIGNIFICANT CHANGE UP
BACTERIA # UR AUTO: NEGATIVE — SIGNIFICANT CHANGE UP
BILIRUB UR-MCNC: NEGATIVE — SIGNIFICANT CHANGE UP
COLOR SPEC: YELLOW — SIGNIFICANT CHANGE UP
DIFF PNL FLD: NEGATIVE — SIGNIFICANT CHANGE UP
EPI CELLS # UR: SIGNIFICANT CHANGE UP
FOLATE SERPL-MCNC: 9.5 NG/ML — SIGNIFICANT CHANGE UP
GLUCOSE UR QL: NEGATIVE MG/DL — SIGNIFICANT CHANGE UP
HCT VFR BLD CALC: 28.1 % — LOW (ref 39–50)
HGB BLD-MCNC: 9.1 G/DL — LOW (ref 13–17)
INR BLD: 2.76 RATIO — HIGH (ref 0.88–1.16)
KETONES UR-MCNC: NEGATIVE — SIGNIFICANT CHANGE UP
LEUKOCYTE ESTERASE UR-ACNC: NEGATIVE — SIGNIFICANT CHANGE UP
MCHC RBC-ENTMCNC: 32.4 GM/DL — SIGNIFICANT CHANGE UP (ref 32–36)
MCHC RBC-ENTMCNC: 32.5 PG — SIGNIFICANT CHANGE UP (ref 27–34)
MCV RBC AUTO: 100.4 FL — HIGH (ref 80–100)
NITRITE UR-MCNC: NEGATIVE — SIGNIFICANT CHANGE UP
NRBC # BLD: 0 /100 WBCS — SIGNIFICANT CHANGE UP (ref 0–0)
OB PNL STL: NEGATIVE — SIGNIFICANT CHANGE UP
PH UR: 7 — SIGNIFICANT CHANGE UP (ref 5–8)
PHOSPHATE SERPL-MCNC: 3.2 MG/DL — SIGNIFICANT CHANGE UP (ref 2.5–4.5)
PLATELET # BLD AUTO: 159 K/UL — SIGNIFICANT CHANGE UP (ref 150–400)
PROT UR-MCNC: 30 MG/DL
PROTHROM AB SERPL-ACNC: 31.6 SEC — HIGH (ref 10–12.9)
RAPID RVP RESULT: SIGNIFICANT CHANGE UP
RBC # BLD: 2.8 M/UL — LOW (ref 4.2–5.8)
RBC # FLD: 18.6 % — HIGH (ref 10.3–14.5)
RBC CASTS # UR COMP ASSIST: SIGNIFICANT CHANGE UP /HPF (ref 0–4)
SP GR SPEC: 1.01 — SIGNIFICANT CHANGE UP (ref 1.01–1.02)
TSH SERPL-MCNC: 7.5 UU/ML — HIGH (ref 0.34–4.82)
UROBILINOGEN FLD QL: NEGATIVE MG/DL — SIGNIFICANT CHANGE UP
VIT B12 SERPL-MCNC: 769 PG/ML — SIGNIFICANT CHANGE UP (ref 232–1245)
WBC # BLD: 6.12 K/UL — SIGNIFICANT CHANGE UP (ref 3.8–10.5)
WBC # FLD AUTO: 6.12 K/UL — SIGNIFICANT CHANGE UP (ref 3.8–10.5)
WBC UR QL: SIGNIFICANT CHANGE UP

## 2018-12-31 RX ORDER — GABAPENTIN 400 MG/1
200 CAPSULE ORAL THREE TIMES A DAY
Qty: 0 | Refills: 0 | Status: COMPLETED | OUTPATIENT
Start: 2018-12-31 | End: 2018-12-31

## 2018-12-31 RX ORDER — GABAPENTIN 400 MG/1
200 CAPSULE ORAL AT BEDTIME
Qty: 0 | Refills: 0 | Status: DISCONTINUED | OUTPATIENT
Start: 2019-01-01 | End: 2019-01-02

## 2018-12-31 RX ORDER — GABAPENTIN 400 MG/1
200 CAPSULE ORAL ONCE
Qty: 0 | Refills: 0 | Status: COMPLETED | OUTPATIENT
Start: 2018-12-31 | End: 2018-12-31

## 2018-12-31 RX ORDER — FUROSEMIDE 40 MG
40 TABLET ORAL ONCE
Qty: 0 | Refills: 0 | Status: COMPLETED | OUTPATIENT
Start: 2018-12-31 | End: 2018-12-31

## 2018-12-31 RX ORDER — WARFARIN SODIUM 2.5 MG/1
7.5 TABLET ORAL AT BEDTIME
Qty: 0 | Refills: 0 | Status: DISCONTINUED | OUTPATIENT
Start: 2018-12-31 | End: 2019-01-01

## 2018-12-31 RX ORDER — GABAPENTIN 400 MG/1
100 CAPSULE ORAL
Qty: 0 | Refills: 0 | Status: DISCONTINUED | OUTPATIENT
Start: 2019-01-01 | End: 2019-01-02

## 2018-12-31 RX ORDER — GABAPENTIN 400 MG/1
300 CAPSULE ORAL THREE TIMES A DAY
Qty: 0 | Refills: 0 | Status: DISCONTINUED | OUTPATIENT
Start: 2018-12-31 | End: 2018-12-31

## 2018-12-31 RX ADMIN — GABAPENTIN 100 MILLIGRAM(S): 400 CAPSULE ORAL at 05:22

## 2018-12-31 RX ADMIN — GABAPENTIN 200 MILLIGRAM(S): 400 CAPSULE ORAL at 21:07

## 2018-12-31 RX ADMIN — Medication 25 MILLIGRAM(S): at 18:05

## 2018-12-31 RX ADMIN — Medication 100 MICROGRAM(S): at 05:23

## 2018-12-31 RX ADMIN — Medication 40 MILLIGRAM(S): at 16:26

## 2018-12-31 RX ADMIN — GABAPENTIN 200 MILLIGRAM(S): 400 CAPSULE ORAL at 11:50

## 2018-12-31 RX ADMIN — Medication 1 DROP(S): at 05:24

## 2018-12-31 RX ADMIN — Medication 3 MILLILITER(S): at 21:16

## 2018-12-31 RX ADMIN — GABAPENTIN 200 MILLIGRAM(S): 400 CAPSULE ORAL at 16:26

## 2018-12-31 RX ADMIN — AMIODARONE HYDROCHLORIDE 200 MILLIGRAM(S): 400 TABLET ORAL at 11:50

## 2018-12-31 RX ADMIN — Medication 3 MILLILITER(S): at 14:39

## 2018-12-31 RX ADMIN — Medication 0.5 MILLIGRAM(S): at 21:07

## 2018-12-31 RX ADMIN — Medication 1 DROP(S): at 18:05

## 2018-12-31 RX ADMIN — Medication 25 MILLIGRAM(S): at 05:23

## 2018-12-31 RX ADMIN — WARFARIN SODIUM 7.5 MILLIGRAM(S): 2.5 TABLET ORAL at 21:06

## 2018-12-31 RX ADMIN — Medication 0.25 MILLIGRAM(S): at 11:50

## 2018-12-31 RX ADMIN — Medication 3 MILLILITER(S): at 08:49

## 2018-12-31 RX ADMIN — Medication 1 TABLET(S): at 11:50

## 2018-12-31 RX ADMIN — Medication 3 MILLILITER(S): at 02:30

## 2018-12-31 NOTE — CONSULT NOTE ADULT - SUBJECTIVE AND OBJECTIVE BOX
Pulmonary Consult    89 year old male with anxiety, AFIB, BPH, CKD,HTN, hypothyroid,  postherpetic neuralgia, prostate ca , small B cell lymphoma presented to  ED by ambulance c/o weakness  Pt was discharged from rehab 2 weeks ago and started feeling weak. Has not been able to sleep for the past 2 days and has a nonproductive cough w wheezing according to his nurse.  She gives him albuterol nebs w partial relief.  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  Previously had been admitted to  October w SOB and PNA.  Was discharged to rehab and then home.  States he felt progressively weaker and has had difficulty standing x several days.  Today slide to floor. No injury.  In ED labs done, increased creatinine w acute on chronic CKD, weakness, unable to ambulate.  patient is poor historian and noted admitting physician history of present illness   He says his reason for the admission is weakness and unable to walk   He has cough with no associated fever with the mucus or increasing sob or wheezing or chest pain   His ct findings noted that shows bilateral effusion with the chf with the marginal increase with mediastinal adenopathy   compressive atelectasis with the mild peribronchial infiltrates . no gross pneumonia     PAST MEDICAL & SURGICAL HISTORY:  CKD (chronic kidney disease)  BPH (benign prostatic hyperplasia)  Postherpetic neuralgia  Chronic diarrhea  Prostate CA  Basal cell carcinoma  Lymphoma  Anxiety  Hypothyroid  Hypertension  A-fib  CHF   H/O shoulder replacement  S/P bilateral unicompartmental knee replacement        FAMILY HISTORY:  Family history of ischemic heart disease (Father, Mother)      SOCIAL HISTORY:  no smoking or iv drug or alcohol abuse     Allergies    No Known Allergies    Intolerances        HOME MEDICATIONS:    Home Medications:  albuterol 2.5 mg/3 mL (0.083%) inhalation solution: 3 milliliter(s) inhaled every 6 hours (30 Dec 2018 14:59)  alfuzosin 10 mg oral tablet, extended release: 1 tab(s) orally once a day (30 Dec 2018 14:59)  ALPRAZolam 0.25 mg oral tablet: 1 tab(s) orally once a day (30 Dec 2018 14:59)  ALPRAZolam 0.5 mg oral tablet: orally once a day (at bedtime) (30 Dec 2018 14:59)  furosemide 40 mg oral tablet: 1 tab(s) orally once a day (30 Dec 2018 14:59)  gabapentin 300 mg oral capsule: 2  orally 2 times a day (30 Dec 2018 14:59)  gabapentin 300 mg oral capsule: 3 cap(s) orally once a day (at bedtime) (30 Dec 2018 14:59)  lactobacillus acidophilus oral capsule: 1 cap(s) orally once a day (30 Dec 2018 14:59)  levothyroxine 100 mcg (0.1 mg) oral tablet: 1 tab(s) orally once a day (30 Dec 2018 14:59)  Lotemax 0.5% ophthalmic ointment: 1 application in each eye 2 times a day (30 Dec 2018 14:59)  Metoprolol Tartrate 25 mg oral tablet: 1 tab(s) orally 2 times a day (30 Dec 2018 14:59)  Neurontin 100 mg oral capsule: 2 cap(s) orally once a day (30 Dec 2018 14:59)  Pacerone 200 mg oral tablet: 1 tab(s) orally once a day (30 Dec 2018 14:59)  Refresh ophthalmic solution: 1 drop(s) in each eye 2 times a day (30 Dec 2018 14:59)  warfarin 5 mg oral tablet: 7.5 tab(s) orally once a day (30 Dec 2018 14:59)    REVIEW OF SYSTEMS:  Constitutional: positive for the weakness and fatigue   Eyes: positive for the itching of the eyes   ENT:  No post nasal drip. No epistaxis. No throat pain. No sore throat. No difficulty swallowing.   CV: No chest pain. No palpitations. No lightheadedness or dizziness.   Resp: positive for the productive cough and negative for the wheezing   GI: No nausea. No vomiting. No diarrhea or abdominal pain   MSK: No joint pain or pain in any extremities  Integumentary: No skin lesions. No pedal edema.  Neurological: genaralized weakness and able to lift the legs     OBJECTIVE:  Vital Signs Last 24 Hrs  T(C): 36.8 (31 Dec 2018 11:36), Max: 36.8 (31 Dec 2018 11:36)  T(F): 98.3 (31 Dec 2018 11:36), Max: 98.3 (31 Dec 2018 11:36)  HR: 75 (31 Dec 2018 14:40) (58 - 79)  BP: 99/54 (31 Dec 2018 13:30) (99/54 - 121/64)  BP(mean): --  RR: 16 (31 Dec 2018 11:36) (16 - 18)  SpO2: 94% (31 Dec 2018 14:40) (92% - 96%)      PHYSICAL EXAM:  General: Awake, alert, partially oriented   HEENT: Atraumatic, normocephalic.   Neck: No JVD no lymphadenopathy   Respiratory: normal vesicular breathing with decreased breath sounds in the bases   Cardiovascular: S1 S2 normal. No murmurs, rubs or gallops.   Abdomen: Soft, non-tender, non-distended. No organomegaly.  Extremities: . Peripheral pulse palpable. No pedal edema.   Skin: No rashes or skin lesions  Neurological moving all the extremities and unable to have full evaluation   Psychiatry: Appropriate mood and affect.    HOSPITAL MEDICATIONS:  MEDICATIONS  (STANDING):  ALBUTerol    90 MICROgram(s) HFA Inhaler 1 Puff(s) Inhalation every 4 hours  ALBUTerol/ipratropium for Nebulization 3 milliLiter(s) Nebulizer every 6 hours  ALPRAZolam 0.25 milliGRAM(s) Oral daily  ALPRAZolam 0.5 milliGRAM(s) Oral at bedtime  amiodarone    Tablet 200 milliGRAM(s) Oral daily  artificial tears (preservative free) Ophthalmic Solution 1 Drop(s) Both EYES two times a day  gabapentin 200 milliGRAM(s) Oral three times a day  influenza   Vaccine 0.5 milliLiter(s) IntraMuscular once  lactobacillus acidophilus 1 Tablet(s) Oral daily  levothyroxine 100 MICROGram(s) Oral daily  metoprolol tartrate 25 milliGRAM(s) Oral two times a day  tiotropium 18 MICROgram(s) Capsule 1 Capsule(s) Inhalation daily  warfarin 7.5 milliGRAM(s) Oral at bedtime    MEDICATIONS  (PRN):  acetaminophen   Tablet .. 650 milliGRAM(s) Oral every 6 hours PRN Temp greater or equal to 38C (100.4F), Mild Pain (1 - 3)  senna 2 Tablet(s) Oral at bedtime PRN Constipation      LABS:                        9.1    6.12  )-----------( 159      ( 31 Dec 2018 07:18 )             28.1     12-30    146<H>  |  108  |  55<H>  ----------------------------<  90  4.0   |  32<H>  |  2.94<H>    Ca    9.3      30 Dec 2018 11:34  Phos  3.2     12-31    TPro  5.9<L>  /  Alb  3.5  /  TBili  1.1  /  DBili  x   /  AST  19  /  ALT  21  /  AlkPhos  61  12-30    PT/INR - ( 31 Dec 2018 07:18 )   PT: 31.6 sec;   INR: 2.76 ratio         PTT - ( 30 Dec 2018 11:34 )  PTT:42.0 sec        < from: CT Chest No Cont (12.30.18 @ 13:37) >  CT chest without contrast    CLINICAL INFORMATION:  History of lymphoma, had pneumonia in October 2018, with weakness and continued cough    TECHNIQUE:  Contiguous axial sections were obtained through the chest   using single helical acquisition.  In addition, 2D sagittal and coronal   reformatted images obtained.   This scan was performed using automatic   exposure control (radiation dose reduction software) to obtain a   diagnostic image quality scan with patient dose as low as reasonably   achievable.        FINDINGS: Comparison is made to CT chest of 10/23/2018.    The thyroid gland appears intact.    Smallto moderate bilateral pleural effusions are increased in size since   prior exam.  There is mild underlying atelectasis suggested .  No   definite areas of pneumonia. The trachea and major bronchi are patent.    There are numerous markedly enlarged lymph nodes in the bilateral   mediastinum, hilar and axillary regions, unchanged since prior exam,   compatible with known lymphoma.     The heart size is normal.      Limited evaluation of the upper abdomen demonstrates several scattered   hepatic cysts.    There are numerous prominent lymph nodes within the abdominal mesentery   and visualized retroperitoneal regions.    Multilevel degenerative changes of the spine are identified.    Right humeral prosthesis is present.      IMPRESSION: Small tomoderate bilateral pleural effusions are increased   in size since prior exam.  There is mild underlying atelectasis suggested   .  No definite areas of pneumonia.  There are numerous markedly enlarged   lymph nodes in the bilateral mediastinum, hilar and axillary regions,   unchanged since prior exam, compatible with known lymphoma. There are   numerous prominent lymph nodes within the abdominal mesentery and   visualized retroperitoneal regions.    < end of copied text >

## 2018-12-31 NOTE — CONSULT NOTE ADULT - ASSESSMENT
- chronic cough rule out mild aspiration and might also related with the underlying chf with no new pneumonia in the ct scan of the chest with the mild peribronchial thickening  need to exclude any viral infection .  - small to moderate size effusion with the known patient with the diastolic dysfunction   - acute on chronic ckd rule out cardiorenal syndrome  - weakness likely medication induced with the worsening renal function rule out amiodarone induced fatigue   - known patent with the lymphoma with the stable lymphadenopathy     PLAN     - symptomatic relief for the cough   - send RVP panel if not done now   - monitor fever spikes   - re adust the medication .  - continue to observe with out antibiotics for now   - diuretics as tolerated by the renal function to get negative balance with the monitoring of the renal function   - 02 supplementation . continue with the anticoagulation with less clinical suspicion for the P.E

## 2018-12-31 NOTE — CONSULT NOTE ADULT - SUBJECTIVE AND OBJECTIVE BOX
Chief complaints.  : Complains of increasing weakness and recurrent  episodes of LE weakness.    HPI:  89 yo man with PMHX significant for CHF, HTN, Hx of Lymphoma treated with Rituxan.  S/p Admission in 10/2018 for multifocal PNA associated with LETICIA/CKD.  Pt was d/cecelia to Rehab.   Reports improved ability to ambulate however, did not greatly improve and d/cecelia home 2 weeks ago.   At home has had repeated episodes of weakness and at times unable to stand up.  Denies fever at home,  Denies any chest discomfort or SOB with weakness.  Unclear to if related to timing of meds.  Has not noted color of stool.    PMHX and PSHX.  1.CKD ( 12/2015--Creat 1.63), Post recent hosp for CHF.  Creat 1.7 on d/c 6/2018.  2.A FIB  3.HTN  4.Hx of Prostate CA ( Post RT)  5.Hx of Lymphoma Post Rituxan therapy.  6.Hx of Postherpetic Neuralgia  7.Hypothyroidism  8.Hx of Chronic Diarrhea.  9.Recent admission for CHF.    FAMILY HISTORY:  Family history of ischemic heart disease (Father, Mother)    SOCIAL HISTORY :  Former smoker. No ETOH.  lives alone at home.  Allergies    No Known Allergies    Review of Systems:  No HA.  Denies SOB  No fever.  Denies chest discomfort  Denies abdominal discomfort  Denies dysuria.  No noticeable change in urination.    Home Medications:   * Patient Currently Takes Medications as of 22-Oct-2018 21:11 documented in Structured Notes  · 	Lotemax 0.5% ophthalmic ointment: 1 application in the left eye 2 times a day, Last Dose Taken:    · 	furosemide 40 mg oral tablet: 1 tab(s) orally once a day, Last Dose Taken:    · 	ALPRAZolam 0.25 mg oral tablet: 1 tab(s) orally 2 times a day, As Needed, Last Dose Taken:    · 	gabapentin 100 mg oral capsule: 2 cap(s) orally 3 times a day, Last Dose Taken:    · 	Refresh ophthalmic solution: 1 drop(s) to each affected eye 2 times a day, As Needed, Last Dose Taken:    · 	alfuzosin 10 mg oral tablet, extended release: 1 tab(s) orally once a day, Last Dose Taken:    · 	warfarin 5 mg oral tablet: 1 tab(s) orally once a day (at bedtime), Last Dose Taken:    · 	potassium chloride 20 mEq oral tablet, extended release: 1 tab(s) orally once a day, Last Dose Taken:    · 	Toprol-XL 50 mg oral tablet, extended release: 1 tab(s) orally 2 times a day, Last Dose Taken:    · 	levothyroxine 100 mcg (0.1 mg) oral tablet: 1 tab(s) orally once a day, Last Dose Taken:    · 	Efudex 5% topical cream: Apply topically to affected area 2 times a day  	*Pt stopped using. Was previously using for crusty scalp, Last Dose Taken:        MEDICATIONS  (STANDING):  ALBUTerol    90 MICROgram(s) HFA Inhaler 1 Puff(s) Inhalation every 4 hours  ALBUTerol/ipratropium for Nebulization 3 milliLiter(s) Nebulizer every 6 hours  ALPRAZolam 0.25 milliGRAM(s) Oral daily  ALPRAZolam 0.5 milliGRAM(s) Oral at bedtime  amiodarone    Tablet 200 milliGRAM(s) Oral daily  artificial tears (preservative free) Ophthalmic Solution 1 Drop(s) Both EYES two times a day  gabapentin 200 milliGRAM(s) Oral three times a day  influenza   Vaccine 0.5 milliLiter(s) IntraMuscular once  lactobacillus acidophilus 1 Tablet(s) Oral daily  levothyroxine 100 MICROGram(s) Oral daily  metoprolol tartrate 25 milliGRAM(s) Oral two times a day  tiotropium 18 MICROgram(s) Capsule 1 Capsule(s) Inhalation daily    MEDICATIONS  (PRN):  acetaminophen   Tablet .. 650 milliGRAM(s) Oral every 6 hours PRN Temp greater or equal to 38C (100.4F), Mild Pain (1 - 3)  senna 2 Tablet(s) Oral at bedtime PRN Constipation         Vital Signs Last 24 Hrs  T(C): 36.8 (31 Dec 2018 11:36), Max: 36.8 (31 Dec 2018 11:36)  T(F): 98.3 (31 Dec 2018 11:36), Max: 98.3 (31 Dec 2018 11:36)  HR: 76 (31 Dec 2018 11:36) (58 - 88)  BP: 104/63 (31 Dec 2018 11:36) (103/56 - 121/64)  BP(mean): --  RR: 16 (31 Dec 2018 11:36) (16 - 18)  SpO2: 94% (31 Dec 2018 11:36) (92% - 97%)        PHYSICAL EXAM: Alert and appropriate  GEN: no acute distress  HEENT: WNL  NECK : supple  CV: S1S2 RRR  LUNGS: Basilar crackles.  ABD: soft  EXT: trace to 1+ edema    LABS:                        9.1    6.12  )-----------( 159      ( 31 Dec 2018 07:18 )             28.1     12-30    146<H>  |  108  |  55<H>  ----------------------------<  90  4.0   |  32<H>  |  2.94<H>    Ca    9.3      30 Dec 2018 11:34  Phos  3.2     12-31    TPro  5.9<L>  /  Alb  3.5  /  TBili  1.1  /  DBili  x   /  AST  19  /  ALT  21  /  AlkPhos  61  12-30    PT/INR - ( 31 Dec 2018 07:18 )   PT: 31.6 sec;   INR: 2.76 ratio         PTT - ( 30 Dec 2018 11:34 )  PTT:42.0 sec    Phosphorus Level, Serum: 3.2 mg/dL (12-31 @ 07:18)      < from: CT Chest No Cont (12.30.18 @ 13:37) >    EXAM:  CT CHEST                            PROCEDURE DATE:  12/30/2018          INTERPRETATION:  Exam Date: 12/30/2018 1:37 PM    CT chest without contrast    CLINICAL INFORMATION:  History of lymphoma, had pneumonia in October 2018, with weakness and continued cough    TECHNIQUE:  Contiguous axial sections were obtained through the chest   using single helical acquisition.  In addition, 2D sagittal and coronal   reformatted images obtained.   This scan was performed using automatic   exposure control (radiation dose reduction software) to obtain a   diagnostic image quality scan with patient dose as low as reasonably   achievable.        FINDINGS: Comparison is made to CT chest of 10/23/2018.    The thyroid gland appears intact.    Smallto moderate bilateral pleural effusions are increased in size since   prior exam.  There is mild underlying atelectasis suggested .  No   definite areas of pneumonia. The trachea and major bronchi are patent.    There are numerous markedly enlarged lymph nodes in the bilateral   mediastinum, hilar and axillary regions, unchanged since prior exam,   compatible with known lymphoma.     The heart size is normal.      Limited evaluation of the upper abdomen demonstrates several scattered   hepatic cysts.    There are numerous prominent lymph nodes within the abdominal mesentery   and visualized retroperitoneal regions.    Multilevel degenerative changes of the spine are identified.    Right humeral prosthesis is present.      IMPRESSION: Small tomoderate bilateral pleural effusions are increased   in size since prior exam.  There is mild underlying atelectasis suggested   .  No definite areas of pneumonia.  There are numerous markedly enlarged   lymph nodes in the bilateral mediastinum, hilar and axillary regions,   unchanged since prior exam, compatible with known lymphoma. There are   numerous prominent lymph nodes within the abdominal mesentery and   visualized retroperitoneal regions.        PRUDENCE FRIAS M.D., ATTENDING RADIOLOGIST  This document has been electronically signed. Dec 30 2018  2:03PM                < end of copied text > Chief complaints.  : Complains of increasing weakness and recurrent  episodes of LE weakness.    HPI:  89 yo man with PMHX significant for CHF, HTN, Hx of Lymphoma treated with Rituxan.  S/p Admission in 10/2018 for multifocal PNA associated with LETICIA/CKD.  Pt was d/cecelia to Rehab.   Reports improved ability to ambulate however, did not greatly improve and d/cecelia home 2 weeks ago.   At home has had repeated episodes of weakness and at times unable to stand up.  Denies fever at home,  Denies any chest discomfort or SOB with weakness.  However, does report some SOB with activity. Unclear  if related to timing of meds.  Has not noted color of stool lately.  Denies diarrhea.    PMHX and PSHX.  1.CKD ( 12/2015--Creat 1.63), Post recent hosp for CHF.  Creat 1.7 on d/c 6/2018.  2.A FIB  3.HTN  4.Hx of Prostate CA ( Post RT)  5.Hx of Lymphoma Post Rituxan therapy.  6.Hx of Postherpetic Neuralgia  7.Hypothyroidism  8.Hx of Chronic Diarrhea.  9.Recent admission for CHF.    FAMILY HISTORY:  Family history of ischemic heart disease (Father, Mother)    SOCIAL HISTORY :  Former smoker. No ETOH.  lives alone at home.  Allergies    No Known Allergies    Review of Systems:  No HA.  Denies SOB  No fever.  Denies chest discomfort  Denies abdominal discomfort  Denies dysuria.  No noticeable change in urination.    Home Medications:   * Patient Currently Takes Medications as of 22-Oct-2018 21:11 documented in Structured Notes  · 	Lotemax 0.5% ophthalmic ointment: 1 application in the left eye 2 times a day, Last Dose Taken:    · 	furosemide 40 mg oral tablet: 1 tab(s) orally once a day, Last Dose Taken:    · 	ALPRAZolam 0.25 mg oral tablet: 1 tab(s) orally 2 times a day, As Needed, Last Dose Taken:    · 	gabapentin 100 mg oral capsule: 2 cap(s) orally 3 times a day, Last Dose Taken:    · 	Refresh ophthalmic solution: 1 drop(s) to each affected eye 2 times a day, As Needed, Last Dose Taken:    · 	alfuzosin 10 mg oral tablet, extended release: 1 tab(s) orally once a day, Last Dose Taken:    · 	warfarin 5 mg oral tablet: 1 tab(s) orally once a day (at bedtime), Last Dose Taken:    · 	potassium chloride 20 mEq oral tablet, extended release: 1 tab(s) orally once a day, Last Dose Taken:    · 	Toprol-XL 50 mg oral tablet, extended release: 1 tab(s) orally 2 times a day, Last Dose Taken:    · 	levothyroxine 100 mcg (0.1 mg) oral tablet: 1 tab(s) orally once a day, Last Dose Taken:    · 	Efudex 5% topical cream: Apply topically to affected area 2 times a day  	*Pt stopped using. Was previously using for crusty scalp, Last Dose Taken:        MEDICATIONS  (STANDING):  ALBUTerol    90 MICROgram(s) HFA Inhaler 1 Puff(s) Inhalation every 4 hours  ALBUTerol/ipratropium for Nebulization 3 milliLiter(s) Nebulizer every 6 hours  ALPRAZolam 0.25 milliGRAM(s) Oral daily  ALPRAZolam 0.5 milliGRAM(s) Oral at bedtime  amiodarone    Tablet 200 milliGRAM(s) Oral daily  artificial tears (preservative free) Ophthalmic Solution 1 Drop(s) Both EYES two times a day  gabapentin 200 milliGRAM(s) Oral three times a day  influenza   Vaccine 0.5 milliLiter(s) IntraMuscular once  lactobacillus acidophilus 1 Tablet(s) Oral daily  levothyroxine 100 MICROGram(s) Oral daily  metoprolol tartrate 25 milliGRAM(s) Oral two times a day  tiotropium 18 MICROgram(s) Capsule 1 Capsule(s) Inhalation daily    MEDICATIONS  (PRN):  acetaminophen   Tablet .. 650 milliGRAM(s) Oral every 6 hours PRN Temp greater or equal to 38C (100.4F), Mild Pain (1 - 3)  senna 2 Tablet(s) Oral at bedtime PRN Constipation         Vital Signs Last 24 Hrs  T(C): 36.8 (31 Dec 2018 11:36), Max: 36.8 (31 Dec 2018 11:36)  T(F): 98.3 (31 Dec 2018 11:36), Max: 98.3 (31 Dec 2018 11:36)  HR: 76 (31 Dec 2018 11:36) (58 - 88)  BP: 104/63 (31 Dec 2018 11:36) (103/56 - 121/64)  BP(mean): --  RR: 16 (31 Dec 2018 11:36) (16 - 18)  SpO2: 94% (31 Dec 2018 11:36) (92% - 97%)        PHYSICAL EXAM: Alert and appropriate  GEN: no acute distress  HEENT: WNL  NECK : supple  CV: S1S2 RRR  LUNGS: Basilar crackles.  ABD: soft  EXT: trace to 1+ edema    LABS:                        9.1    6.12  )-----------( 159      ( 31 Dec 2018 07:18 )             28.1     12-30    146<H>  |  108  |  55<H>  ----------------------------<  90  4.0   |  32<H>  |  2.94<H>    Ca    9.3      30 Dec 2018 11:34  Phos  3.2     12-31    TPro  5.9<L>  /  Alb  3.5  /  TBili  1.1  /  DBili  x   /  AST  19  /  ALT  21  /  AlkPhos  61  12-30    PT/INR - ( 31 Dec 2018 07:18 )   PT: 31.6 sec;   INR: 2.76 ratio         PTT - ( 30 Dec 2018 11:34 )  PTT:42.0 sec    Phosphorus Level, Serum: 3.2 mg/dL (12-31 @ 07:18)      < from: CT Chest No Cont (12.30.18 @ 13:37) >    EXAM:  CT CHEST                            PROCEDURE DATE:  12/30/2018          INTERPRETATION:  Exam Date: 12/30/2018 1:37 PM    CT chest without contrast    CLINICAL INFORMATION:  History of lymphoma, had pneumonia in October 2018, with weakness and continued cough    TECHNIQUE:  Contiguous axial sections were obtained through the chest   using single helical acquisition.  In addition, 2D sagittal and coronal   reformatted images obtained.   This scan was performed using automatic   exposure control (radiation dose reduction software) to obtain a   diagnostic image quality scan with patient dose as low as reasonably   achievable.        FINDINGS: Comparison is made to CT chest of 10/23/2018.    The thyroid gland appears intact.    Smallto moderate bilateral pleural effusions are increased in size since   prior exam.  There is mild underlying atelectasis suggested .  No   definite areas of pneumonia. The trachea and major bronchi are patent.    There are numerous markedly enlarged lymph nodes in the bilateral   mediastinum, hilar and axillary regions, unchanged since prior exam,   compatible with known lymphoma.     The heart size is normal.      Limited evaluation of the upper abdomen demonstrates several scattered   hepatic cysts.    There are numerous prominent lymph nodes within the abdominal mesentery   and visualized retroperitoneal regions.    Multilevel degenerative changes of the spine are identified.    Right humeral prosthesis is present.      IMPRESSION: Small tomoderate bilateral pleural effusions are increased   in size since prior exam.  There is mild underlying atelectasis suggested   .  No definite areas of pneumonia.  There are numerous markedly enlarged   lymph nodes in the bilateral mediastinum, hilar and axillary regions,   unchanged since prior exam, compatible with known lymphoma. There are   numerous prominent lymph nodes within the abdominal mesentery and   visualized retroperitoneal regions.        PRUDENCE FRIAS M.D., ATTENDING RADIOLOGIST  This document has been electronically signed. Dec 30 2018  2:03PM                < end of copied text >

## 2018-12-31 NOTE — PROGRESS NOTE ADULT - ASSESSMENT
89 year old male with anxiety, AFIB, BPH, CKD ,HTN, hypothyroid,  postherpetic neuralgia, prostate ca , small B cell lymphoma presented to  ED by ambulance c/o weakness    1) Weakness - likely multifactorial (gabapetin dosing, effusions, declining due to recent admission and age)  - d/w family to reduce gabapentin to 100mg AM, afternoon, 200mg bedtime with tylenol prn    2) Acute on chronic renal failure  - renal evaluation appreciated    3) Cough: could be postnasal  - flonase and mucinex   duoneb q 6 hrs    4) AFIB  pacerone 200 mg po daily   metoprolol 25 mg BID  - continue coumadin    5) HTN  metoprolol w parameters    6) Hypothyroid  - TSH noted, for his age, goal can be as high as 10  - cont levothyroxine 100 mcg    7. Effusions: Dr.yella renner  - pt comfortable, speaking full sentences.    OOB to chair  PT jud

## 2018-12-31 NOTE — PROGRESS NOTE ADULT - SUBJECTIVE AND OBJECTIVE BOX
HOSPITALIST ATTENDING PROGRESS NOTE    Chart and meds reviewed.  Patient seen and examined.    HPI: 89 year old male with anxiety, AFIB, BPH, CKD,HTN, hypothyroid,  postherpetic neuralgia, prostate ca , small B cell lymphoma presented to  ED by ambulance c/o weakness        Pt was discharged from rehab 2 weeks ago and started feeling weak. Has not been able to sleep for the past 2 days and has a nonproductive cough w wheezing according to his nurse.  She gives him albuterol nebs w partial relief.  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    12/31/19 pt seen and examined, daughter at beside, pt still works on cures for cancer, he is an . He feels weaker since rehab. Has had a cough for the past couple of weeks, unclear what color sputum. wants PMD to see him in the hospital.     All 10 systems reviewed and found to be negative with the exception of what has been described above.    MEDICATIONS  (STANDING):  ALBUTerol    90 MICROgram(s) HFA Inhaler 1 Puff(s) Inhalation every 4 hours  ALBUTerol/ipratropium for Nebulization 3 milliLiter(s) Nebulizer every 6 hours  ALPRAZolam 0.25 milliGRAM(s) Oral daily  ALPRAZolam 0.5 milliGRAM(s) Oral at bedtime  amiodarone    Tablet 200 milliGRAM(s) Oral daily  artificial tears (preservative free) Ophthalmic Solution 1 Drop(s) Both EYES two times a day  furosemide   Injectable 40 milliGRAM(s) IV Push once  gabapentin 200 milliGRAM(s) Oral three times a day  influenza   Vaccine 0.5 milliLiter(s) IntraMuscular once  lactobacillus acidophilus 1 Tablet(s) Oral daily  levothyroxine 100 MICROGram(s) Oral daily  metoprolol tartrate 25 milliGRAM(s) Oral two times a day  tiotropium 18 MICROgram(s) Capsule 1 Capsule(s) Inhalation daily    MEDICATIONS  (PRN):  acetaminophen   Tablet .. 650 milliGRAM(s) Oral every 6 hours PRN Temp greater or equal to 38C (100.4F), Mild Pain (1 - 3)  senna 2 Tablet(s) Oral at bedtime PRN Constipation      VITALS:  T(F): 98.3 (12-31-18 @ 11:36), Max: 98.3 (12-31-18 @ 11:36)  HR: 75 (12-31-18 @ 14:40) (58 - 79)  BP: 99/54 (12-31-18 @ 13:30) (99/54 - 121/64)  RR: 16 (12-31-18 @ 11:36) (16 - 18)  SpO2: 94% (12-31-18 @ 14:40) (92% - 96%)  Wt(kg): --    I&O's Summary      CAPILLARY BLOOD GLUCOSE          PHYSICAL EXAM:    HEENT:  pupils equal and reactive, EOMI, no oropharyngeal lesions, erythema, exudates, oral thrush    NECK:   supple, no carotid bruits, no palpable lymph nodes, no thyromegaly    CV:  +S1, +S2, regular, no murmurs or rubs    RESP:   lungs clear to auscultation bilaterally, no wheezing, rales, rhonchi, good air entry bilaterally    BREAST:  not examined    GI:  abdomen soft, non-tender, non-distended, normal BS, no bruits, no abdominal masses, no palpable masses    RECTAL:  not examined    :  not examined    MSK:   normal muscle tone, no atrophy, no rigidity, no contractions    EXT:   no clubbing, no cyanosis, no edema, no calf pain, swelling or erythema    VASCULAR:  pulses equal and symmetric in the upper and lower extremities    NEURO:  AAOX3, no focal neurological deficits, follows all commands, able to move extremities spontaneously    SKIN:  no ulcers, lesions or rashes    LABS:                            9.1    6.12  )-----------( 159      ( 31 Dec 2018 07:18 )             28.1     12-30    146<H>  |  108  |  55<H>  ----------------------------<  90  4.0   |  32<H>  |  2.94<H>    Ca    9.3      30 Dec 2018 11:34  Phos  3.2     12-31    TPro  5.9<L>  /  Alb  3.5  /  TBili  1.1  /  DBili  x   /  AST  19  /  ALT  21  /  AlkPhos  61  12-30        LIVER FUNCTIONS - ( 30 Dec 2018 11:34 )  Alb: 3.5 g/dL / Pro: 5.9 gm/dL / ALK PHOS: 61 U/L / ALT: 21 U/L / AST: 19 U/L / GGT: x           PT/INR - ( 31 Dec 2018 07:18 )   PT: 31.6 sec;   INR: 2.76 ratio         PTT - ( 30 Dec 2018 11:34 )  PTT:42.0 sec                                  CULTURES:

## 2018-12-31 NOTE — CONSULT NOTE ADULT - ASSESSMENT
89 yo man with Known CKD, CHF and Hx of Lymphoma post recent admission for Multifocal PNA now admitted with weakness.  --LETICIA/CKD : unclear if volume related.  Chest CT with increased bilateral effusion--Will need to continue diuretics.  --LETICIA/ with increased Pleural effusion : monitor for decompensated CHF and resume diuretics.  --Weakness. ; Trial of decreased dose of Gabapentin.  --Anemia : significant drop in HGB noted c/w previous admission ; Check stool guaic and anemia work up.--No retroperitoneal bleed on CT. 89 yo man with Known CKD, CHF and Hx of Lymphoma post recent admission for Multifocal PNA now admitted with weakness.  --LETICIA/CKD : unclear if volume related.  Chest CT with increased bilateral effusion--Will need to continue diuretics.  --LETICIA/ with increased Pleural effusion : monitor for decompensated CHF and trial of diuresis with pulmonary follow up.  --Weakness. ; Trial of decreased dose of Gabapentin.  --Anemia : significant drop in HGB noted c/w previous admission ; Check stool guaic and anemia work up.--No retroperitoneal bleed on CT.

## 2019-01-01 LAB
ALBUMIN SERPL ELPH-MCNC: 3.1 G/DL — LOW (ref 3.3–5)
ALP SERPL-CCNC: 54 U/L — SIGNIFICANT CHANGE UP (ref 40–120)
ALT FLD-CCNC: 19 U/L — SIGNIFICANT CHANGE UP (ref 12–78)
ANION GAP SERPL CALC-SCNC: 8 MMOL/L — SIGNIFICANT CHANGE UP (ref 5–17)
APTT BLD: 36.5 SEC — HIGH (ref 27.5–36.3)
AST SERPL-CCNC: 13 U/L — LOW (ref 15–37)
BILIRUB SERPL-MCNC: 1.1 MG/DL — SIGNIFICANT CHANGE UP (ref 0.2–1.2)
BUN SERPL-MCNC: 55 MG/DL — HIGH (ref 7–23)
CALCIUM SERPL-MCNC: 9 MG/DL — SIGNIFICANT CHANGE UP (ref 8.5–10.1)
CHLORIDE SERPL-SCNC: 105 MMOL/L — SIGNIFICANT CHANGE UP (ref 96–108)
CO2 SERPL-SCNC: 31 MMOL/L — SIGNIFICANT CHANGE UP (ref 22–31)
CREAT SERPL-MCNC: 2.59 MG/DL — HIGH (ref 0.5–1.3)
FERRITIN SERPL-MCNC: 83 NG/ML — SIGNIFICANT CHANGE UP (ref 30–400)
GLUCOSE SERPL-MCNC: 85 MG/DL — SIGNIFICANT CHANGE UP (ref 70–99)
HCT VFR BLD CALC: 29.1 % — LOW (ref 39–50)
HGB BLD-MCNC: 9.2 G/DL — LOW (ref 13–17)
INR BLD: 2.17 RATIO — HIGH (ref 0.88–1.16)
IRON SATN MFR SERPL: 18 % — SIGNIFICANT CHANGE UP (ref 16–55)
IRON SATN MFR SERPL: 48 UG/DL — SIGNIFICANT CHANGE UP (ref 45–165)
MCHC RBC-ENTMCNC: 31.6 GM/DL — LOW (ref 32–36)
MCHC RBC-ENTMCNC: 31.8 PG — SIGNIFICANT CHANGE UP (ref 27–34)
MCV RBC AUTO: 100.7 FL — HIGH (ref 80–100)
NRBC # BLD: 0 /100 WBCS — SIGNIFICANT CHANGE UP (ref 0–0)
PLATELET # BLD AUTO: 151 K/UL — SIGNIFICANT CHANGE UP (ref 150–400)
POTASSIUM SERPL-MCNC: 3.6 MMOL/L — SIGNIFICANT CHANGE UP (ref 3.5–5.3)
POTASSIUM SERPL-SCNC: 3.6 MMOL/L — SIGNIFICANT CHANGE UP (ref 3.5–5.3)
PROT ?TM UR-MCNC: 13 MG/DL — HIGH (ref 0–12)
PROT SERPL-MCNC: 5 G/DL — LOW (ref 6–8.3)
PROT SERPL-MCNC: 5 G/DL — LOW (ref 6–8.3)
PROT SERPL-MCNC: 5.4 GM/DL — LOW (ref 6–8.3)
PROTHROM AB SERPL-ACNC: 24.7 SEC — HIGH (ref 10–12.9)
RBC # BLD: 2.89 M/UL — LOW (ref 4.2–5.8)
RBC # FLD: 18.7 % — HIGH (ref 10.3–14.5)
SODIUM SERPL-SCNC: 144 MMOL/L — SIGNIFICANT CHANGE UP (ref 135–145)
T4 FREE SERPL-MCNC: 1.32 NG/DL — SIGNIFICANT CHANGE UP (ref 0.76–1.46)
TIBC SERPL-MCNC: 271 UG/DL — SIGNIFICANT CHANGE UP (ref 220–430)
TSH SERPL-MCNC: 6.88 UU/ML — HIGH (ref 0.34–4.82)
UIBC SERPL-MCNC: 223 UG/DL — SIGNIFICANT CHANGE UP (ref 110–370)
WBC # BLD: 5.94 K/UL — SIGNIFICANT CHANGE UP (ref 3.8–10.5)
WBC # FLD AUTO: 5.94 K/UL — SIGNIFICANT CHANGE UP (ref 3.8–10.5)

## 2019-01-01 RX ORDER — FUROSEMIDE 40 MG
40 TABLET ORAL ONCE
Qty: 0 | Refills: 0 | Status: COMPLETED | OUTPATIENT
Start: 2019-01-01 | End: 2019-01-01

## 2019-01-01 RX ORDER — WARFARIN SODIUM 2.5 MG/1
7.5 TABLET ORAL AT BEDTIME
Qty: 0 | Refills: 0 | Status: DISCONTINUED | OUTPATIENT
Start: 2019-01-01 | End: 2019-01-02

## 2019-01-01 RX ADMIN — Medication 1 TABLET(S): at 13:21

## 2019-01-01 RX ADMIN — Medication 1 DROP(S): at 05:09

## 2019-01-01 RX ADMIN — GABAPENTIN 200 MILLIGRAM(S): 400 CAPSULE ORAL at 21:41

## 2019-01-01 RX ADMIN — Medication 100 MICROGRAM(S): at 05:08

## 2019-01-01 RX ADMIN — Medication 0.25 MILLIGRAM(S): at 13:20

## 2019-01-01 RX ADMIN — GABAPENTIN 100 MILLIGRAM(S): 400 CAPSULE ORAL at 15:41

## 2019-01-01 RX ADMIN — GABAPENTIN 100 MILLIGRAM(S): 400 CAPSULE ORAL at 09:08

## 2019-01-01 RX ADMIN — Medication 3 MILLILITER(S): at 07:41

## 2019-01-01 RX ADMIN — WARFARIN SODIUM 7.5 MILLIGRAM(S): 2.5 TABLET ORAL at 21:41

## 2019-01-01 RX ADMIN — Medication 0.5 MILLIGRAM(S): at 21:41

## 2019-01-01 RX ADMIN — Medication 1 DROP(S): at 17:24

## 2019-01-01 RX ADMIN — Medication 3 MILLILITER(S): at 13:16

## 2019-01-01 RX ADMIN — AMIODARONE HYDROCHLORIDE 200 MILLIGRAM(S): 400 TABLET ORAL at 09:08

## 2019-01-01 RX ADMIN — Medication 25 MILLIGRAM(S): at 05:07

## 2019-01-01 RX ADMIN — Medication 3 MILLILITER(S): at 20:39

## 2019-01-01 NOTE — PROGRESS NOTE ADULT - ASSESSMENT
89 year old male with anxiety, AFIB, BPH, CKD ,HTN, hypothyroid,  postherpetic neuralgia, prostate ca , small B cell lymphoma presented to  ED by ambulance c/o weakness    1) Weakness - likely multifactorial (gabapetin dosing, effusions, declining due to recent admission and age)  - d/w family to reduce gabapentin to 100mg AM, afternoon, 200mg bedtime with tylenol prn    2) Acute on chronic renal failure possibly due to ?cardiorenal vs acute on chronic diastolic dysfunciton  - lasix intermittent  - Cr improved s/p IV lasix yesterday  - renal evaluation appreciated    3) Cough: could be postnasal  - flonase and mucinex  - duoneb q 6 hrs    4) AFIB  pacerone 200 mg po daily   metoprolol 25 mg BID  - continue coumadin to maintain INR 2-3    5) HTN  metoprolol w parameters    6) Hypothyroid  - TSH noted, for his age, within goal  - free T4 is normal  - cont levothyroxine 100 mcg    7. Effusions: Dr.yella renner appreciated  - pt comfortable, speaking full sentences.    OOB to chair  PT jud

## 2019-01-01 NOTE — PHYSICAL THERAPY INITIAL EVALUATION ADULT - MODALITIES TREATMENT COMMENTS
Patient  left in chair with CBIR and chair alarm active. Patient instructed to call for assistance back to bed or the bathroom, understands same. RN informed of session.

## 2019-01-01 NOTE — PHYSICAL THERAPY INITIAL EVALUATION ADULT - PERTINENT HX OF CURRENT PROBLEM, REHAB EVAL
89 yo M admitted c/o weakness.    Pt was discharged from rehab 2 weeks ago and started feeling weak. Has not been able to sleep for the past 2 days and has a nonproductive cough w wheezing .  CT chest: B pleural effusions.

## 2019-01-01 NOTE — PROGRESS NOTE ADULT - ASSESSMENT
89 yo man with Known CKD, CHF and Hx of Lymphoma post recent admission for Multifocal PNA now admitted with weakness.  --LETICIA/CKD : unclear if volume related.  Chest CT with increased bilateral effusion--Will need to continue diuretics.  --LETICIA/ with increased Pleural effusion : monitor for decompensated CHF and trial of diuresis with pulmonary follow up.  --Weakness. ; Trial of decreased dose of Gabapentin.  --Anemia : significant drop in HGB noted c/w previous admission ; Check stool guaic and anemia work up.--No retroperitoneal bleed on CT.    1/1  creat improving  Chest imaging shows increased pleural effusions  will administer iv lasix x1, as needed   monitoring renal function, and fluid status

## 2019-01-01 NOTE — PROGRESS NOTE ADULT - ASSESSMENT
- chronic cough  - small to moderate size effusion in the setting of known diastolic dysfunction  - acute on chronic ckd rule out cardiorenal syndrome  - weakness likely medication induced with the worsening renal function rule out amiodarone induced fatigue   - lymphoma with the stable lymphadenopathy     PLAN     - symptomatic relief for the cough   - monitor fever spikes   - continue to observe with out antibiotics for now   - d/c duonebs q6 or spiriva, should be on only 1 anticholinergic as to prevent overmedication  - diuretics as tolerated by the renal function to get negative balance with the monitoring of the renal function   - 02 supplementation   - continue anticoagulation  - PT eval

## 2019-01-01 NOTE — PROGRESS NOTE ADULT - SUBJECTIVE AND OBJECTIVE BOX
HOSPITALIST ATTENDING PROGRESS NOTE    Chart and meds reviewed.  Patient seen and examined.    HPI: 89 year old male with anxiety, AFIB, BPH, CKD,HTN, hypothyroid,  postherpetic neuralgia, prostate ca , small B cell lymphoma presented to  ED by ambulance c/o weakness        Pt was discharged from rehab 2 weeks ago and started feeling weak. Has not been able to sleep for the past 2 days and has a nonproductive cough w wheezing according to his nurse.  She gives him albuterol nebs w partial relief.  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    19 pt seen and examined, daughter at beside, pt still works on cures for cancer, he is an . He feels weaker since rehab. Has had a cough for the past couple of weeks, unclear what color sputum. wants PMD to see him in the hospital.     19 pt seen and examined, coughing but not bringing up phlegm, pulm and renal note appreciated.    All 10 systems reviewed and found to be negative with the exception of what has been described above.    MEDICATIONS  (STANDING):  ALBUTerol    90 MICROgram(s) HFA Inhaler 1 Puff(s) Inhalation every 4 hours  ALBUTerol/ipratropium for Nebulization 3 milliLiter(s) Nebulizer every 6 hours  ALPRAZolam 0.25 milliGRAM(s) Oral daily  ALPRAZolam 0.5 milliGRAM(s) Oral at bedtime  amiodarone    Tablet 200 milliGRAM(s) Oral daily  artificial tears (preservative free) Ophthalmic Solution 1 Drop(s) Both EYES two times a day  gabapentin 100 milliGRAM(s) Oral <User Schedule>  gabapentin 200 milliGRAM(s) Oral at bedtime  influenza   Vaccine 0.5 milliLiter(s) IntraMuscular once  lactobacillus acidophilus 1 Tablet(s) Oral daily  levothyroxine 100 MICROGram(s) Oral daily  metoprolol tartrate 25 milliGRAM(s) Oral two times a day  tiotropium 18 MICROgram(s) Capsule 1 Capsule(s) Inhalation daily  warfarin 7.5 milliGRAM(s) Oral at bedtime    MEDICATIONS  (PRN):  acetaminophen   Tablet .. 650 milliGRAM(s) Oral every 6 hours PRN Temp greater or equal to 38C (100.4F), Mild Pain (1 - 3)  senna 2 Tablet(s) Oral at bedtime PRN Constipation      VITALS:  T(F): 97.9 (19 @ 04:51), Max: 98.3 (18 @ 11:36)  HR: 76 (19 @ 04:51) (71 - 80)  BP: 110/55 (19 @ 04:51) (99/54 - 115/66)  RR: 18 (19 @ 04:51) (16 - 18)  SpO2: 97% (19 @ 04:51) (94% - 97%)  Wt(kg): --    I&O's Summary      CAPILLARY BLOOD GLUCOSE          PHYSICAL EXAM:    HEENT:  pupils equal and reactive, EOMI, no oropharyngeal lesions, erythema, exudates, oral thrush    NECK:   supple, no carotid bruits, no palpable lymph nodes, no thyromegaly    CV:  +S1, +S2, regular, no murmurs or rubs    RESP:   lungs clear to auscultation bilaterally, no wheezing, rales, rhonchi, good air entry bilaterally    BREAST:  not examined    GI:  abdomen soft, non-tender, non-distended, normal BS, no bruits, no abdominal masses, no palpable masses    RECTAL:  not examined    :  not examined    MSK:   normal muscle tone, no atrophy, no rigidity, no contractions    EXT:   no clubbing, no cyanosis, no edema, no calf pain, swelling or erythema    VASCULAR:  pulses equal and symmetric in the upper and lower extremities    NEURO:  AAOX3, no focal neurological deficits, follows all commands, able to move extremities spontaneously    SKIN:  no ulcers, lesions or rashes    LABS:                            9.2    5.94  )-----------( 151      ( 2019 07:00 )             29.1         144  |  105  |  55<H>  ----------------------------<  85  3.6   |  31  |  2.59<H>    Ca    9.0      2019 07:00  Phos  3.2     12-31    TPro  5.4<L>  /  Alb  3.1<L>  /  TBili  1.1  /  DBili  x   /  AST  13<L>  /  ALT  19  /  AlkPhos  54          LIVER FUNCTIONS - ( 2019 07:00 )  Alb: 3.1 g/dL / Pro: 5.4 gm/dL / ALK PHOS: 54 U/L / ALT: 19 U/L / AST: 13 U/L / GGT: x           PT/INR - ( 2019 07:00 )   PT: 24.7 sec;   INR: 2.17 ratio         PTT - ( 2019 07:00 )  PTT:36.5 sec  Urinalysis Basic - ( 30 Dec 2018 18:00 )    Color: Yellow / Appearance: Clear / S.010 / pH: x  Gluc: x / Ketone: Negative  / Bili: Negative / Urobili: Negative mg/dL   Blood: x / Protein: 30 mg/dL / Nitrite: Negative   Leuk Esterase: Negative / RBC: 0-2 /HPF / WBC 0-2   Sq Epi: x / Non Sq Epi: Occasional / Bacteria: Negative                                    CULTURES:

## 2019-01-01 NOTE — PROGRESS NOTE ADULT - SUBJECTIVE AND OBJECTIVE BOX
Subjective:  Mr Diez feels like his breathing is adequate  More concerned about the weakness in his legs  Denies chest pain    Review of Systems:  All 10 systems reviewed in detailed and found to be negative with the exception of what has already been described above    Allergies:  No Known Allergies    Meds  MEDICATIONS  (STANDING):  ALBUTerol    90 MICROgram(s) HFA Inhaler 1 Puff(s) Inhalation every 4 hours  ALBUTerol/ipratropium for Nebulization 3 milliLiter(s) Nebulizer every 6 hours  ALPRAZolam 0.25 milliGRAM(s) Oral daily  ALPRAZolam 0.5 milliGRAM(s) Oral at bedtime  amiodarone    Tablet 200 milliGRAM(s) Oral daily  artificial tears (preservative free) Ophthalmic Solution 1 Drop(s) Both EYES two times a day  gabapentin 100 milliGRAM(s) Oral <User Schedule>  gabapentin 200 milliGRAM(s) Oral at bedtime  influenza   Vaccine 0.5 milliLiter(s) IntraMuscular once  lactobacillus acidophilus 1 Tablet(s) Oral daily  levothyroxine 100 MICROGram(s) Oral daily  metoprolol tartrate 25 milliGRAM(s) Oral two times a day  tiotropium 18 MICROgram(s) Capsule 1 Capsule(s) Inhalation daily  warfarin 7.5 milliGRAM(s) Oral at bedtime    MEDICATIONS  (PRN):  acetaminophen   Tablet .. 650 milliGRAM(s) Oral every 6 hours PRN Temp greater or equal to 38C (100.4F), Mild Pain (1 - 3)  senna 2 Tablet(s) Oral at bedtime PRN Constipation    Physical Exam  T(C): 36.7 (19 @ 11:22), Max: 36.8 (18 @ 20:51)  HR: 74 (19 @ 13:15) (71 - 80)  BP: 110/60 (19 @ 11:22) (110/55 - 115/66)  RR: 16 (19 @ 11:22) (16 - 18)  SpO2: 94% (19 @ 11:22) (94% - 97%)  Gen: Alert, oriented, no distress  HEENT: Anicteric sclera, moist mucous membranes, no JVD, no lymphadenopathy, good dentition  Cardio: Regular rhythm and rate, normal S1S2, no murmurs  Resp: Decreased breath sounds at bases  GI: Nontender, nondistended, normoactive bowel sounds  Ext: No cyanosis, clubbing or edema  Skin: Numerous ecchymosis in extremities  Neuro: Nonfocal    Labs:                        9.2    5.94  )-----------( 151      ( 2019 07:00 )             29.1         144  |  105  |  55<H>  ----------------------------<  85  3.6   |  31  |  2.59<H>    Ca    9.0      2019 07:00  Phos  3.2     12-31    TPro  5.4<L>  /  Alb  3.1<L>  /  TBili  1.1  /  DBili  x   /  AST  13<L>  /  ALT  19  /  AlkPhos  54      PT/INR - ( 2019 07:00 )   PT: 24.7 sec;   INR: 2.17 ratio         PTT - ( 2019 07:00 )  PTT:36.5 sec  Urinalysis Basic - ( 30 Dec 2018 18:00 )    Color: Yellow / Appearance: Clear / S.010 / pH: x    < from: CT Chest No Cont (12.30.18 @ 13:37) >  CT chest without contrast    CLINICAL INFORMATION:  History of lymphoma, had pneumonia in 2018, with weakness and continued cough    TECHNIQUE:  Contiguous axial sections were obtained through the chest   using single helical acquisition.  In addition, 2D sagittal and coronal   reformatted images obtained.   This scan was performed using automatic   exposure control (radiation dose reduction software) to obtain a   diagnostic image quality scan with patient dose as low as reasonably   achievable.        FINDINGS: Comparison is made to CT chest of 10/23/2018.    The thyroid gland appears intact.    Smallto moderate bilateral pleural effusions are increased in size since   prior exam.  There is mild underlying atelectasis suggested .  No   definite areas of pneumonia. The trachea and major bronchi are patent.    There are numerous markedly enlarged lymph nodes in the bilateral   mediastinum, hilar and axillary regions, unchanged since prior exam,   compatible with known lymphoma.     The heart size is normal.      Limited evaluation of the upper abdomen demonstrates several scattered   hepatic cysts.    There are numerous prominent lymph nodes within the abdominal mesentery   and visualized retroperitoneal regions.    Multilevel degenerative changes of the spine are identified.    Right humeral prosthesis is present.      IMPRESSION: Small tomoderate bilateral pleural effusions are increased   in size since prior exam.  There is mild underlying atelectasis suggested   .  No definite areas of pneumonia.  There are numerous markedly enlarged   lymph nodes in the bilateral mediastinum, hilar and axillary regions,   unchanged since prior exam, compatible with known lymphoma. There are   numerous prominent lymph nodes within the abdominal mesentery and   visualized retroperitoneal regions.    < end of copied text >      Gluc: x / Ketone: Negative  / Bili: Negative / Urobili: Negative mg/dL   Blood: x / Protein: 30 mg/dL / Nitrite: Negative   Leuk Esterase: Negative / RBC: 0-2 /HPF / WBC 0-2   Sq Epi: x / Non Sq Epi: Occasional / Bacteria: Negative

## 2019-01-01 NOTE — PROGRESS NOTE ADULT - SUBJECTIVE AND OBJECTIVE BOX
Chief complaints.  : Complains of increasing weakness and recurrent  episodes of LE weakness.    HPI:  89 yo man with PMHX significant for CHF, HTN, Hx of Lymphoma treated with Rituxan.  S/p Admission in 10/2018 for multifocal PNA associated with LETICIA/CKD.  Pt was d/cecelia to Rehab.   Reports improved ability to ambulate however, did not greatly improve and d/cecelia home 2 weeks ago.   At home has had repeated episodes of weakness and at times unable to stand up.  Denies fever at home,  Denies any chest discomfort or SOB with weakness.  However, does report some SOB with activity. Unclear  if related to timing of meds.  Has not noted color of stool lately.  Denies diarrhea.    1/1  feels well  wants PT to walk and sutures removed from head  no c/o sob but is dyspneic on conversing  Pulmonary input noted    PMHX and PSHX.  1.CKD ( 12/2015--Creat 1.63), Post recent hosp for CHF.  Creat 1.7 on d/c 6/2018.  2.A FIB  3.HTN  4.Hx of Prostate CA ( Post RT)  5.Hx of Lymphoma Post Rituxan therapy.  6.Hx of Postherpetic Neuralgia  7.Hypothyroidism  8.Hx of Chronic Diarrhea.  9.Recent admission for CHF.    FAMILY HISTORY:  Family history of ischemic heart disease (Father, Mother)    SOCIAL HISTORY :  Former smoker. No ETOH.  lives alone at home.  Allergies    No Known Allergies    Review of Systems:  No HA.  Denies SOB  No fever.  Denies chest discomfort  Denies abdominal discomfort  Denies dysuria.  No noticeable change in urination.      MEDICATIONS  (STANDING):  ALBUTerol    90 MICROgram(s) HFA Inhaler 1 Puff(s) Inhalation every 4 hours  ALBUTerol/ipratropium for Nebulization 3 milliLiter(s) Nebulizer every 6 hours  ALPRAZolam 0.25 milliGRAM(s) Oral daily  ALPRAZolam 0.5 milliGRAM(s) Oral at bedtime  amiodarone    Tablet 200 milliGRAM(s) Oral daily  artificial tears (preservative free) Ophthalmic Solution 1 Drop(s) Both EYES two times a day  gabapentin 200 milliGRAM(s) Oral three times a day  influenza   Vaccine 0.5 milliLiter(s) IntraMuscular once  lactobacillus acidophilus 1 Tablet(s) Oral daily  levothyroxine 100 MICROGram(s) Oral daily  metoprolol tartrate 25 milliGRAM(s) Oral two times a day  tiotropium 18 MICROgram(s) Capsule 1 Capsule(s) Inhalation daily    MEDICATIONS  (PRN):  acetaminophen   Tablet .. 650 milliGRAM(s) Oral every 6 hours PRN Temp greater or equal to 38C (100.4F), Mild Pain (1 - 3)  senna 2 Tablet(s) Oral at bedtime PRN Constipation         Vital Signs Last 24 Hrs  T(C): 36.8 (31 Dec 2018 11:36), Max: 36.8 (31 Dec 2018 11:36)  T(F): 98.3 (31 Dec 2018 11:36), Max: 98.3 (31 Dec 2018 11:36)  HR: 76 (31 Dec 2018 11:36) (58 - 88)  BP: 104/63 (31 Dec 2018 11:36) (103/56 - 121/64)  BP(mean): --  RR: 16 (31 Dec 2018 11:36) (16 - 18)  SpO2: 94% (31 Dec 2018 11:36) (92% - 97%)        PHYSICAL EXAM: Alert and appropriate  GEN: no acute distress  HEENT: WNL  NECK : supple  CV: S1S2 RRR  LUNGS: Basilar crackles.  ABD: soft  EXT: trace to 1+ edema    01-01    144  |  105  |  55<H>  ----------------------------<  85  3.6   |  31  |  2.59<H>    Ca    9.0      01 Jan 2019 07:00  Phos  3.2     12-31    TPro  5.4<L>  /  Alb  3.1<L>  /  TBili  1.1  /  DBili  x   /  AST  13<L>  /  ALT  19  /  AlkPhos  54  01-01                            9.2    5.94  )-----------( 151      ( 01 Jan 2019 07:00 )             29.1

## 2019-01-01 NOTE — PHYSICAL THERAPY INITIAL EVALUATION ADULT - DIAGNOSIS, PT EVAL
Weakness - likely multifactorial;  Acute on chronic renal failure possibly due to ?cardiorenal vs acute on chronic diastolic dysfunction; B pleural effusions

## 2019-01-01 NOTE — PHYSICAL THERAPY INITIAL EVALUATION ADULT - GENERAL OBSERVATIONS, REHAB EVAL
Patient received in bed, asking to get out of bed.  Patient denied pain. + stitrches in R scalp from skin CA.  "They should be removed".

## 2019-01-02 ENCOUNTER — TRANSCRIPTION ENCOUNTER (OUTPATIENT)
Age: 84
End: 2019-01-02

## 2019-01-02 VITALS — SYSTOLIC BLOOD PRESSURE: 105 MMHG | DIASTOLIC BLOOD PRESSURE: 42 MMHG

## 2019-01-02 LAB
% ALBUMIN: 69.2 % — SIGNIFICANT CHANGE UP
% ALPHA 1: 7.1 % — SIGNIFICANT CHANGE UP
% ALPHA 2: 9.3 % — SIGNIFICANT CHANGE UP
% BETA: 9.4 % — SIGNIFICANT CHANGE UP
% GAMMA: 5 % — SIGNIFICANT CHANGE UP
ALBUMIN SERPL ELPH-MCNC: 3.1 G/DL — LOW (ref 3.3–5)
ALBUMIN SERPL ELPH-MCNC: 3.5 G/DL — LOW (ref 3.6–5.5)
ALBUMIN/GLOB SERPL ELPH: 2.3 RATIO — SIGNIFICANT CHANGE UP
ALP SERPL-CCNC: 58 U/L — SIGNIFICANT CHANGE UP (ref 40–120)
ALPHA1 GLOB SERPL ELPH-MCNC: 0.4 G/DL — SIGNIFICANT CHANGE UP (ref 0.1–0.4)
ALPHA2 GLOB SERPL ELPH-MCNC: 0.5 G/DL — SIGNIFICANT CHANGE UP (ref 0.5–1)
ALT FLD-CCNC: 19 U/L — SIGNIFICANT CHANGE UP (ref 12–78)
ANION GAP SERPL CALC-SCNC: 9 MMOL/L — SIGNIFICANT CHANGE UP (ref 5–17)
APTT BLD: 37.4 SEC — HIGH (ref 27.5–36.3)
AST SERPL-CCNC: 13 U/L — LOW (ref 15–37)
B-GLOBULIN SERPL ELPH-MCNC: 0.5 G/DL — SIGNIFICANT CHANGE UP (ref 0.5–1)
BILIRUB SERPL-MCNC: 1.2 MG/DL — SIGNIFICANT CHANGE UP (ref 0.2–1.2)
BUN SERPL-MCNC: 60 MG/DL — HIGH (ref 7–23)
CALCIUM SERPL-MCNC: 8.6 MG/DL — SIGNIFICANT CHANGE UP (ref 8.5–10.1)
CHLORIDE SERPL-SCNC: 106 MMOL/L — SIGNIFICANT CHANGE UP (ref 96–108)
CO2 SERPL-SCNC: 30 MMOL/L — SIGNIFICANT CHANGE UP (ref 22–31)
CREAT SERPL-MCNC: 2.59 MG/DL — HIGH (ref 0.5–1.3)
GAMMA GLOBULIN: 0.2 G/DL — LOW (ref 0.6–1.6)
GLUCOSE SERPL-MCNC: 89 MG/DL — SIGNIFICANT CHANGE UP (ref 70–99)
HCT VFR BLD CALC: 29.2 % — LOW (ref 39–50)
HGB BLD-MCNC: 9.2 G/DL — LOW (ref 13–17)
INR BLD: 2.26 RATIO — HIGH (ref 0.88–1.16)
INTERPRETATION 24H UR IFE-IMP: SIGNIFICANT CHANGE UP
INTERPRETATION SERPL IFE-IMP: SIGNIFICANT CHANGE UP
KAPPA LC SER QL IFE: 4.99 MG/DL — HIGH (ref 0.33–1.94)
KAPPA/LAMBDA FREE LIGHT CHAIN RATIO, SERUM: 3.93 RATIO — HIGH (ref 0.26–1.65)
LAMBDA LC SER QL IFE: 1.27 MG/DL — SIGNIFICANT CHANGE UP (ref 0.57–2.63)
MCHC RBC-ENTMCNC: 31.5 GM/DL — LOW (ref 32–36)
MCHC RBC-ENTMCNC: 31.8 PG — SIGNIFICANT CHANGE UP (ref 27–34)
MCV RBC AUTO: 101 FL — HIGH (ref 80–100)
NRBC # BLD: 0 /100 WBCS — SIGNIFICANT CHANGE UP (ref 0–0)
PLATELET # BLD AUTO: 156 K/UL — SIGNIFICANT CHANGE UP (ref 150–400)
POTASSIUM SERPL-MCNC: 3.7 MMOL/L — SIGNIFICANT CHANGE UP (ref 3.5–5.3)
POTASSIUM SERPL-SCNC: 3.7 MMOL/L — SIGNIFICANT CHANGE UP (ref 3.5–5.3)
PROT PATTERN SERPL ELPH-IMP: SIGNIFICANT CHANGE UP
PROT SERPL-MCNC: 5.4 GM/DL — LOW (ref 6–8.3)
PROTHROM AB SERPL-ACNC: 25.7 SEC — HIGH (ref 10–12.9)
RBC # BLD: 2.89 M/UL — LOW (ref 4.2–5.8)
RBC # FLD: 18.7 % — HIGH (ref 10.3–14.5)
SODIUM SERPL-SCNC: 145 MMOL/L — SIGNIFICANT CHANGE UP (ref 135–145)
WBC # BLD: 5.92 K/UL — SIGNIFICANT CHANGE UP (ref 3.8–10.5)
WBC # FLD AUTO: 5.92 K/UL — SIGNIFICANT CHANGE UP (ref 3.8–10.5)

## 2019-01-02 RX ORDER — GABAPENTIN 400 MG/1
3 CAPSULE ORAL
Qty: 0 | Refills: 0 | COMMUNITY

## 2019-01-02 RX ORDER — GABAPENTIN 400 MG/1
1 CAPSULE ORAL
Qty: 60 | Refills: 0
Start: 2019-01-02 | End: 2019-01-31

## 2019-01-02 RX ORDER — GABAPENTIN 400 MG/1
2 CAPSULE ORAL
Qty: 60 | Refills: 0
Start: 2019-01-02 | End: 2019-01-31

## 2019-01-02 RX ORDER — GABAPENTIN 400 MG/1
2 CAPSULE ORAL
Qty: 0 | Refills: 0 | COMMUNITY

## 2019-01-02 RX ORDER — FUROSEMIDE 40 MG
40 TABLET ORAL
Qty: 0 | Refills: 0 | Status: DISCONTINUED | OUTPATIENT
Start: 2019-01-02 | End: 2019-01-02

## 2019-01-02 RX ADMIN — Medication 0.25 MILLIGRAM(S): at 12:28

## 2019-01-02 RX ADMIN — Medication 100 MICROGRAM(S): at 05:42

## 2019-01-02 RX ADMIN — Medication 3 MILLILITER(S): at 01:59

## 2019-01-02 RX ADMIN — Medication 25 MILLIGRAM(S): at 17:45

## 2019-01-02 RX ADMIN — GABAPENTIN 100 MILLIGRAM(S): 400 CAPSULE ORAL at 15:10

## 2019-01-02 RX ADMIN — GABAPENTIN 100 MILLIGRAM(S): 400 CAPSULE ORAL at 08:29

## 2019-01-02 RX ADMIN — Medication 1 TABLET(S): at 12:29

## 2019-01-02 RX ADMIN — Medication 1 DROP(S): at 17:46

## 2019-01-02 RX ADMIN — Medication 25 MILLIGRAM(S): at 05:42

## 2019-01-02 RX ADMIN — Medication 1 DROP(S): at 05:43

## 2019-01-02 RX ADMIN — INFLUENZA VIRUS VACCINE 0.5 MILLILITER(S): 15; 15; 15; 15 SUSPENSION INTRAMUSCULAR at 12:29

## 2019-01-02 RX ADMIN — Medication 40 MILLIGRAM(S): at 15:10

## 2019-01-02 RX ADMIN — Medication 3 MILLILITER(S): at 08:42

## 2019-01-02 RX ADMIN — AMIODARONE HYDROCHLORIDE 200 MILLIGRAM(S): 400 TABLET ORAL at 05:42

## 2019-01-02 NOTE — PROGRESS NOTE ADULT - ASSESSMENT
- chronic cough  - small to moderate size effusion in the setting of known diastolic dysfunction  - acute on chronic ckd rule out cardiorenal syndrome  - weakness likely medication induced with the worsening renal function rule out amiodarone induced fatigue   - lymphoma with the stable lymphadenopathy     PLAN     - symptomatic relief for the cough   - use of nebs as needed for the wheezing   - diuretics as tolerated by the renal function to get negative balance with the monitoring of the renal function and patient was started on the lasix by the renal   - continue anticoagulation with the coumadin   - PT evaluation and discharge planning as per the medicine   - patient would need out patient pft at baseline  with the start of the amiodarone

## 2019-01-02 NOTE — DISCHARGE NOTE ADULT - CARE PROVIDERS DIRECT ADDRESSES
,DirectAddress_Unknown,DirectAddress_Unknown,[email or web address]

## 2019-01-02 NOTE — DISCHARGE NOTE ADULT - PLAN OF CARE
to feel better 1. change the dose of gabapentin to 100mg QAM, afternoon and 200mg at bedtime  2. F/U with  who I spoke to in length  3. Lasix 40mg daily for the plueral effusions  4. Renal f/u as outpatient for the Cr

## 2019-01-02 NOTE — PROVIDER CONTACT NOTE (OTHER) - ASSESSMENT
/70  HR 79  RR 19, O2 92 on room air  +1 edema bilateral ankles/feet  Loose cough, anterior lung sounds diminished but clear

## 2019-01-02 NOTE — DISCHARGE NOTE ADULT - PATIENT PORTAL LINK FT
You can access the [facility] Patient Portal, offered by [facility], by registering with the following website: [email or web address]

## 2019-01-02 NOTE — DISCHARGE NOTE ADULT - CARE PROVIDER_API CALL
Yun, SukHyeon (MD), Nephrology  [address]  [address]  [place] [address]  Phone: [phone]  Fax: [phone]    Jj Joshi), Cardiovascular Disease; Internal Medicine  [address]  [address]  [place] [address]  Phone: [phone]  Fax: [phone]    Merry Franklin), Critical Care Medicine; Internal Medicine; Pulmonary Disease; Sleep Medicine  [address] [place] [address]  Phone: [phone]  Fax: [phone]

## 2019-01-02 NOTE — DISCHARGE NOTE ADULT - CARE PLAN
Principal Discharge DX:	Weakness  Goal:	to feel better  Assessment and plan of treatment:	1. change the dose of gabapentin to 100mg QAM, afternoon and 200mg at bedtime  2. F/U with  who I spoke to in length  3. Lasix 40mg daily for the plueral effusions  4. Renal f/u as outpatient for the Cr

## 2019-01-02 NOTE — PROVIDER CONTACT NOTE (OTHER) - SITUATION
Per nephrology:10/2018 admission for multifocal PNA associated w/ LETICIA/CKD, CT chest show increased pleural effusion, steven to cont. diuretics   1/1/18 dayshift held lasix due to low bp  bp now 120/70

## 2019-01-02 NOTE — DISCHARGE NOTE ADULT - MEDICATION SUMMARY - MEDICATIONS TO TAKE
I will START or STAY ON the medications listed below when I get home from the hospital:    alfuzosin 10 mg oral tablet, extended release  -- 1 tab(s) by mouth once a day  -- Indication: For home med    Pacerone 200 mg oral tablet  -- 1 tab(s) by mouth once a day  -- Indication: For home med    warfarin 5 mg oral tablet  -- 7.5 tab(s) by mouth once a day  -- Indication: For home med    gabapentin 100 mg oral capsule  -- 1 cap(s) by mouth 2 times a day (morning and afternoon)  -- Indication: For CHANGED DOSE    gabapentin 100 mg oral capsule  -- 2 cap(s) by mouth once a day (at bedtime)  -- Indication: For CHANGED DOSE    ALPRAZolam 0.25 mg oral tablet  -- 1 tab(s) by mouth once a day  -- Indication: For home med    ALPRAZolam 0.5 mg oral tablet  -- orally once a day (at bedtime)  -- Indication: For home med    Metoprolol Tartrate 25 mg oral tablet  -- 1 tab(s) by mouth 2 times a day  -- Indication: For home med    albuterol 2.5 mg/3 mL (0.083%) inhalation solution  -- 3 milliliter(s) inhaled every 6 hours  -- Indication: For home med    furosemide 40 mg oral tablet  -- 1 tab(s) by mouth once a day  -- Indication: For home med    Lotemax 0.5% ophthalmic ointment  -- 1 application in each eye 2 times a day  -- Indication: For home med    Refresh ophthalmic solution  -- 1 drop(s) in each eye 2 times a day  -- Indication: For home med    lactobacillus acidophilus oral capsule  -- 1 cap(s) by mouth once a day  -- Indication: For home med    levothyroxine 100 mcg (0.1 mg) oral tablet  -- 1 tab(s) by mouth once a day  -- Indication: For home ed

## 2019-01-02 NOTE — PROGRESS NOTE ADULT - SUBJECTIVE AND OBJECTIVE BOX
NEPHROLOGY INTERVAL HPI/OVERNIGHT EVENTS:  01-02-19 @ 14:07  doing well, to go home today  lasix held due to low bp yesterday      MEDICATIONS  (STANDING):  ALBUTerol    90 MICROgram(s) HFA Inhaler 1 Puff(s) Inhalation every 4 hours  ALBUTerol/ipratropium for Nebulization 3 milliLiter(s) Nebulizer every 6 hours  ALPRAZolam 0.25 milliGRAM(s) Oral daily  ALPRAZolam 0.5 milliGRAM(s) Oral at bedtime  amiodarone    Tablet 200 milliGRAM(s) Oral daily  artificial tears (preservative free) Ophthalmic Solution 1 Drop(s) Both EYES two times a day  furosemide    Tablet 40 milliGRAM(s) Oral daily  gabapentin 100 milliGRAM(s) Oral <User Schedule>  gabapentin 200 milliGRAM(s) Oral at bedtime  lactobacillus acidophilus 1 Tablet(s) Oral daily  levothyroxine 100 MICROGram(s) Oral daily  metoprolol tartrate 25 milliGRAM(s) Oral two times a day  warfarin 7.5 milliGRAM(s) Oral at bedtime    MEDICATIONS  (PRN):  acetaminophen   Tablet .. 650 milliGRAM(s) Oral every 6 hours PRN Temp greater or equal to 38C (100.4F), Mild Pain (1 - 3)  senna 2 Tablet(s) Oral at bedtime PRN Constipation      Allergies    No Known Allergies    Intolerances        I&O's Detail    01 Jan 2019 07:01  -  02 Jan 2019 07:00  --------------------------------------------------------  IN:  Total IN: 0 mL    OUT:    Voided: 400 mL  Total OUT: 400 mL    Total NET: -400 mL      02 Jan 2019 07:01  -  02 Jan 2019 14:07  --------------------------------------------------------  IN:  Total IN: 0 mL    OUT:    Voided: 400 mL  Total OUT: 400 mL    Total NET: -400 mL        Vital Signs Last 24 Hrs  T(C): 36.3 (02 Jan 2019 11:04), Max: 37.2 (01 Jan 2019 17:04)  T(F): 97.4 (02 Jan 2019 11:04), Max: 98.9 (01 Jan 2019 17:04)  HR: 74 (02 Jan 2019 11:04) (72 - 82)  BP: 112/59 (02 Jan 2019 11:04) (96/54 - 120/70)  BP(mean): --  RR: 19 (02 Jan 2019 11:04) (17 - 19)  SpO2: 97% (02 Jan 2019 11:04) (92% - 97%)  Daily     Daily     PHYSICAL EXAM:  General: alert. awake  HEENT: MMM  CV: s1s2 rrr  LUNGS: B/L CTA  EXT: + edema    LABS:                        9.2    5.92  )-----------( 156      ( 02 Jan 2019 07:12 )             29.2     01-02    145  |  106  |  60<H>  ----------------------------<  89  3.7   |  30  |  2.59<H>    Ca    8.6      02 Jan 2019 07:12    TPro  5.4<L>  /  Alb  3.1<L>  /  TBili  1.2  /  DBili  x   /  AST  13<L>  /  ALT  19  /  AlkPhos  58  01-02    PT/INR - ( 02 Jan 2019 07:12 )   PT: 25.7 sec;   INR: 2.26 ratio         PTT - ( 02 Jan 2019 07:12 )  PTT:37.4 sec

## 2019-01-02 NOTE — PROVIDER CONTACT NOTE (OTHER) - NAME OF MD/NP/PA/DO NOTIFIED:
MADELAINE SPOKE WITH ANASTACIA FROM ANSWERING SERVICE.
RHETT SPOKE WITH KASSIE FROM ANSWERING SERVICE.
DR DELANEY
MD Chen

## 2019-01-02 NOTE — DISCHARGE NOTE ADULT - MEDICATION SUMMARY - MEDICATIONS TO CHANGE
I will SWITCH the dose or number of times a day I take the medications listed below when I get home from the hospital:    gabapentin 300 mg oral capsule  -- 2  by mouth 2 times a day    gabapentin 300 mg oral capsule  -- 3 cap(s) by mouth once a day (at bedtime)

## 2019-01-02 NOTE — PROVIDER CONTACT NOTE (OTHER) - BACKGROUND
h/o CHF, Afib, CKD/LETICIA  10/2018 admission for multifocal PNA associated w/ LETICIA/CKD  CT chest show increased pleural effusion

## 2019-01-02 NOTE — DISCHARGE NOTE ADULT - HOSPITAL COURSE
89 year old male with anxiety, AFIB, BPH, CKD,HTN, hypothyroid,  postherpetic neuralgia, prostate ca , small B cell lymphoma presented to  ED by ambulance c/o weakness        Pt was discharged from rehab 2 weeks ago and started feeling weak. Has not been able to sleep for the past 2 days and has a nonproductive cough w wheezing according to his nurse.  She gives him albuterol nebs w partial relief.  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    Hospital Course: Pt was admitted to the hospital for weakness and fatigue. No infections were noted. He was noted to have worsening renal function and pleural effusions. He was given IV lasix as tolerated to help with the effusions. He was started on lasix 40mg po his home dose and feels well. He is not short of breath during the admission and the effusions are not symptomatic. The patient and daughter state that the reason for the admission is that he is weaker and the daughter believes it started after taking gabapentin. We have decreased the dose of the gabapentin to 100mg QAM and afternoon, 200mg at bedtime. This may help with fatigue and somnolence. He was seen by PT and wants to go home. He was advised to f/u with       - renal for his Cr check      -  who I met in the hospital and we reviewed the case in detail      - his surgeon in the city to remove the stitches in his head      - pulmonary physicians  to repeat the CXR      PHYSICAL EXAM:    Constitutional: NAD, awake and alert, well-developed  HEENT: PERR, EOMI, Normal Hearing, MMM  Neck: Soft and supple, No LAD, No JVD  Respiratory: Breath sounds are clear bilaterally, No wheezing, rales or rhonchi  Cardiovascular: S1 and S2, regular rate and rhythm, no Murmurs, gallops or rubs  Gastrointestinal: Bowel Sounds present, soft, nontender, nondistended, no guarding, no rebound  Extremities: No peripheral edema  Vascular: 2+ peripheral pulses  Neurological: A/O x 3, no focal deficits  Musculoskeletal: 5/5 strength b/l upper and lower extremities  Skin: No rashes      total time for discharge: 40 minutes

## 2019-01-02 NOTE — PROGRESS NOTE ADULT - SUBJECTIVE AND OBJECTIVE BOX
SUBJECTIVE     Patient seen in the A.M and says his cough is not worse and weakness is better     PAST MEDICAL & SURGICAL HISTORY:  CKD (chronic kidney disease)  BPH (benign prostatic hyperplasia)  Postherpetic neuralgia  Chronic diarrhea  Prostate CA  Basal cell carcinoma  Lymphoma  Anxiety  Hypothyroid  Hypertension  A-fib  H/O shoulder replacement  S/P bilateral unicompartmental knee replacement    OBJECTIVE   Vital Signs Last 24 Hrs  T(C): 36.3 (02 Jan 2019 11:04), Max: 37.2 (01 Jan 2019 17:04)  T(F): 97.4 (02 Jan 2019 11:04), Max: 98.9 (01 Jan 2019 17:04)  HR: 74 (02 Jan 2019 11:04) (72 - 82)  BP: 112/59 (02 Jan 2019 11:04) (96/54 - 120/70)  BP(mean): --  RR: 19 (02 Jan 2019 11:04) (17 - 19)  SpO2: 97% (02 Jan 2019 11:04) (92% - 97%)    PHYSICAL EXAM:  Constitutional: , awake and alert, not in distress.  HEENT: Normo cephalic atraumatic  Neck: Soft and supple, No J.V.D   Respiratory: vesicular breathing has decreased breath sounds in the bases   Cardiovascular: S1 and S2, regular rate .   Gastrointestinal:  soft, nontender,   Extremities: No  edema or calf tenderness .  Neurological: No new  focal deficits.    MEDICATIONS  (STANDING):  ALBUTerol    90 MICROgram(s) HFA Inhaler 1 Puff(s) Inhalation every 4 hours  ALBUTerol/ipratropium for Nebulization 3 milliLiter(s) Nebulizer every 6 hours  ALPRAZolam 0.25 milliGRAM(s) Oral daily  ALPRAZolam 0.5 milliGRAM(s) Oral at bedtime  amiodarone    Tablet 200 milliGRAM(s) Oral daily  artificial tears (preservative free) Ophthalmic Solution 1 Drop(s) Both EYES two times a day  furosemide    Tablet 40 milliGRAM(s) Oral <User Schedule>  gabapentin 100 milliGRAM(s) Oral <User Schedule>  gabapentin 200 milliGRAM(s) Oral at bedtime  lactobacillus acidophilus 1 Tablet(s) Oral daily  levothyroxine 100 MICROGram(s) Oral daily  metoprolol tartrate 25 milliGRAM(s) Oral two times a day  warfarin 7.5 milliGRAM(s) Oral at bedtime                            9.2    5.92  )-----------( 156      ( 02 Jan 2019 07:12 )             29.2     01-02    145  |  106  |  60<H>  ----------------------------<  89  3.7   |  30  |  2.59<H>    Ca    8.6      02 Jan 2019 07:12    TPro  5.4<L>  /  Alb  3.1<L>  /  TBili  1.2  /  DBili  x   /  AST  13<L>  /  ALT  19  /  AlkPhos  58  01-02

## 2019-01-02 NOTE — PROGRESS NOTE ADULT - ASSESSMENT
89 yo man with Known CKD, CHF and Hx of Lymphoma post recent admission for Multifocal PNA now admitted with weakness.  --LETICIA/CKD : unclear if volume related.  Chest CT with increased bilateral effusion--Will need to continue diuretics.  --LETICIA/ with increased Pleural effusion : monitor for decompensated CHF and trial of diuresis with pulmonary follow up.  --Weakness. ; Trial of decreased dose of Gabapentin.  --Anemia : significant drop in HGB noted c/w previous admission ; Check stool guaic and anemia work up.--No retroperitoneal bleed on CT.    1/1  creat improving  Chest imaging shows increased pleural effusions  will administer iv lasix x1, as needed   monitoring renal function, and fluid status    1/2 MK   - LETICIA/CKD with improvement of renal function.  start lasix 40 qd ( home dose) and monitor response  - Pleural effusion: on lasix and will fu pulmonary input  - anemia with ocb neg and SPEP neg, fu trend of hgb  DW Dr Bean

## 2019-01-03 LAB — GABAPENTIN SERPLBLD-MCNC: 15 MCG/ML — SIGNIFICANT CHANGE UP (ref 2–20)

## 2019-01-04 LAB
CULTURE RESULTS: SIGNIFICANT CHANGE UP
SPECIMEN SOURCE: SIGNIFICANT CHANGE UP

## 2019-01-07 DIAGNOSIS — N18.9 CHRONIC KIDNEY DISEASE, UNSPECIFIED: ICD-10-CM

## 2019-01-07 DIAGNOSIS — R53.1 WEAKNESS: ICD-10-CM

## 2019-01-07 DIAGNOSIS — Z96.653 PRESENCE OF ARTIFICIAL KNEE JOINT, BILATERAL: ICD-10-CM

## 2019-01-07 DIAGNOSIS — N17.9 ACUTE KIDNEY FAILURE, UNSPECIFIED: ICD-10-CM

## 2019-01-07 DIAGNOSIS — C85.10 UNSPECIFIED B-CELL LYMPHOMA, UNSPECIFIED SITE: ICD-10-CM

## 2019-01-07 DIAGNOSIS — Z96.619 PRESENCE OF UNSPECIFIED ARTIFICIAL SHOULDER JOINT: ICD-10-CM

## 2019-01-07 DIAGNOSIS — Z85.46 PERSONAL HISTORY OF MALIGNANT NEOPLASM OF PROSTATE: ICD-10-CM

## 2019-01-07 DIAGNOSIS — E03.9 HYPOTHYROIDISM, UNSPECIFIED: ICD-10-CM

## 2019-01-07 DIAGNOSIS — I50.33 ACUTE ON CHRONIC DIASTOLIC (CONGESTIVE) HEART FAILURE: ICD-10-CM

## 2019-01-07 DIAGNOSIS — I13.0 HYPERTENSIVE HEART AND CHRONIC KIDNEY DISEASE WITH HEART FAILURE AND STAGE 1 THROUGH STAGE 4 CHRONIC KIDNEY DISEASE, OR UNSPECIFIED CHRONIC KIDNEY DISEASE: ICD-10-CM

## 2019-01-07 DIAGNOSIS — D64.9 ANEMIA, UNSPECIFIED: ICD-10-CM

## 2019-01-07 DIAGNOSIS — N40.0 BENIGN PROSTATIC HYPERPLASIA WITHOUT LOWER URINARY TRACT SYMPTOMS: ICD-10-CM

## 2019-01-07 DIAGNOSIS — F41.9 ANXIETY DISORDER, UNSPECIFIED: ICD-10-CM

## 2019-01-07 DIAGNOSIS — Z87.891 PERSONAL HISTORY OF NICOTINE DEPENDENCE: ICD-10-CM

## 2019-01-07 DIAGNOSIS — I48.91 UNSPECIFIED ATRIAL FIBRILLATION: ICD-10-CM

## 2019-01-19 ENCOUNTER — INPATIENT (INPATIENT)
Facility: HOSPITAL | Age: 84
LOS: 5 days | Discharge: HOME | DRG: 291 | End: 2019-01-25
Attending: INTERNAL MEDICINE | Admitting: INTERNAL MEDICINE
Payer: MEDICARE

## 2019-01-19 VITALS
HEIGHT: 66 IN | WEIGHT: 154.98 LBS | RESPIRATION RATE: 20 BRPM | HEART RATE: 72 BPM | SYSTOLIC BLOOD PRESSURE: 119 MMHG | OXYGEN SATURATION: 97 % | DIASTOLIC BLOOD PRESSURE: 72 MMHG

## 2019-01-19 DIAGNOSIS — I50.9 HEART FAILURE, UNSPECIFIED: ICD-10-CM

## 2019-01-19 DIAGNOSIS — N40.0 BENIGN PROSTATIC HYPERPLASIA WITHOUT LOWER URINARY TRACT SYMPTOMS: ICD-10-CM

## 2019-01-19 DIAGNOSIS — Z96.619 PRESENCE OF UNSPECIFIED ARTIFICIAL SHOULDER JOINT: Chronic | ICD-10-CM

## 2019-01-19 DIAGNOSIS — B02.29 OTHER POSTHERPETIC NERVOUS SYSTEM INVOLVEMENT: ICD-10-CM

## 2019-01-19 DIAGNOSIS — C85.90 NON-HODGKIN LYMPHOMA, UNSPECIFIED, UNSPECIFIED SITE: ICD-10-CM

## 2019-01-19 DIAGNOSIS — N18.4 CHRONIC KIDNEY DISEASE, STAGE 4 (SEVERE): ICD-10-CM

## 2019-01-19 DIAGNOSIS — R09.89 OTHER SPECIFIED SYMPTOMS AND SIGNS INVOLVING THE CIRCULATORY AND RESPIRATORY SYSTEMS: ICD-10-CM

## 2019-01-19 DIAGNOSIS — J18.1 LOBAR PNEUMONIA, UNSPECIFIED ORGANISM: ICD-10-CM

## 2019-01-19 DIAGNOSIS — D64.9 ANEMIA, UNSPECIFIED: ICD-10-CM

## 2019-01-19 DIAGNOSIS — I10 ESSENTIAL (PRIMARY) HYPERTENSION: ICD-10-CM

## 2019-01-19 DIAGNOSIS — R06.02 SHORTNESS OF BREATH: ICD-10-CM

## 2019-01-19 DIAGNOSIS — Z96.653 PRESENCE OF ARTIFICIAL KNEE JOINT, BILATERAL: Chronic | ICD-10-CM

## 2019-01-19 DIAGNOSIS — I38 ENDOCARDITIS, VALVE UNSPECIFIED: ICD-10-CM

## 2019-01-19 DIAGNOSIS — Z29.9 ENCOUNTER FOR PROPHYLACTIC MEASURES, UNSPECIFIED: ICD-10-CM

## 2019-01-19 DIAGNOSIS — N18.9 CHRONIC KIDNEY DISEASE, UNSPECIFIED: ICD-10-CM

## 2019-01-19 DIAGNOSIS — J22 UNSPECIFIED ACUTE LOWER RESPIRATORY INFECTION: ICD-10-CM

## 2019-01-19 DIAGNOSIS — R54 AGE-RELATED PHYSICAL DEBILITY: ICD-10-CM

## 2019-01-19 DIAGNOSIS — E03.9 HYPOTHYROIDISM, UNSPECIFIED: ICD-10-CM

## 2019-01-19 DIAGNOSIS — I48.91 UNSPECIFIED ATRIAL FIBRILLATION: ICD-10-CM

## 2019-01-19 LAB
ALBUMIN SERPL ELPH-MCNC: 3.2 G/DL — LOW (ref 3.3–5)
ALP SERPL-CCNC: 75 U/L — SIGNIFICANT CHANGE UP (ref 40–120)
ALT FLD-CCNC: 26 U/L — SIGNIFICANT CHANGE UP (ref 12–78)
ANION GAP SERPL CALC-SCNC: 5 MMOL/L — SIGNIFICANT CHANGE UP (ref 5–17)
APTT BLD: 30.6 SEC — SIGNIFICANT CHANGE UP (ref 27.5–36.3)
AST SERPL-CCNC: 14 U/L — LOW (ref 15–37)
BASE EXCESS BLDV CALC-SCNC: 5.7 MMOL/L — HIGH (ref -2–2)
BASOPHILS # BLD AUTO: 0.03 K/UL — SIGNIFICANT CHANGE UP (ref 0–0.2)
BASOPHILS NFR BLD AUTO: 0.7 % — SIGNIFICANT CHANGE UP (ref 0–2)
BILIRUB SERPL-MCNC: 0.7 MG/DL — SIGNIFICANT CHANGE UP (ref 0.2–1.2)
BLOOD GAS COMMENTS, VENOUS: SIGNIFICANT CHANGE UP
BUN SERPL-MCNC: 44 MG/DL — HIGH (ref 7–23)
CALCIUM SERPL-MCNC: 8.2 MG/DL — LOW (ref 8.5–10.1)
CHLORIDE SERPL-SCNC: 107 MMOL/L — SIGNIFICANT CHANGE UP (ref 96–108)
CK SERPL-CCNC: 44 U/L — SIGNIFICANT CHANGE UP (ref 26–308)
CO2 SERPL-SCNC: 32 MMOL/L — HIGH (ref 22–31)
CREAT SERPL-MCNC: 2.5 MG/DL — HIGH (ref 0.5–1.3)
EOSINOPHIL # BLD AUTO: 0.12 K/UL — SIGNIFICANT CHANGE UP (ref 0–0.5)
EOSINOPHIL NFR BLD AUTO: 2.6 % — SIGNIFICANT CHANGE UP (ref 0–6)
FLU A RESULT: SIGNIFICANT CHANGE UP
FLU A RESULT: SIGNIFICANT CHANGE UP
FLUAV AG NPH QL: SIGNIFICANT CHANGE UP
FLUBV AG NPH QL: SIGNIFICANT CHANGE UP
GLUCOSE SERPL-MCNC: 99 MG/DL — SIGNIFICANT CHANGE UP (ref 70–99)
HCO3 BLDV-SCNC: 29 MMOL/L — SIGNIFICANT CHANGE UP (ref 21–29)
HCT VFR BLD CALC: 28.2 % — LOW (ref 39–50)
HGB BLD-MCNC: 8.9 G/DL — LOW (ref 13–17)
IMM GRANULOCYTES NFR BLD AUTO: 0.7 % — SIGNIFICANT CHANGE UP (ref 0–1.5)
INR BLD: 1.24 RATIO — HIGH (ref 0.88–1.16)
LACTATE SERPL-SCNC: 1.2 MMOL/L — SIGNIFICANT CHANGE UP (ref 0.7–2)
LYMPHOCYTES # BLD AUTO: 1.2 K/UL — SIGNIFICANT CHANGE UP (ref 1–3.3)
LYMPHOCYTES # BLD AUTO: 26.4 % — SIGNIFICANT CHANGE UP (ref 13–44)
MCHC RBC-ENTMCNC: 31.6 GM/DL — LOW (ref 32–36)
MCHC RBC-ENTMCNC: 32.6 PG — SIGNIFICANT CHANGE UP (ref 27–34)
MCV RBC AUTO: 103.3 FL — HIGH (ref 80–100)
MONOCYTES # BLD AUTO: 0.42 K/UL — SIGNIFICANT CHANGE UP (ref 0–0.9)
MONOCYTES NFR BLD AUTO: 9.3 % — SIGNIFICANT CHANGE UP (ref 2–14)
NEUTROPHILS # BLD AUTO: 2.74 K/UL — SIGNIFICANT CHANGE UP (ref 1.8–7.4)
NEUTROPHILS NFR BLD AUTO: 60.3 % — SIGNIFICANT CHANGE UP (ref 43–77)
NRBC # BLD: 0 /100 WBCS — SIGNIFICANT CHANGE UP (ref 0–0)
NT-PROBNP SERPL-SCNC: HIGH PG/ML (ref 0–450)
PCO2 BLDV: 47 MMHG — SIGNIFICANT CHANGE UP (ref 35–50)
PH BLDV: 7.42 — SIGNIFICANT CHANGE UP (ref 7.35–7.45)
PLATELET # BLD AUTO: 132 K/UL — LOW (ref 150–400)
PO2 BLDV: 50 MMHG — HIGH (ref 25–45)
POTASSIUM SERPL-MCNC: 3.7 MMOL/L — SIGNIFICANT CHANGE UP (ref 3.5–5.3)
POTASSIUM SERPL-SCNC: 3.7 MMOL/L — SIGNIFICANT CHANGE UP (ref 3.5–5.3)
PROT SERPL-MCNC: 5.7 G/DL — LOW (ref 6–8.3)
PROTHROM AB SERPL-ACNC: 14.1 SEC — HIGH (ref 10–12.9)
RBC # BLD: 2.73 M/UL — LOW (ref 4.2–5.8)
RBC # FLD: 17.7 % — HIGH (ref 10.3–14.5)
RSV RESULT: SIGNIFICANT CHANGE UP
RSV RNA RESP QL NAA+PROBE: SIGNIFICANT CHANGE UP
SAO2 % BLDV: 85 % — SIGNIFICANT CHANGE UP (ref 67–88)
SODIUM SERPL-SCNC: 144 MMOL/L — SIGNIFICANT CHANGE UP (ref 135–145)
TROPONIN I SERPL-MCNC: 0.03 NG/ML — SIGNIFICANT CHANGE UP (ref 0.01–0.04)
WBC # BLD: 4.54 K/UL — SIGNIFICANT CHANGE UP (ref 3.8–10.5)
WBC # FLD AUTO: 4.54 K/UL — SIGNIFICANT CHANGE UP (ref 3.8–10.5)

## 2019-01-19 PROCEDURE — 99223 1ST HOSP IP/OBS HIGH 75: CPT | Mod: GC,AI

## 2019-01-19 PROCEDURE — 99285 EMERGENCY DEPT VISIT HI MDM: CPT

## 2019-01-19 PROCEDURE — 99497 ADVNCD CARE PLAN 30 MIN: CPT | Mod: 25

## 2019-01-19 PROCEDURE — 93010 ELECTROCARDIOGRAM REPORT: CPT | Mod: 76

## 2019-01-19 PROCEDURE — 71045 X-RAY EXAM CHEST 1 VIEW: CPT | Mod: 26

## 2019-01-19 PROCEDURE — 99222 1ST HOSP IP/OBS MODERATE 55: CPT

## 2019-01-19 RX ORDER — ALPRAZOLAM 0.25 MG
0.5 TABLET ORAL AT BEDTIME
Qty: 0 | Refills: 0 | Status: DISCONTINUED | OUTPATIENT
Start: 2019-01-19 | End: 2019-01-25

## 2019-01-19 RX ORDER — ALBUTEROL 90 UG/1
2.5 AEROSOL, METERED ORAL ONCE
Qty: 0 | Refills: 0 | Status: COMPLETED | OUTPATIENT
Start: 2019-01-19 | End: 2019-01-19

## 2019-01-19 RX ORDER — FUROSEMIDE 40 MG
20 TABLET ORAL ONCE
Qty: 0 | Refills: 0 | Status: COMPLETED | OUTPATIENT
Start: 2019-01-19 | End: 2019-01-19

## 2019-01-19 RX ORDER — WARFARIN SODIUM 2.5 MG/1
2.5 TABLET ORAL ONCE
Qty: 0 | Refills: 0 | Status: DISCONTINUED | OUTPATIENT
Start: 2019-01-19 | End: 2019-01-19

## 2019-01-19 RX ORDER — ACETAMINOPHEN 500 MG
650 TABLET ORAL EVERY 6 HOURS
Qty: 0 | Refills: 0 | Status: DISCONTINUED | OUTPATIENT
Start: 2019-01-19 | End: 2019-01-25

## 2019-01-19 RX ORDER — ALBUTEROL 90 UG/1
2.5 AEROSOL, METERED ORAL EVERY 6 HOURS
Qty: 0 | Refills: 0 | Status: DISCONTINUED | OUTPATIENT
Start: 2019-01-19 | End: 2019-01-25

## 2019-01-19 RX ORDER — GABAPENTIN 400 MG/1
100 CAPSULE ORAL
Qty: 0 | Refills: 0 | Status: DISCONTINUED | OUTPATIENT
Start: 2019-01-19 | End: 2019-01-25

## 2019-01-19 RX ORDER — ONDANSETRON 8 MG/1
4 TABLET, FILM COATED ORAL ONCE
Qty: 0 | Refills: 0 | Status: COMPLETED | OUTPATIENT
Start: 2019-01-19 | End: 2019-01-19

## 2019-01-19 RX ORDER — TAMSULOSIN HYDROCHLORIDE 0.4 MG/1
0.4 CAPSULE ORAL AT BEDTIME
Qty: 0 | Refills: 0 | Status: DISCONTINUED | OUTPATIENT
Start: 2019-01-19 | End: 2019-01-25

## 2019-01-19 RX ORDER — IPRATROPIUM/ALBUTEROL SULFATE 18-103MCG
3 AEROSOL WITH ADAPTER (GRAM) INHALATION ONCE
Qty: 0 | Refills: 0 | Status: COMPLETED | OUTPATIENT
Start: 2019-01-19 | End: 2019-01-19

## 2019-01-19 RX ORDER — ENOXAPARIN SODIUM 100 MG/ML
70 INJECTION SUBCUTANEOUS ONCE
Qty: 0 | Refills: 0 | Status: COMPLETED | OUTPATIENT
Start: 2019-01-19 | End: 2019-01-19

## 2019-01-19 RX ORDER — LACTOBACILLUS ACIDOPHILUS 100MM CELL
1 CAPSULE ORAL
Qty: 0 | Refills: 0 | Status: DISCONTINUED | OUTPATIENT
Start: 2019-01-19 | End: 2019-01-25

## 2019-01-19 RX ORDER — METOPROLOL TARTRATE 50 MG
25 TABLET ORAL DAILY
Qty: 0 | Refills: 0 | Status: DISCONTINUED | OUTPATIENT
Start: 2019-01-19 | End: 2019-01-19

## 2019-01-19 RX ORDER — FUROSEMIDE 40 MG
40 TABLET ORAL DAILY
Qty: 0 | Refills: 0 | Status: DISCONTINUED | OUTPATIENT
Start: 2019-01-19 | End: 2019-01-25

## 2019-01-19 RX ORDER — METOPROLOL TARTRATE 50 MG
25 TABLET ORAL DAILY
Qty: 0 | Refills: 0 | Status: DISCONTINUED | OUTPATIENT
Start: 2019-01-19 | End: 2019-01-25

## 2019-01-19 RX ORDER — WARFARIN SODIUM 2.5 MG/1
5 TABLET ORAL ONCE
Qty: 0 | Refills: 0 | Status: COMPLETED | OUTPATIENT
Start: 2019-01-19 | End: 2019-01-19

## 2019-01-19 RX ORDER — LEVOTHYROXINE SODIUM 125 MCG
100 TABLET ORAL DAILY
Qty: 0 | Refills: 0 | Status: DISCONTINUED | OUTPATIENT
Start: 2019-01-19 | End: 2019-01-25

## 2019-01-19 RX ORDER — PIPERACILLIN AND TAZOBACTAM 4; .5 G/20ML; G/20ML
3.38 INJECTION, POWDER, LYOPHILIZED, FOR SOLUTION INTRAVENOUS ONCE
Qty: 0 | Refills: 0 | Status: COMPLETED | OUTPATIENT
Start: 2019-01-19 | End: 2019-01-19

## 2019-01-19 RX ORDER — GABAPENTIN 400 MG/1
200 CAPSULE ORAL AT BEDTIME
Qty: 0 | Refills: 0 | Status: DISCONTINUED | OUTPATIENT
Start: 2019-01-19 | End: 2019-01-25

## 2019-01-19 RX ORDER — SODIUM CHLORIDE 9 MG/ML
3 INJECTION INTRAMUSCULAR; INTRAVENOUS; SUBCUTANEOUS ONCE
Qty: 0 | Refills: 0 | Status: COMPLETED | OUTPATIENT
Start: 2019-01-19 | End: 2019-01-19

## 2019-01-19 RX ORDER — AMIODARONE HYDROCHLORIDE 400 MG/1
200 TABLET ORAL DAILY
Qty: 0 | Refills: 0 | Status: DISCONTINUED | OUTPATIENT
Start: 2019-01-19 | End: 2019-01-25

## 2019-01-19 RX ADMIN — Medication 600 MILLIGRAM(S): at 17:54

## 2019-01-19 RX ADMIN — Medication 1 DROP(S): at 17:54

## 2019-01-19 RX ADMIN — Medication 40 MILLIGRAM(S): at 21:56

## 2019-01-19 RX ADMIN — Medication 3 MILLILITER(S): at 15:48

## 2019-01-19 RX ADMIN — Medication 1 TABLET(S): at 12:39

## 2019-01-19 RX ADMIN — WARFARIN SODIUM 5 MILLIGRAM(S): 2.5 TABLET ORAL at 21:56

## 2019-01-19 RX ADMIN — AMIODARONE HYDROCHLORIDE 200 MILLIGRAM(S): 400 TABLET ORAL at 17:54

## 2019-01-19 RX ADMIN — TAMSULOSIN HYDROCHLORIDE 0.4 MILLIGRAM(S): 0.4 CAPSULE ORAL at 21:56

## 2019-01-19 RX ADMIN — ONDANSETRON 4 MILLIGRAM(S): 8 TABLET, FILM COATED ORAL at 17:54

## 2019-01-19 RX ADMIN — Medication 25 MILLIGRAM(S): at 12:37

## 2019-01-19 RX ADMIN — GABAPENTIN 200 MILLIGRAM(S): 400 CAPSULE ORAL at 21:56

## 2019-01-19 RX ADMIN — Medication 1 TABLET(S): at 17:54

## 2019-01-19 RX ADMIN — ALBUTEROL 2.5 MILLIGRAM(S): 90 AEROSOL, METERED ORAL at 12:01

## 2019-01-19 RX ADMIN — GABAPENTIN 100 MILLIGRAM(S): 400 CAPSULE ORAL at 17:54

## 2019-01-19 RX ADMIN — ENOXAPARIN SODIUM 70 MILLIGRAM(S): 100 INJECTION SUBCUTANEOUS at 12:37

## 2019-01-19 RX ADMIN — Medication 40 MILLIGRAM(S): at 15:39

## 2019-01-19 RX ADMIN — PIPERACILLIN AND TAZOBACTAM 200 GRAM(S): 4; .5 INJECTION, POWDER, LYOPHILIZED, FOR SOLUTION INTRAVENOUS at 08:47

## 2019-01-19 RX ADMIN — ALBUTEROL 2.5 MILLIGRAM(S): 90 AEROSOL, METERED ORAL at 19:21

## 2019-01-19 RX ADMIN — Medication 40 MILLIGRAM(S): at 12:37

## 2019-01-19 RX ADMIN — SODIUM CHLORIDE 3 MILLILITER(S): 9 INJECTION INTRAMUSCULAR; INTRAVENOUS; SUBCUTANEOUS at 07:57

## 2019-01-19 RX ADMIN — Medication 20 MILLIGRAM(S): at 17:53

## 2019-01-19 NOTE — CHART NOTE - NSCHARTNOTEFT_GEN_A_CORE
Contacted by RN for patient endorsing persistent shortness of breath and epigastric pain. Patient seen and examined at bedside. Patient states his shortness of breath has not worsened however is persistently uncomfortably. Patient also endorses nausea without vomiting however denies any fever/chills, chest pain, cough, epigastric or generalized abdominal pain.    Vital Signs (24 Hrs):  T(C): 36.8 (01-19-19 @ 15:50), Max: 36.9 (01-19-19 @ 10:41)  HR: 81 (01-19-19 @ 15:50) (72 - 85)  BP: 152/81 (01-19-19 @ 15:50) (116/54 - 152/81)  RR: 18 (01-19-19 @ 15:50) (16 - 20)  SpO2: 100% (01-19-19 @ 15:50) (97% - 100%) on 3L NC    Physical Exam:  General: Frail, elderly, lying uncomfortably in bed, nasal cannula in place  HEENT: NCAT, EOMI bl, dry mucous membranes   Neck: Nontender, no mass  Neurology: A&Ox3, nonfocal  Respiratory: diffuse expiratory wheezing bilaterally anteriorly and posteriorly with decreased breath sounds at right lower base, no crackles appreciated  CV: irregular, +S1/S2, +systolic murmur right sternal border, no rubs or gallops  Abdominal: Soft, NT, ND +BSx4  Extremities: 2+ pitting edema R>L, + peripheral pulses  Skin: warm, dry, no rash or abnormal lesions    Assessment/Plan: 89 year old male with PMH HFpEF 55-60% (last ECHO Oct 2018), moderate aortic stenosis, mild pulmonary HTN, B cell lymphoma, CKD IV, atrial fibrillation on warfarin, hypothyroidism, prostate cancer, postherpetic neuralgia presenting with 4 day history of cough and weakness admitted for possible viral etiology vs. CHF exacerbation vs. developing pnuemonia with persistent dyspnea likely secondary to fluid overload.    - s/p 40mg oral lasix at noon, duoneb treatment at 3:50PM and 40mg IV solumedrol at 3:30PM without change in dyspnea.  - Given lack of improvement with recent duoneb treatment will order one time dose of IV lasix 20mg as patient already had 40mg oral earlier today and has creatinine of 2.5.  - Start 40mg IV solumedrol q8 standing  - Informed patient to try and avoid oral fluid intake - RN to provide oral sponges as patient states his lips feel dry.  - One time dose of zofran for nausea   - Plan discussed with attending Dr. Nogueira  - RN to call with any changes Contacted by RN for patient endorsing persistent shortness of breath and epigastric pain. Patient seen and examined at bedside. Patient states his shortness of breath has not worsened however is persistently uncomfortably. Patient also endorses nausea without vomiting however denies any fever/chills, chest pain, cough, epigastric or generalized abdominal pain.    Vital Signs (24 Hrs):  T(C): 36.8 (01-19-19 @ 15:50), Max: 36.9 (01-19-19 @ 10:41)  HR: 81 (01-19-19 @ 15:50) (72 - 85)  BP: 152/81 (01-19-19 @ 15:50) (116/54 - 152/81)  RR: 18 (01-19-19 @ 15:50) (16 - 20)  SpO2: 100% (01-19-19 @ 15:50) (97% - 100%) on 3L NC    Physical Exam:  General: Frail, elderly, lying uncomfortably in bed, nasal cannula in place  HEENT: NCAT, EOMI bl, dry mucous membranes   Neck: Nontender, no mass  Neurology: A&Ox3, nonfocal  Respiratory: diffuse expiratory wheezing bilaterally anteriorly and posteriorly with decreased breath sounds at right lower base, no crackles appreciated  CV: irregular, +S1/S2, +systolic murmur right sternal border, no rubs or gallops  Abdominal: Soft, NT, ND +BSx4  Extremities: 2+ pitting edema R>L, + peripheral pulses  Skin: warm, dry, no rash or abnormal lesions    Assessment/Plan: 89 year old male with PMH HFpEF 55-60% (last ECHO Oct 2018), moderate aortic stenosis, mild pulmonary HTN, B cell lymphoma, CKD IV, atrial fibrillation on warfarin, hypothyroidism, prostate cancer, postherpetic neuralgia presenting with 4 day history of cough and weakness admitted for possible viral etiology vs. CHF exacerbation vs. developing pneumonia with persistent dyspnea likely secondary to fluid overload.    - s/p 40mg oral lasix at noon, duoneb treatment at 3:50PM and 40mg IV solumedrol at 3:30PM without change in dyspnea.  - Given lack of improvement with recent duoneb treatment will order one time dose of IV lasix 20mg as patient already had 40mg oral earlier today and has creatinine of 2.5.  - Start 40mg IV solumedrol q8 standing  - Informed patient to try and avoid oral fluid intake - RN to provide oral sponges as patient states his lips feel dry.  - One time dose of zofran for nausea   - Plan discussed with attending Dr. Nogueira  - RN to call with any changes

## 2019-01-19 NOTE — CONSULT NOTE ADULT - ASSESSMENT
89yo M w/ PMHx HFpEF, moderate aortic stenosis, mild pulmonary HTN, moderate MR, small B cell lymphoma, CKD IV, afib on warfarin, hypothyroidism, prostate ca, postherpetic neuralgia presents with coughing, weakness, increased sputum production, admitted with HCAP:    - HR and BP appear controlled  - No evidence of volume overload or acute ischemia  - Can continue to dose Coumadin for goal INR 2-3.  His INR is low, and I would increase his dose tonight from his  home dose to get his INR to within range.  HE sould get 10 tonight  - Metoprolol 25 BID for heart rate and BP control  - He is on amidarone 200 QD as an outpatient. T his can be continued as an in patient  - Continue Lasix at home dose of 40 QD  - Abx per primary team  - Supplemental oxygen as needed  - Monitor and replete lytes  - To follow closely with you

## 2019-01-19 NOTE — ED PROVIDER NOTE - CPE EDP EYES NORM
[facility]     Department of Hospital Medicine     [address] [place] [address]     [phone] after hours  [phone] fax       NURSING HOME ORDERS    05/29/2017    Admit to Nursing Home:  Regular Bed                                                    Diagnoses:  Active Hospital Problems    Diagnosis  POA    *Encephalopathy [G93.40]  Yes    Pain [R52]  Yes    Nausea [R11.0]  Yes    Palliative care encounter [Z51.5]  Not Applicable    Goals of care, counseling/discussion [Z71.89]  Not Applicable    Severe sepsis [A41.9, R65.20]  Yes    Acute respiratory failure with hypoxia [J96.01]  Yes    SERINA (acute kidney injury) [N17.9]  Yes    Hypernatremia [E87.0]  Yes    Elevated TSH [R94.6]  Yes    Essential hypertension [I10]  Yes    Osteopenia [M85.80]  Yes      Resolved Hospital Problems    Diagnosis Date Resolved POA   No resolved problems to display.       Patient is homebound due to:  Encephalopathy    Allergies:  Review of patient's allergies indicates:   Allergen Reactions    Codeine        Vitals:       Every shift   Diet: Pureed Cardiac when awake; HOB elevated   Supplement:  1 can every three times a day with meals                         Type:  House           Acitivities:   - As tolerated; up in chair daily    LABS:  Per facility protocol     Nursing Precautions:   - Aspiration precautions:             - Total assistance with meals            -  Upright 90 degrees befor during and after meals             -  Suction at bedside          - Fall precautions per nursing home protocol   - Decubitus precautions:        -  for positioning   - Pressure reducing foam mattress   - Turn patient every two hours. Use wedge pillows to anchor patient    CONSULTS:          Hospice to evaluate; patient seen by palliative care in hospital and decision to transition to comfort care made with family      MISCELLANEOUS CARE:      Routine Skin for Bedridden  Patients:  Apply moisture barrier cream to all    skin folds and wet areas in perineal area daily and after baths and                           all bowel movements.     Wound Care: Stage 2 pressure injury noted to left buttocks. Patient incontinent, recommend applying zinc barrier cream BID and as needed. Remove only soiled cream then reapply more. Patient daughter at bedside, reports patient sits in wheelchair most of the time. Wheel chair cushion ordered for patient. Turn every 2hrs while in bed. Nursing to continue care.       Medications: Discontinue all previous medication orders, if any. See new list below.     Medication List      START taking these medications    amoxicillin-clavulanate 500-125mg 500-125 mg Tab  Commonly known as:  AUGMENTIN  Take 1 tablet (500 mg total) by mouth 2 (two) times daily for 12 days.        CONTINUE taking these medications    losartan-hydrochlorothiazide 50-12.5 mg 50-12.5 mg per tablet  Commonly known as:  HYZAAR     mirtazapine 30 MG tablet  Commonly known as:  REMERON     multivitamin capsule     VITAMIN D2 50,000 unit Cap  Generic drug:  ergocalciferol           Where to Get Your Medications      These medications were sent to [facility] [address] [address]    Phone:  [phone]    amoxicillin-clavulanate 500-125mg 500-125 mg Tab               _________________________________  Wiley Blackwell MD  05/29/2017     normal...

## 2019-01-19 NOTE — H&P ADULT - ATTENDING COMMENTS
I personally conducted a physical examination of the patient. I personally gathered the patient's history. I edited the above listed findings which were prepared by the listed resident physician. I personally discussed the plan of care with the patient. The questions and concerns were addressed to the best of my ability. The patient is in agreement with the listed treatment plan.     - dyspnea is multifactorial. consider cardio (volume overload, heart failure), pulm (former smoker, on amio), infectious etiologies (gram negative rods, MRSA pna, anaerobes to consider aspiration but this is of low concern at this time) but picture is not clear. Further imaging ordered for the morning along w/ serologic evaluation. Supportive care. Monitoring off abx for now.   - monitor lytes, monitor renal indices, monitor h/h which has been low but stable  - agree w/ plan as detailed and edited above  - daughter (Eli [phone]) states that the pt was seen by cardio yesterday and recommended increasing his dose of warfarin due to subtherapeutic INR for the weekend and to f/u early next week. she also states that the pt is strict w/ med adherence but maybe not as strict w/ diet and salt restrictions. she states that he is becoming increasingly weak and she agreed w/ PT eval. pt has home health aide which will have to be reinstated prior to d/c home vs. dispo to [facility] I personally conducted a physical examination of the patient. I personally gathered the patient's history. I edited the above listed findings which were prepared by the listed resident physician. I personally discussed the plan of care with the patient. The questions and concerns were addressed to the best of my ability. The patient is in agreement with the listed treatment plan.     - dyspnea is multifactorial. consider cardio (volume overload, heart failure), pulm (former smoker, on amio), infectious etiologies (gram negative rods, MRSA pna, anaerobes to consider aspiration but this is of low concern at this time) but picture is not clear. Further imaging ordered for the morning along w/ serologic evaluation. Supportive care. Monitoring off abx for now  - monitor lytes, monitor renal indices, monitor h/h which has been low but stable  - agree w/ plan as detailed and edited above  - daughter (Eli [phone]) states that the pt was seen by cardio yesterday and recommended increasing his dose of warfarin due to subtherapeutic INR for the weekend and to f/u early next week. she also states that the pt is strict w/ med adherence but maybe not as strict w/ diet and salt restrictions. she states that he is becoming increasingly weak and she agreed w/ PT eval. pt has home health aide which will have to be reinstated prior to d/c home vs. dispo to [facility]  - pt previously elected for DNR but the pt denies this. daughter states she will bring in the paperwork to support and we can update the EMR accordingly at that time

## 2019-01-19 NOTE — CONSULT NOTE ADULT - PROBLEM SELECTOR RECOMMENDATION 4
doubt PNA  will check CRP and Procalcitonin  would check cx  pt got Zosyn in ER, would hold off on further ABX pending more information - eg. Biomarkers, cx, labs and clinical surveillance  Lactic acid - normal  will check VBG  cxr reviewed - not reliable in the setting of multi organ dysfunction

## 2019-01-19 NOTE — ED ADULT NURSE REASSESSMENT NOTE - NS ED NURSE REASSESS COMMENT FT1
report given to nuvia duvall, pt transported to bed 328, a/o x 3, iv 20 g lac, pt reports improvement with 3 L NC, belongings with pt, no other complaints

## 2019-01-19 NOTE — ED CLERICAL - NS ED CLERK NOTE PRE-ARRIVAL INFORMATION; ADDITIONAL PRE-ARRIVAL INFORMATION
This patient is enrolled in the [facility] readmission reduction initiative and has active care navigation. This patient can be followed up by the care navigation team within 24 hours.  To arrange close follow-up or to obtain additional clinical information, please call the care navigation contact number above.      For patients previously on the Hospitalist Service please call the hospitalist at [phone] for input and/or consultation PRIOR to admission. If the patient was on a Voluntary Physician’s service please contact that physician for input and/or consultation PRIOR to admission.

## 2019-01-19 NOTE — ED PROVIDER NOTE - PROGRESS NOTE DETAILS
Almas Gates ([facility]), will see pt to admit. Pt doing well, no acute changes.

## 2019-01-19 NOTE — H&P ADULT - NSHPLABSRESULTS_GEN_ALL_CORE
CXR personally reviewed: minimal consolidation in RLL but unclear    EKG personally reviewd: see interpretation from cardio below, I agree

## 2019-01-19 NOTE — H&P ADULT - ASSESSMENT
89yo M w/ PMHx HFpEF 55-60% (last echo Oct 2018), moderate aortic stenosis, mild pulmonary HTN, small B cell lymphoma, CKD IV, afib on warfarin, hypothyroidism, prostate ca, postherpetic neuralgia presents with coughing, weakness, increased sputum production for 4 days duration most likely 2/2 PNA. Concern for HCAP given recent hospitalization/risk factors. 89yo M w/ PMHx HFpEF 55-60% (last echo Oct 2018), moderate aortic stenosis, mild pulmonary HTN, small B cell lymphoma, CKD IV, afib on warfarin, hypothyroidism, prostate ca, postherpetic neuralgia presents with coughing, weakness, increased sputum production for 4 days duration most likely 2/2 viral etiology vs PNA. 89yo M w/ PMHx HFpEF 55-60% (last echo Oct 2018), moderate aortic stenosis, mild pulmonary HTN, small B cell lymphoma, CKD IV, afib on warfarin, hypothyroidism, prostate ca, postherpetic neuralgia presents with coughing, weakness, increased sputum production for 4 days duration most likely 2/2 viral etiology vs fluid overload vs. devloping PNA.

## 2019-01-19 NOTE — CONSULT NOTE ADULT - PROBLEM SELECTOR RECOMMENDATION 3
lymphoma known -   hx of prostate ca -   ct scan reviewed from old records  supportive measures  assist with ADL  pall care eval for MOLST discussion

## 2019-01-19 NOTE — CONSULT NOTE ADULT - PROBLEM SELECTOR RECOMMENDATION 5
frail  weak  multiple medical issues, acute and chronic  malignancy  Heart disease  advanced renal disease  nutrition  supportive care and assist with ADL  monitor vs and HD and Sat  may need PT assessment

## 2019-01-19 NOTE — CONSULT NOTE ADULT - SUBJECTIVE AND OBJECTIVE BOX
[facility] - Sandy Alvarado, Kt, Lizett, Dalton, Noah Hilliard  Office Number: [phone]    Initial Consult Note    CHIEF COMPLAINT: Patient is a 89y old  Male who presents with a chief complaint of dyspnea    HPI:  89yo M w/ PMHx HFpEF, moderate aortic stenosis, mild pulmonary HTN, moderate MR, small B cell lymphoma, CKD IV, afib on warfarin, hypothyroidism, prostate ca, postherpetic neuralgia presents with coughing, weakness, increased sputum production. (19 Jan 2019 09:48).  He was admitted to [place] a few weeks ago, and started to feel poorly a few days ago.  he is not having chest pain, palpitations, dizziness, or syncope.  he has been diet compliant, and has been taking his medications as prescribed.  He is not having fever, but does have productive cough and SOB.      PAST MEDICAL & SURGICAL HISTORY:  CKD (chronic kidney disease)  BPH (benign prostatic hyperplasia)  Postherpetic neuralgia  Chronic diarrhea  Prostate CA  Basal cell carcinoma  Lymphoma  Anxiety  Hypothyroid  Hypertension  A-fib  H/O shoulder replacement  S/P bilateral unicompartmental knee replacement      SOCIAL HISTORY:  No tobacco, ethanol, or drug abuse.    FAMILY HISTORY:  Family history of ischemic heart disease (Father)      MEDICATIONS  (STANDING):  guaiFENesin  milliGRAM(s) Oral every 12 hours    Home medications reviewed in detail    MEDICATIONS  (PRN):  acetaminophen   Tablet .. 650 milliGRAM(s) Oral every 6 hours PRN Temp greater or equal to 38C (100.4F), Mild Pain (1 - 3)      Allergies    No Known Allergies          REVIEW OF SYSTEMS:    All other review of systems is negative unless indicated above    VITAL SIGNS:   Vital Signs Last 24 Hrs  T(C): 36.8 (19 Jan 2019 08:59), Max: 36.8 (19 Jan 2019 08:59)  T(F): 98.3 (19 Jan 2019 08:59), Max: 98.3 (19 Jan 2019 08:59)  HR: 77 (19 Jan 2019 08:59) (72 - 77)  BP: 116/66 (19 Jan 2019 08:59) (116/66 - 119/72)  BP(mean): --  RR: 18 (19 Jan 2019 08:59) (18 - 20)  SpO2: 98% (19 Jan 2019 08:59) (97% - 98%)    I&O's Summary      On Exam:    Constitutional: NAD, alert and oriented x 3  Lungs:  Non-labored, coarse breath sounds b/l.  NO wheezes  Cardiovascular: IRRR.  S1 and S2 positive. 2/6 systolic murmur  Gastrointestinal: Bowel Sounds present, soft, nontender.   Lymph: Mild b/l peripheral edema. No cervical lymphadenopathy.  Neurological: Alert, no focal deficits  Skin: No rashes or ulcers   Psych:  Mood & affect appropriate.    LABS: All Labs Reviewed:                        8.9    4.54  )-----------( 132      ( 19 Jan 2019 07:56 )             28.2     19 Jan 2019 07:56    144    |  107    |  44     ----------------------------<  99     3.7     |  32     |  2.50     Ca    8.2        19 Jan 2019 07:56    TPro  5.7    /  Alb  3.2    /  TBili  0.7    /  DBili  x      /  AST  14     /  ALT  26     /  AlkPhos  75     19 Jan 2019 07:56    PT/INR - ( 19 Jan 2019 07:56 )   PT: 14.1 sec;   INR: 1.24 ratio         PTT - ( 19 Jan 2019 07:56 )  PTT:30.6 sec  CARDIAC MARKERS ( 19 Jan 2019 07:56 )  .033 ng/mL / x     / 44 U/L / x     / x          Blood Culture:   01-19 @ 07:56  Pro Bnp [id number]        RADIOLOGY:    < from: Xray Chest 1 View- PORTABLE-Urgent (01.19.19 @ 07:37) >    EXAM:  XR CHEST PORTABLE URGENT 1V                            PROCEDURE DATE:  01/19/2019          INTERPRETATION:  History: Shortness of breath    Portable chest radiograph is compared to 12/30/2018.    The heart is not enlarged. The trachea is midline. There is increased   patchy density at the right lung base may represent developing   infiltrate. A small right pleural effusion is suggested. Left lung is   clear. Osseous structures demonstrate osteopenia and right shoulder   replacement.    Impression: Patchy density at the right lung base may represent   developing infiltrate. Correlation with PA and lateral radiographs is   recommended. Small right pleural effusion is suggested.                CORNELIUS PALACIOS M.D.,ATTENDING RADIOLOGIST  This document has been electronically signed. Jan 19 2019  8:35AM              < end of copied text >      EKG:  AF, rate controlled, RBBB, LAFB

## 2019-01-19 NOTE — ED PROVIDER NOTE - OBJECTIVE STATEMENT
89 yo M p/w co feeling sob x past few days, now worse this am. No cp/palp. No numb/ting/focal weak. No abd pain. NO n/v/d. NO neck / back pain. NO agg/allev factors. No recent trauma. No other inj or co.

## 2019-01-19 NOTE — H&P ADULT - NSHPATTENDINGPLANDISCUSS_GEN_ALL_CORE
pt, pulm/cardio attendings, family (over the phone), ED attending - re: tx plan, dispo planning (at least 48hrs observation)

## 2019-01-19 NOTE — CONSULT NOTE ADULT - PROBLEM SELECTOR RECOMMENDATION 2
valv heart disease, HFpEF and Pulm HTN and Moderate AS  I and O  diuresis  cvs regimen and optimization  cardio eval pending  [facility]  [facility] care eval - multi organ dysfunction - frail elderly - known lymphoma

## 2019-01-19 NOTE — H&P ADULT - PROBLEM SELECTOR PLAN 2
with associated productive cough. Worsening. Most liekly 2/2 PNA. Pt at risk for HAP. HAP vs CAP. CXR: revealing R lung base opacity and small right sided effusion. rapid flu/RSV neg.   will obtain RVP to r/o oral viral illness as etiology  will start Mucinex PRN for cough  will start albuterol nebs PRN  cont NC to maintain SpO2 >92% Most likely 2/2 anemia of chronic disease. recent FOBT neg. Potentially could be contributed to worsening SOB. Baseline ~9 via chart review. no overt signs of bleeding. Given MCV anemia is macrocytic in nature.   will cont to monitor  will obtain iron studies, TSH, B12, folate Most likely 2/2 anemia of chronic disease. recent FOBT neg. Potentially could be contributed to worsening SOB. Baseline ~9 via chart review. no overt signs of bleeding. anemia is macrocytic in nature.   - will cont to monitor  - reviewed iron panel and b12/folate levels from last admission less than 1 month ago  - suspect AOCD Most likely 2/2 anemia of chronic disease. recent FOBT neg. Potentially could be contributed to worsening SOB. Baseline ~9 via chart review. no overt signs of bleeding. anemia is macrocytic in nature.   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

## 2019-01-19 NOTE — H&P ADULT - PROBLEM SELECTOR PLAN 3
Most likely 2/2 anemia of chronic disease. recent FOBT neg. Potentially could be contributed to worsening SOB. Baseline ~9 via chart review. no overt signs of bleeding. Given MCV anemia is macrocytic in nature.   will cont to monitor  will obtain iron studies, TSH, B12, folate LETICIA on CKD. Baseline Cr ~2 per chart review. Possibly postrenal 2/2 fluid overload versus prerenal from poor po intake.   monitor renal indices   caution with nephrotoxic agents  monitor I/O LETICIA on CKD. Baseline Cr ~2 per chart review. Possibly postrenal 2/2 fluid overload versus prerenal from poor po intake.   monitor renal indices closely  caution with nephrotoxic agents  monitor I/O LETICIA on CKD. Baseline Cr ~2 per chart review.   likely progression of CKD but may be LETICIA (difficult to determine baseline creatinine)  monitor renal indices closely  caution with nephrotoxic agents (avoiding acei/arb for now)  monitor I/O

## 2019-01-19 NOTE — H&P ADULT - HISTORY OF PRESENT ILLNESS
89yo M w/ PMHx HFpEF, moderate aortic stenosis, mild pulmonary HTN, small B cell lymphoma, CKD IV, afib on warfarin, hypothyroidism, prostate ca, postherpetic neuralgia presents with coughing, weakness, increased sputum production. 89yo M w/ PMHx HFpEF 55-60% (last echo Oct 2018), moderate aortic stenosis, mild pulmonary HTN, small B cell lymphoma, CKD IV, afib on warfarin, hypothyroidism, prostate ca, postherpetic neuralgia presents with coughing, weakness, increased sputum production for 4 days duration. Pt was in his usual state of health up until 5 days ago when he began to have a productive cough and sore throat. He took some Mucinex which he reports helped him minimally. He then began to experience worsening SOB, and on the day of admission felt like he could not breath. Pt reports SOB at rest and is not on home O2. He states his SOB worsens with movement and only is able to walk 10 yards before needing to rest due to his SOB. Pt ambulates with a walker. He denies having SOB as bad at this in the past. He does endorse lower extremity edema worse on the right, which he states is chronic in nature and has not worsened to his knowledge. He denies any nasal congestion, ear pain, HA, neck pain, recent travel, sick contacts, fevers, chills, N/V, diarrhea, constipation, dark or bloody stool, dysuria, hematuria, orthopnea. Of note pt was recently d/c from [facility] (Dec 30th) where he was treated for PNA. Pt reports he takes his medications everyday and does not miss doses    In the ED, VSS, SpO2 100% on 3L NC. CBC revealed H/H 8.9/28.9 (baseline around 9 per chart review) otherwise grossly normal. coags revealed subtherapeutic INR at 1.24. CMP revealed Cr of 2.5. (baseline ~2 per chart review). CXR: patchy density R lung base and R pleural effusion. EKG: afib w/ R fascicular block at 78 QTc 494.  Received Zosyn 89yo M w/ PMHx HFpEF 55-60% (last echo Oct 2018), moderate aortic stenosis, mild pulmonary HTN, small B cell lymphoma, CKD IV, afib on warfarin, hypothyroidism, prostate ca, postherpetic neuralgia presents with coughing, weakness, increased sputum production for 4 days duration. Pt was in his usual state of health up until 5 days ago when he began to have a productive cough and sore throat. He took some Mucinex which he reports helped him minimally. He then began to experience worsening SOB, and on the day of admission felt like he could not breath. Pt reports SOB at rest and is not on home O2. He states his SOB worsens with movement and only is able to walk 10 yards before needing to rest due to his SOB. Pt ambulates with a walker. He denies having SOB as bad at this in the past. He does endorse lower extremity edema worse on the right, which he states is chronic in nature and has not worsened to his knowledge. He denies any nasal congestion, ear pain, HA, neck pain, recent travel, sick contacts, fevers, chills, N/V, diarrhea, constipation, dark or bloody stool, dysuria, hematuria, orthopnea. Of note pt was recently d/c from [facility] (Dec 30th) where he was treated for PNA. Pt reports he takes his medications everyday and does not miss doses    In the ED, VSS, SpO2 100% on 3L NC. CBC revealed H/H 8.9/28.9 (baseline around 9 per chart review) otherwise grossly normal. coags revealed subtherapeutic INR at 1.24. CMP revealed Cr of 2.5. (baseline ~2 per chart review). elevated proBNP. CXR: patchy density R lung base and R pleural effusion. EKG: afib w/ R fascicular block at 78 QTc 494.  Received Zosyn 89yo M w/ PMHx HFpEF 55-60% (last echo Oct 2018), moderate aortic stenosis, mild pulmonary HTN, small B cell lymphoma, CKD IV, afib on warfarin, hypothyroidism, prostate ca, postherpetic neuralgia presents with coughing, weakness, increased sputum production for 4 days duration. Pt was in his usual state of health up until 5 days ago when he began to have a productive cough and sore throat. He took some Mucinex which he reports helped him minimally. He then began to experience worsening SOB, and on the day of admission felt like he could not breath. Pt reports SOB at rest and is not on home O2. He states his SOB worsens with movement and only is able to walk 10 yards before needing to rest due to his SOB. Pt ambulates with a walker. He denies having SOB as bad at this in the past. He does repot lower extremity edema worse on the right, which he states is chronic in nature and has not worsened to his knowledge. He denies any nasal congestion, ear pain, HA, neck pain, recent travel, sick contacts, fevers, chills, N/V, diarrhea, constipation, dark or bloody stool, dysuria, hematuria, orthopnea. Of note pt was recently d/c from [facility] (Dec 30th) where he was treated for PNA/generalized weakness. Pt reports he takes his medications everyday and does not miss doses    In the ED, VSS, SpO2 100% on 3L NC. CBC revealed H/H 8.9/28.9 (baseline around 9 per chart review) otherwise grossly normal. coags revealed subtherapeutic INR at 1.24. CMP revealed Cr of 2.5. (baseline ~2 per chart review). elevated proBNP. CXR: patchy density R lung base and R pleural effusion. EKG: afib w/ R fascicular block at 78 QTc 494.  Received Zosyn

## 2019-01-19 NOTE — H&P ADULT - NSHPPHYSICALEXAM_GEN_ALL_CORE
Vital Signs Last 24 Hrs  T(C): 36.8 (19 Jan 2019 08:59), Max: 36.8 (19 Jan 2019 08:59)  T(F): 98.3 (19 Jan 2019 08:59), Max: 98.3 (19 Jan 2019 08:59)  HR: 77 (19 Jan 2019 08:59) (72 - 77)  BP: 116/66 (19 Jan 2019 08:59) (116/66 - 119/72)  BP(mean): --  RR: 18 (19 Jan 2019 08:59) (18 - 20)  SpO2: 98% (19 Jan 2019 08:59) (97% - 98%)    T(C): 36.8 (01-19-19 @ 08:59), Max: 36.8 (01-19-19 @ 08:59)  HR: 77 (01-19-19 @ 08:59) (72 - 77)  BP: 116/66 (01-19-19 @ 08:59) (116/66 - 119/72)  RR: 18 (01-19-19 @ 08:59) (18 - 20)  SpO2: 98% (01-19-19 @ 08:59) (97% - 98%)    GENERAL: frail, no acute distress, appropriate, pleasant on 3L NC  EYES: sclera clear, no exudates, PERRL  ENMT: oropharynx clear without erythema, no exudates, moist mucous membranes, post-nasal drip present, +1 tonsils, no airway obstruction present  NECK: supple, soft, no thyromegaly noted  LUNGS: diffuse coarse breath sounds noted, decreased breath sounds at RLL, expiratory wheezing, prolonged expiratory phase  HEART: soft S1/S2, regular rate, irregular rhythm, 2/6 systolic murmur, 2+ RLE edema up to midcalf, 1+ LLE edema up to ankle, no JVD  GASTROINTESTINAL: abdomen is soft, nontender, distended, normoactive bowel sounds, no palpable masses  INTEGUMENT: good skin turgor, no lesions noted  MUSCULOSKELETAL: no clubbing or cyanosis, no obvious deformity  NEUROLOGIC: awake, alert, oriented x3, good muscle tone in 4 extremities, no obvious sensory deficits  PSYCHIATRIC: mood is good, affect is congruent, linear and logical thought process  HEME/LYMPH: no palpable supraclavicular nodules, no obvious ecchymosis or petechiae Vital Signs Last 24 Hrs  T(C): 36.9 (19 Jan 2019 10:41), Max: 36.9 (19 Jan 2019 10:41)  T(F): 98.5 (19 Jan 2019 10:41), Max: 98.5 (19 Jan 2019 10:41)  HR: 76 (19 Jan 2019 10:41) (72 - 77)  BP: 116/54 (19 Jan 2019 10:41) (116/54 - 119/72)  BP(mean): --  RR: 16 (19 Jan 2019 10:41) (16 - 20)  SpO2: 98% (19 Jan 2019 10:41) (97% - 98%)    GENERAL: frail, no acute distress, appropriate, pleasant on 3L NC  EYES: sclera clear, no exudates, PERRL  ENMT: oropharynx clear without erythema, no exudates, moist mucous membranes, post-nasal drip present, +1 tonsils, no airway obstruction present  NECK: supple, soft, no thyromegaly noted  LUNGS: diffuse coarse breath sounds noted, decreased breath sounds at RLL, expiratory wheezing, prolonged expiratory phase  HEART: soft S1/S2, regular rate, irregular rhythm, 2/6 systolic murmur, 2+ RLE edema up to midcalf, 1+ LLE edema up to ankle, no JVD  GASTROINTESTINAL: abdomen is soft, nontender, distended, normoactive bowel sounds, no palpable masses  INTEGUMENT: good skin turgor, no lesions noted  MUSCULOSKELETAL: no clubbing or cyanosis, no obvious deformity  NEUROLOGIC: awake, alert, oriented x3, good muscle tone in 4 extremities, no obvious sensory deficits  PSYCHIATRIC: mood is good, affect is congruent, linear and logical thought process  HEME/LYMPH: no palpable supraclavicular nodules, no obvious ecchymosis or petechiae Vital Signs Last 24 Hrs  T(C): 36.9 (19 Jan 2019 10:41), Max: 36.9 (19 Jan 2019 10:41)  T(F): 98.5 (19 Jan 2019 10:41), Max: 98.5 (19 Jan 2019 10:41)  HR: 76 (19 Jan 2019 10:41) (72 - 77)  BP: 116/54 (19 Jan 2019 10:41) (116/54 - 119/72)  BP(mean): --  RR: 16 (19 Jan 2019 10:41) (16 - 20)  SpO2: 98% (19 Jan 2019 10:41) (97% - 98%)    GENERAL: frail, no acute distress, appropriate, pleasant on 3L NC  EYES: sclera clear, no exudates, PERRL  ENMT: oropharynx clear without erythema, no exudates, moist mucous membranes, post-nasal drip present, +1 tonsils, no airway obstruction present  NECK: supple, soft, no thyromegaly noted  LUNGS: diffuse coarse breath sounds noted, decreased breath sounds at RLL, expiratory wheezing, prolonged expiratory phase  HEART: soft S1/S2, regular rate, irregular rhythm, 2/6 systolic murmur, 2+ RLE edema up to midcalf, 1+ LLE edema up to ankle, no JVD  GASTROINTESTINAL: abdomen is soft, nontender, distended, normoactive bowel sounds, no palpable masses  INTEGUMENT: good skin turgor, no lesions noted  MUSCULOSKELETAL: no clubbing or cyanosis, no obvious deformity  NEUROLOGIC: awake, alert, oriented x3, good muscle tone in 4 extremities, no obvious sensory deficits  HEME/LYMPH: no palpable supraclavicular nodules, no obvious ecchymosis or petechiae

## 2019-01-19 NOTE — PROVIDER CONTACT NOTE (OTHER) - ASSESSMENT
Bedside assessment done by Dr VICTOR HUGO Nogueira, stat dose Bedside assessment done by Dr VICTOR HUGO Nogueira, stat dose Solu- MEDROL 40 mg IVP,  Duoneb 3 ml nebulizer adm.

## 2019-01-19 NOTE — H&P ADULT - NSHPOUTPATIENTPROVIDERS_GEN_ALL_CORE
[facility]  Nephro - Nafisa [phone] PCP/Cardio - [facility] [phone]  Nephro - Nafisa [phone]

## 2019-01-19 NOTE — H&P ADULT - PROBLEM SELECTOR PLAN 4
LETICIA on CKD. Baseline Cr ~2 per chart review. Possibly postrenal 2/2 fluid overload versus prerenal from poor po intake.   monitor renal indices   caution with nephrotoxic agents  monitor I/O Chronic on coumadin. Subtherapeutic INR on admission.  will monitor INR with goal between 2-3  will dose 5mg coumadin today  cont metoprolol with hold parameters  cont pacerone 200mg daily for rhythm control Chronic on coumadin. Subtherapeutic INR on admission.  will monitor INR with goal between 2-3  will dose 5mg coumadin today  cont metoprolol with hold parameters  cont pacerone 200mg daily for rhythm control - monitor LFT's and TFT's while on amio (TFT's ordered for AM tomorrow) and liver function reviewed Chronic on coumadin. Subtherapeutic INR on admission.  will monitor INR with goal between 2-3  will dose 5mg coumadin today (dosing at home has been variable the past few months from 2.5mg all the way to 7mg qhs)  cont metoprolol with hold parameters  cont pacerone 200mg daily for rhythm control - monitor LFT's and TFT's while on amio (TFT's ordered for AM tomorrow) and liver function reviewed Chronic on coumadin. Subtherapeutic INR on admission.  will monitor INR with goal between 2-3  will dose 5mg coumadin today (dosing at home has been variable the past few months from 2.5mg all the way to 7mg qhs)  also will dose 70mg sq lovenox now to bridge due to subtherapeutic INR, may need additional q 24hr doses if INR remains subtherapeutic. I discussed this w/ family to reduce CVA risk and they are agreeable  cont metoprolol with hold parameters  cont pacerone 200mg daily for rhythm control - monitor LFT's and TFT's while on amio (TFT's ordered for AM tomorrow) and liver function reviewed

## 2019-01-19 NOTE — H&P ADULT - PROBLEM SELECTOR PLAN 9
Pt on gabapentin at home.   will renally dose gabapentin for pain control [place] 6  heparin for VTE ppx Chronic. Stable.  will cont alfuzosin as Flomax

## 2019-01-19 NOTE — ED ADULT NURSE NOTE - NSIMPLEMENTINTERV_GEN_ALL_ED
Implemented All Fall with Harm Risk Interventions:  [place] to call system. Call bell, personal items and telephone within reach. Instruct patient to call for assistance. Room bathroom lighting operational. Non-slip footwear when patient is off stretcher. Physically safe environment: no spills, clutter or unnecessary equipment. Stretcher in lowest position, wheels locked, appropriate side rails in place. Provide visual cue, wrist band, yellow gown, etc. Monitor gait and stability. Monitor for mental status changes and reorient to person, place, and time. Review medications for side effects contributing to fall risk. Reinforce activity limits and safety measures with patient and family. Provide visual clues: red socks.

## 2019-01-19 NOTE — H&P ADULT - PROBLEM SELECTOR PLAN 7
Chronic.   will cont synthyroid  will obtain TSH HFpEF. recent echo Oct 2018 showing EF 55-60%. SOB most likely Pulmonary in nature.   will cont metoprolol with hold parameters  will use lasix with caution

## 2019-01-19 NOTE — ED ADULT NURSE NOTE - OBJECTIVE STATEMENT
89 y/o male pmh afib, chf, anxiety, basal cell carcinoma presents to the ed c/o sob and difficulty breathing. pt recently admitted to [facility] and dc'd two weeks ago. he denies nausea vomiting fever chills chest pain headache abdominal pain and urinary problems.

## 2019-01-19 NOTE — CONSULT NOTE ADULT - CONSULT REASON
sob  grant  pulm congestion  moderate AS and Pulm HTN  poss Lower resp tract infection  frail and weak  prostate ca / lymphoma

## 2019-01-19 NOTE — PHARMACOTHERAPY INTERVENTION NOTE - COMMENTS
pt with estefania skinner on warfarin (5mg home dose), spoke to Dr. Nogueira, pt subtherapeutic currently with INR 1.24, also went to cardiologist office yesterday where INR was ~1, discussed 2.5mg initiation however MD would like to give 5mg tonight - ok given circumstance

## 2019-01-19 NOTE — H&P ADULT - PROBLEM SELECTOR PLAN 8
HFpEF. recent echo Oct 2018 showing EF 55-60%. SOB most likely Pulmonary in nature.   will cont metoprolol with hold parameters  will use lasix with caution Pt on gabapentin at home.   will renally dose gabapentin for pain control

## 2019-01-19 NOTE — H&P ADULT - PROBLEM SELECTOR PLAN 1
Pt with recent hospitalization. Pt at risk for HAP. HAP vs CAP. CXR: revealing R lung base opacity and small right sided effusion. Afebrile. WBC wnl. productive cough. rapid flu/RSV neg.   s/p Zosyn in ED  will continue Zosyn  will start Cipro  will obtain urine legionella  will obtain MRSA nasal swab with associated productive cough. Worsening. Pt with recent hospitalization. Pt at risk for HAP. through etiology of sxs possibly viral. CXR: revealing R lung base opacity and small right sided effusion. Afebrile. WBC wnl. productive cough. rapid flu/RSV neg.   s/p Zosyn in ED  will continue Zosyn  will obtain urine legionella  will obtain MRSA nasal swab  will obtain RVP to r/o oral viral illness as etiology  will start Mucinex PRN for cough  will start albuterol nebs PRN  cont NC to maintain SpO2 >92% with associated productive cough. Worsening. Pt with recent hospitalization. Pt at risk for HAP. through etiology of sxs possibly viral. CXR: revealing R lung base opacity and small right sided effusion. Afebrile. WBC wnl. productive cough. rapid flu/RSV neg.   s/p Zosyn in ED  will continue Zosyn  will obtain urine legionella  will obtain MRSA nasal swab  will obtain RVP to r/o oral viral illness as etiology  will start Mucinex PRN for cough  will start albuterol nebs PRN  cont NC to maintain SpO2 >92%  given SOB will hold daytime Xanax, and give bedtime dose PRN for insomnia with associated productive cough. Worsening. Pt with recent hospitalization. Pt at risk for HAP. through etiology of sxs possibly viral. CXR: revealing R lung base opacity and small right sided effusion. Afebrile. WBC wnl. productive cough. rapid flu/RSV neg.   s/p Zosyn in ED  will continue Zosyn  will obtain urine legionella  will obtain MRSA nasal swab  will obtain RVP to r/o oral viral illness as etiology  will start Mucinex PRN for cough  will start albuterol nebs PRN  obtain procal  cont NC to maintain SpO2 >92%  given SOB will hold daytime Xanax, and give bedtime dose PRN for insomnia with associated productive cough. Worsening. Pt with recent hospitalization. Pt at risk for HAP. through etiology of sxs possibly viral with concurrent fluid overload. CXR: revealing R lung base opacity and small right sided effusion. Afebrile. WBC wnl. productive cough. rapid flu/RSV neg.  s/p Zosyn in ED  will continue Zosyn  will obtain urine legionella  will obtain MRSA nasal swab  will obtain RVP to r/o oral viral illness as etiology  will start Mucinex PRN for cough  will start albuterol nebs PRN  obtain procal  cont NC to maintain SpO2 >92%  given SOB will hold daytime Xanax, and give bedtime dose PRN for insomnia with associated productive cough. Worsening over period of days. Pt with recent hospitalization. Pt at risk for gram negative PNA vs. MRSA pna through etiology of sxs unknown and multifactorial  - CXR: revealing R lung base opacity and small right sided effusion. Afebrile. WBC wnl. productive cough. rapid flu/RSV neg.  - s/p Zosyn in ED, holding further abx at this time, reviewed pulm note, agree  - will obtain urine legionella  - will start Mucinex PRN for cough  - will start albuterol nebs PRN  - obtain procal and other inflammatory markers per pulm recs  - cont NC to maintain SpO2 >92%, pt is not on home O2 so will wean as tolerated  - given SOB will hold daytime Xanax, and give bedtime dose PRN for insomnia  - will chest AM PA/lateral CXR  - pt has h/o diastolic heart failure and developed acute hypoxic respiratory failure today and is currently on supplemental O2 in the ED  - clinically appears euvolemic but will monitor strict i/o's and daily weights for now  - BP has been soft so will not add ACEI/ARB yet and will monitor for stability of creatinine before determining need  - low dose bb w/ holding parameter ordered  - has asymmetric b/l LE edema, check venous dopplers to eval for DVT (pt has subtherapeutic INR)

## 2019-01-19 NOTE — ED PROVIDER NOTE - CARE PLAN
Principal Discharge DX:	SOB (shortness of breath)  Secondary Diagnosis:	Pneumonia of right lower lobe due to infectious organism

## 2019-01-19 NOTE — H&P ADULT - NSHPREVIEWOFSYSTEMS_GEN_ALL_CORE
Constitutional: Denies fever, chills, general malaise, weight loss, weight gain, diaphoresis   HEENT: Endorse sore throat, denies runny nose, photophobia, blurry vision, double vision, eye pain, difficulty hearing, dizziness, dysphagia, epistaxis  Respiratory: Endorses shortness of breath, dyspnea on exertion, cough, sputum production, wheezing, Denies hemoptysis  Cardiovascular: Denies chest pain, palpitations, endorses edema  Gastrointestinal: Denies nausea, vomiting, diarrhea, constipation, abdominal pain, melena, hematochezia   Genitourinary: Denies dysuria, hematuria, frequency, urgency, incontinence  Skin/Breast: Denies rash, hives, itching  Musculoskeletal: Denies muscle pains, muscle weakness, joint pain or swelling  Neurologic: Denies syncope, loss of consciousness, headache, dizziness, paresthesias, numbness, tingling, confusion, dementia   Hematology/Oncology: Denies abnormal bruising, tender or enlarged lymph nodes   ROS negative except as noted above Constitutional: Denies fever, chills, general malaise, weight loss, weight gain, diaphoresis   HEENT: Endorse sore throat, denies runny nose, photophobia, blurry vision, double vision, eye pain, difficulty hearing, dizziness, dysphagia, epistaxis  Respiratory: Endorses shortness of breath, dyspnea on exertion, cough, sputum production, wheezing, Denies hemoptysis  Cardiovascular: Denies chest pain, palpitations, endorses edema  Gastrointestinal: Denies nausea, vomiting, diarrhea, constipation, abdominal pain, melena, hematochezia   Genitourinary: Denies dysuria, hematuria, frequency, urgency, incontinence  Skin/Breast: Denies rash, hives, itching  Musculoskeletal: Denies muscle pains, muscle weakness, joint pain or swelling  Neurologic: Denies syncope, loss of consciousness, headache, dizziness, paresthesias, numbness, tingling, confusion, dementia   Hematology/Oncology: Denies abnormal bruising, tender or enlarged lymph nodes

## 2019-01-19 NOTE — H&P ADULT - PROBLEM SELECTOR PLAN 6
Chronic. slightly hypotensive on admission.   will cont metoprolol with hold parameters Chronic.   will cont synthyroid  will obtain TSH Chronic.   will cont synthyroid  will obtain TFT's

## 2019-01-19 NOTE — PHARMACOTHERAPY INTERVENTION NOTE - COMMENTS
pt with estefania skinner on warfarin, INR subtherapeutic 1.24,  MD requesting dose for full dose lovenox in CKD - CrCl 19, discussed with Dr. Nogueira to give 1 time dose now (70mg) and can reassess kidney function tomorrow to determine continuation of therapy for q24hr; accepted

## 2019-01-19 NOTE — H&P ADULT - NSHPSOCIALHISTORY_GEN_ALL_CORE
pt lives home alone  ambulates w/ walker  does not have any pets  Denies ETOH and illicit drug use  Former pipe smoker for 2 years, quit 70 years ago  received flu vaccination  denies PNA or shingles vaccinations pt lives home alone and has a home health aide  ambulates w/ walker  does not have any pets  Denies ETOH and illicit drug use  Former pipe smoker for 2 years, quit 70 years ago  received flu vaccination  denies PNA or shingles vaccinations

## 2019-01-19 NOTE — CHART NOTE - NSCHARTNOTEFT_GEN_A_CORE
Called by RN for pt with increased wheezing. Pt seen and examined at bedside. Pt is in NAD. NC has fallen out of nose and HOB at ~20degree angle. There was no signs of cyanosis and pt was able to converse comfortably. Pt reported his SOB has not worsened, but that he is still experiencing dyspnea. Good oxygen saturation on pulse ox.     Vital Signs Last 24 Hrs  T(C): 36.3 (19 Jan 2019 12:01), Max: 36.9 (19 Jan 2019 10:41)  T(F): 97.4 (19 Jan 2019 12:01), Max: 98.5 (19 Jan 2019 10:41)  HR: 72 (19 Jan 2019 12:42) (72 - 78)  BP: 120/68 (19 Jan 2019 12:42) (116/54 - 121/77)  BP(mean): --  RR: 16 (19 Jan 2019 12:01) (16 - 20)  SpO2: 100% (19 Jan 2019 12:01) (97% - 100%)    GENERAL: frail, no acute distress, appropriate  ENMT: oropharynx clear without erythema, no exudates, moist mucous membranes, post-nasal drip present, +1 tonsils, no airway obstruction present, no oral cyanosis  NECK: supple, soft, no thyromegaly noted  LUNGS: diffuse coarse breath sounds noted, decreased breath sounds at RLL, increased expiratory wheezing, prolonged expiratory phase, some mild belly breathing present.   HEART: soft S1/S2, regular rate, irregular rhythm, 2/6 systolic murmur, 2+ RLE edema up to midcalf, 1+ LLE edema up to ankle, no JVD  SKIN: warm, dry, normal color    A/P: 89yo M w/ PMHx HFpEF 55-60% (last echo Oct 2018), moderate aortic stenosis, mild pulmonary HTN, small B cell lymphoma, CKD IV, afib on warfarin, hypothyroidism, prostate ca, postherpetic neuralgia presents with coughing, weakness, increased sputum production for 4 days duration now with increased wheezing and dyspnea. Last albuterol nebs around noon.  - Replaced/secured NC and elevated HOB to ~45degrees  - one time dose of 40IV Solu-medrol STAT  - one time dose of duonebs STAT  - will cont to monitor respiratory status  - RN to notify if any changes Called by RN for pt with increased wheezing. Pt seen and examined at bedside. Pt is in NAD. NC has fallen out of nose and HOB at ~20degree angle. There was no signs of cyanosis and pt was able to converse comfortably. Pt reported his SOB has not worsened, but that he is still experiencing dyspnea. SpO2 100%. previous CXR: patchy density R lung base and R pleural effusion.    Vital Signs Last 24 Hrs  T(C): 36.3 (19 Jan 2019 12:01), Max: 36.9 (19 Jan 2019 10:41)  T(F): 97.4 (19 Jan 2019 12:01), Max: 98.5 (19 Jan 2019 10:41)  HR: 72 (19 Jan 2019 12:42) (72 - 78)  BP: 120/68 (19 Jan 2019 12:42) (116/54 - 121/77)  BP(mean): --  RR: 16 (19 Jan 2019 12:01) (16 - 20)  SpO2: 100% (19 Jan 2019 12:01) (97% - 100%)    GENERAL: frail, no acute distress, appropriate  ENMT: oropharynx clear without erythema, no exudates, moist mucous membranes, post-nasal drip present, +1 tonsils, no airway obstruction present, no oral cyanosis  NECK: supple, soft, no thyromegaly noted  LUNGS: diffuse coarse breath sounds noted, decreased breath sounds at RLL, increased expiratory wheezing, prolonged expiratory phase, some mild belly breathing present.   HEART: soft S1/S2, regular rate, irregular rhythm, 2/6 systolic murmur, 2+ RLE edema up to midcalf, 1+ LLE edema up to ankle, no JVD  SKIN: warm, dry, normal color    A/P: 89yo M w/ PMHx HFpEF 55-60% (last echo Oct 2018), moderate aortic stenosis, mild pulmonary HTN, small B cell lymphoma, CKD IV, afib on warfarin, hypothyroidism, prostate ca, postherpetic neuralgia presents with coughing, weakness, increased sputum production for 4 days duration now with increased wheezing and dyspnea. Last albuterol nebs around noon.  - Replaced/secured NC and elevated HOB to ~45degrees  - one time dose of 40IV Solu-medrol STAT  - one time dose of duonebs STAT  - will cont to monitor respiratory status  - previous CXR: patchy density R lung base and R pleural effusion. Pt to go for PA/lateral CXR in AM  - RN to notify if any changes Called by RN for pt with increased wheezing. Pt seen and examined at bedside. Pt is in NAD. NC has fallen out of nose and HOB at ~20degree angle. There was no signs of cyanosis and pt was able to converse comfortably. Pt reported his SOB has not worsened, but that he is still experiencing dyspnea. SpO2 100%. previous CXR: patchy density R lung base and R pleural effusion.    Vital Signs Last 24 Hrs  T(C): 36.3 (19 Jan 2019 12:01), Max: 36.9 (19 Jan 2019 10:41)  T(F): 97.4 (19 Jan 2019 12:01), Max: 98.5 (19 Jan 2019 10:41)  HR: 72 (19 Jan 2019 12:42) (72 - 78)  BP: 120/68 (19 Jan 2019 12:42) (116/54 - 121/77)  BP(mean): --  RR: 16 (19 Jan 2019 12:01) (16 - 20)  SpO2: 100% (19 Jan 2019 12:01) (97% - 100%)    GENERAL: frail, no acute distress, appropriate  ENMT: oropharynx clear without erythema, no exudates, moist mucous membranes, post-nasal drip present, +1 tonsils, no airway obstruction present, no oral cyanosis  NECK: supple, soft, no thyromegaly noted  LUNGS: diffuse coarse breath sounds noted, decreased breath sounds at RLL, increased expiratory wheezing, prolonged expiratory phase, some mild belly breathing present.   HEART: soft S1/S2, regular rate, irregular rhythm, 2/6 systolic murmur, 2+ RLE edema up to midcalf, 1+ LLE edema up to ankle, no JVD  SKIN: warm, dry, normal color    A/P: 89yo M w/ PMHx HFpEF 55-60% (last echo Oct 2018), moderate aortic stenosis, mild pulmonary HTN, small B cell lymphoma, CKD IV, afib on warfarin, hypothyroidism, prostate ca, postherpetic neuralgia presents with coughing, weakness, increased sputum production for 4 days duration now with increased wheezing and dyspnea. Last albuterol nebs around noon.  - Replaced/secured NC and elevated HOB to ~45degrees  - one time dose of 40IV Solu-medrol STAT  - one time dose of duonebs STAT  - will cont to monitor respiratory status  - previous CXR: patchy density R lung base and R pleural effusion. Pt to go for PA/lateral CXR in AM  - will fu VBG results  - RN to notify if any changes Called by RN for pt with increased wheezing. Pt seen and examined at bedside. Pt is in NAD. NC has fallen out of nose and HOB at ~20degree angle. There was no signs of cyanosis and pt was able to converse comfortably. Pt reported his SOB has not worsened, but that he is still experiencing dyspnea. SpO2 100%. previous CXR: patchy density R lung base and R pleural effusion.     Vital Signs Last 24 Hrs  T(C): 36.3 (19 Jan 2019 12:01), Max: 36.9 (19 Jan 2019 10:41)  T(F): 97.4 (19 Jan 2019 12:01), Max: 98.5 (19 Jan 2019 10:41)  HR: 72 (19 Jan 2019 12:42) (72 - 78)  BP: 120/68 (19 Jan 2019 12:42) (116/54 - 121/77)  BP(mean): --  RR: 16 (19 Jan 2019 12:01) (16 - 20)  SpO2: 100% (19 Jan 2019 12:01) (97% - 100%)    GENERAL: frail, no acute distress, appropriate  ENMT: oropharynx clear without erythema, no exudates, moist mucous membranes, post-nasal drip present, +1 tonsils, no airway obstruction present, no oral cyanosis  NECK: supple, soft, no thyromegaly noted  LUNGS: diffuse coarse breath sounds noted, decreased breath sounds at RLL, increased expiratory wheezing, prolonged expiratory phase, some mild belly breathing present, RR 18 at time of examination  HEART: soft S1/S2, regular rate, irregular rhythm, 2/6 systolic murmur, 2+ RLE edema up to midcalf, 1+ LLE edema up to ankle, no JVD  SKIN: warm, dry, normal color    A/P: 89yo M w/ PMHx HFpEF 55-60% (last echo Oct 2018), moderate aortic stenosis, mild pulmonary HTN, small B cell lymphoma, CKD IV, afib on warfarin, hypothyroidism, prostate ca, postherpetic neuralgia presents with coughing, weakness, increased sputum production for 4 days duration now with increased wheezing and dyspnea. Last albuterol nebs around noon.  - Replaced/secured NC and elevated HOB to ~45degrees  - one time dose of 40IV Solu-medrol STAT  - one time dose of duonebs STAT  - will cont to monitor respiratory status  - previous CXR: patchy density R lung base and R pleural effusion. Pt to go for PA/lateral CXR in AM  - will fu VBG results  - RN to notify if any changes Called by RN for pt with increased wheezing. Pt seen and examined at bedside. Pt is in NAD. NC has fallen out of nose and HOB at ~20degree angle. There was no signs of cyanosis and pt was able to converse comfortably. Pt reported his SOB has not worsened, but that he is still experiencing dyspnea. SpO2 100%. previous CXR: patchy density R lung base and R pleural effusion.     Vital Signs Last 24 Hrs  T(C): 36.3 (19 Jan 2019 12:01), Max: 36.9 (19 Jan 2019 10:41)  T(F): 97.4 (19 Jan 2019 12:01), Max: 98.5 (19 Jan 2019 10:41)  HR: 72 (19 Jan 2019 12:42) (72 - 78)  BP: 120/68 (19 Jan 2019 12:42) (116/54 - 121/77)  BP(mean): --  RR: 16 (19 Jan 2019 12:01) (16 - 20)  SpO2: 100% (19 Jan 2019 12:01) (97% - 100%)    GENERAL: frail, no acute distress, appropriate  ENMT: oropharynx clear without erythema, no exudates, moist mucous membranes, post-nasal drip present, +1 tonsils, no airway obstruction present, no oral cyanosis  NECK: supple, soft, no thyromegaly noted  LUNGS: diffuse coarse breath sounds noted, decreased breath sounds at RLL, increased expiratory wheezing, prolonged expiratory phase, some mild belly breathing present, RR 18 at time of examination  HEART: soft S1/S2, regular rate, irregular rhythm, 2/6 systolic murmur, 2+ RLE edema up to midcalf, 1+ LLE edema up to ankle, no JVD  SKIN: warm, dry, normal color    A/P: 89yo M w/ PMHx HFpEF 55-60% (last echo Oct 2018), moderate aortic stenosis, mild pulmonary HTN, small B cell lymphoma, CKD IV, afib on warfarin, hypothyroidism, prostate ca, postherpetic neuralgia presents with coughing, weakness, increased sputum production for 4 days duration now with increased wheezing and dyspnea. Last albuterol nebs around noon.  - Replaced/secured NC at 3L and elevated HOB to ~45degrees  - one time dose of 40IV Solu-medrol STAT  - one time dose of duonebs STAT  - will cont to monitor respiratory status  - previous CXR: patchy density R lung base and R pleural effusion. Pt to go for PA/lateral CXR in AM  - will fu VBG results  - RN to notify if any changes Called by RN for pt with increased wheezing. Pt seen and examined at bedside. Pt is in NAD. NC has fallen out of nose and HOB at ~20degree angle. There was no signs of cyanosis and pt was able to converse comfortably. Pt reported his SOB has not worsened, but that he is still experiencing dyspnea. SpO2 100%. previous CXR: patchy density R lung base and possible R pleural effusion.     Vital Signs Last 24 Hrs  T(C): 36.3 (19 Jan 2019 12:01), Max: 36.9 (19 Jan 2019 10:41)  T(F): 97.4 (19 Jan 2019 12:01), Max: 98.5 (19 Jan 2019 10:41)  HR: 72 (19 Jan 2019 12:42) (72 - 78)  BP: 120/68 (19 Jan 2019 12:42) (116/54 - 121/77)  BP(mean): --  RR: 16 (19 Jan 2019 12:01) (16 - 20)  SpO2: 100% (19 Jan 2019 12:01) (97% - 100%)    GENERAL: frail, no acute distress, appropriate  ENMT: oropharynx clear without erythema, no exudates, moist mucous membranes, post-nasal drip present, +1 tonsils, no airway obstruction present, no oral cyanosis  NECK: supple, soft, no thyromegaly noted  LUNGS: diffuse coarse breath sounds noted, decreased breath sounds at RLL, increased expiratory wheezing, prolonged expiratory phase, some mild belly breathing present, RR 18 at time of examination  HEART: soft S1/S2, regular rate, irregular rhythm, 2/6 systolic murmur, 2+ RLE edema up to midcalf, 1+ LLE edema up to ankle, no JVD  SKIN: warm, dry, normal color    A/P: 89yo M w/ PMHx HFpEF 55-60% (last echo Oct 2018), moderate aortic stenosis, mild pulmonary HTN, small B cell lymphoma, CKD IV, afib on warfarin, hypothyroidism, prostate ca, postherpetic neuralgia presents with coughing, weakness, increased sputum production for 4 days duration now with increased wheezing and dyspnea. Last albuterol nebs around noon.  - Replaced/secured NC at 3L and elevated HOB to ~45degrees  - one time dose of 40IV Solu-medrol STAT  - one time dose of duonebs STAT  - will cont to monitor respiratory status  - previous CXR: patchy density R lung base and possible R pleural effusion. Pt to go for PA/lateral CXR in AM  - will fu VBG results  - RN to notify if any changes

## 2019-01-19 NOTE — H&P ADULT - PROBLEM SELECTOR PLAN 5
Chronic on coumadin. Subtherapeutic INR on admission.  will monitor INR with goal between 2-3  will dose 5mg coumadin today  cont metoprolol with hold parameters  cont pacerone 200mg daily for rhythm control Chronic. slightly hypotensive on admission.   will cont metoprolol with hold parameters

## 2019-01-20 LAB
ANION GAP SERPL CALC-SCNC: 7 MMOL/L — SIGNIFICANT CHANGE UP (ref 5–17)
BASOPHILS # BLD AUTO: 0.01 K/UL — SIGNIFICANT CHANGE UP (ref 0–0.2)
BASOPHILS NFR BLD AUTO: 0.3 % — SIGNIFICANT CHANGE UP (ref 0–2)
BUN SERPL-MCNC: 44 MG/DL — HIGH (ref 7–23)
CALCIUM SERPL-MCNC: 8.6 MG/DL — SIGNIFICANT CHANGE UP (ref 8.5–10.1)
CHLORIDE SERPL-SCNC: 107 MMOL/L — SIGNIFICANT CHANGE UP (ref 96–108)
CO2 SERPL-SCNC: 34 MMOL/L — HIGH (ref 22–31)
CREAT SERPL-MCNC: 2.5 MG/DL — HIGH (ref 0.5–1.3)
EOSINOPHIL # BLD AUTO: 0 K/UL — SIGNIFICANT CHANGE UP (ref 0–0.5)
EOSINOPHIL NFR BLD AUTO: 0 % — SIGNIFICANT CHANGE UP (ref 0–6)
GLUCOSE SERPL-MCNC: 139 MG/DL — HIGH (ref 70–99)
HCT VFR BLD CALC: 29.5 % — LOW (ref 39–50)
HGB BLD-MCNC: 9.1 G/DL — LOW (ref 13–17)
IMM GRANULOCYTES NFR BLD AUTO: 0.8 % — SIGNIFICANT CHANGE UP (ref 0–1.5)
INR BLD: 1.24 RATIO — HIGH (ref 0.88–1.16)
LYMPHOCYTES # BLD AUTO: 0.66 K/UL — LOW (ref 1–3.3)
LYMPHOCYTES # BLD AUTO: 17.4 % — SIGNIFICANT CHANGE UP (ref 13–44)
MAGNESIUM SERPL-MCNC: 2.4 MG/DL — SIGNIFICANT CHANGE UP (ref 1.6–2.6)
MCHC RBC-ENTMCNC: 30.8 GM/DL — LOW (ref 32–36)
MCHC RBC-ENTMCNC: 32.3 PG — SIGNIFICANT CHANGE UP (ref 27–34)
MCV RBC AUTO: 104.6 FL — HIGH (ref 80–100)
MONOCYTES # BLD AUTO: 0.18 K/UL — SIGNIFICANT CHANGE UP (ref 0–0.9)
MONOCYTES NFR BLD AUTO: 4.7 % — SIGNIFICANT CHANGE UP (ref 2–14)
NEUTROPHILS # BLD AUTO: 2.91 K/UL — SIGNIFICANT CHANGE UP (ref 1.8–7.4)
NEUTROPHILS NFR BLD AUTO: 76.8 % — SIGNIFICANT CHANGE UP (ref 43–77)
PHOSPHATE SERPL-MCNC: 4.7 MG/DL — HIGH (ref 2.5–4.5)
PLATELET # BLD AUTO: 133 K/UL — LOW (ref 150–400)
POTASSIUM SERPL-MCNC: 4.1 MMOL/L — SIGNIFICANT CHANGE UP (ref 3.5–5.3)
POTASSIUM SERPL-SCNC: 4.1 MMOL/L — SIGNIFICANT CHANGE UP (ref 3.5–5.3)
PROCALCITONIN SERPL-MCNC: <0.05 — SIGNIFICANT CHANGE UP (ref 0–0.04)
PROTHROM AB SERPL-ACNC: 14.2 SEC — HIGH (ref 10–12.9)
RBC # BLD: 2.82 M/UL — LOW (ref 4.2–5.8)
RBC # FLD: 17.2 % — HIGH (ref 10.3–14.5)
SODIUM SERPL-SCNC: 148 MMOL/L — HIGH (ref 135–145)
T3 SERPL-MCNC: 27 NG/DL — LOW (ref 80–200)
T4 AB SER-ACNC: 8 UG/DL — SIGNIFICANT CHANGE UP (ref 4.6–12)
TSH SERPL-MCNC: 1.77 UIU/ML — SIGNIFICANT CHANGE UP (ref 0.36–3.74)
WBC # BLD: 3.79 K/UL — LOW (ref 3.8–10.5)
WBC # FLD AUTO: 3.79 K/UL — LOW (ref 3.8–10.5)

## 2019-01-20 PROCEDURE — 99232 SBSQ HOSP IP/OBS MODERATE 35: CPT

## 2019-01-20 PROCEDURE — 71045 X-RAY EXAM CHEST 1 VIEW: CPT | Mod: 26

## 2019-01-20 PROCEDURE — 99233 SBSQ HOSP IP/OBS HIGH 50: CPT

## 2019-01-20 PROCEDURE — 93970 EXTREMITY STUDY: CPT | Mod: 26

## 2019-01-20 RX ORDER — WARFARIN SODIUM 2.5 MG/1
6 TABLET ORAL ONCE
Qty: 0 | Refills: 0 | Status: COMPLETED | OUTPATIENT
Start: 2019-01-20 | End: 2019-01-20

## 2019-01-20 RX ADMIN — GABAPENTIN 100 MILLIGRAM(S): 400 CAPSULE ORAL at 05:29

## 2019-01-20 RX ADMIN — Medication 100 MILLIGRAM(S): at 18:02

## 2019-01-20 RX ADMIN — Medication 1 TABLET(S): at 08:23

## 2019-01-20 RX ADMIN — Medication 0.5 MILLIGRAM(S): at 21:35

## 2019-01-20 RX ADMIN — Medication 600 MILLIGRAM(S): at 05:30

## 2019-01-20 RX ADMIN — Medication 600 MILLIGRAM(S): at 18:02

## 2019-01-20 RX ADMIN — GABAPENTIN 100 MILLIGRAM(S): 400 CAPSULE ORAL at 18:02

## 2019-01-20 RX ADMIN — Medication 1 DROP(S): at 05:29

## 2019-01-20 RX ADMIN — Medication 100 MICROGRAM(S): at 05:30

## 2019-01-20 RX ADMIN — WARFARIN SODIUM 6 MILLIGRAM(S): 2.5 TABLET ORAL at 21:33

## 2019-01-20 RX ADMIN — Medication 40 MILLIGRAM(S): at 05:29

## 2019-01-20 RX ADMIN — AMIODARONE HYDROCHLORIDE 200 MILLIGRAM(S): 400 TABLET ORAL at 05:29

## 2019-01-20 RX ADMIN — Medication 1 TABLET(S): at 12:05

## 2019-01-20 RX ADMIN — Medication 40 MILLIGRAM(S): at 05:30

## 2019-01-20 RX ADMIN — Medication 20 MILLIGRAM(S): at 18:02

## 2019-01-20 RX ADMIN — Medication 1 TABLET(S): at 18:02

## 2019-01-20 RX ADMIN — TAMSULOSIN HYDROCHLORIDE 0.4 MILLIGRAM(S): 0.4 CAPSULE ORAL at 21:35

## 2019-01-20 RX ADMIN — Medication 25 MILLIGRAM(S): at 05:30

## 2019-01-20 RX ADMIN — Medication 1 DROP(S): at 18:01

## 2019-01-20 NOTE — PROGRESS NOTE ADULT - PROBLEM SELECTOR PLAN 7
HFpEF. recent echo Oct 2018 showing EF 55-60%. SOB most likely Pulmonary in nature.   will cont metoprolol with hold parameters  will use lasix with caution

## 2019-01-20 NOTE — PROGRESS NOTE ADULT - SUBJECTIVE AND OBJECTIVE BOX
Date/Time Patient Seen:  		  Referring MD:   Data Reviewed	       Patient is a 89y old  Male who presents with a chief complaint of SOB (19 Jan 2019 09:48)      Subjective/HPI     PAST MEDICAL & SURGICAL HISTORY:  CKD (chronic kidney disease)  BPH (benign prostatic hyperplasia)  Postherpetic neuralgia  Chronic diarrhea  Prostate CA  Basal cell carcinoma  Lymphoma  Anxiety  Hypothyroid  Hypertension  A-fib  H/O shoulder replacement  S/P bilateral unicompartmental knee replacement        Medication list         MEDICATIONS  (STANDING):  amiodarone    Tablet 200 milliGRAM(s) Oral daily  artificial  tears Solution 1 Drop(s) Both EYES two times a day  furosemide    Tablet 40 milliGRAM(s) Oral daily  gabapentin 100 milliGRAM(s) Oral two times a day  gabapentin 200 milliGRAM(s) Oral at bedtime  guaiFENesin  milliGRAM(s) Oral every 12 hours  lactobacillus acidophilus 1 Tablet(s) Oral three times a day with meals  levothyroxine 100 MICROGram(s) Oral daily  metoprolol succinate ER 25 milliGRAM(s) Oral daily  predniSONE   Tablet 20 milliGRAM(s) Oral two times a day  tamsulosin 0.4 milliGRAM(s) Oral at bedtime    MEDICATIONS  (PRN):  acetaminophen   Tablet .. 650 milliGRAM(s) Oral every 6 hours PRN Temp greater or equal to 38C (100.4F), Mild Pain (1 - 3)  ALBUTerol    0.083% 2.5 milliGRAM(s) Nebulizer every 6 hours PRN Shortness of Breath and/or Wheezing  ALPRAZolam 0.5 milliGRAM(s) Oral at bedtime PRN insomnia         Vitals log        ICU Vital Signs Last 24 Hrs  T(C): 36.9 (20 Jan 2019 07:50), Max: 37.7 (19 Jan 2019 20:07)  T(F): 98.5 (20 Jan 2019 07:50), Max: 99.9 (19 Jan 2019 20:07)  HR: 74 (20 Jan 2019 07:50) (72 - 88)  BP: 122/71 (20 Jan 2019 07:50) (111/68 - 152/81)  BP(mean): --  ABP: --  ABP(mean): --  RR: 17 (20 Jan 2019 07:50) (16 - 18)  SpO2: 99% (20 Jan 2019 07:50) (96% - 100%)           Input and Output:  I&O's Detail    19 Jan 2019 07:01  -  20 Jan 2019 07:00  --------------------------------------------------------  IN:  Total IN: 0 mL    OUT:    Voided: 600 mL  Total OUT: 600 mL    Total NET: -600 mL          Lab Data                        9.1    3.79  )-----------( 133      ( 20 Jan 2019 06:54 )             29.5     01-20    148<H>  |  107  |  44<H>  ----------------------------<  139<H>  4.1   |  34<H>  |  2.50<H>    Ca    8.6      20 Jan 2019 06:54  Phos  4.7     01-20  Mg     2.4     01-20    TPro  5.7<L>  /  Alb  3.2<L>  /  TBili  0.7  /  DBili  x   /  AST  14<L>  /  ALT  26  /  AlkPhos  75  01-19      CARDIAC MARKERS ( 19 Jan 2019 07:56 )  .033 ng/mL / x     / 44 U/L / x     / x            Review of Systems	      Objective     Physical Examination  feels better  heart s1s2  lung dec BS  on o2 support        Pertinent Lab findings & Imaging      Jaycob:  NO   Adequate UO     I&O's Detail    19 Jan 2019 07:01  -  20 Jan 2019 07:00  --------------------------------------------------------  IN:  Total IN: 0 mL    OUT:    Voided: 600 mL  Total OUT: 600 mL    Total NET: -600 mL               Discussed with:     Cultures:	        Radiology

## 2019-01-20 NOTE — PROGRESS NOTE ADULT - SUBJECTIVE AND OBJECTIVE BOX
[facility] -- Sandy Alvarado, Lizett Meraz Pannella, Patel, Savella  Office # [phone]      Follow Up:    Afib/sob  Subjective/Observations:   No events overnight resting comfortably in bed.  No complaints of chest pain, dyspnea, or palpitations reported. No signs of orthopnea or PND.     REVIEW OF SYSTEMS: All other review of systems is negative unless indicated above    PAST MEDICAL & SURGICAL HISTORY:  CKD (chronic kidney disease)  BPH (benign prostatic hyperplasia)  Postherpetic neuralgia  Chronic diarrhea  Prostate CA  Basal cell carcinoma  Lymphoma  Anxiety  Hypothyroid  Hypertension  A-fib  H/O shoulder replacement  S/P bilateral unicompartmental knee replacement      MEDICATIONS  (STANDING):  amiodarone    Tablet 200 milliGRAM(s) Oral daily  artificial  tears Solution 1 Drop(s) Both EYES two times a day  furosemide    Tablet 40 milliGRAM(s) Oral daily  gabapentin 100 milliGRAM(s) Oral two times a day  gabapentin 200 milliGRAM(s) Oral at bedtime  guaiFENesin  milliGRAM(s) Oral every 12 hours  lactobacillus acidophilus 1 Tablet(s) Oral three times a day with meals  levothyroxine 100 MICROGram(s) Oral daily  metoprolol succinate ER 25 milliGRAM(s) Oral daily  predniSONE   Tablet 20 milliGRAM(s) Oral two times a day  tamsulosin 0.4 milliGRAM(s) Oral at bedtime    MEDICATIONS  (PRN):  acetaminophen   Tablet .. 650 milliGRAM(s) Oral every 6 hours PRN Temp greater or equal to 38C (100.4F), Mild Pain (1 - 3)  ALBUTerol    0.083% 2.5 milliGRAM(s) Nebulizer every 6 hours PRN Shortness of Breath and/or Wheezing  ALPRAZolam 0.5 milliGRAM(s) Oral at bedtime PRN insomnia      Allergies    No Known Allergies    Intolerances        Vital Signs Last 24 Hrs  T(C): 36.9 (20 Jan 2019 07:50), Max: 37.7 (19 Jan 2019 20:07)  T(F): 98.5 (20 Jan 2019 07:50), Max: 99.9 (19 Jan 2019 20:07)  HR: 65 (20 Jan 2019 09:46) (65 - 88)  BP: 115/66 (20 Jan 2019 09:46) (111/68 - 152/81)  BP(mean): --  RR: 17 (20 Jan 2019 07:50) (16 - 18)  SpO2: 99% (20 Jan 2019 09:46) (96% - 100%)    I&O's Summary    19 Jan 2019 07:01  -  20 Jan 2019 07:00  --------------------------------------------------------  IN: 0 mL / OUT: 600 mL / NET: -600 mL          PHYSICAL EXAM:  TELE: NSR No events on tele overnight   Constitutional: NAD, awake and alert, well-developed  HEENT: Moist Mucous Membranes, Anicteric  Pulmonary: Non-labored, breath sounds with expiratory wheeze throughout  diminished at R base No  crackles or rhonchi   Cardiovascular: Regular, S1 and S2 nl, + murmur No rubs, gallops or clicks   Gastrointestinal: Bowel Sounds present, soft, nontender.   Lymph: No lymphadenopathy. No peripheral edema.  Skin: No visible rashes or ulcers.  Psych:  Mood & affect appropriate    LABS: All Labs Reviewed:                        9.1    3.79  )-----------( 133      ( 20 Jan 2019 06:54 )             29.5                         8.9    4.54  )-----------( 132      ( 19 Jan 2019 07:56 )             28.2     20 Jan 2019 06:54    148    |  107    |  44     ----------------------------<  139    4.1     |  34     |  2.50   19 Jan 2019 07:56    144    |  107    |  44     ----------------------------<  99     3.7     |  32     |  2.50     Ca    8.6        20 Jan 2019 06:54  Ca    8.2        19 Jan 2019 07:56  Phos  4.7       20 Jan 2019 06:54  Mg     2.4       20 Jan 2019 06:54    TPro  5.7    /  Alb  3.2    /  TBili  0.7    /  DBili  x      /  AST  14     /  ALT  26     /  AlkPhos  75     19 Jan 2019 07:56    PT/INR - ( 20 Jan 2019 06:54 )   PT: 14.2 sec;   INR: 1.24 ratio         PTT - ( 19 Jan 2019 07:56 )  PTT:30.6 sec  CARDIAC MARKERS ( 19 Jan 2019 07:56 )  .033 ng/mL / x     / 44 U/L / x     / x             ECG:  < from: 12 Lead ECG (01.19.19 @ 07:22) >  Ventricular Rate 78 BPM    Atrial Rate 80 BPM    QRS Duration 156 ms    Q-T Interval 434 ms    QTC Calculation(Bezet) 494 ms    R Axis -71 degrees    T Axis 84 degrees    Diagnosis Line Atrial fibrillation  Right bundle branch block  Left anterior fascicular block  *** Bifascicular block ***  Septal infarct (cited on or before 09-APR-2017)  Abnormal ECG    Confirmed by Hal Mondragon ([id number]) on 1/19/2019 11:00:38 AM    < end of copied text >    Echo:    Radiology:  RADIOLOGY:    < from: Xray Chest 1 View- PORTABLE-Urgent (01.19.19 @ 07:37) >    EXAM:  XR CHEST PORTABLE URGENT 1V                            PROCEDURE DATE:  01/19/2019          INTERPRETATION:  History: Shortness of breath    Portable chest radiograph is compared to 12/30/2018.    The heart is not enlarged. The trachea is midline. There is increased   patchy density at the right lung base may represent developing   infiltrate. A small right pleural effusion is suggested. Left lung is   clear. Osseous structures demonstrate osteopenia and right shoulder   replacement.    Impression: Patchy density at the right lung base may represent   developing infiltrate. Correlation with PA and lateral radiographs is   recommended. Small right pleural effusion is suggested.                CORNELIUS PALACIOS M.D.,ATTENDING RADIOLOGIST  This document has been electronically signed. Jan 19 2019  8:35AM              < end of copied text >             Shubham Monson [facility]   Cardiology

## 2019-01-20 NOTE — PHYSICAL THERAPY INITIAL EVALUATION ADULT - ADDITIONAL COMMENTS
Pt lives with 24 hr live in aide in private home [address].  Pt reports aide assists him with all ADLs and supervises his transfer and ambulation with RW

## 2019-01-20 NOTE — PROGRESS NOTE ADULT - ASSESSMENT
89yo M w/ PMHx HFpEF 55-60% (last echo Oct 2018), moderate aortic stenosis, mild pulmonary HTN, small B cell lymphoma, CKD IV, afib on warfarin, hypothyroidism, prostate ca, postherpetic neuralgia presents with coughing, weakness, increased sputum production for 4 days duration most likely 2/2 viral etiology vs fluid overload vs. devloping PNA.

## 2019-01-20 NOTE — PROGRESS NOTE ADULT - PROBLEM SELECTOR PLAN 4
Chronic on coumadin. Subtherapeutic INR on admission.  will monitor INR with goal between 2-3  will dose 6mg coumadin today   also will c/w 70mg sq lovenox now to bridge due to subtherapeutic INR,  reduce CVA risk and they are agreeable  cont metoprolol with hold parameters  cont pacerone 200mg daily for rhythm control -    LFT's and TFT's  reviewed

## 2019-01-20 NOTE — PROGRESS NOTE ADULT - PROBLEM SELECTOR PLAN 1
2/2 ac on ch  diastolic heart failure and developed acute hypoxic respiratory failure   - clinically appears euvolemic but will monitor strict i/o's and daily weights for now  - CXR: revealing R lung base opacity and small right sided effusion.   - Afebrile. WBC wnl. productive cough. rapid flu/RSV neg.  procal neg  - s/p Zosyn in ED, no further abx at this time, reviewed pulm note, agree  - c/w  Mucinex albuterol nebs PRN  - cont NC to maintain SpO2 >92%, pt is not on home O2   - given SOB will hold daytime Xanax, and give bedtime dose PRN for insomnia  - low dose bb w/ holding parameter ordered  - has asymmetric b/l LE edema, check venous dopplers to eval for DVT (pt has subtherapeutic INR)

## 2019-01-20 NOTE — PROGRESS NOTE ADULT - PROBLEM SELECTOR PLAN 2
Most likely 2/2 anemia of chronic disease. recent FOBT neg. Potentially could be contributed to worsening SOB. Baseline ~9 via chart review. no overt signs of bleeding. anemia is macrocytic in nature.   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

## 2019-01-20 NOTE — PROGRESS NOTE ADULT - PROBLEM SELECTOR PLAN 1
sob, grant, pulm edema, atelectasis, CKD and HFpEF and Pulm HTN and Moderate Aortic Valve disease,   there is no hx of COPD - NEBS for mucociliary clearance, will taper steroids  o2 support as needed - keep sat > 88 pct  I and O - serial labs, replete lytes, monitor vs and HD and Sat, out of bed as tolerated  prognosis guarded - multiple medical issues - acute and chronic - advanced age and frail and weak state  am labs pending  will follow  pt is DNR DNI

## 2019-01-20 NOTE — PROGRESS NOTE ADULT - ASSESSMENT
89yo M w/ PMHx HFpEF, moderate aortic stenosis, mild pulmonary HTN, moderate MR, small B cell lymphoma, CKD IV, afib on warfarin, hypothyroidism, prostate ca, postherpetic neuralgia presents with coughing, weakness, increased sputum production, admitted with HCAP:    - HR and BP appear controlled  - No evidence of volume overload or acute ischemia  - Can continue to dose Coumadin for goal INR 2-3.  His INR remains low, and I would increase his dose tonight from his  home dose to get his INR to within range.  HE should get 10 tonight  - Metoprolol 25 BID for heart rate and BP control  - CW amidarone 200 QD   - Continue Lasix at home dose of 40 QD  - Abx per primary team  - Supplemental oxygen as needed  - Monitor and replete lytes  - To follow closely with you  Shubham FLORES  Cardiology 89yo M w/ PMHx HFpEF, moderate aortic stenosis, mild pulmonary HTN, moderate MR, small B cell lymphoma, CKD IV, afib on warfarin, hypothyroidism, prostate ca, postherpetic neuralgia presents with coughing, weakness, increased sputum production, admitted with HCAP:    - HR and BP appear controlled  - No evidence of volume overload or acute ischemia  - Can continue to dose Coumadin for goal INR 2-3.  His INR remains low, and I would increase his dose tonight from his  home dose to get his INR to within range.  He only got 5mg last night, which is lower than his home dose.  I would give him 10mg tonight.    - Metoprolol 25 BID for heart rate and BP control  - CW amidarone 200 QD   - Continue Lasix at home dose of 40 QD  - Abx per primary team  - Supplemental oxygen as needed  - Monitor and replete lytes  - To follow closely with you  Shubham FLORES  Cardiology

## 2019-01-20 NOTE — PROGRESS NOTE ADULT - PROBLEM SELECTOR PLAN 3
LETICIA on CKD. Baseline Cr ~2 per chart review.   likely progression of CKD but may be LETICIA (difficult to determine baseline creatinine)  monitor renal indices closely  caution with nephrotoxic agents (avoiding acei/arb for now)  monitor I/O

## 2019-01-20 NOTE — PROGRESS NOTE ADULT - SUBJECTIVE AND OBJECTIVE BOX
Patient is a 89y old  Male who presents with a chief complaint of SOB (20 Jan 2019 09:53)      INTERVAL HPI:  OVERNIGHT EVENTS:  T(F): 98.1 (01-20-19 @ 12:10), Max: 99.9 (01-19-19 @ 20:07)  HR: 73 (01-20-19 @ 12:10) (65 - 88)  BP: 135/77 (01-20-19 @ 12:10) (111/68 - 152/81)  RR: 19 (01-20-19 @ 12:10) (17 - 19)  SpO2: 99% (01-20-19 @ 12:10) (96% - 100%)  Wt(kg): --  I&O's Summary    19 Jan 2019 07:01  -  20 Jan 2019 07:00  --------------------------------------------------------  IN: 0 mL / OUT: 600 mL / NET: -600 mL    20 Jan 2019 07:01  -  20 Jan 2019 14:53  --------------------------------------------------------  IN: 0 mL / OUT: 250 mL / NET: -250 mL        REVIEW OF SYSTEM:    Constitutional: No fever, chills, fatigue  Neuro: No headache, numbness, weakness  Resp: No cough, wheezing, shortness of breath  CVS: No chest pain, palpitations, leg swelling  GI: No abdominal pain, nausea, vomiting, diarrhea   : No dysuria, frequency, incontinence  Skin: No itching, burning, rashes, or lesions   Msk: No joint pain or swelling  Psych: No depression, anxiety, mood swings          PHYSICAL EXAM:  GENERAL: NAD, well-developed  HEAD:  Atraumatic, Normocephalic  EYES: EOMI, PERRLA, conjunctiva and sclera clear  ENMT: No tonsillar erythema, exudates, or enlargement; Moist mucous membranes,   NECK: Supple, No JVD, Normal thyroid  HEART: Irregular rate and rhythm; No murmurs, rubs, or gallops  RESPIRATORY: CTA B/L, No wheezing / rhonchi  ABDOMEN: Soft, Nontender, Nondistended; Bowel sounds present  NEUROLOGY: A&Ox3, nonfocal, moving all extremities.  EXTREMITIES:  2+ Peripheral Pulses, No clubbing, cyanosis, + RLE edema  SKIN: warm, dry, normal color, no rash or abnormal lesions        LABS:                        9.1    3.79  )-----------( 133      ( 20 Jan 2019 06:54 )             29.5     01-20    148<H>  |  107  |  44<H>  ----------------------------<  139<H>  4.1   |  34<H>  |  2.50<H>    Ca    8.6      20 Jan 2019 06:54  Phos  4.7     01-20  Mg     2.4     01-20    TPro  5.7<L>  /  Alb  3.2<L>  /  TBili  0.7  /  DBili  x   /  AST  14<L>  /  ALT  26  /  AlkPhos  75  01-19    PT/INR - ( 20 Jan 2019 06:54 )   PT: 14.2 sec;   INR: 1.24 ratio         PTT - ( 19 Jan 2019 07:56 )  PTT:30.6 sec    CAPILLARY BLOOD GLUCOSE          01-19 @ 10:31   No growth to date.  --  --          MEDICATIONS  (STANDING):  amiodarone    Tablet 200 milliGRAM(s) Oral daily  artificial  tears Solution 1 Drop(s) Both EYES two times a day  furosemide    Tablet 40 milliGRAM(s) Oral daily  gabapentin 100 milliGRAM(s) Oral two times a day  gabapentin 200 milliGRAM(s) Oral at bedtime  guaiFENesin  milliGRAM(s) Oral every 12 hours  lactobacillus acidophilus 1 Tablet(s) Oral three times a day with meals  levothyroxine 100 MICROGram(s) Oral daily  metoprolol succinate ER 25 milliGRAM(s) Oral daily  predniSONE   Tablet 20 milliGRAM(s) Oral two times a day  tamsulosin 0.4 milliGRAM(s) Oral at bedtime  warfarin 6 milliGRAM(s) Oral once    MEDICATIONS  (PRN):  acetaminophen   Tablet .. 650 milliGRAM(s) Oral every 6 hours PRN Temp greater or equal to 38C (100.4F), Mild Pain (1 - 3)  ALBUTerol    0.083% 2.5 milliGRAM(s) Nebulizer every 6 hours PRN Shortness of Breath and/or Wheezing  ALPRAZolam 0.5 milliGRAM(s) Oral at bedtime PRN insomnia

## 2019-01-20 NOTE — PHYSICAL THERAPY INITIAL EVALUATION ADULT - PERTINENT HX OF CURRENT PROBLEM, REHAB EVAL
89 year old male with anxiety, AFIB, BPH, CKD,HTN, hypothyroid,  postherpetic neuralgia, prostate ca , small B cell lymphoma presented to  ED by ambulance c/o weakness

## 2019-01-21 LAB
ANION GAP SERPL CALC-SCNC: 8 MMOL/L — SIGNIFICANT CHANGE UP (ref 5–17)
BUN SERPL-MCNC: 56 MG/DL — HIGH (ref 7–23)
CALCIUM SERPL-MCNC: 8.6 MG/DL — SIGNIFICANT CHANGE UP (ref 8.5–10.1)
CHLORIDE SERPL-SCNC: 105 MMOL/L — SIGNIFICANT CHANGE UP (ref 96–108)
CO2 SERPL-SCNC: 34 MMOL/L — HIGH (ref 22–31)
CREAT SERPL-MCNC: 2.4 MG/DL — HIGH (ref 0.5–1.3)
GLUCOSE SERPL-MCNC: 121 MG/DL — HIGH (ref 70–99)
HCT VFR BLD CALC: 31.4 % — LOW (ref 39–50)
HGB BLD-MCNC: 9.6 G/DL — LOW (ref 13–17)
INR BLD: 1.53 RATIO — HIGH (ref 0.88–1.16)
LEGIONELLA AG UR QL: NEGATIVE — SIGNIFICANT CHANGE UP
MCHC RBC-ENTMCNC: 30.6 GM/DL — LOW (ref 32–36)
MCHC RBC-ENTMCNC: 32.1 PG — SIGNIFICANT CHANGE UP (ref 27–34)
MCV RBC AUTO: 105 FL — HIGH (ref 80–100)
NRBC # BLD: 0 /100 WBCS — SIGNIFICANT CHANGE UP (ref 0–0)
PLATELET # BLD AUTO: 161 K/UL — SIGNIFICANT CHANGE UP (ref 150–400)
POTASSIUM SERPL-MCNC: 4.1 MMOL/L — SIGNIFICANT CHANGE UP (ref 3.5–5.3)
POTASSIUM SERPL-SCNC: 4.1 MMOL/L — SIGNIFICANT CHANGE UP (ref 3.5–5.3)
PROTHROM AB SERPL-ACNC: 17.5 SEC — HIGH (ref 10–12.9)
RBC # BLD: 2.99 M/UL — LOW (ref 4.2–5.8)
RBC # FLD: 16.8 % — HIGH (ref 10.3–14.5)
SODIUM SERPL-SCNC: 147 MMOL/L — HIGH (ref 135–145)
WBC # BLD: 6.01 K/UL — SIGNIFICANT CHANGE UP (ref 3.8–10.5)
WBC # FLD AUTO: 6.01 K/UL — SIGNIFICANT CHANGE UP (ref 3.8–10.5)

## 2019-01-21 PROCEDURE — 99233 SBSQ HOSP IP/OBS HIGH 50: CPT

## 2019-01-21 PROCEDURE — 99232 SBSQ HOSP IP/OBS MODERATE 35: CPT

## 2019-01-21 RX ORDER — WARFARIN SODIUM 2.5 MG/1
6 TABLET ORAL ONCE
Qty: 0 | Refills: 0 | Status: COMPLETED | OUTPATIENT
Start: 2019-01-21 | End: 2019-01-21

## 2019-01-21 RX ORDER — SODIUM CHLORIDE 0.65 %
1 AEROSOL, SPRAY (ML) NASAL THREE TIMES A DAY
Qty: 0 | Refills: 0 | Status: DISCONTINUED | OUTPATIENT
Start: 2019-01-21 | End: 2019-01-25

## 2019-01-21 RX ORDER — ALBUTEROL 90 UG/1
2.5 AEROSOL, METERED ORAL EVERY 12 HOURS
Qty: 0 | Refills: 0 | Status: DISCONTINUED | OUTPATIENT
Start: 2019-01-21 | End: 2019-01-25

## 2019-01-21 RX ADMIN — Medication 20 MILLIGRAM(S): at 17:28

## 2019-01-21 RX ADMIN — Medication 1 TABLET(S): at 17:28

## 2019-01-21 RX ADMIN — GABAPENTIN 100 MILLIGRAM(S): 400 CAPSULE ORAL at 06:27

## 2019-01-21 RX ADMIN — ALBUTEROL 2.5 MILLIGRAM(S): 90 AEROSOL, METERED ORAL at 14:05

## 2019-01-21 RX ADMIN — TAMSULOSIN HYDROCHLORIDE 0.4 MILLIGRAM(S): 0.4 CAPSULE ORAL at 21:50

## 2019-01-21 RX ADMIN — WARFARIN SODIUM 6 MILLIGRAM(S): 2.5 TABLET ORAL at 21:50

## 2019-01-21 RX ADMIN — GABAPENTIN 100 MILLIGRAM(S): 400 CAPSULE ORAL at 17:28

## 2019-01-21 RX ADMIN — Medication 100 MICROGRAM(S): at 06:28

## 2019-01-21 RX ADMIN — Medication 40 MILLIGRAM(S): at 06:27

## 2019-01-21 RX ADMIN — Medication 25 MILLIGRAM(S): at 06:27

## 2019-01-21 RX ADMIN — Medication 100 MILLIGRAM(S): at 09:33

## 2019-01-21 RX ADMIN — Medication 1 TABLET(S): at 11:53

## 2019-01-21 RX ADMIN — Medication 650 MILLIGRAM(S): at 18:28

## 2019-01-21 RX ADMIN — Medication 600 MILLIGRAM(S): at 17:28

## 2019-01-21 RX ADMIN — Medication 1 DROP(S): at 06:28

## 2019-01-21 RX ADMIN — Medication 600 MILLIGRAM(S): at 06:26

## 2019-01-21 RX ADMIN — Medication 20 MILLIGRAM(S): at 06:27

## 2019-01-21 RX ADMIN — Medication 100 MILLIGRAM(S): at 18:28

## 2019-01-21 RX ADMIN — ALBUTEROL 2.5 MILLIGRAM(S): 90 AEROSOL, METERED ORAL at 20:39

## 2019-01-21 RX ADMIN — Medication 1 SPRAY(S): at 13:29

## 2019-01-21 RX ADMIN — GABAPENTIN 200 MILLIGRAM(S): 400 CAPSULE ORAL at 21:50

## 2019-01-21 RX ADMIN — Medication 1 TABLET(S): at 08:34

## 2019-01-21 RX ADMIN — AMIODARONE HYDROCHLORIDE 200 MILLIGRAM(S): 400 TABLET ORAL at 06:28

## 2019-01-21 RX ADMIN — Medication 1 DROP(S): at 17:28

## 2019-01-21 NOTE — PROGRESS NOTE ADULT - SUBJECTIVE AND OBJECTIVE BOX
Date/Time Patient Seen:  		  Referring MD:   Data Reviewed	       Patient is a 89y old  Male who presents with a chief complaint of SOB (20 Jan 2019 14:52)      Subjective/HPI     PAST MEDICAL & SURGICAL HISTORY:  CKD (chronic kidney disease)  BPH (benign prostatic hyperplasia)  Postherpetic neuralgia  Chronic diarrhea  Prostate CA  Basal cell carcinoma  Lymphoma  Anxiety  Hypothyroid  Hypertension  A-fib  H/O shoulder replacement  S/P bilateral unicompartmental knee replacement        Medication list         MEDICATIONS  (STANDING):  ALBUTerol    0.083% 2.5 milliGRAM(s) Nebulizer every 12 hours  amiodarone    Tablet 200 milliGRAM(s) Oral daily  artificial  tears Solution 1 Drop(s) Both EYES two times a day  furosemide    Tablet 40 milliGRAM(s) Oral daily  gabapentin 100 milliGRAM(s) Oral two times a day  gabapentin 200 milliGRAM(s) Oral at bedtime  guaiFENesin  milliGRAM(s) Oral every 12 hours  lactobacillus acidophilus 1 Tablet(s) Oral three times a day with meals  levothyroxine 100 MICROGram(s) Oral daily  metoprolol succinate ER 25 milliGRAM(s) Oral daily  predniSONE   Tablet 20 milliGRAM(s) Oral two times a day  sodium chloride 0.65% Nasal 1 Spray(s) Both Nostrils three times a day  tamsulosin 0.4 milliGRAM(s) Oral at bedtime    MEDICATIONS  (PRN):  acetaminophen   Tablet .. 650 milliGRAM(s) Oral every 6 hours PRN Temp greater or equal to 38C (100.4F), Mild Pain (1 - 3)  ALBUTerol    0.083% 2.5 milliGRAM(s) Nebulizer every 6 hours PRN Shortness of Breath and/or Wheezing  ALPRAZolam 0.5 milliGRAM(s) Oral at bedtime PRN insomnia  benzonatate 100 milliGRAM(s) Oral every 8 hours PRN Cough         Vitals log        ICU Vital Signs Last 24 Hrs  T(C): 36.2 (21 Jan 2019 07:37), Max: 37.2 (20 Jan 2019 20:12)  T(F): 97.2 (21 Jan 2019 07:37), Max: 98.9 (20 Jan 2019 20:12)  HR: 76 (21 Jan 2019 07:37) (65 - 77)  BP: 114/73 (21 Jan 2019 07:37) (114/73 - 151/87)  BP(mean): --  ABP: --  ABP(mean): --  RR: 18 (21 Jan 2019 07:37) (18 - 19)  SpO2: 100% (21 Jan 2019 07:37) (98% - 100%)           Input and Output:  I&O's Detail    20 Jan 2019 07:01  -  21 Jan 2019 07:00  --------------------------------------------------------  IN:  Total IN: 0 mL    OUT:    Voided: 250 mL  Total OUT: 250 mL    Total NET: -250 mL          Lab Data                        9.6    6.01  )-----------( 161      ( 21 Jan 2019 07:04 )             31.4     01-21    147<H>  |  105  |  56<H>  ----------------------------<  121<H>  4.1   |  34<H>  |  2.40<H>    Ca    8.6      21 Jan 2019 07:04  Phos  4.7     01-20  Mg     2.4     01-20              Review of Systems	      Objective     Physical Examination    heart s1s2  lung dec BS  abd soft  upper airway congestion      Pertinent Lab findings & Imaging      Jaycob:  NO   Adequate UO     I&O's Detail    20 Jan 2019 07:01  -  21 Jan 2019 07:00  --------------------------------------------------------  IN:  Total IN: 0 mL    OUT:    Voided: 250 mL  Total OUT: 250 mL    Total NET: -250 mL               Discussed with:     Cultures:	        Radiology

## 2019-01-21 NOTE — PROGRESS NOTE ADULT - PROBLEM SELECTOR PLAN 1
2/2 ac on ch  diastolic heart failure and developed acute hypoxic respiratory failure   - clinically appears euvolemic but will monitor strict i/o's and daily weights for now  - CXR: revealing R lung base opacity and small right sided effusion.   - Afebrile. WBC wnl. productive cough. rapid flu/RSV neg.  procal neg  - s/p Zosyn in ED, no further abx at this time, reviewed pulm note, agree  - c/w  Mucinex albuterol nebs PRN  - cont NC to maintain SpO2 >92%, pt is not on home O2   - given SOB will hold daytime Xanax, and give bedtime dose PRN for insomnia  - low dose bb w/ holding parameter ordered  - has asymmetric b/l LE edema, DVT neg

## 2019-01-21 NOTE — PROGRESS NOTE ADULT - SUBJECTIVE AND OBJECTIVE BOX
Patient is a 89y old  Male who presents with a chief complaint of SOB (20 Jan 2019 09:53)      INTERVAL HPI: Pt seen and examined bedside, c/o severe cough. denies any CP, SOB.  OVERNIGHT EVENTS:  Vital Signs Last 24 Hrs  T(C): 36.2 (21 Jan 2019 16:03), Max: 37.2 (20 Jan 2019 20:12)  T(F): 97.2 (21 Jan 2019 16:03), Max: 98.9 (20 Jan 2019 20:12)  HR: 65 (21 Jan 2019 16:03) (65 - 79)  BP: 130/72 (21 Jan 2019 16:03) (110/73 - 151/87)  BP(mean): --  RR: 18 (21 Jan 2019 16:03) (18 - 18)  SpO2: 97% (21 Jan 2019 16:03) (97% - 100%)      REVIEW OF SYSTEM:    Constitutional: No fever, chills, fatigue  Neuro: No headache, numbness, weakness  Resp: +cough, wheezing, shortness of breath  CVS: No chest pain, palpitations, leg swelling  GI: No abdominal pain, nausea, vomiting, diarrhea   : No dysuria, frequency, incontinence  Skin: No itching, burning, rashes, or lesions   Msk: No joint pain or swelling  Psych: No depression, anxiety, mood swings          PHYSICAL EXAM:  GENERAL: NAD, well-developed  HEAD:  Atraumatic, Normocephalic  NECK: Supple, No JVD, Normal thyroid  HEART: Irregular rate and rhythm; No murmurs, rubs, or gallops  RESPIRATORY: Coarse BS B/L, with wheezing / rhonchi  ABDOMEN: Soft, Nontender, Nondistended; Bowel sounds present  NEUROLOGY: A&Ox3, nonfocal, moving all extremities.  EXTREMITIES:  2+ Peripheral Pulses, No clubbing, cyanosis, + RLE edema  SKIN: warm, dry, normal color, no rash or abnormal lesions        LABS:                        9.6    6.01  )-----------( 161      ( 21 Jan 2019 07:04 )             31.4     21 Jan 2019 07:04    147    |  105    |  56     ----------------------------<  121    4.1     |  34     |  2.40     Ca    8.6        21 Jan 2019 07:04      PT/INR - ( 21 Jan 2019 07:04 )   PT: 17.5 sec;   INR: 1.53 ratio             CAPILLARY BLOOD GLUCOSE        UCx       RADIOLOGY & ADDITIONAL TESTS:          01-19 @ 10:31   No growth to date.  --  --

## 2019-01-21 NOTE — PROGRESS NOTE ADULT - PROBLEM SELECTOR PLAN 1
pulm congestion - HF and CKD and frail elderly  upper airway congestion - nasal saline, nebs, enc cough, HOB elev, oral hygiene  HFpEF and AS and valv heart disease - pulm HTN - diuresis, cvs regimen and BP control - cardio following  CKD - serial labs, replete lytes,   prognosis guarded - multiple medical issues - in the setting of advance age and frailty  pt is DNR DNI  will follow

## 2019-01-21 NOTE — PROGRESS NOTE ADULT - PROBLEM SELECTOR PLAN 3
LETICIA on CKD. Baseline Cr ~2 per chart review.   likely progression of CKD but may be LETICIA   monitor renal indices closely  caution with nephrotoxic agents (avoiding acei/arb for now)  monitor I/O

## 2019-01-21 NOTE — PROGRESS NOTE ADULT - ASSESSMENT
89yo M w/ PMHx HFpEF, moderate aortic stenosis, mild pulmonary HTN, moderate MR, small B cell lymphoma, CKD IV, afib on warfarin, hypothyroidism, prostate ca, postherpetic neuralgia presents with coughing, weakness, increased sputum production, admitted with HCAP:    - HR and BP appear controlled.  No significant arrhythmias except rate-controlled PAfib.  Discontinue telemetry  - No evidence of volume overload or acute ischemia.  Appears on the dry side.  Monitor volume status  - Daily Coumadin for goal INR 2-3.  INR remains subtherapeutic.  Received 6 mg last night.    - Metoprolol 25 BID and Amiodarone 200 QD for rate control  - Continue Lasix at home dose of 40 QD  - Abx per primary team  - Supplemental oxygen as needed.  Bronchodilators and steroids per Pulm  - Incentive spirometer  - Monitor and replete lytes  - To follow closely with you    May Benitez NP  Cardiology

## 2019-01-21 NOTE — PROGRESS NOTE ADULT - SUBJECTIVE AND OBJECTIVE BOX
[facility] -- Sandy Alvarado, Lizett Meraz Pannella, Patel, Savella  Office # [phone]    Follow Up:  SOB    Subjective/Observations: Seen in bed, claims to be feeling better.  However, still with tight cough.  Denies CP or palpitations    REVIEW OF SYSTEMS: All other review of systems is negative unless indicated above    PAST MEDICAL & SURGICAL HISTORY:  CKD (chronic kidney disease)  BPH (benign prostatic hyperplasia)  Postherpetic neuralgia  Chronic diarrhea  Prostate CA  Basal cell carcinoma  Lymphoma  Anxiety  Hypothyroid  Hypertension  A-fib  H/O shoulder replacement  S/P bilateral unicompartmental knee replacement    MEDICATIONS  (STANDING):  ALBUTerol    0.083% 2.5 milliGRAM(s) Nebulizer every 12 hours  amiodarone    Tablet 200 milliGRAM(s) Oral daily  artificial  tears Solution 1 Drop(s) Both EYES two times a day  furosemide    Tablet 40 milliGRAM(s) Oral daily  gabapentin 100 milliGRAM(s) Oral two times a day  gabapentin 200 milliGRAM(s) Oral at bedtime  guaiFENesin  milliGRAM(s) Oral every 12 hours  lactobacillus acidophilus 1 Tablet(s) Oral three times a day with meals  levothyroxine 100 MICROGram(s) Oral daily  metoprolol succinate ER 25 milliGRAM(s) Oral daily  predniSONE   Tablet 20 milliGRAM(s) Oral two times a day  sodium chloride 0.65% Nasal 1 Spray(s) Both Nostrils three times a day  tamsulosin 0.4 milliGRAM(s) Oral at bedtime    MEDICATIONS  (PRN):  acetaminophen   Tablet .. 650 milliGRAM(s) Oral every 6 hours PRN Temp greater or equal to 38C (100.4F), Mild Pain (1 - 3)  ALBUTerol    0.083% 2.5 milliGRAM(s) Nebulizer every 6 hours PRN Shortness of Breath and/or Wheezing  ALPRAZolam 0.5 milliGRAM(s) Oral at bedtime PRN insomnia  benzonatate 100 milliGRAM(s) Oral every 8 hours PRN Cough    Allergies    No Known Allergies    Intolerances    Vital Signs Last 24 Hrs  T(C): 36.2 (21 Jan 2019 07:37), Max: 37.2 (20 Jan 2019 20:12)  T(F): 97.2 (21 Jan 2019 07:37), Max: 98.9 (20 Jan 2019 20:12)  HR: 76 (21 Jan 2019 07:37) (73 - 77)  BP: 114/73 (21 Jan 2019 07:37) (114/73 - 151/87)  BP(mean): --  RR: 18 (21 Jan 2019 07:37) (18 - 19)  SpO2: 100% (21 Jan 2019 07:37) (98% - 100%)    I&O's Summary    20 Jan 2019 07:01  -  21 Jan 2019 07:00  --------------------------------------------------------  IN: 0 mL / OUT: 250 mL / NET: -250 mL    PHYSICAL EXAM:  TELE: SR/Afib/flutter  Constitutional: NAD, awake and alert, well-developed  HEENT: Moist Mucous Membranes, Anicteric  Pulmonary: Non-labored, breath sounds are diminished bilaterally, No wheezing or rales. + scatttered rhonchi  Cardiovascular: Regular, S1 and S2, No murmurs, rubs, gallops or clicks  Gastrointestinal: Bowel Sounds present, soft, nontender.   Lymph: No peripheral edema. No lymphadenopathy.  Skin: No visible rashes or ulcers.  Psych:  Mood & affect appropriate    LABS: All Labs Reviewed:                        9.6    6.01  )-----------( 161      ( 21 Jan 2019 07:04 )             31.4                         9.1    3.79  )-----------( 133      ( 20 Jan 2019 06:54 )             29.5                         8.9    4.54  )-----------( 132      ( 19 Jan 2019 07:56 )             28.2     21 Jan 2019 07:04    147    |  105    |  56     ----------------------------<  121    4.1     |  34     |  2.40   20 Jan 2019 06:54    148    |  107    |  44     ----------------------------<  139    4.1     |  34     |  2.50   19 Jan 2019 07:56    144    |  107    |  44     ----------------------------<  99     3.7     |  32     |  2.50     Ca    8.6        21 Jan 2019 07:04  Ca    8.6        20 Jan 2019 06:54  Ca    8.2        19 Jan 2019 07:56  Phos  4.7       20 Jan 2019 06:54  Mg     2.4       20 Jan 2019 06:54    TPro  5.7    /  Alb  3.2    /  TBili  0.7    /  DBili  x      /  AST  14     /  ALT  26     /  AlkPhos  75     19 Jan 2019 07:56    PT/INR - ( 21 Jan 2019 07:04 )   PT: 17.5 sec;   INR: 1.53 ratio      < from: Xray Chest 1 View- PORTABLE-Routine (01.20.19 @ 09:13) >    EXAM:  XR CHEST PORTABLE ROUTINE 1V                          PROCEDURE DATE:  01/20/2019        INTERPRETATION:  AP semierect chest on January 20, 2019 at 8:48 AM.   Patient is short of breath.    Heart size is within normal limits. Reverse right shoulder replacement   again noted.    Small infiltrates off right hilum and left hilum again noted.    Chest is similar to January 19.    IMPRESSION: Unchanged chest as above.    JULEE MOSER M.D., ATTENDING RADIOLOGIST  This document has been electronically signed. Jan 20 2019  1:09PM      < end of copied text >

## 2019-01-21 NOTE — PROGRESS NOTE ADULT - ASSESSMENT
89yo M w/ PMHx HFpEF 55-60% (last echo Oct 2018), moderate aortic stenosis, mild pulmonary HTN, small B cell lymphoma, CKD IV, afib on warfarin, hypothyroidism, prostate ca, postherpetic neuralgia presents with coughing, weakness, increased sputum production for 4 days duration most likely 2/2 viral etiology vs fluid overload vs. devloping PNA.

## 2019-01-22 DIAGNOSIS — D50.8 OTHER IRON DEFICIENCY ANEMIAS: ICD-10-CM

## 2019-01-22 LAB
ANION GAP SERPL CALC-SCNC: 6 MMOL/L — SIGNIFICANT CHANGE UP (ref 5–17)
BUN SERPL-MCNC: 71 MG/DL — HIGH (ref 7–23)
CALCIUM SERPL-MCNC: 8.3 MG/DL — LOW (ref 8.5–10.1)
CHLORIDE SERPL-SCNC: 104 MMOL/L — SIGNIFICANT CHANGE UP (ref 96–108)
CO2 SERPL-SCNC: 35 MMOL/L — HIGH (ref 22–31)
CREAT SERPL-MCNC: 2.4 MG/DL — HIGH (ref 0.5–1.3)
GLUCOSE SERPL-MCNC: 140 MG/DL — HIGH (ref 70–99)
HCT VFR BLD CALC: 27.8 % — LOW (ref 39–50)
HGB BLD-MCNC: 8.6 G/DL — LOW (ref 13–17)
INR BLD: 2.42 RATIO — HIGH (ref 0.88–1.16)
MCHC RBC-ENTMCNC: 30.9 GM/DL — LOW (ref 32–36)
MCHC RBC-ENTMCNC: 32.1 PG — SIGNIFICANT CHANGE UP (ref 27–34)
MCV RBC AUTO: 103.7 FL — HIGH (ref 80–100)
NRBC # BLD: 0 /100 WBCS — SIGNIFICANT CHANGE UP (ref 0–0)
OB PNL STL: NEGATIVE — SIGNIFICANT CHANGE UP
PLATELET # BLD AUTO: 141 K/UL — LOW (ref 150–400)
POTASSIUM SERPL-MCNC: 3.9 MMOL/L — SIGNIFICANT CHANGE UP (ref 3.5–5.3)
POTASSIUM SERPL-SCNC: 3.9 MMOL/L — SIGNIFICANT CHANGE UP (ref 3.5–5.3)
PROTHROM AB SERPL-ACNC: 28.4 SEC — HIGH (ref 10–12.9)
RBC # BLD: 2.68 M/UL — LOW (ref 4.2–5.8)
RBC # FLD: 16.5 % — HIGH (ref 10.3–14.5)
SODIUM SERPL-SCNC: 145 MMOL/L — SIGNIFICANT CHANGE UP (ref 135–145)
WBC # BLD: 5.69 K/UL — SIGNIFICANT CHANGE UP (ref 3.8–10.5)
WBC # FLD AUTO: 5.69 K/UL — SIGNIFICANT CHANGE UP (ref 3.8–10.5)

## 2019-01-22 PROCEDURE — 99233 SBSQ HOSP IP/OBS HIGH 50: CPT

## 2019-01-22 PROCEDURE — 99232 SBSQ HOSP IP/OBS MODERATE 35: CPT

## 2019-01-22 RX ORDER — DOCUSATE SODIUM 100 MG
100 CAPSULE ORAL
Qty: 0 | Refills: 0 | Status: DISCONTINUED | OUTPATIENT
Start: 2019-01-22 | End: 2019-01-25

## 2019-01-22 RX ORDER — WARFARIN SODIUM 2.5 MG/1
3 TABLET ORAL ONCE
Qty: 0 | Refills: 0 | Status: COMPLETED | OUTPATIENT
Start: 2019-01-22 | End: 2019-01-22

## 2019-01-22 RX ORDER — SENNA PLUS 8.6 MG/1
1 TABLET ORAL DAILY
Qty: 0 | Refills: 0 | Status: DISCONTINUED | OUTPATIENT
Start: 2019-01-22 | End: 2019-01-25

## 2019-01-22 RX ORDER — LACTULOSE 10 G/15ML
30 SOLUTION ORAL ONCE
Qty: 0 | Refills: 0 | Status: COMPLETED | OUTPATIENT
Start: 2019-01-22 | End: 2019-01-23

## 2019-01-22 RX ADMIN — Medication 1 TABLET(S): at 11:38

## 2019-01-22 RX ADMIN — Medication 600 MILLIGRAM(S): at 05:05

## 2019-01-22 RX ADMIN — AMIODARONE HYDROCHLORIDE 200 MILLIGRAM(S): 400 TABLET ORAL at 05:05

## 2019-01-22 RX ADMIN — Medication 650 MILLIGRAM(S): at 12:40

## 2019-01-22 RX ADMIN — Medication 100 MILLIGRAM(S): at 17:20

## 2019-01-22 RX ADMIN — Medication 650 MILLIGRAM(S): at 11:40

## 2019-01-22 RX ADMIN — Medication 25 MILLIGRAM(S): at 05:05

## 2019-01-22 RX ADMIN — Medication 1 TABLET(S): at 17:20

## 2019-01-22 RX ADMIN — Medication 20 MILLIGRAM(S): at 05:06

## 2019-01-22 RX ADMIN — GABAPENTIN 200 MILLIGRAM(S): 400 CAPSULE ORAL at 21:44

## 2019-01-22 RX ADMIN — Medication 5 MILLIGRAM(S): at 13:30

## 2019-01-22 RX ADMIN — WARFARIN SODIUM 3 MILLIGRAM(S): 2.5 TABLET ORAL at 21:44

## 2019-01-22 RX ADMIN — SENNA PLUS 1 TABLET(S): 8.6 TABLET ORAL at 13:30

## 2019-01-22 RX ADMIN — Medication 1 DROP(S): at 05:10

## 2019-01-22 RX ADMIN — GABAPENTIN 100 MILLIGRAM(S): 400 CAPSULE ORAL at 17:20

## 2019-01-22 RX ADMIN — TAMSULOSIN HYDROCHLORIDE 0.4 MILLIGRAM(S): 0.4 CAPSULE ORAL at 21:44

## 2019-01-22 RX ADMIN — Medication 1 TABLET(S): at 08:08

## 2019-01-22 RX ADMIN — Medication 100 MILLIGRAM(S): at 21:46

## 2019-01-22 RX ADMIN — ALBUTEROL 2.5 MILLIGRAM(S): 90 AEROSOL, METERED ORAL at 19:13

## 2019-01-22 RX ADMIN — Medication 600 MILLIGRAM(S): at 17:20

## 2019-01-22 RX ADMIN — GABAPENTIN 100 MILLIGRAM(S): 400 CAPSULE ORAL at 05:06

## 2019-01-22 RX ADMIN — Medication 100 MICROGRAM(S): at 05:06

## 2019-01-22 RX ADMIN — Medication 40 MILLIGRAM(S): at 05:05

## 2019-01-22 RX ADMIN — Medication 1 DROP(S): at 17:20

## 2019-01-22 RX ADMIN — ALBUTEROL 2.5 MILLIGRAM(S): 90 AEROSOL, METERED ORAL at 07:43

## 2019-01-22 NOTE — PROGRESS NOTE ADULT - SUBJECTIVE AND OBJECTIVE BOX
CHIEF COMPLAINT: Patient is a 89y old  Male who presents with a chief complaint of SOB (22 Jan 2019 09:11)      HPI:  89yo M w/ PMHx HFpEF 55-60% (last echo Oct 2018), moderate aortic stenosis, mild pulmonary HTN, small B cell lymphoma, CKD IV, afib on warfarin, hypothyroidism, prostate ca, postherpetic neuralgia presents with coughing, weakness, increased sputum production for 4 days duration. Pt was in his usual state of health up until 5 days ago when he began to have a productive cough and sore throat. He took some Mucinex which he reports helped him minimally. He then began to experience worsening SOB, and on the day of admission felt like he could not breath. Pt reports SOB at rest and is not on home O2. He states his SOB worsens with movement and only is able to walk 10 yards before needing to rest due to his SOB. Pt ambulates with a walker. He denies having SOB as bad at this in the past. He does repot lower extremity edema worse on the right, which he states is chronic in nature and has not worsened to his knowledge. He denies any nasal congestion, ear pain, HA, neck pain, recent travel, sick contacts, fevers, chills, N/V, diarrhea, constipation, dark or bloody stool, dysuria, hematuria, orthopnea. Of note pt was recently d/c from [facility] (Dec 30th) where he was treated for PNA/generalized weakness. Pt reports he takes his medications everyday and does not miss doses    In the ED, VSS, SpO2 100% on 3L NC. CBC revealed H/H 8.9/28.9 (baseline around 9 per chart review) otherwise grossly normal. coags revealed subtherapeutic INR at 1.24. CMP revealed Cr of 2.5. (baseline ~2 per chart review). elevated proBNP. CXR: patchy density R lung base and R pleural effusion. EKG: afib w/ R fascicular block at 78 QTc 494.  Received Zosyn (19 Jan 2019 09:48)      Follow Up:  SOD, AS, AF    Interval History: Patient seen and examined, denies CP, dyspnea, palpitations, orthopnea, PND and able to lay flat.    EKG:  < from: 12 Lead ECG (01.19.19 @ 16:03) >  Ventricular Rate 89 BPM    Atrial Rate 100 BPM    QRS Duration 154 ms    Q-T Interval 406 ms    QTC Calculation(Bezet) 493 ms    R Axis 265 degrees    T Axis 102 degrees    Diagnosis Line Atrial fibrillation  Right bundle branch block  Septal infarct (cited on or before 09-APR-2017)  Abnormal ECG    < end of copied text >      REVIEW OF SYSTEMS:    CONSTITUTIONAL: No weakness, fevers or chills  EYES/ENT: No visual changes;  No vertigo or throat pain   NECK: No pain or stiffness  RESPIRATORY: No cough, wheezing, hemoptysis; No shortness of breath  CARDIOVASCULAR: No chest pain or palpitations  GASTROINTESTINAL: No abdominal or epigastric pain. No nausea, vomiting, or hematemesis; No diarrhea or constipation. No melena or hematochezia.  GENITOURINARY: No dysuria, frequency or hematuria  NEUROLOGICAL: No numbness or weakness  SKIN: No itching, rashes      PAST MEDICAL & SURGICAL HISTORY:  CKD (chronic kidney disease)  BPH (benign prostatic hyperplasia)  Postherpetic neuralgia  Chronic diarrhea  Prostate CA  Basal cell carcinoma  Lymphoma  Anxiety  Hypothyroid  Hypertension  A-fib  H/O shoulder replacement  S/P bilateral unicompartmental knee replacement      SOCIAL HISTORY:  No tobacco, ethanol, or drug abuse.    FAMILY HISTORY:  Family history of ischemic heart disease (Father)    No family history of acute MI or sudden cardiac death.    MEDICATIONS  (STANDING):  ALBUTerol    0.083% 2.5 milliGRAM(s) Nebulizer every 12 hours  amiodarone    Tablet 200 milliGRAM(s) Oral daily  artificial  tears Solution 1 Drop(s) Both EYES two times a day  furosemide    Tablet 40 milliGRAM(s) Oral daily  gabapentin 100 milliGRAM(s) Oral two times a day  gabapentin 200 milliGRAM(s) Oral at bedtime  guaiFENesin  milliGRAM(s) Oral every 12 hours  lactobacillus acidophilus 1 Tablet(s) Oral three times a day with meals  levothyroxine 100 MICROGram(s) Oral daily  metoprolol succinate ER 25 milliGRAM(s) Oral daily  predniSONE   Tablet 20 milliGRAM(s) Oral daily  sodium chloride 0.65% Nasal 1 Spray(s) Both Nostrils three times a day  tamsulosin 0.4 milliGRAM(s) Oral at bedtime  warfarin 3 milliGRAM(s) Oral once    MEDICATIONS  (PRN):  acetaminophen   Tablet .. 650 milliGRAM(s) Oral every 6 hours PRN Temp greater or equal to 38C (100.4F), Mild Pain (1 - 3)  ALBUTerol    0.083% 2.5 milliGRAM(s) Nebulizer every 6 hours PRN Shortness of Breath and/or Wheezing  ALPRAZolam 0.5 milliGRAM(s) Oral at bedtime PRN insomnia  benzonatate 100 milliGRAM(s) Oral every 8 hours PRN Cough      Allergies    No Known Allergies    Intolerances        Home meds:  Home Medications:  albuterol 2.5 mg/3 mL (0.083%) inhalation solution: 3 milliliter(s) inhaled every 6 hours (19 Jan 2019 10:11)  alfuzosin 10 mg oral tablet, extended release: 1 tab(s) orally once a day (19 Jan 2019 10:11)  ALPRAZolam 0.25 mg oral tablet: 1 tab(s) orally once a day (19 Jan 2019 10:11)  ALPRAZolam 0.5 mg oral tablet: orally once a day (at bedtime) (19 Jan 2019 10:11)  furosemide 40 mg oral tablet: 1 tab(s) orally once a day (19 Jan 2019 10:11)  lactobacillus acidophilus oral capsule: 1 cap(s) orally once a day (19 Jan 2019 10:11)  levothyroxine 100 mcg (0.1 mg) oral tablet: 1 tab(s) orally once a day (19 Jan 2019 10:11)  Lotemax 0.5% ophthalmic ointment: 1 application in each eye 2 times a day (19 Jan 2019 10:11)  Metoprolol Tartrate 25 mg oral tablet: 1 tab(s) orally once a day (19 Jan 2019 10:11)  Pacerone 200 mg oral tablet: 1 tab(s) orally once a day (19 Jan 2019 10:11)  potassium chloride 20 mEq oral tablet, extended release: 1 tab(s) orally once a day (19 Jan 2019 10:11)  Refresh ophthalmic solution: 1 drop(s) in each eye 2 times a day (19 Jan 2019 10:11)  warfarin 5 mg oral tablet: 1 tab(s) orally once a day (19 Jan 2019 10:11)        VITAL SIGNS:   Vital Signs Last 24 Hrs  T(C): 36.3 (22 Jan 2019 07:32), Max: 36.7 (21 Jan 2019 19:55)  T(F): 97.3 (22 Jan 2019 07:32), Max: 98.1 (21 Jan 2019 19:55)  HR: 62 (22 Jan 2019 07:43) (62 - 96)  BP: 110/67 (22 Jan 2019 07:32) (108/70 - 130/72)  BP(mean): --  RR: 18 (22 Jan 2019 07:32) (18 - 18)  SpO2: 100% (22 Jan 2019 07:43) (89% - 100%)    I&O's Summary    21 Jan 2019 07:01  -  22 Jan 2019 07:00  --------------------------------------------------------  IN: 0 mL / OUT: 200 mL / NET: -200 mL    22 Jan 2019 07:01  -  22 Jan 2019 10:28  --------------------------------------------------------  IN: 0 mL / OUT: 250 mL / NET: -250 mL        On Exam:  TELE: AFib   Constitutional: NAD, awake and alert, well-developed  HEENT: Moist Mucous Membranes, Anicteric  Pulmonary: Non-labored, coarse breath sounds bilaterally, No wheezing, rales or rhonchi  Cardiovascular: Regular, S1 and S2,  2/6 systolic murmur, rubs, gallops or clicks  Gastrointestinal: Bowel Sounds present, soft, nontender.   Lymph: No peripheral edema. No lymphadenopathy.  Skin: No visible rashes or ulcers.  Psych:  Mood & affect appropriate    LABS: All Labs Reviewed:                        8.6    5.69  )-----------( 141      ( 22 Jan 2019 06:34 )             27.8                         9.6    6.01  )-----------( 161      ( 21 Jan 2019 07:04 )             31.4                         9.1    3.79  )-----------( 133      ( 20 Jan 2019 06:54 )             29.5     22 Jan 2019 06:34    145    |  104    |  71     ----------------------------<  140    3.9     |  35     |  2.40   21 Jan 2019 07:04    147    |  105    |  56     ----------------------------<  121    4.1     |  34     |  2.40   20 Jan 2019 06:54    148    |  107    |  44     ----------------------------<  139    4.1     |  34     |  2.50     Ca    8.3        22 Jan 2019 06:34  Ca    8.6        21 Jan 2019 07:04  Ca    8.6        20 Jan 2019 06:54  Phos  4.7       20 Jan 2019 06:54  Mg     2.4       20 Jan 2019 06:54      PT/INR - ( 22 Jan 2019 06:34 )   PT: 28.4 sec;   INR: 2.42 ratio           Blood Culture: Organism --  Gram Stain Blood -- Gram Stain --  Specimen Source .Blood Blood-Peripheral  Culture-Blood --        01-20 @ 06:54  TSH: 1.77      RADIOLOGY:  < from: Transthoracic Echocardiogram (10.24.18 @ 09:06) >   Impression     Summary     Fibrocalcific changes noted to the mitral valve leaflets with preserved   leaflet excursion.   Mild mitral annular calcification is present.   Moderate (2+) mitral regurgitation is present.   Significant fibrocalcific changes noted to the aortic valve leaflets with   restriction in leaflet excursion. Peak transaortic gradient is 48 mmHg;   this finding is consistent with moderate aortic stenosis.   DI = .2   Continuity equation AGUSTIN is .59 cm which likely overestimates the degree   of   aortic stenosis   Mild to Moderate Tricuspid regurgitation is present. Mild pulmonary   hypertension.   Mild concentric left ventricular hypertrophy is present.   Estimated left ventricular ejection fraction is 55-60 %.   The right atrium appears moderately dilated.   Normal appearing right ventricle structure andfunction.    < end of copied text >    < from: Xray Chest 1 View- PORTABLE-Routine (01.20.19 @ 09:13) >  INTERPRETATION:  AP semierect chest on January 20, 2019 at 8:48 AM.   Patient is short of breath.    Heart size is within normal limits. Reverse right shoulder replacement   again noted.    Small infiltrates off right hilum and left hilum again noted.    Chest is similar to January 19. CHIEF COMPLAINT: Patient is a 89y old  Male who presents with a chief complaint of SOB (22 Jan 2019 09:11)      HPI:  89yo M w/ PMHx HFpEF 55-60% (last echo Oct 2018), moderate aortic stenosis, mild pulmonary HTN, small B cell lymphoma, CKD IV, afib on warfarin, hypothyroidism, prostate ca, postherpetic neuralgia presents with coughing, weakness, increased sputum production for 4 days duration. Pt was in his usual state of health up until 5 days ago when he began to have a productive cough and sore throat. He took some Mucinex which he reports helped him minimally. He then began to experience worsening SOB, and on the day of admission felt like he could not breath. Pt reports SOB at rest and is not on home O2. He states his SOB worsens with movement and only is able to walk 10 yards before needing to rest due to his SOB. Pt ambulates with a walker. He denies having SOB as bad at this in the past. He does repot lower extremity edema worse on the right, which he states is chronic in nature and has not worsened to his knowledge. He denies any nasal congestion, ear pain, HA, neck pain, recent travel, sick contacts, fevers, chills, N/V, diarrhea, constipation, dark or bloody stool, dysuria, hematuria, orthopnea. Of note pt was recently d/c from [facility] (Dec 30th) where he was treated for PNA/generalized weakness. Pt reports he takes his medications everyday and does not miss doses    In the ED, VSS, SpO2 100% on 3L NC. CBC revealed H/H 8.9/28.9 (baseline around 9 per chart review) otherwise grossly normal. coags revealed subtherapeutic INR at 1.24. CMP revealed Cr of 2.5. (baseline ~2 per chart review). elevated proBNP. CXR: patchy density R lung base and R pleural effusion. EKG: afib w/ R fascicular block at 78 QTc 494.  Received Zosyn (19 Jan 2019 09:48)      Follow Up:  CHF exac, AS, AF, SOB    Interval History: Patient seen and examined, denies CP, dyspnea, palpitations, orthopnea, PND and able to lay flat.    EKG:  < from: 12 Lead ECG (01.19.19 @ 16:03) >  Ventricular Rate 89 BPM    Atrial Rate 100 BPM    QRS Duration 154 ms    Q-T Interval 406 ms    QTC Calculation(Bezet) 493 ms    R Axis 265 degrees    T Axis 102 degrees    Diagnosis Line Atrial fibrillation  Right bundle branch block  Septal infarct (cited on or before 09-APR-2017)  Abnormal ECG    < end of copied text >      REVIEW OF SYSTEMS:    CONSTITUTIONAL: No weakness, fevers or chills  EYES/ENT: No visual changes;  No vertigo or throat pain   NECK: No pain or stiffness  RESPIRATORY: No cough, wheezing, hemoptysis; No shortness of breath  CARDIOVASCULAR: No chest pain or palpitations  GASTROINTESTINAL: No abdominal or epigastric pain. No nausea, vomiting, or hematemesis; No diarrhea or constipation. No melena or hematochezia.  GENITOURINARY: No dysuria, frequency or hematuria  NEUROLOGICAL: No numbness or weakness  SKIN: No itching, rashes      PAST MEDICAL & SURGICAL HISTORY:  CKD (chronic kidney disease)  BPH (benign prostatic hyperplasia)  Postherpetic neuralgia  Chronic diarrhea  Prostate CA  Basal cell carcinoma  Lymphoma  Anxiety  Hypothyroid  Hypertension  A-fib  H/O shoulder replacement  S/P bilateral unicompartmental knee replacement      SOCIAL HISTORY:  No tobacco, ethanol, or drug abuse.    FAMILY HISTORY:  Family history of ischemic heart disease (Father)    No family history of acute MI or sudden cardiac death.    MEDICATIONS  (STANDING):  ALBUTerol    0.083% 2.5 milliGRAM(s) Nebulizer every 12 hours  amiodarone    Tablet 200 milliGRAM(s) Oral daily  artificial  tears Solution 1 Drop(s) Both EYES two times a day  furosemide    Tablet 40 milliGRAM(s) Oral daily  gabapentin 100 milliGRAM(s) Oral two times a day  gabapentin 200 milliGRAM(s) Oral at bedtime  guaiFENesin  milliGRAM(s) Oral every 12 hours  lactobacillus acidophilus 1 Tablet(s) Oral three times a day with meals  levothyroxine 100 MICROGram(s) Oral daily  metoprolol succinate ER 25 milliGRAM(s) Oral daily  predniSONE   Tablet 20 milliGRAM(s) Oral daily  sodium chloride 0.65% Nasal 1 Spray(s) Both Nostrils three times a day  tamsulosin 0.4 milliGRAM(s) Oral at bedtime  warfarin 3 milliGRAM(s) Oral once    MEDICATIONS  (PRN):  acetaminophen   Tablet .. 650 milliGRAM(s) Oral every 6 hours PRN Temp greater or equal to 38C (100.4F), Mild Pain (1 - 3)  ALBUTerol    0.083% 2.5 milliGRAM(s) Nebulizer every 6 hours PRN Shortness of Breath and/or Wheezing  ALPRAZolam 0.5 milliGRAM(s) Oral at bedtime PRN insomnia  benzonatate 100 milliGRAM(s) Oral every 8 hours PRN Cough      Allergies    No Known Allergies    Intolerances        Home meds:  Home Medications:  albuterol 2.5 mg/3 mL (0.083%) inhalation solution: 3 milliliter(s) inhaled every 6 hours (19 Jan 2019 10:11)  alfuzosin 10 mg oral tablet, extended release: 1 tab(s) orally once a day (19 Jan 2019 10:11)  ALPRAZolam 0.25 mg oral tablet: 1 tab(s) orally once a day (19 Jan 2019 10:11)  ALPRAZolam 0.5 mg oral tablet: orally once a day (at bedtime) (19 Jan 2019 10:11)  furosemide 40 mg oral tablet: 1 tab(s) orally once a day (19 Jan 2019 10:11)  lactobacillus acidophilus oral capsule: 1 cap(s) orally once a day (19 Jan 2019 10:11)  levothyroxine 100 mcg (0.1 mg) oral tablet: 1 tab(s) orally once a day (19 Jan 2019 10:11)  Lotemax 0.5% ophthalmic ointment: 1 application in each eye 2 times a day (19 Jan 2019 10:11)  Metoprolol Tartrate 25 mg oral tablet: 1 tab(s) orally once a day (19 Jan 2019 10:11)  Pacerone 200 mg oral tablet: 1 tab(s) orally once a day (19 Jan 2019 10:11)  potassium chloride 20 mEq oral tablet, extended release: 1 tab(s) orally once a day (19 Jan 2019 10:11)  Refresh ophthalmic solution: 1 drop(s) in each eye 2 times a day (19 Jan 2019 10:11)  warfarin 5 mg oral tablet: 1 tab(s) orally once a day (19 Jan 2019 10:11)        VITAL SIGNS:   Vital Signs Last 24 Hrs  T(C): 36.3 (22 Jan 2019 07:32), Max: 36.7 (21 Jan 2019 19:55)  T(F): 97.3 (22 Jan 2019 07:32), Max: 98.1 (21 Jan 2019 19:55)  HR: 62 (22 Jan 2019 07:43) (62 - 96)  BP: 110/67 (22 Jan 2019 07:32) (108/70 - 130/72)  BP(mean): --  RR: 18 (22 Jan 2019 07:32) (18 - 18)  SpO2: 100% (22 Jan 2019 07:43) (89% - 100%)    I&O's Summary    21 Jan 2019 07:01  -  22 Jan 2019 07:00  --------------------------------------------------------  IN: 0 mL / OUT: 200 mL / NET: -200 mL    22 Jan 2019 07:01  -  22 Jan 2019 10:28  --------------------------------------------------------  IN: 0 mL / OUT: 250 mL / NET: -250 mL        On Exam:  TELE: AFib   Constitutional: NAD, awake and alert, well-developed  HEENT: Moist Mucous Membranes, Anicteric  Pulmonary: Non-labored, coarse breath sounds bilaterally, No wheezing, rales or rhonchi  Cardiovascular: Regular, S1 and S2,  2/6 systolic murmur, rubs, gallops or clicks  Gastrointestinal: Bowel Sounds present, soft, nontender.   Lymph: No peripheral edema. No lymphadenopathy.  Skin: No visible rashes or ulcers.  Psych:  Mood & affect appropriate    LABS: All Labs Reviewed:                        8.6    5.69  )-----------( 141      ( 22 Jan 2019 06:34 )             27.8                         9.6    6.01  )-----------( 161      ( 21 Jan 2019 07:04 )             31.4                         9.1    3.79  )-----------( 133      ( 20 Jan 2019 06:54 )             29.5     22 Jan 2019 06:34    145    |  104    |  71     ----------------------------<  140    3.9     |  35     |  2.40   21 Jan 2019 07:04    147    |  105    |  56     ----------------------------<  121    4.1     |  34     |  2.40   20 Jan 2019 06:54    148    |  107    |  44     ----------------------------<  139    4.1     |  34     |  2.50     Ca    8.3        22 Jan 2019 06:34  Ca    8.6        21 Jan 2019 07:04  Ca    8.6        20 Jan 2019 06:54  Phos  4.7       20 Jan 2019 06:54  Mg     2.4       20 Jan 2019 06:54      PT/INR - ( 22 Jan 2019 06:34 )   PT: 28.4 sec;   INR: 2.42 ratio           Blood Culture: Organism --  Gram Stain Blood -- Gram Stain --  Specimen Source .Blood Blood-Peripheral  Culture-Blood --        01-20 @ 06:54  TSH: 1.77      RADIOLOGY:  < from: Transthoracic Echocardiogram (10.24.18 @ 09:06) >   Impression     Summary     Fibrocalcific changes noted to the mitral valve leaflets with preserved   leaflet excursion.   Mild mitral annular calcification is present.   Moderate (2+) mitral regurgitation is present.   Significant fibrocalcific changes noted to the aortic valve leaflets with   restriction in leaflet excursion. Peak transaortic gradient is 48 mmHg;   this finding is consistent with moderate aortic stenosis.   DI = .2   Continuity equation AGUSTIN is .59 cm which likely overestimates the degree   of   aortic stenosis   Mild to Moderate Tricuspid regurgitation is present. Mild pulmonary   hypertension.   Mild concentric left ventricular hypertrophy is present.   Estimated left ventricular ejection fraction is 55-60 %.   The right atrium appears moderately dilated.   Normal appearing right ventricle structure andfunction.    < end of copied text >    < from: Xray Chest 1 View- PORTABLE-Routine (01.20.19 @ 09:13) >  INTERPRETATION:  AP semierect chest on January 20, 2019 at 8:48 AM.   Patient is short of breath.    Heart size is within normal limits. Reverse right shoulder replacement   again noted.    Small infiltrates off right hilum and left hilum again noted.    Chest is similar to January 19. CHIEF COMPLAINT: Patient is a 89y old  Male who presents with a chief complaint of SOB (22 Jan 2019 09:11)      HPI:  89yo M w/ PMHx HFpEF 55-60% (last echo Oct 2018), moderate aortic stenosis, mild pulmonary HTN, small B cell lymphoma, CKD IV, afib on warfarin, hypothyroidism, prostate ca, postherpetic neuralgia presents with coughing, weakness, increased sputum production for 4 days duration. Pt was in his usual state of health up until 5 days ago when he began to have a productive cough and sore throat. He took some Mucinex which he reports helped him minimally. He then began to experience worsening SOB, and on the day of admission felt like he could not breath. Pt reports SOB at rest and is not on home O2. He states his SOB worsens with movement and only is able to walk 10 yards before needing to rest due to his SOB. Pt ambulates with a walker. He denies having SOB as bad at this in the past. He does repot lower extremity edema worse on the right, which he states is chronic in nature and has not worsened to his knowledge. He denies any nasal congestion, ear pain, HA, neck pain, recent travel, sick contacts, fevers, chills, N/V, diarrhea, constipation, dark or bloody stool, dysuria, hematuria, orthopnea. Of note pt was recently d/c from [facility] (Dec 30th) where he was treated for PNA/generalized weakness. Pt reports he takes his medications everyday and does not miss doses    In the ED, VSS, SpO2 100% on 3L NC. CBC revealed H/H 8.9/28.9 (baseline around 9 per chart review) otherwise grossly normal. coags revealed subtherapeutic INR at 1.24. CMP revealed Cr of 2.5. (baseline ~2 per chart review). elevated proBNP. CXR: patchy density R lung base and R pleural effusion. EKG: afib w/ R fascicular block at 78 QTc 494.  Received Zosyn (19 Jan 2019 09:48)      Follow Up:  CHF exac, AS, AF, SOB    Interval History: Patient seen and examined, denies CP, dyspnea, palpitations, orthopnea, PND and able to lay flat.    EKG:  < from: 12 Lead ECG (01.19.19 @ 16:03) >  Ventricular Rate 89 BPM    Atrial Rate 100 BPM    QRS Duration 154 ms    Q-T Interval 406 ms    QTC Calculation(Bezet) 493 ms    R Axis 265 degrees    T Axis 102 degrees    Diagnosis Line Atrial fibrillation  Right bundle branch block  Septal infarct (cited on or before 09-APR-2017)  Abnormal ECG    < end of copied text >      REVIEW OF SYSTEMS:    CONSTITUTIONAL: No weakness, fevers or chills  EYES/ENT: No visual changes;  No vertigo or throat pain   NECK: No pain or stiffness  RESPIRATORY: No cough, wheezing, hemoptysis; No shortness of breath  CARDIOVASCULAR: No chest pain or palpitations  GASTROINTESTINAL: No abdominal or epigastric pain. No nausea, vomiting, or hematemesis; No diarrhea or constipation. No melena or hematochezia.  GENITOURINARY: No dysuria, frequency or hematuria  NEUROLOGICAL: No numbness or weakness  SKIN: No itching, rashes      PAST MEDICAL & SURGICAL HISTORY:  CKD (chronic kidney disease)  BPH (benign prostatic hyperplasia)  Postherpetic neuralgia  Chronic diarrhea  Prostate CA  Basal cell carcinoma  Lymphoma  Anxiety  Hypothyroid  Hypertension  A-fib  H/O shoulder replacement  S/P bilateral unicompartmental knee replacement      SOCIAL HISTORY:  No tobacco, ethanol, or drug abuse.    FAMILY HISTORY:  Family history of ischemic heart disease (Father)    No family history of acute MI or sudden cardiac death.    MEDICATIONS  (STANDING):  ALBUTerol    0.083% 2.5 milliGRAM(s) Nebulizer every 12 hours  amiodarone    Tablet 200 milliGRAM(s) Oral daily  artificial  tears Solution 1 Drop(s) Both EYES two times a day  furosemide    Tablet 40 milliGRAM(s) Oral daily  gabapentin 100 milliGRAM(s) Oral two times a day  gabapentin 200 milliGRAM(s) Oral at bedtime  guaiFENesin  milliGRAM(s) Oral every 12 hours  lactobacillus acidophilus 1 Tablet(s) Oral three times a day with meals  levothyroxine 100 MICROGram(s) Oral daily  metoprolol succinate ER 25 milliGRAM(s) Oral daily  predniSONE   Tablet 20 milliGRAM(s) Oral daily  sodium chloride 0.65% Nasal 1 Spray(s) Both Nostrils three times a day  tamsulosin 0.4 milliGRAM(s) Oral at bedtime  warfarin 3 milliGRAM(s) Oral once    MEDICATIONS  (PRN):  acetaminophen   Tablet .. 650 milliGRAM(s) Oral every 6 hours PRN Temp greater or equal to 38C (100.4F), Mild Pain (1 - 3)  ALBUTerol    0.083% 2.5 milliGRAM(s) Nebulizer every 6 hours PRN Shortness of Breath and/or Wheezing  ALPRAZolam 0.5 milliGRAM(s) Oral at bedtime PRN insomnia  benzonatate 100 milliGRAM(s) Oral every 8 hours PRN Cough      Allergies    No Known Allergies    Intolerances        Home meds:  Home Medications:  albuterol 2.5 mg/3 mL (0.083%) inhalation solution: 3 milliliter(s) inhaled every 6 hours (19 Jan 2019 10:11)  alfuzosin 10 mg oral tablet, extended release: 1 tab(s) orally once a day (19 Jan 2019 10:11)  ALPRAZolam 0.25 mg oral tablet: 1 tab(s) orally once a day (19 Jan 2019 10:11)  ALPRAZolam 0.5 mg oral tablet: orally once a day (at bedtime) (19 Jan 2019 10:11)  furosemide 40 mg oral tablet: 1 tab(s) orally once a day (19 Jan 2019 10:11)  lactobacillus acidophilus oral capsule: 1 cap(s) orally once a day (19 Jan 2019 10:11)  levothyroxine 100 mcg (0.1 mg) oral tablet: 1 tab(s) orally once a day (19 Jan 2019 10:11)  Lotemax 0.5% ophthalmic ointment: 1 application in each eye 2 times a day (19 Jan 2019 10:11)  Metoprolol Tartrate 25 mg oral tablet: 1 tab(s) orally once a day (19 Jan 2019 10:11)  Pacerone 200 mg oral tablet: 1 tab(s) orally once a day (19 Jan 2019 10:11)  potassium chloride 20 mEq oral tablet, extended release: 1 tab(s) orally once a day (19 Jan 2019 10:11)  Refresh ophthalmic solution: 1 drop(s) in each eye 2 times a day (19 Jan 2019 10:11)  warfarin 5 mg oral tablet: 1 tab(s) orally once a day (19 Jan 2019 10:11)        VITAL SIGNS:   Vital Signs Last 24 Hrs  T(C): 36.3 (22 Jan 2019 07:32), Max: 36.7 (21 Jan 2019 19:55)  T(F): 97.3 (22 Jan 2019 07:32), Max: 98.1 (21 Jan 2019 19:55)  HR: 62 (22 Jan 2019 07:43) (62 - 96)  BP: 110/67 (22 Jan 2019 07:32) (108/70 - 130/72)  BP(mean): --  RR: 18 (22 Jan 2019 07:32) (18 - 18)  SpO2: 100% (22 Jan 2019 07:43) (89% - 100%)    I&O's Summary    21 Jan 2019 07:01  -  22 Jan 2019 07:00  --------------------------------------------------------  IN: 0 mL / OUT: 200 mL / NET: -200 mL    22 Jan 2019 07:01  -  22 Jan 2019 10:28  --------------------------------------------------------  IN: 0 mL / OUT: 250 mL / NET: -250 mL        On Exam:  TELE: AFib   Constitutional: NAD, awake   HEENT: Moist Mucous Membranes, Anicteric  Pulmonary: Decreased breath sounds b/l. mild wheeze  Cardiovascular: IRRR, S1 and S2,  2/6 systolic murmur, rubs, gallops or clicks  Gastrointestinal: Bowel Sounds present, soft, nontender.   Lymph: No peripheral edema. No lymphadenopathy.  Skin: No visible rashes or ulcers.  Psych:  Mood & affect appropriate    LABS: All Labs Reviewed:                        8.6    5.69  )-----------( 141      ( 22 Jan 2019 06:34 )             27.8                         9.6    6.01  )-----------( 161      ( 21 Jan 2019 07:04 )             31.4                         9.1    3.79  )-----------( 133      ( 20 Jan 2019 06:54 )             29.5     22 Jan 2019 06:34    145    |  104    |  71     ----------------------------<  140    3.9     |  35     |  2.40   21 Jan 2019 07:04    147    |  105    |  56     ----------------------------<  121    4.1     |  34     |  2.40   20 Jan 2019 06:54    148    |  107    |  44     ----------------------------<  139    4.1     |  34     |  2.50     Ca    8.3        22 Jan 2019 06:34  Ca    8.6        21 Jan 2019 07:04  Ca    8.6        20 Jan 2019 06:54  Phos  4.7       20 Jan 2019 06:54  Mg     2.4       20 Jan 2019 06:54      PT/INR - ( 22 Jan 2019 06:34 )   PT: 28.4 sec;   INR: 2.42 ratio           Blood Culture: Organism --  Gram Stain Blood -- Gram Stain --  Specimen Source .Blood Blood-Peripheral  Culture-Blood --        01-20 @ 06:54  TSH: 1.77      RADIOLOGY:  < from: Transthoracic Echocardiogram (10.24.18 @ 09:06) >   Impression     Summary     Fibrocalcific changes noted to the mitral valve leaflets with preserved   leaflet excursion.   Mild mitral annular calcification is present.   Moderate (2+) mitral regurgitation is present.   Significant fibrocalcific changes noted to the aortic valve leaflets with   restriction in leaflet excursion. Peak transaortic gradient is 48 mmHg;   this finding is consistent with moderate aortic stenosis.   DI = .2   Continuity equation AGSUTIN is .59 cm which likely overestimates the degree   of   aortic stenosis   Mild to Moderate Tricuspid regurgitation is present. Mild pulmonary   hypertension.   Mild concentric left ventricular hypertrophy is present.   Estimated left ventricular ejection fraction is 55-60 %.   The right atrium appears moderately dilated.   Normal appearing right ventricle structure andfunction.    < end of copied text >    < from: Xray Chest 1 View- PORTABLE-Routine (01.20.19 @ 09:13) >  INTERPRETATION:  AP semierect chest on January 20, 2019 at 8:48 AM.   Patient is short of breath.    Heart size is within normal limits. Reverse right shoulder replacement   again noted.    Small infiltrates off right hilum and left hilum again noted.    Chest is similar to January 19.

## 2019-01-22 NOTE — PROGRESS NOTE ADULT - PROBLEM SELECTOR PLAN 1
sob  grant  on o2 support  multiple medical issues - acute and chronic  HFpEF and Pulm HTN and Valv heart disease  CKD  COPD element possibly   NEBS / will taper steroids this am  LASIX - I and O - serial labs, serial PE, monitor BP  CKD - monitor labs, on LASIX  Cardio following -   pt is DNR DNI - GOC documented

## 2019-01-22 NOTE — PROGRESS NOTE ADULT - SUBJECTIVE AND OBJECTIVE BOX
LIZBETH MADDOX  89y  Male      Patient is a 89y old  Male who presents with a chief complaint of SOB (22 Jan 2019 10:28)    89yo M w/ PMHx HFpEF 55-60% (last echo Oct 2018), moderate aortic stenosis, mild pulmonary HTN, small B cell lymphoma, CKD IV, Afib on warfarin, hypothyroidism, prostate ca, postherpetic neuralgia presents with coughing, weakness, increased sputum production for 4 days duration most likely 2/2 viral etiology vs fluid overload vs. devloping PNA.    INTERVAL HPI/OVERNIGHT EVENTS: Patient was seen and examined at bedside. Patient states his shortness of breath has improved since admission. Patient states productive cough is still present. Patient has remained afebrile. Patient is eating and drinking well. Patient reports he is having constipation but denies any blood in stool. Denies HA, lightheadedness, N/V, diarrhea, hematochezia, hematemesis, abdominal pain, numbness and tingling in extremities.         REVIEW OF SYSTEMS:  CONSTITUTIONAL: No fever, weight loss, or fatigue  EYES: No eye pain, visual disturbances, or discharge  ENMT:  No difficulty hearing, tinnitus, vertigo; No sinus or throat pain  RESPIRATORY: Shortness of breath, productive cough, denies wheezing, chills or hemoptysis.  CARDIOVASCULAR: No chest pain, palpitations, dizziness, or leg swelling  GASTROINTESTINAL: Reports constipation. No abdominal or epigastric pain. No nausea, vomiting, or hematemesis; No diarrhea, melena or hematochezia.  GENITOURINARY: No dysuria, frequency, hematuria, or incontinence  NEUROLOGICAL: No headaches, memory loss, loss of strength, numbness, or tremors  SKIN: No itching, burning, rashes, or lesions   MUSCULOSKELETAL: No joint pain or swelling; No muscle, back, or extremity pain  HEME/LYMPH: No easy bruising, or bleeding gums    T(C): 36.3 (01-22-19 @ 07:32), Max: 36.7 (01-21-19 @ 19:55)  HR: 62 (01-22-19 @ 07:43) (62 - 96)  BP: 110/67 (01-22-19 @ 07:32) (108/70 - 130/72)  RR: 18 (01-22-19 @ 07:32) (18 - 18)  SpO2: 100% (01-22-19 @ 07:43) (89% - 100%)  Wt(kg): --Vital Signs Last 24 Hrs  T(C): 36.3 (22 Jan 2019 07:32), Max: 36.7 (21 Jan 2019 19:55)  T(F): 97.3 (22 Jan 2019 07:32), Max: 98.1 (21 Jan 2019 19:55)  HR: 62 (22 Jan 2019 07:43) (62 - 96)  BP: 110/67 (22 Jan 2019 07:32) (108/70 - 130/72)  BP(mean): --  RR: 18 (22 Jan 2019 07:32) (18 - 18)  SpO2: 100% (22 Jan 2019 07:43) (89% - 100%)    PHYSICAL EXAM:  GENERAL: NAD, well-groomed, well-developed  HEAD:  Atraumatic, Normocephalic  NERVOUS SYSTEM:  Alert & Oriented X3, Good concentration  CHEST/LUNG: Clear to auscultation bilaterally; No rales, rhonchi, wheezing, or rubs  HEART: Regular rate and rhythm; No murmurs, rubs, or gallops  ABDOMEN: Soft, Nontender, Nondistended; Bowel sounds present  EXTREMITIES:  2+ Peripheral Pulses, No clubbing, cyanosis, or edema  LYMPH: No lymphadenopathy noted  SKIN: No rashes or lesions    Consultant(s) Notes Reviewed:  [x ] YES  [ ] NO  Care Discussed with Consultants/Other Providers [ x] YES  [ ] NO    LABS:                        8.6    5.69  )-----------( 141      ( 22 Jan 2019 06:34 )             27.8     01-22    145  |  104  |  71<H>  ----------------------------<  140<H>  3.9   |  35<H>  |  2.40<H>    Ca    8.3<L>      22 Jan 2019 06:34      PT/INR - ( 22 Jan 2019 06:34 )   PT: 28.4 sec;   INR: 2.42 ratio             CAPILLARY BLOOD GLUCOSE                RADIOLOGY & ADDITIONAL TESTS:  CXR: reports no change compared to CXR from 1/19    Venous doppler LE:   No evidence of bilateral lower extremity deep venous thrombosis.    Multiple groin lymph nodes, which were present on prior study.      Imaging Personally Reviewed:  [ ] YES  [ ] NO    HEALTH ISSUES - PROBLEM Dx:  BPH (benign prostatic hyperplasia): BPH (benign prostatic hyperplasia)  Need for prophylactic measure: Need for prophylactic measure  Postherpetic neuralgia: Postherpetic neuralgia  CHF (congestive heart failure): CHF (congestive heart failure)  Hypothyroid: Hypothyroid  Hypertension: Hypertension  A-fib: A-fib  CKD (chronic kidney disease) stage 4, GFR 15-29 ml/min: CKD (chronic kidney disease) stage 4, GFR 15-29 ml/min  Anemia: Anemia  SOB (shortness of breath): SOB (shortness of breath)  Pneumonia of right lower lobe due to infectious organism: Pneumonia of right lower lobe due to infectious organism  CKD (chronic kidney disease): CKD (chronic kidney disease)  Frail elderly: Frail elderly  Lower resp. tract infection: Lower resp. tract infection  Lymphoma: Lymphoma  Valvular heart disease: Valvular heart disease  Pulmonary congestion: Pulmonary congestion LIZBETH MADDOX  89y  Male      Patient is a 89y old  Male who presents with a chief complaint of SOB (22 Jan 2019 10:28)    89yo M w/ PMHx HFpEF 55-60% (last echo Oct 2018), moderate aortic stenosis, mild pulmonary HTN, small B cell lymphoma, CKD IV, Afib on warfarin, hypothyroidism, prostate ca, postherpetic neuralgia presents with coughing, weakness, increased sputum production for 4 days duration most likely 2/2 viral etiology vs fluid overload vs. devloping PNA.    INTERVAL HPI/OVERNIGHT EVENTS: Patient was seen and examined at bedside. Patient states his shortness of breath has improved since admission. Patient states productive cough is still present. Patient has remained afebrile. Patient is eating and drinking well. Patient reports he is having constipation but denies any blood in stool. Denies HA, lightheadedness, N/V, diarrhea, hematochezia, hematemesis, abdominal pain, numbness and tingling in extremities.         REVIEW OF SYSTEMS:  CONSTITUTIONAL: No fever, weight loss, or fatigue  EYES: No eye pain, visual disturbances, or discharge  ENMT:  No difficulty hearing, tinnitus, vertigo; No sinus or throat pain  RESPIRATORY: Shortness of breath, productive cough, denies wheezing, chills or hemoptysis.  CARDIOVASCULAR: No chest pain, palpitations, dizziness, or leg swelling  GASTROINTESTINAL: Reports constipation. No abdominal or epigastric pain. No nausea, vomiting, or hematemesis; No diarrhea, melena or hematochezia.  GENITOURINARY: No dysuria, frequency, hematuria, or incontinence  NEUROLOGICAL: No headaches, memory loss, loss of strength, numbness, or tremors  SKIN: No itching, burning, rashes, or lesions   MUSCULOSKELETAL: No joint pain or swelling; No muscle, back, or extremity pain  HEME/LYMPH: No easy bruising, or bleeding gums    T(C): 36.3 (01-22-19 @ 07:32), Max: 36.7 (01-21-19 @ 19:55)  HR: 62 (01-22-19 @ 07:43) (62 - 96)  BP: 110/67 (01-22-19 @ 07:32) (108/70 - 130/72)  RR: 18 (01-22-19 @ 07:32) (18 - 18)  SpO2: 100% (01-22-19 @ 07:43) (89% - 100%)  Wt(kg): --Vital Signs Last 24 Hrs  T(C): 36.3 (22 Jan 2019 07:32), Max: 36.7 (21 Jan 2019 19:55)  T(F): 97.3 (22 Jan 2019 07:32), Max: 98.1 (21 Jan 2019 19:55)  HR: 62 (22 Jan 2019 07:43) (62 - 96)  BP: 110/67 (22 Jan 2019 07:32) (108/70 - 130/72)  BP(mean): --  RR: 18 (22 Jan 2019 07:32) (18 - 18)  SpO2: 100% (22 Jan 2019 07:43) (89% - 100%)    PHYSICAL EXAM:  GENERAL: NAD, well-groomed, well-developed  HEAD:  Atraumatic, Normocephalic  NERVOUS SYSTEM:  Alert & Oriented X3, Good concentration, CNs 2-12 grossly intact, Muscle strength 5/5 throughout  CHEST/LUNG: Coarse breath sounds bilaterally; No rales, rhonchi, wheezing, or rubs  HEART: Regular rate and rhythm; systolic ejection murmur loudest at the apex, No rubs, or gallops  ABDOMEN: Soft, Nontender, Nondistended; Bowel sounds present  EXTREMITIES:  2+ Peripheral Pulses, No clubbing, cyanosis, or edema  SKIN: Multiple keratotic lesions throughout scalp and bilateral arms, ecchymoses present on dorsal forearms b/l    Consultant(s) Notes Reviewed:  [ x ] YES  [ ] NO  Care Discussed with Consultants/Other Providers [ x ] YES  [ ] NO    LABS:                        8.6    5.69  )-----------( 141      ( 22 Jan 2019 06:34 )             27.8     01-22    145  |  104  |  71<H>  ----------------------------<  140<H>  3.9   |  35<H>  |  2.40<H>    Ca    8.3<L>      22 Jan 2019 06:34      PT/INR - ( 22 Jan 2019 06:34 )   PT: 28.4 sec;   INR: 2.42 ratio             CAPILLARY BLOOD GLUCOSE                RADIOLOGY & ADDITIONAL TESTS:  CXR: reports no change compared to CXR from 1/19    Venous doppler LE:   No evidence of bilateral lower extremity deep venous thrombosis.    Multiple groin lymph nodes, which were present on prior study.      Imaging Personally Reviewed:  [ x ] YES  [ ] NO    HEALTH ISSUES - PROBLEM Dx:  BPH (benign prostatic hyperplasia): BPH (benign prostatic hyperplasia)  Need for prophylactic measure: Need for prophylactic measure  Postherpetic neuralgia: Postherpetic neuralgia  CHF (congestive heart failure): CHF (congestive heart failure)  Hypothyroid: Hypothyroid  Hypertension: Hypertension  A-fib: A-fib  CKD (chronic kidney disease) stage 4, GFR 15-29 ml/min: CKD (chronic kidney disease) stage 4, GFR 15-29 ml/min  Anemia: Anemia  SOB (shortness of breath): SOB (shortness of breath)  Pneumonia of right lower lobe due to infectious organism: Pneumonia of right lower lobe due to infectious organism  CKD (chronic kidney disease): CKD (chronic kidney disease)  Frail elderly: Frail elderly  Lower resp. tract infection: Lower resp. tract infection  Lymphoma: Lymphoma  Valvular heart disease: Valvular heart disease  Pulmonary congestion: Pulmonary congestion

## 2019-01-22 NOTE — PROGRESS NOTE ADULT - ASSESSMENT
89yo M w/ PMHx HFpEF 55-60% (last echo Oct 2018), moderate aortic stenosis, mild pulmonary HTN, small B cell lymphoma, CKD IV, afib on warfarin, hypothyroidism, prostate ca, postherpetic neuralgia presents with coughing, weakness, increased sputum production for 4 days duration most likely 2/2 viral etiology vs fluid overload vs. devloping PNA. 89yo M w/ PMHx HFpEF 55-60% (last echo Oct 2018), moderate aortic stenosis, mild pulmonary HTN, small B cell lymphoma, CKD IV, afib on warfarin, hypothyroidism, prostate ca, postherpetic neuralgia presents with coughing, weakness, increased sputum production for 4 days duration most likely 2/2 viral etiology vs fluid overload vs. devloping PNA. Improving fluid status, better WOB.

## 2019-01-22 NOTE — PROGRESS NOTE ADULT - PROBLEM SELECTOR PLAN 1
with associated productive cough. Worsening over period of days. Pt with recent hospitalization. Pt at risk for gram negative PNA vs. MRSA pna through etiology of sxs unknown and multifactorial  - CXR: revealing R lung base opacity and small right sided effusion. Afebrile. WBC wnl. productive cough. rapid flu/RSV neg  - Repeat CXR this AM shows no change  - s/p Zosyn in ED, holding further abx at this time, reviewed pulm note, agree  - urine legionella negative  - continue Mucinex for cough and albuterol nebs PRN  - procal WNL   - given SOB will hold daytime Xanax, and give bedtime dose PRN for insomnia  - pt has h/o diastolic heart failure and developed acute hypoxic respiratory failure in ED. Pt is 98% O2 sat on room air. D/c NC for supplemental O2  - clinically appears euvolemic but will monitor strict i/o's and daily weights for now  - BP has been soft so will not add ACEI/ARB yet and will monitor for stability of creatinine before determining need  - low dose bb w/ holding parameter ordered  - has asymmetric b/l LE edema, venous dopplers negative. with associated productive cough. Worsening over period of days. Pt with recent hospitalization. Pt at risk for gram negative PNA vs. MRSA pna through etiology of sxs unknown and multifactorial- Normal WBC, normal pro-clarence; no antibiotics at this time  - CXR: revealing R lung base opacity and small right sided effusion. Afebrile. WBC wnl. productive cough. rapid flu/RSV neg  - Repeat CXR this AM shows no change  - s/p Zosyn in ED, holding further abx at this time, reviewed pulm note, agree  - urine legionella negative  - continue Mucinex for cough and albuterol nebs PRN  - given SOB will hold daytime Xanax, and give bedtime dose PRN for insomnia  - pt has h/o diastolic heart failure and developed acute hypoxic respiratory failure in ED. Pt is 98% O2 sat on room air. D/c NC for supplemental O2  - clinically appears euvolemic but will monitor strict i/o's and daily weights for now  - BP has been soft so will not add ACEI/ARB yet and will monitor for stability of creatinine before determining need  - low dose bb w/ hold parameters  - LE edema improved; dopplers neg for DVT  - Supportive care for cough

## 2019-01-22 NOTE — PROGRESS NOTE ADULT - PROBLEM SELECTOR PLAN 2
Most likely 2/2 anemia of chronic disease. recent FOBT neg. Potentially could be contributed to worsening SOB. Baseline ~9 via chart review. no overt signs of bleeding. anemia is macrocytic in nature.   - Hb decreased to 8.6 from 9.6, Hct 27.8 decreased from 31.4  - will cont to monitor H/H  - also w/ thrombocytopenia, monitor platelet counts q daily especially while on warfarin, Plt 141 today.  - Consult GI to r/o upper GI bleed, appreciate recs Most likely 2/2 anemia of chronic disease. recent FOBT neg. Potentially could be contributed to worsening SOB. Baseline ~9 via chart review. no overt signs of bleeding. anemia is macrocytic in nature.   - Hb decreased to 8.6 from 9.6, Hct 27.8 decreased from 31.4  - will cont to monitor H/H  - also w/ thrombocytopenia, monitor platelet counts q daily especially while on warfarin, Plt 141 today.  - Consult GI to r/o upper GI bleed, appreciate recs  - Follow up FOBT due to increase in BUN without increase in Cr

## 2019-01-22 NOTE — PROGRESS NOTE ADULT - ASSESSMENT
89yo M w/ PMHx HFpEF, moderate aortic stenosis, mild pulmonary HTN, moderate MR, small B cell lymphoma, CKD IV, afib on warfarin, hypothyroidism, prostate ca, postherpetic neuralgia presents with coughing, weakness, increased sputum production, admitted with HCAP:    - HR and BP appear controlled.  No significant arrhythmias except rate-controlled PAfib.  Discontinue telemetry  - No evidence of volume overload or acute ischemia.  Appears on the dry side.  Monitor volume status  - Daily Coumadin for goal INR 2-3.  INR remains subtherapeutic.  1/22 Ordered 3 mg for tonight.    - Metoprolol 25 BID and Amiodarone 200 QD for rate control  - Continue Lasix at home dose of 40 QD  - Abx per primary team  - Supplemental oxygen as needed.  Bronchodilators and steroids per Pulm  - Incentive spirometer  - Monitor and replete lytes  - To follow closely with you    Plelette DNP  Cardiology 89yo M w/ PMHx HFpEF, moderate aortic stenosis, mild pulmonary HTN, moderate MR, small B cell lymphoma, CKD IV, afib on warfarin, hypothyroidism, prostate ca, postherpetic neuralgia presents with coughing, weakness, increased sputum production, admitted with HCAP:    - HR and BP appear controlled.  No significant arrhythmias except rate-controlled PAfib.  Discontinue telemetry  - No evidence of volume overload or acute ischemia.    - Appears compensated from HF POV.   - Cont lasix 40mg PO Qday  - Please continue to maintain strict I/Os, monitor daily weights, Cr, and K.     - Daily Coumadin for goal INR 2-3.  INR remains subtherapeutic.  1/22 Ordered 3 mg for tonight.    - Metoprolol 25 BID and Amiodarone 200 QD for rate control     - Abx per primary team  - Supplemental oxygen as needed.  Bronchodilators and steroids per Pulm  - Incentive spirometer    - Monitor and replete electrolytes. Keep K>4.0 and Mg>2.0.   - Further cardiac workup will depend on clinical course.   - All other workup per primary team. Will followup.     Garcia JALLOH  Cardiology

## 2019-01-22 NOTE — PROGRESS NOTE ADULT - PROBLEM SELECTOR PLAN 7
HFpEF. recent echo Oct 2018 showing EF 55-60%. SOB most likely Pulmonary in nature.   - continue metoprolol with hold parameters  - continue lasix with caution

## 2019-01-22 NOTE — CONSULT NOTE ADULT - PROBLEM SELECTOR RECOMMENDATION 9
pulm congestion - HF and CKD   mucinex for cough  may need diuresis as tolerated by renal function and HD   keep sat > 88 pct  will add NEBS prn for Mucociliary clearance
daily cbc   transfuse prn   consider hematology eval  check iron studies   ppi once a day  check stool occult blood  further recommendations pending above

## 2019-01-22 NOTE — PROGRESS NOTE ADULT - PROBLEM SELECTOR PLAN 4
Chronic on coumadin. Subtherapeutic INR on admission.  - will monitor INR with goal between 2-3  - will dose 5mg coumadin today (dosing at home has been variable the past few months from 2.5mg all the way to 7mg qhs)  - also will dose 70mg sq lovenox now to bridge due to subtherapeutic INR, may need additional q 24hr doses if INR remains subtherapeutic.   - cont metoprolol with hold parameters  - cont pacerone 200mg daily for rhythm control - monitor LFT's and TFT's while on amio (TFT's ordered for AM tomorrow) and liver function reviewed Chronic on coumadin. Subtherapeutic INR on admission.  - will monitor INR with goal between 2-3- INR 2.4 within range  - will dose 3mg coumadin today (dosing at home has been variable the past few months from 2.5mg all the way to 7mg qhs)  - cont metoprolol with hold parameters  - cont amiodarone 200mg daily for rhythm control - monitor LFT's and TFT's while on amio (TFT's ordered for AM tomorrow) and liver function reviewed

## 2019-01-22 NOTE — PROGRESS NOTE ADULT - SUBJECTIVE AND OBJECTIVE BOX
Date/Time Patient Seen:  		  Referring MD:   Data Reviewed	       Patient is a 89y old  Male who presents with a chief complaint of SOB (21 Jan 2019 16:27)      Subjective/HPI     PAST MEDICAL & SURGICAL HISTORY:  CKD (chronic kidney disease)  BPH (benign prostatic hyperplasia)  Postherpetic neuralgia  Chronic diarrhea  Prostate CA  Basal cell carcinoma  Lymphoma  Anxiety  Hypothyroid  Hypertension  A-fib  H/O shoulder replacement  S/P bilateral unicompartmental knee replacement        Medication list         MEDICATIONS  (STANDING):  ALBUTerol    0.083% 2.5 milliGRAM(s) Nebulizer every 12 hours  amiodarone    Tablet 200 milliGRAM(s) Oral daily  artificial  tears Solution 1 Drop(s) Both EYES two times a day  furosemide    Tablet 40 milliGRAM(s) Oral daily  gabapentin 100 milliGRAM(s) Oral two times a day  gabapentin 200 milliGRAM(s) Oral at bedtime  guaiFENesin  milliGRAM(s) Oral every 12 hours  lactobacillus acidophilus 1 Tablet(s) Oral three times a day with meals  levothyroxine 100 MICROGram(s) Oral daily  metoprolol succinate ER 25 milliGRAM(s) Oral daily  predniSONE   Tablet 20 milliGRAM(s) Oral daily  sodium chloride 0.65% Nasal 1 Spray(s) Both Nostrils three times a day  tamsulosin 0.4 milliGRAM(s) Oral at bedtime    MEDICATIONS  (PRN):  acetaminophen   Tablet .. 650 milliGRAM(s) Oral every 6 hours PRN Temp greater or equal to 38C (100.4F), Mild Pain (1 - 3)  ALBUTerol    0.083% 2.5 milliGRAM(s) Nebulizer every 6 hours PRN Shortness of Breath and/or Wheezing  ALPRAZolam 0.5 milliGRAM(s) Oral at bedtime PRN insomnia  benzonatate 100 milliGRAM(s) Oral every 8 hours PRN Cough         Vitals log        ICU Vital Signs Last 24 Hrs  T(C): 36.3 (22 Jan 2019 07:32), Max: 36.7 (21 Jan 2019 19:55)  T(F): 97.3 (22 Jan 2019 07:32), Max: 98.1 (21 Jan 2019 19:55)  HR: 62 (22 Jan 2019 07:43) (62 - 96)  BP: 110/67 (22 Jan 2019 07:32) (108/70 - 130/72)  BP(mean): --  ABP: --  ABP(mean): --  RR: 18 (22 Jan 2019 07:32) (18 - 18)  SpO2: 100% (22 Jan 2019 07:43) (89% - 100%)           Input and Output:  I&O's Detail    21 Jan 2019 07:01  -  22 Jan 2019 07:00  --------------------------------------------------------  IN:  Total IN: 0 mL    OUT:    Voided: 200 mL  Total OUT: 200 mL    Total NET: -200 mL      22 Jan 2019 07:01  -  22 Jan 2019 09:11  --------------------------------------------------------  IN:  Total IN: 0 mL    OUT:    Voided: 250 mL  Total OUT: 250 mL    Total NET: -250 mL          Lab Data                        8.6    5.69  )-----------( 141      ( 22 Jan 2019 06:34 )             27.8     01-22    145  |  104  |  71<H>  ----------------------------<  140<H>  3.9   |  35<H>  |  2.40<H>    Ca    8.3<L>      22 Jan 2019 06:34              Review of Systems	      Objective     Physical Examination    heart s1s2  lung dec BS  abd soft      Pertinent Lab findings & Imaging      Jaycob:  NO   Adequate UO     I&O's Detail    21 Jan 2019 07:01  -  22 Jan 2019 07:00  --------------------------------------------------------  IN:  Total IN: 0 mL    OUT:    Voided: 200 mL  Total OUT: 200 mL    Total NET: -200 mL      22 Jan 2019 07:01  -  22 Jan 2019 09:11  --------------------------------------------------------  IN:  Total IN: 0 mL    OUT:    Voided: 250 mL  Total OUT: 250 mL    Total NET: -250 mL               Discussed with:     Cultures:	        Radiology

## 2019-01-22 NOTE — PROGRESS NOTE ADULT - PROBLEM SELECTOR PLAN 3
LETICIA on CKD. Baseline Cr ~2 per chart review.   - likely progression of CKD but may be LETICIA (difficult to determine baseline Cr)  - monitor renal indices closely, BUN 71, Cr 2.4  - continue to hold ACE/ARB due to renal function   - monitor I/O

## 2019-01-22 NOTE — CONSULT NOTE ADULT - SUBJECTIVE AND OBJECTIVE BOX
Chief Complaint:  Patient is a 89y old  Male who presents with a chief complaint of SOB 89yo M w/ PMHx HFpEF 55-60% (last echo Oct 2018), moderate aortic stenosis, mild pulmonary HTN, small B cell lymphoma, CKD IV, afib on warfarin, hypothyroidism, prostate ca, postherpetic neuralgia presents with coughing, weakness, increased sputum production for 4 days duration. Pt was in his usual state of health up until 5 days ago when he began to have a productive cough and sore throat. He took some Mucinex which he reports helped him minimally. He then began to experience worsening SOB, and on the day of admission felt like he could not breath. Pt reports SOB at rest and is not on home O2. He states his SOB worsens with movement and only is able to walk 10 yards before needing to rest due to his SOB. Pt ambulates with a walker. He denies having SOB as bad at this in the past. He does repot lower extremity edema worse on the right, which he states is chronic in nature and has not worsened to his knowledge. He denies any nasal congestion, ear pain, HA, neck pain, recent travel, sick contacts, fevers, chills, N/V, diarrhea, constipation, dark or bloody stool, dysuria, hematuria, orthopnea. Of note pt was recently d/c from [facility] (Dec 30th) where he was treated for PNA/generalized weakness. Pt reports he takes his medications everyday and does not miss doses    In the ED, VSS, SpO2 100% on 3L NC. CBC revealed H/H 8.9/28.9 (baseline around 9 per chart review) otherwise grossly normal. coags revealed subtherapeutic INR at 1.24. CMP revealed Cr of 2.5. (baseline ~2 per chart review). elevated proBNP. CXR: patchy density R lung base and R pleural effusion. EKG: afib w/ R fascicular block at 78 QTc 494.  Received Zosyn     Review of Systems:  Review of Systems: Constitutional: Denies fever, chills, general malaise, weight loss, weight gain, diaphoresis   HEENT: Endorse sore throat, denies runny nose, photophobia, blurry vision, double vision, eye pain, difficulty hearing, dizziness, dysphagia, epistaxis  Respiratory: Endorses shortness of breath, dyspnea on exertion, cough, sputum production, wheezing, Denies hemoptysis  Cardiovascular: Denies chest pain, palpitations, endorses edema  Gastrointestinal: Denies nausea, vomiting, diarrhea, constipation, abdominal pain, melena, hematochezia   Genitourinary: Denies dysuria, hematuria, frequency, urgency, incontinence  Skin/Breast: Denies rash, hives, itching  Musculoskeletal: Denies muscle pains, muscle weakness, joint pain or swelling  Neurologic: Denies syncope, loss of consciousness, headache, dizziness, paresthesias, numbness, tingling, confusion, dementia  Hematology/Oncology: Denies abnormal bruising, tender or enlarged lymph nodes	  Other Review of Systems: All other review of systems negative, except as noted in HPI	  no melena no brbpr he had a colonosocpy many years back    Allergies:  No Known Allergies      Medications:  acetaminophen   Tablet .. 650 milliGRAM(s) Oral every 6 hours PRN  ALBUTerol    0.083% 2.5 milliGRAM(s) Nebulizer every 6 hours PRN  ALBUTerol    0.083% 2.5 milliGRAM(s) Nebulizer every 12 hours  ALPRAZolam 0.5 milliGRAM(s) Oral at bedtime PRN  amiodarone    Tablet 200 milliGRAM(s) Oral daily  artificial  tears Solution 1 Drop(s) Both EYES two times a day  benzonatate 100 milliGRAM(s) Oral every 8 hours PRN  docusate sodium 100 milliGRAM(s) Oral two times a day  furosemide    Tablet 40 milliGRAM(s) Oral daily  gabapentin 100 milliGRAM(s) Oral two times a day  gabapentin 200 milliGRAM(s) Oral at bedtime  guaiFENesin  milliGRAM(s) Oral every 12 hours  lactobacillus acidophilus 1 Tablet(s) Oral three times a day with meals  levothyroxine 100 MICROGram(s) Oral daily  metoprolol succinate ER 25 milliGRAM(s) Oral daily  predniSONE   Tablet 20 milliGRAM(s) Oral daily  senna 1 Tablet(s) Oral daily  sodium chloride 0.65% Nasal 1 Spray(s) Both Nostrils three times a day  tamsulosin 0.4 milliGRAM(s) Oral at bedtime  warfarin 3 milliGRAM(s) Oral once      PMHX/PSHX:  CKD (chronic kidney disease)  BPH (benign prostatic hyperplasia)  Postherpetic neuralgia  Chronic diarrhea  Prostate CA  Basal cell carcinoma  Lymphoma  Anxiety  Hypothyroid  Hypertension  A-fib  H/O shoulder replacement  S/P bilateral unicompartmental knee replacement      Family history:  Family history of ischemic heart disease (Father)  No pertinent family history in first degree relatives      Social History:     ROS:     General:  No wt loss, fevers, chills, night sweats, fatigue,   Eyes:  Good vision, no reported pain  ENT:  No sore throat, pain, runny nose, dysphagia  CV:  No pain, palpitations, hypo/hypertension  Resp:  No dyspnea, cough, tachypnea, wheezing  GI:  No pain, No nausea, No vomiting, No diarrhea, No constipation, No weight loss, No fever, No pruritis, No rectal bleeding, No tarry stools, No dysphagia,  :  No pain, bleeding, incontinence, nocturia  Muscle:  No pain, weakness  Neuro:  No weakness, tingling, memory problems  Psych:  No fatigue, insomnia, mood problems, depression  Endocrine:  No polyuria, polydipsia, cold/heat intolerance  Heme:  No petechiae, ecchymosis, easy bruisability  Skin:  No rash, tattoos, scars, edema      PHYSICAL EXAM:   Vital Signs:  Vital Signs Last 24 Hrs  T(C): 36.7 (2019 16:05), Max: 36.7 (2019 19:55)  T(F): 98 (2019 16:05), Max: 98.1 (2019 19:55)  HR: 77 (2019 19:15) (62 - 96)  BP: 121/75 (2019 16:05) (108/70 - 121/75)  BP(mean): --  RR: 18 (2019 16:05) (18 - 18)  SpO2: 94% (2019 19:15) (94% - 100%)  Daily     Daily Weight in k.9 (2019 05:56)    GENERAL:  Appears stated age, well-groomed, well-nourished, no distress  HEENT:  NC/AT,  conjunctivae clear and pink, no thyromegaly, nodules, adenopathy, no JVD, sclera -anicteric  CHEST:  Full & symmetric excursion, no increased effort, breath sounds clear  HEART:  Regular rhythm, S1, S2, no murmur/rub/S3/S4, no abdominal bruit, no edema  ABDOMEN:  Soft, non-tender, non-distended, normoactive bowel sounds,  no masses ,no hepato-splenomegaly, no signs of chronic liver disease  EXTEREMITIES:  no cyanosis,clubbing or edema  SKIN:  No rash/erythema/ecchymoses/petechiae/wounds/abscess/warm/dry  NEURO:  Alert, oriented, no asterixis, no tremor, no encephalopathy    LABS:                        8.6    5.69  )-----------( 141      ( 2019 06:34 )             27.8         145  |  104  |  71<H>  ----------------------------<  140<H>  3.9   |  35<H>  |  2.40<H>    Ca    8.3<L>      2019 06:34        PT/INR - ( 2019 06:34 )   PT: 28.4 sec;   INR: 2.42 ratio                 Imaging:

## 2019-01-22 NOTE — PHARMACOTHERAPY INTERVENTION NOTE - COMMENTS
Patient was prescribed warfarin 3mg for one dose tonight.  He has received 5mg on 1/19, 6mg on 1/20, and 6mg on 1/21 (INR 1.24, 1.53, 2.24 respectively).  Reached out to Dr. Eden and recommended holding tonight's warfarin dose as the INR jumped 0.71.  She said that the patient takes 5mg and 7.5mg at home and that if she holds the dose tonight the INR will decrease further.  I advised her the patient still has 2 doses of 6mg that have not reached full efficacy yet.  She preferred to continue the dose tonight. Patient was prescribed warfarin 3mg for one dose tonight.  He has received 5mg on 1/19, 6mg on 1/20, and 6mg on 1/21 (INR 1.24 1/20, 1.53 1/21, 2.24 1/22).  Reached out to Dr. Eden and recommended holding tonight's warfarin dose as the INR jumped 0.71.  She said that the patient takes 5mg and 7.5mg at home and that if she holds the dose tonight the INR will decrease further.  I advised her the patient still has 2 doses of 6mg that have not reached full efficacy yet.  She preferred to continue the dose tonight. Patient was prescribed warfarin 3mg for one dose tonight.  He has received 5mg on 1/19, 6mg on 1/20, and 6mg on 1/21 (INR 1.24 1/20, 1.53 1/21, 2.42 1/22).  Reached out to Dr. Eden and recommended holding tonight's warfarin dose as the INR jumped 0.71.  She said that the patient takes 5mg and 7.5mg at home and that if she holds the dose tonight the INR will decrease further.  I advised her the patient still has 2 doses of 6mg that have not reached full efficacy yet.  She preferred to continue the dose tonight.

## 2019-01-23 ENCOUNTER — TRANSCRIPTION ENCOUNTER (OUTPATIENT)
Age: 84
End: 2019-01-23

## 2019-01-23 LAB
ALBUMIN SERPL ELPH-MCNC: 2.9 G/DL — LOW (ref 3.3–5)
ALP SERPL-CCNC: 53 U/L — SIGNIFICANT CHANGE UP (ref 40–120)
ALT FLD-CCNC: 20 U/L — SIGNIFICANT CHANGE UP (ref 12–78)
ANION GAP SERPL CALC-SCNC: 6 MMOL/L — SIGNIFICANT CHANGE UP (ref 5–17)
AST SERPL-CCNC: 13 U/L — LOW (ref 15–37)
BILIRUB SERPL-MCNC: 0.5 MG/DL — SIGNIFICANT CHANGE UP (ref 0.2–1.2)
BUN SERPL-MCNC: 66 MG/DL — HIGH (ref 7–23)
CALCIUM SERPL-MCNC: 8.2 MG/DL — LOW (ref 8.5–10.1)
CHLORIDE SERPL-SCNC: 104 MMOL/L — SIGNIFICANT CHANGE UP (ref 96–108)
CO2 SERPL-SCNC: 37 MMOL/L — HIGH (ref 22–31)
CREAT SERPL-MCNC: 2.2 MG/DL — HIGH (ref 0.5–1.3)
GLUCOSE SERPL-MCNC: 93 MG/DL — SIGNIFICANT CHANGE UP (ref 70–99)
HCT VFR BLD CALC: 28.9 % — LOW (ref 39–50)
HGB BLD-MCNC: 8.9 G/DL — LOW (ref 13–17)
INR BLD: 2.85 RATIO — HIGH (ref 0.88–1.16)
MCHC RBC-ENTMCNC: 30.8 GM/DL — LOW (ref 32–36)
MCHC RBC-ENTMCNC: 31.9 PG — SIGNIFICANT CHANGE UP (ref 27–34)
MCV RBC AUTO: 103.6 FL — HIGH (ref 80–100)
NRBC # BLD: 0 /100 WBCS — SIGNIFICANT CHANGE UP (ref 0–0)
PLATELET # BLD AUTO: 147 K/UL — LOW (ref 150–400)
POTASSIUM SERPL-MCNC: 3.3 MMOL/L — LOW (ref 3.5–5.3)
POTASSIUM SERPL-SCNC: 3.3 MMOL/L — LOW (ref 3.5–5.3)
PROT SERPL-MCNC: 5 G/DL — LOW (ref 6–8.3)
PROTHROM AB SERPL-ACNC: 33.3 SEC — HIGH (ref 10–12.9)
RBC # BLD: 2.79 M/UL — LOW (ref 4.2–5.8)
RBC # FLD: 16.1 % — HIGH (ref 10.3–14.5)
SODIUM SERPL-SCNC: 147 MMOL/L — HIGH (ref 135–145)
WBC # BLD: 5.64 K/UL — SIGNIFICANT CHANGE UP (ref 3.8–10.5)
WBC # FLD AUTO: 5.64 K/UL — SIGNIFICANT CHANGE UP (ref 3.8–10.5)

## 2019-01-23 PROCEDURE — 99233 SBSQ HOSP IP/OBS HIGH 50: CPT

## 2019-01-23 PROCEDURE — 99232 SBSQ HOSP IP/OBS MODERATE 35: CPT

## 2019-01-23 RX ORDER — POTASSIUM CHLORIDE 20 MEQ
10 PACKET (EA) ORAL DAILY
Qty: 0 | Refills: 0 | Status: DISCONTINUED | OUTPATIENT
Start: 2019-01-23 | End: 2019-01-25

## 2019-01-23 RX ORDER — FUROSEMIDE 40 MG
20 TABLET ORAL ONCE
Qty: 0 | Refills: 0 | Status: COMPLETED | OUTPATIENT
Start: 2019-01-23 | End: 2019-01-23

## 2019-01-23 RX ORDER — POTASSIUM CHLORIDE 20 MEQ
40 PACKET (EA) ORAL EVERY 4 HOURS
Qty: 0 | Refills: 0 | Status: COMPLETED | OUTPATIENT
Start: 2019-01-23 | End: 2019-01-23

## 2019-01-23 RX ORDER — WARFARIN SODIUM 2.5 MG/1
1 TABLET ORAL ONCE
Qty: 0 | Refills: 0 | Status: COMPLETED | OUTPATIENT
Start: 2019-01-23 | End: 2019-01-23

## 2019-01-23 RX ORDER — PANTOPRAZOLE SODIUM 20 MG/1
40 TABLET, DELAYED RELEASE ORAL
Qty: 0 | Refills: 0 | Status: DISCONTINUED | OUTPATIENT
Start: 2019-01-23 | End: 2019-01-25

## 2019-01-23 RX ADMIN — PANTOPRAZOLE SODIUM 40 MILLIGRAM(S): 20 TABLET, DELAYED RELEASE ORAL at 14:04

## 2019-01-23 RX ADMIN — Medication 1 DROP(S): at 17:35

## 2019-01-23 RX ADMIN — TAMSULOSIN HYDROCHLORIDE 0.4 MILLIGRAM(S): 0.4 CAPSULE ORAL at 21:11

## 2019-01-23 RX ADMIN — GABAPENTIN 200 MILLIGRAM(S): 400 CAPSULE ORAL at 21:11

## 2019-01-23 RX ADMIN — Medication 100 MILLIGRAM(S): at 09:25

## 2019-01-23 RX ADMIN — Medication 40 MILLIEQUIVALENT(S): at 09:25

## 2019-01-23 RX ADMIN — AMIODARONE HYDROCHLORIDE 200 MILLIGRAM(S): 400 TABLET ORAL at 05:25

## 2019-01-23 RX ADMIN — Medication 40 MILLIEQUIVALENT(S): at 14:04

## 2019-01-23 RX ADMIN — ALBUTEROL 2.5 MILLIGRAM(S): 90 AEROSOL, METERED ORAL at 19:13

## 2019-01-23 RX ADMIN — Medication 100 MILLIGRAM(S): at 17:35

## 2019-01-23 RX ADMIN — Medication 1 TABLET(S): at 09:25

## 2019-01-23 RX ADMIN — GABAPENTIN 100 MILLIGRAM(S): 400 CAPSULE ORAL at 17:35

## 2019-01-23 RX ADMIN — LACTULOSE 30 GRAM(S): 10 SOLUTION ORAL at 05:30

## 2019-01-23 RX ADMIN — Medication 25 MILLIGRAM(S): at 05:26

## 2019-01-23 RX ADMIN — Medication 1 DROP(S): at 05:26

## 2019-01-23 RX ADMIN — WARFARIN SODIUM 1 MILLIGRAM(S): 2.5 TABLET ORAL at 21:11

## 2019-01-23 RX ADMIN — Medication 1 TABLET(S): at 12:28

## 2019-01-23 RX ADMIN — ALBUTEROL 2.5 MILLIGRAM(S): 90 AEROSOL, METERED ORAL at 07:30

## 2019-01-23 RX ADMIN — SENNA PLUS 1 TABLET(S): 8.6 TABLET ORAL at 11:17

## 2019-01-23 RX ADMIN — Medication 1 TABLET(S): at 17:35

## 2019-01-23 RX ADMIN — Medication 10 MILLIEQUIVALENT(S): at 11:16

## 2019-01-23 RX ADMIN — Medication 20 MILLIGRAM(S): at 05:25

## 2019-01-23 RX ADMIN — Medication 600 MILLIGRAM(S): at 17:35

## 2019-01-23 RX ADMIN — Medication 600 MILLIGRAM(S): at 05:25

## 2019-01-23 RX ADMIN — Medication 100 MICROGRAM(S): at 05:26

## 2019-01-23 RX ADMIN — GABAPENTIN 100 MILLIGRAM(S): 400 CAPSULE ORAL at 05:26

## 2019-01-23 RX ADMIN — Medication 100 MILLIGRAM(S): at 05:26

## 2019-01-23 RX ADMIN — Medication 40 MILLIGRAM(S): at 05:26

## 2019-01-23 RX ADMIN — Medication 20 MILLIGRAM(S): at 11:17

## 2019-01-23 NOTE — PROGRESS NOTE ADULT - PROBLEM SELECTOR PLAN 1
macrocytic, suspect multifactorial  fobt neg  no evidence of active gib  hgb low but stable  daily cbc, transfuse prn  cont ppi  consider iron studies/heme eval  reports hx of remote, normal colonoscopy  will follow

## 2019-01-23 NOTE — PROGRESS NOTE ADULT - PROBLEM SELECTOR PLAN 2
Most likely 2/2 anemia of chronic disease. recent FOBT neg. Potentially could be contributed to worsening SOB. Baseline ~9 via chart review. no overt signs of bleeding. anemia is macrocytic.   - Hb trending up at 8.9 from 8.6, Hct trending up at 28.9 from 27.8  - will cont to monitor H/H  - also w/ thrombocytopenia, monitor platelet counts q daily especially while on warfarin, Plt 147 today.  - Consulted GI to r/o upper GI bleed, appreciate recs

## 2019-01-23 NOTE — DISCHARGE NOTE ADULT - ADDITIONAL INSTRUCTIONS
Medically stable for discharge ______________________________________________________ Follow up with your primary care doctor within 3 days for rechecking of your PT/INR while on coumadin, evaluation and medication management.  Continue all medications as directed. Follow up with your primary care doctor within 2-3 days for rechecking of your PT/INR while on coumadin, evaluation and medication management.  Continue all medications as directed above.

## 2019-01-23 NOTE — DISCHARGE NOTE ADULT - PATIENT PORTAL LINK FT
You can access the [email or web address], offered by [facility], by registering with the following website: [email or web address]

## 2019-01-23 NOTE — DISCHARGE NOTE ADULT - CARE PLAN
Principal Discharge DX:	SOB (shortness of breath)  Secondary Diagnosis:	CHF (congestive heart failure) Principal Discharge DX:	SOB (shortness of breath)  Goal:	Improvement of symptoms  Assessment and plan of treatment:	Your shortness of breath is believed to be due to fluid overload in the setting of congestive heart failure. Work up during hospitalization did not reveal evidence of infection or pneumonia. You were treated with blood pressure control and diuresis, leading to improvement in symptoms. Continue furosemide and metoprolol as directed. Follow up with your PCP within 1 week of discharge. Follow up with cardiology within 1 week of discharge.  Secondary Diagnosis:	CHF (congestive heart failure)  Assessment and plan of treatment:	It is important to continue all medications and to keep up with a low sodium diet. Continue furosemide and metoprolol as directed. Follow up with your PCP within 1 week of discharge. Follow up with cardiology within 1 week of discharge.  Secondary Diagnosis:	A-fib  Secondary Diagnosis:	Postherpetic neuralgia  Secondary Diagnosis:	Hypothyroid Principal Discharge DX:	SOB (shortness of breath)  Goal:	Improvement of symptoms  Assessment and plan of treatment:	Your shortness of breath is believed to be due to fluid overload in the setting of congestive heart failure. Work up during hospitalization did not reveal evidence of infection or pneumonia. You were treated with blood pressure control and diuresis, leading to improvement in symptoms. Continue furosemide and metoprolol as directed. Follow up with your PCP within 1 week of discharge. Follow up with cardiology within 1 week of discharge.  Secondary Diagnosis:	CHF (congestive heart failure)  Assessment and plan of treatment:	It is important to continue all medications and to keep up with a low sodium diet. Continue furosemide and metoprolol as directed. Follow up with your PCP within 1 week of discharge. Follow up with cardiology within 1 week of discharge.  Secondary Diagnosis:	A-fib  Assessment and plan of treatment:	Continue amiodarone and coumadin as directed. Follow up with your PCP within 3 days for lab test to evaluate PT/INR. Also follow up with your PCP routinely for lap monitoring while on amiodarone. Follow up with cardiology within 1 week of discharge.  Secondary Diagnosis:	Postherpetic neuralgia  Assessment and plan of treatment:	Continue gabapentin as directed.  Secondary Diagnosis:	Hypothyroid  Assessment and plan of treatment:	Continue levothyroxine as directed. Principal Discharge DX:	SOB (shortness of breath)  Goal:	Improvement of symptoms  Assessment and plan of treatment:	Your shortness of breath is believed to be due to fluid overload in the setting of congestive heart failure. Work up during hospitalization did not reveal evidence of infection or pneumonia. You were treated with blood pressure control and diuresis, leading to improvement in symptoms. Continue furosemide and metoprolol as directed. Follow up with your PCP within 1 week of discharge. Follow up with cardiology within 1 week of discharge.  Secondary Diagnosis:	CHF (congestive heart failure)  Assessment and plan of treatment:	It is important to continue all medications and to keep up with a low sodium diet. Continue furosemide and metoprolol as directed. Follow up with your PCP within 1 week of discharge. Follow up with cardiology within 1 week of discharge.  Secondary Diagnosis:	A-fib  Assessment and plan of treatment:	Continue amiodarone and coumadin as directed. Take coumadin dose 2mg per day until you follow up with your PCP within 2-3 days for lab test to evaluate PT/INR. Also follow up with your PCP routinely for lap monitoring while on amiodarone. Follow up with cardiology within 1 week of discharge.  Secondary Diagnosis:	Postherpetic neuralgia  Assessment and plan of treatment:	Continue gabapentin as directed.  Secondary Diagnosis:	Hypothyroid  Assessment and plan of treatment:	Continue levothyroxine as directed.

## 2019-01-23 NOTE — DISCHARGE NOTE ADULT - CARE PROVIDER_API CALL
Jj Joshi (MD), Cardiovascular Disease; Internal Medicine  [address] [place] [address]  Phone: [phone]  Fax: [phone]

## 2019-01-23 NOTE — PROGRESS NOTE ADULT - PROBLEM SELECTOR PLAN 7
HFpEF. recent echo Oct 2018 showing EF 55-60%. SOB most likely Pulmonary in nature.   - continue metoprolol with hold parameters  - continue lasix with caution  - K today was 3.3, Potassium repleted. Daily 10 mEq potassium ordered.

## 2019-01-23 NOTE — PROGRESS NOTE ADULT - SUBJECTIVE AND OBJECTIVE BOX
INTERVAL HPI/OVERNIGHT EVENTS:  pt seen and examined  denies n/v/abd pain  per overnight rn no s/s active gib  afebrile overnight labs noted  +bm yesterday    MEDICATIONS  (STANDING):  ALBUTerol    0.083% 2.5 milliGRAM(s) Nebulizer every 12 hours  amiodarone    Tablet 200 milliGRAM(s) Oral daily  artificial  tears Solution 1 Drop(s) Both EYES two times a day  docusate sodium 100 milliGRAM(s) Oral two times a day  furosemide    Tablet 40 milliGRAM(s) Oral daily  gabapentin 100 milliGRAM(s) Oral two times a day  gabapentin 200 milliGRAM(s) Oral at bedtime  guaiFENesin  milliGRAM(s) Oral every 12 hours  lactobacillus acidophilus 1 Tablet(s) Oral three times a day with meals  levothyroxine 100 MICROGram(s) Oral daily  metoprolol succinate ER 25 milliGRAM(s) Oral daily  potassium chloride    Tablet ER 40 milliEquivalent(s) Oral every 4 hours  potassium chloride    Tablet ER 10 milliEquivalent(s) Oral daily  predniSONE   Tablet 20 milliGRAM(s) Oral daily  senna 1 Tablet(s) Oral daily  sodium chloride 0.65% Nasal 1 Spray(s) Both Nostrils three times a day  tamsulosin 0.4 milliGRAM(s) Oral at bedtime    MEDICATIONS  (PRN):  acetaminophen   Tablet .. 650 milliGRAM(s) Oral every 6 hours PRN Temp greater or equal to 38C (100.4F), Mild Pain (1 - 3)  ALBUTerol    0.083% 2.5 milliGRAM(s) Nebulizer every 6 hours PRN Shortness of Breath and/or Wheezing  ALPRAZolam 0.5 milliGRAM(s) Oral at bedtime PRN insomnia  benzonatate 100 milliGRAM(s) Oral every 8 hours PRN Cough  bisacodyl Suppository 10 milliGRAM(s) Rectal once PRN Constipation      Allergies    No Known Allergies    Intolerances        Review of Systems:    General:  No wt loss, fevers, chills, night sweats, fatigue   Eyes:  Good vision, no reported pain  ENT:  No sore throat, pain, runny nose, dysphagia  CV:  No pain, palpitations, hypo/hypertension  Resp:  No dyspnea, cough, tachypnea, wheezing  GI:  No pain, No nausea, No vomiting, No diarrhea, No constipation, No weight loss, No fever, No pruritis, No rectal bleeding, No melena, No dysphagia  :  No pain, bleeding, incontinence, nocturia  Muscle:  No pain, weakness  Neuro:  No weakness, tingling, memory problems  Psych:  No fatigue, insomnia, mood problems, depression  Endocrine:  No polyuria, polydypsia, cold/heat intolerance  Heme:  No petechiae, ecchymosis, easy bruisability  Skin:  No rash, tattoos, scars, edema      Vital Signs Last 24 Hrs  T(C): 36.6 (23 Jan 2019 11:19), Max: 36.7 (22 Jan 2019 16:05)  T(F): 97.8 (23 Jan 2019 11:19), Max: 98 (22 Jan 2019 16:05)  HR: 71 (23 Jan 2019 11:19) (63 - 97)  BP: 119/72 (23 Jan 2019 11:19) (119/72 - 130/72)  BP(mean): --  RR: 16 (23 Jan 2019 11:19) (16 - 18)  SpO2: 99% (23 Jan 2019 11:19) (94% - 100%)    PHYSICAL EXAM:    Constitutional: NAD  HEENT: ncat  Neck: No LAD  Respiratory: coarse bs  Cardiovascular: S1 and S2, RRR  Gastrointestinal: soft nt nd  Extremities: No peripheral edema  Vascular: 2+ peripheral pulses  Neurological: awake alert responds appropriately  Skin: No rashes      LABS:                        8.9    5.64  )-----------( 147      ( 23 Jan 2019 06:30 )             28.9     01-23    147<H>  |  104  |  66<H>  ----------------------------<  93  3.3<L>   |  37<H>  |  2.20<H>    Ca    8.2<L>      23 Jan 2019 06:30    TPro  5.0<L>  /  Alb  2.9<L>  /  TBili  0.5  /  DBili  x   /  AST  13<L>  /  ALT  20  /  AlkPhos  53  01-23    PT/INR - ( 23 Jan 2019 06:48 )   PT: 33.3 sec;   INR: 2.85 ratio               RADIOLOGY & ADDITIONAL TESTS:

## 2019-01-23 NOTE — PROGRESS NOTE ADULT - ASSESSMENT
89yo M w/ PMHx HFpEF 55-60% (last echo Oct 2018), moderate aortic stenosis, mild pulmonary HTN, small B cell lymphoma, CKD IV, Afib on warfarin, hypothyroidism, prostate ca, postherpetic neuralgia presents with coughing, weakness, increased sputum production for 4 days duration most likely 2/2 viral etiology vs fluid overload vs. devloping PNA. Constipation now resolved. 89yo M w/ PMHx HFpEF 55-60% (last echo Oct 2018), moderate aortic stenosis, mild pulmonary HTN, small B cell lymphoma, CKD IV, Afib on warfarin, hypothyroidism, prostate ca, postherpetic neuralgia presents with coughing, weakness, increased sputum production for 4 days duration most likely 2/2 viral etiology vs fluid overload vs. devloping PNA. Constipation now resolved.     Lung exam with increased congestion. Given 1 dose IV lasix 20 in addition to PO 40.

## 2019-01-23 NOTE — PROGRESS NOTE ADULT - PROBLEM SELECTOR PLAN 3
LETICIA on CKD. Baseline Cr ~2 per chart review.   - likely progression of CKD but may be LETICIA (difficult to determine baseline Cr)  - monitor renal indices closely, trending down BUN 66, Cr 2.2  - BP has been stable, continue to hold ACE/ARB due to renal function  - monitor I/O

## 2019-01-23 NOTE — DISCHARGE NOTE ADULT - HOSPITAL COURSE
89yo M w/ PMHx HFpEF 55-60% (last echo Oct 2018), moderate aortic stenosis, mild pulmonary HTN, small B cell lymphoma, CKD IV, afib on warfarin, hypothyroidism, prostate ca, postherpetic neuralgia presents with coughing, weakness, increased sputum production for 4 days duration. Pt was in his usual state of health up until 5 days ago when he began to have a productive cough and sore throat. He took some Mucinex which he reports helped him minimally. He then began to experience worsening SOB, and on the day of admission felt like he could not breath. Pt reports SOB at rest and is not on home O2. He states his SOB worsens with movement and only is able to walk 10 yards before needing to rest due to his SOB. Pt ambulates with a walker. He denies having SOB as bad at this in the past. He does report lower extremity edema worse on the right, which he states is chronic in nature and has not worsened to his knowledge. He denies any nasal congestion, ear pain, HA, neck pain, recent travel, sick contacts, fevers, chills, N/V, diarrhea, constipation, dark or bloody stool, dysuria, hematuria, orthopnea. Of note pt was recently d/c from [facility] (Dec 30th) where he was treated for PNA/generalized weakness. Pt reports he takes his medications everyday and does not miss doses    In the ED, VSS, SpO2 100% on 3L NC. CBC revealed H/H 8.9/28.9 (baseline around 9 per chart review) otherwise grossly normal. coags revealed subtherapeutic INR at 1.24. CMP revealed Cr of 2.5. (baseline ~2 per chart review). elevated proBNP. CXR: patchy density R lung base and R pleural effusion. EKG: afib w/ R fascicular block at 78 QTc 494.  Received Zosyn    Admitted to telemetry for weakness, cough and increased sputum production. Negative for flu/RSV. Pulmonary consulted for SOB and pulm congestion. Recommended diuresis and supportive care with PRN albuterol. Due to normal WBC and negative procalcitonin, further antibiotics were not indicated. Cardiology consulted for Afib and SOB, recommended continued metoprolol/amiodarone and increase coumadin dose until therapeutic INR of 2-3. Coumadin adjusted with goal INR achieved. Due to low hemoglobin and increasing BUN, GI consulted for r/o UGIB. FOBT negative, anemia likely 2/2 iron deficiency. 7 sutures were removed from patient's R anterior forearm from previous outpatient procedure with no complications. Patient improved throughout his stay, tolerating room air without issues. BCs drawn on admission remained NGTD. Patient evaluated by PTguilherme for dc to home with home PT as patient has 24 hour aide.     Patient seen and examined on day of discharge.     Medically stable for discharge home. 89yo M w/ PMHx HFpEF 55-60% (last echo Oct 2018), moderate aortic stenosis, mild pulmonary HTN, small B cell lymphoma, CKD IV, afib on warfarin, hypothyroidism, prostate ca, postherpetic neuralgia presents with coughing, weakness, increased sputum production for 4 days duration. Pt was in his usual state of health up until 5 days ago when he began to have a productive cough and sore throat. He took some Mucinex which he reports helped him minimally. He then began to experience worsening SOB, and on the day of admission felt like he could not breath. Pt reports SOB at rest and is not on home O2. He states his SOB worsens with movement and only is able to walk 10 yards before needing to rest due to his SOB. Pt ambulates with a walker. He denies having SOB as bad at this in the past. He does report lower extremity edema worse on the right, which he states is chronic in nature and has not worsened to his knowledge. He denies any nasal congestion, ear pain, HA, neck pain, recent travel, sick contacts, fevers, chills, N/V, diarrhea, constipation, dark or bloody stool, dysuria, hematuria, orthopnea. Of note pt was recently d/c from [facility] (Dec 30th) where he was treated for PNA/generalized weakness. Pt reports he takes his medications everyday and does not miss doses    In the ED, VSS, SpO2 100% on 3L NC. CBC revealed H/H 8.9/28.9 (baseline around 9 per chart review) otherwise grossly normal. coags revealed subtherapeutic INR at 1.24. CMP revealed Cr of 2.5. (baseline ~2 per chart review). elevated proBNP. CXR: patchy density R lung base and R pleural effusion. EKG: afib w/ R fascicular block at 78 QTc 494.  Received Zosyn    Admitted to telemetry for weakness, cough and increased sputum production. Negative for flu/RSV. Pulmonary consulted for SOB and pulm congestion. Recommended diuresis and supportive care with PRN albuterol. Due to normal WBC and negative procalcitonin, further antibiotics were not indicated. Cardiology consulted for Afib and SOB, recommended continued metoprolol/amiodarone and increase coumadin dose until therapeutic INR of 2-3. Coumadin adjusted with goal INR achieved. Due to low hemoglobin and increasing BUN, GI consulted for r/o UGIB. FOBT negative, anemia likely 2/2 iron deficiency. 7 sutures were removed from patient's R anterior forearm from previous outpatient procedure with no complications. Patient improved throughout his stay, tolerating room air without issues by day of discharge. BCs drawn on admission remained NGTD. Patient evaluated by PT, guilherme for dc to home with home PT as patient has 24 hour aide.     Patient seen and examined on day of discharge.     T(C): 36.4 (01-25-19 @ 07:55), Max: 37 (01-24-19 @ 16:39)  HR: 58 (01-25-19 @ 07:56) (56 - 88)  BP: 121/73 (01-25-19 @ 07:55) (119/81 - 145/83)  RR: 17 (01-25-19 @ 07:55) (16 - 24)  SpO2: 96% (01-25-19 @ 07:55) (91% - 96%)    GENERAL: patient appears well, no acute distress, appropriate, pleasant  EYES: sclera clear, no exudates  ENMT: oropharynx clear without erythema, no exudates, moist mucous membranes  NECK: supple, soft, no thyromegaly noted  LUNGS: good air entry bilaterally, clear to auscultation, symmetric breath sounds, no wheezing or rhonchi appreciated  HEART: soft S1/S2, regular rate and rhythm, no murmurs noted, no lower extremity edema  GASTROINTESTINAL: abdomen is soft, nontender, nondistended, normoactive bowel sounds, no palpable masses  INTEGUMENT: good skin turgor, no lesions noted  MUSCULOSKELETAL: no clubbing or cyanosis, no obvious deformity  NEUROLOGIC: awake, alert, oriented x3, good muscle tone in 4 extremities, no obvious sensory deficits  PSYCHIATRIC: mood is good, affect is congruent, linear and logical thought process  HEME/LYMPH: no palpable supraclavicular nodules, no obvious ecchymosis or petechiae     Medically stable for discharge home. 89yo M w/ PMHx HFpEF 55-60% (last echo Oct 2018), moderate aortic stenosis, mild pulmonary HTN, small B cell lymphoma, CKD IV, afib on warfarin, hypothyroidism, prostate ca, postherpetic neuralgia presents with coughing, weakness, increased sputum production for 4 days duration. Pt was in his usual state of health up until 5 days ago when he began to have a productive cough and sore throat. He took some Mucinex which he reports helped him minimally. He then began to experience worsening SOB, and on the day of admission felt like he could not breath. Pt reports SOB at rest and is not on home O2. He states his SOB worsens with movement and only is able to walk 10 yards before needing to rest due to his SOB. Pt ambulates with a walker. He denies having SOB as bad at this in the past. He does report lower extremity edema worse on the right, which he states is chronic in nature and has not worsened to his knowledge. He denies any nasal congestion, ear pain, HA, neck pain, recent travel, sick contacts, fevers, chills, N/V, diarrhea, constipation, dark or bloody stool, dysuria, hematuria, orthopnea. Of note pt was recently d/c from [facility] (Dec 30th) where he was treated for PNA/generalized weakness. Pt reports he takes his medications everyday and does not miss doses    In the ED, VSS, SpO2 100% on 3L NC. CBC revealed H/H 8.9/28.9 (baseline around 9 per chart review) otherwise grossly normal. coags revealed subtherapeutic INR at 1.24. CMP revealed Cr of 2.5. (baseline ~2 per chart review). elevated proBNP. CXR: patchy density R lung base and R pleural effusion. EKG: afib w/ R fascicular block at 78 QTc 494.  Received Zosyn    Admitted to telemetry for weakness, cough and increased sputum production. Negative for flu/RSV. Pulmonary consulted for SOB and pulm congestion. Recommended diuresis and supportive care with PRN albuterol. Due to normal WBC and negative procalcitonin, further antibiotics were not indicated. Cardiology consulted for Afib and SOB, recommended continued metoprolol/amiodarone and increase coumadin dose until therapeutic INR of 2-3. Coumadin adjusted with goal INR achieved. Due to low hemoglobin and increasing BUN, GI consulted for r/o UGIB. FOBT negative, anemia likely 2/2 iron deficiency. 7 sutures were removed from patient's R anterior forearm from previous outpatient procedure with no complications. Patient improved throughout his stay, tolerating room air without issues by day of discharge. BCs drawn on admission remained NGTD. Patient evaluated by PT, guilherme for dc to home with home PT as patient has 24 hour aide.     Patient seen and examined on day of discharge.     T(C): 36.4 (01-25-19 @ 07:55), Max: 37 (01-24-19 @ 16:39)  HR: 58 (01-25-19 @ 07:56) (56 - 88)  BP: 121/73 (01-25-19 @ 07:55) (119/81 - 145/83)  RR: 17 (01-25-19 @ 07:55) (16 - 24)  SpO2: 96% (01-25-19 @ 07:55) (91% - 96%)    PHYSICAL EXAM:  GENERAL: NAD, well-groomed, well-developed, sitting in bed comfortably   HEAD:  Atraumatic, Normocephalic  NERVOUS SYSTEM:  Alert & Oriented X3, Good concentration; Motor Strength 5/5 B/L upper and lower extremities; DTRs 2+ intact and symmetric  CHEST/LUNG: Coarse breath sounds with diffuse wheezing bilaterally; frequent cough, No rales or rhonchi   HEART: Regular rate and rhythm; No murmurs, rubs, or gallops  ABDOMEN: Soft, Nontender, Nondistended; Bowel sounds present  EXTREMITIES: 2+ pitting edema on right LE,  2+ Peripheral Pulses, No clubbing or cyanosis.   SKIN: Normal skin turgor, multiple hyperkeratotic lesions on face, scalp arms    Medically stable for discharge home.

## 2019-01-23 NOTE — CHART NOTE - NSCHARTNOTEFT_GEN_A_CORE
7 sutures removed from right anterior forearm. Wound is clean, dry, and intact. No erythema, swelling, or discharge in area of incision. Patient tolerated procedure well without any complications

## 2019-01-23 NOTE — PROGRESS NOTE ADULT - PROBLEM SELECTOR PLAN 4
Chronic on coumadin. Subtherapeutic INR on admission.  - will monitor INR with goal between 2-3- INR 2.8   - decreased dosage of coumadin from 3mg to 1mg (dosing at home has been variable the past few months from 2.5mg to 7mg qhs)  - cont metoprolol with hold parameters  - cont amiodarone 200mg daily for rhythm control - LFT's and TFT's wnl. Chronic on coumadin. Subtherapeutic INR on admission.  - will monitor INR with goal between 2-3- INR 2.85   - decreased dosage of coumadin from 3mg to 1mg (dosing at home has been variable the past few months from 2.5mg to 7mg qhs)  - cont metoprolol with hold parameters  - cont amiodarone 200mg daily for rhythm control - LFT's and TFT's wnl.

## 2019-01-23 NOTE — PROGRESS NOTE ADULT - PROBLEM SELECTOR PLAN 1
multifactorial sob - ckd, copd, chf,   nebs and o2 support, cough rx regimen - mucinex - nasal saline  on PO LASIX - cardio follow up - cvs regimen optimization - TTE reviewed  Anemia - GI eval noted  CKD - monitor labs, on PO LASIX - replete lytes  will follow and monitor  prognosis guarded  pt is DNR  advanced and multiple comorbidities  assess for need of RENÉ

## 2019-01-23 NOTE — PROGRESS NOTE ADULT - ASSESSMENT
89yo M w/ PMHx HFpEF, moderate aortic stenosis, mild pulmonary HTN, moderate MR, small B cell lymphoma, CKD IV, afib on warfarin, hypothyroidism, prostate ca, postherpetic neuralgia presents with coughing, weakness, increased sputum production, admitted with HCAP:    - HR and BP appear controlled.  No significant arrhythmias except rate-controlled Afib/Aflutter  - Discontinue telemetry monitoring  - Appears compensated from HF POV.  ont lasix 40mg PO Qday  - Please continue to maintain strict I/Os, monitor daily weights, Cr, and K.   - Dose Coumadin daily to a goal of 2-3.  INR therapeutic at 2.8.  - H/H stable at 8-9, FOBT negative.,  Heme following.  Transfuse as needed but would give IV Lasix post transfusion to prevent volume overload  - Abx per primary team  - Supplemental oxygen as needed.  Bronchodilators and steroids per Pulm  - Incentive spirometer.  Please encourage use  - Monitor and replete electrolytes. Keep K>4.0 and Mg>2.0.   - Further cardiac workup will depend on clinical course.   - All other workup per primary team. Will followup.     May Benitez NP  Cardiology 89yo M w/ PMHx HFpEF, moderate aortic stenosis, mild pulmonary HTN, moderate MR, small B cell lymphoma, CKD IV, afib on warfarin, hypothyroidism, prostate ca, postherpetic neuralgia presents with coughing, weakness, increased sputum production, admitted with HCAP:    - HR and BP appear controlled.  No significant arrhythmias except rate-controlled Afib/Aflutter  - Discontinue telemetry monitoring  - Appears compensated from HF POV.  Cont lasix 40mg PO Qday  - Please continue to maintain strict I/Os, monitor daily weights, Cr, and K.   - Dose Coumadin daily to a goal of 2-3.  INR therapeutic at 2.8.  - H/H stable at 8-9, FOBT negative.,  Heme following.  Transfuse as needed but would give IV Lasix post transfusion to prevent volume overload  - Abx per primary team  - Supplemental oxygen as needed.  Bronchodilators and steroids per Pulm  - Incentive spirometer.  Please encourage use  - Monitor and replete electrolytes. Keep K>4.0 and Mg>2.0.   - Further cardiac workup will depend on clinical course.   - All other workup per primary team. Will followup.     May Benitez NP  Cardiology

## 2019-01-23 NOTE — PROGRESS NOTE ADULT - SUBJECTIVE AND OBJECTIVE BOX
[facility] -- Sandy Alvarado, Lizett Meraz, Babak Hoffman Savella  Office # [phone]    Follow Up:  Afib, AS, SOB    Subjective/Observations: Awake and alert, in nasal cannula.  Denies SOB or PND.  However, c/o cough with inability to expectorate.  Denies CP or palpitations.  Denies any form of bleeding    REVIEW OF SYSTEMS: All other review of systems is negative unless indicated above    PAST MEDICAL & SURGICAL HISTORY:  CKD (chronic kidney disease)  BPH (benign prostatic hyperplasia)  Postherpetic neuralgia  Chronic diarrhea  Prostate CA  Basal cell carcinoma  Lymphoma  Anxiety  Hypothyroid  Hypertension  A-fib  H/O shoulder replacement  S/P bilateral unicompartmental knee replacement    MEDICATIONS  (STANDING):  ALBUTerol    0.083% 2.5 milliGRAM(s) Nebulizer every 12 hours  amiodarone    Tablet 200 milliGRAM(s) Oral daily  artificial  tears Solution 1 Drop(s) Both EYES two times a day  docusate sodium 100 milliGRAM(s) Oral two times a day  furosemide    Tablet 40 milliGRAM(s) Oral daily  furosemide   Injectable 20 milliGRAM(s) IV Push once  gabapentin 100 milliGRAM(s) Oral two times a day  gabapentin 200 milliGRAM(s) Oral at bedtime  guaiFENesin  milliGRAM(s) Oral every 12 hours  lactobacillus acidophilus 1 Tablet(s) Oral three times a day with meals  levothyroxine 100 MICROGram(s) Oral daily  metoprolol succinate ER 25 milliGRAM(s) Oral daily  potassium chloride    Tablet ER 40 milliEquivalent(s) Oral every 4 hours  potassium chloride    Tablet ER 10 milliEquivalent(s) Oral daily  predniSONE   Tablet 20 milliGRAM(s) Oral daily  senna 1 Tablet(s) Oral daily  sodium chloride 0.65% Nasal 1 Spray(s) Both Nostrils three times a day  tamsulosin 0.4 milliGRAM(s) Oral at bedtime    MEDICATIONS  (PRN):  acetaminophen   Tablet .. 650 milliGRAM(s) Oral every 6 hours PRN Temp greater or equal to 38C (100.4F), Mild Pain (1 - 3)  ALBUTerol    0.083% 2.5 milliGRAM(s) Nebulizer every 6 hours PRN Shortness of Breath and/or Wheezing  ALPRAZolam 0.5 milliGRAM(s) Oral at bedtime PRN insomnia  benzonatate 100 milliGRAM(s) Oral every 8 hours PRN Cough  bisacodyl Suppository 10 milliGRAM(s) Rectal once PRN Constipation    Allergies    No Known Allergies    Intolerance    Vital Signs Last 24 Hrs  T(C): 36.3 (23 Jan 2019 07:28), Max: 36.7 (22 Jan 2019 16:05)  T(F): 97.4 (23 Jan 2019 07:28), Max: 98 (22 Jan 2019 16:05)  HR: 75 (23 Jan 2019 07:30) (63 - 97)  BP: 128/79 (23 Jan 2019 07:28) (114/72 - 130/72)  BP(mean): --  RR: 16 (23 Jan 2019 07:28) (16 - 18)  SpO2: 97% (23 Jan 2019 07:30) (94% - 100%)    I&O's Summary    22 Jan 2019 07:01  -  23 Jan 2019 07:00  --------------------------------------------------------  IN: 0 mL / OUT: 1100 mL / NET: -1100 mL          PHYSICAL EXAM:  TELE:   Constitutional: NAD, awake and alert, well-developed  HEENT: Moist Mucous Membranes, Anicteric  Pulmonary: Non-labored, breath sounds are diminished bilaterally, No wheezing, rales.  + scattered rhonchi  Cardiovascular: Regular, S1 and S2, No murmurs, rubs, gallops or clicks  Gastrointestinal: Bowel Sounds present, soft, nontender.   Lymph: No peripheral edema. No lymphadenopathy.  Skin: No visible rashes or ulcers.  Psych:  Mood & affect appropriate    LABS: All Labs Reviewed:                        8.9    5.64  )-----------( 147      ( 23 Jan 2019 06:30 )             28.9                         8.6    5.69  )-----------( 141      ( 22 Jan 2019 06:34 )             27.8                         9.6    6.01  )-----------( 161      ( 21 Jan 2019 07:04 )             31.4     23 Jan 2019 06:30    147    |  104    |  66     ----------------------------<  93     3.3     |  37     |  2.20   22 Jan 2019 06:34    145    |  104    |  71     ----------------------------<  140    3.9     |  35     |  2.40   21 Jan 2019 07:04    147    |  105    |  56     ----------------------------<  121    4.1     |  34     |  2.40     Ca    8.2        23 Jan 2019 06:30  Ca    8.3        22 Jan 2019 06:34  Ca    8.6        21 Jan 2019 07:04    TPro  5.0    /  Alb  2.9    /  TBili  0.5    /  DBili  x      /  AST  13     /  ALT  20     /  AlkPhos  53     23 Jan 2019 06:30    PT/INR - ( 23 Jan 2019 06:48 )   PT: 33.3 sec;   INR: 2.85 ratio       < from: Transthoracic Echocardiogram (10.24.18 @ 09:06) >     EXAM:  ECHO TTE WO CON COMP W DOP         PROCEDURE DATE:  10/24/2018        INTERPRETATION:  Transthoracic Echocardiography Report (TTE)     Demographics     Patient name        VARSHA NIEVES    Age           89 year(s)     Med Rec #           [id number]         Gender        Male     Account #           [id number]           Date of Birth 07/03/1927     Interpreting        Brittany Ruiz MD Room Number   0004   Physician     Referring Physician Solo Huddleston        Sonographer   Alber Huffman [facility]     Date of study       10/24/2018 08:21                       AM     Height              65 in             Weight        154.99 pounds    Type of Study:     TTE procedure: ECHO TTE WO CON COMP W DOP     BP: 102/63 mmHg     Study Location: 3NTechnical Quality: Technically Difficullt    Indications   1) I50.9 - Heart failure    M-Mode Measurements (cm)     LVEDd: 4.08 cm            LVESd: 2.8 cm   IVSEd: 1.39 cm   LVPWd: 1.31 cm            AO Root Dimension: 3.5 cm                             ACS: 1.3 cm                             LA: 5.6 cm                             LVOT: 1.9 cm    Doppler Measurements:     AV Velocity:345 cm/s                 MV Peak E-Wave: 73.1 cm/s   AV Peak Gradient: 47.61 mmHg         MV Peak A-Wave: 24.7 cm/s   AVMean Gradient: 29 mmHg            MV E/A Ratio: 2.96 %   AV Area (Continuity):0.59 cm^2       MV Peak Gradient: 2.14 mmHg   TR Velocity:297 cm/s   TR Gradient:35.2836 mmHg   Estimated RAP:10 mmHg   RVSP:45 mmHg     Findings     Mitral Valve   Fibrocalcific changes noted to the mitral valve leaflets with preserved   leaflet excursion.   Mild mitral annular calcification is present.   Moderate (2+) mitral regurgitation is present.     Aortic Valve   Significant fibrocalcific changes noted to the aortic valve leaflets with   restriction in leaflet excursion. Peak transaortic gradient is 48 mmHg;   this finding is consistent with moderate aortic stenosis.   DI = .2   Continuity equation AGUSTIN is .59 cm which likely overestimates the degree   of   aortic stenosis     Tricuspid Valve   Mild to Moderate Tricuspid regurgitation is present. Mild pulmonary   hypertension.     Pulmonic Valve   Pulmonic valve not well seen.     Left Atrium   The left atrium is moderately dilated.     Left Ventricle   Mild concentric left ventricular hypertrophy is present.   Estimated left ventricular ejection fraction is 55-60 %.     Right Atrium   The right atrium appears moderately dilated.     Right Ventricle   Normal appearing right ventricle structure and function.     Pericardial Effusion   No evidence of pericardial effusion.     Pleural Effusion   No evidence of pleural effusion.     Miscellaneous   The IVC is dilated.     Impression     Summary     Fibrocalcific changes noted to the mitral valve leaflets with preserved   leaflet excursion.   Mild mitral annular calcification is present.   Moderate (2+) mitral regurgitation is present.   Significant fibrocalcific changes noted to the aortic valve leaflets with   restriction in leaflet excursion. Peak transaortic gradient is 48 mmHg;   this finding is consistent with moderate aortic stenosis.   DI = .2   Continuity equation AGUSTIN is .59 cm which likely overestimates the degree   of   aortic stenosis   Mild to Moderate Tricuspid regurgitation is present. Mild pulmonary   hypertension.   Mild concentric left ventricular hypertrophy is present.   Estimated left ventricular ejection fraction is 55-60 %.   The right atrium appears moderately dilated.   Normal appearing right ventricle structure andfunction.     Signature     ----------------------------------------------------------------   Electronically signed by Brittany Ruiz MD(Interpreting   physician) on 10/24/2018 06:04 PM   ----------------------------------------------------------------    Valves     Mitral Valve     Peak E-Wave: 73.1 cm/s   Peak A-Wave: 24.7 cm/s   Peak Gradient: 2.14 mmHg                                                 E/A Ratio: 2.96     Aortic Valve     Peak Velocity: 345 cm/s            Area (continuity): 0.59 cm^2   Peak Gradient: 47.61 mmHg          Mean Gradient: 29 mmHg                                      AV VTI: 67.9 cm   Cusp Separation: 1.3 cm     Tricuspid Valve     TR Velocity: 297 cm/s                  Estimated RAP: 10 mmHg   TR Gradient: 35.2836 mmHg              Estimated RVSP: 45 mmHg     Pulmonic Valve              Estimated PASP: 45.28 mmHg     LVOT     Peak Velocity: 72 cm/s                Mean Gradient: 1 mmHg   LVOT Diameter: 1.9 cm                 LVOT VTI: 14.1 cm    Structures     Left Atrium     LA Dimension: 5.6 cm         LA Area: 30 cm^2   LA/Aorta: 1.6                LA Volume/Index: 113 ml /64m^2     Left Ventricle     Diastolic Dimension: 4.08 cm           Systolic Dimension: 2.8 cm   Septum Diastolic: 1.39 cm   PW Diastolic: 1.31 cm     FS: 31.37 %   LVOT Diameter: 1.9 cm     Right Atrium     RA Systolic Pressure: 10 mmHg     Right Ventricle              RV Systolic Pressure: 45.28 mmHg     Miscellaneous     Aorta     Aortic Root: 3.5 cm   LVOT Diameter: 1.9 cm      < from: Xray Chest 1 View- PORTABLE-Routine (01.20.19 @ 09:13) >    EXAM:  XR CHEST PORTABLE ROUTINE 1V                          PROCEDURE DATE:  01/20/2019      INTERPRETATION:  AP semierect chest on January 20, 2019 at 8:48 AM.   Patient is short of breath.    Heart size is within normal limits. Reverse right shoulder replacement   again noted.    Small infiltrates off right hilum and left hilum again noted.    Chest is similar to January 19.    IMPRESSION: Unchanged chest as above.    JULEE MOSER M.D., ATTENDING RADIOLOGIST  This document has been electronically signed. Jan 20 2019  1:09PM      < end of copied text >  BRITTANY RUIZ M.D., ATTENDING CARDIOLOGIST  This document has been electronically signed. Oct 24 2018  6:04PM      < end of copied text >

## 2019-01-23 NOTE — PROGRESS NOTE ADULT - SUBJECTIVE AND OBJECTIVE BOX
Patient is a 89y old Male who presents with a chief complaint of SOB (23 Jan 2019 10:18)    89yo M w/ PMHx HFpEF 55-60% (last echo Oct 2018), moderate aortic stenosis, mild pulmonary HTN, small B cell lymphoma, CKD IV, Afib on warfarin, hypothyroidism, prostate ca, postherpetic neuralgia presents with coughing, weakness, increased sputum production for 4 days duration most likely 2/2 viral etiology vs fluid overload vs. devloping PNA.        INTERVAL HPI/OVERNIGHT EVENTS: Patient was seen and examined at bedside. Patient states overall that he is feeling better this morning. Patient states it still feels like there is fluid in his lungs. He has attempted to cough up mucus, but hasn't be able to as of yet. Denies any associated SOB, chest pain, or dizziness.  He is eating and drinking well. Patient is urinating well. Denies burning with urination or hematuria. Patient states he has had constipation the past few days, but was able to pass a large BM this morning after being given stool softeners. Denies any abdominal pain, diarrhea or hematochezia.    Patient O2 sat dropped to 87-88% on room air overnight while patient was sleeping. Patient was restarted on 2L supplemental O2 via NC. O2 sat stable this AM.          REVIEW OF SYSTEMS:  CONSTITUTIONAL: No fever, weight loss, or fatigue  RESPIRATORY: Productive cough and SOB, denies wheezing, chills or hemoptysis CARDIOVASCULAR: Reports leg swelling is chronic. No chest pain, palpitations, or dizziness.  GASTROINTESTINAL: No abdominal or epigastric pain. No nausea, vomiting, or hematemesis; No diarrhea or constipation. No melena or hematochezia.  GENITOURINARY: No dysuria, increased frequency, hematuria, or incontinence  SKIN: No itching, burning, rashes, or lesions   MUSCULOSKELETAL: No joint pain or swelling; No muscle, back, or extremity pain  HEME/LYMPH: No easy bruising, or bleeding gums    T(C): 36.3 (01-23-19 @ 07:28), Max: 36.7 (01-22-19 @ 16:05)  HR: 75 (01-23-19 @ 07:30) (63 - 97)  BP: 128/79 (01-23-19 @ 07:28) (114/72 - 130/72)  RR: 16 (01-23-19 @ 07:28) (16 - 18)  SpO2: 97% (01-23-19 @ 07:30) (94% - 100%)  Wt(kg): --Vital Signs Last 24 Hrs  T(C): 36.3 (23 Jan 2019 07:28), Max: 36.7 (22 Jan 2019 16:05)  T(F): 97.4 (23 Jan 2019 07:28), Max: 98 (22 Jan 2019 16:05)  HR: 75 (23 Jan 2019 07:30) (63 - 97)  BP: 128/79 (23 Jan 2019 07:28) (114/72 - 130/72)  BP(mean): --  RR: 16 (23 Jan 2019 07:28) (16 - 18)  SpO2: 97% (23 Jan 2019 07:30) (94% - 100%)    PHYSICAL EXAM:  GENERAL: NAD, well-groomed, well-developed, sitting in bed comfortably   HEAD:  Atraumatic, Normocephalic  NERVOUS SYSTEM:  Alert & Oriented X3, Good concentration; Motor Strength 5/5 B/L upper and lower extremities; DTRs 2+ intact and symmetric  CHEST/LUNG: Diffuse wheezing bilaterally; No rales or rhonchi   HEART: Regular rate and rhythm; No murmurs, rubs, or gallops  ABDOMEN: Soft, Nontender, Nondistended; Bowel sounds present  EXTREMITIES: 2+ pitting edema on right LE,  2+ Peripheral Pulses, No clubbing or cyanosis.   SKIN: No rashes or lesions    Consultant(s) Notes Reviewed:  [x ] YES  [ ] NO  Care Discussed with Consultants/Other Providers [ x] YES  [ ] NO    LABS:                        8.9    5.64  )-----------( 147      ( 23 Jan 2019 06:30 )             28.9     01-23    147<H>  |  104  |  66<H>  ----------------------------<  93  3.3<L>   |  37<H>  |  2.20<H>    Ca    8.2<L>      23 Jan 2019 06:30    TPro  5.0<L>  /  Alb  2.9<L>  /  TBili  0.5  /  DBili  x   /  AST  13<L>  /  ALT  20  /  AlkPhos  53  01-23    PT/INR - ( 23 Jan 2019 06:48 )   PT: 33.3 sec;   INR: 2.85 ratio             HEALTH ISSUES - PROBLEM Dx:  Other iron deficiency anemia: Other iron deficiency anemia  BPH (benign prostatic hyperplasia): BPH (benign prostatic hyperplasia)  Need for prophylactic measure: Need for prophylactic measure  Postherpetic neuralgia: Postherpetic neuralgia  CHF (congestive heart failure): CHF (congestive heart failure)  Hypothyroid: Hypothyroid  Hypertension: Hypertension  A-fib: A-fib  CKD (chronic kidney disease) stage 4, GFR 15-29 ml/min: CKD (chronic kidney disease) stage 4, GFR 15-29 ml/min  Anemia: Anemia  SOB (shortness of breath): SOB (shortness of breath)  Pneumonia of right lower lobe due to infectious organism: Pneumonia of right lower lobe due to infectious organism  CKD (chronic kidney disease): CKD (chronic kidney disease)  Frail elderly: Frail elderly  Lower resp. tract infection: Lower resp. tract infection  Lymphoma: Lymphoma  Valvular heart disease: Valvular heart disease  Pulmonary congestion: Pulmonary congestion Patient is a 89y old Male who presents with a chief complaint of SOB (23 Jan 2019 10:18)    89yo M w/ PMHx HFpEF 55-60% (last echo Oct 2018), moderate aortic stenosis, mild pulmonary HTN, small B cell lymphoma, CKD IV, Afib on warfarin, hypothyroidism, prostate ca, postherpetic neuralgia presents with coughing, weakness, increased sputum production for 4 days duration most likely 2/2 viral etiology vs fluid overload vs. devloping PNA.        INTERVAL HPI/OVERNIGHT EVENTS: Patient was seen and examined at bedside. Patient states overall that he is feeling better this morning. Patient states it still feels like there is fluid in his lungs. He has attempted to cough up mucus, but hasn't be able to as of yet. Denies any associated SOB, chest pain, or dizziness.  He is eating and drinking well. Patient is urinating well. Denies burning with urination or hematuria. Patient states he has had constipation the past few days, but was able to pass a large BM this morning after being given stool softeners. Denies any abdominal pain, diarrhea or hematochezia.    Patient O2 sat dropped to 87-88% on room air overnight while patient was sleeping. Patient was restarted on 2L supplemental O2 via NC. O2 sat stable this AM.          REVIEW OF SYSTEMS:  CONSTITUTIONAL: No fever, weight loss, or fatigue  RESPIRATORY: Productive cough and SOB, denies wheezing, chills or hemoptysis   CARDIOVASCULAR: Reports leg swelling is chronic. No chest pain, palpitations, or dizziness.  GASTROINTESTINAL: No abdominal or epigastric pain. No nausea, vomiting, or hematemesis; No diarrhea or constipation. No melena or hematochezia.  GENITOURINARY: No dysuria, increased frequency, hematuria, or incontinence  SKIN: No itching, burning, rashes, or lesions   MUSCULOSKELETAL: No joint pain or swelling; No muscle, back, or extremity pain  HEME/LYMPH: No easy bruising, or bleeding gums    T(C): 36.3 (01-23-19 @ 07:28), Max: 36.7 (01-22-19 @ 16:05)  HR: 75 (01-23-19 @ 07:30) (63 - 97)  BP: 128/79 (01-23-19 @ 07:28) (114/72 - 130/72)  RR: 16 (01-23-19 @ 07:28) (16 - 18)  SpO2: 97% (01-23-19 @ 07:30) (94% - 100%)  Wt(kg): --Vital Signs Last 24 Hrs  T(C): 36.3 (23 Jan 2019 07:28), Max: 36.7 (22 Jan 2019 16:05)  T(F): 97.4 (23 Jan 2019 07:28), Max: 98 (22 Jan 2019 16:05)  HR: 75 (23 Jan 2019 07:30) (63 - 97)  BP: 128/79 (23 Jan 2019 07:28) (114/72 - 130/72)  BP(mean): --  RR: 16 (23 Jan 2019 07:28) (16 - 18)  SpO2: 97% (23 Jan 2019 07:30) (94% - 100%)    PHYSICAL EXAM:  GENERAL: NAD, well-groomed, well-developed, sitting in bed comfortably   HEAD:  Atraumatic, Normocephalic  NERVOUS SYSTEM:  Alert & Oriented X3, Good concentration; Motor Strength 5/5 B/L upper and lower extremities; DTRs 2+ intact and symmetric  CHEST/LUNG: Coarse breath sounds with diffuse wheezing bilaterally; frequent cough, No rales or rhonchi   HEART: Regular rate and rhythm; No murmurs, rubs, or gallops  ABDOMEN: Soft, Nontender, Nondistended; Bowel sounds present  EXTREMITIES: 2+ pitting edema on right LE,  2+ Peripheral Pulses, No clubbing or cyanosis.   SKIN: No rashes or lesions    Consultant(s) Notes Reviewed:  [ x ] YES  [ ] NO  Care Discussed with Consultants/Other Providers [ x ] YES  [ ] NO    LABS:                        8.9    5.64  )-----------( 147      ( 23 Jan 2019 06:30 )             28.9     01-23    147<H>  |  104  |  66<H>  ----------------------------<  93  3.3<L>   |  37<H>  |  2.20<H>    Ca    8.2<L>      23 Jan 2019 06:30    TPro  5.0<L>  /  Alb  2.9<L>  /  TBili  0.5  /  DBili  x   /  AST  13<L>  /  ALT  20  /  AlkPhos  53  01-23    PT/INR - ( 23 Jan 2019 06:48 )   PT: 33.3 sec;   INR: 2.85 ratio             HEALTH ISSUES - PROBLEM Dx:  Other iron deficiency anemia: Other iron deficiency anemia  BPH (benign prostatic hyperplasia): BPH (benign prostatic hyperplasia)  Need for prophylactic measure: Need for prophylactic measure  Postherpetic neuralgia: Postherpetic neuralgia  CHF (congestive heart failure): CHF (congestive heart failure)  Hypothyroid: Hypothyroid  Hypertension: Hypertension  A-fib: A-fib  CKD (chronic kidney disease) stage 4, GFR 15-29 ml/min: CKD (chronic kidney disease) stage 4, GFR 15-29 ml/min  Anemia: Anemia  SOB (shortness of breath): SOB (shortness of breath)  Pneumonia of right lower lobe due to infectious organism: Pneumonia of right lower lobe due to infectious organism  CKD (chronic kidney disease): CKD (chronic kidney disease)  Frail elderly: Frail elderly  Lower resp. tract infection: Lower resp. tract infection  Lymphoma: Lymphoma  Valvular heart disease: Valvular heart disease  Pulmonary congestion: Pulmonary congestion

## 2019-01-23 NOTE — PROGRESS NOTE ADULT - PROBLEM SELECTOR PLAN 1
SOB with associated productive cough. Worsening over period of days. Pt with recent hospitalization for PNA. Pt at risk for gram negative PNA vs. MRSA pna through etiology of sxs unknown and multifactorial- Normal WBC, normal pro-clarence; no antibiotics at this time  - CXR: revealing R lung base opacity and small right sided effusion. Afebrile. WBC wnl. productive cough. rapid flu/RSV neg  - Repeat CXR yesterday showed no change  - s/p Zosyn in ED, holding further abx at this time, reviewed pulm note, agree  - urine legionella negative  - continue Mucinex for cough and albuterol nebs PRN  - given SOB will hold daytime Xanax, and give bedtime dose PRN for insomnia  - pt has h/o diastolic heart failure and developed acute hypoxic respiratory failure in ED prior to admission. Patient was 97% on room air yesterday so was taken off of supplemental O2. O2 sat dropped to 86-87% overnight while he was sleeping so was restarted on 2L O2 NC.  - clinically appears euvolemic but will monitor strict i/o's and daily weights for now  - BP has been stable so will not add ACEI/ARB yet and will monitor for stability of creatinine before determining need  - low dose bb w/ hold parameters  - chronic right LE edema; dopplers neg for DVT  - Supportive care for cough  - Pulm consulted, appreciate recs

## 2019-01-23 NOTE — PROGRESS NOTE ADULT - SUBJECTIVE AND OBJECTIVE BOX
Date/Time Patient Seen:  		  Referring MD:   Data Reviewed	       Patient is a 89y old  Male who presents with a chief complaint of SOB (22 Jan 2019 19:31)      Subjective/HPI     PAST MEDICAL & SURGICAL HISTORY:  CKD (chronic kidney disease)  BPH (benign prostatic hyperplasia)  Postherpetic neuralgia  Chronic diarrhea  Prostate CA  Basal cell carcinoma  Lymphoma  Anxiety  Hypothyroid  Hypertension  A-fib  H/O shoulder replacement  S/P bilateral unicompartmental knee replacement        Medication list         MEDICATIONS  (STANDING):  ALBUTerol    0.083% 2.5 milliGRAM(s) Nebulizer every 12 hours  amiodarone    Tablet 200 milliGRAM(s) Oral daily  artificial  tears Solution 1 Drop(s) Both EYES two times a day  docusate sodium 100 milliGRAM(s) Oral two times a day  furosemide    Tablet 40 milliGRAM(s) Oral daily  gabapentin 100 milliGRAM(s) Oral two times a day  gabapentin 200 milliGRAM(s) Oral at bedtime  guaiFENesin  milliGRAM(s) Oral every 12 hours  lactobacillus acidophilus 1 Tablet(s) Oral three times a day with meals  levothyroxine 100 MICROGram(s) Oral daily  metoprolol succinate ER 25 milliGRAM(s) Oral daily  predniSONE   Tablet 20 milliGRAM(s) Oral daily  senna 1 Tablet(s) Oral daily  sodium chloride 0.65% Nasal 1 Spray(s) Both Nostrils three times a day  tamsulosin 0.4 milliGRAM(s) Oral at bedtime    MEDICATIONS  (PRN):  acetaminophen   Tablet .. 650 milliGRAM(s) Oral every 6 hours PRN Temp greater or equal to 38C (100.4F), Mild Pain (1 - 3)  ALBUTerol    0.083% 2.5 milliGRAM(s) Nebulizer every 6 hours PRN Shortness of Breath and/or Wheezing  ALPRAZolam 0.5 milliGRAM(s) Oral at bedtime PRN insomnia  benzonatate 100 milliGRAM(s) Oral every 8 hours PRN Cough         Vitals log        ICU Vital Signs Last 24 Hrs  T(C): 36.3 (23 Jan 2019 07:28), Max: 36.7 (22 Jan 2019 16:05)  T(F): 97.4 (23 Jan 2019 07:28), Max: 98 (22 Jan 2019 16:05)  HR: 63 (23 Jan 2019 07:28) (63 - 97)  BP: 128/79 (23 Jan 2019 07:28) (114/72 - 130/72)  BP(mean): --  ABP: --  ABP(mean): --  RR: 16 (23 Jan 2019 07:28) (16 - 18)  SpO2: 100% (23 Jan 2019 07:28) (94% - 100%)           Input and Output:  I&O's Detail    22 Jan 2019 07:01  -  23 Jan 2019 07:00  --------------------------------------------------------  IN:  Total IN: 0 mL    OUT:    Voided: 500 mL  Total OUT: 500 mL    Total NET: -500 mL          Lab Data                        8.9    5.64  )-----------( 147      ( 23 Jan 2019 06:30 )             28.9     01-23    147<H>  |  104  |  66<H>  ----------------------------<  93  3.3<L>   |  37<H>  |  2.20<H>    Ca    8.2<L>      23 Jan 2019 06:30    TPro  5.0<L>  /  Alb  2.9<L>  /  TBili  0.5  /  DBili  x   /  AST  13<L>  /  ALT  20  /  AlkPhos  53  01-23            Review of Systems	      Objective     Physical Examination    heart s1s2  lung dec BS  abd soft      Pertinent Lab findings & Imaging      Jaycob:  NO   Adequate UO     I&O's Detail    22 Jan 2019 07:01  -  23 Jan 2019 07:00  --------------------------------------------------------  IN:  Total IN: 0 mL    OUT:    Voided: 500 mL  Total OUT: 500 mL    Total NET: -500 mL               Discussed with:     Cultures:	        Radiology

## 2019-01-23 NOTE — DISCHARGE NOTE ADULT - PLAN OF CARE
Improvement of symptoms Your shortness of breath is believed to be due to fluid overload in the setting of congestive heart failure. Work up during hospitalization did not reveal evidence of infection or pneumonia. You were treated with blood pressure control and diuresis, leading to improvement in symptoms. Continue furosemide and metoprolol as directed. Follow up with your PCP within 1 week of discharge. Follow up with cardiology within 1 week of discharge. It is important to continue all medications and to keep up with a low sodium diet. Continue furosemide and metoprolol as directed. Follow up with your PCP within 1 week of discharge. Follow up with cardiology within 1 week of discharge. Continue amiodarone and coumadin as directed. Follow up with your PCP within 3 days for lab test to evaluate PT/INR. Also follow up with your PCP routinely for lap monitoring while on amiodarone. Follow up with cardiology within 1 week of discharge. Continue gabapentin as directed. Continue levothyroxine as directed. Continue amiodarone and coumadin as directed. Take coumadin dose 2mg per day until you follow up with your PCP within 2-3 days for lab test to evaluate PT/INR. Also follow up with your PCP routinely for lap monitoring while on amiodarone. Follow up with cardiology within 1 week of discharge.

## 2019-01-24 LAB
ANION GAP SERPL CALC-SCNC: 6 MMOL/L — SIGNIFICANT CHANGE UP (ref 5–17)
BUN SERPL-MCNC: 58 MG/DL — HIGH (ref 7–23)
CALCIUM SERPL-MCNC: 8.5 MG/DL — SIGNIFICANT CHANGE UP (ref 8.5–10.1)
CHLORIDE SERPL-SCNC: 104 MMOL/L — SIGNIFICANT CHANGE UP (ref 96–108)
CO2 SERPL-SCNC: 36 MMOL/L — HIGH (ref 22–31)
CREAT SERPL-MCNC: 2 MG/DL — HIGH (ref 0.5–1.3)
CULTURE RESULTS: SIGNIFICANT CHANGE UP
CULTURE RESULTS: SIGNIFICANT CHANGE UP
GLUCOSE SERPL-MCNC: 94 MG/DL — SIGNIFICANT CHANGE UP (ref 70–99)
HCT VFR BLD CALC: 30.5 % — LOW (ref 39–50)
HGB BLD-MCNC: 9.6 G/DL — LOW (ref 13–17)
INR BLD: 3.18 RATIO — HIGH (ref 0.88–1.16)
MCHC RBC-ENTMCNC: 31.5 GM/DL — LOW (ref 32–36)
MCHC RBC-ENTMCNC: 32.3 PG — SIGNIFICANT CHANGE UP (ref 27–34)
MCV RBC AUTO: 102.7 FL — HIGH (ref 80–100)
NRBC # BLD: 0 /100 WBCS — SIGNIFICANT CHANGE UP (ref 0–0)
PLATELET # BLD AUTO: 167 K/UL — SIGNIFICANT CHANGE UP (ref 150–400)
POTASSIUM SERPL-MCNC: 3.5 MMOL/L — SIGNIFICANT CHANGE UP (ref 3.5–5.3)
POTASSIUM SERPL-SCNC: 3.5 MMOL/L — SIGNIFICANT CHANGE UP (ref 3.5–5.3)
PROTHROM AB SERPL-ACNC: 37.6 SEC — HIGH (ref 10–12.9)
RBC # BLD: 2.97 M/UL — LOW (ref 4.2–5.8)
RBC # FLD: 16.2 % — HIGH (ref 10.3–14.5)
SODIUM SERPL-SCNC: 146 MMOL/L — HIGH (ref 135–145)
SPECIMEN SOURCE: SIGNIFICANT CHANGE UP
SPECIMEN SOURCE: SIGNIFICANT CHANGE UP
WBC # BLD: 6.11 K/UL — SIGNIFICANT CHANGE UP (ref 3.8–10.5)
WBC # FLD AUTO: 6.11 K/UL — SIGNIFICANT CHANGE UP (ref 3.8–10.5)

## 2019-01-24 PROCEDURE — 99233 SBSQ HOSP IP/OBS HIGH 50: CPT | Mod: GC

## 2019-01-24 PROCEDURE — 99232 SBSQ HOSP IP/OBS MODERATE 35: CPT

## 2019-01-24 RX ORDER — FUROSEMIDE 40 MG
20 TABLET ORAL ONCE
Qty: 0 | Refills: 0 | Status: COMPLETED | OUTPATIENT
Start: 2019-01-24 | End: 2019-01-24

## 2019-01-24 RX ADMIN — Medication 600 MILLIGRAM(S): at 18:07

## 2019-01-24 RX ADMIN — Medication 100 MILLIGRAM(S): at 18:07

## 2019-01-24 RX ADMIN — Medication 1 DROP(S): at 05:09

## 2019-01-24 RX ADMIN — ALBUTEROL 2.5 MILLIGRAM(S): 90 AEROSOL, METERED ORAL at 20:49

## 2019-01-24 RX ADMIN — Medication 1 SPRAY(S): at 14:51

## 2019-01-24 RX ADMIN — GABAPENTIN 100 MILLIGRAM(S): 400 CAPSULE ORAL at 05:09

## 2019-01-24 RX ADMIN — GABAPENTIN 200 MILLIGRAM(S): 400 CAPSULE ORAL at 21:09

## 2019-01-24 RX ADMIN — Medication 1 DROP(S): at 18:07

## 2019-01-24 RX ADMIN — GABAPENTIN 100 MILLIGRAM(S): 400 CAPSULE ORAL at 18:07

## 2019-01-24 RX ADMIN — Medication 20 MILLIGRAM(S): at 10:22

## 2019-01-24 RX ADMIN — Medication 1 TABLET(S): at 10:22

## 2019-01-24 RX ADMIN — Medication 100 MICROGRAM(S): at 05:09

## 2019-01-24 RX ADMIN — Medication 100 MILLIGRAM(S): at 21:09

## 2019-01-24 RX ADMIN — ALBUTEROL 2.5 MILLIGRAM(S): 90 AEROSOL, METERED ORAL at 07:41

## 2019-01-24 RX ADMIN — Medication 100 MILLIGRAM(S): at 05:09

## 2019-01-24 RX ADMIN — SENNA PLUS 1 TABLET(S): 8.6 TABLET ORAL at 12:28

## 2019-01-24 RX ADMIN — AMIODARONE HYDROCHLORIDE 200 MILLIGRAM(S): 400 TABLET ORAL at 05:09

## 2019-01-24 RX ADMIN — Medication 40 MILLIGRAM(S): at 05:09

## 2019-01-24 RX ADMIN — Medication 0.5 MILLIGRAM(S): at 21:09

## 2019-01-24 RX ADMIN — Medication 10 MILLIEQUIVALENT(S): at 12:28

## 2019-01-24 RX ADMIN — Medication 20 MILLIGRAM(S): at 05:09

## 2019-01-24 RX ADMIN — Medication 600 MILLIGRAM(S): at 05:09

## 2019-01-24 RX ADMIN — TAMSULOSIN HYDROCHLORIDE 0.4 MILLIGRAM(S): 0.4 CAPSULE ORAL at 21:09

## 2019-01-24 RX ADMIN — Medication 25 MILLIGRAM(S): at 05:09

## 2019-01-24 RX ADMIN — Medication 1 TABLET(S): at 12:28

## 2019-01-24 RX ADMIN — Medication 1 SPRAY(S): at 21:10

## 2019-01-24 RX ADMIN — Medication 1 TABLET(S): at 18:07

## 2019-01-24 RX ADMIN — PANTOPRAZOLE SODIUM 40 MILLIGRAM(S): 20 TABLET, DELAYED RELEASE ORAL at 05:09

## 2019-01-24 NOTE — PROGRESS NOTE ADULT - PROBLEM SELECTOR PLAN 7
HFpEF. recent echo Oct 2018 showing EF 55-60%. SOB most likely Pulmonary in nature.   - continue metoprolol with hold parameters  - K levels up at 3.5 from 3.3, Potassium levels corrected. Daily 10 mEq potassium ordered.  - Lung exam with increased congestion, pushed 1 dose IV lasix 20mg in addition to PO 40 mg  - continue lasix with caution  - consider repeat TTE prior to d/c

## 2019-01-24 NOTE — PROGRESS NOTE ADULT - ASSESSMENT
89yo M w/ PMHx HFpEF, moderate aortic stenosis, mild pulmonary HTN, moderate MR, small B cell lymphoma, CKD IV, afib on warfarin, hypothyroidism, prostate ca, postherpetic neuralgia presents with coughing, weakness, increased sputum production, admitted with HCAP:    - HR and BP appear controlled.  No significant arrhythmias except rate-controlled Afib/Aflutter  - Appears compensated from HF POV.  Cont lasix 40mg PO Qday  - Please continue to maintain strict I/Os, monitor daily weights, Cr, and K.   - Dose Coumadin daily to a goal of 2-3.    -CW BB, Amio  - H/H stable at 8-9, FOBT negative.,  Heme following.  Transfuse as needed but would give IV Lasix post transfusion to prevent volume overload  - Abx per primary team  - Supplemental oxygen as needed.  Bronchodilators and steroids per Pulm  - Incentive spirometer.  Please encourage use  - Monitor and replete electrolytes. Keep K>4.0 and Mg>2.0.   - Further cardiac workup will depend on clinical course.   - All other workup per primary team. Will followup.     Shubham FLORES  Cardiology   Cardiology

## 2019-01-24 NOTE — PROGRESS NOTE ADULT - SUBJECTIVE AND OBJECTIVE BOX
Date/Time Patient Seen:  		  Referring MD:   Data Reviewed	       Patient is a 89y old  Male who presents with a chief complaint of SOB (23 Jan 2019 14:41)      Subjective/HPI     PAST MEDICAL & SURGICAL HISTORY:  CKD (chronic kidney disease)  BPH (benign prostatic hyperplasia)  Postherpetic neuralgia  Chronic diarrhea  Prostate CA  Basal cell carcinoma  Lymphoma  Anxiety  Hypothyroid  Hypertension  A-fib  H/O shoulder replacement  S/P bilateral unicompartmental knee replacement        Medication list         MEDICATIONS  (STANDING):  ALBUTerol    0.083% 2.5 milliGRAM(s) Nebulizer every 12 hours  amiodarone    Tablet 200 milliGRAM(s) Oral daily  artificial  tears Solution 1 Drop(s) Both EYES two times a day  docusate sodium 100 milliGRAM(s) Oral two times a day  furosemide    Tablet 40 milliGRAM(s) Oral daily  gabapentin 100 milliGRAM(s) Oral two times a day  gabapentin 200 milliGRAM(s) Oral at bedtime  guaiFENesin  milliGRAM(s) Oral every 12 hours  lactobacillus acidophilus 1 Tablet(s) Oral three times a day with meals  levothyroxine 100 MICROGram(s) Oral daily  metoprolol succinate ER 25 milliGRAM(s) Oral daily  pantoprazole    Tablet 40 milliGRAM(s) Oral before breakfast  potassium chloride    Tablet ER 10 milliEquivalent(s) Oral daily  senna 1 Tablet(s) Oral daily  sodium chloride 0.65% Nasal 1 Spray(s) Both Nostrils three times a day  tamsulosin 0.4 milliGRAM(s) Oral at bedtime    MEDICATIONS  (PRN):  acetaminophen   Tablet .. 650 milliGRAM(s) Oral every 6 hours PRN Temp greater or equal to 38C (100.4F), Mild Pain (1 - 3)  ALBUTerol    0.083% 2.5 milliGRAM(s) Nebulizer every 6 hours PRN Shortness of Breath and/or Wheezing  ALPRAZolam 0.5 milliGRAM(s) Oral at bedtime PRN insomnia  benzonatate 100 milliGRAM(s) Oral every 8 hours PRN Cough  bisacodyl Suppository 10 milliGRAM(s) Rectal once PRN Constipation         Vitals log        ICU Vital Signs Last 24 Hrs  T(C): 36.7 (24 Jan 2019 08:01), Max: 37.1 (23 Jan 2019 20:34)  T(F): 98 (24 Jan 2019 08:01), Max: 98.8 (23 Jan 2019 20:34)  HR: 80 (24 Jan 2019 08:01) (71 - 93)  BP: 116/81 (24 Jan 2019 08:01) (110/79 - 130/82)  BP(mean): --  ABP: --  ABP(mean): --  RR: 16 (24 Jan 2019 08:01) (16 - 18)  SpO2: 92% (24 Jan 2019 08:01) (92% - 99%)           Input and Output:  I&O's Detail    23 Jan 2019 07:01  -  24 Jan 2019 07:00  --------------------------------------------------------  IN:    Oral Fluid: 1050 mL  Total IN: 1050 mL    OUT:    Voided: 600 mL  Total OUT: 600 mL    Total NET: 450 mL          Lab Data                        9.6    6.11  )-----------( 167      ( 24 Jan 2019 07:26 )             30.5     01-24    146<H>  |  104  |  58<H>  ----------------------------<  94  3.5   |  36<H>  |  2.00<H>    Ca    8.5      24 Jan 2019 07:26    TPro  5.0<L>  /  Alb  2.9<L>  /  TBili  0.5  /  DBili  x   /  AST  13<L>  /  ALT  20  /  AlkPhos  53  01-23            Review of Systems	      Objective     Physical Examination    heart s1s2  lung dec BS      Pertinent Lab findings & Imaging      Jaycob:  NO   Adequate UO     I&O's Detail    23 Jan 2019 07:01  -  24 Jan 2019 07:00  --------------------------------------------------------  IN:    Oral Fluid: 1050 mL  Total IN: 1050 mL    OUT:    Voided: 600 mL  Total OUT: 600 mL    Total NET: 450 mL               Discussed with:     Cultures:	        Radiology

## 2019-01-24 NOTE — PROGRESS NOTE ADULT - NSHPATTENDINGPLANDISCUSS_GEN_ALL_CORE
Pt, RN
Pt, RN
Pt, Daughter, RN, resident.
Pt, Daughter, RN, resident.
Pt, RN, Residents and daughter.

## 2019-01-24 NOTE — PROGRESS NOTE ADULT - ASSESSMENT
89yo M w/ PMHx HFpEF 55-60% (last echo Oct 2018), moderate aortic stenosis, mild pulmonary HTN, small B cell lymphoma, CKD IV, Afib on warfarin, hypothyroidism, prostate ca, postherpetic neuralgia presents with coughing, weakness, increased sputum production for 4 days duration most likely 2/2 viral etiology vs fluid overload vs. devloping PNA.    Lung exam with increased congestion. Given 1 dose IV lasix 20 in addition to PO 40. 89yo M w/ PMHx HFpEF 55-60% (last echo Oct 2018), moderate aortic stenosis, mild pulmonary HTN, small B cell lymphoma, CKD IV, Afib on warfarin, hypothyroidism, prostate ca, postherpetic neuralgia presents with coughing, weakness, increased sputum production for 4 days duration most likely 2/2 viral etiology vs fluid overload vs. devloping PNA.    Lung exam with increased congestion, patient desaturated while ambulating on room air. Given 1 dose IV lasix 20 in addition to PO 40.

## 2019-01-24 NOTE — PROGRESS NOTE ADULT - SUBJECTIVE AND OBJECTIVE BOX
[facility] -- Sandy Alvarado, Kt, Lizett, Babak Hoffman Savella  Office # [phone]      Follow Up:    Afib, AS, SOB  Subjective/Observations:   No events overnight resting comfortably in bed.  No complaints of chest pain, dyspnea, or palpitations reported. No signs of orthopnea or PND.     REVIEW OF SYSTEMS: All other review of systems is negative unless indicated above    PAST MEDICAL & SURGICAL HISTORY:  CKD (chronic kidney disease)  BPH (benign prostatic hyperplasia)  Postherpetic neuralgia  Chronic diarrhea  Prostate CA  Basal cell carcinoma  Lymphoma  Anxiety  Hypothyroid  Hypertension  A-fib  H/O shoulder replacement  S/P bilateral unicompartmental knee replacement      MEDICATIONS  (STANDING):  ALBUTerol    0.083% 2.5 milliGRAM(s) Nebulizer every 12 hours  amiodarone    Tablet 200 milliGRAM(s) Oral daily  artificial  tears Solution 1 Drop(s) Both EYES two times a day  docusate sodium 100 milliGRAM(s) Oral two times a day  furosemide    Tablet 40 milliGRAM(s) Oral daily  gabapentin 100 milliGRAM(s) Oral two times a day  gabapentin 200 milliGRAM(s) Oral at bedtime  guaiFENesin  milliGRAM(s) Oral every 12 hours  lactobacillus acidophilus 1 Tablet(s) Oral three times a day with meals  levothyroxine 100 MICROGram(s) Oral daily  metoprolol succinate ER 25 milliGRAM(s) Oral daily  pantoprazole    Tablet 40 milliGRAM(s) Oral before breakfast  potassium chloride    Tablet ER 10 milliEquivalent(s) Oral daily  senna 1 Tablet(s) Oral daily  sodium chloride 0.65% Nasal 1 Spray(s) Both Nostrils three times a day  tamsulosin 0.4 milliGRAM(s) Oral at bedtime    MEDICATIONS  (PRN):  acetaminophen   Tablet .. 650 milliGRAM(s) Oral every 6 hours PRN Temp greater or equal to 38C (100.4F), Mild Pain (1 - 3)  ALBUTerol    0.083% 2.5 milliGRAM(s) Nebulizer every 6 hours PRN Shortness of Breath and/or Wheezing  ALPRAZolam 0.5 milliGRAM(s) Oral at bedtime PRN insomnia  benzonatate 100 milliGRAM(s) Oral every 8 hours PRN Cough  bisacodyl Suppository 10 milliGRAM(s) Rectal once PRN Constipation      Allergies    No Known Allergies    Intolerances        Vital Signs Last 24 Hrs  T(C): 36.9 (24 Jan 2019 12:00), Max: 37.1 (23 Jan 2019 20:34)  T(F): 98.4 (24 Jan 2019 12:00), Max: 98.8 (23 Jan 2019 20:34)  HR: 86 (24 Jan 2019 12:00) (80 - 93)  BP: 119/81 (24 Jan 2019 12:00) (110/79 - 130/82)  BP(mean): --  RR: 16 (24 Jan 2019 12:00) (16 - 18)  SpO2: 94% (24 Jan 2019 12:00) (92% - 97%)    I&O's Summary    23 Jan 2019 07:01  -  24 Jan 2019 07:00  --------------------------------------------------------  IN: 1050 mL / OUT: 600 mL / NET: 450 mL          PHYSICAL EXAM:  TELE: Off tele   Constitutional: NAD, awake and alert, well-developed  HEENT: Moist Mucous Membranes, Anicteric  Pulmonary: Non-labored, breath sounds are clear bilaterally, No wheezing, crackles or rhonchi  Cardiovascular: Regular, S1 and S2 nl, No murmurs, rubs, gallops or clicks  Gastrointestinal: Bowel Sounds present, soft, nontender.   Lymph: No lymphadenopathy. No peripheral edema.  Skin: No visible rashes or ulcers.  Psych:  Mood & affect appropriate    LABS: All Labs Reviewed:                        9.6    6.11  )-----------( 167      ( 24 Jan 2019 07:26 )             30.5                         8.9    5.64  )-----------( 147      ( 23 Jan 2019 06:30 )             28.9                         8.6    5.69  )-----------( 141      ( 22 Jan 2019 06:34 )             27.8     24 Jan 2019 07:26    146    |  104    |  58     ----------------------------<  94     3.5     |  36     |  2.00   23 Jan 2019 06:30    147    |  104    |  66     ----------------------------<  93     3.3     |  37     |  2.20   22 Jan 2019 06:34    145    |  104    |  71     ----------------------------<  140    3.9     |  35     |  2.40     Ca    8.5        24 Jan 2019 07:26  Ca    8.2        23 Jan 2019 06:30  Ca    8.3        22 Jan 2019 06:34    TPro  5.0    /  Alb  2.9    /  TBili  0.5    /  DBili  x      /  AST  13     /  ALT  20     /  AlkPhos  53     23 Jan 2019 06:30    PT/INR - ( 24 Jan 2019 07:26 )   PT: 37.6 sec;   INR: 3.18 ratio       < from: Transthoracic Echocardiogram (10.24.18 @ 09:06) >     EXAM:  ECHO TTE WO CON COMP W DOP         PROCEDURE DATE:  10/24/2018        INTERPRETATION:  Transthoracic Echocardiography Report (TTE)     Demographics     Patient name        VARSHA NIEVES    Age           89 year(s)     Med Rec #           [id number]         Gender        Male     Account #           [id number]           Date of Birth 07/03/1927     Interpreting        Brittany Ruiz MD Room Number   0004   Physician     Referring Physician Solo Huddleston        Sonographer   Alber Huffman [facility]     Date of study       10/24/2018 08:21                       AM     Height              65 in             Weight        154.99 pounds    Type of Study:     TTE procedure: ECHO TTE WO CON COMP W DOP     BP: 102/63 mmHg     Study Location: 3NTechnical Quality: Technically Difficullt    Indications   1) I50.9 - Heart failure    M-Mode Measurements (cm)     LVEDd: 4.08 cm            LVESd: 2.8 cm   IVSEd: 1.39 cm   LVPWd: 1.31 cm            AO Root Dimension: 3.5 cm                             ACS: 1.3 cm                             LA: 5.6 cm                             LVOT: 1.9 cm    Doppler Measurements:     AV Velocity:345 cm/s                 MV Peak E-Wave: 73.1 cm/s   AV Peak Gradient: 47.61 mmHg         MV Peak A-Wave: 24.7 cm/s   AVMean Gradient: 29 mmHg            MV E/A Ratio: 2.96 %   AV Area (Continuity):0.59 cm^2       MV Peak Gradient: 2.14 mmHg   TR Velocity:297 cm/s   TR Gradient:35.2836 mmHg   Estimated RAP:10 mmHg   RVSP:45 mmHg     Findings     Mitral Valve   Fibrocalcific changes noted to the mitral valve leaflets with preserved   leaflet excursion.   Mild mitral annular calcification is present.   Moderate (2+) mitral regurgitation is present.     Aortic Valve   Significant fibrocalcific changes noted to the aortic valve leaflets with   restriction in leaflet excursion. Peak transaortic gradient is 48 mmHg;   this finding is consistent with moderate aortic stenosis.   DI = .2   Continuity equation AGUSTIN is .59 cm which likely overestimates the degree   of   aortic stenosis     Tricuspid Valve   Mild to Moderate Tricuspid regurgitation is present. Mild pulmonary   hypertension.     Pulmonic Valve   Pulmonic valve not well seen.     Left Atrium   The left atrium is moderately dilated.     Left Ventricle   Mild concentric left ventricular hypertrophy is present.   Estimated left ventricular ejection fraction is 55-60 %.     Right Atrium   The right atrium appears moderately dilated.     Right Ventricle   Normal appearing right ventricle structure and function.     Pericardial Effusion   No evidence of pericardial effusion.     Pleural Effusion   No evidence of pleural effusion.     Miscellaneous   The IVC is dilated.     Impression     Summary     Fibrocalcific changes noted to the mitral valve leaflets with preserved   leaflet excursion.   Mild mitral annular calcification is present.   Moderate (2+) mitral regurgitation is present.   Significant fibrocalcific changes noted to the aortic valve leaflets with   restriction in leaflet excursion. Peak transaortic gradient is 48 mmHg;   this finding is consistent with moderate aortic stenosis.   DI = .2   Continuity equation AGUSTIN is .59 cm which likely overestimates the degree   of   aortic stenosis   Mild to Moderate Tricuspid regurgitation is present. Mild pulmonary   hypertension.   Mild concentric left ventricular hypertrophy is present.   Estimated left ventricular ejection fraction is 55-60 %.   The right atrium appears moderately dilated.   Normal appearing right ventricle structure andfunction.     Signature     ----------------------------------------------------------------   Electronically signed by Brittany Ruiz MD(Interpreting   physician) on 10/24/2018 06:04 PM   ----------------------------------------------------------------    Valves     Mitral Valve     Peak E-Wave: 73.1 cm/s   Peak A-Wave: 24.7 cm/s   Peak Gradient: 2.14 mmHg                                                 E/A Ratio: 2.96     Aortic Valve     Peak Velocity: 345 cm/s            Area (continuity): 0.59 cm^2   Peak Gradient: 47.61 mmHg          Mean Gradient: 29 mmHg                                      AV VTI: 67.9 cm   Cusp Separation: 1.3 cm     Tricuspid Valve     TR Velocity: 297 cm/s                  Estimated RAP: 10 mmHg   TR Gradient: 35.2836 mmHg              Estimated RVSP: 45 mmHg     Pulmonic Valve              Estimated PASP: 45.28 mmHg     LVOT     Peak Velocity: 72 cm/s                Mean Gradient: 1 mmHg   LVOT Diameter: 1.9 cm                 LVOT VTI: 14.1 cm    Structures     Left Atrium     LA Dimension: 5.6 cm         LA Area: 30 cm^2   LA/Aorta: 1.6                LA Volume/Index: 113 ml /64m^2     Left Ventricle     Diastolic Dimension: 4.08 cm           Systolic Dimension: 2.8 cm   Septum Diastolic: 1.39 cm   PW Diastolic: 1.31 cm     FS: 31.37 %   LVOT Diameter: 1.9 cm     Right Atrium     RA Systolic Pressure: 10 mmHg     Right Ventricle              RV Systolic Pressure: 45.28 mmHg     Miscellaneous     Aorta     Aortic Root: 3.5 cm   LVOT Diameter: 1.9 cm      < from: Xray Chest 1 View- PORTABLE-Routine (01.20.19 @ 09:13) >    EXAM:  XR CHEST PORTABLE ROUTINE 1V                          PROCEDURE DATE:  01/20/2019      INTERPRETATION:  AP semierect chest on January 20, 2019 at 8:48 AM.   Patient is short of breath.    Heart size is within normal limits. Reverse right shoulder replacement   again noted.    Small infiltrates off right hilum and left hilum again noted.    Chest is similar to January 19.    IMPRESSION: Unchanged chest as above.    JULEE MOSER M.D., ATTENDING RADIOLOGIST  This document has been electronically signed. Jan 20 2019  1:09PM      < end of copied text >  BRITTANY RUIZ M.D., ATTENDING CARDIOLOGIST  This document has been electronically signed. Oct 24 2018  6:04PM      < end of copied text >             Brittany Monson [facility]   Cardiology

## 2019-01-24 NOTE — PROGRESS NOTE ADULT - PROBLEM SELECTOR PLAN 3
LETICIA on CKD. Baseline Cr ~2 per chart review.   - likely progression of CKD but may be LETICIA (difficult to determine baseline Cr)  - monitor renal indices closely, trending down BUN 56, Cr 2  - BP has been stable, continue to hold ACE/ARB due to renal function  - monitor I/O

## 2019-01-24 NOTE — PROGRESS NOTE ADULT - PROBLEM SELECTOR PLAN 1
multifactorial sob - ckd, copd, chf,   nebs and o2 support, cough rx regimen - mucinex - nasal saline  on PO LASIX - cardio follow up - cvs regimen optimization - TTE reviewed  Anemia - GI eval noted  CKD - monitor labs, on PO LASIX - replete lytes  will follow and monitor  prognosis guarded  pt is DNR  advanced and multiple comorbidities  assess for need of RENÉ.   will DC Steroids this am - cont NEBS prn - keep sat > 88 pct

## 2019-01-24 NOTE — PROGRESS NOTE ADULT - SUBJECTIVE AND OBJECTIVE BOX
INTERVAL HPI/OVERNIGHT EVENTS:  pt seen and examined  denies n/v/d/abd pain +bm yesterday  eating ok  states hes going home today  per overnight rn no s/s active gib  afebrile overnight labs noted    MEDICATIONS  (STANDING):  ALBUTerol    0.083% 2.5 milliGRAM(s) Nebulizer every 12 hours  amiodarone    Tablet 200 milliGRAM(s) Oral daily  artificial  tears Solution 1 Drop(s) Both EYES two times a day  docusate sodium 100 milliGRAM(s) Oral two times a day  furosemide    Tablet 40 milliGRAM(s) Oral daily  gabapentin 100 milliGRAM(s) Oral two times a day  gabapentin 200 milliGRAM(s) Oral at bedtime  guaiFENesin  milliGRAM(s) Oral every 12 hours  lactobacillus acidophilus 1 Tablet(s) Oral three times a day with meals  levothyroxine 100 MICROGram(s) Oral daily  metoprolol succinate ER 25 milliGRAM(s) Oral daily  pantoprazole    Tablet 40 milliGRAM(s) Oral before breakfast  potassium chloride    Tablet ER 10 milliEquivalent(s) Oral daily  senna 1 Tablet(s) Oral daily  sodium chloride 0.65% Nasal 1 Spray(s) Both Nostrils three times a day  tamsulosin 0.4 milliGRAM(s) Oral at bedtime    MEDICATIONS  (PRN):  acetaminophen   Tablet .. 650 milliGRAM(s) Oral every 6 hours PRN Temp greater or equal to 38C (100.4F), Mild Pain (1 - 3)  ALBUTerol    0.083% 2.5 milliGRAM(s) Nebulizer every 6 hours PRN Shortness of Breath and/or Wheezing  ALPRAZolam 0.5 milliGRAM(s) Oral at bedtime PRN insomnia  benzonatate 100 milliGRAM(s) Oral every 8 hours PRN Cough  bisacodyl Suppository 10 milliGRAM(s) Rectal once PRN Constipation      Allergies    No Known Allergies    Intolerances        Review of Systems:    General:  No wt loss, fevers, chills, night sweats, fatigue   Eyes:  Good vision, no reported pain  ENT:  No sore throat, pain, runny nose, dysphagia  CV:  No pain, palpitations, hypo/hypertension  Resp:  No dyspnea, cough, tachypnea, wheezing  GI:  No pain, No nausea, No vomiting, No diarrhea, No constipation, No weight loss, No fever, No pruritis, No rectal bleeding, No melena, No dysphagia  :  No pain, bleeding, incontinence, nocturia  Muscle:  No pain, weakness  Neuro:  No weakness, tingling, memory problems  Psych:  No fatigue, insomnia, mood problems, depression  Endocrine:  No polyuria, polydypsia, cold/heat intolerance  Heme:  No petechiae, ecchymosis, easy bruisability  Skin:  No rash, tattoos, scars, edema      Vital Signs Last 24 Hrs  T(C): 36.7 (24 Jan 2019 08:01), Max: 37.1 (23 Jan 2019 20:34)  T(F): 98 (24 Jan 2019 08:01), Max: 98.8 (23 Jan 2019 20:34)  HR: 80 (24 Jan 2019 08:01) (80 - 93)  BP: 116/81 (24 Jan 2019 08:01) (110/79 - 130/82)  BP(mean): --  RR: 16 (24 Jan 2019 08:01) (16 - 18)  SpO2: 92% (24 Jan 2019 08:01) (92% - 97%)    PHYSICAL EXAM:    Constitutional: NAD  HEENT: ncat  Neck: No LAD  Respiratory: coarse bs  Cardiovascular: S1 and S2, RRR  Gastrointestinal: soft nt nd  Extremities: No peripheral edema  Vascular: 2+ peripheral pulses  Neurological: awake alert responds appropriately  Skin: No rashes        LABS:                        9.6    6.11  )-----------( 167      ( 24 Jan 2019 07:26 )             30.5     01-24    146<H>  |  104  |  58<H>  ----------------------------<  94  3.5   |  36<H>  |  2.00<H>    Ca    8.5      24 Jan 2019 07:26    TPro  5.0<L>  /  Alb  2.9<L>  /  TBili  0.5  /  DBili  x   /  AST  13<L>  /  ALT  20  /  AlkPhos  53  01-23    PT/INR - ( 24 Jan 2019 07:26 )   PT: 37.6 sec;   INR: 3.18 ratio               RADIOLOGY & ADDITIONAL TESTS:

## 2019-01-24 NOTE — PROGRESS NOTE ADULT - PROBLEM SELECTOR PLAN 2
Most likely 2/2 anemia of chronic disease. Recent FOBT neg. Potentially could be contributed to worsening SOB. Baseline ~9 via chart review. no overt signs of bleeding. anemia is macrocytic.   - Hb trending up at 9.6 from 8.9, Hct trending up at 30.5 from 28.9.  - will cont to monitor H/H  - also w/ thrombocytopenia, monitor platelet counts q daily especially while on warfarin, Plt 147 today.

## 2019-01-24 NOTE — PROGRESS NOTE ADULT - PROBLEM SELECTOR PLAN 1
SOB with associated productive cough. Worsening over period of days. Pt with recent hospitalization for PNA. Pt at risk for gram negative PNA vs. MRSA pna through etiology of sxs unknown and multifactorial- Normal WBC, normal pro-clarence; no antibiotics at this time  - CXR: revealing R lung base opacity and small right sided effusion. Afebrile. WBC wnl. productive cough. rapid flu/RSV neg  - Repeat CXR showed no change  - s/p Zosyn in ED, holding further abx at this time, reviewed pulm note, agree  - urine legionella negative  - continue Mucinex for cough and albuterol nebs PRN  - given SOB will hold daytime Xanax, and give bedtime dose PRN for insomnia  - pt has h/o diastolic heart failure and developed acute hypoxic respiratory failure in ED prior to admission. Patient was 97% on room air yesterday so was taken off of supplemental O2. O2 sat dropped to 86-87% overnight while he was sleeping so was restarted on 2L O2 NC.  - clinically euvolemic but cont to monitor strict i/o's and daily weights for now  - BP has been stable so will not add ACEI/ARB yet and will monitor for stability of creatinine before determining need  - low dose bb w/ hold parameters  - chronic right LE edema; dopplers neg for DVT  - Supportive care for cough  - Pulm consulted, appreciate recs SOB with associated productive cough. Worsening over period of days. Pt with recent hospitalization for PNA. Pt at risk for gram negative PNA vs. MRSA pna through etiology of sxs unknown and multifactorial- Normal WBC, normal pro-clarence; no antibiotics at this time  - CXR: revealing R lung base opacity and small right sided effusion. Afebrile. WBC wnl. productive cough. rapid flu/RSV neg  - Repeat CXR showed no change  - s/p Zosyn in ED, holding further abx at this time, reviewed pulm note, agree  - urine legionella negative  - continue Mucinex for cough and albuterol nebs PRN  - given SOB will hold daytime Xanax, and give bedtime dose PRN for insomnia  - pt has h/o diastolic heart failure and developed acute hypoxic respiratory failure in ED prior to admission. Patient was 97% on room air yesterday so was taken off of supplemental O2. O2 sat dropped on ambulation but resumed to normal at rest.  - clinically euvolemic but cont to monitor strict i/o's and daily weights for now  - BP has been stable so will not add ACEI/ARB yet and will monitor for stability of creatinine before determining need  - low dose bb w/ hold parameters  - chronic right LE edema; dopplers neg for DVT  - Supportive care for cough  - Pulm consulted, appreciate recs

## 2019-01-24 NOTE — PROGRESS NOTE ADULT - SUBJECTIVE AND OBJECTIVE BOX
Patient is a 89y old  Male who presents with a chief complaint of SOB (24 Jan 2019 09:25)    89yo M w/ PMHx HFpEF 55-60% (last echo Oct 2018), moderate aortic stenosis, mild pulmonary HTN, small B cell lymphoma, CKD IV, Afib on warfarin, hypothyroidism, prostate ca, postherpetic neuralgia presented with coughing, weakness, increased sputum production for 5 days duration most likely 2/2 viral etiology vs fluid overload vs. devloping PNA.    INTERVAL HPI/OVERNIGHT EVENTS: Patient was seen and examined at bedside. Patient states he is doing better today. He states that his cough has improved but still feels like there is fluid in his lungs that he is trying to cough up. Denies SOB, chest pain or tightness. Patient has been eating and drinking well. Denies any nausea, vomiting or abdominal pain. Patient is urinating and stooling well. He states that his constipation has resolved and is now stooling regularly. Denies any hematuria or hematochezia. Patient ambulates with a walker. He also reports lower extremity edema that is worse on the right left, which he states is chronic in nature and has not worsened to his knowledge. Denies any leg pain or loss of sensation in LE.     Patient was having O2 sats ranging from 90-94% on room air overnight. Tried ambulating this AM down the camacho and back and was reported to have an O2 sat of 74% s/p ambulation. Patient denies SOB or lightheadedness during or after walking.    REVIEW OF SYSTEMS:  CONSTITUTIONAL: No fever, weight loss, or fatigue, sitting in chair comfortably  RESPIRATORY: Cough and shortness of breath, no wheezing or hemoptysis  CARDIOVASCULAR: Chronic R LE edema, No chest pain, palpitations, or dizziness.  GASTROINTESTINAL: No abdominal or epigastric pain. No nausea, vomiting, or hematemesis; No diarrhea or constipation. No melena or hematochezia.  GENITOURINARY: No dysuria, frequency, hematuria, or incontinence  SKIN: No itching, burning, rashes, or lesions   MUSCULOSKELETAL: No joint pain or swelling; No muscle, back, or extremity pain  HEME/LYMPH: No easy bruising, or bleeding gums      T(C): 36.7 (01-24-19 @ 08:01), Max: 37.1 (01-23-19 @ 20:34)  HR: 80 (01-24-19 @ 08:01) (71 - 93)  BP: 116/81 (01-24-19 @ 08:01) (110/79 - 130/82)  RR: 16 (01-24-19 @ 08:01) (16 - 18)  SpO2: 92% (01-24-19 @ 08:01) (92% - 99%)  Wt(kg): --Vital Signs Last 24 Hrs  T(C): 36.7 (24 Jan 2019 08:01), Max: 37.1 (23 Jan 2019 20:34)  T(F): 98 (24 Jan 2019 08:01), Max: 98.8 (23 Jan 2019 20:34)  HR: 80 (24 Jan 2019 08:01) (71 - 93)  BP: 116/81 (24 Jan 2019 08:01) (110/79 - 130/82)  BP(mean): --  RR: 16 (24 Jan 2019 08:01) (16 - 18)  SpO2: 92% (24 Jan 2019 08:01) (92% - 99%)    PHYSICAL EXAM:  GENERAL: NAD, well-groomed, well-developed  HEAD:  Atraumatic, Normocephalic  NERVOUS SYSTEM:  Alert & Oriented X3, Good concentration; Motor Strength 5/5 B/L upper and lower extremities; DTRs 2+ intact and symmetric  CHEST/LUNG:  Coarse lung sounds bilaterally; No rales, rhonchi, or wheezing.  HEART: Regular rate and rhythm; No murmurs, rubs, or gallops  ABDOMEN: Soft, Nontender, Nondistended; Bowel sounds present  EXTREMITIES:  2+ pitting edema right LE, 2+ Peripheral Pulses, No clubbing, or cyanosis  SKIN: No rashes or lesions    Consultant(s) Notes Reviewed:  [x ] YES  [ ] NO  Care Discussed with Consultants/Other Providers [ x] YES  [ ] NO    LABS:                        9.6    6.11  )-----------( 167      ( 24 Jan 2019 07:26 )             30.5     01-24    146<H>  |  104  |  58<H>  ----------------------------<  94  3.5   |  36<H>  |  2.00<H>    Ca    8.5      24 Jan 2019 07:26    TPro  5.0<L>  /  Alb  2.9<L>  /  TBili  0.5  /  DBili  x   /  AST  13<L>  /  ALT  20  /  AlkPhos  53  01-23    PT/INR - ( 24 Jan 2019 07:26 )   PT: 37.6 sec;   INR: 3.18 ratio        Imaging Personally Reviewed:  [ ] YES  [ ] NO Patient is a 89y old  Male who presents with a chief complaint of SOB (24 Jan 2019 09:25)    89yo M w/ PMHx HFpEF 55-60% (last echo Oct 2018), moderate aortic stenosis, mild pulmonary HTN, small B cell lymphoma, CKD IV, Afib on warfarin, hypothyroidism, prostate ca, postherpetic neuralgia presented with coughing, weakness, increased sputum production for 5 days duration most likely 2/2 viral etiology vs fluid overload vs. devloping PNA.    INTERVAL HPI/OVERNIGHT EVENTS: Patient was seen and examined at bedside. Patient states he is doing better today. He states that his cough has improved but still feels like there is fluid in his lungs that he is trying to cough up. Denies SOB, chest pain or tightness. Patient has been eating and drinking well. Denies any nausea, vomiting or abdominal pain. Patient is urinating and stooling well. He states that his constipation has resolved and is now stooling regularly. Denies any hematuria or hematochezia. Patient ambulates with a walker. He also reports lower extremity edema that is worse on the right left, which he states is chronic in nature and has not worsened to his knowledge. Denies any leg pain or loss of sensation in LE.     Patient was having O2 sats ranging from 90-94% on room air overnight. Tried ambulating this AM down the camacho and back and was reported to have an O2 sat of 74% s/p ambulation. Patient denies SOB or lightheadedness during or after walking.    REVIEW OF SYSTEMS:  CONSTITUTIONAL: No fever, weight loss, or fatigue, sitting in chair comfortably  RESPIRATORY: Cough and shortness of breath, no wheezing or hemoptysis  CARDIOVASCULAR: Chronic R LE edema, No chest pain, palpitations, or dizziness.  GASTROINTESTINAL: No abdominal or epigastric pain. No nausea, vomiting, or hematemesis; No diarrhea or constipation. No melena or hematochezia.  GENITOURINARY: No dysuria, frequency, hematuria, or incontinence  SKIN: No itching, burning, rashes, or lesions   MUSCULOSKELETAL: No joint pain or swelling; No muscle, back, or extremity pain  HEME/LYMPH: No easy bruising, or bleeding gums      T(C): 36.7 (01-24-19 @ 08:01), Max: 37.1 (01-23-19 @ 20:34)  HR: 80 (01-24-19 @ 08:01) (71 - 93)  BP: 116/81 (01-24-19 @ 08:01) (110/79 - 130/82)  RR: 16 (01-24-19 @ 08:01) (16 - 18)  SpO2: 92% (01-24-19 @ 08:01) (92% - 99%)  Wt(kg): --Vital Signs Last 24 Hrs  T(C): 36.7 (24 Jan 2019 08:01), Max: 37.1 (23 Jan 2019 20:34)  T(F): 98 (24 Jan 2019 08:01), Max: 98.8 (23 Jan 2019 20:34)  HR: 80 (24 Jan 2019 08:01) (71 - 93)  BP: 116/81 (24 Jan 2019 08:01) (110/79 - 130/82)  BP(mean): --  RR: 16 (24 Jan 2019 08:01) (16 - 18)  SpO2: 92% (24 Jan 2019 08:01) (92% - 99%)    PHYSICAL EXAM:  GENERAL: NAD, well-groomed, well-developed  HEAD:  Atraumatic, Normocephalic  NERVOUS SYSTEM:  Alert & Oriented X3, Good concentration; Motor Strength 5/5 B/L upper and lower extremities; DTRs 2+ intact and symmetric  CHEST/LUNG:  Coarse lung sounds bilaterally; No rales, rhonchi, or wheezing.  HEART: Regular rate and rhythm; No murmurs, rubs, or gallops  ABDOMEN: Soft, Nontender, Nondistended; Bowel sounds present  EXTREMITIES:  2+ pitting edema right LE, 2+ Peripheral Pulses, No clubbing, or cyanosis  SKIN: No rashes, normal skin color    Consultant(s) Notes Reviewed:  [ x ] YES  [ ] NO  Care Discussed with Consultants/Other Providers [ x ] YES  [ ] NO    LABS:                        9.6    6.11  )-----------( 167      ( 24 Jan 2019 07:26 )             30.5     01-24    146<H>  |  104  |  58<H>  ----------------------------<  94  3.5   |  36<H>  |  2.00<H>    Ca    8.5      24 Jan 2019 07:26    TPro  5.0<L>  /  Alb  2.9<L>  /  TBili  0.5  /  DBili  x   /  AST  13<L>  /  ALT  20  /  AlkPhos  53  01-23    PT/INR - ( 24 Jan 2019 07:26 )   PT: 37.6 sec;   INR: 3.18 ratio        Imaging Personally Reviewed:  [ x ] YES  [ ] NO

## 2019-01-24 NOTE — PROGRESS NOTE ADULT - PROBLEM SELECTOR PLAN 4
Chronic on coumadin. Subtherapeutic INR on admission.  - will monitor INR with goal between 2-3- INR 3.18  - continue lower dose of coumadin 1 mg (dosing at home has been variable the past few months from 2.5mg to 7mg qhs)  - cont metoprolol with hold parameters  - cont amiodarone 200mg daily for rhythm control - LFT's and TFT's wnl. Chronic on coumadin. Subtherapeutic INR on admission.  - will monitor INR with goal between 2-3- INR 3.18  - Holding coumadin today (dosing at home has been variable the past few months from 2.5mg to 7mg qhs)  - cont metoprolol with hold parameters  - cont amiodarone 200mg daily for rhythm control - LFT's and TFT's wnl.

## 2019-01-25 VITALS — WEIGHT: 151.9 LBS

## 2019-01-25 LAB
ANION GAP SERPL CALC-SCNC: 5 MMOL/L — SIGNIFICANT CHANGE UP (ref 5–17)
BUN SERPL-MCNC: 56 MG/DL — HIGH (ref 7–23)
CALCIUM SERPL-MCNC: 7.9 MG/DL — LOW (ref 8.5–10.1)
CHLORIDE SERPL-SCNC: 103 MMOL/L — SIGNIFICANT CHANGE UP (ref 96–108)
CO2 SERPL-SCNC: 37 MMOL/L — HIGH (ref 22–31)
CREAT SERPL-MCNC: 2 MG/DL — HIGH (ref 0.5–1.3)
GLUCOSE SERPL-MCNC: 94 MG/DL — SIGNIFICANT CHANGE UP (ref 70–99)
HCT VFR BLD CALC: 30.2 % — LOW (ref 39–50)
HGB BLD-MCNC: 9.3 G/DL — LOW (ref 13–17)
INR BLD: 2.63 RATIO — HIGH (ref 0.88–1.16)
MCHC RBC-ENTMCNC: 30.8 GM/DL — LOW (ref 32–36)
MCHC RBC-ENTMCNC: 31.3 PG — SIGNIFICANT CHANGE UP (ref 27–34)
MCV RBC AUTO: 101.7 FL — HIGH (ref 80–100)
NRBC # BLD: 0 /100 WBCS — SIGNIFICANT CHANGE UP (ref 0–0)
PLATELET # BLD AUTO: 157 K/UL — SIGNIFICANT CHANGE UP (ref 150–400)
POTASSIUM SERPL-MCNC: 3.2 MMOL/L — LOW (ref 3.5–5.3)
POTASSIUM SERPL-SCNC: 3.2 MMOL/L — LOW (ref 3.5–5.3)
PROTHROM AB SERPL-ACNC: 30.6 SEC — HIGH (ref 10–12.9)
RBC # BLD: 2.97 M/UL — LOW (ref 4.2–5.8)
RBC # FLD: 15.8 % — HIGH (ref 10.3–14.5)
SODIUM SERPL-SCNC: 145 MMOL/L — SIGNIFICANT CHANGE UP (ref 135–145)
WBC # BLD: 6.13 K/UL — SIGNIFICANT CHANGE UP (ref 3.8–10.5)
WBC # FLD AUTO: 6.13 K/UL — SIGNIFICANT CHANGE UP (ref 3.8–10.5)

## 2019-01-25 PROCEDURE — 99239 HOSP IP/OBS DSCHRG MGMT >30: CPT

## 2019-01-25 PROCEDURE — 82550 ASSAY OF CK (CPK): CPT

## 2019-01-25 PROCEDURE — 97116 GAIT TRAINING THERAPY: CPT

## 2019-01-25 PROCEDURE — 93005 ELECTROCARDIOGRAM TRACING: CPT

## 2019-01-25 PROCEDURE — 71045 X-RAY EXAM CHEST 1 VIEW: CPT

## 2019-01-25 PROCEDURE — 82272 OCCULT BLD FECES 1-3 TESTS: CPT

## 2019-01-25 PROCEDURE — 84484 ASSAY OF TROPONIN QUANT: CPT

## 2019-01-25 PROCEDURE — 97161 PT EVAL LOW COMPLEX 20 MIN: CPT

## 2019-01-25 PROCEDURE — 85027 COMPLETE CBC AUTOMATED: CPT

## 2019-01-25 PROCEDURE — 80053 COMPREHEN METABOLIC PANEL: CPT

## 2019-01-25 PROCEDURE — 96374 THER/PROPH/DIAG INJ IV PUSH: CPT

## 2019-01-25 PROCEDURE — 99232 SBSQ HOSP IP/OBS MODERATE 35: CPT

## 2019-01-25 PROCEDURE — 84443 ASSAY THYROID STIM HORMONE: CPT

## 2019-01-25 PROCEDURE — 86140 C-REACTIVE PROTEIN: CPT

## 2019-01-25 PROCEDURE — 99285 EMERGENCY DEPT VISIT HI MDM: CPT | Mod: 25

## 2019-01-25 PROCEDURE — 97530 THERAPEUTIC ACTIVITIES: CPT

## 2019-01-25 PROCEDURE — 84480 ASSAY TRIIODOTHYRONINE (T3): CPT

## 2019-01-25 PROCEDURE — 85610 PROTHROMBIN TIME: CPT

## 2019-01-25 PROCEDURE — 83735 ASSAY OF MAGNESIUM: CPT

## 2019-01-25 PROCEDURE — 83605 ASSAY OF LACTIC ACID: CPT

## 2019-01-25 PROCEDURE — 84436 ASSAY OF TOTAL THYROXINE: CPT

## 2019-01-25 PROCEDURE — 80048 BASIC METABOLIC PNL TOTAL CA: CPT

## 2019-01-25 PROCEDURE — 82803 BLOOD GASES ANY COMBINATION: CPT

## 2019-01-25 PROCEDURE — 94640 AIRWAY INHALATION TREATMENT: CPT

## 2019-01-25 PROCEDURE — 36415 COLL VENOUS BLD VENIPUNCTURE: CPT

## 2019-01-25 PROCEDURE — 84145 PROCALCITONIN (PCT): CPT

## 2019-01-25 PROCEDURE — 83880 ASSAY OF NATRIURETIC PEPTIDE: CPT

## 2019-01-25 PROCEDURE — 94760 N-INVAS EAR/PLS OXIMETRY 1: CPT

## 2019-01-25 PROCEDURE — 85730 THROMBOPLASTIN TIME PARTIAL: CPT

## 2019-01-25 PROCEDURE — 87631 RESP VIRUS 3-5 TARGETS: CPT

## 2019-01-25 PROCEDURE — 87040 BLOOD CULTURE FOR BACTERIA: CPT

## 2019-01-25 PROCEDURE — 84100 ASSAY OF PHOSPHORUS: CPT

## 2019-01-25 PROCEDURE — 93970 EXTREMITY STUDY: CPT

## 2019-01-25 PROCEDURE — 87449 NOS EACH ORGANISM AG IA: CPT

## 2019-01-25 RX ORDER — POTASSIUM CHLORIDE 20 MEQ
40 PACKET (EA) ORAL EVERY 4 HOURS
Qty: 0 | Refills: 0 | Status: COMPLETED | OUTPATIENT
Start: 2019-01-25 | End: 2019-01-25

## 2019-01-25 RX ORDER — WARFARIN SODIUM 2.5 MG/1
1 TABLET ORAL
Qty: 10 | Refills: 0
Start: 2019-01-25 | End: 2019-02-03

## 2019-01-25 RX ADMIN — PANTOPRAZOLE SODIUM 40 MILLIGRAM(S): 20 TABLET, DELAYED RELEASE ORAL at 05:03

## 2019-01-25 RX ADMIN — Medication 1 SPRAY(S): at 05:03

## 2019-01-25 RX ADMIN — Medication 100 MILLIGRAM(S): at 09:53

## 2019-01-25 RX ADMIN — Medication 40 MILLIEQUIVALENT(S): at 09:53

## 2019-01-25 RX ADMIN — Medication 1 TABLET(S): at 09:53

## 2019-01-25 RX ADMIN — Medication 40 MILLIEQUIVALENT(S): at 13:18

## 2019-01-25 RX ADMIN — Medication 25 MILLIGRAM(S): at 05:03

## 2019-01-25 RX ADMIN — SENNA PLUS 1 TABLET(S): 8.6 TABLET ORAL at 13:19

## 2019-01-25 RX ADMIN — Medication 600 MILLIGRAM(S): at 05:03

## 2019-01-25 RX ADMIN — Medication 1 TABLET(S): at 13:19

## 2019-01-25 RX ADMIN — ALBUTEROL 2.5 MILLIGRAM(S): 90 AEROSOL, METERED ORAL at 07:56

## 2019-01-25 RX ADMIN — Medication 10 MILLIEQUIVALENT(S): at 13:19

## 2019-01-25 RX ADMIN — AMIODARONE HYDROCHLORIDE 200 MILLIGRAM(S): 400 TABLET ORAL at 05:03

## 2019-01-25 RX ADMIN — Medication 1 DROP(S): at 05:03

## 2019-01-25 RX ADMIN — Medication 40 MILLIGRAM(S): at 05:03

## 2019-01-25 RX ADMIN — Medication 100 MICROGRAM(S): at 05:03

## 2019-01-25 RX ADMIN — Medication 100 MILLIGRAM(S): at 05:03

## 2019-01-25 RX ADMIN — GABAPENTIN 100 MILLIGRAM(S): 400 CAPSULE ORAL at 05:03

## 2019-01-25 NOTE — PROGRESS NOTE ADULT - PROVIDER SPECIALTY LIST ADULT
Cardiology
Gastroenterology
Gastroenterology
Hospitalist
Pulmonology
Gastroenterology

## 2019-01-25 NOTE — PROGRESS NOTE ADULT - PROBLEM SELECTOR PLAN 1
suspect multifactorial  prior fobt neg  yesterdays events noted, in setting of supratherapeutic inr, improved today  cont bowel regimen, rec anusol prn  no further gib per nursing  hgb low but stable  daily cbc, transfuse prn  cont ppi  consider iron studies/heme eval  reports hx of remote, normal colonoscopy  if w s/s active gib check cta/bleeding scan  will follow

## 2019-01-25 NOTE — PROGRESS NOTE ADULT - SUBJECTIVE AND OBJECTIVE BOX
INTERVAL HPI/OVERNIGHT EVENTS:  pt seen and examined  denies n/v/abd pain  reports episode of stool w scant amount of brb after straining w bm yesterday  no further s/s gib per nursing  afebrile overnight labs noted    MEDICATIONS  (STANDING):  ALBUTerol    0.083% 2.5 milliGRAM(s) Nebulizer every 12 hours  amiodarone    Tablet 200 milliGRAM(s) Oral daily  artificial  tears Solution 1 Drop(s) Both EYES two times a day  docusate sodium 100 milliGRAM(s) Oral two times a day  furosemide    Tablet 40 milliGRAM(s) Oral daily  gabapentin 100 milliGRAM(s) Oral two times a day  gabapentin 200 milliGRAM(s) Oral at bedtime  guaiFENesin  milliGRAM(s) Oral every 12 hours  lactobacillus acidophilus 1 Tablet(s) Oral three times a day with meals  levothyroxine 100 MICROGram(s) Oral daily  metoprolol succinate ER 25 milliGRAM(s) Oral daily  pantoprazole    Tablet 40 milliGRAM(s) Oral before breakfast  potassium chloride    Tablet ER 10 milliEquivalent(s) Oral daily  potassium chloride    Tablet ER 40 milliEquivalent(s) Oral every 4 hours  senna 1 Tablet(s) Oral daily  sodium chloride 0.65% Nasal 1 Spray(s) Both Nostrils three times a day  tamsulosin 0.4 milliGRAM(s) Oral at bedtime    MEDICATIONS  (PRN):  acetaminophen   Tablet .. 650 milliGRAM(s) Oral every 6 hours PRN Temp greater or equal to 38C (100.4F), Mild Pain (1 - 3)  ALBUTerol    0.083% 2.5 milliGRAM(s) Nebulizer every 6 hours PRN Shortness of Breath and/or Wheezing  ALPRAZolam 0.5 milliGRAM(s) Oral at bedtime PRN insomnia  benzonatate 100 milliGRAM(s) Oral every 8 hours PRN Cough  bisacodyl Suppository 10 milliGRAM(s) Rectal once PRN Constipation      Allergies    No Known Allergies    Intolerances        Review of Systems:    General:  No wt loss, fevers, chills, night sweats, fatigue   Eyes:  Good vision, no reported pain  ENT:  No sore throat, pain, runny nose, dysphagia  CV:  No pain, palpitations, hypo/hypertension  Resp:  No dyspnea, cough, tachypnea, wheezing  GI:  No pain, No nausea, No vomiting, No diarrhea, No constipation, No weight loss, No fever, No pruritis, scant rectal bleeding, No melena, No dysphagia  :  No pain, bleeding, incontinence, nocturia  Muscle:  No pain, weakness  Neuro:  No weakness, tingling, memory problems  Psych:  No fatigue, insomnia, mood problems, depression  Endocrine:  No polyuria, polydypsia, cold/heat intolerance  Heme:  No petechiae, ecchymosis, easy bruisability  Skin:  No rash, tattoos, scars, edema      Vital Signs Last 24 Hrs  T(C): 36.4 (25 Jan 2019 07:55), Max: 37 (24 Jan 2019 16:39)  T(F): 97.5 (25 Jan 2019 07:55), Max: 98.6 (24 Jan 2019 16:39)  HR: 58 (25 Jan 2019 07:56) (56 - 88)  BP: 121/73 (25 Jan 2019 07:55) (121/73 - 145/83)  BP(mean): --  RR: 17 (25 Jan 2019 07:55) (17 - 24)  SpO2: 96% (25 Jan 2019 07:55) (91% - 96%)    PHYSICAL EXAM:    Constitutional: NAD  HEENT: ncat  Neck: No LAD  Respiratory: coarse bs  Cardiovascular: S1 and S2, RRR  Gastrointestinal: soft nt nd  Extremities: No peripheral edema  Vascular: 2+ peripheral pulses  Neurological: awake alert responds appropriately  Skin: No rashes    LABS:                        9.3    6.13  )-----------( 157      ( 25 Jan 2019 07:28 )             30.2     01-25    145  |  103  |  56<H>  ----------------------------<  94  3.2<L>   |  37<H>  |  2.00<H>    Ca    7.9<L>      25 Jan 2019 07:28      PT/INR - ( 25 Jan 2019 11:02 )   PT: 30.6 sec;   INR: 2.63 ratio               RADIOLOGY & ADDITIONAL TESTS:

## 2019-01-25 NOTE — DIETITIAN INITIAL EVALUATION ADULT. - NS AS NUTRI INTERV ED CONTENT
Pt provided with written and verbal heart failure nutrition education. Topics discussed include: limiting sodium intake, increasing consumption of fruits/vegetables/whole grains, limiting intake of saturated fat from red meat and full-fat dairy, watching fluid intake, monitoring weight changes (2-3 pounds overnight or 5 pounds within 1 week and communicating them to MD in case of fluid retention). Also discussed coumadin Vitamin K food drug interaction with pt and daughter. Discussed sources of vitamin K and cranberry juice interaction. Pt verbalized understanding of nutrition education and had no questions at this time. Will follow up regarding teach back ability.

## 2019-01-25 NOTE — PROGRESS NOTE ADULT - PROBLEM SELECTOR PROBLEM 1
Anemia
Anemia
Pulmonary congestion
SOB (shortness of breath)
Anemia

## 2019-01-25 NOTE — PROGRESS NOTE ADULT - ATTENDING COMMENTS
Chart reviewed    Patient seen and examined    Agree with plan as outlined
Seen/examined. agree with above.
I personally saw and examined the patient in detail.  I have spoken to the above provider regarding the assessment and plan of care.  I reviewed the above assessment and plan of care, and agree.  I have made changes in the body of the note where appropriate.
I personally saw and examined the patient in detail.  I have spoken to the above provider regarding the assessment and plan of care.  I reviewed the above assessment and plan of care, and agree.  I have made changes in the body of the note where appropriate.
The patient was personally seen and examined, in addition to being examined and evaluated by NP.  All elements of the note were edited where appropriate.
I saw and examined the patient personally. Spoke with above provider regarding this case. I reviewed the above findings completely.  I agree with the above history, physical, and plan which I have edited where appropriate.
Advanced care planning was discussed with patient and family.  Advanced care planning forms were reviewed and discussed.  Risks, benefits and alternatives of gastroenterologic procedures were discussed in detail and all questions were answered.    30 minutes spent.
Pt is on RA with Spo2 ~ 92%, and was hypoxic yesterday on ambulation. Today pt has cough and wheezing.   Elevated INR and improving renal indices.  Pt refusing for rehab. D/C planning for tomorrow.
Cardiac: HFpEF with AS, pHTN improving clinically. Has less cough. c/w current manage ment.   Anemia: drop in Hb 9.6 to 8.6, increase in BUN only. pt on coumadin, will r/o GI bleed-FOBT and GI eval.  COPD: Cough and SOB improving, Spo2 is 94% at RA. c/w current treatment.  D/W daughter advance care planning. He is DNR and DNI, Molst in th chart

## 2019-01-25 NOTE — PROGRESS NOTE ADULT - ASSESSMENT
89yo M w/ PMHx HFpEF, moderate aortic stenosis, mild pulmonary HTN, moderate MR, small B cell lymphoma, CKD IV, afib on warfarin, hypothyroidism, prostate ca, postherpetic neuralgia presents with coughing, weakness, increased sputum production, admitted with HCAP:    - HR and BP appear controlled.  No significant arrhythmias except rate-controlled Afib/Aflutter  - Appears compensated from HF POV.  Cont lasix 40mg PO Qday  - Please continue to maintain strict I/Os, monitor daily weights, Cr, and K.   - Dose Coumadin daily to a goal of 2-3.    -CW BB, Amio  - H/H stable at 8-9, FOBT negative.,  Heme following.  Transfuse as needed but would give IV Lasix post transfusion to prevent volume overload  - Abx per primary team  - Supplemental oxygen as needed.  Bronchodilators and steroids per Pulm  - Incentive spirometer.  Please encourage use  - Monitor and replete electrolytes. Keep K>4.0 and Mg>2.0.   - Further cardiac workup will depend on clinical course.   - All other workup per primary team. Will followup.     Shubham FLORES  Cardiology   Cardiology 89yo M w/ PMHx HFpEF, moderate aortic stenosis, mild pulmonary HTN, moderate MR, small B cell lymphoma, CKD IV, afib on warfarin, hypothyroidism, prostate ca, postherpetic neuralgia presents with coughing, weakness, increased sputum production, admitted with HCAP:    - HR and BP appear controlled.  No significant arrhythmias except rate-controlled Afib/Aflutter  - Appears compensated from HF POV.  Cont lasix 40mg PO Qday  - Please continue to maintain strict I/Os, monitor daily weights, Cr, and K.   - Dose Coumadin daily to a goal of 2-3.    -CW BB, Amio  - H/H stable at 8-9, FOBT negative.,  Heme following.  Transfuse as needed but would give IV Lasix post transfusion to prevent volume overload  - Abx per primary team  - Supplemental oxygen as needed.  Bronchodilators and steroids per Pulm  - Incentive spirometer.  Please encourage use  - Monitor and replete electrolytes. Keep K>4.0 and Mg>2.0.   - Further cardiac workup will depend on clinical course.   - All other workup and dc planning per primary team. Will followup.     Shubham FLORES  Cardiology   Cardiology

## 2019-01-25 NOTE — DIETITIAN INITIAL EVALUATION ADULT. - PROBLEM SELECTOR PLAN 1
with associated productive cough. Worsening over period of days. Pt with recent hospitalization. Pt at risk for gram negative PNA vs. MRSA pna through etiology of sxs unknown and multifactorial  - CXR: revealing R lung base opacity and small right sided effusion. Afebrile. WBC wnl. productive cough. rapid flu/RSV neg.  - s/p Zosyn in ED, holding further abx at this time, reviewed pulm note, agree  - will obtain urine legionella  - will start Mucinex PRN for cough  - will start albuterol nebs PRN  - obtain procal and other inflammatory markers per pulm recs  - cont NC to maintain SpO2 >92%, pt is not on home O2 so will wean as tolerated  - given SOB will hold daytime Xanax, and give bedtime dose PRN for insomnia  - will chest AM PA/lateral CXR  - pt has h/o diastolic heart failure and developed acute hypoxic respiratory failure today and is currently on supplemental O2 in the ED  - clinically appears euvolemic but will monitor strict i/o's and daily weights for now  - BP has been soft so will not add ACEI/ARB yet and will monitor for stability of creatinine before determining need  - low dose bb w/ holding parameter ordered  - has asymmetric b/l LE edema, check venous dopplers to eval for DVT (pt has subtherapeutic INR)

## 2019-01-25 NOTE — DIETITIAN INITIAL EVALUATION ADULT. - OTHER INFO
Pt seen for nutrition assessment for HF. D/C today, provided written/verbal nutrition education related to HF prior to discharge.

## 2019-01-25 NOTE — DIETITIAN INITIAL EVALUATION ADULT. - PROBLEM SELECTOR PLAN 4
Chronic on coumadin. Subtherapeutic INR on admission.  will monitor INR with goal between 2-3  will dose 5mg coumadin today (dosing at home has been variable the past few months from 2.5mg all the way to 7mg qhs)  also will dose 70mg sq lovenox now to bridge due to subtherapeutic INR, may need additional q 24hr doses if INR remains subtherapeutic. I discussed this w/ family to reduce CVA risk and they are agreeable  cont metoprolol with hold parameters  cont pacerone 200mg daily for rhythm control - monitor LFT's and TFT's while on amio (TFT's ordered for AM tomorrow) and liver function reviewed

## 2019-01-25 NOTE — DIETITIAN INITIAL EVALUATION ADULT. - SOURCE
other (specify)/Family/caretaker at bedside, reviewed pt's medical chart./patient/family/significant other

## 2019-01-25 NOTE — PROGRESS NOTE ADULT - SUBJECTIVE AND OBJECTIVE BOX
[facility] -- Sandy Alvarado, Kt, Lizett, Babak Hoffman Savella  Office # [phone]      Follow Up:    Afib AS SOB  Subjective/Observations:   No events overnight resting comfortably in bed.  No complaints of chest pain, dyspnea, or palpitations reported. No signs of orthopnea or PND.     REVIEW OF SYSTEMS: All other review of systems is negative unless indicated above    PAST MEDICAL & SURGICAL HISTORY:  CKD (chronic kidney disease)  BPH (benign prostatic hyperplasia)  Postherpetic neuralgia  Chronic diarrhea  Prostate CA  Basal cell carcinoma  Lymphoma  Anxiety  Hypothyroid  Hypertension  A-fib  H/O shoulder replacement  S/P bilateral unicompartmental knee replacement      MEDICATIONS  (STANDING):  ALBUTerol    0.083% 2.5 milliGRAM(s) Nebulizer every 12 hours  amiodarone    Tablet 200 milliGRAM(s) Oral daily  artificial  tears Solution 1 Drop(s) Both EYES two times a day  docusate sodium 100 milliGRAM(s) Oral two times a day  furosemide    Tablet 40 milliGRAM(s) Oral daily  gabapentin 100 milliGRAM(s) Oral two times a day  gabapentin 200 milliGRAM(s) Oral at bedtime  guaiFENesin  milliGRAM(s) Oral every 12 hours  lactobacillus acidophilus 1 Tablet(s) Oral three times a day with meals  levothyroxine 100 MICROGram(s) Oral daily  metoprolol succinate ER 25 milliGRAM(s) Oral daily  pantoprazole    Tablet 40 milliGRAM(s) Oral before breakfast  potassium chloride    Tablet ER 10 milliEquivalent(s) Oral daily  potassium chloride    Tablet ER 40 milliEquivalent(s) Oral every 4 hours  senna 1 Tablet(s) Oral daily  sodium chloride 0.65% Nasal 1 Spray(s) Both Nostrils three times a day  tamsulosin 0.4 milliGRAM(s) Oral at bedtime    MEDICATIONS  (PRN):  acetaminophen   Tablet .. 650 milliGRAM(s) Oral every 6 hours PRN Temp greater or equal to 38C (100.4F), Mild Pain (1 - 3)  ALBUTerol    0.083% 2.5 milliGRAM(s) Nebulizer every 6 hours PRN Shortness of Breath and/or Wheezing  ALPRAZolam 0.5 milliGRAM(s) Oral at bedtime PRN insomnia  benzonatate 100 milliGRAM(s) Oral every 8 hours PRN Cough  bisacodyl Suppository 10 milliGRAM(s) Rectal once PRN Constipation      Allergies    No Known Allergies    Intolerances        Vital Signs Last 24 Hrs  T(C): 36.6 (25 Jan 2019 11:50), Max: 37 (24 Jan 2019 16:39)  T(F): 97.8 (25 Jan 2019 11:50), Max: 98.6 (24 Jan 2019 16:39)  HR: 67 (25 Jan 2019 11:50) (56 - 88)  BP: 122/77 (25 Jan 2019 11:50) (121/73 - 145/83)  BP(mean): --  RR: 17 (25 Jan 2019 11:50) (17 - 24)  SpO2: 95% (25 Jan 2019 11:50) (91% - 96%)    I&O's Summary    24 Jan 2019 07:01  -  25 Jan 2019 07:00  --------------------------------------------------------  IN: 0 mL / OUT: 1600 mL / NET: -1600 mL          PHYSICAL EXAM:  TELE: Off tele   Constitutional: NAD, awake and alert, well-developed  HEENT: Moist Mucous Membranes, Anicteric  Pulmonary: Non-labored, breath sounds with Bilaterally diminished at bases  No  wheezes, crackles or rhonchi   Cardiovascular: Irregular, S1 and S2 nl, + murmur No rubs, gallops or clicks   Gastrointestinal: Bowel Sounds present, soft, nontender.   Lymph: No lymphadenopathy. +R LE peripheral edema.   Skin: No visible rashes or ulcers.  Psych:  Mood & affect appropriate    LABS: All Labs Reviewed:                        9.3    6.13  )-----------( 157      ( 25 Jan 2019 07:28 )             30.2                         9.6    6.11  )-----------( 167      ( 24 Jan 2019 07:26 )             30.5                         8.9    5.64  )-----------( 147      ( 23 Jan 2019 06:30 )             28.9     25 Jan 2019 07:28    145    |  103    |  56     ----------------------------<  94     3.2     |  37     |  2.00   24 Jan 2019 07:26    146    |  104    |  58     ----------------------------<  94     3.5     |  36     |  2.00   23 Jan 2019 06:30    147    |  104    |  66     ----------------------------<  93     3.3     |  37     |  2.20     Ca    7.9        25 Jan 2019 07:28  Ca    8.5        24 Jan 2019 07:26  Ca    8.2        23 Jan 2019 06:30    TPro  5.0    /  Alb  2.9    /  TBili  0.5    /  DBili  x      /  AST  13     /  ALT  20     /  AlkPhos  53     23 Jan 2019 06:30    PT/INR - ( 25 Jan 2019 11:02 )   PT: 30.6 sec;   INR: 2.63 ratio       < from: 12 Lead ECG (01.19.19 @ 16:03) >  Ventricular Rate 89 BPM    Atrial Rate 100 BPM    QRS Duration 154 ms    Q-T Interval 406 ms    QTC Calculation(Bezet) 493 ms    R Axis 265 degrees    T Axis 102 degrees    Diagnosis Line Atrial fibrillation  Right bundle branch block  Septal infarct (cited on or before 09-APR-2017)  Abnormal ECG  Confirmed by Hal Mondragon ([id number]) on 1/20/2019 11:48:19 AM    < end of copied text >     from: Transthoracic Echocardiogram (10.24.18 @ 09:06) >     EXAM:  ECHO TTE WO CON COMP W DOP         PROCEDURE DATE:  10/24/2018        INTERPRETATION:  Transthoracic Echocardiography Report (TTE)     Demographics     Patient name        VARSHA NIEVES    Age           89 year(s)     Med Rec #           [id number]         Gender        Male     Account #           [id number]           Date of Birth 07/03/1927     Interpreting        Brittany Ruiz MD Room Number   0004   Physician     Referring Physician Solo Huddleston        Sonographer   Alber Huffman RDCS     Date of study       10/24/2018 08:21                       AM     Height              65 in             Weight        154.99 pounds    Type of Study:     TTE procedure: ECHO TTE WO CON COMP W DOP     BP: 102/63 mmHg     Study Location: 3NTechnical Quality: Technically Difficullt    Indications   1) I50.9 - Heart failure    M-Mode Measurements (cm)     LVEDd: 4.08 cm            LVESd: 2.8 cm   IVSEd: 1.39 cm   LVPWd: 1.31 cm            AO Root Dimension: 3.5 cm                             ACS: 1.3 cm                             LA: 5.6 cm                             LVOT: 1.9 cm    Doppler Measurements:     AV Velocity:345 cm/s                 MV Peak E-Wave: 73.1 cm/s   AV Peak Gradient: 47.61 mmHg         MV Peak A-Wave: 24.7 cm/s   AVMean Gradient: 29 mmHg            MV E/A Ratio: 2.96 %   AV Area (Continuity):0.59 cm^2       MV Peak Gradient: 2.14 mmHg   TR Velocity:297 cm/s   TR Gradient:35.2836 mmHg   Estimated RAP:10 mmHg   RVSP:45 mmHg     Findings     Mitral Valve   Fibrocalcific changes noted to the mitral valve leaflets with preserved   leaflet excursion.   Mild mitral annular calcification is present.   Moderate (2+) mitral regurgitation is present.     Aortic Valve   Significant fibrocalcific changes noted to the aortic valve leaflets with   restriction in leaflet excursion. Peak transaortic gradient is 48 mmHg;   this finding is consistent with moderate aortic stenosis.   DI = .2   Continuity equation AGUSTIN is .59 cm which likely overestimates the degree   of   aortic stenosis     Tricuspid Valve   Mild to Moderate Tricuspid regurgitation is present. Mild pulmonary   hypertension.     Pulmonic Valve   Pulmonic valve not well seen.     Left Atrium   The left atrium is moderately dilated.     Left Ventricle   Mild concentric left ventricular hypertrophy is present.   Estimated left ventricular ejection fraction is 55-60 %.     Right Atrium   The right atrium appears moderately dilated.     Right Ventricle   Normal appearing right ventricle structure and function.     Pericardial Effusion   No evidence of pericardial effusion.     Pleural Effusion   No evidence of pleural effusion.     Miscellaneous   The IVC is dilated.     Impression     Summary     Fibrocalcific changes noted to the mitral valve leaflets with preserved   leaflet excursion.   Mild mitral annular calcification is present.   Moderate (2+) mitral regurgitation is present.   Significant fibrocalcific changes noted to the aortic valve leaflets with   restriction in leaflet excursion. Peak transaortic gradient is 48 mmHg;   this finding is consistent with moderate aortic stenosis.   DI = .2   Continuity equation AGUSTIN is .59 cm which likely overestimates the degree   of   aortic stenosis   Mild to Moderate Tricuspid regurgitation is present. Mild pulmonary   hypertension.   Mild concentric left ventricular hypertrophy is present.   Estimated left ventricular ejection fraction is 55-60 %.   The right atrium appears moderately dilated.   Normal appearing right ventricle structure andfunction.     Signature     ----------------------------------------------------------------   Electronically signed by Brittany Ruiz MD(Interpreting   physician) on 10/24/2018 06:04 PM   ----------------------------------------------------------------    Valves     Mitral Valve     Peak E-Wave: 73.1 cm/s   Peak A-Wave: 24.7 cm/s   Peak Gradient: 2.14 mmHg                                                 E/A Ratio: 2.96     Aortic Valve     Peak Velocity: 345 cm/s            Area (continuity): 0.59 cm^2   Peak Gradient: 47.61 mmHg          Mean Gradient: 29 mmHg                                      AV VTI: 67.9 cm   Cusp Separation: 1.3 cm     Tricuspid Valve     TR Velocity: 297 cm/s                  Estimated RAP: 10 mmHg   TR Gradient: 35.2836 mmHg              Estimated RVSP: 45 mmHg     Pulmonic Valve              Estimated PASP: 45.28 mmHg     LVOT     Peak Velocity: 72 cm/s                Mean Gradient: 1 mmHg   LVOT Diameter: 1.9 cm                 LVOT VTI: 14.1 cm    Structures     Left Atrium     LA Dimension: 5.6 cm         LA Area: 30 cm^2   LA/Aorta: 1.6                LA Volume/Index: 113 ml /64m^2     Left Ventricle     Diastolic Dimension: 4.08 cm           Systolic Dimension: 2.8 cm   Septum Diastolic: 1.39 cm   PW Diastolic: 1.31 cm     FS: 31.37 %   LVOT Diameter: 1.9 cm     Right Atrium     RA Systolic Pressure: 10 mmHg     Right Ventricle              RV Systolic Pressure: 45.28 mmHg     Miscellaneous     Aorta     Aortic Root: 3.5 cm   LVOT Diameter: 1.9 cm      < from: Xray Chest 1 View- PORTABLE-Routine (01.20.19 @ 09:13) >    EXAM:  XR CHEST PORTABLE ROUTINE 1V                          PROCEDURE DATE:  01/20/2019      INTERPRETATION:  AP semierect chest on January 20, 2019 at 8:48 AM.   Patient is short of breath.    Heart size is within normal limits. Reverse right shoulder replacement   again noted.    Small infiltrates off right hilum and left hilum again noted.    Chest is similar to January 19.    IMPRESSION: Unchanged chest as above.    JULEE MOSER M.D., ATTENDING RADIOLOGIST  This document has been electronically signed. Jan 20 2019  1:09PM      < end of copied text >  BRITTANY RUIZ M.D., ATTENDING CARDIOLOGIST  This document has been electronically signed. Oct 24 2018  6:04PM      < end of copied text >           Brittany Monson [facility] [place]   Cardiology

## 2019-02-10 PROCEDURE — 30300 REMOVE NASAL FOREIGN BODY: CPT

## 2019-02-10 PROCEDURE — 99283 EMERGENCY DEPT VISIT LOW MDM: CPT | Mod: 25

## 2019-02-27 ENCOUNTER — APPOINTMENT (OUTPATIENT)
Dept: NEUROSURGERY | Facility: CLINIC | Age: 84
End: 2019-02-27
Payer: MEDICARE

## 2019-03-04 ENCOUNTER — APPOINTMENT (OUTPATIENT)
Dept: NEUROSURGERY | Facility: CLINIC | Age: 84
End: 2019-03-04
Payer: MEDICARE

## 2019-03-04 VITALS
BODY MASS INDEX: 24.11 KG/M2 | SYSTOLIC BLOOD PRESSURE: 93 MMHG | RESPIRATION RATE: 16 BRPM | TEMPERATURE: 97.9 F | DIASTOLIC BLOOD PRESSURE: 59 MMHG | HEART RATE: 68 BPM | WEIGHT: 150 LBS | HEIGHT: 66 IN

## 2019-03-04 DIAGNOSIS — B02.22 POSTHERPETIC TRIGEMINAL NEURALGIA: ICD-10-CM

## 2019-03-04 DIAGNOSIS — R51 HEADACHE: ICD-10-CM

## 2019-03-04 PROCEDURE — 99214 OFFICE O/P EST MOD 30 MIN: CPT

## 2019-03-04 RX ORDER — MELATONIN 3 MG
3 CAPSULE ORAL
Refills: 0 | Status: DISCONTINUED | COMMUNITY
Start: 2018-11-02 | End: 2019-03-04

## 2019-03-04 RX ORDER — SENNOSIDES 8.6 MG
8.6 TABLET ORAL EVERY 8 HOURS
Refills: 0 | Status: DISCONTINUED | COMMUNITY
Start: 2018-11-02 | End: 2019-03-04

## 2019-03-05 PROBLEM — B02.22 POST-HERPETIC TRIGEMINAL NEURALGIA: Status: ACTIVE | Noted: 2018-07-09

## 2019-03-05 PROBLEM — R51 FACIAL PAIN: Status: ACTIVE | Noted: 2018-05-15

## 2019-03-05 NOTE — CONSULT LETTER
[Dear  ___] : Dear  [unfilled], [Sincerely,] : Sincerely, [FreeTextEntry2] : Jj Joshi MD\par  [FreeTextEntry1] : Mr. Cortes Diez is a 89 year old male with a past medical history of small cell B lymphoma and prostate cancer, who presents today with left upper and lower orbital pruritus and shooting pain.  Patient states this can worsen with speaking and thinking. He denies any worsening symptoms with eating. As you may recall, patient has been suffering with this this same pain and pruritus dating back to 2017.  He had underwent cyber knife SRS treatment July of 2018 with no improvement noted for left trigeminal neuralgia. Family does report that the patient had a previous history of shingles near this same region approximately 2 years ago. Patient was treated with Valtrex at that time.  Patient has not noted any recent fever or chills or rashes.  Patient is currently taking gabapentin 200mg four times a day as needed.  He has previously been trialed on tegretol and Lyrica but had experienced side effects with this medication. \par \par There is no new imaging to review with the patient.  \par \par The patient is alert and oriented. No acute distress noted. PERRL.  Light sensation with a cotton swab above the left eye causes immense pursuits. Sensation to remainder face normal.  There is of note a small open wound above the left eye without any drainage or swelling.  No redness noted to conjunctiva.  \par \par At this time, I have referred the patient to dermatology for possible evaluation and treatment for shingles which could result in post herpetic trigeminal neuralgia.  I have also made a recommendation for pain management. I have explained to the patient that in order for gabapentin to work he must use it as prescribed around the clock.  Patient is aware to call our office at any time with any further questions or concerns.  \par  [FreeTextEntry3] : Elvira Land, SUJIT., FNP-BC\par Department of Neurosurgery\par [address] [place] [address]\par [place] [id number]\par Phone: [phone]\par Fax: [phone]\par

## 2019-04-12 ENCOUNTER — RX RENEWAL (OUTPATIENT)
Age: 84
End: 2019-04-12

## 2019-04-12 RX ORDER — LIDOCAINE 5% 700 MG/1
5 PATCH TOPICAL
Qty: 30 | Refills: 0 | Status: ACTIVE | COMMUNITY
Start: 2019-03-04 | End: 1900-01-01

## 2019-04-22 ENCOUNTER — APPOINTMENT (OUTPATIENT)
Dept: CARDIOLOGY | Facility: CLINIC | Age: 84
End: 2019-04-22
Payer: MEDICARE

## 2019-04-22 VITALS
OXYGEN SATURATION: 95 % | TEMPERATURE: 97.5 F | DIASTOLIC BLOOD PRESSURE: 43 MMHG | HEART RATE: 47 BPM | SYSTOLIC BLOOD PRESSURE: 71 MMHG

## 2019-04-22 DIAGNOSIS — I35.0 NONRHEUMATIC AORTIC (VALVE) STENOSIS: ICD-10-CM

## 2019-04-22 DIAGNOSIS — R60.0 LOCALIZED EDEMA: ICD-10-CM

## 2019-04-22 PROCEDURE — 93000 ELECTROCARDIOGRAM COMPLETE: CPT

## 2019-04-22 PROCEDURE — 99205 OFFICE O/P NEW HI 60 MIN: CPT

## 2019-04-22 NOTE — REVIEW OF SYSTEMS
[Recent Weight Gain (___ Lbs)] : recent [unfilled] ~Ulb weight gain [see HPI] : see HPI [Feeling Fatigued] : feeling fatigued [Negative] : Heme/Lymph

## 2019-04-22 NOTE — REASON FOR VISIT
[Consultation] : a consultation regarding [Aortic Stenosis] : aortic stenosis [Dyspnea] : dyspnea [Heart Failure] : congestive heart failure

## 2019-04-22 NOTE — HISTORY OF PRESENT ILLNESS
[FreeTextEntry1] : 89 year old man with multiple medical problems and increasing dyspnea on exertion and edema.  Transthoracic echo has demonstrated severe AS with normal LV function.  He has a prior history of prostate Ca, B cell lymphoma and melanoma all currently JOSE.

## 2019-04-22 NOTE — PHYSICAL EXAM
[Normal Conjunctiva] : the conjunctiva exhibited no abnormalities [FreeTextEntry1] : Somewhat cachectic [Eyelids - No Xanthelasma] : the eyelids demonstrated no xanthelasmas [Normal Oral Mucosa] : normal oral mucosa [No Oral Pallor] : no oral pallor [No Oral Cyanosis] : no oral cyanosis [Normal Jugular Venous A Waves Present] : normal jugular venous A waves present [Normal Jugular Venous V Waves Present] : normal jugular venous V waves present [Heart Rate And Rhythm] : heart rate and rhythm were normal [No Jugular Venous Mcgowan A Waves] : no jugular venous mcgowan A waves [Systolic grade ___/6] : A grade [unfilled]/6 systolic murmur was heard. [Heart Sounds] : normal S1 and S2 [Respiration, Rhythm And Depth] : normal respiratory rhythm and effort [Auscultation Breath Sounds / Voice Sounds] : lungs were clear to auscultation bilaterally [Exaggerated Use Of Accessory Muscles For Inspiration] : no accessory muscle use [Abdomen Soft] : soft [Abdomen Tenderness] : non-tender [Abnormal Walk] : normal gait [Abdomen Mass (___ Cm)] : no abdominal mass palpated [Gait - Sufficient For Exercise Testing] : the gait was sufficient for exercise testing [Cyanosis, Localized] : no localized cyanosis [Nail Clubbing] : no clubbing of the fingernails [Petechial Hemorrhages (___cm)] : no petechial hemorrhages [Skin Color & Pigmentation] : normal skin color and pigmentation [] : no rash [Skin Lesions] : no skin lesions [No Venous Stasis] : no venous stasis [No Xanthoma] : no  xanthoma was observed [No Skin Ulcers] : no skin ulcer [Affect] : the affect was normal [Mood] : the mood was normal [Oriented To Time, Place, And Person] : oriented to person, place, and time [No Anxiety] : not feeling anxious

## 2019-04-22 NOTE — DISCUSSION/SUMMARY
[FreeTextEntry1] : 89 year old with symptomatic severe aortic stenosis.  He can only walk about 20 feet.  He has had syncope.  No angina.  His performance status is borderline for TAVR, but will proceed with evaluation.

## 2019-05-13 ENCOUNTER — APPOINTMENT (OUTPATIENT)
Dept: HEMATOLOGY ONCOLOGY | Facility: CLINIC | Age: 84
End: 2019-05-13

## 2019-05-15 ENCOUNTER — APPOINTMENT (OUTPATIENT)
Age: 84
End: 2019-05-15

## 2019-07-02 ENCOUNTER — TRANSCRIPTION ENCOUNTER (OUTPATIENT)
Age: 84
End: 2019-07-02

## 2019-07-02 ENCOUNTER — INPATIENT (INPATIENT)
Facility: HOSPITAL | Age: 84
LOS: 0 days | Discharge: ANOTHER HOSPITAL | DRG: 306 | End: 2019-07-02
Attending: INTERNAL MEDICINE | Admitting: INTERNAL MEDICINE
Payer: MEDICARE

## 2019-07-02 VITALS
HEART RATE: 52 BPM | TEMPERATURE: 98 F | SYSTOLIC BLOOD PRESSURE: 115 MMHG | OXYGEN SATURATION: 100 % | RESPIRATION RATE: 19 BRPM | DIASTOLIC BLOOD PRESSURE: 71 MMHG

## 2019-07-02 VITALS
DIASTOLIC BLOOD PRESSURE: 80 MMHG | SYSTOLIC BLOOD PRESSURE: 132 MMHG | HEART RATE: 51 BPM | TEMPERATURE: 98 F | RESPIRATION RATE: 16 BRPM | OXYGEN SATURATION: 98 %

## 2019-07-02 DIAGNOSIS — Z96.653 PRESENCE OF ARTIFICIAL KNEE JOINT, BILATERAL: Chronic | ICD-10-CM

## 2019-07-02 DIAGNOSIS — Z96.619 PRESENCE OF UNSPECIFIED ARTIFICIAL SHOULDER JOINT: Chronic | ICD-10-CM

## 2019-07-02 DIAGNOSIS — N18.4 CHRONIC KIDNEY DISEASE, STAGE 4 (SEVERE): ICD-10-CM

## 2019-07-02 DIAGNOSIS — I35.0 NONRHEUMATIC AORTIC (VALVE) STENOSIS: ICD-10-CM

## 2019-07-02 DIAGNOSIS — Z79.01 LONG TERM (CURRENT) USE OF ANTICOAGULANTS: ICD-10-CM

## 2019-07-02 DIAGNOSIS — J90 PLEURAL EFFUSION, NOT ELSEWHERE CLASSIFIED: ICD-10-CM

## 2019-07-02 DIAGNOSIS — I50.9 HEART FAILURE, UNSPECIFIED: ICD-10-CM

## 2019-07-02 DIAGNOSIS — I48.2 CHRONIC ATRIAL FIBRILLATION: ICD-10-CM

## 2019-07-02 DIAGNOSIS — I50.32 CHRONIC DIASTOLIC (CONGESTIVE) HEART FAILURE: ICD-10-CM

## 2019-07-02 DIAGNOSIS — J81.0 ACUTE PULMONARY EDEMA: ICD-10-CM

## 2019-07-02 LAB
ALBUMIN SERPL ELPH-MCNC: 3.3 G/DL — SIGNIFICANT CHANGE UP (ref 3.3–5)
ALP SERPL-CCNC: 44 U/L — SIGNIFICANT CHANGE UP (ref 30–120)
ALT FLD-CCNC: 12 U/L DA — SIGNIFICANT CHANGE UP (ref 10–60)
ANION GAP SERPL CALC-SCNC: 9 MMOL/L — SIGNIFICANT CHANGE UP (ref 5–17)
APTT BLD: 37.9 SEC — HIGH (ref 28.5–37)
AST SERPL-CCNC: 15 U/L — SIGNIFICANT CHANGE UP (ref 10–40)
BILIRUB SERPL-MCNC: 0.8 MG/DL — SIGNIFICANT CHANGE UP (ref 0.2–1.2)
BUN SERPL-MCNC: 73 MG/DL — HIGH (ref 7–23)
CALCIUM SERPL-MCNC: 9.7 MG/DL — SIGNIFICANT CHANGE UP (ref 8.4–10.5)
CHLORIDE SERPL-SCNC: 106 MMOL/L — SIGNIFICANT CHANGE UP (ref 96–108)
CK SERPL-CCNC: 54 U/L — SIGNIFICANT CHANGE UP (ref 39–308)
CO2 SERPL-SCNC: 30 MMOL/L — SIGNIFICANT CHANGE UP (ref 22–31)
CREAT SERPL-MCNC: 3.2 MG/DL — HIGH (ref 0.5–1.3)
GLUCOSE SERPL-MCNC: 101 MG/DL — HIGH (ref 70–99)
HCT VFR BLD CALC: 25.7 % — LOW (ref 39–50)
HGB BLD-MCNC: 8 G/DL — LOW (ref 13–17)
INR BLD: 2.36 RATIO — HIGH (ref 0.88–1.16)
MCHC RBC-ENTMCNC: 31.1 GM/DL — LOW (ref 32–36)
MCHC RBC-ENTMCNC: 32 PG — SIGNIFICANT CHANGE UP (ref 27–34)
MCV RBC AUTO: 102.8 FL — HIGH (ref 80–100)
NRBC # BLD: 0 /100 WBCS — SIGNIFICANT CHANGE UP (ref 0–0)
NT-PROBNP SERPL-SCNC: HIGH PG/ML (ref 0–450)
PLATELET # BLD AUTO: 139 K/UL — LOW (ref 150–400)
POTASSIUM SERPL-MCNC: 4.3 MMOL/L — SIGNIFICANT CHANGE UP (ref 3.5–5.3)
POTASSIUM SERPL-SCNC: 4.3 MMOL/L — SIGNIFICANT CHANGE UP (ref 3.5–5.3)
PROT SERPL-MCNC: 5.7 G/DL — LOW (ref 6–8.3)
PROTHROM AB SERPL-ACNC: 27.6 SEC — HIGH (ref 10–12.9)
RBC # BLD: 2.5 M/UL — LOW (ref 4.2–5.8)
RBC # FLD: 17.2 % — HIGH (ref 10.3–14.5)
SODIUM SERPL-SCNC: 145 MMOL/L — SIGNIFICANT CHANGE UP (ref 135–145)
TROPONIN I SERPL-MCNC: 0.04 NG/ML — SIGNIFICANT CHANGE UP (ref 0.01–0.04)
TROPONIN I SERPL-MCNC: 0.05 NG/ML — SIGNIFICANT CHANGE UP (ref 0.02–0.06)
WBC # BLD: 5.48 K/UL — SIGNIFICANT CHANGE UP (ref 3.8–10.5)
WBC # FLD AUTO: 5.48 K/UL — SIGNIFICANT CHANGE UP (ref 3.8–10.5)

## 2019-07-02 PROCEDURE — 99285 EMERGENCY DEPT VISIT HI MDM: CPT

## 2019-07-02 PROCEDURE — 84484 ASSAY OF TROPONIN QUANT: CPT

## 2019-07-02 PROCEDURE — 85027 COMPLETE CBC AUTOMATED: CPT

## 2019-07-02 PROCEDURE — 36415 COLL VENOUS BLD VENIPUNCTURE: CPT

## 2019-07-02 PROCEDURE — 93005 ELECTROCARDIOGRAM TRACING: CPT

## 2019-07-02 PROCEDURE — 71045 X-RAY EXAM CHEST 1 VIEW: CPT | Mod: 26

## 2019-07-02 PROCEDURE — 99285 EMERGENCY DEPT VISIT HI MDM: CPT | Mod: 25

## 2019-07-02 PROCEDURE — 85730 THROMBOPLASTIN TIME PARTIAL: CPT

## 2019-07-02 PROCEDURE — 93010 ELECTROCARDIOGRAM REPORT: CPT

## 2019-07-02 PROCEDURE — 96374 THER/PROPH/DIAG INJ IV PUSH: CPT

## 2019-07-02 PROCEDURE — 80053 COMPREHEN METABOLIC PANEL: CPT

## 2019-07-02 PROCEDURE — 99223 1ST HOSP IP/OBS HIGH 75: CPT | Mod: AI

## 2019-07-02 PROCEDURE — 99223 1ST HOSP IP/OBS HIGH 75: CPT

## 2019-07-02 PROCEDURE — 82550 ASSAY OF CK (CPK): CPT

## 2019-07-02 PROCEDURE — 83880 ASSAY OF NATRIURETIC PEPTIDE: CPT

## 2019-07-02 PROCEDURE — 71045 X-RAY EXAM CHEST 1 VIEW: CPT

## 2019-07-02 PROCEDURE — 85610 PROTHROMBIN TIME: CPT

## 2019-07-02 RX ORDER — ASPIRIN/CALCIUM CARB/MAGNESIUM 324 MG
325 TABLET ORAL ONCE
Refills: 0 | Status: DISCONTINUED | OUTPATIENT
Start: 2019-07-02 | End: 2019-07-02

## 2019-07-02 RX ORDER — ALFUZOSIN HYDROCHLORIDE 10 MG/1
1 TABLET, EXTENDED RELEASE ORAL
Qty: 0 | Refills: 0 | DISCHARGE

## 2019-07-02 RX ORDER — ALBUTEROL 90 UG/1
3 AEROSOL, METERED ORAL
Qty: 0 | Refills: 0 | DISCHARGE

## 2019-07-02 RX ORDER — METOPROLOL TARTRATE 50 MG
1 TABLET ORAL
Qty: 0 | Refills: 0 | DISCHARGE
Start: 2019-07-02

## 2019-07-02 RX ORDER — METOPROLOL TARTRATE 50 MG
25 TABLET ORAL DAILY
Refills: 0 | Status: DISCONTINUED | OUTPATIENT
Start: 2019-07-02 | End: 2019-07-02

## 2019-07-02 RX ORDER — FUROSEMIDE 40 MG
1 TABLET ORAL
Qty: 0 | Refills: 0 | DISCHARGE

## 2019-07-02 RX ORDER — POTASSIUM CHLORIDE 20 MEQ
20 PACKET (EA) ORAL DAILY
Refills: 0 | Status: DISCONTINUED | OUTPATIENT
Start: 2019-07-02 | End: 2019-07-02

## 2019-07-02 RX ORDER — LEVOTHYROXINE SODIUM 125 MCG
100 TABLET ORAL DAILY
Refills: 0 | Status: DISCONTINUED | OUTPATIENT
Start: 2019-07-02 | End: 2019-07-02

## 2019-07-02 RX ORDER — GABAPENTIN 400 MG/1
200 CAPSULE ORAL AT BEDTIME
Refills: 0 | Status: DISCONTINUED | OUTPATIENT
Start: 2019-07-02 | End: 2019-07-02

## 2019-07-02 RX ORDER — ALPRAZOLAM 0.25 MG
0 TABLET ORAL
Qty: 0 | Refills: 0 | DISCHARGE

## 2019-07-02 RX ORDER — AMIODARONE HYDROCHLORIDE 400 MG/1
1 TABLET ORAL
Qty: 0 | Refills: 0 | DISCHARGE
Start: 2019-07-02

## 2019-07-02 RX ORDER — ALPRAZOLAM 0.25 MG
0.25 TABLET ORAL DAILY
Refills: 0 | Status: DISCONTINUED | OUTPATIENT
Start: 2019-07-02 | End: 2019-07-02

## 2019-07-02 RX ORDER — GABAPENTIN 400 MG/1
2 CAPSULE ORAL
Qty: 0 | Refills: 0 | DISCHARGE
Start: 2019-07-02

## 2019-07-02 RX ORDER — AMIODARONE HYDROCHLORIDE 400 MG/1
200 TABLET ORAL DAILY
Refills: 0 | Status: DISCONTINUED | OUTPATIENT
Start: 2019-07-02 | End: 2019-07-02

## 2019-07-02 RX ORDER — FUROSEMIDE 40 MG
40 TABLET ORAL
Qty: 0 | Refills: 0 | DISCHARGE
Start: 2019-07-02

## 2019-07-02 RX ORDER — FUROSEMIDE 40 MG
60 TABLET ORAL ONCE
Refills: 0 | Status: COMPLETED | OUTPATIENT
Start: 2019-07-02 | End: 2019-07-02

## 2019-07-02 RX ORDER — GABAPENTIN 400 MG/1
100 CAPSULE ORAL
Refills: 0 | Status: DISCONTINUED | OUTPATIENT
Start: 2019-07-02 | End: 2019-07-02

## 2019-07-02 RX ORDER — ALPRAZOLAM 0.25 MG
0.5 TABLET ORAL AT BEDTIME
Refills: 0 | Status: DISCONTINUED | OUTPATIENT
Start: 2019-07-02 | End: 2019-07-02

## 2019-07-02 RX ORDER — ALPRAZOLAM 0.25 MG
1 TABLET ORAL
Qty: 0 | Refills: 0 | DISCHARGE
Start: 2019-07-02

## 2019-07-02 RX ORDER — LACTOBACILLUS ACIDOPHILUS 100MM CELL
0 CAPSULE ORAL
Qty: 0 | Refills: 0 | DISCHARGE
Start: 2019-07-02

## 2019-07-02 RX ORDER — POTASSIUM CHLORIDE 20 MEQ
1 PACKET (EA) ORAL
Qty: 0 | Refills: 0 | DISCHARGE

## 2019-07-02 RX ORDER — GABAPENTIN 400 MG/1
1 CAPSULE ORAL
Qty: 0 | Refills: 0 | DISCHARGE
Start: 2019-07-02

## 2019-07-02 RX ORDER — LACTOBACILLUS ACIDOPHILUS 100MM CELL
1 CAPSULE ORAL DAILY
Refills: 0 | Status: DISCONTINUED | OUTPATIENT
Start: 2019-07-02 | End: 2019-07-02

## 2019-07-02 RX ORDER — LEVOTHYROXINE SODIUM 125 MCG
1 TABLET ORAL
Qty: 0 | Refills: 0 | DISCHARGE
Start: 2019-07-02

## 2019-07-02 RX ORDER — FUROSEMIDE 40 MG
40 TABLET ORAL
Refills: 0 | Status: DISCONTINUED | OUTPATIENT
Start: 2019-07-02 | End: 2019-07-02

## 2019-07-02 RX ORDER — LACTOBACILLUS ACIDOPHILUS 100MM CELL
1 CAPSULE ORAL
Qty: 0 | Refills: 0 | DISCHARGE
Start: 2019-07-02

## 2019-07-02 RX ORDER — POTASSIUM CHLORIDE 20 MEQ
1 PACKET (EA) ORAL
Qty: 0 | Refills: 0 | DISCHARGE
Start: 2019-07-02

## 2019-07-02 RX ORDER — LEVOTHYROXINE SODIUM 125 MCG
1 TABLET ORAL
Qty: 0 | Refills: 0 | DISCHARGE

## 2019-07-02 RX ORDER — ALBUTEROL 90 UG/1
2.5 AEROSOL, METERED ORAL EVERY 6 HOURS
Refills: 0 | Status: DISCONTINUED | OUTPATIENT
Start: 2019-07-02 | End: 2019-07-02

## 2019-07-02 RX ADMIN — Medication 60 MILLIGRAM(S): at 07:11

## 2019-07-02 RX ADMIN — Medication 20 MILLIEQUIVALENT(S): at 12:23

## 2019-07-02 RX ADMIN — Medication 1 TABLET(S): at 12:23

## 2019-07-02 RX ADMIN — Medication 0.25 MILLIGRAM(S): at 12:23

## 2019-07-02 NOTE — ED PROVIDER NOTE - CARE PLAN
Principal Discharge DX:	Pleural effusion Principal Discharge DX:	Pleural effusion  Secondary Diagnosis:	CKD (chronic kidney disease)  Secondary Diagnosis:	CHF (congestive heart failure)

## 2019-07-02 NOTE — CONSULT NOTE ADULT - PROBLEM SELECTOR RECOMMENDATION 9
CHRONIC KIDNEY DISEASE, STAGE 4: due to fluid overload and sig diuresis   Serum creatinine is stable at 3.2, approximating a GFR of is decreased  ml/min.   There is no progression.  No uremic symptoms. No evidence of  worsening  Anemia. Fluid status stable.   Will continue to avoid nephrotoxic drugs.  Patient remains asymptomatic.  Continue current therapy.

## 2019-07-02 NOTE — ED ADULT TRIAGE NOTE - CHIEF COMPLAINT QUOTE
Patient presents via ambulance, patient states "rad been short of breath since yesterday"  patient saw PCP Adi who "started him on metolazone because he had water in his lungs yesterday" as per aide/caregiver.   patient denies chest pain, palpitations, cough, fever.

## 2019-07-02 NOTE — ED ADULT NURSE NOTE - OBJECTIVE STATEMENT
Patient came in to ED c/o worsening shortness of breath started yesterday. Patient denies fever/ chills/ palpitation/ no chest pain. On assessment noted with skin tear on the left shin covered with dressing. Also noted bruises on the left upper and lower arm. On further assessment noted with scab on the left shoulder from previous biopsy. Noted non blanchable redness noted on the sacral area. Patient came in to ED c/o worsening shortness of breath started yesterday. Patient denies fever/ chills/ palpitation/ no chest pain. On assessment noted with skin tear on the left shin covered with dressing. Also noted bruises on the left upper and lower arm. On further assessment noted with scab on the left shoulder from previous biopsy. Noted non blanchable redness noted on the sacral area. Both lower extremity edema also noted.

## 2019-07-02 NOTE — H&P ADULT - NSICDXPASTMEDICALHX_GEN_ALL_CORE_FT
PAST MEDICAL HISTORY:  A-fib     Anxiety     Basal cell carcinoma     BPH (benign prostatic hyperplasia)     Chronic diarrhea     CKD (chronic kidney disease)     Hypertension     Hypothyroid     Lymphoma     Postherpetic neuralgia     Prostate CA

## 2019-07-02 NOTE — CONSULT NOTE ADULT - SUBJECTIVE AND OBJECTIVE BOX
[facility]         Sandy Alvarado, Kt, Lizett, Dalton, Babak, Noah        [phone] (office)    CHIEF COMPLAINT: Patient is a 89y old  Male who presents with a chief complaint of shortness of breath    HPI:  89yo M w/ PMHx HFpEF, moderate aortic stenosis, mild pulmonary HTN, moderate MR, small B cell lymphoma, CKD IV, afib on warfarin, hypothyroidism, prostate ca, postherpetic neuralgia presenting to the ED complaining of SOB. Symptoms began approximately 1 week ago, worse at night when lying down and when walking, better when sitting up. Denies chest pain/pressure, dizziness, LOC, N/V.   Patient is preparing       PAST MEDICAL & SURGICAL HISTORY:  CKD (chronic kidney disease)  BPH (benign prostatic hyperplasia)  Postherpetic neuralgia  Chronic diarrhea  Prostate CA  Basal cell carcinoma  Lymphoma  Anxiety  Hypothyroid  Hypertension  A-fib  H/O shoulder replacement  S/P bilateral unicompartmental knee replacement      SOCIAL HISTORY: No active tobacco, alcohol or illicit drug use    FAMILY HISTORY:  Family history of ischemic heart disease      Outpatient medications:    MEDICATIONS  (STANDING):  ALPRAZolam 0.25 milliGRAM(s) Oral daily  ALPRAZolam 0.5 milliGRAM(s) Oral at bedtime  amiodarone    Tablet 200 milliGRAM(s) Oral daily  artificial tears (preservative free) Ophthalmic Solution 1 Drop(s) Both EYES two times a day  furosemide   Injectable 40 milliGRAM(s) IV Push two times a day  gabapentin 100 milliGRAM(s) Oral two times a day  gabapentin 200 milliGRAM(s) Oral at bedtime  lactobacillus acidophilus 1 Tablet(s) Oral daily  levothyroxine 100 MICROGram(s) Oral daily  metoprolol tartrate 25 milliGRAM(s) Oral daily  potassium chloride    Tablet ER 20 milliEquivalent(s) Oral daily    MEDICATIONS  (PRN):  ALBUTerol    0.083% 2.5 milliGRAM(s) Nebulizer every 6 hours PRN Shortness of Breath and/or Wheezing      Allergies    No Known Allergies    Intolerances        REVIEW OF SYSTEMS: Is negative for eye, ENT, GI, , allergic, dermatologic, musculoskeletal and neurologic, except as described above.    VITAL SIGNS:   Vital Signs Last 24 Hrs  T(C): 36.4 (02 Jul 2019 06:34), Max: 36.6 (02 Jul 2019 03:39)  T(F): 97.6 (02 Jul 2019 06:34), Max: 97.9 (02 Jul 2019 03:39)  HR: 50 (02 Jul 2019 07:24) (50 - 52)  BP: 111/64 (02 Jul 2019 07:24) (111/64 - 115/71)  BP(mean): --  RR: 14 (02 Jul 2019 07:24) (14 - 19)  SpO2: 98% (02 Jul 2019 07:24) (97% - 100%)    I&O's Summary    02 Jul 2019 07:01  -  02 Jul 2019 10:40  --------------------------------------------------------  IN: 0 mL / OUT: 400 mL / NET: -400 mL        PHYSICAL EXAM:    Constitutional: NAD, awake and alert, well-developed  Eyes:  EOMI, no oral cyanosis, conjunctivae clear, anicteric.  Pulmonary: Non-labored, breath sounds are clear bilaterally, no wheezing, rales or rhonchi  Cardiovascular:  regular S1 and S2. No murmur.  No rubs, gallops or clicks  Gastrointestinal: Bowel Sounds present, soft, nontender.   Lymph: No peripheral edema.   Neurological: Alert, strength and sensitivity are grossly intact  Skin: No obvious lesions/rashes.   Psych:  Mood & affect appropriate .    LABS: All Labs Reviewed:                        8.0    5.48  )-----------( 139      ( 02 Jul 2019 04:12 )             25.7     02 Jul 2019 04:12    145    |  106    |  73     ----------------------------<  101    4.3     |  30     |  3.20     Ca    9.7        02 Jul 2019 04:12    TPro  5.7    /  Alb  3.3    /  TBili  0.8    /  DBili  x      /  AST  15     /  ALT  12     /  AlkPhos  44     02 Jul 2019 04:12    PT/INR - ( 02 Jul 2019 10:28 )   PT: 27.6 sec;   INR: 2.36 ratio         PTT - ( 02 Jul 2019 10:28 )  PTT:37.9 sec  CARDIAC MARKERS ( 02 Jul 2019 04:12 )  .054 ng/mL / x     / 54 U/L / x     / x          Blood Culture:   07-02 @ 04:12  Pro Bnp [id number]        RADIOLOGY:    EKG:    Impression/Plan: [facility]         Sandy Alvarado, Kt, Lizett, Dalton, Babak, Noah        [phone] (office)    CHIEF COMPLAINT: Patient is a 89y old  Male who presents with a chief complaint of shortness of breath    HPI:  89yo M w/ PMHx HFpEF, moderate aortic stenosis, mild pulmonary HTN, moderate MR, small B cell lymphoma, CKD IV, afib on warfarin, hypothyroidism, prostate ca, postherpetic neuralgia presenting to the ED complaining of SOB. Symptoms began approximately 1 week ago, worse at night when lying down and when walking, better when sitting up. Denies chest pain/pressure, dizziness, LOC, N/V. No worsening LE edema. Patient saw Dr. Correa, his outpatient cardiologist yesterday for these symptoms, switched from lasix 40 mg BID to metolazone.  Patient is preparing for TAVR at [place], had recent angio which was unremarkable. Also had recent echo, unsure of results.      PAST MEDICAL & SURGICAL HISTORY:  CKD (chronic kidney disease)  BPH (benign prostatic hyperplasia)  Postherpetic neuralgia  Chronic diarrhea  Prostate CA  Basal cell carcinoma  Lymphoma  Anxiety  Hypothyroid  Hypertension  A-fib  H/O shoulder replacement  S/P bilateral unicompartmental knee replacement      SOCIAL HISTORY: No active tobacco, alcohol or illicit drug use    FAMILY HISTORY:  Family history of ischemic heart disease      Outpatient medications:    MEDICATIONS  (STANDING):  ALPRAZolam 0.25 milliGRAM(s) Oral daily  ALPRAZolam 0.5 milliGRAM(s) Oral at bedtime  amiodarone    Tablet 200 milliGRAM(s) Oral daily  artificial tears (preservative free) Ophthalmic Solution 1 Drop(s) Both EYES two times a day  furosemide   Injectable 40 milliGRAM(s) IV Push two times a day  gabapentin 100 milliGRAM(s) Oral two times a day  gabapentin 200 milliGRAM(s) Oral at bedtime  lactobacillus acidophilus 1 Tablet(s) Oral daily  levothyroxine 100 MICROGram(s) Oral daily  metoprolol tartrate 25 milliGRAM(s) Oral daily  potassium chloride    Tablet ER 20 milliEquivalent(s) Oral daily    MEDICATIONS  (PRN):  ALBUTerol    0.083% 2.5 milliGRAM(s) Nebulizer every 6 hours PRN Shortness of Breath and/or Wheezing      Allergies    No Known Allergies    Intolerances        REVIEW OF SYSTEMS: Is negative for eye, ENT, GI, , allergic, dermatologic, musculoskeletal and neurologic, except as described above.    VITAL SIGNS:   Vital Signs Last 24 Hrs  T(C): 36.4 (02 Jul 2019 06:34), Max: 36.6 (02 Jul 2019 03:39)  T(F): 97.6 (02 Jul 2019 06:34), Max: 97.9 (02 Jul 2019 03:39)  HR: 50 (02 Jul 2019 07:24) (50 - 52)  BP: 111/64 (02 Jul 2019 07:24) (111/64 - 115/71)  BP(mean): --  RR: 14 (02 Jul 2019 07:24) (14 - 19)  SpO2: 98% (02 Jul 2019 07:24) (97% - 100%)    I&O's Summary    02 Jul 2019 07:01  -  02 Jul 2019 10:40  --------------------------------------------------------  IN: 0 mL / OUT: 400 mL / NET: -400 mL        PHYSICAL EXAM:    Constitutional: NAD, awake and alert, well-developed  Eyes: cataract in left eye  Pulmonary: Non-labored, breath sounds are clear bilaterally, no wheezing, rales or rhonchi  Cardiovascular:  bradycardic, late peaking systolic murmur, grade 3/6   Gastrointestinal: Bowel Sounds present, soft, nontender.   Lymph: +2 pitting edema R>L  Neurological: Alert, strength and sensitivity are grossly intact  Psych:  Mood & affect appropriate .    LABS: All Labs Reviewed:                        8.0    5.48  )-----------( 139      ( 02 Jul 2019 04:12 )             25.7     02 Jul 2019 04:12    145    |  106    |  73     ----------------------------<  101    4.3     |  30     |  3.20     Ca    9.7        02 Jul 2019 04:12    TPro  5.7    /  Alb  3.3    /  TBili  0.8    /  DBili  x      /  AST  15     /  ALT  12     /  AlkPhos  44     02 Jul 2019 04:12    PT/INR - ( 02 Jul 2019 10:28 )   PT: 27.6 sec;   INR: 2.36 ratio         PTT - ( 02 Jul 2019 10:28 )  PTT:37.9 sec  CARDIAC MARKERS ( 02 Jul 2019 04:12 )  .054 ng/mL / x     / 54 U/L / x     / x          Blood Culture:   07-02 @ 04:12  Pro Bnp [id number]        RADIOLOGY:      EXAM:  XR CHEST AP OR PA 1V                            PROCEDURE DATE:  07/02/2019          INTERPRETATION:  Clinical information: Chest pain.    Technique: Frontal view of the chest.    Comparison: Prior chest x-ray examination dated 1/20/2019.    Findings: There are trace lateral pleural effusions. There is mild   pulmonary vascular congestion. The heart size is mildly enlarged.   Multilevel degenerative changes are noted within the imaged potions of   the spine. A right-sided total reverse shoulder arthroplasty is notable.    IMPRESSION: Mild pulmonary edema and/or vascular congestion. Trace   layering bilateral pleural effusions.        VIDAL CALL M.D., ATTENDING RADIOLOGIST  This document has been electronically signed. Jul 2 2019  8:32AM    EKG: sinus bradycardia at 50 bpm with 1st degree av block, RBB and LAFB

## 2019-07-02 NOTE — CONSULT NOTE ADULT - PROBLEM SELECTOR RECOMMENDATION 3
CHF exacerbation symptoms and severe aortic stenosis, transfer to [place] for t TAVR evaluation .

## 2019-07-02 NOTE — ED ADULT NURSE REASSESSMENT NOTE - NS ED NURSE REASSESS COMMENT FT1
Aspirin 325mg ordered by Dr. Virk but patient states he had Warfarin taken last night. Dr. Virk made aware and ordered to cancel giving the aspirin.
called [facility] and spoke with admitting and bed board, they have no record of pt coming over. will call back in 10 minutes to recheck
charge rn called [facility] spoke to Sanket and told us [facility] was called and [facility] will call us with time
pt fed breakfast and lunch, ems arrived at dinnertime
Patient states he feels better. Denies chest pain / short of breath time. Patient maintained on oxygen @ 3LPM via nasal cannula.

## 2019-07-02 NOTE — H&P ADULT - NSHPREVIEWOFSYSTEMS_GEN_ALL_CORE
REVIEW OF SYSTEMS:    CONSTITUTIONAL: No fever, chills, weight loss, or fatigue  EYES: No eye pain, visual disturbances, or discharge  ENMT:  No difficulty hearing, tinnitus, vertigo; No sinus or throat pain  NECK: No pain or stiffness  RESPIRATORY: No wheezing, or hemoptysis  CARDIOVASCULAR: No chest pain, palpitations, dizziness  GASTROINTESTINAL: No abdominal or epigastric pain. No nausea, vomiting, or hematemesis; No diarrhea or constipation. No melena or hematochezia.  GENITOURINARY: No dysuria, frequency, hematuria, or incontinence  NEUROLOGICAL: No headaches, memory loss, loss of strength, numbness, or tremors  SKIN: No itching, burning, rashes, or lesions   LYMPH NODES: No enlarged glands  ENDOCRINE: No heat or cold intolerance; No hair loss  MUSCULOSKELETAL: No joint pain or swelling; No muscle, back, or extremity pain  PSYCHIATRIC: No depression, anxiety, mood swings, or difficulty sleeping  HEME/LYMPH: No easy bruising, or bleeding gums  ALLERGY AND IMMUNOLOGIC: No hives or eczema

## 2019-07-02 NOTE — CONSULT NOTE ADULT - ATTENDING COMMENTS
Chart reviewed    Patient seen and examined    Agree with plans outlined above    I personally spoke with the patient, family, and Dr. Cordova.  Given his presentation with heart failure, it makes sense to transfer him to [facility] for further evaluation, management, and ultimate TAVR procedure. He agrees and arrangements have been made

## 2019-07-02 NOTE — GOALS OF CARE CONVERSATION - PERSONAL ADVANCE DIRECTIVE - CONVERSATION DETAILS
spoke to pt daughterEli on phone, confirmed hcp, both daughters are hcp, form placed on chart. also reviewed copy of molst from last hospitalization: daughter unsure where form is located at this time. pt A&O, able to complete molst. daughter supports dnr dni for pt. met pt, with home aide, pt A&O x3, unsure where molst is at this time, NA has not seen it, but pt knows what form is. molst reviewed: pt agrees to dnr dni, no decision regarding MELA, will discuss further with family . wants treatment: IV fluids & antibiotics, no limitations for treatment. Dr Donald completed molst form, order received from Dr RUTH Price. Pt for transfer to [facility] for TAVR, aware will have to clarify dnr when procedure to be done. PC RN contact # given.

## 2019-07-02 NOTE — CONSULT NOTE ADULT - SUBJECTIVE AND OBJECTIVE BOX
Patient is a 89y Male whom presented to the hospital with     PAST MEDICAL & SURGICAL HISTORY:  CKD (chronic kidney disease)  BPH (benign prostatic hyperplasia)  Postherpetic neuralgia  Chronic diarrhea  Prostate CA  Basal cell carcinoma  Lymphoma  Anxiety  Hypothyroid  Hypertension  A-fib  H/O shoulder replacement  S/P bilateral unicompartmental knee replacement      MEDICATIONS  (STANDING):  ALPRAZolam 0.25 milliGRAM(s) Oral daily  ALPRAZolam 0.5 milliGRAM(s) Oral at bedtime  amiodarone    Tablet 200 milliGRAM(s) Oral daily  artificial tears (preservative free) Ophthalmic Solution 1 Drop(s) Both EYES two times a day  furosemide   Injectable 40 milliGRAM(s) IV Push two times a day  gabapentin 100 milliGRAM(s) Oral two times a day  gabapentin 200 milliGRAM(s) Oral at bedtime  lactobacillus acidophilus 1 Tablet(s) Oral daily  levothyroxine 100 MICROGram(s) Oral daily  metoprolol tartrate 25 milliGRAM(s) Oral daily  potassium chloride    Tablet ER 20 milliEquivalent(s) Oral daily      Allergies    No Known Allergies    Intolerances        SOCIAL HISTORY:  Denies ETOh,Smoking,     FAMILY HISTORY:  Family history of ischemic heart disease      REVIEW OF SYSTEMS:    CONSTITUTIONAL: No weakness, fevers or chills  EYES/ENT: No visual changes;  no throat pain   NECK: No pain or stiffness  RESPIRATORY: No cough, wheezing, hemoptysis; No shortness of breath  CARDIOVASCULAR: No chest pain or palpitations  GASTROINTESTINAL: No abdominal or epigastric pain. No nausea, vomiting,     No diarrhea or constipation. No melena   GENITOURINARY: No dysuria, frequency or hematuria  NEUROLOGICAL: No numbness or weakness  SKIN: dry      VITAL:  T(C): , Max: 36.6 (07-02-19 @ 03:39)  T(F): , Max: 97.9 (07-02-19 @ 03:39)  HR: 48 (07-02-19 @ 11:28)  BP: 107/68 (07-02-19 @ 11:28)  BP(mean): --  RR: 15 (07-02-19 @ 11:28)  SpO2: 98% (07-02-19 @ 11:28)  Wt(kg): --    I and O's:    07-02 @ 07:01  -  07-02 @ 11:42  --------------------------------------------------------  IN: 0 mL / OUT: 400 mL / NET: -400 mL          PHYSICAL EXAM:    Constitutional: NAD  HEENT: conjunctive   clear   Neck:  No JVD  Respiratory: CTAB  Cardiovascular: S1 and S2  Gastrointestinal: BS+, soft, NT/ND  Extremities: No peripheral edema  Neurological: A/O x 3, no focal deficits  Psychiatric: Normal mood, normal affect  : No Devries  Skin: No rashes  Access: Not applicable    LABS:                        8.0    5.48  )-----------( 139      ( 02 Jul 2019 04:12 )             25.7     07-02    145  |  106  |  73<H>  ----------------------------<  101<H>  4.3   |  30  |  3.20<H>    Ca    9.7      02 Jul 2019 04:12    TPro  5.7<L>  /  Alb  3.3  /  TBili  0.8  /  DBili  x   /  AST  15  /  ALT  12  /  AlkPhos  44  07-02      Urine Studies:          RADIOLOGY & ADDITIONAL STUDIES: Patient is a 89y Male whom presented to the hospital with ckd stage 4     PAST MEDICAL & SURGICAL HISTORY:  CKD (chronic kidney disease)  BPH (benign prostatic hyperplasia)  Postherpetic neuralgia  Chronic diarrhea  Prostate CA  Basal cell carcinoma  Lymphoma  Anxiety  Hypothyroid  Hypertension  A-fib  H/O shoulder replacement  S/P bilateral unicompartmental knee replacement      MEDICATIONS  (STANDING):  ALPRAZolam 0.25 milliGRAM(s) Oral daily  ALPRAZolam 0.5 milliGRAM(s) Oral at bedtime  amiodarone    Tablet 200 milliGRAM(s) Oral daily  artificial tears (preservative free) Ophthalmic Solution 1 Drop(s) Both EYES two times a day  furosemide   Injectable 40 milliGRAM(s) IV Push two times a day  gabapentin 100 milliGRAM(s) Oral two times a day  gabapentin 200 milliGRAM(s) Oral at bedtime  lactobacillus acidophilus 1 Tablet(s) Oral daily  levothyroxine 100 MICROGram(s) Oral daily  metoprolol tartrate 25 milliGRAM(s) Oral daily  potassium chloride    Tablet ER 20 milliEquivalent(s) Oral daily      Allergies    No Known Allergies    Intolerances        SOCIAL HISTORY:  Denies ETOh,Smoking,     FAMILY HISTORY:  Family history of ischemic heart disease      REVIEW OF SYSTEMS:    CONSTITUTIONAL: pos  weakness, no  fevers or chills  EYES/ENT: No visual changes;  no throat pain   NECK: No pain or stiffness  RESPIRATORY: No cough, wheezing, hemoptysis; pos  shortness of breath  CARDIOVASCULAR: No chest pain or palpitations  GASTROINTESTINAL: No abdominal or epigastric pain. No nausea, vomiting,     No diarrhea or constipation. No melena   GENITOURINARY: No dysuria, frequency or hematuria  NEUROLOGICAL: pos  weakness  SKIN: dry      VITAL:  T(C): , Max: 36.6 (07-02-19 @ 03:39)  T(F): , Max: 97.9 (07-02-19 @ 03:39)  HR: 48 (07-02-19 @ 11:28)  BP: 107/68 (07-02-19 @ 11:28)  BP(mean): --  RR: 15 (07-02-19 @ 11:28)  SpO2: 98% (07-02-19 @ 11:28)  Wt(kg): --    I and O's:    07-02 @ 07:01  -  07-02 @ 11:42  --------------------------------------------------------  IN: 0 mL / OUT: 400 mL / NET: -400 mL          PHYSICAL EXAM:    Constitutional: NAD  HEENT: conjunctive   clear   Neck:  No JVD  Respiratory: decrease bs b/l   Cardiovascular: S1 and S2  Gastrointestinal: BS+, soft, NT/ND  Extremities: pos  peripheral edema  Neurological: no focal deficits  Psychiatric: Normal mood, normal affect  : No Devries  Skin: dry   Access: Not applicable    LABS:                        8.0    5.48  )-----------( 139      ( 02 Jul 2019 04:12 )             25.7     07-02    145  |  106  |  73<H>  ----------------------------<  101<H>  4.3   |  30  |  3.20<H>    Ca    9.7      02 Jul 2019 04:12    TPro  5.7<L>  /  Alb  3.3  /  TBili  0.8  /  DBili  x   /  AST  15  /  ALT  12  /  AlkPhos  44  07-02      Urine Studies:          RADIOLOGY & ADDITIONAL STUDIES:

## 2019-07-02 NOTE — H&P ADULT - HISTORY OF PRESENT ILLNESS
Pt is 89F, HTN, hypothy, Afib/coumadin, AS/planned TAVR, chronic diastolic CHF (TTE 2018 w/nl EF), CKD, BPH, hx prostate cancer, hx postherpetic neuralgia; adm w/worsening sob x few days, s/p PMD visit w/increase in diuretic dosing, but no improvement; no assoc chest pain, palpitations, dizziness, headache, n/v, abd pain; no URI sxs.    In ED, VSS, O2 100% on NC; labs w/Cr 3.2, trop neg 0.054, pBNP 90191, CXR w/mild pulm edema/PVC, trace b/l pleural effusions; pt tx'd w/Lasix 60IV x1, then adm to Tele. Pt is 89F, HTN, hypothy, Afib/coumadin, AS/planned TAVR, chronic diastolic CHF (TTE 2018 w/nl EF), stage 3 CKD (b/l Cr 2-2.5), BPH, hx prostate cancer, hx postherpetic neuralgia; adm w/worsening sob x few days, s/p PMD visit w/increase in diuretic dosing, but no improvement; no assoc chest pain, palpitations, dizziness, headache, n/v, abd pain; no URI sxs.    In ED, VSS, O2 100% on NC; labs w/Cr 3.2, trop neg 0.054, pBNP 61946, CXR w/mild pulm edema/PVC, trace b/l pleural effusions; pt tx'd w/Lasix 60IV x1, then adm to Tele.

## 2019-07-02 NOTE — ED PROVIDER NOTE - OBJECTIVE STATEMENT
89yo male bib ems with worsening sob over the last few days. pt c/o worsening sob with exertion now at rest, no cough, fever, chills, no chest pain, pt was seen by pmd yesterday and had increase in lasix and another diuretic and pt is awaiting approval for TAVER

## 2019-07-02 NOTE — H&P ADULT - ASSESSMENT
89F, HTN, hypothy, Afib/coumadin, AS/planned TAVR, chronic diastolic CHF (TTE 2018 w/nl EF), CKD, BPH, hx prostate cancer, hx postherpetic neuralgia; adm w/worsening sob, despite increased o/p diuretics, Cr 3.2, trop neg 0.054, pBNP 32944, CXR w/mild pulm edema/PVC, trace b/l pleural effusions - being optimized    pulmonary edema - in setting of known AS  -telemetry monitoring  -serial trops  -IV diuresis w/Lasix as renal function tolerates    CKD  -Cr 3.2    Afib - [place] tx w/metoprolol; therap a/c w/coumadin - dose daily, follow INR  neuro/psych - continue maintenance home regimen of ativan  hypothy - continue synthroid  dvt prophy 89F, HTN, hypothy, Afib/coumadin, AS/planned TAVR, chronic diastolic CHF (TTE 2018 w/nl EF), stage 3 CKD (b/l Cr 2-2.5), BPH, hx prostate cancer, hx postherpetic neuralgia; adm w/worsening sob, despite increased o/p diuretics, Cr 3.2, trop neg 0.054, pBNP 77352, CXR w/mild pulm edema/PVC, trace b/l pleural effusions - being optimized    pulmonary edema - in setting of known AS  -telemetry monitoring  -serial trops  -IV diuresis w/Lasix as renal function tolerates  -consult Cards for input  -no nicola e/o acute infection as precipitant - likely 2/2 known valvular heart dz    CKD - baseline Cr 2-2.5  -Cr 3.2 on admit  -may need to tolerate slight decrement in renal function for improved volume status  -trend Cr   -renally dose meds  -consult Renal    Afib - [place] tx w/metoprolol; therap a/c w/coumadin - dose daily, follow INR    neuro/psych - continue maintenance home regimen of ativan    hypothy - continue synthroid    dvt prophy 89F, HTN, hypothy, Afib/coumadin, AS/planned TAVR, chronic diastolic CHF (TTE 2018 w/nl EF), stage 3 CKD (b/l Cr 2-2.5), BPH, hx prostate cancer, hx postherpetic neuralgia; adm w/worsening sob, despite increased o/p diuretics, Cr 3.2, trop neg 0.054, pBNP 33321, CXR w/mild pulm edema/PVC, trace b/l pleural effusions - being optimized    pulmonary edema - in setting of known AS  -telemetry monitoring  -serial trops  -IV diuresis w/Lasix as renal function tolerates  -consult Cards for input -spk to Dr Khalil [facility] - Dr Zamora cvrg  -no nicola e/o acute infection as precipitant - likely 2/2 known valvular heart dz    CKD - baseline Cr 2-2.5  -Cr 3.2 on admit  -may need to tolerate slight decrement in renal function for improved volume status  -trend Cr   -renally dose meds  -consult Renal - spk to Dr Finch-Hyeon Yun [facility]    Afib - [place] tx w/metoprolol; therap a/c w/coumadin - dose daily, follow INR    neuro/psych - continue maintenance home regimen of ativan    hypothy - continue synthroid    dvt prophy 89F, HTN, hypothy, Afib/coumadin, AS/planned TAVR, chronic diastolic CHF (TTE 2018 w/nl EF), stage 3 CKD (b/l Cr 2-2.5), BPH, hx prostate cancer, hx postherpetic neuralgia; adm w/worsening sob, despite increased o/p diuretics, Cr 3.2, trop neg 0.054, pBNP 63078, CXR w/mild pulm edema/PVC, trace b/l pleural effusions - being optimized    pulmonary edema - in setting of known AS  -telemetry monitoring  -serial trops  -obtain TTE  -IV diuresis w/Lasix as renal function tolerates  -consult Cards for input -spk to Dr Simran galindo - Dr Zamora cvrg  -no nicola e/o acute infection as precipitant - likely 2/2 known valvular heart dz    CKD - baseline Cr 2-2.5  -Cr 3.2 on admit  -may need to tolerate slight decrement in renal function for improved volume status  -trend Cr   -renally dose meds  -consult Renal - spk to Dr Finch-Hyeon Yun [facility]    Afib - [place] tx w/metoprolol; therap a/c w/coumadin - dose daily, follow INR - check INR now as none drawn    neuro/psych - continue maintenance home regimen of ativan    hypothy - continue synthroid    dvt prophy - therap a/c w/coumadin 89F, HTN, hypothy, Afib/coumadin, AS/planned TAVR, chronic diastolic CHF (TTE 2018 w/nl EF), stage 3 CKD (b/l Cr 2-2.5), BPH, hx prostate cancer, hx postherpetic neuralgia; adm w/worsening sob, despite increased o/p diuretics, Cr 3.2, trop neg 0.054, pBNP 05089, CXR w/mild pulm edema/PVC, trace b/l pleural effusions - being optimized    pulmonary edema - in setting of known AS  -telemetry monitoring  -serial trops  -obtain TTE - suspect AS likely severe (last TTE 2018 w/mod AS)  -IV diuresis w/Lasix as renal function tolerates  -consult Cards for input -spk to Dr Simran galindo - Dr Zamora cvrg  -no nicola e/o acute infection as precipitant - likely 2/2 known valvular heart dz    CKD - baseline Cr 2-2.5  -Cr 3.2 on admit  -may need to tolerate slight decrement in renal function for improved volume status  -trend Cr   -renally dose meds  -consult Renal - spk to Dr Finch-Hyeon Yun [facility]    Afib - [place] tx w/metoprolol; therap a/c w/coumadin - dose daily, follow INR - check INR now as none drawn    neuro/psych - continue maintenance home regimen of ativan    hypothy - continue synthroid    dvt prophy - therap a/c w/coumadin      ADDENDUM: INR 2.36 - as pt now planned for transfer [facility] for surgical intervention, hold off on dosing coumadin tonight, and plan for start hep gtt once INR<2 given surgery planned

## 2019-07-02 NOTE — CHART NOTE - NSCHARTNOTEFT_GEN_A_CORE
ADDENDUM: INR 2.36 - as pt now planned for transfer [facility] for surgical intervention, hold off on dosing coumadin tonight, and plan for start hep gtt once INR<2 given surgery planned

## 2019-07-02 NOTE — ED ADULT NURSE NOTE - NSIMPLEMENTINTERV_GEN_ALL_ED
Implemented All Fall with Harm Risk Interventions:  [place] to call system. Call bell, personal items and telephone within reach. Instruct patient to call for assistance. Room bathroom lighting operational. Non-slip footwear when patient is off stretcher. Physically safe environment: no spills, clutter or unnecessary equipment. Stretcher in lowest position, wheels locked, appropriate side rails in place. Provide visual cue, wrist band, yellow gown, etc. Monitor gait and stability. Monitor for mental status changes and reorient to person, place, and time. Review medications for side effects contributing to fall risk. Reinforce activity limits and safety measures with patient and family. Provide visual clues: red socks.

## 2019-07-02 NOTE — CONSULT NOTE ADULT - ASSESSMENT
Pt is 89F, HTN, hypothy, Afib/coumadin, AS/planned TAVR, chronic diastolic CHF (TTE 2018 w/nl EF), stage 3 CKD (b/l Cr 2-2.5), BPH, hx prostate cancer, hx postherpetic neuralgia; adm w/worsening sob x few days, s/p PMD visit w/increase in diuretic dosing, but no improvement; no assoc chest pain, palpitations, dizziness, headache, n/v, abd pain; no URI sxs.  HFpEF, moderate aortic stenosis, mild pulmonary HTN, moderate MR, small B cell lymphoma, CKD IV, afib on warfarin, hypothyroidism, prostate ca, postherpetic neuralgia presenting to the ED complaining of SOB. Symptoms began approximately 1 week ago, worse at night when lying down and when walking, better when sitting up.  In ED, VSS, O2 100% on NC; labs w/Cr 3.2, trop neg 0.054, pBNP 83287, CXR w/mild pulm edema/PVC, trace b/l pleural effusions; pt tx'd w/Lasix 60IV x1, then adm to Tele. (02 Jul 2019 08:53)
89yo M w/ PMHx HFpEF, moderate aortic stenosis, mild pulmonary HTN, moderate MR, small B cell lymphoma, CKD IV, afib on warfarin, hypothyroidism, prostate ca, postherpetic neuralgia here with SOB likely 2/2 to HF exacerbation and AS.      - No clear evidence of acute ischemia, trops negative x 1.   - Patient seems to have some degree of volume overload, with elevated proBNP >2500, LE edema (although patient denies this is worse than his baseline) and mild pulmonary edema/vascular congestion seen on CXR.     - Patient presenting with HF exacerbation symptoms in the setting of known severe aortic stenosis, patient would benefit from transfer to [place] for urgent TAVR.  - Discussed with Dr. Jamey Cordova, who patient plans to perform TAVR. Will accept this patient for transfer.  - monitor and replete lytes, keep K>4, Mg>2

## 2019-07-02 NOTE — H&P ADULT - NSICDXPASTSURGICALHX_GEN_ALL_CORE_FT
PAST SURGICAL HISTORY:  H/O shoulder replacement     S/P bilateral unicompartmental knee replacement

## 2019-07-02 NOTE — DISCHARGE NOTE PROVIDER - HOSPITAL COURSE
89yo M w/ PMHx HFpEF, moderate aortic stenosis, mild pulmonary HTN, moderate MR, small B cell lymphoma, CKD IV, afib on warfarin, hypothyroidism, prostate ca, postherpetic neuralgia presenting to the ED complaining of SOB. Symptoms began approximately 1 week ago, worse at night when lying down and when walking, better when sitting up. Denies chest pain/pressure, dizziness, LOC, N/V. No worsening LE edema. Patient saw Dr. Correa, his outpatient cardiologist yesterday for these symptoms, switched from lasix 40 mg BID to metolazone.    Patient is preparing for TAVR at [place], had recent angio which was unremarkable. Also had recent echo, unsure of results.        Patient evaluated in ED by Dr. Meraz, attending cardiologist. Due to increased mortality risk with HF exacerbation in the setting of known severe AS, patient would benefit from transfer to [place] where he will undergo urgent TAVR. Discussed with Dr. Jamey Cordova, who patient has followed with, and he accepts patient for transfer.

## 2019-07-02 NOTE — DISCHARGE NOTE PROVIDER - NSDCCPCAREPLAN_GEN_ALL_CORE_FT
PRINCIPAL DISCHARGE DIAGNOSIS  Diagnosis: Pleural effusion  Assessment and Plan of Treatment: in the setting of severe AS with plan for TAVR.  -transfer to [place] for TAVR, Dr. Jamey Cordova accepting      SECONDARY DISCHARGE DIAGNOSES  Diagnosis: Severe aortic stenosis  Assessment and Plan of Treatment: - see above    Diagnosis: CHF (congestive heart failure)  Assessment and Plan of Treatment: - See above    Diagnosis: CKD (chronic kidney disease)  Assessment and Plan of Treatment:

## 2019-08-01 NOTE — ED PROVIDER NOTE - SECONDARY DIAGNOSIS.
Mammogram Instructions:  The day of the exam you may bathe as usual.  DO NOT use powders, creams or deodorants on the breast or underarm area.    To reduce tenderness in the breasts, it is suggested that you refrain from caffeine for one week prior to your mammogram.    Please come to the Imaging Suites at or before your appointment time so that we may start the exam as close to your appointment time as possible.  If you need to cancel, please call [phone] as soon as possible, and we will be happy to reschedule it for you.  Due to our busy schedule, tardiness may result in having to reschedule your exam.    If your previous mammogram was not performed at [facility], you must obtain the films and bring them with you to your mammogram appointment or have them mailed to:  [facility]  [address] [place] [address]    Due to safety concerns, another adult must accompany children that need supervision.     Laceration of scalp, initial encounter

## 2019-12-02 ENCOUNTER — APPOINTMENT (OUTPATIENT)
Dept: INTERNAL MEDICINE | Facility: CLINIC | Age: 84
End: 2019-12-02
Payer: MEDICARE

## 2019-12-02 VITALS
BODY MASS INDEX: 24.11 KG/M2 | WEIGHT: 150 LBS | TEMPERATURE: 97.8 F | HEART RATE: 60 BPM | RESPIRATION RATE: 18 BRPM | SYSTOLIC BLOOD PRESSURE: 118 MMHG | HEIGHT: 66 IN | DIASTOLIC BLOOD PRESSURE: 62 MMHG | OXYGEN SATURATION: 95 %

## 2019-12-02 DIAGNOSIS — I10 ESSENTIAL (PRIMARY) HYPERTENSION: ICD-10-CM

## 2019-12-02 DIAGNOSIS — R06.09 OTHER FORMS OF DYSPNEA: ICD-10-CM

## 2019-12-02 DIAGNOSIS — K21.9 GASTRO-ESOPHAGEAL REFLUX DISEASE W/OUT ESOPHAGITIS: ICD-10-CM

## 2019-12-02 DIAGNOSIS — R09.82 POSTNASAL DRIP: ICD-10-CM

## 2019-12-02 PROCEDURE — G0008: CPT

## 2019-12-02 PROCEDURE — G0009: CPT

## 2019-12-02 PROCEDURE — 90670 PCV13 VACCINE IM: CPT

## 2019-12-02 PROCEDURE — 94060 EVALUATION OF WHEEZING: CPT

## 2019-12-02 PROCEDURE — 94727 GAS DIL/WSHOT DETER LNG VOL: CPT

## 2019-12-02 PROCEDURE — ZZZZZ: CPT

## 2019-12-02 PROCEDURE — 90662 IIV NO PRSV INCREASED AG IM: CPT

## 2019-12-02 PROCEDURE — 94729 DIFFUSING CAPACITY: CPT

## 2019-12-02 PROCEDURE — 99204 OFFICE O/P NEW MOD 45 MIN: CPT | Mod: 25

## 2019-12-02 RX ORDER — WARFARIN SODIUM 2 MG/1
2 TABLET ORAL
Refills: 0 | Status: ACTIVE | COMMUNITY
Start: 2019-12-02

## 2019-12-02 RX ORDER — IPRATROPIUM BROMIDE AND ALBUTEROL SULFATE .5; 3 MG/3ML; MG/3ML
2.5-0.5 SOLUTION RESPIRATORY (INHALATION)
Refills: 0 | Status: ACTIVE | COMMUNITY

## 2019-12-02 RX ORDER — FUROSEMIDE 40 MG/1
40 TABLET ORAL
Refills: 0 | Status: ACTIVE | COMMUNITY

## 2019-12-02 RX ORDER — ASPIRIN 81 MG
81 TABLET, DELAYED RELEASE (ENTERIC COATED) ORAL
Refills: 0 | Status: ACTIVE | COMMUNITY

## 2019-12-02 NOTE — PHYSICAL EXAM
[General Appearance - Well Developed] : well developed [Well Groomed] : well groomed [Normal Appearance] : normal appearance [General Appearance - Well Nourished] : well nourished [No Deformities] : no deformities [General Appearance - In No Acute Distress] : no acute distress [Normal Conjunctiva] : the conjunctiva exhibited no abnormalities [Eyelids - No Xanthelasma] : the eyelids demonstrated no xanthelasmas [Normal Oropharynx] : normal oropharynx [Neck Appearance] : the appearance of the neck was normal [Neck Cervical Mass (___cm)] : no neck mass was observed [Jugular Venous Distention Increased] : there was no jugular-venous distention [Thyroid Diffuse Enlargement] : the thyroid was not enlarged [Thyroid Nodule] : there were no palpable thyroid nodules [Heart Sounds] : normal S1 and S2 [Exaggerated Use Of Accessory Muscles For Inspiration] : no accessory muscle use [Respiration, Rhythm And Depth] : normal respiratory rhythm and effort [Abdomen Soft] : soft [Abdomen Tenderness] : non-tender [] : no hepato-splenomegaly [Nail Clubbing] : no clubbing of the fingernails [Abdomen Mass (___ Cm)] : no abdominal mass palpated [Skin Turgor] : normal skin turgor [No Focal Deficits] : no focal deficits [FreeTextEntry1] : verbal

## 2019-12-02 NOTE — ASSESSMENT
[FreeTextEntry1] : #1 cough for 2 months, on and off previously. Consider secondary to obstructive lung disease. Consider component of postnasal drip, ?  reflux. Patient will increase his duonebs from q hs to one treatment every 12 hours. Begin Asmanex 220 2 inhalations per day. Begin Flonase 2 sprays per nostril per day. No bedtime snacks. Take antacid p.r.n. for reflux. Patient and daughter were instructed to call and return if symptoms are not improving in the next 3-4 weeks.\par \par #2 h.o. Lymphoma.\par \par #3 s/p TAVR.\par \par #4 CHF.\par \par #5 AF.\par \par #6 PM placement hx. \par \par Prevnar 13 and influenza vaccinations given today.\par \par

## 2019-12-02 NOTE — PHYSICAL EXAM
[General Appearance - Well Developed] : well developed [Normal Appearance] : normal appearance [Well Groomed] : well groomed [General Appearance - Well Nourished] : well nourished [No Deformities] : no deformities [General Appearance - In No Acute Distress] : no acute distress [Normal Conjunctiva] : the conjunctiva exhibited no abnormalities [Eyelids - No Xanthelasma] : the eyelids demonstrated no xanthelasmas [Normal Oropharynx] : normal oropharynx [Neck Appearance] : the appearance of the neck was normal [Neck Cervical Mass (___cm)] : no neck mass was observed [Jugular Venous Distention Increased] : there was no jugular-venous distention [Thyroid Diffuse Enlargement] : the thyroid was not enlarged [Thyroid Nodule] : there were no palpable thyroid nodules [Heart Sounds] : normal S1 and S2 [Respiration, Rhythm And Depth] : normal respiratory rhythm and effort [Exaggerated Use Of Accessory Muscles For Inspiration] : no accessory muscle use [Abdomen Soft] : soft [Abdomen Tenderness] : non-tender [] : no hepato-splenomegaly [Nail Clubbing] : no clubbing of the fingernails [Abdomen Mass (___ Cm)] : no abdominal mass palpated [No Focal Deficits] : no focal deficits [Skin Turgor] : normal skin turgor [FreeTextEntry1] : verbal

## 2019-12-02 NOTE — PROCEDURE
[FreeTextEntry1] : Full pulmonary function testing today shows a mild obstructive deficit with an FEV1 of 1.5 478% predicted. Diffusing capacity is normal.

## 2019-12-02 NOTE — PHYSICAL EXAM
[General Appearance - Well Developed] : well developed [Well Groomed] : well groomed [Normal Appearance] : normal appearance [General Appearance - Well Nourished] : well nourished [No Deformities] : no deformities [General Appearance - In No Acute Distress] : no acute distress [Normal Conjunctiva] : the conjunctiva exhibited no abnormalities [Eyelids - No Xanthelasma] : the eyelids demonstrated no xanthelasmas [Normal Oropharynx] : normal oropharynx [Neck Appearance] : the appearance of the neck was normal [Neck Cervical Mass (___cm)] : no neck mass was observed [Jugular Venous Distention Increased] : there was no jugular-venous distention [Thyroid Diffuse Enlargement] : the thyroid was not enlarged [Thyroid Nodule] : there were no palpable thyroid nodules [Heart Sounds] : normal S1 and S2 [Exaggerated Use Of Accessory Muscles For Inspiration] : no accessory muscle use [Respiration, Rhythm And Depth] : normal respiratory rhythm and effort [Abdomen Soft] : soft [Abdomen Tenderness] : non-tender [] : no hepato-splenomegaly [Abdomen Mass (___ Cm)] : no abdominal mass palpated [Nail Clubbing] : no clubbing of the fingernails [Skin Turgor] : normal skin turgor [No Focal Deficits] : no focal deficits [FreeTextEntry1] : verbal

## 2020-01-13 ENCOUNTER — RX RENEWAL (OUTPATIENT)
Age: 85
End: 2020-01-13

## 2020-01-13 RX ORDER — FLUTICASONE PROPIONATE 110 UG/1
110 AEROSOL, METERED RESPIRATORY (INHALATION) TWICE DAILY
Qty: 1 | Refills: 1 | Status: ACTIVE | COMMUNITY
Start: 2019-12-03 | End: 1900-01-01

## 2020-01-13 RX ORDER — FLUTICASONE PROPIONATE 50 UG/1
50 SPRAY, METERED NASAL DAILY
Qty: 1 | Refills: 2 | Status: ACTIVE | COMMUNITY
Start: 2019-12-02 | End: 1900-01-01

## 2020-01-30 ENCOUNTER — NON-APPOINTMENT (OUTPATIENT)
Age: 85
End: 2020-01-30

## 2020-01-30 ENCOUNTER — APPOINTMENT (OUTPATIENT)
Dept: INTERNAL MEDICINE | Facility: CLINIC | Age: 85
End: 2020-01-30
Payer: MEDICARE

## 2020-01-30 VITALS
WEIGHT: 150 LBS | DIASTOLIC BLOOD PRESSURE: 78 MMHG | HEIGHT: 66 IN | TEMPERATURE: 97.5 F | RESPIRATION RATE: 18 BRPM | BODY MASS INDEX: 24.11 KG/M2 | SYSTOLIC BLOOD PRESSURE: 122 MMHG | OXYGEN SATURATION: 94 % | HEART RATE: 60 BPM

## 2020-01-30 DIAGNOSIS — J18.9 PNEUMONIA, UNSPECIFIED ORGANISM: ICD-10-CM

## 2020-01-30 DIAGNOSIS — R05 COUGH: ICD-10-CM

## 2020-01-30 DIAGNOSIS — N18.9 CHRONIC KIDNEY DISEASE, UNSPECIFIED: ICD-10-CM

## 2020-01-30 PROCEDURE — 99214 OFFICE O/P EST MOD 30 MIN: CPT | Mod: 25

## 2020-01-30 PROCEDURE — 94010 BREATHING CAPACITY TEST: CPT

## 2020-01-30 RX ORDER — METHYLPREDNISOLONE 4 MG/1
4 TABLET ORAL
Qty: 1 | Refills: 0 | Status: ACTIVE | COMMUNITY
Start: 2020-01-30 | End: 1900-01-01

## 2020-01-30 RX ORDER — DOXYCYCLINE 100 MG/1
100 CAPSULE ORAL
Qty: 14 | Refills: 0 | Status: ACTIVE | COMMUNITY
Start: 2020-01-30 | End: 1900-01-01

## 2020-01-30 NOTE — PHYSICAL EXAM
[No Acute Distress] : no acute distress [Normal Appearance] : normal appearance [Normal Oropharynx] : normal oropharynx [Normal S1, S2] : normal s1, s2 [No Resp Distress] : no resp distress [Clear to Auscultation Bilaterally] : clear to auscultation bilaterally [Benign] : benign [Normal Gait] : normal gait [No Cyanosis] : no cyanosis [No Clubbing] : no clubbing [Normal Color/ Pigmentation] : normal color/ pigmentation [Normal Affect] : normal affect [TextBox_54] : 80, 2/6 castillo [place] [TextBox_125] : ecchymotic area L lat upper torso.  [TextBox_68] : mild wheeze with FEM.

## 2020-01-30 NOTE — ASSESSMENT
[FreeTextEntry1] : #1 atelectasis/consolidation medial right lower lung.  2 pulmonary nodules as noted above (R Upper looks infectious, L is new c/w 2018, will need 3 mo following CT).  Multiple similar appearing lymph nodes neck, axillae, mediastinum c/w 12/2018 CT.  Alot of secretions in bronchi.  Patient will continue his Duonebs b.i.d. and Flovent 110 2 puffs b.i.d.  Treat for infection/PNA  with doxycycline 100 mg p.o. b.i.d. x7 days.  Patient will also take a Medrol Dosepak for airway inflammation.  If patient not improving or if any increase in sx's, will go to ER for inpatient treatment of PNA.   I s/w Eli bledsoe, for tele follow up Mon or Tues.  \par \par Patient is also to see oncology (h.o. prostate cancer and lymphoma). For following CT scan of chest in 3 months regarding nodules (especially L nodule).  Call or return if not improving. \par \par #2 Obstructive lung disease. As above. \par \par #3 h.o. prostate cancer and lymohoma. \par \par For CBC, BMP.  For recheck INR next week per cardiology.

## 2020-01-30 NOTE — PROCEDURE
[FreeTextEntry1] : Spirometry today shows a mild restrictive and obstructive deficit with an FEV1 of 1.16 or 64% predicted.\par \par CT scan of January 27, 2020 reviewed with CT radiologist:  shows atelectasis medial right lower lobe and consolidation, 9 x 11 mm nodule right upper lobe (? infectious), 10 mm nodule left lower lobe (will need 3 mo following CT, new c/w 12/2018 CT). Multiple lymph nodes seen in neck, axilla and mediastinum (similar adenopathy c/w 12/2018 CT). Alot of secretions in bronchi. Will treat for PNA.

## 2020-01-30 NOTE — HISTORY OF PRESENT ILLNESS
[TextBox_4] : This is a 89-year-old male with a history including AS, TAVR, , history of CHF, A. fib, pacemaker placement, chronic renal insufficiency, lymphoma, and prostate cancer. He also has obstructive lung disease. He currently is on Duonebs b.i.d., Flovent 110 2 puffs b.i.d. and Flonase nasal spray. He is here today with his daughter and aide. He does continue to have cough and have sputum production. Notes an occasional wheeze. He is not having fever or chills. He is wheelchair-bound and is not noting dyspnea. Not having hemoptysis.

## 2020-01-30 NOTE — REVIEW OF SYSTEMS
[Cough] : cough [Sputum] : sputum [Negative] : Endocrine [Recent Wt Gain (___ Lbs)] : ~T no recent weight gain [Fever] : no fever [Chills] : no chills [Dyspnea] : no dyspnea

## 2020-01-31 ENCOUNTER — OUTPATIENT (OUTPATIENT)
Dept: OUTPATIENT SERVICES | Facility: HOSPITAL | Age: 85
LOS: 1 days | Discharge: HOME | End: 2020-01-31

## 2020-01-31 DIAGNOSIS — I26.99 OTHER PULMONARY EMBOLISM WITHOUT ACUTE COR PULMONALE: ICD-10-CM

## 2020-01-31 DIAGNOSIS — Z96.653 PRESENCE OF ARTIFICIAL KNEE JOINT, BILATERAL: Chronic | ICD-10-CM

## 2020-01-31 DIAGNOSIS — Z96.619 PRESENCE OF UNSPECIFIED ARTIFICIAL SHOULDER JOINT: Chronic | ICD-10-CM

## 2020-02-03 ENCOUNTER — APPOINTMENT (OUTPATIENT)
Age: 85
End: 2020-02-03
Payer: MEDICARE

## 2020-02-03 ENCOUNTER — INPATIENT (INPATIENT)
Facility: HOSPITAL | Age: 85
LOS: 2 days | Discharge: HOME HEALTH | DRG: 194 | End: 2020-02-06
Attending: HOSPITALIST | Admitting: HOSPITALIST
Payer: MEDICARE

## 2020-02-03 VITALS
HEIGHT: 66 IN | BODY MASS INDEX: 23.14 KG/M2 | HEART RATE: 61 BPM | SYSTOLIC BLOOD PRESSURE: 92 MMHG | DIASTOLIC BLOOD PRESSURE: 53 MMHG | WEIGHT: 144 LBS | RESPIRATION RATE: 16 BRPM | TEMPERATURE: 97.5 F

## 2020-02-03 VITALS — WEIGHT: 143.96 LBS | HEIGHT: 65 IN

## 2020-02-03 DIAGNOSIS — R91.8 OTHER NONSPECIFIC ABNORMAL FINDING OF LUNG FIELD: ICD-10-CM

## 2020-02-03 DIAGNOSIS — Z96.619 PRESENCE OF UNSPECIFIED ARTIFICIAL SHOULDER JOINT: Chronic | ICD-10-CM

## 2020-02-03 DIAGNOSIS — Z96.653 PRESENCE OF ARTIFICIAL KNEE JOINT, BILATERAL: Chronic | ICD-10-CM

## 2020-02-03 DIAGNOSIS — Z95.2 PRESENCE OF PROSTHETIC HEART VALVE: Chronic | ICD-10-CM

## 2020-02-03 DIAGNOSIS — Z95.0 PRESENCE OF CARDIAC PACEMAKER: Chronic | ICD-10-CM

## 2020-02-03 DIAGNOSIS — C83.04: ICD-10-CM

## 2020-02-03 LAB
ALBUMIN SERPL ELPH-MCNC: 3.2 G/DL — LOW (ref 3.3–5)
ALP SERPL-CCNC: 49 U/L — SIGNIFICANT CHANGE UP (ref 40–120)
ALT FLD-CCNC: 13 U/L — SIGNIFICANT CHANGE UP (ref 12–78)
ANION GAP SERPL CALC-SCNC: 6 MMOL/L — SIGNIFICANT CHANGE UP (ref 5–17)
APPEARANCE UR: CLEAR — SIGNIFICANT CHANGE UP
APTT BLD: 33.8 SEC — SIGNIFICANT CHANGE UP (ref 27.5–36.3)
AST SERPL-CCNC: 21 U/L — SIGNIFICANT CHANGE UP (ref 15–37)
BASOPHILS # BLD AUTO: 0 K/UL — SIGNIFICANT CHANGE UP (ref 0–0.2)
BASOPHILS NFR BLD AUTO: 0 % — SIGNIFICANT CHANGE UP (ref 0–2)
BILIRUB SERPL-MCNC: 0.4 MG/DL — SIGNIFICANT CHANGE UP (ref 0.2–1.2)
BILIRUB UR-MCNC: NEGATIVE — SIGNIFICANT CHANGE UP
BUN SERPL-MCNC: 63 MG/DL — HIGH (ref 7–23)
CALCIUM SERPL-MCNC: 9.1 MG/DL — SIGNIFICANT CHANGE UP (ref 8.5–10.1)
CHLORIDE SERPL-SCNC: 107 MMOL/L — SIGNIFICANT CHANGE UP (ref 96–108)
CO2 SERPL-SCNC: 28 MMOL/L — SIGNIFICANT CHANGE UP (ref 22–31)
COLOR SPEC: YELLOW — SIGNIFICANT CHANGE UP
CREAT SERPL-MCNC: 2.34 MG/DL — HIGH (ref 0.5–1.3)
DIFF PNL FLD: NEGATIVE — SIGNIFICANT CHANGE UP
EOSINOPHIL # BLD AUTO: 0 K/UL — SIGNIFICANT CHANGE UP (ref 0–0.5)
EOSINOPHIL NFR BLD AUTO: 0 % — SIGNIFICANT CHANGE UP (ref 0–6)
GLUCOSE SERPL-MCNC: 111 MG/DL — HIGH (ref 70–99)
GLUCOSE UR QL: NEGATIVE MG/DL — SIGNIFICANT CHANGE UP
HCT VFR BLD CALC: 25.9 % — LOW (ref 39–50)
HGB BLD-MCNC: 8.1 G/DL — LOW (ref 13–17)
INR BLD: 1.89 RATIO — HIGH (ref 0.88–1.16)
KETONES UR-MCNC: NEGATIVE — SIGNIFICANT CHANGE UP
LACTATE SERPL-SCNC: 1.7 MMOL/L — SIGNIFICANT CHANGE UP (ref 0.7–2)
LEUKOCYTE ESTERASE UR-ACNC: NEGATIVE — SIGNIFICANT CHANGE UP
LYMPHOCYTES # BLD AUTO: 1 K/UL — SIGNIFICANT CHANGE UP (ref 1–3.3)
LYMPHOCYTES # BLD AUTO: 19 % — SIGNIFICANT CHANGE UP (ref 13–44)
MCHC RBC-ENTMCNC: 31.3 GM/DL — LOW (ref 32–36)
MCHC RBC-ENTMCNC: 35.5 PG — HIGH (ref 27–34)
MCV RBC AUTO: 113.6 FL — HIGH (ref 80–100)
MONOCYTES # BLD AUTO: 0.37 K/UL — SIGNIFICANT CHANGE UP (ref 0–0.9)
MONOCYTES NFR BLD AUTO: 7 % — SIGNIFICANT CHANGE UP (ref 2–14)
NEUTROPHILS # BLD AUTO: 3.67 K/UL — SIGNIFICANT CHANGE UP (ref 1.8–7.4)
NEUTROPHILS NFR BLD AUTO: 70 % — SIGNIFICANT CHANGE UP (ref 43–77)
NITRITE UR-MCNC: NEGATIVE — SIGNIFICANT CHANGE UP
NRBC # BLD: SIGNIFICANT CHANGE UP /100 WBCS (ref 0–0)
NT-PROBNP SERPL-SCNC: 4514 PG/ML — HIGH (ref 0–450)
PH UR: 5 — SIGNIFICANT CHANGE UP (ref 5–8)
PLATELET # BLD AUTO: 161 K/UL — SIGNIFICANT CHANGE UP (ref 150–400)
POTASSIUM SERPL-MCNC: 5.1 MMOL/L — SIGNIFICANT CHANGE UP (ref 3.5–5.3)
POTASSIUM SERPL-SCNC: 5.1 MMOL/L — SIGNIFICANT CHANGE UP (ref 3.5–5.3)
PROT SERPL-MCNC: 5.9 GM/DL — LOW (ref 6–8.3)
PROT UR-MCNC: NEGATIVE MG/DL — SIGNIFICANT CHANGE UP
PROTHROM AB SERPL-ACNC: 21.4 SEC — HIGH (ref 10–12.9)
RBC # BLD: 2.28 M/UL — LOW (ref 4.2–5.8)
RBC # FLD: 14.6 % — HIGH (ref 10.3–14.5)
SODIUM SERPL-SCNC: 141 MMOL/L — SIGNIFICANT CHANGE UP (ref 135–145)
SP GR SPEC: 1.01 — SIGNIFICANT CHANGE UP (ref 1.01–1.02)
UROBILINOGEN FLD QL: NEGATIVE MG/DL — SIGNIFICANT CHANGE UP
WBC # BLD: 5.24 K/UL — SIGNIFICANT CHANGE UP (ref 3.8–10.5)
WBC # FLD AUTO: 5.24 K/UL — SIGNIFICANT CHANGE UP (ref 3.8–10.5)

## 2020-02-03 PROCEDURE — 93010 ELECTROCARDIOGRAM REPORT: CPT

## 2020-02-03 PROCEDURE — 97162 PT EVAL MOD COMPLEX 30 MIN: CPT | Mod: GP

## 2020-02-03 PROCEDURE — 93971 EXTREMITY STUDY: CPT | Mod: LT

## 2020-02-03 PROCEDURE — 36415 COLL VENOUS BLD VENIPUNCTURE: CPT

## 2020-02-03 PROCEDURE — 99223 1ST HOSP IP/OBS HIGH 75: CPT

## 2020-02-03 PROCEDURE — 84436 ASSAY OF TOTAL THYROXINE: CPT

## 2020-02-03 PROCEDURE — 82607 VITAMIN B-12: CPT

## 2020-02-03 PROCEDURE — 85045 AUTOMATED RETICULOCYTE COUNT: CPT

## 2020-02-03 PROCEDURE — 85610 PROTHROMBIN TIME: CPT

## 2020-02-03 PROCEDURE — 85730 THROMBOPLASTIN TIME PARTIAL: CPT

## 2020-02-03 PROCEDURE — 80048 BASIC METABOLIC PNL TOTAL CA: CPT

## 2020-02-03 PROCEDURE — 83540 ASSAY OF IRON: CPT

## 2020-02-03 PROCEDURE — 80053 COMPREHEN METABOLIC PANEL: CPT

## 2020-02-03 PROCEDURE — 87581 M.PNEUMON DNA AMP PROBE: CPT

## 2020-02-03 PROCEDURE — 83550 IRON BINDING TEST: CPT

## 2020-02-03 PROCEDURE — 84466 ASSAY OF TRANSFERRIN: CPT

## 2020-02-03 PROCEDURE — 99215 OFFICE O/P EST HI 40 MIN: CPT

## 2020-02-03 PROCEDURE — 87798 DETECT AGENT NOS DNA AMP: CPT

## 2020-02-03 PROCEDURE — 87633 RESP VIRUS 12-25 TARGETS: CPT

## 2020-02-03 PROCEDURE — 82728 ASSAY OF FERRITIN: CPT

## 2020-02-03 PROCEDURE — 82746 ASSAY OF FOLIC ACID SERUM: CPT

## 2020-02-03 PROCEDURE — 97116 GAIT TRAINING THERAPY: CPT | Mod: GP

## 2020-02-03 PROCEDURE — 71045 X-RAY EXAM CHEST 1 VIEW: CPT | Mod: 26

## 2020-02-03 PROCEDURE — 87040 BLOOD CULTURE FOR BACTERIA: CPT

## 2020-02-03 PROCEDURE — 94640 AIRWAY INHALATION TREATMENT: CPT

## 2020-02-03 PROCEDURE — 85027 COMPLETE CBC AUTOMATED: CPT

## 2020-02-03 PROCEDURE — 87486 CHLMYD PNEUM DNA AMP PROBE: CPT

## 2020-02-03 PROCEDURE — 97530 THERAPEUTIC ACTIVITIES: CPT | Mod: GP

## 2020-02-03 PROCEDURE — 84443 ASSAY THYROID STIM HORMONE: CPT

## 2020-02-03 PROCEDURE — G0103: CPT

## 2020-02-03 RX ORDER — CEFEPIME 1 G/1
1000 INJECTION, POWDER, FOR SOLUTION INTRAMUSCULAR; INTRAVENOUS EVERY 12 HOURS
Refills: 0 | Status: DISCONTINUED | OUTPATIENT
Start: 2020-02-03 | End: 2020-02-04

## 2020-02-03 RX ORDER — VANCOMYCIN HCL 1 G
1000 VIAL (EA) INTRAVENOUS ONCE
Refills: 0 | Status: COMPLETED | OUTPATIENT
Start: 2020-02-03 | End: 2020-02-04

## 2020-02-03 RX ORDER — ACETAMINOPHEN 500 MG
1000 TABLET ORAL ONCE
Refills: 0 | Status: COMPLETED | OUTPATIENT
Start: 2020-02-03 | End: 2020-02-03

## 2020-02-03 RX ORDER — GABAPENTIN 400 MG/1
200 CAPSULE ORAL ONCE
Refills: 0 | Status: COMPLETED | OUTPATIENT
Start: 2020-02-03 | End: 2020-02-03

## 2020-02-03 RX ADMIN — GABAPENTIN 200 MILLIGRAM(S): 400 CAPSULE ORAL at 23:44

## 2020-02-03 RX ADMIN — Medication 1000 MILLIGRAM(S): at 23:44

## 2020-02-03 NOTE — ED PROVIDER NOTE - OBJECTIVE STATEMENT
89 y/o male with PMHx of CKD, BPH, postherpetic neuralgia, chronic diarrhea, prostate cancer, basal cell carcinoma, lymphoma, anxiety, hypothyroid, HTN, Afib s/p pacemaker in July 2019, s/p TAVR in July 2019, s/p shoulder replacement, s/p bilateral unicompartmental knee replacement presents to the ED sent in by MD regarding low hemoglobin and partially collapsed right lung. Per family at bedside, pt has been having a cough for the past 3 months, and today pt felt more fatigued than usual. Denies fever, dark/black stools, SOB. Seen by pulmonologist Dr. Morales this morning, had labs drawn which showed low hemoglobin, and had CT chest done at [facility] showing PNA. Was started on Doxycycline and Methylprednisolone on 1/30/20. No hx of blood transfusions since July 2019. On Coumadin. Pulmonologist: Andrew. PMD: Adi.

## 2020-02-03 NOTE — ED ADULT NURSE NOTE - OBJECTIVE STATEMENT
c/o more fatigue today, was told to come to ER by dr. gonzalez for hgb 7.8, pt has report productive cough for past 3 month, denies SOB or difficulty breathing, pt was told of collapse lung within past week after ct scan, also pt was told he has lung nodules and blockage in lung that are new, unknown if pneumonia vs. mass HX: HTN, prostaste cancer, CLL, post hepatic neuralgia, uses home O2 which he has been using more often in past month, PPM, heart valve replacement, CHF, denies fever, c/o chronic pain due to post hepatic neuralgia c/o more fatigue today, was told to come to ER by dr. gonzalez for hgb 7.8, pt has report productive cough for past 3 month, denies SOB or difficulty breathing, pt was told of collapse lung within past week after ct scan, also pt was told he has lung nodules and blockage in lung that are new, unknown if pneumonia vs. mass HX: HTN, prostaste cancer, CLL, post hepatic neuralgia, uses home O2 which he has been using more often in past month, PPM, heart valve replacement, CHF, denies fever, c/o chronic pain due to post hepatic neuralgia, denies overt bleeding

## 2020-02-03 NOTE — ED PROVIDER NOTE - MUSCULOSKELETAL, MLM
Spine appears normal, range of motion is not limited, no muscle or joint tenderness, good distal pulses +left arm marked swelling and ecchymosis

## 2020-02-03 NOTE — ED ADULT NURSE NOTE - NSIMPLEMENTINTERV_GEN_ALL_ED
Implemented All Fall with Harm Risk Interventions:  [place] to call system. Call bell, personal items and telephone within reach. Instruct patient to call for assistance. Room bathroom lighting operational. Non-slip footwear when patient is off stretcher. Physically safe environment: no spills, clutter or unnecessary equipment. Stretcher in lowest position, wheels locked, appropriate side rails in place. Provide visual cue, wrist band, yellow gown, etc. Monitor gait and stability. Monitor for mental status changes and reorient to person, place, and time. Review medications for side effects contributing to fall risk. Reinforce activity limits and safety measures with patient and family. Provide visual clues: red socks.

## 2020-02-03 NOTE — ED PROVIDER NOTE - PSH
Artificial cardiac pacemaker    H/O shoulder replacement    S/P bilateral unicompartmental knee replacement    S/P TAVR (transcatheter aortic valve replacement)

## 2020-02-03 NOTE — ED PROVIDER NOTE - CLINICAL SUMMARY MEDICAL DECISION MAKING FREE TEXT BOX
Pt with hx of Afib, TAVR, CHF, pacemaker, prostate cancer, lymphoma, partially collapsed right lung here for cough and anemia which now is known to be chronic. Pt has been on doxycyline for a few days but still has cough. Sent by MD to be admitted.

## 2020-02-04 DIAGNOSIS — I35.0 NONRHEUMATIC AORTIC (VALVE) STENOSIS: ICD-10-CM

## 2020-02-04 DIAGNOSIS — Z86.79 PERSONAL HISTORY OF OTHER DISEASES OF THE CIRCULATORY SYSTEM: ICD-10-CM

## 2020-02-04 DIAGNOSIS — J44.9 CHRONIC OBSTRUCTIVE PULMONARY DISEASE, UNSPECIFIED: ICD-10-CM

## 2020-02-04 DIAGNOSIS — D64.9 ANEMIA, UNSPECIFIED: ICD-10-CM

## 2020-02-04 DIAGNOSIS — I48.91 UNSPECIFIED ATRIAL FIBRILLATION: ICD-10-CM

## 2020-02-04 DIAGNOSIS — J18.9 PNEUMONIA, UNSPECIFIED ORGANISM: ICD-10-CM

## 2020-02-04 DIAGNOSIS — Z85.79 PERSONAL HISTORY OF OTHER MALIGNANT NEOPLASMS OF LYMPHOID, HEMATOPOIETIC AND RELATED TISSUES: ICD-10-CM

## 2020-02-04 DIAGNOSIS — B02.29 OTHER POSTHERPETIC NERVOUS SYSTEM INVOLVEMENT: ICD-10-CM

## 2020-02-04 DIAGNOSIS — N18.9 CHRONIC KIDNEY DISEASE, UNSPECIFIED: ICD-10-CM

## 2020-02-04 DIAGNOSIS — J98.11 ATELECTASIS: ICD-10-CM

## 2020-02-04 DIAGNOSIS — C83.04 SMALL CELL B-CELL LYMPHOMA, LYMPH NODES OF AXILLA AND UPPER LIMB: ICD-10-CM

## 2020-02-04 DIAGNOSIS — Z85.46 PERSONAL HISTORY OF MALIGNANT NEOPLASM OF PROSTATE: ICD-10-CM

## 2020-02-04 DIAGNOSIS — R91.8 OTHER NONSPECIFIC ABNORMAL FINDING OF LUNG FIELD: ICD-10-CM

## 2020-02-04 LAB
ALBUMIN SERPL ELPH-MCNC: 2.8 G/DL — LOW (ref 3.3–5)
ALP SERPL-CCNC: 43 U/L — SIGNIFICANT CHANGE UP (ref 40–120)
ALT FLD-CCNC: 10 U/L — LOW (ref 12–78)
ANION GAP SERPL CALC-SCNC: 4 MMOL/L — LOW (ref 5–17)
APTT BLD: 32.8 SEC — SIGNIFICANT CHANGE UP (ref 27.5–36.3)
AST SERPL-CCNC: 12 U/L — LOW (ref 15–37)
BILIRUB SERPL-MCNC: 0.4 MG/DL — SIGNIFICANT CHANGE UP (ref 0.2–1.2)
BUN SERPL-MCNC: 62 MG/DL — HIGH (ref 7–23)
CALCIUM SERPL-MCNC: 8.5 MG/DL — SIGNIFICANT CHANGE UP (ref 8.5–10.1)
CHLORIDE SERPL-SCNC: 105 MMOL/L — SIGNIFICANT CHANGE UP (ref 96–108)
CO2 SERPL-SCNC: 29 MMOL/L — SIGNIFICANT CHANGE UP (ref 22–31)
CREAT SERPL-MCNC: 2.16 MG/DL — HIGH (ref 0.5–1.3)
FERRITIN SERPL-MCNC: 222 NG/ML — SIGNIFICANT CHANGE UP (ref 30–400)
FERRITIN SERPL-MCNC: 228 NG/ML — SIGNIFICANT CHANGE UP (ref 30–400)
FOLATE SERPL-MCNC: 11.9 NG/ML — SIGNIFICANT CHANGE UP
FOLATE SERPL-MCNC: 12.6 NG/ML — SIGNIFICANT CHANGE UP
GLUCOSE SERPL-MCNC: 64 MG/DL — LOW (ref 70–99)
HCT VFR BLD CALC: 24.2 % — LOW (ref 39–50)
HGB BLD-MCNC: 7.4 G/DL — LOW (ref 13–17)
INR BLD: 1.65 RATIO — HIGH (ref 0.88–1.16)
IRON SATN MFR SERPL: 14 % — LOW (ref 16–55)
IRON SATN MFR SERPL: 14 % — LOW (ref 16–55)
IRON SATN MFR SERPL: 37 UG/DL — LOW (ref 45–165)
IRON SATN MFR SERPL: 42 UG/DL — LOW (ref 45–165)
MCHC RBC-ENTMCNC: 30.6 GM/DL — LOW (ref 32–36)
MCHC RBC-ENTMCNC: 34.6 PG — HIGH (ref 27–34)
MCV RBC AUTO: 113.1 FL — HIGH (ref 80–100)
PLATELET # BLD AUTO: 139 K/UL — LOW (ref 150–400)
POTASSIUM SERPL-MCNC: 3.7 MMOL/L — SIGNIFICANT CHANGE UP (ref 3.5–5.3)
POTASSIUM SERPL-SCNC: 3.7 MMOL/L — SIGNIFICANT CHANGE UP (ref 3.5–5.3)
PROT SERPL-MCNC: 5 GM/DL — LOW (ref 6–8.3)
PROTHROM AB SERPL-ACNC: 18.6 SEC — HIGH (ref 10–12.9)
PSA FLD-MCNC: 42.2 NG/ML — HIGH (ref 0–4)
RAPID RVP RESULT: DETECTED
RBC # BLD: 2.14 M/UL — LOW (ref 4.2–5.8)
RBC # BLD: 2.52 M/UL — LOW (ref 4.2–5.8)
RBC # FLD: 14.7 % — HIGH (ref 10.3–14.5)
RETICS #: 61 K/UL — SIGNIFICANT CHANGE UP (ref 25–125)
RETICS/RBC NFR: 2.4 % — SIGNIFICANT CHANGE UP (ref 0.5–2.5)
RV+EV RNA SPEC QL NAA+PROBE: DETECTED
SODIUM SERPL-SCNC: 138 MMOL/L — SIGNIFICANT CHANGE UP (ref 135–145)
TIBC SERPL-MCNC: 262 UG/DL — SIGNIFICANT CHANGE UP (ref 220–430)
TIBC SERPL-MCNC: 306 UG/DL — SIGNIFICANT CHANGE UP (ref 220–430)
TRANSFERRIN SERPL-MCNC: 228 MG/DL — SIGNIFICANT CHANGE UP (ref 200–360)
TSH SERPL-MCNC: 14.3 UU/ML — HIGH (ref 0.34–4.82)
UIBC SERPL-MCNC: 225 UG/DL — SIGNIFICANT CHANGE UP (ref 110–370)
UIBC SERPL-MCNC: 263 UG/DL — SIGNIFICANT CHANGE UP (ref 110–370)
VIT B12 SERPL-MCNC: 1045 PG/ML — SIGNIFICANT CHANGE UP (ref 232–1245)
VIT B12 SERPL-MCNC: 995 PG/ML — SIGNIFICANT CHANGE UP (ref 232–1245)
WBC # BLD: 5.53 K/UL — SIGNIFICANT CHANGE UP (ref 3.8–10.5)
WBC # FLD AUTO: 5.53 K/UL — SIGNIFICANT CHANGE UP (ref 3.8–10.5)

## 2020-02-04 PROCEDURE — 99223 1ST HOSP IP/OBS HIGH 75: CPT

## 2020-02-04 PROCEDURE — 99233 SBSQ HOSP IP/OBS HIGH 50: CPT | Mod: GC

## 2020-02-04 PROCEDURE — 93971 EXTREMITY STUDY: CPT | Mod: 26,LT

## 2020-02-04 RX ORDER — GABAPENTIN 400 MG/1
200 CAPSULE ORAL EVERY 6 HOURS
Refills: 0 | Status: DISCONTINUED | OUTPATIENT
Start: 2020-02-04 | End: 2020-02-05

## 2020-02-04 RX ORDER — WARFARIN SODIUM 2.5 MG/1
3 TABLET ORAL ONCE
Refills: 0 | Status: COMPLETED | OUTPATIENT
Start: 2020-02-04 | End: 2020-02-04

## 2020-02-04 RX ORDER — SODIUM CHLORIDE 9 MG/ML
1000 INJECTION INTRAMUSCULAR; INTRAVENOUS; SUBCUTANEOUS
Refills: 0 | Status: DISCONTINUED | OUTPATIENT
Start: 2020-02-04 | End: 2020-02-06

## 2020-02-04 RX ORDER — ALPRAZOLAM 0.25 MG
0.25 TABLET ORAL ONCE
Refills: 0 | Status: DISCONTINUED | OUTPATIENT
Start: 2020-02-04 | End: 2020-02-04

## 2020-02-04 RX ORDER — AMIODARONE HYDROCHLORIDE 400 MG/1
200 TABLET ORAL DAILY
Refills: 0 | Status: DISCONTINUED | OUTPATIENT
Start: 2020-02-04 | End: 2020-02-06

## 2020-02-04 RX ORDER — LEVOTHYROXINE SODIUM 125 MCG
100 TABLET ORAL DAILY
Refills: 0 | Status: DISCONTINUED | OUTPATIENT
Start: 2020-02-04 | End: 2020-02-06

## 2020-02-04 RX ORDER — IPRATROPIUM/ALBUTEROL SULFATE 18-103MCG
3 AEROSOL WITH ADAPTER (GRAM) INHALATION EVERY 12 HOURS
Refills: 0 | Status: DISCONTINUED | OUTPATIENT
Start: 2020-02-04 | End: 2020-02-06

## 2020-02-04 RX ORDER — CEFTRIAXONE 500 MG/1
1000 INJECTION, POWDER, FOR SOLUTION INTRAMUSCULAR; INTRAVENOUS EVERY 24 HOURS
Refills: 0 | Status: DISCONTINUED | OUTPATIENT
Start: 2020-02-04 | End: 2020-02-04

## 2020-02-04 RX ORDER — CEFTRIAXONE 500 MG/1
1000 INJECTION, POWDER, FOR SOLUTION INTRAMUSCULAR; INTRAVENOUS EVERY 24 HOURS
Refills: 0 | Status: DISCONTINUED | OUTPATIENT
Start: 2020-02-04 | End: 2020-02-06

## 2020-02-04 RX ORDER — GABAPENTIN 400 MG/1
200 CAPSULE ORAL THREE TIMES A DAY
Refills: 0 | Status: DISCONTINUED | OUTPATIENT
Start: 2020-02-04 | End: 2020-02-04

## 2020-02-04 RX ORDER — GABAPENTIN 400 MG/1
100 CAPSULE ORAL DAILY
Refills: 0 | Status: DISCONTINUED | OUTPATIENT
Start: 2020-02-04 | End: 2020-02-04

## 2020-02-04 RX ORDER — AZITHROMYCIN 500 MG/1
500 TABLET, FILM COATED ORAL EVERY 24 HOURS
Refills: 0 | Status: DISCONTINUED | OUTPATIENT
Start: 2020-02-04 | End: 2020-02-06

## 2020-02-04 RX ORDER — ONDANSETRON 8 MG/1
4 TABLET, FILM COATED ORAL EVERY 6 HOURS
Refills: 0 | Status: DISCONTINUED | OUTPATIENT
Start: 2020-02-04 | End: 2020-02-06

## 2020-02-04 RX ORDER — GABAPENTIN 400 MG/1
200 CAPSULE ORAL DAILY
Refills: 0 | Status: DISCONTINUED | OUTPATIENT
Start: 2020-02-04 | End: 2020-02-04

## 2020-02-04 RX ORDER — METOPROLOL TARTRATE 50 MG
25 TABLET ORAL DAILY
Refills: 0 | Status: DISCONTINUED | OUTPATIENT
Start: 2020-02-04 | End: 2020-02-06

## 2020-02-04 RX ORDER — GABAPENTIN 400 MG/1
200 CAPSULE ORAL EVERY 6 HOURS
Refills: 0 | Status: DISCONTINUED | OUTPATIENT
Start: 2020-02-04 | End: 2020-02-04

## 2020-02-04 RX ORDER — BUDESONIDE, MICRONIZED 100 %
0.5 POWDER (GRAM) MISCELLANEOUS EVERY 12 HOURS
Refills: 0 | Status: DISCONTINUED | OUTPATIENT
Start: 2020-02-04 | End: 2020-02-06

## 2020-02-04 RX ORDER — GABAPENTIN 400 MG/1
300 CAPSULE ORAL AT BEDTIME
Refills: 0 | Status: DISCONTINUED | OUTPATIENT
Start: 2020-02-04 | End: 2020-02-04

## 2020-02-04 RX ORDER — ACETAMINOPHEN 500 MG
650 TABLET ORAL ONCE
Refills: 0 | Status: COMPLETED | OUTPATIENT
Start: 2020-02-04 | End: 2020-02-04

## 2020-02-04 RX ORDER — POLYETHYLENE GLYCOL 3350 17 G/17G
17 POWDER, FOR SOLUTION ORAL ONCE
Refills: 0 | Status: COMPLETED | OUTPATIENT
Start: 2020-02-04 | End: 2020-02-04

## 2020-02-04 RX ADMIN — Medication 0.25 MILLIGRAM(S): at 22:33

## 2020-02-04 RX ADMIN — Medication 0.25 MILLIGRAM(S): at 05:14

## 2020-02-04 RX ADMIN — Medication 0.25 MILLIGRAM(S): at 01:50

## 2020-02-04 RX ADMIN — AMIODARONE HYDROCHLORIDE 200 MILLIGRAM(S): 400 TABLET ORAL at 05:14

## 2020-02-04 RX ADMIN — GABAPENTIN 200 MILLIGRAM(S): 400 CAPSULE ORAL at 21:04

## 2020-02-04 RX ADMIN — WARFARIN SODIUM 3 MILLIGRAM(S): 2.5 TABLET ORAL at 21:04

## 2020-02-04 RX ADMIN — CEFEPIME 1000 MILLIGRAM(S): 1 INJECTION, POWDER, FOR SOLUTION INTRAMUSCULAR; INTRAVENOUS at 00:58

## 2020-02-04 RX ADMIN — AZITHROMYCIN 255 MILLIGRAM(S): 500 TABLET, FILM COATED ORAL at 13:20

## 2020-02-04 RX ADMIN — CEFTRIAXONE 1000 MILLIGRAM(S): 500 INJECTION, POWDER, FOR SOLUTION INTRAMUSCULAR; INTRAVENOUS at 13:20

## 2020-02-04 RX ADMIN — GABAPENTIN 200 MILLIGRAM(S): 400 CAPSULE ORAL at 10:28

## 2020-02-04 RX ADMIN — Medication 250 MILLIGRAM(S): at 01:50

## 2020-02-04 RX ADMIN — Medication 1000 MILLIGRAM(S): at 00:57

## 2020-02-04 RX ADMIN — Medication 30 MILLILITER(S): at 15:07

## 2020-02-04 RX ADMIN — Medication 25 MILLIGRAM(S): at 05:14

## 2020-02-04 RX ADMIN — Medication 650 MILLIGRAM(S): at 21:15

## 2020-02-04 RX ADMIN — Medication 650 MILLIGRAM(S): at 20:14

## 2020-02-04 RX ADMIN — POLYETHYLENE GLYCOL 3350 17 GRAM(S): 17 POWDER, FOR SOLUTION ORAL at 22:34

## 2020-02-04 RX ADMIN — GABAPENTIN 200 MILLIGRAM(S): 400 CAPSULE ORAL at 05:14

## 2020-02-04 RX ADMIN — GABAPENTIN 200 MILLIGRAM(S): 400 CAPSULE ORAL at 16:28

## 2020-02-04 RX ADMIN — Medication 100 MICROGRAM(S): at 05:14

## 2020-02-04 NOTE — H&P ADULT - NSHPREVIEWOFSYSTEMS_GEN_ALL_CORE
REVIEW OF SYSTEMS:    CONSTITUTIONAL: No weakness, fevers or chills  EYES/ENT: No visual changes;  No vertigo or throat pain   NECK: No pain or stiffness  RESPIRATORY: + productive cough, wheezing, hemoptysis; No shortness of breath  CARDIOVASCULAR: No chest pain or palpitations  GASTROINTESTINAL: No abdominal or epigastric pain. No nausea, vomiting, or hematemesis; No diarrhea or constipation. No melena or hematochezia.  GENITOURINARY: No dysuria, frequency or hematuria  NEUROLOGICAL: No numbness or weakness  SKIN: Left arm swelling secondary to PPM, multiple bruising secondary to coumadin

## 2020-02-04 NOTE — PROGRESS NOTE ADULT - SUBJECTIVE AND OBJECTIVE BOX
89M PMHx CKD4, BPH, postherpetic neuralgia, prostate ca(2008), SCC(2019),  lymphoma(latent), hypothyroid, COPD, HTN, Afib s/p ppm&TAVR, s/p shoulder replacement, s/p bilateral unicompartmental knee replacement was sent to hospital by Pulmonologist PNA treatment and worsening chronic anemia. Patient reports he has been feeling fatigued over the past 3 months. He reports that the fatigue is worse in the mornings and gets better throughout the day, worse with exertion -- 15 yards(uses a walker to ambulate). Patient noted productive cough, clear sputum, occurs more at night time. Patient denies dyspnea at rest, dysphagia, fevers, chills, orthopnea, chest pain, bilateral leg swelling/tenderness. He reports using his duonebs and flovent without relief for his cough. Patient was recently started a taper dose of prednisone 1/30/20 by his pulmonologist but hasn't noticed any improvement. Patient reports he had a CT chest done outpatient with Dr. Morales that showed suspicious findings for PNA. Of note patient uses oxygen at home as needed. Patient was breathing 95% Pulse ox on room air at bedside. EMR review of 's notes that a CT chest done on 1/27/20 "shows atelectasis medial right lower lobe and consolidation, 9 x 11 mm nodule right upper lobe (? infectious), 10 mm nodule left lower lobe (will need 3 mo following CT, new c/w 12/2018 CT). Multiple lymph nodes seen in neck, axilla and mediastinum (similar adenopathy c/w 12/2018 CT)."    Patient seen and examined at bedside, patient OOB into chair. Patient cough resolved since admission. Patient with questions regarding his anemia, all questions answered. Pulm Note appreciated, nebs and Abx. Patient anemia work up done, iron deficiency noted, Retic normal, B12/Folate normal, patient likely with multiple reasons for Anemia given hx of malignancy, and CKD4.    REVIEW OF SYSTEMS:    CONSTITUTIONAL: No weakness, fevers or chills  EYES/ENT: No visual changes;  No vertigo or throat pain   NECK: No pain or stiffness  RESPIRATORY: No cough, wheezing, hemoptysis; No shortness of breath  CARDIOVASCULAR: No chest pain or palpitations  GASTROINTESTINAL: No abdominal or epigastric pain. No nausea, vomiting, or hematemesis; No diarrhea or constipation. No melena or hematochezia.  GENITOURINARY: No dysuria, frequency or hematuria  NEUROLOGICAL: No numbness or weakness  SKIN: No itching, burning, rashes, or lesions   All other review of systems is negative unless indicated above    Vital Signs Last 24 Hrs  T(C): 37.1 (04 Feb 2020 07:01), Max: 37.1 (04 Feb 2020 07:01)  T(F): 98.7 (04 Feb 2020 07:01), Max: 98.7 (04 Feb 2020 07:01)  HR: 60 (04 Feb 2020 07:01) (60 - 62)  BP: 130/58 (04 Feb 2020 07:01) (112/57 - 130/58)  BP(mean): 76 (03 Feb 2020 18:53) (76 - 76)  RR: 16 (04 Feb 2020 07:01) (16 - 16)  SpO2: 94% (04 Feb 2020 07:01) (94% - 97%)    I&O's Summary    04 Feb 2020 07:01  -  04 Feb 2020 16:46  --------------------------------------------------------  IN: 240 mL / OUT: 0 mL / NET: 240 mL    PHYSICAL EXAM:    Constitutional: NAD, awake and alert, well-developed  HEENT: PERR, EOMI, s/p cataract surgery, constricted pupils+ Normal Hearing  Neck: Soft and supple, No LAD, No JVD  Respiratory: Decreased Breath sounds basal bilaterally, with crackles of RLL+  Cardiovascular: S1 and S2, regular rate and rhythm, no Murmurs, gallops or rubs  Gastrointestinal: Bowel Sounds present, soft, nontender, nondistended, no guarding, no rebound  Extremities: LUE chronic Edema+  Vascular: 2+ peripheral pulses  Neurological: A/O x 3, no focal deficits  Musculoskeletal: 5/5 strength b/l upper and lower extremities  Skin: Widespread Ecchymoses+    MEDICATIONS:  MEDICATIONS  (STANDING):  albuterol/ipratropium for Nebulization 3 milliLiter(s) Nebulizer every 12 hours  aMIOdarone    Tablet 200 milliGRAM(s) Oral daily  azithromycin  IVPB 500 milliGRAM(s) IV Intermittent every 24 hours  buDESOnide    Inhalation Suspension 0.5 milliGRAM(s) Inhalation every 12 hours  cefTRIAXone Injectable. 1000 milliGRAM(s) IV Push every 24 hours  gabapentin 200 milliGRAM(s) Oral every 6 hours  levothyroxine 100 MICROGram(s) Oral daily  metoprolol tartrate 25 milliGRAM(s) Oral daily  sodium chloride 0.9%. 1000 milliLiter(s) (50 mL/Hr) IV Continuous <Continuous>    LABS: All Labs Reviewed:                        7.4    5.53  )-----------( 139      ( 04 Feb 2020 08:18 )             24.2     02-04    138  |  105  |  62<H>  ----------------------------<  64<L>  3.7   |  29  |  2.16<H>    Ca    8.5      04 Feb 2020 08:18    TPro  5.0<L>  /  Alb  2.8<L>  /  TBili  0.4  /  DBili  x   /  AST  12<L>  /  ALT  10<L>  /  AlkPhos  43  02-04    PT/INR - ( 04 Feb 2020 08:18 )   PT: 18.6 sec;   INR: 1.65 ratio       PTT - ( 04 Feb 2020 08:18 )  PTT:32.8 sec      Iron - Total Binding Capacity.: 262 ug/dL    % Saturation, Iron: 14 %    Iron Total, Serum: 37 ug/dL    Unsaturated Iron Binding Capacity: 225 ug/dL  Ferritin, Serum: 228 ng/mL (02.04.20 @ 08:18)    Vitamin B12, Serum in AM (02.04.20 @ 08:18)    Vitamin B12, Serum: 995 pg/mL    Folate, Serum in AM (02.04.20 @ 08:18)    Folate, Serum: 11.9 ng/mL    Reticulocyte Count (02.04.20 @ 12:27)    RBC Count: 2.52 M/uL    Reticulocyte Percent: 2.4 %    Absolute Reticulocytes: 61.0 K/uL    RVP: Entero/Rhino - virus      < from: Xray Chest 1 View- PORTABLE-Urgent (02.03.20 @ 21:49) >    IMPRESSION:  Right base infiltrateversus atelectasis. Follow-up study is recommended as clinically warranted.    < end of copied text >      < from: US Duplex Venous Upper Ext Ltd, Left (02.04.20 @ 09:21) >  IMPRESSION:     No evidence of left upper extremity deep venous thrombosis.    Enlarged morphologically abnormal lymph nodes in the region of the left subclavian vein suspicious for lymphomatous involvement.    < end of copied text >

## 2020-02-04 NOTE — H&P ADULT - ATTENDING COMMENTS
Patient seen and examined after initial evaluation above by family medicine resident. Case discussed and reviewed in detail. Please note my plan below.    89 y/o M with PMH of CKD stage IV, BPH, postherpetic neuralgia, COPD (on home O2 PRN), prostate cancer (2008), squamous cell carcinoma, lymphoma, hypothyroidism, HTN, afib (on coumadin) s/p PPM, s/p TAVR, p/w cough, fatigue and anemia       *Persistent Cough - Possible community acquired PNA   -Patient states that he had a CT scan on Thursday and was told he had possible pneumonia. Will start ceftriaxone / zithromax. Will need to obtain records.   -RVP  -F/u blood cultures   -Patient is also on amiodarone   -Pulm consult  -Pulse ox monitoring     *Anemia  -Hb has been trending down slowly in the last 2 years  -Iron panel / ferritin  / B12 / Folate  -FOBT     *CKD stage IV  -Last creatinine is from 7/19, unable to establish a baseline. Will need outpatient records  -Avoid nephrotoxic medications  -IVF 50cc hour x 12 hours     *LUE swelling  -U/S to r/o DVT    *H/p lymphoma / SCC / prostate cancer  -F/u outpatient with oncology    *H/o BPH / hypothyroidism / HTN   -C/w home meds and f/u outpatient for further management     *DVT ppx  -On coumadin

## 2020-02-04 NOTE — CONSULT NOTE ADULT - SUBJECTIVE AND OBJECTIVE BOX
HPI:  89 y/o male with PMHx of Stage 4 CKD, BPH, postherpetic neuralgia, prostate cancer(2008), Squamous cell carcinoma(2019 recicion),  lymphoma(latent), hypothyroid, COPD, HTN, Afib s/p pacemaker in 2019, s/p TAVR in 2019, s/p shoulder replacement, s/p bilateral unicompartmental knee replacement was sent to the hospital by Pulmonologist() for PNA treatment and worsening chronic anemia. Patient reports he has been feeling fatigued over the past 3 months. He reports that the fatigue is worse in the mornings and gets better throughout the day. Patient states his fatigue is worse after walking 15 yards(uses a walker to ambulate). Patient states that his cough is productive, clear, occurs more at night time. Patient denies dyspnea at rest, change in sputum color, dysphagia, fevers, chills, orthopnea, chest pain, bilateral leg swelling/tenderness. He reports using his duonebs and flovent without relief for his cough. Patient was recently started a taper dose of prednisone 20 by his pulmonologist but hasn't noticed any improvement. Patient reports he had a CT chest done outpatient with Dr. Morales that showed suspicious findings for PNA.     Of note patient uses oxygen at home as needed. Patient was breathing 95% Pulse ox on room air at bedside.  EMR review of 's notes that a CT chest done on 20 "shows atelectasis medial right lower lobe and consolidation, 9 x 11 mm nodule right upper lobe (? infectious), 10 mm nodule left lower lobe (will need 3 mo following CT, new c/w 2018 CT). Multiple lymph nodes seen in neck, axilla and mediastinum (similar adenopathy c/w 2018 CT)." (2020 02:25)    Pt also seen by Dr ANNA Lehman, heme/onc yesterday.     : cough improving on IV Abx's, less sputum, swallows. in bed, no distress.       PAST MEDICAL & SURGICAL HISTORY:  CKD (chronic kidney disease): Stage 4  BPH (benign prostatic hyperplasia)  Postherpetic neuralgia: Treated  Chronic diarrhea  Prostate CA  Basal cell carcinoma  Lymphoma: Latent  Anxiety  Hypothyroid  Hypertension  A-fib: Pacemaker  S/P TAVR (transcatheter aortic valve replacement)  Artificial cardiac pacemaker  H/O shoulder replacement  S/P bilateral unicompartmental knee replacement      MEDICATIONS  (STANDING):  albuterol/ipratropium for Nebulization 3 milliLiter(s) Nebulizer every 12 hours  aMIOdarone    Tablet 200 milliGRAM(s) Oral daily  azithromycin  IVPB 500 milliGRAM(s) IV Intermittent every 24 hours  buDESOnide    Inhalation Suspension 0.5 milliGRAM(s) Inhalation every 12 hours  cefTRIAXone Injectable. 1000 milliGRAM(s) IV Push every 24 hours  gabapentin 300 milliGRAM(s) Oral at bedtime  gabapentin 100 milliGRAM(s) Oral daily  gabapentin Oral Tab/Cap - Peds 200 milliGRAM(s) Oral daily  levothyroxine 100 MICROGram(s) Oral daily  metoprolol tartrate 25 milliGRAM(s) Oral daily  sodium chloride 0.9%. 1000 milliLiter(s) (50 mL/Hr) IV Continuous <Continuous>    MEDICATIONS  (PRN):      Allergies    No Known Allergies    Intolerances        SOCIAL HISTORY: Denies tobacco, etoh abuse or illicit drug use    FAMILY HISTORY:  Family history of ischemic heart disease      Vital Signs Last 24 Hrs  T(C): 37.1 (2020 07:01), Max: 37.1 (2020 07:01)  T(F): 98.7 (2020 07:01), Max: 98.7 (2020 07:01)  HR: 60 (2020 07:01) (60 - 62)  BP: 130/58 (2020 07:01) (112/57 - 130/58)  BP(mean): 76 (2020 18:53) (76 - 76)  RR: 16 (2020 07:01) (16 - 16)  SpO2: 94% (2020 07:01) (94% - 97%)    REVIEW OF SYSTEMS:    CONSTITUTIONAL:  As per HPI.  HEENT:  Eyes:  No diplopia or blurred vision. ENT:  No earache, sore throat or runny nose.  CARDIOVASCULAR:  No pressure, squeezing, tightness, heaviness or aching about the chest, neck, axilla or epigastrium.  RESPIRATORY: see above.  GASTROINTESTINAL:  No nausea, vomiting or diarrhea.  GENITOURINARY:  No dysuria, frequency or urgency.  MUSCULOSKELETAL:  As per HPI.  SKIN:  No change in skin, hair or nails.  NEUROLOGIC:  No paresthesias, fasciculations, seizures or weakness.  PSYCHIATRIC:  No disorder of thought or mood.  ENDOCRINE:  No heat or cold intolerance, polyuria or polydipsia.  HEMATOLOGICAL:  No easy bruising or bleedings:  .     PHYSICAL EXAMINATION:    GENERAL APPEARANCE:  Pt. is not currently dyspneic, in no distress. Pt. is alert, oriented, and pleasant.  HEENT:  Pupils are normal and react normally. No icterus. Mucous membranes well colored.  NECK:  Supple. No lymphadenopathy. Jugular venous pressure not elevated. Carotids equal.   HEART:   The cardiac impulse has a normal quality. Regular. Normal S1 and S2. There are no murmurs, rubs or gallops noted  CHEST:  Decreased aud BS's. mild crackles R base.   ABDOMEN:  Soft and nontender.   EXTREMITIES:  There is no cyanosis, clubbing or edema.   SKIN:  No rash or significant lesions are noted.  Neuro: Alert, awake, and O x 3.      LABS:                        7.4    5.53  )-----------( 139      ( 2020 08:18 )             24.2     02-04    138  |  105  |  62<H>  ----------------------------<  64<L>  3.7   |  29  |  2.16<H>    Ca    8.5      2020 08:18    TPro  5.0<L>  /  Alb  2.8<L>  /  TBili  0.4  /  DBili  x   /  AST  12<L>  /  ALT  10<L>  /  AlkPhos  43  02-04    LIVER FUNCTIONS - ( 2020 08:18 )  Alb: 2.8 g/dL / Pro: 5.0 gm/dL / ALK PHOS: 43 U/L / ALT: 10 U/L / AST: 12 U/L / GGT: x           PT/INR - ( 2020 08:18 )   PT: 18.6 sec;   INR: 1.65 ratio         PTT - ( 2020 08:18 )  PTT:32.8 sec      Urinalysis Basic - ( 2020 21:22 )    Color: Yellow / Appearance: Clear / S.010 / pH: x  Gluc: x / Ketone: Negative  / Bili: Negative / Urobili: Negative mg/dL   Blood: x / Protein: Negative mg/dL / Nitrite: Negative   Leuk Esterase: Negative / RBC: x / WBC x   Sq Epi: x / Non Sq Epi: x / Bacteria: x          RADIOLOGY & ADDITIONAL STUDIES:     see CT findings above.

## 2020-02-04 NOTE — H&P ADULT - NSICDXPASTMEDICALHX_GEN_ALL_CORE_FT
PAST MEDICAL HISTORY:  A-fib Pacemaker    Anxiety     Basal cell carcinoma     BPH (benign prostatic hyperplasia)     Chronic diarrhea     CKD (chronic kidney disease) Stage 4    Hypertension     Hypothyroid     Lymphoma Latent    Postherpetic neuralgia Treated    Prostate CA

## 2020-02-04 NOTE — H&P ADULT - ASSESSMENT
89 y/o male with PMHx of Stage 4 CKD, BPH, postherpetic neuralgia, prostate cancer(2008), Squamous cell carcinoma(2019 recicion),  lymphoma(latent), hypothyroid, HTN, Afib s/p pacemaker in July 2019, s/p TAVR in July 2019, s/p shoulder replacement, s/p bilateral unicompartmental knee replacement was sent to the hospital for PNA treatment and acute on chronic anemia work up.       #Cough in the setting of CAP or chronic bronchitis   -Admit to tele   -Cardiac etiology unlikely cause. BNP 4,500 (falsely elevated in the setting of renal failure and a-fib) and Baseline BNP around 14,000.   -Consider Consult Cardiology-.   -Pacemaker Evaluation by EP  -SIRS Criteria 0/4 and Modified Qsofa 0/3   -Chest xray imaging suggestive of right pleural effusion/ infiltrates  -Start Antibiotics(azithromycin and ceftriaxone) add atypical coverage and order procalcitonin in AM  -Duonebs PRN.       # Fatigue in the setting of Macrocytic anemia  -Hbg 8.1. Baseling Hbg 9.6   -Order iron studies  -Order B12 and folate levels  -Trend h/h  -order TSH level    #Acute on Chronic Kidney injury in the setting of CKD stage 4  -Likely Prerenal component vs Postrenal. Cr baseline appears to be between (2.0 and 2.4)  -Consider bilateral kidney U/S and Nephrology consult if the creatinine worsens  -Avoid nephrotoxic agents  -Gentle hydration 100ml/hr for 12 hours     #Lymphoma(latent)/ Prostate cancer   -Contact patient's oncologist   -patient can't recall if he is receiving current treatment for this.  -Prostate cancer: EMR report he received radiation but no chemo treatment for his prostate    #Persitent Afib  -Currently on coumadin 2mg daily   -Order 3mg coumadin tonight    #Subtherapeutic INR  - Consult AC team to adjust coumadin dose  - Order 3mg coumadin tonight    #Advance directives    #DVT prophylaxis   -On AC 89 y/o male with PMHx of Stage 4 CKD, BPH, postherpetic neuralgia, prostate cancer(2008), Squamous cell carcinoma(2019 recicion), COPD, CLL lymphoma(latent), hypothyroid, HTN, Afib s/p pacemaker in July 2019, s/p TAVR in July 2019, s/p shoulder replacement, s/p bilateral unicompartmental knee replacement was sent to the hospital for PNA treatment and acute on chronic anemia work up.       #Cough in the setting of CAP or chronic bronchitis   -Admit to Floor  -Cardiac etiology unlikely cause. BNP 4,500 (falsely elevated in the setting of renal failure and a-fib) and Baseline BNP around 14,000.   -SIRS Criteria 0/4 and Modified Qsofa 0/3   -Chest xray imaging reviewed from 1/19/20 shows of right lobe pulmonary infiltrates  -Start Antibiotics(azithromycin and ceftriaxone)   -Duoneluma PRN   -Consult Pulmonology:   -Order RVP  -Hold off on Ct chest until outside records are provided    # Fatigue in the setting of Macrocytic anemia  -Hbg 8.1. Baseling Hbg 9.6 from last year. Hbg has downtrended slowly in the past couple of years  -Order iron studies  -Order B12 and folate levels  -Trend h/h  -order TSH level  -order FOBT    #Chronic Kidney injury in the setting of CKD stage 4  -Cr today 2.34. Cr baseline appears to be between (2.0 and 2.4)  -Consider bilateral kidney U/S and Nephrology consult if the creatinine worsens  -Avoid nephrotoxic agents  -Gentle hydration 50ml/hr for 12 hours     #Lymphoma(latent)/ Prostate cancer/SCC  -Follow up with oncologist outpatient   -Patient can't recall if he is receiving current treatment for lymphoma  -Prostate cancer: EMR report he received radiation but no chemo treatment for his prostate in 2008    #Persitent Afib  -Currently on coumadin 2mg daily   -Order 2mg coumadin tonight    #Left Upper extremity Edema  Order U/S    #Subtherapeutic INR  - Consult AC team to adjust coumadin dose  - Order 2mg coumadin tonight    #Advance directives  -Full Code    #DVT prophylaxis   -On AC 89 y/o male with PMHx of Stage 4 CKD, BPH, postherpetic neuralgia, prostate cancer(2008), Squamous cell carcinoma(2019 recicion), COPD, CLL lymphoma(latent), hypothyroid, HTN, Afib s/p pacemaker in July 2019, s/p TAVR in July 2019, s/p shoulder replacement, s/p bilateral unicompartmental knee replacement was sent to the hospital for PNA treatment and acute on chronic anemia work up.       #Cough in the setting of CAP or chronic bronchitis   -Admit to Floor  -Cardiac etiology unlikely cause  -SIRS Criteria 0/4 and Modified Qsofa 0/3   -Chest xray imaging reviewed from 1/19/20 shows of right lobe pulmonary infiltrates  -Start Antibiotics(azithromycin and ceftriaxone)   -Nano PRN   -Consult Pulmonology:   -Order RVP  -Hold off on Ct chest until outside records are provided    # Fatigue in the setting of Macrocytic anemia  -Hbg 8.1. Baseling Hbg 9.6 from last year. Hbg has downtrended slowly in the past couple of years  -Order iron studies  -Order B12 and folate levels  -Trend h/h  -order TSH level  -order FOBT    #Chronic Kidney injury in the setting of CKD stage 4  -Cr today 2.34. Cr baseline appears to be between (2.0 and 2.4)  -Consider bilateral kidney U/S and Nephrology consult if the creatinine worsens  -Avoid nephrotoxic agents  -Gentle hydration 50ml/hr for 12 hours     #Lymphoma(latent)/ Prostate cancer/SCC  -Follow up with oncologist outpatient   -Patient can't recall if he is receiving current treatment for lymphoma  -Prostate cancer: EMR report he received radiation but no chemo treatment for his prostate in 2008    #Persitent Afib  -Currently on coumadin 2mg daily   -Order 2mg coumadin tonight    #Left Upper extremity Edema  Order U/S    #Subtherapeutic INR  - Consult AC team to adjust coumadin dose  - Order 2mg coumadin tonight    #Advance directives  -Full Code    #DVT prophylaxis   -On AC 89 y/o male with PMHx of Stage 4 CKD, BPH, postherpetic neuralgia, prostate cancer(2008), Squamous cell carcinoma(2019 recicion), COPD, CLL lymphoma(latent), hypothyroid, HTN, Afib s/p pacemaker in July 2019, s/p TAVR in July 2019, s/p shoulder replacement, s/p bilateral unicompartmental knee replacement was sent to the hospital for PNA treatment and acute on chronic anemia work up.       #Cough in the setting of CAP or chronic bronchitis   -Admit to Floor  -Cardiac etiology unlikely cause  -SIRS Criteria 0/4 and Modified Qsofa 0/3   -Chest xray imaging reviewed from 1/19/20 shows of right lobe pulmonary infiltrates. Patient may be aspirating silently.    -Start Antibiotics(azithromycin and ceftriaxone)   -Nano PRN   -Consult Pulmonology:   -Order RVP  -Hold off on Ct chest until outside records are provided    # Fatigue in the setting of Macrocytic anemia  -Hbg 8.1. Baseling Hbg 9.6 from last year. Hbg has downtrended slowly in the past couple of years  -Order iron studies  -Order B12 and folate levels  -Trend h/h  -order TSH level  -order FOBT    #Chronic Kidney injury in the setting of CKD stage 4  -Cr today 2.34. Cr baseline appears to be between (2.0 and 2.4)  -Consider bilateral kidney U/S and Nephrology consult if the creatinine worsens  -Avoid nephrotoxic agents  -Gentle hydration 50ml/hr for 12 hours     #Lymphoma(latent)/ Prostate cancer/SCC  -Follow up with oncologist outpatient   -Patient can't recall if he is receiving current treatment for lymphoma  -Prostate cancer: EMR report he received radiation but no chemo treatment for his prostate in 2008    #Persitent Afib  -Currently on coumadin 2mg daily   -Order 2mg coumadin tonight    #Left Upper extremity Edema  Order U/S    #Subtherapeutic INR  - Consult AC team to adjust coumadin dose  - Order 2mg coumadin tonight    #Advance directives  -Full Code    #DVT prophylaxis   -On AC

## 2020-02-04 NOTE — PROGRESS NOTE ADULT - ATTENDING COMMENTS
Patient seen and examined with Family Medicine Residents Drs. Edward Kayserian, Umer Solano, Michelle Borrego and Aakash Hatch on the Family Medicine Teaching Service.  Agree with history, physical, labs and plan which were reviewed in detail after a face to face encounter with the patient. Patient seen and examined with Family Medicine Residents Drs. Edward Kayserian, Amita Burkett, Umer Solano, Michelle Borrego and Aakash Hatch and NP Student Elsie Allen on the Family Medicine Teaching Service.  Agree with history, physical, labs and plan which were reviewed in detail after a face to face encounter with the patient.

## 2020-02-04 NOTE — PROGRESS NOTE ADULT - ASSESSMENT
89 y/o male with PMHx of Stage 4 CKD, BPH, postherpetic neuralgia, prostate cancer(2008), Squamous cell carcinoma(2019 recicion), COPD, CLL lymphoma(latent), hypothyroid, HTN, Afib s/p pacemaker in July 2019, s/p TAVR in July 2019, s/p shoulder replacement, s/p bilateral unicompartmental knee replacement was sent to the hospital for PNA treatment and acute on chronic anemia work up.     #CAP  - Entero/Rhinovirus Positive, likely with superimposed bacterial infection  - Cont. Azithromycin 500mg IV QD and Ceftriaxone 1g IV QD   -Duonebs PRN   -Consult Pulmonology:  appreciated, cont current mgmt  -Hold off on Ct chest until outside records are provided    # Fatigue in the setting of Macrocytic anemia  - Trend CBC  -Iron Studies show MIREYA  -TSH elevated ; f/u T4 in AM  -f/u FOBT    #Chronic Kidney injury in the setting of CKD stage 4  -Cr baseline appears to be between (2.0 and 2.4)  - improving since admission  - Trend RFTs  - Consider bilateral kidney U/S and Nephrology consult if the creatinine worsens  - Avoid nephrotoxic agents    #Lymphoma(latent)/ Prostate cancer/SCC  -Follow up with oncologist outpatient   -Patient can't recall if he is receiving current treatment for lymphoma  -Prostate cancer: EMR report he received radiation but no chemo treatment for his prostate in 2008  - f/u PSA    #Persistent Afib  -Currently on coumadin 2mg daily   -Order 2mg coumadin tonight  - Amiodarone 200mg po qd  - Metorpolol 25mg po qd    #Left Upper extremity Edema  U/S Negative    #Advance directives  -Full Code    #DVT prophylaxis   -On AC

## 2020-02-04 NOTE — H&P ADULT - NSHPPHYSICALEXAM_GEN_ALL_CORE
Vitals  T(F): 98.4 (02-03-20 @ 18:53), Max: 98.4 (02-03-20 @ 18:53)  HR: 61 (02-03-20 @ 23:48) (60 - 61)  BP: 118/51 (02-03-20 @ 23:48) (118/51 - 122/58)  RR: 16 (02-03-20 @ 23:48) (16 - 16)  SpO2: 94% (02-03-20 @ 23:48) (94% - 97%)    Physical Exam   Gen: NAD, comfortable  HENT: atraumatic head and ears, no gross abnormalities of ears, mucous membranes moist, no oral lesions,  Card: RRR, 2/6 mid-systolic  murmur auscultated in left lower sternal border   Pulm: nl respiratory effort, decreased air sounds at the bases  GI: normoactive bowel sounds, soft, nontender, nondistended, no rebound, no guarding, no masses  Extremities: no pedal edema, pedal pulses palpable, left upper extremity edema noted  Skin: nl warm and dry, with scattered bruising on extensor surfaces(shins, elbows, arms)  Neuro: A&Ox3, answering questions appropriately, PERRL, EOMI, face symmetric,   Pysch: no depression, no SI, no HI Vitals  T(F): 98.4 (02-03-20 @ 18:53), Max: 98.4 (02-03-20 @ 18:53)  HR: 61 (02-03-20 @ 23:48) (60 - 61)  BP: 118/51 (02-03-20 @ 23:48) (118/51 - 122/58)  RR: 16 (02-03-20 @ 23:48) (16 - 16)  SpO2: 94% (02-03-20 @ 23:48) (94% - 97%)    Physical Exam   Gen: NAD, comfortable  HENT: atraumatic head and ears, no gross abnormalities of ears, mucous membranes moist, no oral lesions,  Card: RRR, 2/6 mid-systolic  murmur auscultated in left lower sternal border   Pulm: nl respiratory effort, decreased air sounds at the bases, able to complete his sentences without difficulty  GI: normoactive bowel sounds, soft, nontender, nondistended, no rebound, no guarding, no masses  Extremities: no pedal edema, pedal pulses palpable, left upper extremity edema noted  Skin: nl warm and dry, with scattered bruising on extensor surfaces(shins, elbows, arms)  Neuro: A&Ox3, answering questions appropriately, PERRL, EOMI, face symmetric,   Pysch: no depression, no SI, no HI

## 2020-02-04 NOTE — H&P ADULT - NSICDXPASTSURGICALHX_GEN_ALL_CORE_FT
PAST SURGICAL HISTORY:  Artificial cardiac pacemaker     H/O shoulder replacement     S/P bilateral unicompartmental knee replacement     S/P TAVR (transcatheter aortic valve replacement)

## 2020-02-04 NOTE — H&P ADULT - HISTORY OF PRESENT ILLNESS
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 Patient reports he has been feeling fatigued over the past 3 months. He reports that the fatigue is worse in the mornings and gets better throughout the day. Patient states his fatigue is worsened after walking 15 yards(uses a walker to ambulate). Patient states that his cough is productive, clear, occurs more at night time. Patient denies dyspnea at rest, change in color of sputum, fevers, chills, orthopnea, chest pain, bilateral leg swelling/tenderness. He reports using his duonebs and flovent without relief for his cough. Patient was recently started a taper dose of prednisone 1/30/20 by his pulmonologist but hasn't noticed improvement. 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 Patient reports he has been feeling fatigued over the past 3 months. He reports that the fatigue is worse in the mornings and gets better throughout the day. Patient states his fatigue is worsened after walking 15 yards(uses a walker to ambulate). Patient states that his cough is productive, clear, occurs more at night time. Patient denies dyspnea at rest, change in sputum color, fevers, chills, orthopnea, chest pain, bilateral leg swelling/tenderness. He reports using his duonebs and flovent without relief for his cough. Patient was recently started a taper dose of prednisone 1/30/20 by his pulmonologist but hasn't noticed improvement. 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 Patient reports he has been feeling fatigued over the past 3 months. He reports that the fatigue is worse in the mornings and gets better throughout the day. Patient states his fatigue is worse after walking 15 yards(uses a walker to ambulate). Patient states that his cough is productive, clear, occurs more at night time. Patient denies dyspnea at rest, change in sputum color, fevers, chills, orthopnea, chest pain, bilateral leg swelling/tenderness. He reports using his duonebs and flovent without relief for his cough. Patient was recently started a taper dose of prednisone 1/30/20 by his pulmonologist but hasn't noticed any improvement. Patient reports he had a CT chest done outpatient with Dr. Morales that showed suspicious findings for PNA.     Of note patient uses oxygen at home as needed. Patient was breathing 95% Pulse ox on room air at bedside. 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 Patient reports he has been feeling fatigued over the past 3 months. He reports that the fatigue is worse in the mornings and gets better throughout the day. Patient states his fatigue is worse after walking 15 yards(uses a walker to ambulate). Patient states that his cough is productive, clear, occurs more at night time. Patient denies dyspnea at rest, change in sputum color, fevers, chills, orthopnea, chest pain, bilateral leg swelling/tenderness. He reports using his duonebs and flovent without relief for his cough. Patient was recently started a taper dose of prednisone 1/30/20 by his pulmonologist but hasn't noticed any improvement. Patient reports he had a CT chest done outpatient with Dr. Morales that showed suspicious findings for PNA.     Of note patient uses oxygen at home as needed. Patient was breathing 95% Pulse ox on room air at bedside.  EMR review of 's notes that a CT chest done on 1/27/20 "shows atelectasis medial right lower lobe and consolidation, 9 x 11 mm nodule right upper lobe (? infectious), 10 mm nodule left lower lobe (will need 3 mo following CT, new c/w 12/2018 CT). 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 Patient reports he has been feeling fatigued over the past 3 months. He reports that the fatigue is worse in the mornings and gets better throughout the day. Patient states his fatigue is worse after walking 15 yards(uses a walker to ambulate). Patient states that his cough is productive, clear, occurs more at night time. Patient denies dyspnea at rest, change in sputum color, dysphagia, fevers, chills, orthopnea, chest pain, bilateral leg swelling/tenderness. He reports using his duonebs and flovent without relief for his cough. Patient was recently started a taper dose of prednisone 1/30/20 by his pulmonologist but hasn't noticed any improvement. Patient reports he had a CT chest done outpatient with Dr. Morales that showed suspicious findings for PNA.     Of note patient uses oxygen at home as needed. Patient was breathing 95% Pulse ox on room air at bedside.  EMR review of 's notes that a CT chest done on 1/27/20 "shows atelectasis medial right lower lobe and consolidation, 9 x 11 mm nodule right upper lobe (? infectious), 10 mm nodule left lower lobe (will need 3 mo following CT, new c/w 12/2018 CT). Multiple lymph nodes seen in neck, axilla and mediastinum (similar adenopathy c/w 12/2018 CT)."

## 2020-02-04 NOTE — CONSULT NOTE ADULT - ASSESSMENT
Cough is improving on IV Abx;s.    O2 as needed, IV Abx's, duonebs b.i.d., budesonide nebs.   is s/p 5 days of medrol dose pack for airway inflammation.  up in chair and walk with walker with assistance as cy.     anemia labs. PSA.    recommend oncology consultation, Dr ANNA Lehman.

## 2020-02-05 ENCOUNTER — TRANSCRIPTION ENCOUNTER (OUTPATIENT)
Age: 85
End: 2020-02-05

## 2020-02-05 LAB
ANION GAP SERPL CALC-SCNC: 7 MMOL/L — SIGNIFICANT CHANGE UP (ref 5–17)
BUN SERPL-MCNC: 58 MG/DL — HIGH (ref 7–23)
CALCIUM SERPL-MCNC: 8.5 MG/DL — SIGNIFICANT CHANGE UP (ref 8.5–10.1)
CHLORIDE SERPL-SCNC: 107 MMOL/L — SIGNIFICANT CHANGE UP (ref 96–108)
CO2 SERPL-SCNC: 27 MMOL/L — SIGNIFICANT CHANGE UP (ref 22–31)
CREAT SERPL-MCNC: 2.21 MG/DL — HIGH (ref 0.5–1.3)
GLUCOSE SERPL-MCNC: 75 MG/DL — SIGNIFICANT CHANGE UP (ref 70–99)
HCT VFR BLD CALC: 25 % — LOW (ref 39–50)
HGB BLD-MCNC: 7.9 G/DL — LOW (ref 13–17)
INR BLD: 1.48 RATIO — HIGH (ref 0.88–1.16)
MCHC RBC-ENTMCNC: 31.6 GM/DL — LOW (ref 32–36)
MCHC RBC-ENTMCNC: 35.4 PG — HIGH (ref 27–34)
MCV RBC AUTO: 112.1 FL — HIGH (ref 80–100)
PLATELET # BLD AUTO: 158 K/UL — SIGNIFICANT CHANGE UP (ref 150–400)
POTASSIUM SERPL-MCNC: 3.9 MMOL/L — SIGNIFICANT CHANGE UP (ref 3.5–5.3)
POTASSIUM SERPL-SCNC: 3.9 MMOL/L — SIGNIFICANT CHANGE UP (ref 3.5–5.3)
PROTHROM AB SERPL-ACNC: 16.6 SEC — HIGH (ref 10–12.9)
RBC # BLD: 2.23 M/UL — LOW (ref 4.2–5.8)
RBC # FLD: 14.6 % — HIGH (ref 10.3–14.5)
SODIUM SERPL-SCNC: 141 MMOL/L — SIGNIFICANT CHANGE UP (ref 135–145)
T4 AB SER-ACNC: 7.8 UG/DL — SIGNIFICANT CHANGE UP (ref 4.6–12)
WBC # BLD: 6.01 K/UL — SIGNIFICANT CHANGE UP (ref 3.8–10.5)
WBC # FLD AUTO: 6.01 K/UL — SIGNIFICANT CHANGE UP (ref 3.8–10.5)

## 2020-02-05 PROCEDURE — 99233 SBSQ HOSP IP/OBS HIGH 50: CPT | Mod: GC

## 2020-02-05 PROCEDURE — 99233 SBSQ HOSP IP/OBS HIGH 50: CPT

## 2020-02-05 RX ORDER — ENOXAPARIN SODIUM 100 MG/ML
40 INJECTION SUBCUTANEOUS AT BEDTIME
Refills: 0 | Status: DISCONTINUED | OUTPATIENT
Start: 2020-02-05 | End: 2020-02-05

## 2020-02-05 RX ORDER — ENOXAPARIN SODIUM 100 MG/ML
30 INJECTION SUBCUTANEOUS DAILY
Refills: 0 | Status: DISCONTINUED | OUTPATIENT
Start: 2020-02-05 | End: 2020-02-06

## 2020-02-05 RX ORDER — ALPRAZOLAM 0.25 MG
0.25 TABLET ORAL AT BEDTIME
Refills: 0 | Status: DISCONTINUED | OUTPATIENT
Start: 2020-02-05 | End: 2020-02-06

## 2020-02-05 RX ORDER — ACETAMINOPHEN 500 MG
650 TABLET ORAL EVERY 6 HOURS
Refills: 0 | Status: DISCONTINUED | OUTPATIENT
Start: 2020-02-05 | End: 2020-02-06

## 2020-02-05 RX ORDER — GABAPENTIN 400 MG/1
200 CAPSULE ORAL EVERY 6 HOURS
Refills: 0 | Status: DISCONTINUED | OUTPATIENT
Start: 2020-02-05 | End: 2020-02-06

## 2020-02-05 RX ORDER — GABAPENTIN 400 MG/1
100 CAPSULE ORAL ONCE
Refills: 0 | Status: COMPLETED | OUTPATIENT
Start: 2020-02-05 | End: 2020-02-05

## 2020-02-05 RX ADMIN — AZITHROMYCIN 255 MILLIGRAM(S): 500 TABLET, FILM COATED ORAL at 12:19

## 2020-02-05 RX ADMIN — Medication 100 MICROGRAM(S): at 05:08

## 2020-02-05 RX ADMIN — Medication 650 MILLIGRAM(S): at 18:07

## 2020-02-05 RX ADMIN — GABAPENTIN 200 MILLIGRAM(S): 400 CAPSULE ORAL at 05:08

## 2020-02-05 RX ADMIN — Medication 650 MILLIGRAM(S): at 10:30

## 2020-02-05 RX ADMIN — Medication 3 MILLILITER(S): at 19:58

## 2020-02-05 RX ADMIN — Medication 40 MILLIGRAM(S): at 11:37

## 2020-02-05 RX ADMIN — Medication 3 MILLILITER(S): at 08:53

## 2020-02-05 RX ADMIN — Medication 0.25 MILLIGRAM(S): at 22:59

## 2020-02-05 RX ADMIN — GABAPENTIN 200 MILLIGRAM(S): 400 CAPSULE ORAL at 14:12

## 2020-02-05 RX ADMIN — Medication 0.5 MILLIGRAM(S): at 08:58

## 2020-02-05 RX ADMIN — Medication 0.5 MILLIGRAM(S): at 19:59

## 2020-02-05 RX ADMIN — ONDANSETRON 4 MILLIGRAM(S): 8 TABLET, FILM COATED ORAL at 15:02

## 2020-02-05 RX ADMIN — GABAPENTIN 200 MILLIGRAM(S): 400 CAPSULE ORAL at 10:22

## 2020-02-05 RX ADMIN — Medication 25 MILLIGRAM(S): at 09:41

## 2020-02-05 RX ADMIN — GABAPENTIN 100 MILLIGRAM(S): 400 CAPSULE ORAL at 18:07

## 2020-02-05 RX ADMIN — Medication 650 MILLIGRAM(S): at 09:41

## 2020-02-05 RX ADMIN — AMIODARONE HYDROCHLORIDE 200 MILLIGRAM(S): 400 TABLET ORAL at 09:41

## 2020-02-05 RX ADMIN — GABAPENTIN 200 MILLIGRAM(S): 400 CAPSULE ORAL at 20:13

## 2020-02-05 RX ADMIN — CEFTRIAXONE 1000 MILLIGRAM(S): 500 INJECTION, POWDER, FOR SOLUTION INTRAMUSCULAR; INTRAVENOUS at 12:19

## 2020-02-05 NOTE — PROGRESS NOTE ADULT - SUBJECTIVE AND OBJECTIVE BOX
HPI: 89 y/o male with PMHx of Stage 4 CKD, BPH, postherpetic neuralgia, prostate cancer(2008), Squamous cell carcinoma(2019 recicion),  lymphoma(latent), hypothyroid, COPD, HTN, Afib s/p pacemaker in 2019, s/p TAVR in 2019, s/p shoulder replacement, s/p bilateral unicompartmental knee replacement was sent to the hospital by Pulmonologist() for PNA treatment and worsening chronic anemia. Patient reports he has been feeling fatigued over the past 3 months. He reports that the fatigue is worse in the mornings and gets better throughout the day. Patient states his fatigue is worse after walking 15 yards(uses a walker to ambulate). Patient states that his cough is productive, clear, occurs more at night time. Patient denies dyspnea at rest, change in sputum color, dysphagia, fevers, chills, orthopnea, chest pain, bilateral leg swelling/tenderness. He reports using his duonebs and flovent without relief for his cough. Patient was recently started a taper dose of prednisone 20 by his pulmonologist but hasn't noticed any improvement. Patient reports he had a CT chest done outpatient with Dr. Morales that showed suspicious findings for PNA. Of note patient uses oxygen at home as needed. Patient was breathing 95% Pulse ox on room air at bedside. EMR review of 's notes that a CT chest done on 20 "shows atelectasis medial right lower lobe and consolidation, 9 x 11 mm nodule right upper lobe (? infectious), 10 mm nodule left lower lobe (will need 3 mo following CT, new c/w 2018 CT). Multiple lymph nodes seen in neck, axilla and mediastinum (similar adenopathy c/w 2018 CT)." (2020 02:25)      Subjective: Pt was seen and examined at bedside in the presents of his Home nursing aid. Per the nurse, overnight, there were no acute events, the pt remained vitally stable. This morning the pt states that his cough has improved and that he is no longer experiencing increased difficulty breathing. He is still concerned in regards to his decreased Hgb count. On further review of systems, he reports increased neuropathic pain and request a change in his gabapentin frequency. Remaining ROS was unremarkable. Pt has no further complaints or concerns.     Vital Signs Last 24 Hrs  T(C): 36.5 (2020 09:20), Max: 37 (2020 21:05)  T(F): 97.7 (2020 09:20), Max: 98.6 (2020 21:05)  HR: 60 (2020 09:20) (60 - 62)  BP: 113/52 (2020 09:20) (113/52 - 125/48)  RR: 18 (2020 09:20) (18 - 18)  SpO2: 99% (2020 09:20) (95% - 99%)      PHYSICAL EXAM:    Constitutional: NAD, awake and alert, well-developed  HEENT: PERR, EOMI, s/p cataract surgery, constricted pupils+ Normal Hearing  Neck: Soft and supple, No LAD, No JVD  Respiratory: Decreased Breath sounds basal bilaterally, with crackles of RLL+  Cardiovascular: S1 and S2, regular rate and rhythm, no Murmurs, gallops or rubs  Gastrointestinal: Bowel Sounds present, soft, nontender, nondistended, no guarding, no rebound  Extremities: LUE chronic Edema+  Vascular: 2+ peripheral pulses  Neurological: A/O x 3, no focal deficits  Musculoskeletal: decreased but equal strength b/l upper and lower extremities  Skin: Widespread Ecchymoses+ BL LE/UE      MEDICATIONS  (STANDING):  albuterol/ipratropium for Nebulization 3 milliLiter(s) Nebulizer every 12 hours  aMIOdarone    Tablet 200 milliGRAM(s) Oral daily  azithromycin  IVPB 500 milliGRAM(s) IV Intermittent every 24 hours  buDESOnide    Inhalation Suspension 0.5 milliGRAM(s) Inhalation every 12 hours  cefTRIAXone Injectable. 1000 milliGRAM(s) IV Push every 24 hours  gabapentin 200 milliGRAM(s) Oral every 6 hours  levothyroxine 100 MICROGram(s) Oral daily  metoprolol tartrate 25 milliGRAM(s) Oral daily  sodium chloride 0.9%. 1000 milliLiter(s) (50 mL/Hr) IV Continuous <Continuous>    MEDICATIONS  (PRN):  acetaminophen   Tablet .. 650 milliGRAM(s) Oral every 6 hours PRN Mild Pain (1 - 3)  aluminum hydroxide/magnesium hydroxide/simethicone Suspension 30 milliLiter(s) Oral every 4 hours PRN Dyspepsia  ondansetron Injectable 4 milliGRAM(s) IV Push every 6 hours PRN Nausea and/or Vomiting    LABS: All Labs Reviewed:                          7.9    6.01  )-----------( 158      ( 2020 07:30 )             25.0     2020 07:30    141    |  107    |  58     ----------------------------<  75     3.9     |  27     |  2.21     Ca    8.5        2020 07:30    TPro  5.0    /  Alb  2.8    /  TBili  0.4    /  DBili  x      /  AST  12     /  ALT  10     /  AlkPhos  43     2020 08:18    LIVER FUNCTIONS - ( 2020 08:18 )  Alb: 2.8 g/dL / Pro: 5.0 gm/dL / ALK PHOS: 43 U/L / ALT: 10 U/L / AST: 12 U/L / GGT: x           PT/INR - ( 2020 07:30 )   PT: 16.6 sec;   INR: 1.48 ratio         PTT - ( 2020 08:18 )  PTT:32.8 sec  CAPILLARY BLOOD GLUCOSE        Urinalysis Basic - ( 2020 21:22 )    Color: Yellow / Appearance: Clear / S.010 / pH: x  Gluc: x / Ketone: Negative  / Bili: Negative / Urobili: Negative mg/dL   Blood: x / Protein: Negative mg/dL / Nitrite: Negative   Leuk Esterase: Negative / RBC: x / WBC x   Sq Epi: x / Non Sq Epi: x / Bacteria: x      Iron - Total Binding Capacity.: 262 ug/dL    % Saturation, Iron: 14 %    Iron Total, Serum: 37 ug/dL    Unsaturated Iron Binding Capacity: 225 ug/dL  Ferritin, Serum: 228 ng/mL (20 @ 08:18)    Vitamin B12, Serum in AM (20 @ 08:18)    Vitamin B12, Serum: 995 pg/mL    Folate, Serum in AM (20 @ 08:18)    Folate, Serum: 11.9 ng/mL    Reticulocyte Count (20 @ 12:27)    RBC Count: 2.52 M/uL    Reticulocyte Percent: 2.4 %    Absolute Reticulocytes: 61.0 K/uL    RVP: Entero/Rhino - virus      < from: Xray Chest 1 View- PORTABLE-Urgent (20 @ 21:49) >    IMPRESSION:  Right base infiltrateversus atelectasis. Follow-up study is recommended as clinically warranted.    < end of copied text >      < from: US Duplex Venous Upper Ext Ltd, Left (20 @ 09:21) >  IMPRESSION:     No evidence of left upper extremity deep venous thrombosis.    Enlarged morphologically abnormal lymph nodes in the region of the left subclavian vein suspicious for lymphomatous involvement.    < end of copied text >

## 2020-02-05 NOTE — DISCHARGE NOTE NURSING/CASE MANAGEMENT/SOCIAL WORK - PATIENT PORTAL LINK FT
You can access the FollowMyHealth Patient Portal offered by [facility] by registering at the following website: [email or web address]. By joining [facility]’s FollowMyHealth portal, you will also be able to view your health information using other applications (apps) compatible with our system.

## 2020-02-05 NOTE — PHYSICAL THERAPY INITIAL EVALUATION ADULT - MODALITIES TREATMENT COMMENTS
Patient  left in chair with CBIR and chair alarm active. HHA at bedside. Patient instructed to call for assistance back to bed or the bathroom, understands same. RN informed of session/status.

## 2020-02-05 NOTE — PROGRESS NOTE ADULT - ASSESSMENT
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 In the ED he was found to be vitally stable with an H/H of 8.1/25.9. CXR displayed infiltrates at the Right lung bases, he was given a dose of cefepime and vanco, initiated on IVFs, then admitted to the floor Pneumonia.       # RLL CAP    - Entero/Rhinovirus Positive,  - Cont. Azithromycin 500mg IV QD and Ceftriaxone 1g IV QD   - Continue with Inhaled Duoneb Budesonide PRN   - Pulm: recommends consulting Heme/Onc      #Fatigue 2/2 Macrocytic anemia  -H/H 7.9/25  -Iron mildly decreased,  -FA/B12 within normal limits   -FOBT pending results    #Elevated TSH in the setting of Sick Euthyroid Syndrome   -TSH 14.20  -Currently on Synthroid 100mcg qD  -f/u as an Outpt to re-adjust meds    #LETICIA on CKD IV   - Imoproving, BUN: Cr; 58/2.21, trending down, Baseline Cr 2.20  - Continue to trend Cr  - Avoid nephrotoxic agents  - Continue maintenance IVF Hydration 50cc/hr    #Chronic Lymphocytic Lymphoma/ Prostate cancer/Squamous Cell Ca  -Currently stable  -Follow up with oncologist outpatient   -PSA 42.20    #Persistent Afib  - Continue 2mg Coumadin qHs, F/u daily Co-ags  - Continue Amiodarone 200mg po qd  - Continue Metoprolol 25mg po qd    #Left Upper extremity Edema  -LUE Duplex: Enlarged Subclavian LNs, evidence of DVT    #Advance directives  -Full Code    #DVT prophylaxis   -On Coumadin

## 2020-02-05 NOTE — PROGRESS NOTE ADULT - SUBJECTIVE AND OBJECTIVE BOX
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, s/p shoulder replacement, s/p bilateral unicompartmental knee replacement was sent to the hospital by Pulmonologist() for PNA treatment     PHYSICAL EXAM:  Vital Signs Last 24 Hrs  T(C): 36.5 (05 Feb 2020 09:20), Max: 37 (04 Feb 2020 21:05)  T(F): 97.7 (05 Feb 2020 09:20), Max: 98.6 (04 Feb 2020 21:05)  HR: 60 (05 Feb 2020 09:20) (60 - 62)  BP: 113/52 (05 Feb 2020 09:20) (113/52 - 125/48)  BP(mean): --  RR: 18 (05 Feb 2020 09:20) (18 - 18)  SpO2: 99% (05 Feb 2020 09:20) (95% - 99%)  general- comfortable   -rs-aeeb,cta  -cvs-s1s2 normal   -p/a-soft,bs+        A/P    #ct abx, supportive care, discharge plan

## 2020-02-05 NOTE — PROGRESS NOTE ADULT - SUBJECTIVE AND OBJECTIVE BOX
HPI:  89 y/o male with PMHx of Stage 4 CKD, BPH, postherpetic neuralgia, prostate cancer(2008), Squamous cell carcinoma(2019 recicion),  lymphoma(latent), hypothyroid, COPD, HTN, Afib s/p pacemaker in 2019, s/p TAVR in 2019, s/p shoulder replacement, s/p bilateral unicompartmental knee replacement was sent to the hospital by Pulmonologist() for PNA treatment and worsening chronic anemia. Patient reports he has been feeling fatigued over the past 3 months. He reports that the fatigue is worse in the mornings and gets better throughout the day. Patient states his fatigue is worse after walking 15 yards(uses a walker to ambulate). Patient states that his cough is productive, clear, occurs more at night time. Patient denies dyspnea at rest, change in sputum color, dysphagia, fevers, chills, orthopnea, chest pain, bilateral leg swelling/tenderness. He reports using his duonebs and flovent without relief for his cough. Patient was recently started a taper dose of prednisone 20 by his pulmonologist but hasn't noticed any improvement. Patient reports he had a CT chest done outpatient with Dr. Morales that showed suspicious findings for PNA.     Of note patient uses oxygen at home as needed. Patient was breathing 95% Pulse ox on room air at bedside.  EMR review of 's notes that a CT chest done on 20 "shows atelectasis medial right lower lobe and consolidation, 9 x 11 mm nodule right upper lobe (? infectious), 10 mm nodule left lower lobe (will need 3 mo following CT, new c/w 2018 CT). Multiple lymph nodes seen in neck, axilla and mediastinum (similar adenopathy c/w 2018 CT)." (2020 02:25)    Pt also seen by Dr ANNA Lehman, heme/onc yesterday.     2/4: cough improving on IV Abx's, less sputum, swallows. in bed, no distress.   2/5: less cough, no distress. in bed, awake.     PAST MEDICAL & SURGICAL HISTORY:  CKD (chronic kidney disease): Stage 4  BPH (benign prostatic hyperplasia)  Postherpetic neuralgia: Treated  Chronic diarrhea  Prostate CA  Basal cell carcinoma  Lymphoma: Latent  Anxiety  Hypothyroid  Hypertension  A-fib: Pacemaker  S/P TAVR (transcatheter aortic valve replacement)  Artificial cardiac pacemaker  H/O shoulder replacement  S/P bilateral unicompartmental knee replacement      MEDICATIONS  (STANDING):  albuterol/ipratropium for Nebulization 3 milliLiter(s) Nebulizer every 12 hours  aMIOdarone    Tablet 200 milliGRAM(s) Oral daily  azithromycin  IVPB 500 milliGRAM(s) IV Intermittent every 24 hours  buDESOnide    Inhalation Suspension 0.5 milliGRAM(s) Inhalation every 12 hours  cefTRIAXone Injectable. 1000 milliGRAM(s) IV Push every 24 hours  gabapentin 300 milliGRAM(s) Oral at bedtime  gabapentin 100 milliGRAM(s) Oral daily  gabapentin Oral Tab/Cap - Peds 200 milliGRAM(s) Oral daily  levothyroxine 100 MICROGram(s) Oral daily  metoprolol tartrate 25 milliGRAM(s) Oral daily  sodium chloride 0.9%. 1000 milliLiter(s) (50 mL/Hr) IV Continuous <Continuous>    MEDICATIONS  (PRN):      Allergies    No Known Allergies    Intolerances        SOCIAL HISTORY: Denies tobacco, etoh abuse or illicit drug use    FAMILY HISTORY:  Family history of ischemic heart disease      Vital Signs Last 24 Hrs  T(C): 37.1 (2020 07:01), Max: 37.1 (2020 07:01)  T(F): 98.7 (2020 07:01), Max: 98.7 (2020 07:01)  HR: 60 (2020 07:01) (60 - 62)  BP: 130/58 (2020 07:01) (112/57 - 130/58)  BP(mean): 76 (2020 18:53) (76 - 76)  RR: 16 (2020 07:01) (16 - 16)  SpO2: 94% (2020 07:01) (94% - 97%)    REVIEW OF SYSTEMS:    CONSTITUTIONAL:  As per HPI.  HEENT:  Eyes:  No diplopia or blurred vision. ENT:  No earache, sore throat or runny nose.  CARDIOVASCULAR:  No pressure, squeezing, tightness, heaviness or aching about the chest, neck, axilla or epigastrium.  RESPIRATORY: see above.  GASTROINTESTINAL:  No nausea, vomiting or diarrhea.  GENITOURINARY:  No dysuria, frequency or urgency.  MUSCULOSKELETAL:  As per HPI.  SKIN:  No change in skin, hair or nails.  NEUROLOGIC:  No paresthesias, fasciculations, seizures or weakness.  PSYCHIATRIC:  No disorder of thought or mood.  ENDOCRINE:  No heat or cold intolerance, polyuria or polydipsia.  HEMATOLOGICAL:  No easy bruising or bleedings:  .     PHYSICAL EXAMINATION:    GENERAL APPEARANCE:  Pt. is not currently dyspneic, in no distress. Pt. is alert, oriented, and pleasant.  HEENT:  Pupils are normal and react normally. No icterus. Mucous membranes well colored.  NECK:  Supple. No lymphadenopathy. Jugular venous pressure not elevated. Carotids equal.   HEART:   The cardiac impulse has a normal quality. Regular. Normal S1 and S2. There are no murmurs, rubs or gallops noted  CHEST:  Decreased aud BS's. mild crackles R base.  Low pitched wheeze this AM.   ABDOMEN:  Soft and nontender.   EXTREMITIES:  There is no cyanosis, clubbing or edema.   SKIN:  No rash or significant lesions are noted.  Neuro: Alert, awake, and O x 3.      LABS:                        7.4    5.53  )-----------( 139      ( 2020 08:18 )             24.2     02-04    138  |  105  |  62<H>  ----------------------------<  64<L>  3.7   |  29  |  2.16<H>    Ca    8.5      2020 08:18    TPro  5.0<L>  /  Alb  2.8<L>  /  TBili  0.4  /  DBili  x   /  AST  12<L>  /  ALT  10<L>  /  AlkPhos  43  02-04    LIVER FUNCTIONS - ( 2020 08:18 )  Alb: 2.8 g/dL / Pro: 5.0 gm/dL / ALK PHOS: 43 U/L / ALT: 10 U/L / AST: 12 U/L / GGT: x           PT/INR - ( 2020 08:18 )   PT: 18.6 sec;   INR: 1.65 ratio         PTT - ( 2020 08:18 )  PTT:32.8 sec      Urinalysis Basic - ( 2020 21:22 )    Color: Yellow / Appearance: Clear / S.010 / pH: x  Gluc: x / Ketone: Negative  / Bili: Negative / Urobili: Negative mg/dL   Blood: x / Protein: Negative mg/dL / Nitrite: Negative   Leuk Esterase: Negative / RBC: x / WBC x   Sq Epi: x / Non Sq Epi: x / Bacteria: x          RADIOLOGY & ADDITIONAL STUDIES:     see CT findings above.

## 2020-02-05 NOTE — PHYSICAL THERAPY INITIAL EVALUATION ADULT - PERTINENT HX OF CURRENT PROBLEM, REHAB EVAL
89 yo M admitted from MD office c/o SOB. Patient reports he has been feeling fatigued over the past 3 months. He reports that the fatigue is worse in the mornings and gets better throughout the day, worse with exertion.  CT chest done outpatient with Dr. Morales that showed suspicious findings for PNA. +Left Upper extremity Edema: Doppler (-).

## 2020-02-05 NOTE — PHYSICAL THERAPY INITIAL EVALUATION ADULT - GENERAL OBSERVATIONS, REHAB EVAL
Patient received in bed, private HHA at bedside.  Patient denied pain, c/o fatigue. L UE edematous and erythematic. H/H low at 7.9/25.0.

## 2020-02-05 NOTE — PROGRESS NOTE ADULT - ASSESSMENT
Cough is improving on IV Abx's.    O2 as needed, IV Abx's, duonebs b.i.d., budesonide nebs.   is s/p 5 days of medrol dose pack for airway inflammation. Will give Solumedrol 40 IV x 1 this AM for wheezing.    Up in chair and walk with walker with assistance as cy.     anemia labs. iron defic.     recommend oncology consultation, Dr ANNA Lehman.

## 2020-02-06 ENCOUNTER — TRANSCRIPTION ENCOUNTER (OUTPATIENT)
Age: 85
End: 2020-02-06

## 2020-02-06 VITALS
HEART RATE: 61 BPM | TEMPERATURE: 98 F | SYSTOLIC BLOOD PRESSURE: 127 MMHG | OXYGEN SATURATION: 91 % | DIASTOLIC BLOOD PRESSURE: 35 MMHG | RESPIRATION RATE: 17 BRPM

## 2020-02-06 LAB
ANION GAP SERPL CALC-SCNC: 6 MMOL/L — SIGNIFICANT CHANGE UP (ref 5–17)
BUN SERPL-MCNC: 61 MG/DL — HIGH (ref 7–23)
CALCIUM SERPL-MCNC: 8.6 MG/DL — SIGNIFICANT CHANGE UP (ref 8.5–10.1)
CHLORIDE SERPL-SCNC: 108 MMOL/L — SIGNIFICANT CHANGE UP (ref 96–108)
CO2 SERPL-SCNC: 28 MMOL/L — SIGNIFICANT CHANGE UP (ref 22–31)
CREAT SERPL-MCNC: 2.57 MG/DL — HIGH (ref 0.5–1.3)
GLUCOSE SERPL-MCNC: 93 MG/DL — SIGNIFICANT CHANGE UP (ref 70–99)
HCT VFR BLD CALC: 24.4 % — LOW (ref 39–50)
HGB BLD-MCNC: 7.6 G/DL — LOW (ref 13–17)
INR BLD: 1.22 RATIO — HIGH (ref 0.88–1.16)
MCHC RBC-ENTMCNC: 31.1 GM/DL — LOW (ref 32–36)
MCHC RBC-ENTMCNC: 35 PG — HIGH (ref 27–34)
MCV RBC AUTO: 112.4 FL — HIGH (ref 80–100)
PLATELET # BLD AUTO: 171 K/UL — SIGNIFICANT CHANGE UP (ref 150–400)
POTASSIUM SERPL-MCNC: 3.8 MMOL/L — SIGNIFICANT CHANGE UP (ref 3.5–5.3)
POTASSIUM SERPL-SCNC: 3.8 MMOL/L — SIGNIFICANT CHANGE UP (ref 3.5–5.3)
PROTHROM AB SERPL-ACNC: 13.6 SEC — HIGH (ref 10–12.9)
RBC # BLD: 2.17 M/UL — LOW (ref 4.2–5.8)
RBC # FLD: 14.6 % — HIGH (ref 10.3–14.5)
SODIUM SERPL-SCNC: 142 MMOL/L — SIGNIFICANT CHANGE UP (ref 135–145)
WBC # BLD: 6.71 K/UL — SIGNIFICANT CHANGE UP (ref 3.8–10.5)
WBC # FLD AUTO: 6.71 K/UL — SIGNIFICANT CHANGE UP (ref 3.8–10.5)

## 2020-02-06 PROCEDURE — 99232 SBSQ HOSP IP/OBS MODERATE 35: CPT | Mod: GC

## 2020-02-06 PROCEDURE — 99233 SBSQ HOSP IP/OBS HIGH 50: CPT

## 2020-02-06 RX ORDER — GABAPENTIN 400 MG/1
2 CAPSULE ORAL
Qty: 56 | Refills: 0
Start: 2020-02-06 | End: 2020-02-12

## 2020-02-06 RX ORDER — WARFARIN SODIUM 2.5 MG/1
2 TABLET ORAL
Qty: 10 | Refills: 0
Start: 2020-02-06 | End: 2020-02-10

## 2020-02-06 RX ORDER — BUDESONIDE, MICRONIZED 100 %
2 POWDER (GRAM) MISCELLANEOUS
Qty: 1 | Refills: 0
Start: 2020-02-06

## 2020-02-06 RX ORDER — DOCUSATE SODIUM 100 MG
1 CAPSULE ORAL
Qty: 0 | Refills: 0 | DISCHARGE

## 2020-02-06 RX ORDER — WARFARIN SODIUM 2.5 MG/1
1 TABLET ORAL
Qty: 0 | Refills: 0 | DISCHARGE

## 2020-02-06 RX ORDER — FERROUS SULFATE 325(65) MG
1 TABLET ORAL
Qty: 30 | Refills: 0
Start: 2020-02-06

## 2020-02-06 RX ORDER — GABAPENTIN 400 MG/1
300 CAPSULE ORAL
Qty: 0 | Refills: 0 | DISCHARGE

## 2020-02-06 RX ORDER — GABAPENTIN 400 MG/1
100 CAPSULE ORAL ONCE
Refills: 0 | Status: COMPLETED | OUTPATIENT
Start: 2020-02-06 | End: 2020-02-06

## 2020-02-06 RX ORDER — GABAPENTIN 400 MG/1
100 CAPSULE ORAL
Qty: 0 | Refills: 0 | DISCHARGE

## 2020-02-06 RX ORDER — POTASSIUM CHLORIDE 20 MEQ
1 PACKET (EA) ORAL
Qty: 0 | Refills: 0 | DISCHARGE

## 2020-02-06 RX ORDER — GABAPENTIN 400 MG/1
200 CAPSULE ORAL
Qty: 0 | Refills: 0 | DISCHARGE

## 2020-02-06 RX ORDER — AZITHROMYCIN 500 MG/1
1 TABLET, FILM COATED ORAL
Qty: 1 | Refills: 0
Start: 2020-02-06 | End: 2020-02-06

## 2020-02-06 RX ADMIN — GABAPENTIN 100 MILLIGRAM(S): 400 CAPSULE ORAL at 05:33

## 2020-02-06 RX ADMIN — Medication 100 MICROGRAM(S): at 05:33

## 2020-02-06 RX ADMIN — AZITHROMYCIN 255 MILLIGRAM(S): 500 TABLET, FILM COATED ORAL at 11:44

## 2020-02-06 RX ADMIN — GABAPENTIN 200 MILLIGRAM(S): 400 CAPSULE ORAL at 08:52

## 2020-02-06 RX ADMIN — GABAPENTIN 200 MILLIGRAM(S): 400 CAPSULE ORAL at 18:17

## 2020-02-06 RX ADMIN — GABAPENTIN 200 MILLIGRAM(S): 400 CAPSULE ORAL at 13:59

## 2020-02-06 RX ADMIN — Medication 0.5 MILLIGRAM(S): at 09:33

## 2020-02-06 RX ADMIN — Medication 3 MILLILITER(S): at 09:33

## 2020-02-06 RX ADMIN — Medication 25 MILLIGRAM(S): at 11:44

## 2020-02-06 RX ADMIN — GABAPENTIN 200 MILLIGRAM(S): 400 CAPSULE ORAL at 02:17

## 2020-02-06 RX ADMIN — AMIODARONE HYDROCHLORIDE 200 MILLIGRAM(S): 400 TABLET ORAL at 11:44

## 2020-02-06 RX ADMIN — Medication 650 MILLIGRAM(S): at 03:17

## 2020-02-06 RX ADMIN — Medication 650 MILLIGRAM(S): at 02:17

## 2020-02-06 RX ADMIN — CEFTRIAXONE 1000 MILLIGRAM(S): 500 INJECTION, POWDER, FOR SOLUTION INTRAMUSCULAR; INTRAVENOUS at 11:44

## 2020-02-06 RX ADMIN — ENOXAPARIN SODIUM 30 MILLIGRAM(S): 100 INJECTION SUBCUTANEOUS at 11:44

## 2020-02-06 NOTE — PROGRESS NOTE ADULT - SUBJECTIVE AND OBJECTIVE BOX
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, s/p shoulder replacement, s/p bilateral unicompartmental knee replacement was sent to the hospital by Pulmonologist() for PNA treatment     PHYSICAL EXAM:  Vital Signs Last 24 Hrs  T(C): 36.6 (06 Feb 2020 09:58), Max: 36.9 (05 Feb 2020 20:57)  T(F): 97.9 (06 Feb 2020 09:58), Max: 98.4 (05 Feb 2020 20:57)  HR: 61 (06 Feb 2020 09:58) (60 - 61)  BP: 127/35 (06 Feb 2020 09:58) (110/27 - 127/35)  BP(mean): --  RR: 17 (06 Feb 2020 09:58) (17 - 18)  SpO2: 91% (06 Feb 2020 09:58) (91% - 96%)  general- comfortable   -rs-aeeb,crakcles present   -cvs-s1s2 normal   -p/a-soft,bs+    A/P    #Elevated PSA- advised to follow up with his urologist and oncologist as an outpt- discussed this with pt     #Anemia - appears multifactorial- stable h/h but slightly lower   -advised to have it monitored as an outpt  - no evidence of bleeding     #Weakness - multifactorial but mostly due to comorbidities advised to have PT    #d/c today with further management as an outpt, time spent 45 minutes

## 2020-02-06 NOTE — DISCHARGE NOTE PROVIDER - CARE PROVIDER_API CALL
Jj Joshi)  Cardiovascular Disease; Internal Medicine  [address] [facility], [address]  [place] [address]  Phone: [phone]  Fax: [phone]  Established Patient  Follow Up Time: 1-3 days    Surinder Lehman)  Hematology; Internal Medicine; Medical Oncology  [address] [place] [address]  Phone: [phone]  Fax: [phone]  Established Patient  Follow Up Time: 1 week    Shubham Morales)  Internal Medicine; Pulmonary Disease  [address] [place] [address]  Phone: [phone]  Fax: [phone]  Established Patient  Follow Up Time: 1 week

## 2020-02-06 NOTE — DISCHARGE NOTE PROVIDER - CARE PROVIDERS DIRECT ADDRESSES
,DirectAddress_Unknown,[email or web address]

## 2020-02-06 NOTE — DISCHARGE NOTE PROVIDER - PROVIDER TOKENS
PROVIDER:[TOKEN:[[id number]],FOLLOWUP:[1-3 days],ESTABLISHEDPATIENT:[T]],PROVIDER:[TOKEN:[[id number]],FOLLOWUP:[1 week],ESTABLISHEDPATIENT:[T]],PROVIDER:[TOKEN:[[id number]],FOLLOWUP:[1 week],ESTABLISHEDPATIENT:[T]]

## 2020-02-06 NOTE — DISCHARGE NOTE PROVIDER - NSDCCPCAREPLAN_GEN_ALL_CORE_FT
PRINCIPAL DISCHARGE DIAGNOSIS  Diagnosis: Community acquired pneumonia  Assessment and Plan of Treatment: Pneumonia is a condition in which there is an infection of the lung. Your Chest Xray depicted findings suggestive of this diagnosis. You were treated with IV antibiotics and will be discharged home on Azithromycin, a 500mg pill you will take tommorrow morning, to complete your 5day Antibiotic course. Your PCP, Dr. Joshi was notified and made aware of your hosiptal course. Besure to resume your home meds as prescribed, your Coumadin has been increased to 4mg, this is to bring your INR levels to a theraputic range. It is imperitive that you follow up with Dr. Joshi to check your level in the next few days.

## 2020-02-06 NOTE — PROGRESS NOTE ADULT - SUBJECTIVE AND OBJECTIVE BOX
HPI:  89 y/o male with PMHx of Stage 4 CKD, BPH, postherpetic neuralgia, prostate cancer(2008), Squamous cell carcinoma(2019 recicion),  lymphoma(latent), hypothyroid, COPD, HTN, Afib s/p pacemaker in 2019, s/p TAVR in 2019, s/p shoulder replacement, s/p bilateral unicompartmental knee replacement was sent to the hospital by Pulmonologist() for PNA treatment and worsening chronic anemia. Patient reports he has been feeling fatigued over the past 3 months. He reports that the fatigue is worse in the mornings and gets better throughout the day. Patient states his fatigue is worse after walking 15 yards(uses a walker to ambulate). Patient states that his cough is productive, clear, occurs more at night time. Patient denies dyspnea at rest, change in sputum color, dysphagia, fevers, chills, orthopnea, chest pain, bilateral leg swelling/tenderness. He reports using his duonebs and flovent without relief for his cough. Patient was recently started a taper dose of prednisone 20 by his pulmonologist but hasn't noticed any improvement. Patient reports he had a CT chest done outpatient with Dr. Morales that showed suspicious findings for PNA.     Of note patient uses oxygen at home as needed. Patient was breathing 95% Pulse ox on room air at bedside.  EMR review of 's notes that a CT chest done on 20 "shows atelectasis medial right lower lobe and consolidation, 9 x 11 mm nodule right upper lobe (? infectious), 10 mm nodule left lower lobe (will need 3 mo following CT, new c/w 2018 CT). Multiple lymph nodes seen in neck, axilla and mediastinum (similar adenopathy c/w 2018 CT)." (2020 02:25)    Pt also seen by Dr ANNA Lehman, heme/onc yesterday.     2/4: cough improving on IV Abx's, less sputum, swallows. in bed, no distress.   2/5: less cough, no distress. in bed, awake.   2/6: gradually improving, no distress, awake.     PAST MEDICAL & SURGICAL HISTORY:  CKD (chronic kidney disease): Stage 4  BPH (benign prostatic hyperplasia)  Postherpetic neuralgia: Treated  Chronic diarrhea  Prostate CA  Basal cell carcinoma  Lymphoma: Latent  Anxiety  Hypothyroid  Hypertension  A-fib: Pacemaker  S/P TAVR (transcatheter aortic valve replacement)  Artificial cardiac pacemaker  H/O shoulder replacement  S/P bilateral unicompartmental knee replacement      MEDICATIONS  (STANDING):  albuterol/ipratropium for Nebulization 3 milliLiter(s) Nebulizer every 12 hours  aMIOdarone    Tablet 200 milliGRAM(s) Oral daily  azithromycin  IVPB 500 milliGRAM(s) IV Intermittent every 24 hours  buDESOnide    Inhalation Suspension 0.5 milliGRAM(s) Inhalation every 12 hours  cefTRIAXone Injectable. 1000 milliGRAM(s) IV Push every 24 hours  gabapentin 300 milliGRAM(s) Oral at bedtime  gabapentin 100 milliGRAM(s) Oral daily  gabapentin Oral Tab/Cap - Peds 200 milliGRAM(s) Oral daily  levothyroxine 100 MICROGram(s) Oral daily  metoprolol tartrate 25 milliGRAM(s) Oral daily  sodium chloride 0.9%. 1000 milliLiter(s) (50 mL/Hr) IV Continuous <Continuous>    MEDICATIONS  (PRN):      Allergies    No Known Allergies    Intolerances        SOCIAL HISTORY: Denies tobacco, etoh abuse or illicit drug use    FAMILY HISTORY:  Family history of ischemic heart disease      Vital Signs Last 24 Hrs  T(C): 37.1 (2020 07:01), Max: 37.1 (2020 07:01)  T(F): 98.7 (2020 07:01), Max: 98.7 (2020 07:01)  HR: 60 (2020 07:01) (60 - 62)  BP: 130/58 (2020 07:01) (112/57 - 130/58)  BP(mean): 76 (2020 18:53) (76 - 76)  RR: 16 (2020 07:01) (16 - 16)  SpO2: 94% (2020 07:01) (94% - 97%)    REVIEW OF SYSTEMS:    CONSTITUTIONAL:  As per HPI.  HEENT:  Eyes:  No diplopia or blurred vision. ENT:  No earache, sore throat or runny nose.  CARDIOVASCULAR:  No pressure, squeezing, tightness, heaviness or aching about the chest, neck, axilla or epigastrium.  RESPIRATORY: see above.  GASTROINTESTINAL:  No nausea, vomiting or diarrhea.  GENITOURINARY:  No dysuria, frequency or urgency.  MUSCULOSKELETAL:  As per HPI.  SKIN:  No change in skin, hair or nails.  NEUROLOGIC:  No paresthesias, fasciculations, seizures or weakness.  PSYCHIATRIC:  No disorder of thought or mood.  ENDOCRINE:  No heat or cold intolerance, polyuria or polydipsia.  HEMATOLOGICAL:  No easy bruising or bleedings:  .     PHYSICAL EXAMINATION:    GENERAL APPEARANCE:  Pt. is not currently dyspneic, in no distress. Pt. is alert, oriented, and pleasant.  HEENT:  Pupils are normal and react normally. No icterus. Mucous membranes well colored.  NECK:  Supple. No lymphadenopathy. Jugular venous pressure not elevated. Carotids equal.   HEART:   The cardiac impulse has a normal quality. Regular. Normal S1 and S2. There are no murmurs, rubs or gallops noted  CHEST:  Decreased aud BS's. mild crackles R base.  No wheezing today.  ABDOMEN:  Soft and nontender.   EXTREMITIES:  There is no cyanosis, clubbing or edema.   SKIN:  No rash or significant lesions are noted.  Neuro: Alert, awake, and O x 3.      LABS:                        7.4    5.53  )-----------( 139      ( 2020 08:18 )             24.2     02-04    138  |  105  |  62<H>  ----------------------------<  64<L>  3.7   |  29  |  2.16<H>    Ca    8.5      2020 08:18    TPro  5.0<L>  /  Alb  2.8<L>  /  TBili  0.4  /  DBili  x   /  AST  12<L>  /  ALT  10<L>  /  AlkPhos  43  02-04    LIVER FUNCTIONS - ( 2020 08:18 )  Alb: 2.8 g/dL / Pro: 5.0 gm/dL / ALK PHOS: 43 U/L / ALT: 10 U/L / AST: 12 U/L / GGT: x           PT/INR - ( 2020 08:18 )   PT: 18.6 sec;   INR: 1.65 ratio         PTT - ( 2020 08:18 )  PTT:32.8 sec      Urinalysis Basic - ( 2020 21:22 )    Color: Yellow / Appearance: Clear / S.010 / pH: x  Gluc: x / Ketone: Negative  / Bili: Negative / Urobili: Negative mg/dL   Blood: x / Protein: Negative mg/dL / Nitrite: Negative   Leuk Esterase: Negative / RBC: x / WBC x   Sq Epi: x / Non Sq Epi: x / Bacteria: x          RADIOLOGY & ADDITIONAL STUDIES:     see CT findings above.

## 2020-02-06 NOTE — DISCHARGE NOTE PROVIDER - HOSPITAL COURSE
89 y/o male with PMHx of Stage 4 CKD, BPH, postherpetic neuralgia, prostate cancer(2008), Squamous cell carcinoma(2019 recision), COPD, CLL lymphoma(latent), hypothyroid, HTN, Afib s/p pacemaker in July 2019, s/p TAVR in July 2019, s/p shoulder replacement, s/p bilateral unicompartmental knee replacement, who presented to the ED with a productive cough and worsening fatigue. In the ED he was found to be vitally stable with an H/H of 8.1/25.9. CXR displayed infiltrates at the Right lung bases, he was given a dose of cefepime and vanco, initiated on IVFs, then admitted to the floor Pneumonia. On the floor, His TSH was elevated to 14            Vitals:     Vital Signs Last 24 Hrs    T(C): 36.6 (06 Feb 2020 09:58), Max: 36.9 (05 Feb 2020 20:57)    T(F): 97.9 (06 Feb 2020 09:58), Max: 98.4 (05 Feb 2020 20:57)    HR: 61 (06 Feb 2020 09:58) (60 - 61)    BP: 127/35 (06 Feb 2020 09:58) (110/27 - 127/35)    RR: 17 (06 Feb 2020 09:58) (17 - 18)    SpO2: 91% (06 Feb 2020 09:58) (91% - 96%)        PHYSICAL EXAM:        Constitutional: NAD, awake and alert, well-developed    HEENT: PERR, EOMI, s/p cataract surgery, constricted pupils+ Normal Hearing    Neck: Soft and supple, No LAD, No JVD    Respiratory: Decreased Breath sounds basal bilaterally, with crackles of RLL+    Cardiovascular: S1 and S2, regular rate and rhythm, no Murmurs, gallops or rubs    Gastrointestinal: Bowel Sounds present, soft, nontender, nondistended, no guarding, no rebound    Extremities: LUE chronic Edema+    Vascular: 2+ peripheral pulses    Neurological: A/O x 3, no focal deficits    Musculoskeletal: decreased but equal strength b/l upper and lower extremities    Skin: Widespread Ecchymoses+ BL LE/UE            Imaging     < from: Xray Chest 1 View- PORTABLE-Urgent (02.03.20 @ 21:49) >    IMPRESSION:    Right base infiltrateversus atelectasis. Follow-up study is recommended as clinically warranted.    < end of copied text >    < from: US Duplex Venous Upper Ext Ltd, Left (02.04.20 @ 09:21) >    IMPRESSION:     No evidence of left upper extremity deep venous thrombosis.    Enlarged morphologically abnormal lymph nodes in the region of the left subclavian vein suspicious for lymphomatous involvement. 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 In the ED he was found to be vitally stable with an H/H of 8.1/25.9. CXR displayed infiltrates at the Right lung bases, he was given a dose of cefepime and vanco, initiated on IVFs, then admitted to the floor Pneumonia. On the floor, His TSH was elevated to 14.3 with a PSA 42.20, RVP detected Enterovirus, and INR levels were found to be sub-theraputic, INR 1.22. Pt was continued on a 4day course of IV Rocephin and Azitrhro, he received inhaled Budesonide and Duoneb treatments. His symptoms improved and on the day of discharge the pt was found to be medically optimized and clinically stable for discharge home on 4mg of Coumadin and an additional dose of Azithromycin to complete his 5day course tommorow. Pt advised to follow up with his PCP within a few days to check his INR level and have his Coumadin dose re-adjusted. Dr. Joshi, his PCP, who is aware of his hospital course has agreed.         Vitals:     Vital Signs Last 24 Hrs    T(C): 36.6 (06 Feb 2020 09:58), Max: 36.9 (05 Feb 2020 20:57)    T(F): 97.9 (06 Feb 2020 09:58), Max: 98.4 (05 Feb 2020 20:57)    HR: 61 (06 Feb 2020 09:58) (60 - 61)    BP: 127/35 (06 Feb 2020 09:58) (110/27 - 127/35)    RR: 17 (06 Feb 2020 09:58) (17 - 18)    SpO2: 91% (06 Feb 2020 09:58) (91% - 96%)            PHYSICAL EXAM:        Constitutional: NAD, awake and alert, well-developed    HEENT: PERR, EOMI, s/p cataract surgery, constricted pupils+ Normal Hearing    Neck: Soft and supple, No LAD, No JVD    Respiratory: Decreased Breath sounds basal bilaterally, with crackles of RLL+    Cardiovascular: S1 and S2, regular rate and rhythm, no Murmurs, gallops or rubs    Gastrointestinal: Bowel Sounds present, soft, nontender, nondistended, no guarding, no rebound    Extremities: LUE chronic Edema+    Vascular: 2+ peripheral pulses    Neurological: A/O x 3, no focal deficits    Musculoskeletal: decreased but equal strength b/l upper and lower extremities    Skin: Widespread Ecchymoses+ BL LE/UE               Labs:                  7.6      6.71  )-----------( 171      ( 06 Feb 2020 08:10 )               24.4         06 Feb 2020 08:10        142    |  108    |  61       ----------------------------<  93       3.8     |  28     |  2.57         Ca    8.6        06 Feb 2020 08:10    PT/INR - ( 06 Feb 2020 08:10 )   PT: 13.6 sec;   INR: 1.22 ratio         CAPILLARY BLOOD GLUCOSE        Imaging     < from: Xray Chest 1 View- PORTABLE-Urgent (02.03.20 @ 21:49) >    IMPRESSION:    Right base infiltrateversus atelectasis. Follow-up study is recommended as clinically warranted.    < end of copied text >    < from: US Duplex Venous Upper Ext Ltd, Left (02.04.20 @ 09:21) >    IMPRESSION:     No evidence of left upper extremity deep venous thrombosis.    Enlarged morphologically abnormal lymph nodes in the region of the left subclavian vein suspicious for lymphomatous involvement.

## 2020-02-06 NOTE — DISCHARGE NOTE PROVIDER - NSDCMRMEDTOKEN_GEN_ALL_CORE_FT
albuterol 2.5 mg/3 mL (0.083%) inhalation solution: 3 milliliter(s) inhaled every 6 hours  alfuzosin 10 mg oral tablet, extended release: 1 tab(s) orally once a day  ALPRAZolam 0.25 mg oral tablet: 1 tab(s) orally once a day  ALPRAZolam 0.5 mg oral tablet: 1 tab(s) orally once a day (at bedtime)  amiodarone 200 mg oral tablet: 1 tab(s) orally once a day  aspirin 81 mg oral tablet: 1 tab(s) orally once a day  Coumadin 2 mg oral tablet: 1 tab(s) orally once a day  docusate sodium 100 mg oral tablet: 1 tab(s) orally 2 times a day  gabapentin 100 mg oral capsule: 200 milligram(s) orally once a day in the AM. 100mg in the afternoon and 300mg at bedtime.  gabapentin 100 mg oral capsule: 300 milligram(s) orally once a day (at bedtime)  gabapentin 100 mg oral capsule: 100 milligram(s) orally once a day in the afternoon  lactobacillus acidophilus oral capsule:  orally   levothyroxine 100 mcg (0.1 mg) oral tablet: 1 tab(s) orally once a day  metoprolol tartrate 25 mg oral tablet: 1 tab(s) orally once a day  ocular lubricant ophthalmic solution: 1 drop(s) to each affected eye 2 times a day  potassium chloride 20 mEq oral tablet, extended release: 1 tab(s) orally 2 times a day albuterol 2.5 mg/3 mL (0.083%) inhalation solution: 3 milliliter(s) inhaled every 6 hours  alfuzosin 10 mg oral tablet, extended release: 1 tab(s) orally once a day  ALPRAZolam 0.25 mg oral tablet: 1 tab(s) orally once a day  ALPRAZolam 0.5 mg oral tablet: 1 tab(s) orally once a day (at bedtime)  amiodarone 200 mg oral tablet: 1 tab(s) orally once a day  aspirin 81 mg oral tablet: 1 tab(s) orally once a day  azithromycin 500 mg oral tablet: 1 tab(s) orally once a day  Coumadin 2 mg oral tablet: 1 tab(s) orally once a day  gabapentin 100 mg oral capsule: 200 milligram(s) orally once a day in the AM. 100mg in the afternoon and 300mg at bedtime.  gabapentin 100 mg oral capsule: 300 milligram(s) orally once a day (at bedtime)  gabapentin 100 mg oral capsule: 100 milligram(s) orally once a day in the afternoon  lactobacillus acidophilus oral capsule:  orally   levothyroxine 100 mcg (0.1 mg) oral tablet: 1 tab(s) orally once a day  metoprolol tartrate 25 mg oral tablet: 1 tab(s) orally once a day  ocular lubricant ophthalmic solution: 1 drop(s) to each affected eye 2 times a day  potassium chloride 20 mEq oral tablet, extended release: 1 tab(s) orally 2 times a day albuterol 2.5 mg/3 mL (0.083%) inhalation solution: 3 milliliter(s) inhaled every 6 hours  alfuzosin 10 mg oral tablet, extended release: 1 tab(s) orally once a day  ALPRAZolam 0.25 mg oral tablet: 1 tab(s) orally once a day  ALPRAZolam 0.5 mg oral tablet: 1 tab(s) orally once a day (at bedtime)  amiodarone 200 mg oral tablet: 1 tab(s) orally once a day  aspirin 81 mg oral tablet: 1 tab(s) orally once a day  azithromycin 500 mg oral tablet: 1 tab(s) orally once a day  Coumadin 2 mg oral tablet: 2 tab(s) orally once a day   gabapentin 100 mg oral capsule: 200 milligram(s) orally once a day in the AM. 100mg in the afternoon and 300mg at bedtime.  gabapentin 100 mg oral capsule: 300 milligram(s) orally once a day (at bedtime)  gabapentin 100 mg oral capsule: 100 milligram(s) orally once a day in the afternoon  lactobacillus acidophilus oral capsule:  orally   levothyroxine 100 mcg (0.1 mg) oral tablet: 1 tab(s) orally once a day  metoprolol tartrate 25 mg oral tablet: 1 tab(s) orally once a day  ocular lubricant ophthalmic solution: 1 drop(s) to each affected eye 2 times a day  potassium chloride 20 mEq oral tablet, extended release: 1 tab(s) orally 2 times a day albuterol 2.5 mg/3 mL (0.083%) inhalation solution: 3 milliliter(s) inhaled every 6 hours  alfuzosin 10 mg oral tablet, extended release: 1 tab(s) orally once a day  ALPRAZolam 0.25 mg oral tablet: 1 tab(s) orally once a day  ALPRAZolam 0.5 mg oral tablet: 1 tab(s) orally once a day (at bedtime)  amiodarone 200 mg oral tablet: 1 tab(s) orally once a day  aspirin 81 mg oral tablet: 1 tab(s) orally once a day  azithromycin 500 mg oral tablet: 1 tab(s) orally once a day  budesonide 90 mcg/inh inhalation powder: 2 puff(s) inhaled 2 times a day   Coumadin 2 mg oral tablet: 2 tab(s) orally once a day   ferrous sulfate 325 mg (65 mg elemental iron) oral delayed release tablet: 1 tab(s) orally once a day   lactobacillus acidophilus oral capsule:  orally   levothyroxine 100 mcg (0.1 mg) oral tablet: 1 tab(s) orally once a day  metoprolol tartrate 25 mg oral tablet: 1 tab(s) orally once a day  ocular lubricant ophthalmic solution: 1 drop(s) to each affected eye 2 times a day  potassium chloride 20 mEq oral tablet, extended release: 1 tab(s) orally 2 times a day

## 2020-02-06 NOTE — PROGRESS NOTE ADULT - ASSESSMENT
Cough is improving on IV Abx's.    O2 as needed, IV Abx's, duonebs b.i.d., budesonide nebs.   is s/p 5 days of medrol dose pack for airway inflammation.  Up in chair and walk with walker with assistance as cy.     anemia labs. iron defic.

## 2020-02-07 PROBLEM — C85.90 NON-HODGKIN LYMPHOMA, UNSPECIFIED, UNSPECIFIED SITE: Chronic | Status: ACTIVE | Noted: 2017-04-09

## 2020-02-07 PROBLEM — B02.29 OTHER POSTHERPETIC NERVOUS SYSTEM INVOLVEMENT: Chronic | Status: ACTIVE | Noted: 2018-06-25

## 2020-02-07 PROBLEM — I48.91 UNSPECIFIED ATRIAL FIBRILLATION: Chronic | Status: ACTIVE | Noted: 2017-04-09

## 2020-02-07 PROBLEM — N18.9 CHRONIC KIDNEY DISEASE, UNSPECIFIED: Chronic | Status: ACTIVE | Noted: 2018-10-22

## 2020-02-07 NOTE — RESULTS/DATA
[FreeTextEntry1] : I reviewed recent today's blood work results with patient.\par \par 1/25/19:\par WBC 6.13, hemoglobin 9.3 g/ dL, hematocrit 30.2%, platelets 157,000	\par

## 2020-02-07 NOTE — CONSULT LETTER
[Dear  ___] : Dear  [unfilled], [Consult Letter:] : I had the pleasure of evaluating your patient, [unfilled]. [Please see my note below.] : Please see my note below. [Consult Closing:] : Thank you very much for allowing me to participate in the care of this patient.  If you have any questions, please do not hesitate to contact me. [Sincerely,] : Sincerely, [FreeTextEntry2] : Shubham Morales M.D. [FreeTextEntry3] : GUNNER MAI M.D.\par Medical Oncology\par [facility] [place]\par [facility]\par [address] [place] [address], [facility]\par [place] [address]\par Telephone: [phone]\par FAX: [phone]\par \par

## 2020-02-07 NOTE — ASSESSMENT
[FreeTextEntry1] : Mr. MADDOX 's questions were answered to his satisfaction. He  expressed his  understanding and willingness to comply with the above recommendations, and  will return to the office in  months.\par

## 2020-02-07 NOTE — REVIEW OF SYSTEMS
[Negative] : Neurological [Fatigue] : fatigue [Recent Change In Weight] : ~T recent weight change [Cough] : cough [Night Sweats] : no night sweats [FreeTextEntry6] : nonproductive cough

## 2020-02-09 LAB
CULTURE RESULTS: SIGNIFICANT CHANGE UP
SPECIMEN SOURCE: SIGNIFICANT CHANGE UP

## 2020-02-11 NOTE — DISCUSSION/SUMMARY
[Other: _____] : patient was discharged to  [Follow Up Call to Patient's Family] : follow up call to patient's family [FreeTextEntry3] : MARILIA COLVIN - 02/11/2020 10:08 AM\par TASK EDITED\par Pt's daughter called back, pt will be coming into the office on Friday for a HFUV. \par \par Daughter states he is a little weak but doing well. \par \par Advised if he feels worse than to call office to move apt up sooner.\par

## 2020-02-12 DIAGNOSIS — E03.9 HYPOTHYROIDISM, UNSPECIFIED: ICD-10-CM

## 2020-02-12 DIAGNOSIS — B02.29 OTHER POSTHERPETIC NERVOUS SYSTEM INVOLVEMENT: ICD-10-CM

## 2020-02-12 DIAGNOSIS — I50.9 HEART FAILURE, UNSPECIFIED: ICD-10-CM

## 2020-02-12 DIAGNOSIS — N18.4 CHRONIC KIDNEY DISEASE, STAGE 4 (SEVERE): ICD-10-CM

## 2020-02-12 DIAGNOSIS — N17.9 ACUTE KIDNEY FAILURE, UNSPECIFIED: ICD-10-CM

## 2020-02-12 DIAGNOSIS — Z95.0 PRESENCE OF CARDIAC PACEMAKER: ICD-10-CM

## 2020-02-12 DIAGNOSIS — N40.0 BENIGN PROSTATIC HYPERPLASIA WITHOUT LOWER URINARY TRACT SYMPTOMS: ICD-10-CM

## 2020-02-12 DIAGNOSIS — J18.9 PNEUMONIA, UNSPECIFIED ORGANISM: ICD-10-CM

## 2020-02-12 DIAGNOSIS — I48.19 OTHER PERSISTENT ATRIAL FIBRILLATION: ICD-10-CM

## 2020-02-12 DIAGNOSIS — Z95.2 PRESENCE OF PROSTHETIC HEART VALVE: ICD-10-CM

## 2020-02-12 DIAGNOSIS — Z79.01 LONG TERM (CURRENT) USE OF ANTICOAGULANTS: ICD-10-CM

## 2020-02-12 DIAGNOSIS — E07.81 SICK-EUTHYROID SYNDROME: ICD-10-CM

## 2020-02-12 DIAGNOSIS — C85.90 NON-HODGKIN LYMPHOMA, UNSPECIFIED, UNSPECIFIED SITE: ICD-10-CM

## 2020-02-12 DIAGNOSIS — Z85.46 PERSONAL HISTORY OF MALIGNANT NEOPLASM OF PROSTATE: ICD-10-CM

## 2020-02-12 DIAGNOSIS — I13.0 HYPERTENSIVE HEART AND CHRONIC KIDNEY DISEASE WITH HEART FAILURE AND STAGE 1 THROUGH STAGE 4 CHRONIC KIDNEY DISEASE, OR UNSPECIFIED CHRONIC KIDNEY DISEASE: ICD-10-CM

## 2020-02-12 DIAGNOSIS — Z96.653 PRESENCE OF ARTIFICIAL KNEE JOINT, BILATERAL: ICD-10-CM

## 2020-02-12 DIAGNOSIS — Z79.82 LONG TERM (CURRENT) USE OF ASPIRIN: ICD-10-CM

## 2020-02-12 DIAGNOSIS — J98.11 ATELECTASIS: ICD-10-CM

## 2020-02-12 DIAGNOSIS — F41.9 ANXIETY DISORDER, UNSPECIFIED: ICD-10-CM

## 2020-02-12 DIAGNOSIS — D64.9 ANEMIA, UNSPECIFIED: ICD-10-CM

## 2020-02-12 DIAGNOSIS — J44.0 CHRONIC OBSTRUCTIVE PULMONARY DISEASE WITH (ACUTE) LOWER RESPIRATORY INFECTION: ICD-10-CM

## 2020-02-12 DIAGNOSIS — Z96.619 PRESENCE OF UNSPECIFIED ARTIFICIAL SHOULDER JOINT: ICD-10-CM

## 2020-02-12 DIAGNOSIS — I12.9 HYPERTENSIVE CHRONIC KIDNEY DISEASE WITH STAGE 1 THROUGH STAGE 4 CHRONIC KIDNEY DISEASE, OR UNSPECIFIED CHRONIC KIDNEY DISEASE: ICD-10-CM

## 2020-02-12 DIAGNOSIS — Z79.51 LONG TERM (CURRENT) USE OF INHALED STEROIDS: ICD-10-CM

## 2020-02-12 DIAGNOSIS — Z85.828 PERSONAL HISTORY OF OTHER MALIGNANT NEOPLASM OF SKIN: ICD-10-CM

## 2020-02-14 ENCOUNTER — APPOINTMENT (OUTPATIENT)
Dept: INTERNAL MEDICINE | Facility: CLINIC | Age: 85
End: 2020-02-14
Payer: MEDICARE

## 2020-02-14 ENCOUNTER — NON-APPOINTMENT (OUTPATIENT)
Age: 85
End: 2020-02-14

## 2020-02-14 VITALS
WEIGHT: 149 LBS | DIASTOLIC BLOOD PRESSURE: 56 MMHG | HEIGHT: 67 IN | BODY MASS INDEX: 23.39 KG/M2 | HEART RATE: 60 BPM | OXYGEN SATURATION: 95 % | SYSTOLIC BLOOD PRESSURE: 108 MMHG | TEMPERATURE: 97.3 F | RESPIRATION RATE: 18 BRPM

## 2020-02-14 DIAGNOSIS — J98.11 ATELECTASIS: ICD-10-CM

## 2020-02-14 DIAGNOSIS — J44.9 CHRONIC OBSTRUCTIVE PULMONARY DISEASE, UNSPECIFIED: ICD-10-CM

## 2020-02-14 DIAGNOSIS — Z86.79 PERSONAL HISTORY OF OTHER DISEASES OF THE CIRCULATORY SYSTEM: ICD-10-CM

## 2020-02-14 DIAGNOSIS — R93.89 ABNORMAL FINDINGS ON DIAGNOSTIC IMAGING OF OTHER SPECIFIED BODY STRUCTURES: ICD-10-CM

## 2020-02-14 DIAGNOSIS — D64.9 ANEMIA, UNSPECIFIED: ICD-10-CM

## 2020-02-14 DIAGNOSIS — J18.9 PNEUMONIA, UNSPECIFIED ORGANISM: ICD-10-CM

## 2020-02-14 DIAGNOSIS — I48.91 UNSPECIFIED ATRIAL FIBRILLATION: ICD-10-CM

## 2020-02-14 DIAGNOSIS — Z85.46 PERSONAL HISTORY OF MALIGNANT NEOPLASM OF PROSTATE: ICD-10-CM

## 2020-02-14 DIAGNOSIS — Z85.72 PERSONAL HISTORY OF NON-HODGKIN LYMPHOMAS: ICD-10-CM

## 2020-02-14 PROCEDURE — 94010 BREATHING CAPACITY TEST: CPT

## 2020-02-14 PROCEDURE — 99495 TRANSJ CARE MGMT MOD F2F 14D: CPT | Mod: 25

## 2020-02-14 RX ORDER — ALBUTEROL SULFATE 2.5 MG/3ML
(2.5 MG/3ML) SOLUTION RESPIRATORY (INHALATION)
Refills: 0 | Status: ACTIVE | COMMUNITY
Start: 2020-02-14

## 2020-02-14 NOTE — ASSESSMENT
[FreeTextEntry1] : Spirometry today shows a moderate obstructive and restrictive deficit with an FEV1 of 1.10 or 56% predicted. \par \par #1 status post treatment of pneumonia at [facility]. Patient's CT scan of chest also showed atelectasis medial right lower lobe, pulmonary nodules (9x11 mm RUL ? infectious, 10 mm LLL). He will having a following CT scan of chest one month after antibiotic treatment, in early March. Return for a following appointment after to review the results.\par \par #2 obstructive lung disease. Patient is on Flovent 110 2 puffs b.i.d. and albuterol nebs q.i.d.\par \par #3 AF.\par \par #4 h.o. CHF.\par \par #5 s/p TAVR.\par \par #6 h.o. prostate ca.\par \par #7 h.o. lymphoma. \par \par #8 iron deficiency anemia. Continue ferrous sulfate 325 mg p.o. q.d. \par \par RV 1 month. Continue cardiology following with Dr. Joshi.

## 2020-02-14 NOTE — HISTORY OF PRESENT ILLNESS
[Post-hospitalization from ___ Hospital] : Post-hospitalization from [unfilled] Hospital [Admitted on: ___] : The patient was admitted on [unfilled] [Discharged on ___] : discharged on [unfilled] [Patient Contacted By: ____] : and contacted by [unfilled] [FreeTextEntry2] : This is a 89-year-old male who was recently discharged from [facility] after treatment of pneumonia, atelectasis. He finished his oral antibiotic. His breathing has been all right. He has a slight cough with a mild amount of sputum which he swallows. Does occasionally note a wheeze. He is not having any hemoptysis. He denies fever or chills. Not having any chest pain or syncopal episodes.\par \par For his iron deficiency anemia he is on ferrous sulfate 325 mg p.o. q.d. \par \par

## 2020-02-14 NOTE — PHYSICAL EXAM
[No Acute Distress] : no acute distress [Well Nourished] : well nourished [Well Developed] : well developed [Normal Sclera/Conjunctiva] : normal sclera/conjunctiva [Normal Outer Ear/Nose] : the outer ears and nose were normal in appearance [Normal Oropharynx] : the oropharynx was normal [No JVD] : no jugular venous distention [No Lymphadenopathy] : no lymphadenopathy [Supple] : supple [No Respiratory Distress] : no respiratory distress  [Clear to Auscultation] : lungs were clear to auscultation bilaterally [No Accessory Muscle Use] : no accessory muscle use [Normal Rate] : normal rate  [Normal S1, S2] : normal S1 and S2 [No Extremity Clubbing/Cyanosis] : no extremity clubbing/cyanosis [Soft] : abdomen soft [Non Tender] : non-tender [Non-distended] : non-distended [No HSM] : no HSM [Normal Bowel Sounds] : normal bowel sounds [Normal Anterior Cervical Nodes] : no anterior cervical lymphadenopathy [No Spinal Tenderness] : no spinal tenderness [No CVA Tenderness] : no CVA  tenderness [No Rash] : no rash [No Focal Deficits] : no focal deficits [Normal Insight/Judgement] : insight and judgment were intact [de-identified] : In WC [de-identified] : HR 60 [de-identified] : few bibasilar crackles, decreased BS's R base [de-identified] : mild ankle swelling

## 2020-03-05 ENCOUNTER — APPOINTMENT (OUTPATIENT)
Dept: INTERNAL MEDICINE | Facility: CLINIC | Age: 85
End: 2020-03-05

## 2020-03-05 ENCOUNTER — INPATIENT (INPATIENT)
Facility: HOSPITAL | Age: 85
LOS: 6 days | Discharge: HOME HEALTH | DRG: 871 | End: 2020-03-12
Attending: FAMILY MEDICINE | Admitting: FAMILY MEDICINE
Payer: MEDICARE

## 2020-03-05 VITALS
OXYGEN SATURATION: 95 % | HEART RATE: 71 BPM | WEIGHT: 149.03 LBS | DIASTOLIC BLOOD PRESSURE: 51 MMHG | TEMPERATURE: 98 F | RESPIRATION RATE: 22 BRPM | SYSTOLIC BLOOD PRESSURE: 109 MMHG

## 2020-03-05 DIAGNOSIS — B34.8 OTHER VIRAL INFECTIONS OF UNSPECIFIED SITE: ICD-10-CM

## 2020-03-05 DIAGNOSIS — Z29.9 ENCOUNTER FOR PROPHYLACTIC MEASURES, UNSPECIFIED: ICD-10-CM

## 2020-03-05 DIAGNOSIS — R30.0 DYSURIA: ICD-10-CM

## 2020-03-05 DIAGNOSIS — J18.9 PNEUMONIA, UNSPECIFIED ORGANISM: ICD-10-CM

## 2020-03-05 DIAGNOSIS — Z95.2 PRESENCE OF PROSTHETIC HEART VALVE: Chronic | ICD-10-CM

## 2020-03-05 DIAGNOSIS — R06.02 SHORTNESS OF BREATH: ICD-10-CM

## 2020-03-05 DIAGNOSIS — Z96.653 PRESENCE OF ARTIFICIAL KNEE JOINT, BILATERAL: Chronic | ICD-10-CM

## 2020-03-05 DIAGNOSIS — D53.9 NUTRITIONAL ANEMIA, UNSPECIFIED: ICD-10-CM

## 2020-03-05 DIAGNOSIS — Z95.0 PRESENCE OF CARDIAC PACEMAKER: Chronic | ICD-10-CM

## 2020-03-05 DIAGNOSIS — R79.89 OTHER SPECIFIED ABNORMAL FINDINGS OF BLOOD CHEMISTRY: ICD-10-CM

## 2020-03-05 DIAGNOSIS — I48.20 CHRONIC ATRIAL FIBRILLATION, UNSPECIFIED: ICD-10-CM

## 2020-03-05 DIAGNOSIS — Z96.619 PRESENCE OF UNSPECIFIED ARTIFICIAL SHOULDER JOINT: Chronic | ICD-10-CM

## 2020-03-05 LAB
ALBUMIN SERPL ELPH-MCNC: 3 G/DL — LOW (ref 3.3–5)
ALP SERPL-CCNC: 54 U/L — SIGNIFICANT CHANGE UP (ref 40–120)
ALT FLD-CCNC: 13 U/L — SIGNIFICANT CHANGE UP (ref 12–78)
ANION GAP SERPL CALC-SCNC: 6 MMOL/L — SIGNIFICANT CHANGE UP (ref 5–17)
APPEARANCE UR: CLEAR — SIGNIFICANT CHANGE UP
APTT BLD: 40.4 SEC — HIGH (ref 27.5–36.3)
AST SERPL-CCNC: 32 U/L — SIGNIFICANT CHANGE UP (ref 15–37)
BASOPHILS # BLD AUTO: 0 K/UL — SIGNIFICANT CHANGE UP (ref 0–0.2)
BASOPHILS NFR BLD AUTO: 0 % — SIGNIFICANT CHANGE UP (ref 0–2)
BILIRUB SERPL-MCNC: 0.6 MG/DL — SIGNIFICANT CHANGE UP (ref 0.2–1.2)
BILIRUB UR-MCNC: NEGATIVE — SIGNIFICANT CHANGE UP
BUN SERPL-MCNC: 44 MG/DL — HIGH (ref 7–23)
CALCIUM SERPL-MCNC: 8.9 MG/DL — SIGNIFICANT CHANGE UP (ref 8.5–10.1)
CHLORIDE SERPL-SCNC: 107 MMOL/L — SIGNIFICANT CHANGE UP (ref 96–108)
CO2 SERPL-SCNC: 30 MMOL/L — SIGNIFICANT CHANGE UP (ref 22–31)
COLOR SPEC: YELLOW — SIGNIFICANT CHANGE UP
CREAT SERPL-MCNC: 2.46 MG/DL — HIGH (ref 0.5–1.3)
DIFF PNL FLD: ABNORMAL
EOSINOPHIL # BLD AUTO: 0 K/UL — SIGNIFICANT CHANGE UP (ref 0–0.5)
EOSINOPHIL NFR BLD AUTO: 0 % — SIGNIFICANT CHANGE UP (ref 0–6)
GLUCOSE SERPL-MCNC: 107 MG/DL — HIGH (ref 70–99)
GLUCOSE UR QL: NEGATIVE MG/DL — SIGNIFICANT CHANGE UP
HCT VFR BLD CALC: 27.3 % — LOW (ref 39–50)
HGB BLD-MCNC: 8.3 G/DL — LOW (ref 13–17)
INR BLD: 2.78 RATIO — HIGH (ref 0.88–1.16)
KETONES UR-MCNC: NEGATIVE — SIGNIFICANT CHANGE UP
LACTATE SERPL-SCNC: 2.8 MMOL/L — HIGH (ref 0.7–2)
LEUKOCYTE ESTERASE UR-ACNC: ABNORMAL
LYMPHOCYTES # BLD AUTO: 1.02 K/UL — SIGNIFICANT CHANGE UP (ref 1–3.3)
LYMPHOCYTES # BLD AUTO: 18 % — SIGNIFICANT CHANGE UP (ref 13–44)
MAGNESIUM SERPL-MCNC: 2.2 MG/DL — SIGNIFICANT CHANGE UP (ref 1.6–2.6)
MCHC RBC-ENTMCNC: 30.4 GM/DL — LOW (ref 32–36)
MCHC RBC-ENTMCNC: 35.8 PG — HIGH (ref 27–34)
MCV RBC AUTO: 117.7 FL — HIGH (ref 80–100)
MONOCYTES # BLD AUTO: 0.68 K/UL — SIGNIFICANT CHANGE UP (ref 0–0.9)
MONOCYTES NFR BLD AUTO: 12 % — SIGNIFICANT CHANGE UP (ref 2–14)
NEUTROPHILS # BLD AUTO: 3.85 K/UL — SIGNIFICANT CHANGE UP (ref 1.8–7.4)
NEUTROPHILS NFR BLD AUTO: 57 % — SIGNIFICANT CHANGE UP (ref 43–77)
NITRITE UR-MCNC: NEGATIVE — SIGNIFICANT CHANGE UP
NRBC # BLD: SIGNIFICANT CHANGE UP /100 WBCS (ref 0–0)
NT-PROBNP SERPL-SCNC: HIGH PG/ML (ref 0–450)
PH UR: 5 — SIGNIFICANT CHANGE UP (ref 5–8)
PLATELET # BLD AUTO: 113 K/UL — LOW (ref 150–400)
POTASSIUM SERPL-MCNC: 4.4 MMOL/L — SIGNIFICANT CHANGE UP (ref 3.5–5.3)
POTASSIUM SERPL-SCNC: 4.4 MMOL/L — SIGNIFICANT CHANGE UP (ref 3.5–5.3)
PROT SERPL-MCNC: 5.5 GM/DL — LOW (ref 6–8.3)
PROT UR-MCNC: 30 MG/DL
PROTHROM AB SERPL-ACNC: 31.8 SEC — HIGH (ref 10–12.9)
RAPID RVP RESULT: DETECTED
RBC # BLD: 2.32 M/UL — LOW (ref 4.2–5.8)
RBC # FLD: 14.4 % — SIGNIFICANT CHANGE UP (ref 10.3–14.5)
RV+EV RNA SPEC QL NAA+PROBE: DETECTED
SODIUM SERPL-SCNC: 143 MMOL/L — SIGNIFICANT CHANGE UP (ref 135–145)
SP GR SPEC: 1.01 — SIGNIFICANT CHANGE UP (ref 1.01–1.02)
TROPONIN I SERPL-MCNC: 0.06 NG/ML — HIGH (ref 0.01–0.04)
TROPONIN I SERPL-MCNC: 0.06 NG/ML — HIGH (ref 0.01–0.04)
TSH SERPL-MCNC: 5.22 UU/ML — HIGH (ref 0.34–4.82)
UROBILINOGEN FLD QL: NEGATIVE MG/DL — SIGNIFICANT CHANGE UP
WBC # BLD: 5.66 K/UL — SIGNIFICANT CHANGE UP (ref 3.8–10.5)
WBC # FLD AUTO: 5.66 K/UL — SIGNIFICANT CHANGE UP (ref 3.8–10.5)

## 2020-03-05 PROCEDURE — 71045 X-RAY EXAM CHEST 1 VIEW: CPT

## 2020-03-05 PROCEDURE — 93306 TTE W/DOPPLER COMPLETE: CPT

## 2020-03-05 PROCEDURE — 80053 COMPREHEN METABOLIC PANEL: CPT

## 2020-03-05 PROCEDURE — 97530 THERAPEUTIC ACTIVITIES: CPT | Mod: GP

## 2020-03-05 PROCEDURE — 85730 THROMBOPLASTIN TIME PARTIAL: CPT

## 2020-03-05 PROCEDURE — 86901 BLOOD TYPING SEROLOGIC RH(D): CPT

## 2020-03-05 PROCEDURE — 97163 PT EVAL HIGH COMPLEX 45 MIN: CPT | Mod: GP

## 2020-03-05 PROCEDURE — 85610 PROTHROMBIN TIME: CPT

## 2020-03-05 PROCEDURE — 93010 ELECTROCARDIOGRAM REPORT: CPT

## 2020-03-05 PROCEDURE — 84484 ASSAY OF TROPONIN QUANT: CPT

## 2020-03-05 PROCEDURE — 83605 ASSAY OF LACTIC ACID: CPT

## 2020-03-05 PROCEDURE — 80048 BASIC METABOLIC PNL TOTAL CA: CPT

## 2020-03-05 PROCEDURE — 85025 COMPLETE CBC W/AUTO DIFF WBC: CPT

## 2020-03-05 PROCEDURE — 93325 DOPPLER ECHO COLOR FLOW MAPG: CPT

## 2020-03-05 PROCEDURE — 87086 URINE CULTURE/COLONY COUNT: CPT

## 2020-03-05 PROCEDURE — 94640 AIRWAY INHALATION TREATMENT: CPT

## 2020-03-05 PROCEDURE — 97116 GAIT TRAINING THERAPY: CPT | Mod: GP

## 2020-03-05 PROCEDURE — 71045 X-RAY EXAM CHEST 1 VIEW: CPT | Mod: 26

## 2020-03-05 PROCEDURE — 76770 US EXAM ABDO BACK WALL COMP: CPT

## 2020-03-05 PROCEDURE — 92523 SPEECH SOUND LANG COMPREHEN: CPT | Mod: GN

## 2020-03-05 PROCEDURE — 86850 RBC ANTIBODY SCREEN: CPT

## 2020-03-05 PROCEDURE — P9016: CPT

## 2020-03-05 PROCEDURE — 36415 COLL VENOUS BLD VENIPUNCTURE: CPT

## 2020-03-05 PROCEDURE — 86900 BLOOD TYPING SEROLOGIC ABO: CPT

## 2020-03-05 PROCEDURE — 93320 DOPPLER ECHO COMPLETE: CPT

## 2020-03-05 PROCEDURE — 36430 TRANSFUSION BLD/BLD COMPNT: CPT

## 2020-03-05 PROCEDURE — 85027 COMPLETE CBC AUTOMATED: CPT

## 2020-03-05 PROCEDURE — 86923 COMPATIBILITY TEST ELECTRIC: CPT

## 2020-03-05 PROCEDURE — 99223 1ST HOSP IP/OBS HIGH 75: CPT

## 2020-03-05 PROCEDURE — 74176 CT ABD & PELVIS W/O CONTRAST: CPT

## 2020-03-05 PROCEDURE — 92610 EVALUATE SWALLOWING FUNCTION: CPT | Mod: GN

## 2020-03-05 PROCEDURE — 93312 ECHO TRANSESOPHAGEAL: CPT

## 2020-03-05 PROCEDURE — 87040 BLOOD CULTURE FOR BACTERIA: CPT

## 2020-03-05 PROCEDURE — 81001 URINALYSIS AUTO W/SCOPE: CPT

## 2020-03-05 RX ORDER — IPRATROPIUM/ALBUTEROL SULFATE 18-103MCG
3 AEROSOL WITH ADAPTER (GRAM) INHALATION EVERY 6 HOURS
Refills: 0 | Status: DISCONTINUED | OUTPATIENT
Start: 2020-03-05 | End: 2020-03-10

## 2020-03-05 RX ORDER — GABAPENTIN 400 MG/1
100 CAPSULE ORAL ONCE
Refills: 0 | Status: COMPLETED | OUTPATIENT
Start: 2020-03-05 | End: 2020-03-05

## 2020-03-05 RX ORDER — PIPERACILLIN AND TAZOBACTAM 4; .5 G/20ML; G/20ML
3.38 INJECTION, POWDER, LYOPHILIZED, FOR SOLUTION INTRAVENOUS ONCE
Refills: 0 | Status: COMPLETED | OUTPATIENT
Start: 2020-03-05 | End: 2020-03-05

## 2020-03-05 RX ORDER — LEVOTHYROXINE SODIUM 125 MCG
100 TABLET ORAL DAILY
Refills: 0 | Status: DISCONTINUED | OUTPATIENT
Start: 2020-03-05 | End: 2020-03-06

## 2020-03-05 RX ORDER — VANCOMYCIN HCL 1 G
1000 VIAL (EA) INTRAVENOUS EVERY 12 HOURS
Refills: 0 | Status: DISCONTINUED | OUTPATIENT
Start: 2020-03-05 | End: 2020-03-06

## 2020-03-05 RX ORDER — ALPRAZOLAM 0.25 MG
0.25 TABLET ORAL DAILY
Refills: 0 | Status: COMPLETED | OUTPATIENT
Start: 2020-03-05 | End: 2020-03-12

## 2020-03-05 RX ORDER — WARFARIN SODIUM 2.5 MG/1
4 TABLET ORAL ONCE
Refills: 0 | Status: COMPLETED | OUTPATIENT
Start: 2020-03-05 | End: 2020-03-05

## 2020-03-05 RX ORDER — ASPIRIN/CALCIUM CARB/MAGNESIUM 324 MG
81 TABLET ORAL DAILY
Refills: 0 | Status: DISCONTINUED | OUTPATIENT
Start: 2020-03-05 | End: 2020-03-12

## 2020-03-05 RX ORDER — GABAPENTIN 400 MG/1
200 CAPSULE ORAL
Refills: 0 | Status: DISCONTINUED | OUTPATIENT
Start: 2020-03-05 | End: 2020-03-12

## 2020-03-05 RX ORDER — PIPERACILLIN AND TAZOBACTAM 4; .5 G/20ML; G/20ML
3.38 INJECTION, POWDER, LYOPHILIZED, FOR SOLUTION INTRAVENOUS EVERY 12 HOURS
Refills: 0 | Status: DISCONTINUED | OUTPATIENT
Start: 2020-03-05 | End: 2020-03-06

## 2020-03-05 RX ORDER — AMIODARONE HYDROCHLORIDE 400 MG/1
200 TABLET ORAL DAILY
Refills: 0 | Status: DISCONTINUED | OUTPATIENT
Start: 2020-03-05 | End: 2020-03-12

## 2020-03-05 RX ORDER — VANCOMYCIN HCL 1 G
1000 VIAL (EA) INTRAVENOUS ONCE
Refills: 0 | Status: COMPLETED | OUTPATIENT
Start: 2020-03-05 | End: 2020-03-11

## 2020-03-05 RX ORDER — IPRATROPIUM/ALBUTEROL SULFATE 18-103MCG
3 AEROSOL WITH ADAPTER (GRAM) INHALATION ONCE
Refills: 0 | Status: COMPLETED | OUTPATIENT
Start: 2020-03-05 | End: 2020-03-05

## 2020-03-05 RX ORDER — SODIUM CHLORIDE 9 MG/ML
2000 INJECTION INTRAMUSCULAR; INTRAVENOUS; SUBCUTANEOUS ONCE
Refills: 0 | Status: COMPLETED | OUTPATIENT
Start: 2020-03-05 | End: 2020-03-05

## 2020-03-05 RX ORDER — METOPROLOL TARTRATE 50 MG
25 TABLET ORAL DAILY
Refills: 0 | Status: DISCONTINUED | OUTPATIENT
Start: 2020-03-05 | End: 2020-03-12

## 2020-03-05 RX ORDER — FUROSEMIDE 40 MG
40 TABLET ORAL
Refills: 0 | Status: DISCONTINUED | OUTPATIENT
Start: 2020-03-05 | End: 2020-03-11

## 2020-03-05 RX ADMIN — SODIUM CHLORIDE 2000 MILLILITER(S): 9 INJECTION INTRAMUSCULAR; INTRAVENOUS; SUBCUTANEOUS at 14:29

## 2020-03-05 RX ADMIN — PIPERACILLIN AND TAZOBACTAM 200 GRAM(S): 4; .5 INJECTION, POWDER, LYOPHILIZED, FOR SOLUTION INTRAVENOUS at 14:30

## 2020-03-05 RX ADMIN — Medication 250 MILLIGRAM(S): at 22:03

## 2020-03-05 RX ADMIN — Medication 0.25 MILLIGRAM(S): at 22:02

## 2020-03-05 RX ADMIN — Medication 3 MILLILITER(S): at 13:55

## 2020-03-05 RX ADMIN — GABAPENTIN 100 MILLIGRAM(S): 400 CAPSULE ORAL at 13:55

## 2020-03-05 RX ADMIN — GABAPENTIN 200 MILLIGRAM(S): 400 CAPSULE ORAL at 23:18

## 2020-03-05 RX ADMIN — WARFARIN SODIUM 4 MILLIGRAM(S): 2.5 TABLET ORAL at 22:03

## 2020-03-05 RX ADMIN — GABAPENTIN 200 MILLIGRAM(S): 400 CAPSULE ORAL at 18:34

## 2020-03-05 RX ADMIN — Medication 40 MILLIGRAM(S): at 18:33

## 2020-03-05 RX ADMIN — Medication 3 MILLILITER(S): at 20:23

## 2020-03-05 NOTE — ED ADULT NURSE NOTE - NSIMPLEMENTINTERV_GEN_ALL_ED
Implemented All Fall with Harm Risk Interventions:  [place] to call system. Call bell, personal items and telephone within reach. Instruct patient to call for assistance. Room bathroom lighting operational. Non-slip footwear when patient is off stretcher. Physically safe environment: no spills, clutter or unnecessary equipment. Stretcher in lowest position, wheels locked, appropriate side rails in place. Provide visual cue, wrist band, yellow gown, etc. Monitor gait and stability. Monitor for mental status changes and reorient to person, place, and time. Review medications for side effects contributing to fall risk. Reinforce activity limits and safety measures with patient and family. Provide visual clues: red socks.

## 2020-03-05 NOTE — H&P ADULT - PROBLEM SELECTOR PLAN 7
in the same range as prior admits  doubt ACS, no cp  cardio cs for input  c/w asa, bb in the same range as prior admits  check serial trops  doubt ACS, no cp  cardio cs for input  c/w asa, bb

## 2020-03-05 NOTE — H&P ADULT - HISTORY OF PRESENT ILLNESS
HPI:  89M w/PMH CKD4, BPH, Postherpetic neuralgia, Prostate CA (2008), SCC, CLL, COPD, hypothyroid, HTN, Afib s/p PPM, on AC, s/p TAVR (2019), recent admit 2/3-6, d/c'd in improved and stable condition after treated for Rt lung pna and +rhinovirus, presents today c/o worsening sob since yesterday w/ new productive cough, increased weakness, and per home aide at bedside, fever last night.  Pt otherwise denies cp, n/v/d, abd pain.  He does endorse dysuria x 1 day.     In ED, Hg 8.3 (stable), , Plt 113, Inr 2.7, Cr 2.4 (baseline), TSH 5.2 (14.3 on 2/4), Trop 0,059, BNP 19k, cxr patchy Rt lung pna, given IV vanco/zosyn and cultures sent.     PAST MEDICAL & SURGICAL HISTORY:  CKD (chronic kidney disease): Stage 4  BPH (benign prostatic hyperplasia)  Postherpetic neuralgia: Treated  Prostate CA  Basal cell carcinoma  Lymphoma: Latent  Hypothyroid  Hypertension  A-fib: Pacemaker  S/P TAVR (transcatheter aortic valve replacement)  Artificial cardiac pacemaker  H/O shoulder replacement  S/P bilateral unicompartmental knee replacement      Review of Systems:   CONSTITUTIONAL: ++ fever.  EYES: No eye pain or discharge.  ENMT:  No sinus or throat pain  NECK: No pain or stiffness  RESPIRATORY: ++ cough, NO wheezing, chills or hemoptysis; ++shortness of breath  CARDIOVASCULAR: No chest pain, palpitations, dizziness, or leg swelling  GASTROINTESTINAL: No abdominal or epigastric pain. No nausea, vomiting, or hematemesis; No diarrhea or constipation. No melena or hematochezia.  GENITOURINARY: ++ dysuria   NEUROLOGICAL: ++ headaches, No memory loss, loss of strength, numbness, or tremors  SKIN: No rashes.  MUSCULOSKELETAL: No joint pain or swelling; No muscle, back, or extremity pain  PSYCHIATRIC: No depression, anxiety, mood swings, or difficulty sleeping    Allergies  No Known Allergies    Social History:   requires assist  uses FWW  has 24H aide    FAMILY HISTORY:  Family history of ischemic heart disease    Home Medications:  albuterol 2.5 mg/3 mL (0.083%) inhalation solution: 3 milliliter(s) inhaled every 6 hours (05 Mar 2020 14:03)  alfuzosin 10 mg oral tablet, extended release: 1 tab(s) orally once a day (05 Mar 2020 14:04)  ALPRAZolam 0.25 mg oral tablet: 1 tab(s) orally twice a day at bedtime and 3am (05 Mar 2020 14:03)  amiodarone 200 mg oral tablet: 1 tab(s) orally once a day (05 Mar 2020 14:03)  aspirin 81 mg oral tablet: 1 tab(s) orally once a day (05 Mar 2020 14:04)  docusate sodium 100 mg oral capsule: 2 cap(s) orally 2 times a day (05 Mar 2020 15:18)  furosemide 40 mg oral tablet: 1 tab(s) orally 2 times a day (05 Mar 2020 15:18)  gabapentin 100 mg oral capsule: 2 cap(s) orally 4 times a day (05 Mar 2020 15:18)  lactobacillus acidophilus oral capsule: 1 cap(s) orally once a day (05 Mar 2020 15:18)  levothyroxine 100 mcg (0.1 mg) oral tablet: 1 tab(s) orally once a day (05 Mar 2020 14:03)  Metoprolol Succinate ER 25 mg oral tablet, extended release: 1 tab(s) orally once a day (05 Mar 2020 15:18)  ocular lubricant ophthalmic solution: 1 drop(s) to each affected eye 2 times a day (05 Mar 2020 14:03)  Senna 8.6 mg oral tablet: 1 tab(s) orally once a day, As Needed (05 Mar 2020 15:18)      MEDICATIONS  (STANDING):  ALPRAZolam 0.25 milliGRAM(s) Oral daily  aMIOdarone    Tablet 200 milliGRAM(s) Oral daily  aspirin enteric coated 81 milliGRAM(s) Oral daily  gabapentin 200 milliGRAM(s) Oral four times a day  levothyroxine 100 MICROGram(s) Oral daily  metoprolol succinate ER 25 milliGRAM(s) Oral daily  vancomycin  IVPB 1000 milliGRAM(s) IV Intermittent once  warfarin 4 milliGRAM(s) Oral once        PHYSICAL EXAM:  Vital Signs Last 24 Hrs  T(C): 37.1 (05 Mar 2020 16:06), Max: 37.1 (05 Mar 2020 16:06)  T(F): 98.7 (05 Mar 2020 16:06), Max: 98.7 (05 Mar 2020 16:06)  HR: 73 (05 Mar 2020 16:06) (71 - 73)  BP: 106/51 (05 Mar 2020 16:06) (106/51 - 109/51)  BP(mean): --  RR: 22 (05 Mar 2020 16:06) (22 - 22)  SpO2: 100% (05 Mar 2020 16:06) (95% - 100%)  GENERAL: NAD, frail appearing  HEAD:  Atraumatic, Normocephalic  EYES: EOMI, PERRLA, conjunctiva and sclera clear  ENT: normal hearing, no nasal discharge, throat clear, dentition +dentures  NECK: Supple, No JVD, no LAD, no thyromegaly   CHEST/LUNG: b/l rhonchi and rales, respirations unlabored  HEART: Regular rate and rhythm; No murmurs, rubs, or gallops  ABDOMEN: Soft, Nontender, Nondistended; Bowel sounds present, no HSM  EXTREMITIES:  2+ Peripheral Pulses, No clubbing, cyanosis, or LE edema, LUE edema (previously checked doppler neg for DVT)   PSYCH: AAOx3, normal behavior  NEUROLOGY: non-focal, sensory and cn 2-12 intact, speech/language intact  SKIN: No visible rashes or lesions    LABS:                        8.3    5.66  )-----------( 113      ( 05 Mar 2020 13:41 )             27.3     03-05    143  |  107  |  44<H>  ----------------------------<  107<H>  4.4   |  30  |  2.46<H>    Ca    8.9      05 Mar 2020 13:41  Mg     2.2     03-05    TPro  5.5<L>  /  Alb  3.0<L>  /  TBili  0.6  /  DBili  x   /  AST  32  /  ALT  13  /  AlkPhos  54  03-05    PT/INR - ( 05 Mar 2020 13:41 )   PT: 31.8 sec;   INR: 2.78 ratio         PTT - ( 05 Mar 2020 13:41 )  PTT:40.4 sec  CARDIAC MARKERS ( 05 Mar 2020 13:41 )  0.059 ng/mL / x     / x     / x     / x              RADIOLOGY & ADDITIONAL TESTS:    Imaging Personally Reviewed:  cxre- patchy Rt lung pna     EKG Personally Reviewed:  paced

## 2020-03-05 NOTE — ED PROVIDER NOTE - CARE PLAN
Principal Discharge DX:	SOB (shortness of breath) Principal Discharge DX:	SOB (shortness of breath)  Secondary Diagnosis:	Pneumonia  Secondary Diagnosis:	Elevated troponin

## 2020-03-05 NOTE — H&P ADULT - ASSESSMENT
89M w/PMH CKD4, BPH, Postherpetic neuralgia, Prostate CA (2008), SCC, CLL, COPD, hypothyroid, HTN, Afib s/p PPM, on AC, s/p TAVR (2019), recent admit 2/3-6, d/c'd in improved and stable condition after treated for Rt lung pna and +rhinovirus, presents today c/o worsening sob since yesterday w/ new productive cough, increased weakness, and fever   + dysuria x 1 day.     In ED, Hg 8.3 (stable), , Plt 113, Inr 2.7, Cr 2.4 (baseline), TSH 5.2 (14.3 on 2/4), Trop 0,059, BNP 19k, cxr patchy Rt lung pna, given IV vanco/zosyn and cultures sent.

## 2020-03-05 NOTE — ED ADULT NURSE REASSESSMENT NOTE - NS ED NURSE REASSESS COMMENT FT1
pt a+ox3, vss, resting in bed.  pt tolerating IV ABX well, L arm swollen, MD aware and PCP aware, elevated on pillows.  MD at bedside assessing pt, awaiting further orders, Aide at the bedside.  educated on need for urine sample and on Dx of PNA.  callbell within reach, will continue to monitor.

## 2020-03-05 NOTE — H&P ADULT - PROBLEM SELECTOR PLAN 1
will start on IV abx- vanco/zosyn  ID cs  check cultures  pulm cs for recurring pna  swallow eval for possible aspiration

## 2020-03-05 NOTE — H&P ADULT - PROBLEM SELECTOR PLAN 4
will check UA- if positive, will check UCx   if Pos, will be covered w/ current zosyn- change accordingly as per cx

## 2020-03-05 NOTE — H&P ADULT - PROBLEM SELECTOR PLAN 2
likely 2/2 above  treat as above  will also check TTE  cardio/pulm cs for further input   c/w home dose lasix- does not appear to be in overt CHF exac  (elevated BNP and trop in setting of CKD4)   monitor O2 sats and titrate to keep >90%

## 2020-03-05 NOTE — ED ADULT NURSE NOTE - OBJECTIVE STATEMENT
pt presents to ed ambulatory via with daughter and aid. pt and family endorsing subjective fever, cough, wheezing and increase SOB x 2 days. pt wears 2-3L oxygen at home. pt was in HH for 1 month for PNA. pt presents, with vitals stable, afebrile, 97% on 2L, talking in full sentences.

## 2020-03-05 NOTE — ED PROVIDER NOTE - PROGRESS NOTE DETAILS
[place] DO: lactic acid: 2.8; fluids/antibiotics ordered; pending admission landry sarabia: endorsed to dr. d'amico for admission.

## 2020-03-05 NOTE — ED CLERICAL - NS ED CLERK NOTE PRE-ARRIVAL INFORMATION; ADDITIONAL PRE-ARRIVAL INFORMATION
This patient is enrolled in the readmission reduction program and has active care navigation. This patient can be followed up by the care navigation team within 24 hours. To arrange close follow-up or to obtain additional clinical information about this patient, please call the contact number above. Please call the hospitalist as needed to collaborate on further medical management ([phone])

## 2020-03-05 NOTE — ED PROVIDER NOTE - PMH
A-fib  Pacemaker  Anxiety    Basal cell carcinoma    BPH (benign prostatic hyperplasia)    Chronic diarrhea    CKD (chronic kidney disease)  Stage 4  Hypertension    Hypothyroid    Lymphoma  Latent  Postherpetic neuralgia  Treated  Prostate CA

## 2020-03-05 NOTE — ED ADULT TRIAGE NOTE - CHIEF COMPLAINT QUOTE
Pt brought in by family for subjective fever, cough, difficulty breathing starting last night.  Pt oxygen-dependent at home.

## 2020-03-05 NOTE — ED PROVIDER NOTE - OBJECTIVE STATEMENT
89 y/o male with a PMHx of CKD, BPH, postherpetic neuralgia, chronic diarrhea, prostate CA, basal cell carcinoma, latent lymphoma, anxiety, hypothyroidism, HTN, A-fib, h/o pacemaker placement, s/p TAVR presents to the ED c/o productive cough, wheezing, subjective fever, mild difficulty urinating, and SOB x2 days. Pt was recently hospitalized at [facility] about 1 month ago for pneumonia. Denies CP. No other complaints at this time. NKDA. PCP: Jj Joshi.

## 2020-03-06 LAB
ANION GAP SERPL CALC-SCNC: 5 MMOL/L — SIGNIFICANT CHANGE UP (ref 5–17)
BUN SERPL-MCNC: 45 MG/DL — HIGH (ref 7–23)
CALCIUM SERPL-MCNC: 8.5 MG/DL — SIGNIFICANT CHANGE UP (ref 8.5–10.1)
CHLORIDE SERPL-SCNC: 106 MMOL/L — SIGNIFICANT CHANGE UP (ref 96–108)
CO2 SERPL-SCNC: 31 MMOL/L — SIGNIFICANT CHANGE UP (ref 22–31)
CREAT SERPL-MCNC: 2.3 MG/DL — HIGH (ref 0.5–1.3)
ENTEROCOC DNA BLD POS QL NAA+NON-PROBE: SIGNIFICANT CHANGE UP
GLUCOSE SERPL-MCNC: 83 MG/DL — SIGNIFICANT CHANGE UP (ref 70–99)
GRAM STN FLD: SIGNIFICANT CHANGE UP
HCT VFR BLD CALC: 23.7 % — LOW (ref 39–50)
HGB BLD-MCNC: 7.2 G/DL — LOW (ref 13–17)
INR BLD: 3.05 RATIO — HIGH (ref 0.88–1.16)
MCHC RBC-ENTMCNC: 30.4 GM/DL — LOW (ref 32–36)
MCHC RBC-ENTMCNC: 35.5 PG — HIGH (ref 27–34)
MCV RBC AUTO: 116.7 FL — HIGH (ref 80–100)
METHOD TYPE: SIGNIFICANT CHANGE UP
PLATELET # BLD AUTO: 94 K/UL — LOW (ref 150–400)
POTASSIUM SERPL-MCNC: 3.5 MMOL/L — SIGNIFICANT CHANGE UP (ref 3.5–5.3)
POTASSIUM SERPL-SCNC: 3.5 MMOL/L — SIGNIFICANT CHANGE UP (ref 3.5–5.3)
PROTHROM AB SERPL-ACNC: 35 SEC — HIGH (ref 10–12.9)
RBC # BLD: 2.03 M/UL — LOW (ref 4.2–5.8)
RBC # FLD: 14.2 % — SIGNIFICANT CHANGE UP (ref 10.3–14.5)
SODIUM SERPL-SCNC: 142 MMOL/L — SIGNIFICANT CHANGE UP (ref 135–145)
SPECIMEN SOURCE: SIGNIFICANT CHANGE UP
SPECIMEN SOURCE: SIGNIFICANT CHANGE UP
TROPONIN I SERPL-MCNC: 0.06 NG/ML — HIGH (ref 0.01–0.04)
TROPONIN I SERPL-MCNC: 0.06 NG/ML — HIGH (ref 0.01–0.04)
WBC # BLD: 6.53 K/UL — SIGNIFICANT CHANGE UP (ref 3.8–10.5)
WBC # FLD AUTO: 6.53 K/UL — SIGNIFICANT CHANGE UP (ref 3.8–10.5)

## 2020-03-06 PROCEDURE — 99233 SBSQ HOSP IP/OBS HIGH 50: CPT

## 2020-03-06 PROCEDURE — 93306 TTE W/DOPPLER COMPLETE: CPT | Mod: 26

## 2020-03-06 RX ORDER — ACETAMINOPHEN 500 MG
650 TABLET ORAL EVERY 6 HOURS
Refills: 0 | Status: DISCONTINUED | OUTPATIENT
Start: 2020-03-06 | End: 2020-03-12

## 2020-03-06 RX ORDER — LEVOTHYROXINE SODIUM 125 MCG
112 TABLET ORAL DAILY
Refills: 0 | Status: DISCONTINUED | OUTPATIENT
Start: 2020-03-06 | End: 2020-03-12

## 2020-03-06 RX ORDER — PIPERACILLIN AND TAZOBACTAM 4; .5 G/20ML; G/20ML
3.38 INJECTION, POWDER, LYOPHILIZED, FOR SOLUTION INTRAVENOUS EVERY 12 HOURS
Refills: 0 | Status: DISCONTINUED | OUTPATIENT
Start: 2020-03-06 | End: 2020-03-11

## 2020-03-06 RX ORDER — PHENAZOPYRIDINE HCL 100 MG
100 TABLET ORAL EVERY 8 HOURS
Refills: 0 | Status: DISCONTINUED | OUTPATIENT
Start: 2020-03-06 | End: 2020-03-12

## 2020-03-06 RX ADMIN — Medication 3 MILLILITER(S): at 16:30

## 2020-03-06 RX ADMIN — GABAPENTIN 200 MILLIGRAM(S): 400 CAPSULE ORAL at 17:20

## 2020-03-06 RX ADMIN — Medication 650 MILLIGRAM(S): at 22:58

## 2020-03-06 RX ADMIN — Medication 100 MILLIGRAM(S): at 21:52

## 2020-03-06 RX ADMIN — Medication 3 MILLILITER(S): at 20:44

## 2020-03-06 RX ADMIN — GABAPENTIN 200 MILLIGRAM(S): 400 CAPSULE ORAL at 11:06

## 2020-03-06 RX ADMIN — GABAPENTIN 200 MILLIGRAM(S): 400 CAPSULE ORAL at 23:01

## 2020-03-06 RX ADMIN — Medication 0.25 MILLIGRAM(S): at 21:52

## 2020-03-06 RX ADMIN — Medication 100 MICROGRAM(S): at 05:43

## 2020-03-06 RX ADMIN — Medication 40 MILLIGRAM(S): at 05:43

## 2020-03-06 RX ADMIN — Medication 250 MILLIGRAM(S): at 11:27

## 2020-03-06 RX ADMIN — Medication 3 MILLILITER(S): at 10:56

## 2020-03-06 RX ADMIN — GABAPENTIN 200 MILLIGRAM(S): 400 CAPSULE ORAL at 05:43

## 2020-03-06 RX ADMIN — Medication 40 MILLIGRAM(S): at 19:18

## 2020-03-06 RX ADMIN — Medication 100 MILLIGRAM(S): at 15:03

## 2020-03-06 RX ADMIN — PIPERACILLIN AND TAZOBACTAM 25 GRAM(S): 4; .5 INJECTION, POWDER, LYOPHILIZED, FOR SOLUTION INTRAVENOUS at 01:40

## 2020-03-06 RX ADMIN — AMIODARONE HYDROCHLORIDE 200 MILLIGRAM(S): 400 TABLET ORAL at 05:43

## 2020-03-06 RX ADMIN — Medication 3 MILLILITER(S): at 01:38

## 2020-03-06 RX ADMIN — Medication 81 MILLIGRAM(S): at 11:06

## 2020-03-06 RX ADMIN — Medication 25 MILLIGRAM(S): at 05:43

## 2020-03-06 RX ADMIN — PIPERACILLIN AND TAZOBACTAM 25 GRAM(S): 4; .5 INJECTION, POWDER, LYOPHILIZED, FOR SOLUTION INTRAVENOUS at 19:17

## 2020-03-06 NOTE — SWALLOW BEDSIDE ASSESSMENT ADULT - SLP GENERAL OBSERVATIONS
On encounter, a scab was noted on the bridge of his nose. A slight jaw tremor was noted at times.  The pt was awake and interactive. He was able to verbalize during communicative probes and in conversation. At these times, his motor speech abilities were felt to be functional and his verbalizations were fluent/linguistically intact/contextually appropriate. The patient was able to verbalize his needs and is at reported communicative baseline. Note that pt and his private duty aid denied aspiration signs with meals.

## 2020-03-06 NOTE — SWALLOW BEDSIDE ASSESSMENT ADULT - SWALLOW EVAL: SECRETION MANAGEMENT
adequate on exam. No drooling was noted. Strength of volitional cough was functional. Note that his cough was somewhat moist.

## 2020-03-06 NOTE — CONSULT NOTE ADULT - SUBJECTIVE AND OBJECTIVE BOX
CHIEF COMPLAINT: Patient is a 89y old  Male who presents with a chief complaint of sob, cough (05 Mar 2020 17:03)      HPI: HPI:  HPI:  89M w/PMH CKD4, BPH, Postherpetic neuralgia, Prostate CA (2008), SCC, CLL, COPD, hypothyroid, HTN, Afib s/p PPM, on AC, s/p TAVR (2019), recent admit 2/3-6, d/c'd in improved and stable condition after treated for Rt lung pna and +rhinovirus, presents today c/o worsening sob since yesterday w/ new productive cough, increased weakness, and per home aide at bedside, fever last night.  Pt otherwise denies cp, n/v/d, abd pain.  He does endorse dysuria x 1 day.     In ED, Hg 8.3 (stable), , Plt 113, Inr 2.7, Cr 2.4 (baseline), TSH 5.2 (14.3 on 2/4), Trop 0,059, BNP 19k, cxr patchy Rt lung pna, given IV vanco/zosyn and cultures sent.     PAST MEDICAL & SURGICAL HISTORY:  CKD (chronic kidney disease): Stage 4  BPH (benign prostatic hyperplasia)  Postherpetic neuralgia: Treated  Prostate CA  Basal cell carcinoma  Lymphoma: Latent  Hypothyroid  Hypertension  A-fib: Pacemaker  S/P TAVR (transcatheter aortic valve replacement)  Artificial cardiac pacemaker  H/O shoulder replacement  S/P bilateral unicompartmental knee replacement      Review of Systems:   CONSTITUTIONAL: ++ fever.  EYES: No eye pain or discharge.  ENMT:  No sinus or throat pain  NECK: No pain or stiffness  RESPIRATORY: ++ cough, NO wheezing, chills or hemoptysis; ++shortness of breath  CARDIOVASCULAR: No chest pain, palpitations, dizziness, or leg swelling  GASTROINTESTINAL: No abdominal or epigastric pain. No nausea, vomiting, or hematemesis; No diarrhea or constipation. No melena or hematochezia.  GENITOURINARY: ++ dysuria   NEUROLOGICAL: ++ headaches, No memory loss, loss of strength, numbness, or tremors  SKIN: No rashes.  MUSCULOSKELETAL: No joint pain or swelling; No muscle, back, or extremity pain  PSYCHIATRIC: No depression, anxiety, mood swings, or difficulty sleeping    Allergies  No Known Allergies    Social History:   requires assist  uses [employer]  has 24H aide    FAMILY HISTORY:  Family history of ischemic heart disease    Home Medications:  albuterol 2.5 mg/3 mL (0.083%) inhalation solution: 3 milliliter(s) inhaled every 6 hours (05 Mar 2020 14:03)  alfuzosin 10 mg oral tablet, extended release: 1 tab(s) orally once a day (05 Mar 2020 14:04)  ALPRAZolam 0.25 mg oral tablet: 1 tab(s) orally twice a day at bedtime and 3am (05 Mar 2020 14:03)  amiodarone 200 mg oral tablet: 1 tab(s) orally once a day (05 Mar 2020 14:03)  aspirin 81 mg oral tablet: 1 tab(s) orally once a day (05 Mar 2020 14:04)  docusate sodium 100 mg oral capsule: 2 cap(s) orally 2 times a day (05 Mar 2020 15:18)  furosemide 40 mg oral tablet: 1 tab(s) orally 2 times a day (05 Mar 2020 15:18)  gabapentin 100 mg oral capsule: 2 cap(s) orally 4 times a day (05 Mar 2020 15:18)  lactobacillus acidophilus oral capsule: 1 cap(s) orally once a day (05 Mar 2020 15:18)  levothyroxine 100 mcg (0.1 mg) oral tablet: 1 tab(s) orally once a day (05 Mar 2020 14:03)  Metoprolol Succinate ER 25 mg oral tablet, extended release: 1 tab(s) orally once a day (05 Mar 2020 15:18)  ocular lubricant ophthalmic solution: 1 drop(s) to each affected eye 2 times a day (05 Mar 2020 14:03)  Senna 8.6 mg oral tablet: 1 tab(s) orally once a day, As Needed (05 Mar 2020 15:18)      MEDICATIONS  (STANDING):  ALPRAZolam 0.25 milliGRAM(s) Oral daily  aMIOdarone    Tablet 200 milliGRAM(s) Oral daily  aspirin enteric coated 81 milliGRAM(s) Oral daily  gabapentin 200 milliGRAM(s) Oral four times a day  levothyroxine 100 MICROGram(s) Oral daily  metoprolol succinate ER 25 milliGRAM(s) Oral daily  vancomycin  IVPB 1000 milliGRAM(s) IV Intermittent once  warfarin 4 milliGRAM(s) Oral once        PHYSICAL EXAM:  Vital Signs Last 24 Hrs  T(C): 37.1 (05 Mar 2020 16:06), Max: 37.1 (05 Mar 2020 16:06)  T(F): 98.7 (05 Mar 2020 16:06), Max: 98.7 (05 Mar 2020 16:06)  HR: 73 (05 Mar 2020 16:06) (71 - 73)  BP: 106/51 (05 Mar 2020 16:06) (106/51 - 109/51)  BP(mean): --  RR: 22 (05 Mar 2020 16:06) (22 - 22)  SpO2: 100% (05 Mar 2020 16:06) (95% - 100%)  GENERAL: NAD, frail appearing  HEAD:  Atraumatic, Normocephalic  EYES: EOMI, PERRLA, conjunctiva and sclera clear  ENT: normal hearing, no nasal discharge, throat clear, dentition +dentures  NECK: Supple, No JVD, no LAD, no thyromegaly   CHEST/LUNG: b/l rhonchi and rales, respirations unlabored  HEART: Regular rate and rhythm; No murmurs, rubs, or gallops  ABDOMEN: Soft, Nontender, Nondistended; Bowel sounds present, no HSM  EXTREMITIES:  2+ Peripheral Pulses, No clubbing, cyanosis, or LE edema, LUE edema (previously checked doppler neg for DVT)   PSYCH: AAOx3, normal behavior  NEUROLOGY: non-focal, sensory and cn 2-12 intact, speech/language intact  SKIN: No visible rashes or lesions    LABS:                        8.3    5.66  )-----------( 113      ( 05 Mar 2020 13:41 )             27.3     03-05    143  |  107  |  44<H>  ----------------------------<  107<H>  4.4   |  30  |  2.46<H>    Ca    8.9      05 Mar 2020 13:41  Mg     2.2     03-05    TPro  5.5<L>  /  Alb  3.0<L>  /  TBili  0.6  /  DBili  x   /  AST  32  /  ALT  13  /  AlkPhos  54  03-05    PT/INR - ( 05 Mar 2020 13:41 )   PT: 31.8 sec;   INR: 2.78 ratio         PTT - ( 05 Mar 2020 13:41 )  PTT:40.4 sec  CARDIAC MARKERS ( 05 Mar 2020 13:41 )  0.059 ng/mL / x     / x     / x     / x              RADIOLOGY & ADDITIONAL TESTS:    Imaging Personally Reviewed:  cxre- patchy Rt lung pna     EKG Personally Reviewed:  paced (05 Mar 2020 17:03)      PMHx: PAST MEDICAL & SURGICAL HISTORY:  CKD (chronic kidney disease): Stage 4  BPH (benign prostatic hyperplasia)  Postherpetic neuralgia: Treated  Chronic diarrhea  Prostate CA  Basal cell carcinoma  Lymphoma: Latent  Anxiety  Hypothyroid  Hypertension  A-fib: Pacemaker  S/P TAVR (transcatheter aortic valve replacement)  Artificial cardiac pacemaker  H/O shoulder replacement  S/P bilateral unicompartmental knee replacement        Soc Hx:     FAMILY HISTORY:  Family history of ischemic heart disease      Allergies: Allergies    No Known Allergies    Intolerances          REVIEW OF SYSTEMS:    As above  No chest pain + shortness of breath  No lightheadeness or syncope  No leg swelling  No palpitations  No claudication-like symptoms    Vital Signs Last 24 Hrs  T(C): 36.7 (06 Mar 2020 05:38), Max: 37.3 (05 Mar 2020 21:20)  T(F): 98.1 (06 Mar 2020 05:38), Max: 99.1 (05 Mar 2020 21:20)  HR: 60 (06 Mar 2020 05:38) (60 - 74)  BP: 106/46 (06 Mar 2020 05:38) (101/45 - 109/51)  BP(mean): --  RR: 20 (05 Mar 2020 21:20) (20 - 22)  SpO2: 99% (06 Mar 2020 05:38) (95% - 100%)    I&O's Summary      CAPILLARY BLOOD GLUCOSE          PHYSICAL EXAM:   Patient in NAD  Neck: No JVD; Carotids:  2+ without bruits  Respiratory:  Clear to A&P  Cardiovascular: S1 and S2; syst m  Gastrointestinal:  Soft, non-tender; BS positive  Extremities: No peripheral edema  Vascular: 2+ peripheral pulses  Neurological: A/O x 3, no focal deficits      MEDICATIONS:  MEDICATIONS  (STANDING):  albuterol/ipratropium for Nebulization 3 milliLiter(s) Nebulizer every 6 hours  ALPRAZolam 0.25 milliGRAM(s) Oral daily  aMIOdarone    Tablet 200 milliGRAM(s) Oral daily  aspirin enteric coated 81 milliGRAM(s) Oral daily  furosemide    Tablet 40 milliGRAM(s) Oral two times a day  gabapentin 200 milliGRAM(s) Oral four times a day  levothyroxine 100 MICROGram(s) Oral daily  metoprolol succinate ER 25 milliGRAM(s) Oral daily  piperacillin/tazobactam IVPB.. 3.375 Gram(s) IV Intermittent every 12 hours  vancomycin  IVPB 1000 milliGRAM(s) IV Intermittent once  vancomycin  IVPB 1000 milliGRAM(s) IV Intermittent every 12 hours      LABS: All Labs Reviewed:  Blood Culture: Organism Blood Culture PCR  Gram Stain Blood -- Gram Stain   Growth in aerobic bottle: Gram Positive Cocci in Pairs and Chains  Growth in anaerobic bottle: Gram Positive Cocci in Pairs and Chains  Specimen Source .Blood None  Culture-Blood --    Culture - Blood (03.05.20 @ 13:41)    Gram Stain:   Growth in aerobic bottle: Gram Positive Cocci in Pairs and Chains  Growth in anaerobic bottle: Gram Positive Cocci in Pairs and Chains    Specimen Source: .Blood None    Culture Results:   Growth in aerobic bottle: Gram Positive Cocci in Pairs and Chains  Growth in anaerobic bottle: Gram Positive Cocci in Pairs and Chains          BNP Serum Pro-Brain Natriuretic Peptide: 91897 pg/mL (03-05 @ 13:41)    CBC             WBC Count: 5.66 K/uL (03-05 @ 13:41)              Hemoglobin: 8.3 g/dL (03-05 @ 13:41)              Hematocrit: 27.3 % (03-05 @ 13:41)              Mean Cell Volume: 117.7 fl (03-05 @ 13:41)              Platelet Count - Automated: 113 K/uL (03-05 @ 13:41)                            Cardiac markers             Troponin I, Serum: 0.062 ng/mL (03-06 @ 02:18)  Troponin I, Serum: 0.063 ng/mL (03-05 @ 23:19)  Troponin I, Serum: 0.057 ng/mL (03-05 @ 19:51)  Troponin I, Serum: 0.059 ng/mL (03-05 @ 13:41)                             Chems        Sodium, Serum: 143 mmol/L (03-05 @ 13:41)          Potassium, Serum: 4.4 mmol/L (03-05 @ 13:41)          Blood Urea Nitrogen, Serum: 44 mg/dL (03-05 @ 13:41)          Creatinine 2.46          Magnesium, Serum: 2.2 mg/dL (03-05 @ 13:41)          Protein Total, Serum: 5.5 gm/dL (03-05 @ 13:41)                  Calcium, Total Serum: 8.9 mg/dL (03-05 @ 13:41)                  Bilirubin Total, Serum: 0.6 mg/dL (03-05 @ 13:41)          Alanine Aminotransferase (ALT/SGPT): 13 U/L (03-05 @ 13:41)          Aspartate Aminotransferase (AST/SGOT): 32 U/L (03-05 @ 13:41)                 INR: 2.78 ratio (03-05 @ 13:41)       Culture Results:   Growth in aerobic bottle: Gram Positive Cocci in Pairs and Chains  Growth in anaerobic bottle: Gram Positive Cocci in Pairs and Chains  ***Blood Panel PCR results on this specimen are available  approximately 3 hours after the Gram stain result.***  Gram stain, PCR, and/or culture results may not always  correspond due to difference in methodologies.  ************************************************************  This PCR assay was performed using [employer].  The following targets are tested for: Enterococcus,  vancomycin resistant enterococci, Listeria monocytogenes,  coagulase negative staphylococci, S. aureus,  methicillin resistant S. aureus, Streptococcus agalactiae  (Group B), S. pneumoniae, S. pyogenes (Group A),  Acinetobacter baumannii, Enterobacter cloacae, E. coli,  Klebsiella oxytoca, K. pneumoniae, Proteus sp.,  Serratia marcescens, Haemophilus influenzae,  Neisseria meningitidis, Pseudomonas aeruginosa, Candida  albicans, C. glabrata, C krusei, C parapsilosis,  C. tropicalis and the KPC resistance gene. (03-05 @ 13:41)  Culture Results:   Growth in aerobic bottle: Gram Positive Cocci in Pairs and Chains  Growth in anaerobic bottle: Gram Positive Cocci in Pairs and Chains (03-05 @ 13:41)        RADIOLOGY:    EKG:  Paced    Telemetry: Paced    ECHO:

## 2020-03-06 NOTE — SWALLOW BEDSIDE ASSESSMENT ADULT - COMMENTS
The patient was admitted to  with generalized weakness, SOB and cough. Hospital course is notable for Rhinovirus, pneumonia, dysuria, elevated Troponin, and generalized deconditioning. See below for prior medical information. The patient was admitted to  with generalized weakness, SOB and cough. Hospital course is notable for Rhinovirus, pneumonia, dysuria, elevated Troponin, and generalized deconditioning. See below for prior additional medical information.

## 2020-03-06 NOTE — CONSULT NOTE ADULT - ASSESSMENT
89M w/PMH CKD4, BPH, Postherpetic neuralgia, Prostate CA (2008), SCC, CLL, COPD, hypothyroid, HTN, Afib s/p PPM, on AC, s/p TAVR (2019),  admitted on 3/5 for evaluation of shortness of breath associated with productive cough; also notes he has painful urination as well as frequency of urination; history per medical record as patient is poor historian.    1. Patient admitted with sepsis secondary to Enterococcus probably or urinary origin; also noted with pneumonia superimposed on Enteroviral URI  - follow up cultures to identification and sensitivity  - urine culture is pending  - will check bladder scan given patient abdominal discomfort and dysuria, possibly retaining urine?  - serial cbc and monitor temperature   - oxygen and nebs as needed   - reviewed prior medical records to evaluate for resistant or atypical pathogens   - agree with zosyn as ordered  - hold on further vancomycin given renal function in this frail elderly male  - echocardiogram to evaluate for endocarditis  2. other issues; per medicine 89M w/PMH CKD4, BPH, Postherpetic neuralgia, Prostate CA (2008), SCC, CLL, COPD, hypothyroid, HTN, Afib s/p PPM, on AC, s/p TAVR (2019),  admitted on 3/5 for evaluation of shortness of breath associated with productive cough; also notes he has painful urination as well as frequency of urination; history per medical record as patient is poor historian.    1. Patient admitted with sepsis secondary to Enterococcus probably or urinary origin; also noted with pneumonia superimposed on Enteroviral URI  - follow up cultures to identification and sensitivity  - urine culture is pending  - will check bladder scan given patient abdominal discomfort and dysuria, possibly retaining urine?  - serial cbc and monitor temperature   - oxygen and nebs as needed   - reviewed prior medical records to evaluate for resistant or atypical pathogens   - agree with zosyn as ordered to cover Enterococcus and pneumonia as well  - hold on further vancomycin given renal function in this frail elderly male  - echocardiogram to evaluate for endocarditis  2. other issues; per medicine

## 2020-03-06 NOTE — SWALLOW BEDSIDE ASSESSMENT ADULT - SWALLOW EVAL: CRITERIA FOR SKILLED INTERVENTION MET
DO NOT FEEL THAT ACUTE SPEECH PATHOLOGY INTERVENTION WOULD CHANGE CLINICAL MANAGEMENT/OUTCOME IN HOSPITAL. PATIENT'S SPEECH-LANGUAGE ABILITIES AND OROPHARYNGEAL SWALLOWING ARE WITHIN FUNCTIONAL PARAMETERS/AT BASELINE/ARE FELT TO BE MAXIMIZED. GIVEN ABOVE, WILL NOT ACTIVELY FOLLOW. RECONSULT PRN SHOULD STATUS CHANGE AND CONDITION WARRANT.

## 2020-03-06 NOTE — PROGRESS NOTE ADULT - ASSESSMENT
#Pneumonia.- BC positive for gram + cocci  c/w IV abx- vanco/zosyn  ID consult  check cultures  pulm consult for recurring pna  swallow eval for possible aspiration.     #Rhinovirus.  supportive and symptomatic care  tx otherwise as above    #SOB  likely 2/2 above  treat as above  plan for echo today  cardio consult appreciated   Pulm consult  c/w home dose lasix-  monitor O2 sats and titrate to keep >90%.     #Dysuria  UA with mild LE, UC pendingx  if Pos, will be covered w/ current zosyn- change accordingly as per cx.     #Macrocytic anemia (chronic)  H/H stable   continue to monitor    CKD stage 4  -stable Cr  -continue to monitor on future labs    #Chronic atrial fibrillation. Plan: stable   c/w coumadin- monitor INR to keep goal 2-3  c/w amio.    #Elevated troponin. in the same range as prior admits  cardio consult appreciated  continue check serial trops  doubt ACS, no cp  c/w asa, bb. #Pneumonia.- BC positive for gram + cocci  c/w IV abx- vanco/zosyn  ID consult  check cultures  pulm consult for recurring pna  swallow eval for possible aspiration.     #Rhinovirus.  supportive and symptomatic care  tx otherwise as above    #SOB  likely 2/2 above  treat as above  plan for echo today  cardio consult appreciated   Pulm consult  c/w home dose lasix-  monitor O2 sats and titrate to keep >90%.     #Dysuria  UA with mild LE, UC pendingx  if Pos, will be covered w/ current zosyn- change accordingly as per cx.     #Macrocytic anemia (chronic)  H/H stable   continue to monitor    #hypothyroidism  -TSH 5.22   -increase Synthroid from 100 mcg to 112 mcg  -monitor TSH    CKD stage 4  -stable Cr  -continue to monitor on future labs    #Chronic atrial fibrillation. Plan: stable   c/w coumadin- monitor INR to keep goal 2-3  c/w amio.    #Elevated troponin. in the same range as prior admits  cardio consult appreciated  continue check serial trops  doubt ACS, no cp  c/w asa, bb. #Pneumonia.- BC positive for gram + cocci  -ID consult appreciated,  as per ID d/c vanco and c/w zosyn  echo to r/o endocarditis   pulm consult for recurring pna  swallow eval for possible aspiration.     #Rhinovirus.  supportive and symptomatic care  tx otherwise as above    #SOB  likely 2/2 above  treat as above  plan for echo today  cardio consult appreciated   Pulm consult  c/w home dose lasix-  monitor O2 sats and titrate to keep >90%.     #Dysuria  UA with mild LE, UC pendingx  if Pos, will be covered w/ current zosyn- change accordingly as per cx.     #Macrocytic anemia (chronic)  H/H stable   continue to monitor    #hypothyroidism  -TSH 5.22   -increase Synthroid from 100 mcg to 112 mcg  -monitor TSH    CKD stage 4  -stable Cr  -continue to monitor on future labs    #Chronic atrial fibrillation. Plan: stable   c/w coumadin- monitor INR to keep goal 2-3  c/w amio.    #Elevated troponin. in the same range as prior admits  cardio consult appreciated  continue check serial trops  doubt ACS, no cp  c/w asa, bb.

## 2020-03-06 NOTE — CONSULT NOTE ADULT - SUBJECTIVE AND OBJECTIVE BOX
Patient is a 89y old  Male who presents with a chief complaint of sob, cough (06 Mar 2020 10:37)    HPI:  HPI:  89M w/PMH CKD4, BPH, Postherpetic neuralgia, Prostate CA (2008), SCC, CLL, COPD, hypothyroid, HTN, Afib s/p PPM, on AC, s/p TAVR (),  admitted on 3/5 for evaluation of shortness of breath associated with productive cough; also notes he has painful urination as well as frequency of urination; history per medical record as patient is poor historian.        PAST MEDICAL & SURGICAL HISTORY:  CKD (chronic kidney disease): Stage 4  BPH (benign prostatic hyperplasia)  Postherpetic neuralgia: Treated  Prostate CA  Basal cell carcinoma  Lymphoma: Latent  Hypothyroid  Hypertension  A-fib: Pacemaker  S/P TAVR (transcatheter aortic valve replacement)  Artificial cardiac pacemaker  H/O shoulder replacement  S/P bilateral unicompartmental knee replacement            PMH: as above  PSH: as above  Meds: per reconciliation sheet, noted below  MEDICATIONS  (STANDING):  albuterol/ipratropium for Nebulization 3 milliLiter(s) Nebulizer every 6 hours  ALPRAZolam 0.25 milliGRAM(s) Oral daily  aMIOdarone    Tablet 200 milliGRAM(s) Oral daily  aspirin enteric coated 81 milliGRAM(s) Oral daily  furosemide    Tablet 40 milliGRAM(s) Oral two times a day  gabapentin 200 milliGRAM(s) Oral four times a day  levothyroxine 112 MICROGram(s) Oral daily  metoprolol succinate ER 25 milliGRAM(s) Oral daily  phenazopyridine 100 milliGRAM(s) Oral every 8 hours  piperacillin/tazobactam IVPB.. 3.375 Gram(s) IV Intermittent every 12 hours  vancomycin  IVPB 1000 milliGRAM(s) IV Intermittent once    MEDICATIONS  (PRN):    Allergies    No Known Allergies    Intolerances      Social: no smoking, no alcohol, no illegal drugs; no recent travel, no exposure to TB  FAMILY HISTORY:  Family history of ischemic heart disease     ROS unable to obtain secondary to patient medical condition     Vital Signs Last 24 Hrs  T(C): 36.6 (06 Mar 2020 11:26), Max: 37.3 (05 Mar 2020 21:20)  T(F): 97.9 (06 Mar 2020 11:26), Max: 99.1 (05 Mar 2020 21:20)  HR: 60 (06 Mar 2020 11:26) (60 - 74)  BP: 97/41 (06 Mar 2020 11:26) (97/41 - 109/51)  BP(mean): --  RR: 18 (06 Mar 2020 11:26) (18 - 22)  SpO2: 97% (06 Mar 2020 11:26) (95% - 100%)  Daily     Daily Weight in k (06 Mar 2020 05:38)    PE:    Constitutional: frail looking  HEENT: NC/AT, EOMI, PERRLA, conjunctivae clear; ears and nose atraumatic; pharynx clear  Neck: supple; thyroid not palpable  Back: no tenderness  Respiratory: respiratory effort normal; scattered coarse breath sounds  Cardiovascular: S1S2 regular, 2/6 systolic murmur  Abdomen: soft, not tender, difusely distended, positive BS; no liver or spleen organomegaly  Genitourinary: no suprapubic tenderness  Musculoskeletal: no muscle tenderness, no joint swelling or tenderness  Neurological/ Psychiatric:  moving all extremities  Skin: no rashes; no palpable lesions; ecchymosis of skin    Labs: all available labs reviewed                        7.2    6.53  )-----------( 94       ( 06 Mar 2020 08:23 )             23.7     03-06    142  |  106  |  45<H>  ----------------------------<  83  3.5   |  31  |  2.30<H>    Ca    8.5      06 Mar 2020 08:23  Mg     2.2     03-05    TPro  5.5<L>  /  Alb  3.0<L>  /  TBili  0.6  /  DBili  x   /  AST  32  /  ALT  13  /  AlkPhos  54  03-05     LIVER FUNCTIONS - ( 05 Mar 2020 13:41 )  Alb: 3.0 g/dL / Pro: 5.5 gm/dL / ALK PHOS: 54 U/L / ALT: 13 U/L / AST: 32 U/L / GGT: x           Urinalysis Basic - ( 05 Mar 2020 18:01 )    Color: Yellow / Appearance: Clear / S.010 / pH: x  Gluc: x / Ketone: Negative  / Bili: Negative / Urobili: Negative mg/dL   Blood: x / Protein: 30 mg/dL / Nitrite: Negative   Leuk Esterase: Trace / RBC: 0-2 /HPF / WBC 3-5   Sq Epi: x / Non Sq Epi: Occasional / Bacteria: Few        Culture Results:   Growth in aerobic bottle: Gram Positive Cocci in Pairs and Chains  Growth in anaerobic bottle: Gram Positive Cocci in Pairs and Chains  ***Blood Panel PCR results on this specimen are available  approximately 3 hours after the Gram stain result.***  Gram stain, PCR, and/or culture results may not always  correspond due to difference in methodologies.  ************************************************************  This PCR assay was performed using [employer].  The following targets are tested for: Enterococcus,  vancomycin resistant enterococci, Listeria monocytogenes,  coagulase negative staphylococci, S. aureus,  methicillin resistant S. aureus, Streptococcus agalactiae  (Group B), S. pneumoniae, S. pyogenes (Group A),  Acinetobacter baumannii, Enterobacter cloacae, E. coli,  Klebsiella oxytoca, K. pneumoniae, Proteus sp.,  Serratia marcescens, Haemophilus influenzae,  Neisseria meningitidis, Pseudomonas aeruginosa, Candida  albicans, C. glabrata, C krusei, C parapsilosis,  C. tropicalis and the KPC resistance gene. ( @ 13:41)  Culture Results:   Growth in aerobic bottle: Gram Positive Cocci in Pairs and Chains  Growth in anaerobic bottle: Gram Positive Cocci in Pairs and Chains ( @ 13:41)      < from: Xray Chest 1 View- PORTABLE-Urgent (20 @ 14:51) >    EXAM:  XR CHEST PORTABLE URGENT 1V                            PROCEDURE DATE:  2020          INTERPRETATION:  XR CHEST URGENT    Single AP view    HISTORY:  Shortness of breath    Comparison: Chest x-ray 2/3/2020      Left dual-lead pacer.    Status post TAVR.   Right shoulder arthroplasty.  The cardiac silhouette is rotated. Patchy right mid to lower lung airspace disease.. No pleural abnormality.    IMPRESSION: Patchy right lung pneumonia    < end of copied text >        Radiology: all available radiological tests reviewed    Advanced directives addressed: full resuscitation

## 2020-03-06 NOTE — CONSULT NOTE ADULT - ASSESSMENT
89 year old man with the above history admitted with shortness of breath and a recurrent right sided pneumonia.  The mildly elevated flat troponins are probably demand related.  His elevated BNP is probably multifactorial.  He is on antibiotics.  His blood cultures are apparently positive.  Will await identification.  Check TTE regarding his TAVR valve.  May need a CLEMENTINA.  Continue diuretics as well.  Will follow.

## 2020-03-06 NOTE — PROVIDER CONTACT NOTE (OTHER) - SITUATION
notified of trop 0.063. previous result 0.057. pt asymptomatic at this time tele monitoring in progress

## 2020-03-06 NOTE — SWALLOW BEDSIDE ASSESSMENT ADULT - SWALLOW EVAL: FEEDING ASSISTANCE
PT'S HAND TO MOUTH MOVEMENTS WERE SLOW/TREMULOUS BUT FUNCTIONAL.  HE HAS A PRIVATE DUTY AID WITH HIM AT BEDSIDE SHOULD HE NEED ASSISTANCE.

## 2020-03-06 NOTE — SWALLOW BEDSIDE ASSESSMENT ADULT - ADDITIONAL RECOMMENDATIONS
1) NUTRITION FOLLOW UP    2) HOSPITALIST FOLLOW UP. NOTE THAT PT WITH MILD JAW TREMOR AND MILD ACTION INDUCED UE TREMORS.

## 2020-03-06 NOTE — PROGRESS NOTE ADULT - SUBJECTIVE AND OBJECTIVE BOX
89M w/PMH CKD4, BPH, Postherpetic neuralgia, Prostate CA (2008), SCC, CLL, COPD, hypothyroid, HTN, Afib s/p PPM, on AC, s/p TAVR (2019), recent admit 2/3-6, d/c'd in improved and stable condition after treated for Rt lung pna and +rhinovirus, presents today c/o worsening sob since yesterday w/ new productive cough, increased weakness, and per home aide at bedside, fever last night.  Pt otherwise denies cp, n/v/d, abd pain.  He does endorse dysuria x 1 day.     In ED, Hg 8.3 (stable), , Plt 113, Inr 2.7, Cr 2.4 (baseline), TSH 5.2 (14.3 on ), Trop 0,059, BNP 19k, cxr patchy Rt lung pna, given IV vanco/zosyn.     3/6/20: patient seen lying in bed. He notes feeling better and that his cough/ SOB has improved. Afebrile. No overnight events. patient still notes some dysuria.     All 10 systems reviewed and found to be negative with the exception of what has been described above.    T(F): 98.1 (20 @ 05:38), Max: 99.1 (20 @ 21:20)  HR: 60 (20 @ 05:38) (60 - 74)  BP: 106/46 (20 @ 05:38) (101/45 - 109/51)  RR: 20 (20 @ 21:20) (20 - 22)  SpO2: 99% (20 @ 05:38) (95% - 100%)      GENERAL: NAD,   HEAD:  Atraumatic, Normocephalic  EYES: EOMI, PERRLA, conjunctiva and sclera clear  NECK: Supple, No JVD, no LAD, no thyromegaly   CHEST/LUNG: b/l rhonchi and rales, respirations unlabored  HEART: Regular rate and rhythm; No murmurs, rubs, or gallops  ABDOMEN: Soft, Nontender, Nondistended; Bowel sounds present,  EXTREMITIES:  2+ Peripheral Pulses, No clubbing, cyanosis, or LE edema,   PSYCH: AAOx3, normal behavior  NEUROLOGY: non-focal, sensory and cn 2-12 intact, speech/language intact  SKIN: No visible rashes or lesions                              7.2    6.53  )-----------( 94       ( 06 Mar 2020 08:23 )             23.7     03-06    142  |  106  |  45<H>  ----------------------------<  83  3.5   |  31  |  2.30<H>    Ca    8.5      06 Mar 2020 08:23  Mg     2.2     03-05    TPro  5.5<L>  /  Alb  3.0<L>  /  TBili  0.6  /  DBili  x   /  AST  32  /  ALT  13  /  AlkPhos  54  03-05    CARDIAC MARKERS ( 06 Mar 2020 02:18 )  0.062 ng/mL / x     / x     / x     / x      CARDIAC MARKERS ( 05 Mar 2020 23:19 )  0.063 ng/mL / x     / x     / x     / x      CARDIAC MARKERS ( 05 Mar 2020 19:51 )  0.057 ng/mL / x     / x     / x     / x      CARDIAC MARKERS ( 05 Mar 2020 13:41 )  0.059 ng/mL / x     / x     / x     / x          LIVER FUNCTIONS - ( 05 Mar 2020 13:41 )  Alb: 3.0 g/dL / Pro: 5.5 gm/dL / ALK PHOS: 54 U/L / ALT: 13 U/L / AST: 32 U/L / GGT: x           PT/INR - ( 06 Mar 2020 08:23 )   PT: 35.0 sec;   INR: 3.05 ratio         PTT - ( 05 Mar 2020 13:41 )  PTT:40.4 sec  Urinalysis Basic - ( 05 Mar 2020 18:01 )    Color: Yellow / Appearance: Clear / S.010 / pH: x  Gluc: x / Ketone: Negative  / Bili: Negative / Urobili: Negative mg/dL   Blood: x / Protein: 30 mg/dL / Nitrite: Negative   Leuk Esterase: Trace / RBC: 0-2 /HPF / WBC 3-5   Sq Epi: x / Non Sq Epi: Occasional / Bacteria: Few        Lactate, Blood: 1.7 mmol/L ( @ 16:51)  Lactate, Blood: 2.8 mmol/L ( @ 13:41)    Blood, Urine: Trace ( @ 18:01)  Culture Results:   Growth in aerobic bottle: Gram Positive Cocci in Pairs and Chains  Growth in anaerobic bottle: Gram Positive Cocci in Pairs and Chains  ***Blood Panel PCR results on this specimen are available  approximately 3 hours after the Gram stain result.***  Gram stain, PCR, and/or culture results may not always  correspond due to difference in methodologies.  ************************************************************  This PCR assay was performed using [employer].  The following targets are tested for: Enterococcus,  vancomycin resistant enterococci, Listeria monocytogenes,  coagulase negative staphylococci, S. aureus,  methicillin resistant S. aureus, Streptococcus agalactiae  (Group B), S. pneumoniae, S. pyogenes (Group A),  Acinetobacter baumannii, Enterobacter cloacae, E. coli,  Klebsiella oxytoca, K. pneumoniae, Proteus sp.,  Serratia marcescens, Haemophilus influenzae,  Neisseria meningitidis, Pseudomonas aeruginosa        , Candida  albicans, C. glabrata, C krusei, C parapsilosis,  C. tropicalis and the KPC resistance gene. ( @ :41)  Culture Results:   Growth in aerobic bottle: Gram Positive Cocci in Pairs and Chains  Growth in anaerobic bottle: Gram Positive Cocci in Pairs and Chains ( @ 13:41)

## 2020-03-06 NOTE — SWALLOW BEDSIDE ASSESSMENT ADULT - SWALLOW EVAL: DIAGNOSIS
1) The patient's hand to mouth movement was slow/tremulous. This is atop Oropharyngeal Swallowing abilities which subjectively appear to be grossly within functional parameters for age. NO behavioral aspiration signs exhibited on exam. Odynophagia was denied.  2) The pt was awake and interactive. He was able to verbalize during communicative probes and in conversation. At these times, his motor speech abilities were felt to be functional and his verbalizations were fluent/linguistically intact/contextually appropriate. The patient was able to verbalize his needs and is at reported communicative baseline.

## 2020-03-07 DIAGNOSIS — R91.8 OTHER NONSPECIFIC ABNORMAL FINDING OF LUNG FIELD: ICD-10-CM

## 2020-03-07 DIAGNOSIS — A41.9 SEPSIS, UNSPECIFIED ORGANISM: ICD-10-CM

## 2020-03-07 DIAGNOSIS — J44.9 CHRONIC OBSTRUCTIVE PULMONARY DISEASE, UNSPECIFIED: ICD-10-CM

## 2020-03-07 DIAGNOSIS — C61 MALIGNANT NEOPLASM OF PROSTATE: ICD-10-CM

## 2020-03-07 DIAGNOSIS — Z85.79 PERSONAL HISTORY OF OTHER MALIGNANT NEOPLASMS OF LYMPHOID, HEMATOPOIETIC AND RELATED TISSUES: ICD-10-CM

## 2020-03-07 DIAGNOSIS — N39.0 URINARY TRACT INFECTION, SITE NOT SPECIFIED: ICD-10-CM

## 2020-03-07 DIAGNOSIS — I48.91 UNSPECIFIED ATRIAL FIBRILLATION: ICD-10-CM

## 2020-03-07 LAB
ALBUMIN SERPL ELPH-MCNC: 2.5 G/DL — LOW (ref 3.3–5)
ALP SERPL-CCNC: 49 U/L — SIGNIFICANT CHANGE UP (ref 40–120)
ALT FLD-CCNC: 11 U/L — LOW (ref 12–78)
ANION GAP SERPL CALC-SCNC: 6 MMOL/L — SIGNIFICANT CHANGE UP (ref 5–17)
AST SERPL-CCNC: 20 U/L — SIGNIFICANT CHANGE UP (ref 15–37)
BILIRUB SERPL-MCNC: 0.6 MG/DL — SIGNIFICANT CHANGE UP (ref 0.2–1.2)
BUN SERPL-MCNC: 48 MG/DL — HIGH (ref 7–23)
CALCIUM SERPL-MCNC: 8.5 MG/DL — SIGNIFICANT CHANGE UP (ref 8.5–10.1)
CHLORIDE SERPL-SCNC: 105 MMOL/L — SIGNIFICANT CHANGE UP (ref 96–108)
CO2 SERPL-SCNC: 30 MMOL/L — SIGNIFICANT CHANGE UP (ref 22–31)
CREAT SERPL-MCNC: 2.32 MG/DL — HIGH (ref 0.5–1.3)
CULTURE RESULTS: SIGNIFICANT CHANGE UP
GLUCOSE SERPL-MCNC: 84 MG/DL — SIGNIFICANT CHANGE UP (ref 70–99)
HCT VFR BLD CALC: 25.2 % — LOW (ref 39–50)
HGB BLD-MCNC: 7.9 G/DL — LOW (ref 13–17)
MCHC RBC-ENTMCNC: 31.3 GM/DL — LOW (ref 32–36)
MCHC RBC-ENTMCNC: 36.2 PG — HIGH (ref 27–34)
MCV RBC AUTO: 115.6 FL — HIGH (ref 80–100)
PLATELET # BLD AUTO: 108 K/UL — LOW (ref 150–400)
POTASSIUM SERPL-MCNC: 3.3 MMOL/L — LOW (ref 3.5–5.3)
POTASSIUM SERPL-SCNC: 3.3 MMOL/L — LOW (ref 3.5–5.3)
PROT SERPL-MCNC: 5 GM/DL — LOW (ref 6–8.3)
RBC # BLD: 2.18 M/UL — LOW (ref 4.2–5.8)
RBC # FLD: 13.7 % — SIGNIFICANT CHANGE UP (ref 10.3–14.5)
SODIUM SERPL-SCNC: 141 MMOL/L — SIGNIFICANT CHANGE UP (ref 135–145)
SPECIMEN SOURCE: SIGNIFICANT CHANGE UP
WBC # BLD: 6.25 K/UL — SIGNIFICANT CHANGE UP (ref 3.8–10.5)
WBC # FLD AUTO: 6.25 K/UL — SIGNIFICANT CHANGE UP (ref 3.8–10.5)

## 2020-03-07 PROCEDURE — 99233 SBSQ HOSP IP/OBS HIGH 50: CPT

## 2020-03-07 RX ORDER — BUDESONIDE, MICRONIZED 100 %
0.25 POWDER (GRAM) MISCELLANEOUS EVERY 12 HOURS
Refills: 0 | Status: DISCONTINUED | OUTPATIENT
Start: 2020-03-07 | End: 2020-03-10

## 2020-03-07 RX ADMIN — Medication 650 MILLIGRAM(S): at 23:13

## 2020-03-07 RX ADMIN — Medication 40 MILLIGRAM(S): at 17:58

## 2020-03-07 RX ADMIN — Medication 3 MILLILITER(S): at 13:36

## 2020-03-07 RX ADMIN — Medication 650 MILLIGRAM(S): at 00:00

## 2020-03-07 RX ADMIN — Medication 81 MILLIGRAM(S): at 12:13

## 2020-03-07 RX ADMIN — Medication 650 MILLIGRAM(S): at 06:45

## 2020-03-07 RX ADMIN — PIPERACILLIN AND TAZOBACTAM 25 GRAM(S): 4; .5 INJECTION, POWDER, LYOPHILIZED, FOR SOLUTION INTRAVENOUS at 17:41

## 2020-03-07 RX ADMIN — Medication 650 MILLIGRAM(S): at 12:13

## 2020-03-07 RX ADMIN — Medication 100 MILLIGRAM(S): at 05:15

## 2020-03-07 RX ADMIN — Medication 0.25 MILLIGRAM(S): at 19:59

## 2020-03-07 RX ADMIN — AMIODARONE HYDROCHLORIDE 200 MILLIGRAM(S): 400 TABLET ORAL at 05:15

## 2020-03-07 RX ADMIN — Medication 112 MICROGRAM(S): at 05:15

## 2020-03-07 RX ADMIN — GABAPENTIN 200 MILLIGRAM(S): 400 CAPSULE ORAL at 12:13

## 2020-03-07 RX ADMIN — GABAPENTIN 200 MILLIGRAM(S): 400 CAPSULE ORAL at 17:57

## 2020-03-07 RX ADMIN — GABAPENTIN 200 MILLIGRAM(S): 400 CAPSULE ORAL at 21:43

## 2020-03-07 RX ADMIN — Medication 1 DROP(S): at 15:03

## 2020-03-07 RX ADMIN — Medication 0.25 MILLIGRAM(S): at 21:43

## 2020-03-07 RX ADMIN — GABAPENTIN 200 MILLIGRAM(S): 400 CAPSULE ORAL at 05:15

## 2020-03-07 RX ADMIN — Medication 650 MILLIGRAM(S): at 13:00

## 2020-03-07 RX ADMIN — Medication 25 MILLIGRAM(S): at 05:15

## 2020-03-07 RX ADMIN — Medication 3 MILLILITER(S): at 07:45

## 2020-03-07 RX ADMIN — Medication 100 MILLIGRAM(S): at 21:43

## 2020-03-07 RX ADMIN — Medication 3 MILLILITER(S): at 19:59

## 2020-03-07 RX ADMIN — PIPERACILLIN AND TAZOBACTAM 25 GRAM(S): 4; .5 INJECTION, POWDER, LYOPHILIZED, FOR SOLUTION INTRAVENOUS at 05:15

## 2020-03-07 RX ADMIN — Medication 1 DROP(S): at 21:43

## 2020-03-07 RX ADMIN — Medication 650 MILLIGRAM(S): at 07:23

## 2020-03-07 RX ADMIN — Medication 40 MILLIGRAM(S): at 05:15

## 2020-03-07 RX ADMIN — Medication 3 MILLILITER(S): at 01:50

## 2020-03-07 RX ADMIN — Medication 100 MILLIGRAM(S): at 12:14

## 2020-03-07 NOTE — PROGRESS NOTE ADULT - ASSESSMENT
89 year old man with the above history admitted with shortness of breath and a recurrent right sided pneumonia.  The mildly elevated flat troponins are probably demand related.  His elevated BNP is probably multifactorial.  He is on antibiotics.  His blood cultures are apparently positive.  Will await identification.  Check TTE regarding his TAVR valve.  May need a CLEMENTINA.  Continue diuretics as well.  Will follow.    3/7/20:  As per above echocardiogram patient's cardiac status appears stable with a normally functioning TAVR aortic valve and normal LV systolic function.  No vegetations seen.  Awaiting culture identification-will discuss with ID.  Continue antibiotics and diuretics.

## 2020-03-07 NOTE — PROGRESS NOTE ADULT - ASSESSMENT
#Pneumonia.- BC positive for enterococcus  -ID consult appreciated, plan for repeat blood cultures tomorrow   c/w Zosyn, day #3  continue to monitor vitals and cbc   O2 and nebs as needed.     #Rhinovirus.  supportive and symptomatic care  tx otherwise as above    #SOB  likely 2/2 above  treat as above  cardio consult appreciated   c/w home dose lasix  monitor O2 sats and titrate to keep >90%.   Nebulizer treatment as needed    #Dysuria  UA with mild LE, UC pendingx  if Pos, will be covered w/ current zosyn- change accordingly as per cx.     #Macrocytic anemia (chronic)  H/H stable   continue to monitor    #hypothyroidism  -TSH 5.22   -increase Synthroid from 100 mcg to 112 mcg  -monitor TSH    CKD stage 4  -stable Cr  -continue to monitor on future labs    #Chronic atrial fibrillation.  c/w coumadin- monitor INR to keep goal 2-3  c/w amio.    #Elevated troponin. in the same range as prior admits  cardio consult appreciated  doubt ACS, no cp  c/w asa, bb.

## 2020-03-07 NOTE — CONSULT NOTE ADULT - ASSESSMENT
O2 as needed, nebs, budesonide nebs added.    Enterococcus sepsis, prob of urinary source.  also treat for PNA.  IV Abx's as per ID.    Positve entero/rhino virus.     Pulmonary nodules on previous CT of chest 1/2019  (1 cm LLL and 9x11mm RLL, latter prob infectious). Will need following CT of chest.  Plan to do 3-4 weeks after treatment of PNA as outpatient.  Also, had stable adenopathy neck, axillae, and mediastinum c/w 12/2018 CT.     On lasix for CHF.

## 2020-03-07 NOTE — PROGRESS NOTE ADULT - SUBJECTIVE AND OBJECTIVE BOX
Date of service: 03-07-20 @ 10:44    Patient lying in bed; his aide notes linear skin break in gluteal crease  Afebrile, feels he is wheezing        ROS unable to obtain secondary to patient medical condition     MEDICATIONS  (STANDING):  albuterol/ipratropium for Nebulization 3 milliLiter(s) Nebulizer every 6 hours  ALPRAZolam 0.25 milliGRAM(s) Oral daily  aMIOdarone    Tablet 200 milliGRAM(s) Oral daily  aspirin enteric coated 81 milliGRAM(s) Oral daily  furosemide    Tablet 40 milliGRAM(s) Oral two times a day  gabapentin 200 milliGRAM(s) Oral four times a day  levothyroxine 112 MICROGram(s) Oral daily  metoprolol succinate ER 25 milliGRAM(s) Oral daily  phenazopyridine 100 milliGRAM(s) Oral every 8 hours  piperacillin/tazobactam IVPB.. 3.375 Gram(s) IV Intermittent every 12 hours  vancomycin  IVPB 1000 milliGRAM(s) IV Intermittent once    MEDICATIONS  (PRN):  acetaminophen   Tablet .. 650 milliGRAM(s) Oral every 6 hours PRN Mild Pain (1 - 3)      Vital Signs Last 24 Hrs  T(C): 36.3 (07 Mar 2020 05:05), Max: 36.7 (06 Mar 2020 21:33)  T(F): 97.3 (07 Mar 2020 05:05), Max: 98.1 (06 Mar 2020 21:33)  HR: 60 (07 Mar 2020 05:05) (60 - 66)  BP: 123/45 (07 Mar 2020 05:05) (97/41 - 123/45)  BP(mean): --  RR: 17 (07 Mar 2020 05:05) (17 - 18)  SpO2: 100% (07 Mar 2020 05:05) (95% - 100%)    Physical Exam:          PE:    Constitutional: frail looking  HEENT: NC/AT, EOMI, PERRLA, conjunctivae clear; ears and nose atraumatic; pharynx clear  Neck: supple; thyroid not palpable  Back: no tenderness  Respiratory: respiratory effort normal; scattered coarse breath sounds with wheeze  Cardiovascular: S1S2 regular, 2/6 systolic murmur  Abdomen: soft, not tender, difusely distended, positive BS; no liver or spleen organomegaly  Genitourinary: no suprapubic tenderness  Musculoskeletal: no muscle tenderness, no joint swelling or tenderness  Neurological/ Psychiatric:  moving all extremities  Skin: no rashes; no palpable lesions; ecchymosis of skin    Labs: all available labs reviewed                         Labs:                        7.9    6.25  )-----------( 108      ( 07 Mar 2020 07:22 )             25.2     03-07    141  |  105  |  48<H>  ----------------------------<  84  3.3<L>   |  30  |  2.32<H>    Ca    8.5      07 Mar 2020 07:22  Mg     2.2     03-05    TPro  5.0<L>  /  Alb  2.5<L>  /  TBili  0.6  /  DBili  x   /  AST  20  /  ALT  11<L>  /  AlkPhos  49  03-07           Cultures:       Culture - Urine (collected 03-05-20 @ 18:01)  Source: .Urine None  Final Report (03-07-20 @ 07:37):    <10,000 CFU/mL Normal Urogenital Deanna    Culture - Blood (collected 03-05-20 @ 13:41)  Source: .Blood None  Gram Stain (03-06-20 @ 06:01):    Growth in aerobic bottle: Gram Positive Cocci in Pairs and Chains    Growth in anaerobic bottle: Gram Positive Cocci in Pairs and Chains  Preliminary Report (03-06-20 @ 06:02):    Growth in aerobic bottle: Gram Positive Cocci in Pairs and Chains    Growth in anaerobic bottle: Gram Positive Cocci in Pairs and Chains    Culture - Blood (collected 03-05-20 @ 13:41)  Source: .Blood None  Gram Stain (03-06-20 @ 04:26):    Growth in aerobic bottle: Gram Positive Cocci in Pairs and Chains    Growth in anaerobic bottle: Gram Positive Cocci in Pairs and Chains  Preliminary Report (03-06-20 @ 04:26):    Growth in aerobic bottle: Gram Positive Cocci in Pairs and Chains    Growth in anaerobic bottle: Gram Positive Cocci in Pairs and Chains    ***Blood Panel PCR results on this specimen are available    approximately 3 hours after the Gram stain result.***    Gram stain, PCR, and/or culture results may not always    correspond due to difference in methodologies.    ************************************************************    This PCR assay was performed using [employer].    The following targets are tested for: Enterococcus,    vancomycin resistant enterococci, Listeria monocytogenes,    coagulase negative staphylococci, S. aureus,    methicillin resistant S. aureus, Streptococcus agalactiae    (Group B), S. pneumoniae, S. pyogenes (Group A),    Acinetobacter baumannii, Enterobacter cloacae, E. coli,    Klebsiella oxytoca, K. pneumoniae, Proteus sp.,    Serratia marcescens, Haemophilus influenzae,    Neisseria meningitidis, Pseudomonas aeruginosa, Candida    albicans, C. glabrata, C krusei, C parapsilosis,    C. tropicalis and the KPC resistance gene.  Organism: Blood Culture PCR (03-06-20 @ 06:48)  Organism: Blood Culture PCR (03-06-20 @ 06:48)      -  Enterococcus species: Detec      Method Type: PCR    < from: TTE Echo Complete w/o contrast w/ Doppler (03.06.20 @ 09:39) >  Impression     Summary     Mild to moderate mitral regurgitation is present.   Mild mitral annular calcification is present.   Well seated TAVR prosthetic valve in the aortic position. Peak   trans-prosthetic gradient is within normal limitations for this type of   prosthesis.   Peak transaortic gradient is 13 mmHg;   Moderate (2+) tricuspid valve regurgitation is present.   Normal appearing pulmonic valve structure and function.   The left atrium is mildly dilated.   The left ventricle is normal in size, wall motion and contractility.   Mild concentric left ventricular hypertrophy is present.   Estimated left ventricular ejection fraction is 55-60 %.   Normal appearing right atrium.   A device wire is seenin the RV and RA.   Normal appearing right ventricle structure and function.    < end of copied text >            < from: Xray Chest 1 View- PORTABLE-Urgent (03.05.20 @ 14:51) >    EXAM:  XR CHEST PORTABLE URGENT 1V                            PROCEDURE DATE:  03/05/2020          INTERPRETATION:  XR CHEST URGENT    Single AP view    HISTORY:  Shortness of breath    Comparison: Chest x-ray 2/3/2020      Left dual-lead pacer.    Status post TAVR.   Right shoulder arthroplasty.  The cardiac silhouette is rotated. Patchy right mid to lower lung airspace disease.. No pleural abnormality.    IMPRESSION: Patchy right lung pneumonia    < end of copied text >        Radiology: all available radiological tests reviewed    Advanced directives addressed: full resuscitation

## 2020-03-07 NOTE — PROGRESS NOTE ADULT - SUBJECTIVE AND OBJECTIVE BOX
CHIEF COMPLAINT: Patient is a 89y old  Male who presents with a chief complaint of sob, cough (05 Mar 2020 17:03)      HPI: HPI:  HPI:  89M w/PMH CKD4, BPH, Postherpetic neuralgia, Prostate CA (2008), SCC, CLL, COPD, hypothyroid, HTN, Afib s/p PPM, on AC, s/p TAVR (2019), recent admit 2/3-6, d/c'd in improved and stable condition after treated for Rt lung pna and +rhinovirus, presents today c/o worsening sob since yesterday w/ new productive cough, increased weakness, and per home aide at bedside, fever last night.  Pt otherwise denies cp, n/v/d, abd pain.  He does endorse dysuria x 1 day.     In ED, Hg 8.3 (stable), , Plt 113, Inr 2.7, Cr 2.4 (baseline), TSH 5.2 (14.3 on 2/4), Trop 0,059, BNP 19k, cxr patchy Rt lung pna, given IV vanco/zosyn and cultures sent.     PAST MEDICAL & SURGICAL HISTORY:  CKD (chronic kidney disease): Stage 4  BPH (benign prostatic hyperplasia)  Postherpetic neuralgia: Treated  Prostate CA  Basal cell carcinoma  Lymphoma: Latent  Hypothyroid  Hypertension  A-fib: Pacemaker  S/P TAVR (transcatheter aortic valve replacement)  Artificial cardiac pacemaker  H/O shoulder replacement  S/P bilateral unicompartmental knee replacement      Review of Systems:   CONSTITUTIONAL: ++ fever.  EYES: No eye pain or discharge.  ENMT:  No sinus or throat pain  NECK: No pain or stiffness  RESPIRATORY: ++ cough, NO wheezing, chills or hemoptysis; ++shortness of breath  CARDIOVASCULAR: No chest pain, palpitations, dizziness, or leg swelling  GASTROINTESTINAL: No abdominal or epigastric pain. No nausea, vomiting, or hematemesis; No diarrhea or constipation. No melena or hematochezia.  GENITOURINARY: ++ dysuria   NEUROLOGICAL: ++ headaches, No memory loss, loss of strength, numbness, or tremors  SKIN: No rashes.  MUSCULOSKELETAL: No joint pain or swelling; No muscle, back, or extremity pain  PSYCHIATRIC: No depression, anxiety, mood swings, or difficulty sleeping    Allergies  No Known Allergies    Social History:   requires assist  uses FWW  has 24H aide    FAMILY HISTORY:  Family history of ischemic heart disease    Home Medications:  albuterol 2.5 mg/3 mL (0.083%) inhalation solution: 3 milliliter(s) inhaled every 6 hours (05 Mar 2020 14:03)  alfuzosin 10 mg oral tablet, extended release: 1 tab(s) orally once a day (05 Mar 2020 14:04)  ALPRAZolam 0.25 mg oral tablet: 1 tab(s) orally twice a day at bedtime and 3am (05 Mar 2020 14:03)  amiodarone 200 mg oral tablet: 1 tab(s) orally once a day (05 Mar 2020 14:03)  aspirin 81 mg oral tablet: 1 tab(s) orally once a day (05 Mar 2020 14:04)  docusate sodium 100 mg oral capsule: 2 cap(s) orally 2 times a day (05 Mar 2020 15:18)  furosemide 40 mg oral tablet: 1 tab(s) orally 2 times a day (05 Mar 2020 15:18)  gabapentin 100 mg oral capsule: 2 cap(s) orally 4 times a day (05 Mar 2020 15:18)  lactobacillus acidophilus oral capsule: 1 cap(s) orally once a day (05 Mar 2020 15:18)  levothyroxine 100 mcg (0.1 mg) oral tablet: 1 tab(s) orally once a day (05 Mar 2020 14:03)  Metoprolol Succinate ER 25 mg oral tablet, extended release: 1 tab(s) orally once a day (05 Mar 2020 15:18)  ocular lubricant ophthalmic solution: 1 drop(s) to each affected eye 2 times a day (05 Mar 2020 14:03)  Senna 8.6 mg oral tablet: 1 tab(s) orally once a day, As Needed (05 Mar 2020 15:18)    MEDICATIONS  (STANDING):  albuterol/ipratropium for Nebulization 3 milliLiter(s) Nebulizer every 6 hours  ALPRAZolam 0.25 milliGRAM(s) Oral daily  aMIOdarone    Tablet 200 milliGRAM(s) Oral daily  aspirin enteric coated 81 milliGRAM(s) Oral daily  furosemide    Tablet 40 milliGRAM(s) Oral two times a day  gabapentin 200 milliGRAM(s) Oral four times a day  levothyroxine 112 MICROGram(s) Oral daily  metoprolol succinate ER 25 milliGRAM(s) Oral daily  phenazopyridine 100 milliGRAM(s) Oral every 8 hours  piperacillin/tazobactam IVPB.. 3.375 Gram(s) IV Intermittent every 12 hours  vancomycin  IVPB 1000 milliGRAM(s) IV Intermittent once            GENERAL: NAD, frail appearing  HEAD:  Atraumatic, Normocephalic  EYES: EOMI, PERRLA, conjunctiva and sclera clear  ENT: normal hearing, no nasal discharge, throat clear, dentition +dentures  NECK: Supple, No JVD, no LAD, no thyromegaly   CHEST/LUNG: b/l rhonchi and rales, respirations unlabored  HEART: Regular rate and rhythm; No murmurs, rubs, or gallops  ABDOMEN: Soft, Nontender, Nondistended; Bowel sounds present, no HSM  EXTREMITIES:  2+ Peripheral Pulses, No clubbing, cyanosis, or LE edema, LUE edema (previously checked doppler neg for DVT)   PSYCH: AAOx3, normal behavior  NEUROLOGY: non-focal, sensory and cn 2-12 intact, speech/language intact  SKIN: No visible rashes or lesions    LABS:                        8.3    5.66  )-----------( 113      ( 05 Mar 2020 13:41 )             27.3     03-05    143  |  107  |  44<H>  ----------------------------<  107<H>  4.4   |  30  |  2.46<H>    Ca    8.9      05 Mar 2020 13:41  Mg     2.2     03-05    TPro  5.5<L>  /  Alb  3.0<L>  /  TBili  0.6  /  DBili  x   /  AST  32  /  ALT  13  /  AlkPhos  54  03-05    PT/INR - ( 05 Mar 2020 13:41 )   PT: 31.8 sec;   INR: 2.78 ratio         PTT - ( 05 Mar 2020 13:41 )  PTT:40.4 sec  CARDIAC MARKERS ( 05 Mar 2020 13:41 )  0.059 ng/mL / x     / x     / x     / x              RADIOLOGY & ADDITIONAL TESTS:    Imaging Personally Reviewed:  cxre- patchy Rt lung pna     EKG Personally Reviewed:  paced (05 Mar 2020 17:03)      PMHx: PAST MEDICAL & SURGICAL HISTORY:  CKD (chronic kidney disease): Stage 4  BPH (benign prostatic hyperplasia)  Postherpetic neuralgia: Treated  Chronic diarrhea  Prostate CA  Basal cell carcinoma  Lymphoma: Latent  Anxiety  Hypothyroid  Hypertension  A-fib: Pacemaker  S/P TAVR (transcatheter aortic valve replacement)  Artificial cardiac pacemaker  H/O shoulder replacement  S/P bilateral unicompartmental knee replacement        Soc Hx:     FAMILY HISTORY:  Family history of ischemic heart disease      Allergies: Allergies    No Known Allergies    Intolerances          REVIEW OF SYSTEMS:    As above  No chest pain + shortness of breath  No lightheadeness or syncope  No leg swelling  No palpitations  No claudication-like symptoms    ICU Vital Signs Last 24 Hrs  T(C): 36.3 (07 Mar 2020 05:05), Max: 36.7 (06 Mar 2020 21:33)  T(F): 97.3 (07 Mar 2020 05:05), Max: 98.1 (06 Mar 2020 21:33)  HR: 60 (07 Mar 2020 05:05) (60 - 66)  BP: 123/45 (07 Mar 2020 05:05) (97/41 - 123/45)  BP(mean): --  ABP: --  ABP(mean): --  RR: 17 (07 Mar 2020 05:05) (17 - 18)  SpO2: 100% (07 Mar 2020 05:05) (95% - 100%)      I&O's Summary      CAPILLARY BLOOD GLUCOSE          PHYSICAL EXAM:   Patient in NAD  Neck: No JVD; Carotids:  2+ without bruits  Respiratory:  Clear to A&P  Cardiovascular: S1 and S2; syst m  Gastrointestinal:  Soft, non-tender; BS positive  Extremities: No peripheral edema  Vascular: 2+ peripheral pulses  Neurological: A/O x 3, no focal deficits      MEDICATIONS  (STANDING):  albuterol/ipratropium for Nebulization 3 milliLiter(s) Nebulizer every 6 hours  ALPRAZolam 0.25 milliGRAM(s) Oral daily  aMIOdarone    Tablet 200 milliGRAM(s) Oral daily  aspirin enteric coated 81 milliGRAM(s) Oral daily  furosemide    Tablet 40 milliGRAM(s) Oral two times a day  gabapentin 200 milliGRAM(s) Oral four times a day  levothyroxine 112 MICROGram(s) Oral daily  metoprolol succinate ER 25 milliGRAM(s) Oral daily  phenazopyridine 100 milliGRAM(s) Oral every 8 hours  piperacillin/tazobactam IVPB.. 3.375 Gram(s) IV Intermittent every 12 hours  vancomycin  IVPB 1000 milliGRAM(s) IV Intermittent once        LABS: All Labs Reviewed:  Blood Culture: Organism Blood Culture PCR  Gram Stain Blood -- Gram Stain   Growth in aerobic bottle: Gram Positive Cocci in Pairs and Chains  Growth in anaerobic bottle: Gram Positive Cocci in Pairs and Chains  Specimen Source .Blood None  Culture-Blood --    Culture - Blood (03.05.20 @ 13:41)    Gram Stain:   Growth in aerobic bottle: Gram Positive Cocci in Pairs and Chains  Growth in anaerobic bottle: Gram Positive Cocci in Pairs and Chains    Specimen Source: .Blood None    Culture Results:   Growth in aerobic bottle: Gram Positive Cocci in Pairs and Chains  Growth in anaerobic bottle: Gram Positive Cocci in Pairs and Chains        BNP Serum Pro-Brain Natriuretic Peptide: 12886 pg/mL (03-05 @ 13:41)    CBC             WBC Count: 5.66 K/uL (03-05 @ 13:41)              Hemoglobin: 8.3 g/dL (03-05 @ 13:41)              Hematocrit: 27.3 % (03-05 @ 13:41)              Mean Cell Volume: 117.7 fl (03-05 @ 13:41)              Platelet Count - Automated: 113 K/uL (03-05 @ 13:41)                            Cardiac markers             Troponin I, Serum: 0.062 ng/mL (03-06 @ 02:18)  Troponin I, Serum: 0.063 ng/mL (03-05 @ 23:19)  Troponin I, Serum: 0.057 ng/mL (03-05 @ 19:51)  Troponin I, Serum: 0.059 ng/mL (03-05 @ 13:41)                             Chems        Sodium, Serum: 143 mmol/L (03-05 @ 13:41)          Potassium, Serum: 4.4 mmol/L (03-05 @ 13:41)          Blood Urea Nitrogen, Serum: 44 mg/dL (03-05 @ 13:41)          Creatinine 2.46          Magnesium, Serum: 2.2 mg/dL (03-05 @ 13:41)          Protein Total, Serum: 5.5 gm/dL (03-05 @ 13:41)                  Calcium, Total Serum: 8.9 mg/dL (03-05 @ 13:41)                  Bilirubin Total, Serum: 0.6 mg/dL (03-05 @ 13:41)          Alanine Aminotransferase (ALT/SGPT): 13 U/L (03-05 @ 13:41)          Aspartate Aminotransferase (AST/SGOT): 32 U/L (03-05 @ 13:41)                 INR: 2.78 ratio (03-05 @ 13:41)       Culture Results:   Growth in aerobic bottle: Gram Positive Cocci in Pairs and Chains  Growth in anaerobic bottle: Gram Positive Cocci in Pairs and Chains  ***Blood Panel PCR results on this specimen are available  approximately 3 hours after the Gram stain result.***  Gram stain, PCR, and/or culture results may not always  correspond due to difference in methodologies.  ************************************************************  This PCR assay was performed using [employer]  The following targets are tested for: Enterococcus,  vancomycin resistant enterococci, Listeria monocytogenes,  coagulase negative staphylococci, S. aureus,  methicillin resistant S. aureus, Streptococcus agalactiae  (Group B), S. pneumoniae, S. pyogenes (Group A),  Acinetobacter baumannii, Enterobacter cloacae, E. coli,  Klebsiella oxytoca, K. pneumoniae, Proteus sp.,  Serratia marcescens, Haemophilus influenzae,  Neisseria meningitidis, Pseudomonas aeruginosa, Candida  albicans, C. glabrata, C krusei, C parapsilosis,  C. tropicalis and the KPC resistance gene. (03-05 @ 13:41)  Culture Results:   Growth in aerobic bottle: Gram Positive Cocci in Pairs and Chains  Growth in anaerobic bottle: Gram Positive Cocci in Pairs and Chains (03-05 @ 13:41)        RADIOLOGY:    EKG:  Paced    Telemetry: Paced    ECHO:  < from: TTE Echo Complete w/o contrast w/ Doppler (03.06.20 @ 09:39) >  Summary     Mild to moderate mitral regurgitation is present.   Mild mitral annular calcification is present.   Well seated TAVR prosthetic valve in the aortic position. Peak   trans-prosthetic gradient is within normal limitations for this type of   prosthesis.   Peak transaortic gradient is 13 mmHg;   Moderate (2+) tricuspid valve regurgitation is present.   Normal appearing pulmonic valve structure and function.   The left atrium is mildly dilated.   The left ventricle is normal in size, wall motion and contractility.   Mild concentric left ventricular hypertrophy is present.   Estimated left ventricular ejection fraction is 55-60 %.   Normal appearing right atrium.   A device wire is seenin the RV and RA.   Normal appearing right ventricle structure and function.     Signature     ----------------------------------------------------------------   Electronically signed by Lilliana Kennedy MD(Interpreting   physician) on 03/07/2020 10:18 AM    < end of copied text >

## 2020-03-07 NOTE — PROGRESS NOTE ADULT - SUBJECTIVE AND OBJECTIVE BOX
89M w/PMH CKD4, BPH, Postherpetic neuralgia, Prostate CA (2008), SCC, CLL, COPD, hypothyroid, HTN, Afib s/p PPM, on AC, s/p TAVR (2019), recent admit 2/3-6, d/c'd in improved and stable condition after treated for Rt lung pna and +rhinovirus, presents today c/o worsening sob since yesterday w/ new productive cough, increased weakness, and per home aide at bedside, fever last night.  Pt otherwise denies cp, n/v/d, abd pain.  He does endorse dysuria x 1 day.     In ED, Hg 8.3 (stable), , Plt 113, Inr 2.7, Cr 2.4 (baseline), TSH 5.2 (14.3 on ), Trop 0,059, BNP 19k, cxr patchy Rt lung pna, given IV vanco/zosyn.     3/7: patient notes that his cough has significantly improved. He does note still feeling SOB and wheezing.     All 10 systems reviewed and found to be negative with the exception of what has been described above.    T(F): 97.3 (20 @ 05:05), Max: 98.1 (20 @ 21:33)  HR: 60 (20 @ 05:05) (60 - 65)  BP: 123/45 (20 @ 05:05) (97/41 - 123/45)  RR: 17 (20 @ 05:05) (17 - 18)  SpO2: 100% (20 @ 05:05) (95% - 100%)  Wt(kg): --    I&O's Summary    06 Mar 2020 07:  -  07 Mar 2020 07:00  --------------------------------------------------------  IN: 0 mL / OUT: 370 mL / NET: -370 mL    07 Mar 2020 06:  -  07 Mar 2020 11:05  --------------------------------------------------------  IN: 0 mL / OUT: 150 mL / NET: -150 mL        CAPILLARY BLOOD GLUCOSE      GEN: lying in bed, NAD  HEENT:   NC/AT, pupils equal and reactive, EOMI  CV:  +S1, +S2, RRR  RESP:   occasional wheeze heard throughout b/l, no, rales, rhonchi   BREAST:  not examined  GI:  abdomen soft, non-tender, non-distended, normoactive BS  RECTAL:  not examined  :  not examined  MSK:   normal muscle tone  EXT:  no edema  NEURO:  AAOX3, no focal neurological deficits  SKIN:  no rashes                              7.9    6.25  )-----------( 108      ( 07 Mar 2020 07:22 )             25.2     03-07    141  |  105  |  48<H>  ----------------------------<  84  3.3<L>   |  30  |  2.32<H>    Ca    8.5      07 Mar 2020 07:22  Mg     2.2     03-05    TPro  5.0<L>  /  Alb  2.5<L>  /  TBili  0.6  /  DBili  x   /  AST  20  /  ALT  11<L>  /  AlkPhos  49  03-07    CARDIAC MARKERS ( 06 Mar 2020 02:18 )  0.062 ng/mL / x     / x     / x     / x      CARDIAC MARKERS ( 05 Mar 2020 23:19 )  0.063 ng/mL / x     / x     / x     / x      CARDIAC MARKERS ( 05 Mar 2020 19:51 )  0.057 ng/mL / x     / x     / x     / x      CARDIAC MARKERS ( 05 Mar 2020 13:41 )  0.059 ng/mL / x     / x     / x     / x          LIVER FUNCTIONS - ( 07 Mar 2020 07:22 )  Alb: 2.5 g/dL / Pro: 5.0 gm/dL / ALK PHOS: 49 U/L / ALT: 11 U/L / AST: 20 U/L / GGT: x           PT/INR - ( 06 Mar 2020 08:23 )   PT: 35.0 sec;   INR: 3.05 ratio         PTT - ( 05 Mar 2020 13:41 )  PTT:40.4 sec  Urinalysis Basic - ( 05 Mar 2020 18:01 )    Color: Yellow / Appearance: Clear / S.010 / pH: x  Gluc: x / Ketone: Negative  / Bili: Negative / Urobili: Negative mg/dL   Blood: x / Protein: 30 mg/dL / Nitrite: Negative   Leuk Esterase: Trace / RBC: 0-2 /HPF / WBC 3-5   Sq Epi: x / Non Sq Epi: Occasional / Bacteria: Few

## 2020-03-07 NOTE — CONSULT NOTE ADULT - SUBJECTIVE AND OBJECTIVE BOX
Chief complaints.  Presented with cough and SOB    HPI:  89 yo man with PMHX of CKD (recent creat ~2.2-2.5), HTN, A FIB , COPD and hx of CLL and TAVR (2019).  Pt was recently admitted ( early February)  for PNA and Viral URI.  Pt admitted with complaints of increased cough, SOB and weakness.  Noted with Anemia and stable CKD on admission.    PMHX and PSHX.  1.CKD (baseline crear in low 2's)  2.A FIB  3.COPD  4.Hx of Prostate CA ( Post RT)()  5.Hx of Lymphoma Post Rituxan therapy.  6.Hx of Postherpetic Neuralgia  7.Hypothyroidism  8.Hx of Chronic Diarrhea.  9.Hx of CHF.  10.S/p TAVR (2019)  11.HTN      FAMILY HISTORY:  Family history of ischemic heart disease    SOCIAL HISTORY : Former smoker, NO ETOH.  lives at home with 24 hour aide.  Allergies    No Known Allergies    REVIEW OF SYSTEMS :   reports cough  No SOB at rest  No chest discomfort      MEDICATIONS  (STANDING):  albuterol/ipratropium for Nebulization 3 milliLiter(s) Nebulizer every 6 hours  ALPRAZolam 0.25 milliGRAM(s) Oral daily  aMIOdarone    Tablet 200 milliGRAM(s) Oral daily  artificial  tears Solution 1 Drop(s) Both EYES three times a day  aspirin enteric coated 81 milliGRAM(s) Oral daily  buDESOnide    Inhalation Suspension 0.25 milliGRAM(s) Inhalation every 12 hours  furosemide    Tablet 40 milliGRAM(s) Oral two times a day  gabapentin 200 milliGRAM(s) Oral four times a day  levothyroxine 112 MICROGram(s) Oral daily  metoprolol succinate ER 25 milliGRAM(s) Oral daily  phenazopyridine 100 milliGRAM(s) Oral every 8 hours  piperacillin/tazobactam IVPB.. 3.375 Gram(s) IV Intermittent every 12 hours  vancomycin  IVPB 1000 milliGRAM(s) IV Intermittent once    MEDICATIONS  (PRN):  acetaminophen   Tablet .. 650 milliGRAM(s) Oral every 6 hours PRN Mild Pain (1 - 3)         Vital Signs Last 24 Hrs  T(C): 36.9 (07 Mar 2020 20:56), Max: 36.9 (07 Mar 2020 20:56)  T(F): 98.5 (07 Mar 2020 20:56), Max: 98.5 (07 Mar 2020 20:56)  HR: 62 (07 Mar 2020 20:56) (60 - 75)  BP: 115/47 (07 Mar 2020 20:56) (114/47 - 123/45)  BP(mean): --  RR: 18 (07 Mar 2020 20:56) (17 - 19)  SpO2: 97% (07 Mar 2020 20:56) (97% - 100%)  Daily     Daily Weight in k.8 (07 Mar 2020 06:38)  I&O's Summary    06 Mar 2020 07:01  -  07 Mar 2020 07:00  --------------------------------------------------------  IN: 0 mL / OUT: 370 mL / NET: -370 mL    07 Mar 2020 06:01  -  07 Mar 2020 21:51  --------------------------------------------------------  IN: 0 mL / OUT: 575 mL / NET: -575 mL    PHYSICAL EXAM:  Alert and appropriate  GEN: No distress  HEENT: WNL  NECK : supple  CV: S1S2 RRR  LUNGS: bilateral rhonchi though fair air entry  ABD: soft  EXT: positive edema    LABS:                        7.9    6.25  )-----------( 108      ( 07 Mar 2020 07:22 )             25.2     03-07    141  |  105  |  48<H>  ----------------------------<  84  3.3<L>   |  30  |  2.32<H>    Ca    8.5      07 Mar 2020 07:22    TPro  5.0<L>  /  Alb  2.5<L>  /  TBili  0.6  /  DBili  x   /  AST  20  /  ALT  11<L>  /  AlkPhos  49  03-07    PT/INR - ( 06 Mar 2020 08:23 )   PT: 35.0 sec;   INR: 3.05 ratio         < from: Xray Chest 1 View- PORTABLE-Urgent (20 @ 14:51) >    EXAM:  XR CHEST PORTABLE URGENT 1V                            PROCEDURE DATE:  2020          INTERPRETATION:  XR CHEST URGENT    Single AP view    HISTORY:  Shortness of breath    Comparison: Chest x-ray 2/3/2020      Left dual-lead pacer.    Status post TAVR.   Right shoulder arthroplasty.  The cardiac silhouette is rotated. Patchy right mid to lower lung airspace disease.. No pleural abnormality.    IMPRESSION: Patchy right lung pneumonia    KELLYGABY RAJAN   This document has been electronically signed. Mar  5 2020  3:36PM    < end of copied text >

## 2020-03-07 NOTE — CONSULT NOTE ADULT - SUBJECTIVE AND OBJECTIVE BOX
HPI:  HPI:  89M w/PMH CKD4, BPH, Postherpetic neuralgia, Prostate CA (2008), SCC, CLL, COPD, hypothyroid, HTN, Afib s/p PPM, on AC, s/p TAVR (2019), recent admit 2/3-6, d/c'd in improved and stable condition after treated for Rt lung pna and +rhinovirus, presents today c/o worsening sob since yesterday w/ new productive cough, increased weakness, and per home aide at bedside, fever last night.  Pt otherwise denies cp, n/v/d, abd pain.  He does endorse dysuria x 1 day.     In ED, Hg 8.3 (stable), , Plt 113, Inr 2.7, Cr 2.4 (baseline), TSH 5.2 (14.3 on ), Trop 0,059, BNP 19k, cxr patchy Rt lung pna, given IV vanco/zosyn and cultures sent.     On IV antibiotcs for enterococcus sepsis, probably of urinary source, and probable PNA.     3/7: in chair, alert, no distress, denies cough. not wheezing now, receiving neb trteatment.    PAST MEDICAL & SURGICAL HISTORY:  CKD (chronic kidney disease): Stage 4  BPH (benign prostatic hyperplasia)  Postherpetic neuralgia: Treated  Prostate CA  Basal cell carcinoma  Lymphoma: Latent  Hypothyroid  Hypertension  A-fib: Pacemaker  S/P TAVR (transcatheter aortic valve replacement)  Artificial cardiac pacemaker  H/O shoulder replacement  S/P bilateral unicompartmental knee replacement      Allergies  No Known Allergies    Social History:   requires assist  uses FWW  has 24H aide    FAMILY HISTORY:  Family history of ischemic heart disease    Home Medications:  albuterol 2.5 mg/3 mL (0.083%) inhalation solution: 3 milliliter(s) inhaled every 6 hours (05 Mar 2020 14:03)  alfuzosin 10 mg oral tablet, extended release: 1 tab(s) orally once a day (05 Mar 2020 14:04)  ALPRAZolam 0.25 mg oral tablet: 1 tab(s) orally twice a day at bedtime and 3am (05 Mar 2020 14:03)  amiodarone 200 mg oral tablet: 1 tab(s) orally once a day (05 Mar 2020 14:03)  aspirin 81 mg oral tablet: 1 tab(s) orally once a day (05 Mar 2020 14:04)  docusate sodium 100 mg oral capsule: 2 cap(s) orally 2 times a day (05 Mar 2020 15:18)  furosemide 40 mg oral tablet: 1 tab(s) orally 2 times a day (05 Mar 2020 15:18)  gabapentin 100 mg oral capsule: 2 cap(s) orally 4 times a day (05 Mar 2020 15:18)  lactobacillus acidophilus oral capsule: 1 cap(s) orally once a day (05 Mar 2020 15:18)  levothyroxine 100 mcg (0.1 mg) oral tablet: 1 tab(s) orally once a day (05 Mar 2020 14:03)  Metoprolol Succinate ER 25 mg oral tablet, extended release: 1 tab(s) orally once a day (05 Mar 2020 15:18)  ocular lubricant ophthalmic solution: 1 drop(s) to each affected eye 2 times a day (05 Mar 2020 14:03)  Senna 8.6 mg oral tablet: 1 tab(s) orally once a day, As Needed (05 Mar 2020 15:18)      MEDICATIONS  (STANDING):  ALPRAZolam 0.25 milliGRAM(s) Oral daily  aMIOdarone    Tablet 200 milliGRAM(s) Oral daily  aspirin enteric coated 81 milliGRAM(s) Oral daily  gabapentin 200 milliGRAM(s) Oral four times a day  levothyroxine 100 MICROGram(s) Oral daily  metoprolol succinate ER 25 milliGRAM(s) Oral daily  vancomycin  IVPB 1000 milliGRAM(s) IV Intermittent once  warfarin 4 milliGRAM(s) Oral once          LABS:                        8.3    5.66  )-----------( 113      ( 05 Mar 2020 13:41 )             27.3     03-05    143  |  107  |  44<H>  ----------------------------<  107<H>  4.4   |  30  |  2.46<H>    Ca    8.9      05 Mar 2020 13:41  Mg     2.2     03-05    TPro  5.5<L>  /  Alb  3.0<L>  /  TBili  0.6  /  DBili  x   /  AST  32  /  ALT  13  /  AlkPhos  54  03-05    PT/INR - ( 05 Mar 2020 13:41 )   PT: 31.8 sec;   INR: 2.78 ratio         PTT - ( 05 Mar 2020 13:41 )  PTT:40.4 sec  CARDIAC MARKERS ( 05 Mar 2020 13:41 )  0.059 ng/mL / x     / x     / x     / x              RADIOLOGY & ADDITIONAL TESTS:    Imaging Personally Reviewed:  cxre- patchy Rt lung pna     EKG Personally Reviewed:  paced (05 Mar 2020 17:03)      PAST MEDICAL & SURGICAL HISTORY:  CKD (chronic kidney disease): Stage 4  BPH (benign prostatic hyperplasia)  Postherpetic neuralgia: Treated  Chronic diarrhea  Prostate CA  Basal cell carcinoma  Lymphoma: Latent  Anxiety  Hypothyroid  Hypertension  A-fib: Pacemaker  S/P TAVR (transcatheter aortic valve replacement)  Artificial cardiac pacemaker  H/O shoulder replacement  S/P bilateral unicompartmental knee replacement      MEDICATIONS  (STANDING):  albuterol/ipratropium for Nebulization 3 milliLiter(s) Nebulizer every 6 hours  ALPRAZolam 0.25 milliGRAM(s) Oral daily  aMIOdarone    Tablet 200 milliGRAM(s) Oral daily  aspirin enteric coated 81 milliGRAM(s) Oral daily  buDESOnide    Inhalation Suspension 0.25 milliGRAM(s) Inhalation every 12 hours  furosemide    Tablet 40 milliGRAM(s) Oral two times a day  gabapentin 200 milliGRAM(s) Oral four times a day  levothyroxine 112 MICROGram(s) Oral daily  metoprolol succinate ER 25 milliGRAM(s) Oral daily  phenazopyridine 100 milliGRAM(s) Oral every 8 hours  piperacillin/tazobactam IVPB.. 3.375 Gram(s) IV Intermittent every 12 hours  vancomycin  IVPB 1000 milliGRAM(s) IV Intermittent once    MEDICATIONS  (PRN):  acetaminophen   Tablet .. 650 milliGRAM(s) Oral every 6 hours PRN Mild Pain (1 - 3)      Allergies    No Known Allergies    Intolerances        SOCIAL HISTORY: Denies tobacco, etoh abuse or illicit drug use    FAMILY HISTORY:  Family history of ischemic heart disease      Vital Signs Last 24 Hrs  T(C): 36.3 (07 Mar 2020 05:05), Max: 36.7 (06 Mar 2020 21:33)  T(F): 97.3 (07 Mar 2020 05:05), Max: 98.1 (06 Mar 2020 21:33)  HR: 60 (07 Mar 2020 12:16) (60 - 65)  BP: 114/47 (07 Mar 2020 12:16) (110/42 - 123/45)  BP(mean): --  RR: 19 (07 Mar 2020 12:16) (17 - 19)  SpO2: 99% (07 Mar 2020 12:16) (95% - 100%)    REVIEW OF SYSTEMS:    CONSTITUTIONAL:  As per HPI.  HEENT:  Eyes:  No diplopia or blurred vision. ENT:  No earache, sore throat or runny nose.  CARDIOVASCULAR:  No pressure, squeezing, tightness, heaviness or aching about the chest, neck, axilla or epigastrium.  RESPIRATORY:  No cough, shortness of breath, PND or orthopnea.  GASTROINTESTINAL:  No nausea, vomiting or diarrhea.  GENITOURINARY:  No dysuria, frequency or urgency.  MUSCULOSKELETAL:  As per HPI.  SKIN:  No change in skin, hair or nails.  NEUROLOGIC:  No paresthesias, fasciculations, seizures or weakness.  PSYCHIATRIC:  No disorder of thought or mood.  ENDOCRINE:  No heat or cold intolerance, polyuria or polydipsia.  HEMATOLOGICAL:  No easy bruising or bleedings:  .     PHYSICAL EXAMINATION:    GENERAL APPEARANCE:  Pt. is not currently dyspneic, in no distress. Pt. is alert, oriented, and pleasant.  HEENT:  Pupils are normal and react normally. No icterus. Mucous membranes well colored.  NECK:  Supple. No lymphadenopathy. Jugular venous pressure not elevated. Carotids equal.   HEART:  HR 60.   CHEST:  Paucitiy of adventitious sounds. Decreased aud BS's.   ABDOMEN:  Soft and nontender.   EXTREMITIES:  There is no cyanosis, clubbing or LE edema.   SKIN:  No rash or significant lesions are noted.  Neuro: Alert, awake, and O x 3.      LABS:                        7.9    6.25  )-----------( 108      ( 07 Mar 2020 07:22 )             25.2     03-07    141  |  105  |  48<H>  ----------------------------<  84  3.3<L>   |  30  |  2.32<H>    Ca    8.5      07 Mar 2020 07:22    TPro  5.0<L>  /  Alb  2.5<L>  /  TBili  0.6  /  DBili  x   /  AST  20  /  ALT  11<L>  /  AlkPhos  49  03-07    LIVER FUNCTIONS - ( 07 Mar 2020 07:22 )  Alb: 2.5 g/dL / Pro: 5.0 gm/dL / ALK PHOS: 49 U/L / ALT: 11 U/L / AST: 20 U/L / GGT: x           PT/INR - ( 06 Mar 2020 08:23 )   PT: 35.0 sec;   INR: 3.05 ratio           CARDIAC MARKERS ( 06 Mar 2020 02:18 )  0.062 ng/mL / x     / x     / x     / x      CARDIAC MARKERS ( 05 Mar 2020 23:19 )  0.063 ng/mL / x     / x     / x     / x      CARDIAC MARKERS ( 05 Mar 2020 19:51 )  0.057 ng/mL / x     / x     / x     / x          Urinalysis Basic - ( 05 Mar 2020 18:01 )    Color: Yellow / Appearance: Clear / S.010 / pH: x  Gluc: x / Ketone: Negative  / Bili: Negative / Urobili: Negative mg/dL   Blood: x / Protein: 30 mg/dL / Nitrite: Negative   Leuk Esterase: Trace / RBC: 0-2 /HPF / WBC 3-5   Sq Epi: x / Non Sq Epi: Occasional / Bacteria: Few          RADIOLOGY & ADDITIONAL STUDIES:     EXAM:  XR CHEST PORTABLE URGENT 1V                            PROCEDURE DATE:  2020          INTERPRETATION:  XR CHEST URGENT    Single AP view    HISTORY:  Shortness of breath    Comparison: Chest x-ray 2/3/2020      Left dual-lead pacer.    Status post TAVR.   Right shoulder arthroplasty.  The cardiac silhouette is rotated. Patchy right mid to lower lung airspace disease.. No pleural abnormality.    IMPRESSION: Patchy right lung pneumonia

## 2020-03-07 NOTE — CONSULT NOTE ADULT - ASSESSMENT
89 yo man with HTN, A FIB , COPD, CLL and CKD admitted with SOB and Cough.  Started on abtx for recurrent PNA and positive bc for Enterococcus.  --CKD appears stable at this point.  No recent USG.     --Bacteremia /PNA : continue abtx.  --SOB: more due to infectious process.  Continue maintenance lasix.  --Anemia : more due to acute and recurrent infections.  Continue to monitor.

## 2020-03-07 NOTE — PROGRESS NOTE ADULT - ASSESSMENT
89M w/PMH CKD4, BPH, Postherpetic neuralgia, Prostate CA (2008), SCC, CLL, COPD, hypothyroid, HTN, Afib s/p PPM, on AC, s/p TAVR (2019),  admitted on 3/5 for evaluation of shortness of breath associated with productive cough; also notes he has painful urination as well as frequency of urination; history per medical record as patient is poor historian.    1. Patient admitted with sepsis secondary to Enterococcus probably or urinary origin; also noted with pneumonia superimposed on Enteroviral URI  - follow up cultures to identification and sensitivity  - urine culture is pending  - serial cbc and monitor temperature   - oxygen and nebs as needed   - reviewed prior medical records to evaluate for resistant or atypical pathogens   - day #3 zosyn  - tolerating antibiotics without rashes or side effects   - hold on further vancomycin given renal function in this frail elderly male  - echocardiogram does not reveal any vegetations  - source of enterococcus may be break in skin, at linear skin defect  2. other issues; per medicine

## 2020-03-08 LAB
-  AMPICILLIN: SIGNIFICANT CHANGE UP
-  GENTAMICIN SYNERGY: SIGNIFICANT CHANGE UP
-  VANCOMYCIN: SIGNIFICANT CHANGE UP
ANION GAP SERPL CALC-SCNC: 6 MMOL/L — SIGNIFICANT CHANGE UP (ref 5–17)
BUN SERPL-MCNC: 54 MG/DL — HIGH (ref 7–23)
CALCIUM SERPL-MCNC: 8.3 MG/DL — LOW (ref 8.5–10.1)
CHLORIDE SERPL-SCNC: 108 MMOL/L — SIGNIFICANT CHANGE UP (ref 96–108)
CO2 SERPL-SCNC: 30 MMOL/L — SIGNIFICANT CHANGE UP (ref 22–31)
CREAT SERPL-MCNC: 2.44 MG/DL — HIGH (ref 0.5–1.3)
CULTURE RESULTS: SIGNIFICANT CHANGE UP
CULTURE RESULTS: SIGNIFICANT CHANGE UP
GLUCOSE SERPL-MCNC: 88 MG/DL — SIGNIFICANT CHANGE UP (ref 70–99)
INR BLD: 2.53 RATIO — HIGH (ref 0.88–1.16)
METHOD TYPE: SIGNIFICANT CHANGE UP
ORGANISM # SPEC MICROSCOPIC CNT: SIGNIFICANT CHANGE UP
POTASSIUM SERPL-MCNC: 3.4 MMOL/L — LOW (ref 3.5–5.3)
POTASSIUM SERPL-SCNC: 3.4 MMOL/L — LOW (ref 3.5–5.3)
PROTHROM AB SERPL-ACNC: 28.9 SEC — HIGH (ref 10–12.9)
SODIUM SERPL-SCNC: 144 MMOL/L — SIGNIFICANT CHANGE UP (ref 135–145)
SPECIMEN SOURCE: SIGNIFICANT CHANGE UP
SPECIMEN SOURCE: SIGNIFICANT CHANGE UP

## 2020-03-08 PROCEDURE — 99233 SBSQ HOSP IP/OBS HIGH 50: CPT

## 2020-03-08 PROCEDURE — 99221 1ST HOSP IP/OBS SF/LOW 40: CPT

## 2020-03-08 RX ORDER — POTASSIUM CHLORIDE 20 MEQ
10 PACKET (EA) ORAL DAILY
Refills: 0 | Status: DISCONTINUED | OUTPATIENT
Start: 2020-03-09 | End: 2020-03-12

## 2020-03-08 RX ORDER — WARFARIN SODIUM 2.5 MG/1
2 TABLET ORAL DAILY
Refills: 0 | Status: DISCONTINUED | OUTPATIENT
Start: 2020-03-08 | End: 2020-03-10

## 2020-03-08 RX ORDER — POTASSIUM CHLORIDE 20 MEQ
20 PACKET (EA) ORAL ONCE
Refills: 0 | Status: COMPLETED | OUTPATIENT
Start: 2020-03-08 | End: 2020-03-08

## 2020-03-08 RX ADMIN — Medication 81 MILLIGRAM(S): at 12:05

## 2020-03-08 RX ADMIN — PIPERACILLIN AND TAZOBACTAM 25 GRAM(S): 4; .5 INJECTION, POWDER, LYOPHILIZED, FOR SOLUTION INTRAVENOUS at 05:51

## 2020-03-08 RX ADMIN — Medication 3 MILLILITER(S): at 22:27

## 2020-03-08 RX ADMIN — GABAPENTIN 200 MILLIGRAM(S): 400 CAPSULE ORAL at 12:05

## 2020-03-08 RX ADMIN — Medication 0.25 MILLIGRAM(S): at 21:50

## 2020-03-08 RX ADMIN — Medication 3 MILLILITER(S): at 13:09

## 2020-03-08 RX ADMIN — Medication 3 MILLILITER(S): at 07:39

## 2020-03-08 RX ADMIN — WARFARIN SODIUM 2 MILLIGRAM(S): 2.5 TABLET ORAL at 21:49

## 2020-03-08 RX ADMIN — Medication 25 MILLIGRAM(S): at 05:52

## 2020-03-08 RX ADMIN — Medication 112 MICROGRAM(S): at 05:52

## 2020-03-08 RX ADMIN — Medication 20 MILLIEQUIVALENT(S): at 14:08

## 2020-03-08 RX ADMIN — Medication 1 DROP(S): at 05:53

## 2020-03-08 RX ADMIN — Medication 40 MILLIGRAM(S): at 05:52

## 2020-03-08 RX ADMIN — Medication 600 MILLIGRAM(S): at 18:23

## 2020-03-08 RX ADMIN — GABAPENTIN 200 MILLIGRAM(S): 400 CAPSULE ORAL at 18:23

## 2020-03-08 RX ADMIN — Medication 100 MILLIGRAM(S): at 13:40

## 2020-03-08 RX ADMIN — GABAPENTIN 200 MILLIGRAM(S): 400 CAPSULE ORAL at 05:52

## 2020-03-08 RX ADMIN — Medication 1 DROP(S): at 13:40

## 2020-03-08 RX ADMIN — GABAPENTIN 200 MILLIGRAM(S): 400 CAPSULE ORAL at 21:49

## 2020-03-08 RX ADMIN — Medication 650 MILLIGRAM(S): at 18:24

## 2020-03-08 RX ADMIN — Medication 650 MILLIGRAM(S): at 12:07

## 2020-03-08 RX ADMIN — Medication 1 DROP(S): at 21:51

## 2020-03-08 RX ADMIN — AMIODARONE HYDROCHLORIDE 200 MILLIGRAM(S): 400 TABLET ORAL at 05:51

## 2020-03-08 RX ADMIN — Medication 100 MILLIGRAM(S): at 21:50

## 2020-03-08 RX ADMIN — Medication 100 MILLIGRAM(S): at 05:53

## 2020-03-08 RX ADMIN — Medication 40 MILLIGRAM(S): at 18:23

## 2020-03-08 RX ADMIN — Medication 0.25 MILLIGRAM(S): at 07:39

## 2020-03-08 RX ADMIN — Medication 650 MILLIGRAM(S): at 05:58

## 2020-03-08 RX ADMIN — PIPERACILLIN AND TAZOBACTAM 25 GRAM(S): 4; .5 INJECTION, POWDER, LYOPHILIZED, FOR SOLUTION INTRAVENOUS at 18:24

## 2020-03-08 RX ADMIN — Medication 0.25 MILLIGRAM(S): at 22:27

## 2020-03-08 NOTE — CONSULT NOTE ADULT - PROBLEM SELECTOR RECOMMENDATION 9
-multifactorial; likely s/t chronic BM disease including SLL/CLL, hx prostate ca, infection with bacteremia/enterococcus, rhinovirus and PNA, renal failure  -elevated TSH and possible MDS other potential causes  -would transfuse for goal Hb 8 gm/dL  -check stool occult   -thrombocytopenia improving, also likely s/t infection/ medications- will continue to follow

## 2020-03-08 NOTE — PROGRESS NOTE ADULT - ASSESSMENT
#Pneumonia.- BC positive for enterococcus  -ID consult appreciated,   c/w Zosyn, day #4  -repeat blood cultures pending   continue to monitor vitals and cbc   O2 and nebs as needed.     #Rhinovirus.  supportive and symptomatic care  tx otherwise as above    #SOB  likely 2/2 above  treat as above  cardio consult appreciated   c/w home dose lasix  monitor O2 sats and titrate to keep >90%.   c/w Nebulizer treatment    #Dysuria  UA with mild LE, UC negative  -bladder scan today to r/o retention  covered w/ current zosyn- change accordingly as per cx.     #Macrocytic anemia (chronic)  H/H stable   continue to monitor    #hypothyroidism  -TSH 5.22   -increase Synthroid from 100 mcg to 112 mcg  -monitor TSH    CKD stage 4  -stable Cr  -continue to monitor on future labs    #Chronic atrial fibrillation.  c/w coumadin- monitor INR to keep goal 2-3  c/w amio.    #Elevated troponin. in the same range as prior admits  cardio consult appreciated  doubt ACS, no cp  c/w asa, bb. #Pneumonia/ Enterococcus bacteremia  -ID consult appreciated,   c/w Zosyn, day #4  -repeat blood cultures pending and sent 3/8 AM   continue to monitor vitals and cbc   O2 and nebs as needed.     #Rhinovirus.  supportive and symptomatic care  tx otherwise as above    #SOB  likely 2/2 above  treat as above  cardio consult appreciated   c/w home dose lasix  monitor O2 sats and titrate to keep >90%.   c/w Nebulizer treatment  pulm input noted    #Dysuria  UA with mild LE, UC negative  -bladder scan today to r/o retention  covered w/ current zosyn- change accordingly as per cx.     #Macrocytic anemia (chronic)  H/H stable   continue to monitor and transfuse if hgb < 8.  last CBC hgb 7.9 will recheck in AM  heme/onc note appreciated    #hypothyroidism  -TSH 5.22   -increased Synthroid from 100 mcg to 112 mcg on this admission check TSH in 4-6 weeks   -monitor TSH    CKD stage 4  -stable Cr  -continue to monitor on future labs    #Chronic atrial fibrillation.  c/w coumadin- monitor INR to keep goal 2-3  c/w amio.    #Elevated troponin. in the same range as prior admits  cardio consult appreciated  doubt ACS, no cp  c/w asa, bb. #Pneumonia/ Enterococcus bacteremia  -ID consult appreciated,   c/w Zosyn, day #4  -repeat blood cultures pending and sent 3/8 AM   continue to monitor vitals and cbc   O2 and nebs as needed.       #Rhinovirus.  supportive and symptomatic care  tx otherwise as above    #SOB  likely 2/2 above  treat as above  cardio consult appreciated   c/w home dose lasix  monitor O2 sats and titrate to keep >90%.   c/w Nebulizer treatment  pulm input noted- patient to follow up 3-4 weeks after treatment for evaluation of pulm nodules    #Dysuria  UA with mild LE, UC negative  -bladder scan today to r/o retention  covered w/ current zosyn- change accordingly as per cx.     #Macrocytic anemia (chronic)  H/H stable   continue to monitor and transfuse if hgb < 8.  last CBC hgb 7.9 will recheck in AM  heme/onc note appreciated    #hypothyroidism  -TSH 5.22   -increased Synthroid from 100 mcg to 112 mcg on this admission check TSH in 4-6 weeks   -monitor TSH    CKD stage 4  -stable Cr  -continue to monitor on future labs    #Chronic atrial fibrillation.  c/w coumadin- monitor INR to keep goal 2-3  c/w amio.    #Elevated troponin. in the same range as prior admits  cardio consult appreciated  doubt ACS, no cp  c/w asa, bb. #Pneumonia/ Enterococcus bacteremia  -ID consult appreciated,   c/w Zosyn, day #4  -repeat blood cultures pending and sent 3/8 AM   continue to monitor vitals and cbc   O2 and nebs as needed.   TTE showed no signs of endocarditis, plan for [facility]      #Rhinovirus.  supportive and symptomatic care  tx otherwise as above    #SOB  likely 2/2 above  treat as above  cardio consult appreciated   c/w home dose lasix  monitor O2 sats and titrate to keep >90%.   c/w Nebulizer treatment  pulm input noted- patient to follow up 3-4 weeks after treatment for evaluation of pulm nodules    #Dysuria  UA with mild LE, UC negative  -bladder scan today to r/o retention  covered w/ current zosyn- change accordingly as per cx.     #Macrocytic anemia (chronic)  H/H stable   continue to monitor and transfuse if hgb < 8.  last CBC hgb 7.9 will recheck in AM  heme/onc note appreciated    #hypothyroidism  -TSH 5.22   -increased Synthroid from 100 mcg to 112 mcg on this admission check TSH in 4-6 weeks   -monitor TSH    CKD stage 4  -stable Cr  -continue to monitor on future labs    #Chronic atrial fibrillation.  c/w coumadin- monitor INR to keep goal 2-3  c/w amio.    #Elevated troponin. in the same range as prior admits  cardio consult appreciated  doubt ACS, no cp  c/w asa, bb.

## 2020-03-08 NOTE — PROGRESS NOTE ADULT - SUBJECTIVE AND OBJECTIVE BOX
89M w/PMH CKD4, BPH, Postherpetic neuralgia, Prostate CA (2008), SCC, CLL, COPD, hypothyroid, HTN, Afib s/p PPM, on AC, s/p TAVR (2019), recent admit 2/3-6, d/c'd in improved and stable condition after treated for Rt lung pna and +rhinovirus, presents today c/o worsening sob since yesterday w/ new productive cough, increased weakness, and per home aide at bedside, fever last night.  Pt otherwise denies cp, n/v/d, abd pain.  He does endorse dysuria x 1 day.     In ED, Hg 8.3 (stable), , Plt 113, Inr 2.7, Cr 2.4 (baseline), TSH 5.2 (14.3 on 2/4), Trop 0,059, BNP 19k, cxr patchy Rt lung pna, given IV vanco/zosyn.     3/8: patient notes that his cough has significantly improved. No acute complaints.     All 10 systems reviewed and found to be negative with the exception of what has been described above.    T(F): 97.5 (03-08-20 @ 12:24), Max: 98.5 (03-07-20 @ 20:56)  HR: 60 (03-08-20 @ 12:24) (60 - 75)  BP: 105/40 (03-08-20 @ 12:24) (105/40 - 115/47)  RR: 18 (03-08-20 @ 12:24) (18 - 19)  SpO2: 100% (03-08-20 @ 12:24) (95% - 100%)  Wt(kg): --    I&O's Summary    07 Mar 2020 06:01  -  08 Mar 2020 07:00  --------------------------------------------------------  IN: 0 mL / OUT: 925 mL / NET: -925 mL        CAPILLARY BLOOD GLUCOSE      GEN: lying in bed, NAD  HEENT:   NC/AT, pupils equal and reactive, EOMI  CV:  +S1, +S2, RRR  RESP:  b/l rales no wheeze, rales, rhonchi   BREAST:  not examined  GI:  abdomen soft, non-tender, non-distended, normoactive BS  RECTAL:  not examined  :  not examined  MSK:   normal muscle tone  EXT:  no edema  NEURO:  AAOX3, no focal neurological deficits  SKIN:  no rashes 89M w/PMH CKD4, BPH, Postherpetic neuralgia, Prostate CA (2008), SCC, CLL, COPD, hypothyroid, HTN, Afib s/p PPM, on AC, s/p TAVR (2019), recent admit 2/3-6, d/c'd in improved and stable condition after treated for Rt lung pna and +rhinovirus, presents today c/o worsening sob since yesterday w/ new productive cough, increased weakness, and per home aide at bedside, fever last night.  Pt otherwise denies cp, n/v/d, abd pain.  He does endorse dysuria x 1 day.     In ED, Hg 8.3 (stable), , Plt 113, Inr 2.7, Cr 2.4 (baseline), TSH 5.2 (14.3 on 2/4), Trop 0,059, BNP 19k, cxr patchy Rt lung pna, given IV vanco/zosyn.     3/8: patient notes that his cough has significantly improved. No acute complaints.     All 10 systems reviewed and found to be negative with the exception of what has been described above.    T(F): 97.5 (03-08-20 @ 12:24), Max: 98.5 (03-07-20 @ 20:56)  HR: 60 (03-08-20 @ 12:24) (60 - 75)  BP: 105/40 (03-08-20 @ 12:24) (105/40 - 115/47)  RR: 18 (03-08-20 @ 12:24) (18 - 19)  SpO2: 100% (03-08-20 @ 12:24) (95% - 100%)  Wt(kg): --    I&O's Summary    07 Mar 2020 06:01  -  08 Mar 2020 07:00  --------------------------------------------------------  IN: 0 mL / OUT: 925 mL / NET: -925 mL        CAPILLARY BLOOD GLUCOSE      GEN: lying in bed, NAD  HEENT:   NC/AT, pupils equal and reactive, EOMI  CV:  +S1, +S2, RRR  RESP:  b/l rales no wheeze, rales, rhonchi   BREAST:  not examined  GI:  abdomen soft, non-tender, non-distended, normoactive BS  RECTAL:  not examined  :  not examined  MSK:   normal muscle tone  EXT:  no edema  NEURO:  AAOX3, no focal neurological deficits  SKIN:  no rashes      Labs:                        7.9    6.25  )-----------( 108      ( 07 Mar 2020 07:22 )             25.2     03-08    144  |  108  |  54<H>  ----------------------------<  88  3.4<L>   |  30  |  2.44<H>    Ca    8.3<L>      08 Mar 2020 07:58    TPro  5.0<L>  /  Alb  2.5<L>  /  TBili  0.6  /  DBili  x   /  AST  20  /  ALT  11<L>  /  AlkPhos  49  03-07        LIVER FUNCTIONS - ( 07 Mar 2020 07:22 )  Alb: 2.5 g/dL / Pro: 5.0 gm/dL / ALK PHOS: 49 U/L / ALT: 11 U/L / AST: 20 U/L / GGT: x

## 2020-03-08 NOTE — CONSULT NOTE ADULT - SUBJECTIVE AND OBJECTIVE BOX
HPI:  HPI:  89M w/PMH CKD4, BPH, Postherpetic neuralgia, Prostate CA (2008), SCC, CLL, COPD, hypothyroid, HTN, Afib s/p PPM, on AC, s/p TAVR (2019), recent admit 2/3-6, d/c'd in improved and stable condition after treated for Rt lung pna and +rhinovirus, presents today c/o worsening sob since yesterday w/ new productive cough, increased weakness, and per home aide at bedside, fever last night.  Pt otherwise denies cp, n/v/d, abd pain.  He does endorse dysuria x 1 day.     In ED, Hg 8.3 (stable), , Plt 113, Inr 2.7, Cr 2.4 (baseline), TSH 5.2 (14.3 on 2/4), Trop 0,059, BNP 19k, cxr patchy Rt lung pna, given IV vanco/zosyn and cultures sent.     Review of Systems:   CONSTITUTIONAL: ++ fever.  EYES: No eye pain or discharge.  ENMT:  No sinus or throat pain  NECK: No pain or stiffness  RESPIRATORY: ++ cough, NO wheezing, chills or hemoptysis; ++shortness of breath  CARDIOVASCULAR: No chest pain, palpitations, dizziness, or leg swelling  GASTROINTESTINAL: No abdominal or epigastric pain. No nausea, vomiting, or hematemesis; No diarrhea or constipation. No melena or hematochezia.  GENITOURINARY: ++ dysuria   NEUROLOGICAL: ++ headaches, No memory loss, loss of strength, numbness, or tremors  SKIN: No rashes.  MUSCULOSKELETAL: No joint pain or swelling; No muscle, back, or extremity pain  PSYCHIATRIC: No depression, anxiety, mood swings, or difficulty sleeping    Allergies  No Known Allergies    Social History:   requires assist  uses FWW  has 24H aide    FAMILY HISTORY:  Family history of ischemic heart disease    Home Medications:  albuterol 2.5 mg/3 mL (0.083%) inhalation solution: 3 milliliter(s) inhaled every 6 hours (05 Mar 2020 14:03)  alfuzosin 10 mg oral tablet, extended release: 1 tab(s) orally once a day (05 Mar 2020 14:04)  ALPRAZolam 0.25 mg oral tablet: 1 tab(s) orally twice a day at bedtime and 3am (05 Mar 2020 14:03)  amiodarone 200 mg oral tablet: 1 tab(s) orally once a day (05 Mar 2020 14:03)  aspirin 81 mg oral tablet: 1 tab(s) orally once a day (05 Mar 2020 14:04)  docusate sodium 100 mg oral capsule: 2 cap(s) orally 2 times a day (05 Mar 2020 15:18)  furosemide 40 mg oral tablet: 1 tab(s) orally 2 times a day (05 Mar 2020 15:18)  gabapentin 100 mg oral capsule: 2 cap(s) orally 4 times a day (05 Mar 2020 15:18)  lactobacillus acidophilus oral capsule: 1 cap(s) orally once a day (05 Mar 2020 15:18)  levothyroxine 100 mcg (0.1 mg) oral tablet: 1 tab(s) orally once a day (05 Mar 2020 14:03)  Metoprolol Succinate ER 25 mg oral tablet, extended release: 1 tab(s) orally once a day (05 Mar 2020 15:18)  ocular lubricant ophthalmic solution: 1 drop(s) to each affected eye 2 times a day (05 Mar 2020 14:03)  Senna 8.6 mg oral tablet: 1 tab(s) orally once a day, As Needed (05 Mar 2020 15:18)        Vital Signs Last 24 Hrs  T(C): 36.8 (08 Mar 2020 05:44), Max: 36.9 (07 Mar 2020 20:56)  T(F): 98.2 (08 Mar 2020 05:44), Max: 98.5 (07 Mar 2020 20:56)  HR: 75 (08 Mar 2020 08:41) (60 - 75)  BP: 112/47 (08 Mar 2020 05:44) (112/47 - 115/47)  BP(mean): --  RR: 19 (08 Mar 2020 05:44) (18 - 19)  SpO2: 95% (08 Mar 2020 05:44) (95% - 98%)    GENERAL: NAD, frail appearing  HEAD:  Atraumatic, Normocephalic  EYES: EOMI, PERRLA, conjunctiva and sclera clear  ENT: normal hearing, no nasal discharge, throat clear, dentition +dentures  NECK: Supple, No JVD, no LAD, no thyromegaly   CHEST/LUNG: b/l rhonchi and rales, respirations unlabored  HEART: Regular rate and rhythm; No murmurs, rubs, or gallops  ABDOMEN: Soft, Nontender, Nondistended; Bowel sounds present, no HSM  EXTREMITIES:  2+ Peripheral Pulses, No clubbing, cyanosis, or LE edema, LUE edema (previously checked doppler neg for DVT)   PSYCH: AAOx3, normal behavior  NEUROLOGY: non-focal, sensory and cn 2-12 intact, speech/language intact  SKIN: No visible rashes or lesions      ----------        RADIOLOGY & ADDITIONAL TESTS:    Imaging Personally Reviewed:  cxre- patchy Rt lung pna     EKG Personally Reviewed:  paced (05 Mar 2020 17:03)      PAST MEDICAL & SURGICAL HISTORY:  CKD (chronic kidney disease): Stage 4  BPH (benign prostatic hyperplasia)  Postherpetic neuralgia: Treated  Chronic diarrhea  Prostate CA  Basal cell carcinoma  Lymphoma: Latent  Anxiety  Hypothyroid  Hypertension  A-fib: Pacemaker  S/P TAVR (transcatheter aortic valve replacement)  Artificial cardiac pacemaker  H/O shoulder replacement  S/P bilateral unicompartmental knee replacement      MEDICATIONS  (STANDING):  albuterol/ipratropium for Nebulization 3 milliLiter(s) Nebulizer every 6 hours  ALPRAZolam 0.25 milliGRAM(s) Oral daily  aMIOdarone    Tablet 200 milliGRAM(s) Oral daily  artificial  tears Solution 1 Drop(s) Both EYES three times a day  aspirin enteric coated 81 milliGRAM(s) Oral daily  buDESOnide    Inhalation Suspension 0.25 milliGRAM(s) Inhalation every 12 hours  furosemide    Tablet 40 milliGRAM(s) Oral two times a day  gabapentin 200 milliGRAM(s) Oral four times a day  levothyroxine 112 MICROGram(s) Oral daily  metoprolol succinate ER 25 milliGRAM(s) Oral daily  phenazopyridine 100 milliGRAM(s) Oral every 8 hours  piperacillin/tazobactam IVPB.. 3.375 Gram(s) IV Intermittent every 12 hours  vancomycin  IVPB 1000 milliGRAM(s) IV Intermittent once    MEDICATIONS  (PRN):  acetaminophen   Tablet .. 650 milliGRAM(s) Oral every 6 hours PRN Mild Pain (1 - 3)      Allergies    No Known Allergies    Intolerances        FAMILY HISTORY:  Family history of ischemic heart disease      Review of Systems    Constitutional, Eyes, ENT, Cardiovascular, Respiratory, Gastrointestinal, Genitourinary, Musculoskeletal, Integumentary, Neurological, Psychiatric, Endocrine, Heme/Lymph and Allergic/Immunologic review of systems are otherwise negative except as noted in HPI.             LABS:  CBC Full  -  ( 07 Mar 2020 07:22 )  WBC Count : 6.25 K/uL  RBC Count : 2.18 M/uL  Hemoglobin : 7.9 g/dL  Hematocrit : 25.2 %  Platelet Count - Automated : 108 K/uL  Mean Cell Volume : 115.6 fl  Mean Cell Hemoglobin : 36.2 pg  Mean Cell Hemoglobin Concentration : 31.3 gm/dL  Auto Neutrophil # : x  Auto Lymphocyte # : x  Auto Monocyte # : x  Auto Eosinophil # : x  Auto Basophil # : x  Auto Neutrophil % : x  Auto Lymphocyte % : x  Auto Monocyte % : x  Auto Eosinophil % : x  Auto Basophil % : x    03-08    144  |  108  |  54<H>  ----------------------------<  88  3.4<L>   |  30  |  2.44<H>    Ca    8.3<L>      08 Mar 2020 07:58    TPro  5.0<L>  /  Alb  2.5<L>  /  TBili  0.6  /  DBili  x   /  AST  20  /  ALT  11<L>  /  AlkPhos  49  03-07

## 2020-03-08 NOTE — PROGRESS NOTE ADULT - SUBJECTIVE AND OBJECTIVE BOX
HPI:  HPI:  89M w/PMH CKD4, BPH, Postherpetic neuralgia, Prostate CA (2008), SCC, CLL, COPD, hypothyroid, HTN, Afib s/p PPM, on AC, s/p TAVR (2019), recent admit 2/3-6, d/c'd in improved and stable condition after treated for Rt lung pna and +rhinovirus, presents today c/o worsening sob since yesterday w/ new productive cough, increased weakness, and per home aide at bedside, fever last night.  Pt otherwise denies cp, n/v/d, abd pain.  He does endorse dysuria x 1 day.     In ED, Hg 8.3 (stable), , Plt 113, Inr 2.7, Cr 2.4 (baseline), TSH 5.2 (14.3 on ), Trop 0,059, BNP 19k, cxr patchy Rt lung pna, given IV vanco/zosyn and cultures sent.     On IV antibiotcs for enterococcus sepsis, probably of urinary source, and probable PNA.     3/7: in chair, alert, no distress, denies cough. not wheezing now, receiving neb treatment.  3/8: resting in bed, no distres.     PAST MEDICAL & SURGICAL HISTORY:  CKD (chronic kidney disease): Stage 4  BPH (benign prostatic hyperplasia)  Postherpetic neuralgia: Treated  Prostate CA  Basal cell carcinoma  Lymphoma: Latent  Hypothyroid  Hypertension  A-fib: Pacemaker  S/P TAVR (transcatheter aortic valve replacement)  Artificial cardiac pacemaker  H/O shoulder replacement  S/P bilateral unicompartmental knee replacement      Allergies  No Known Allergies    Social History:   requires assist  uses FWW  has 24H aide    FAMILY HISTORY:  Family history of ischemic heart disease    Home Medications:  albuterol 2.5 mg/3 mL (0.083%) inhalation solution: 3 milliliter(s) inhaled every 6 hours (05 Mar 2020 14:03)  alfuzosin 10 mg oral tablet, extended release: 1 tab(s) orally once a day (05 Mar 2020 14:04)  ALPRAZolam 0.25 mg oral tablet: 1 tab(s) orally twice a day at bedtime and 3am (05 Mar 2020 14:03)  amiodarone 200 mg oral tablet: 1 tab(s) orally once a day (05 Mar 2020 14:03)  aspirin 81 mg oral tablet: 1 tab(s) orally once a day (05 Mar 2020 14:04)  docusate sodium 100 mg oral capsule: 2 cap(s) orally 2 times a day (05 Mar 2020 15:18)  furosemide 40 mg oral tablet: 1 tab(s) orally 2 times a day (05 Mar 2020 15:18)  gabapentin 100 mg oral capsule: 2 cap(s) orally 4 times a day (05 Mar 2020 15:18)  lactobacillus acidophilus oral capsule: 1 cap(s) orally once a day (05 Mar 2020 15:18)  levothyroxine 100 mcg (0.1 mg) oral tablet: 1 tab(s) orally once a day (05 Mar 2020 14:03)  Metoprolol Succinate ER 25 mg oral tablet, extended release: 1 tab(s) orally once a day (05 Mar 2020 15:18)  ocular lubricant ophthalmic solution: 1 drop(s) to each affected eye 2 times a day (05 Mar 2020 14:03)  Senna 8.6 mg oral tablet: 1 tab(s) orally once a day, As Needed (05 Mar 2020 15:18)      MEDICATIONS  (STANDING):  ALPRAZolam 0.25 milliGRAM(s) Oral daily  aMIOdarone    Tablet 200 milliGRAM(s) Oral daily  aspirin enteric coated 81 milliGRAM(s) Oral daily  gabapentin 200 milliGRAM(s) Oral four times a day  levothyroxine 100 MICROGram(s) Oral daily  metoprolol succinate ER 25 milliGRAM(s) Oral daily  vancomycin  IVPB 1000 milliGRAM(s) IV Intermittent once  warfarin 4 milliGRAM(s) Oral once          LABS:                        8.3    5.66  )-----------( 113      ( 05 Mar 2020 13:41 )             27.3     03-05    143  |  107  |  44<H>  ----------------------------<  107<H>  4.4   |  30  |  2.46<H>    Ca    8.9      05 Mar 2020 13:41  Mg     2.2     03-05    TPro  5.5<L>  /  Alb  3.0<L>  /  TBili  0.6  /  DBili  x   /  AST  32  /  ALT  13  /  AlkPhos  54  03-05    PT/INR - ( 05 Mar 2020 13:41 )   PT: 31.8 sec;   INR: 2.78 ratio         PTT - ( 05 Mar 2020 13:41 )  PTT:40.4 sec  CARDIAC MARKERS ( 05 Mar 2020 13:41 )  0.059 ng/mL / x     / x     / x     / x              RADIOLOGY & ADDITIONAL TESTS:    Imaging Personally Reviewed:  cxre- patchy Rt lung pna     EKG Personally Reviewed:  paced (05 Mar 2020 17:03)      PAST MEDICAL & SURGICAL HISTORY:  CKD (chronic kidney disease): Stage 4  BPH (benign prostatic hyperplasia)  Postherpetic neuralgia: Treated  Chronic diarrhea  Prostate CA  Basal cell carcinoma  Lymphoma: Latent  Anxiety  Hypothyroid  Hypertension  A-fib: Pacemaker  S/P TAVR (transcatheter aortic valve replacement)  Artificial cardiac pacemaker  H/O shoulder replacement  S/P bilateral unicompartmental knee replacement      MEDICATIONS  (STANDING):  albuterol/ipratropium for Nebulization 3 milliLiter(s) Nebulizer every 6 hours  ALPRAZolam 0.25 milliGRAM(s) Oral daily  aMIOdarone    Tablet 200 milliGRAM(s) Oral daily  aspirin enteric coated 81 milliGRAM(s) Oral daily  buDESOnide    Inhalation Suspension 0.25 milliGRAM(s) Inhalation every 12 hours  furosemide    Tablet 40 milliGRAM(s) Oral two times a day  gabapentin 200 milliGRAM(s) Oral four times a day  levothyroxine 112 MICROGram(s) Oral daily  metoprolol succinate ER 25 milliGRAM(s) Oral daily  phenazopyridine 100 milliGRAM(s) Oral every 8 hours  piperacillin/tazobactam IVPB.. 3.375 Gram(s) IV Intermittent every 12 hours  vancomycin  IVPB 1000 milliGRAM(s) IV Intermittent once    MEDICATIONS  (PRN):  acetaminophen   Tablet .. 650 milliGRAM(s) Oral every 6 hours PRN Mild Pain (1 - 3)      Allergies    No Known Allergies    Intolerances        SOCIAL HISTORY: Denies tobacco, etoh abuse or illicit drug use    FAMILY HISTORY:  Family history of ischemic heart disease      Vital Signs Last 24 Hrs  T(C): 36.3 (07 Mar 2020 05:05), Max: 36.7 (06 Mar 2020 21:33)  T(F): 97.3 (07 Mar 2020 05:05), Max: 98.1 (06 Mar 2020 21:33)  HR: 60 (07 Mar 2020 12:16) (60 - 65)  BP: 114/47 (07 Mar 2020 12:16) (110/42 - 123/45)  BP(mean): --  RR: 19 (07 Mar 2020 12:16) (17 - 19)  SpO2: 99% (07 Mar 2020 12:16) (95% - 100%)    REVIEW OF SYSTEMS:    CONSTITUTIONAL:  As per HPI.  HEENT:  Eyes:  No diplopia or blurred vision. ENT:  No earache, sore throat or runny nose.  CARDIOVASCULAR:  No pressure, squeezing, tightness, heaviness or aching about the chest, neck, axilla or epigastrium.  RESPIRATORY:  No cough, shortness of breath, PND or orthopnea.  GASTROINTESTINAL:  No nausea, vomiting or diarrhea.  GENITOURINARY:  No dysuria, frequency or urgency.  MUSCULOSKELETAL:  As per HPI.  SKIN:  No change in skin, hair or nails.  NEUROLOGIC:  No paresthesias, fasciculations, seizures or weakness.  PSYCHIATRIC:  No disorder of thought or mood.  ENDOCRINE:  No heat or cold intolerance, polyuria or polydipsia.  HEMATOLOGICAL:  No easy bruising or bleedings:  .     PHYSICAL EXAMINATION:    GENERAL APPEARANCE:  Pt. is not currently dyspneic, in no distress. Pt. is alert, oriented, and pleasant.  HEENT:  Pupils are normal and react normally. No icterus. Mucous membranes well colored.  NECK:  Supple. No lymphadenopathy. Jugular venous pressure not elevated. Carotids equal.   HEART:  HR 60.   CHEST:  Paucity of adventitious sounds. Decreased aud BS's. Anterior exam.  ABDOMEN:  Soft and nontender.   EXTREMITIES:  There is no cyanosis, clubbing or LE edema.   SKIN:  No rash or significant lesions are noted.  Neuro: Alert, awake, and O x 3.      LABS:                        7.9    6.25  )-----------( 108      ( 07 Mar 2020 07:22 )             25.2     03-07    141  |  105  |  48<H>  ----------------------------<  84  3.3<L>   |  30  |  2.32<H>    Ca    8.5      07 Mar 2020 07:22    TPro  5.0<L>  /  Alb  2.5<L>  /  TBili  0.6  /  DBili  x   /  AST  20  /  ALT  11<L>  /  AlkPhos  49  03-07    LIVER FUNCTIONS - ( 07 Mar 2020 07:22 )  Alb: 2.5 g/dL / Pro: 5.0 gm/dL / ALK PHOS: 49 U/L / ALT: 11 U/L / AST: 20 U/L / GGT: x           PT/INR - ( 06 Mar 2020 08:23 )   PT: 35.0 sec;   INR: 3.05 ratio           CARDIAC MARKERS ( 06 Mar 2020 02:18 )  0.062 ng/mL / x     / x     / x     / x      CARDIAC MARKERS ( 05 Mar 2020 23:19 )  0.063 ng/mL / x     / x     / x     / x      CARDIAC MARKERS ( 05 Mar 2020 19:51 )  0.057 ng/mL / x     / x     / x     / x          Urinalysis Basic - ( 05 Mar 2020 18:01 )    Color: Yellow / Appearance: Clear / S.010 / pH: x  Gluc: x / Ketone: Negative  / Bili: Negative / Urobili: Negative mg/dL   Blood: x / Protein: 30 mg/dL / Nitrite: Negative   Leuk Esterase: Trace / RBC: 0-2 /HPF / WBC 3-5   Sq Epi: x / Non Sq Epi: Occasional / Bacteria: Few          RADIOLOGY & ADDITIONAL STUDIES:     EXAM:  XR CHEST PORTABLE URGENT 1V                            PROCEDURE DATE:  2020          INTERPRETATION:  XR CHEST URGENT    Single AP view    HISTORY:  Shortness of breath    Comparison: Chest x-ray 2/3/2020      Left dual-lead pacer.    Status post TAVR.   Right shoulder arthroplasty.  The cardiac silhouette is rotated. Patchy right mid to lower lung airspace disease.. No pleural abnormality.    IMPRESSION: Patchy right lung pneumonia

## 2020-03-08 NOTE — PROGRESS NOTE ADULT - SUBJECTIVE AND OBJECTIVE BOX
CHIEF COMPLAINT: Patient is a 89y old  Male who presents with a chief complaint of sob, cough (05 Mar 2020 17:03)      HPI: HPI:  HPI:  89M w/PMH CKD4, BPH, Postherpetic neuralgia, Prostate CA (2008), SCC, CLL, COPD, hypothyroid, HTN, Afib s/p PPM, on AC, s/p TAVR (2019), recent admit 2/3-6, d/c'd in improved and stable condition after treated for Rt lung pna and +rhinovirus, presents today c/o worsening sob since yesterday w/ new productive cough, increased weakness, and per home aide at bedside, fever last night.  Pt otherwise denies cp, n/v/d, abd pain.  He does endorse dysuria x 1 day.     In ED, Hg 8.3 (stable), , Plt 113, Inr 2.7, Cr 2.4 (baseline), TSH 5.2 (14.3 on 2/4), Trop 0,059, BNP 19k, cxr patchy Rt lung pna, given IV vanco/zosyn and cultures sent.     PAST MEDICAL & SURGICAL HISTORY:  CKD (chronic kidney disease): Stage 4  BPH (benign prostatic hyperplasia)  Postherpetic neuralgia: Treated  Prostate CA  Basal cell carcinoma  Lymphoma: Latent  Hypothyroid  Hypertension  A-fib: Pacemaker  S/P TAVR (transcatheter aortic valve replacement)  Artificial cardiac pacemaker  H/O shoulder replacement  S/P bilateral unicompartmental knee replacement      Review of Systems:   CONSTITUTIONAL: ++ fever.  EYES: No eye pain or discharge.  ENMT:  No sinus or throat pain  NECK: No pain or stiffness  RESPIRATORY: ++ cough, NO wheezing, chills or hemoptysis; ++shortness of breath  CARDIOVASCULAR: No chest pain, palpitations, dizziness, or leg swelling  GASTROINTESTINAL: No abdominal or epigastric pain. No nausea, vomiting, or hematemesis; No diarrhea or constipation. No melena or hematochezia.  GENITOURINARY: ++ dysuria   NEUROLOGICAL: ++ headaches, No memory loss, loss of strength, numbness, or tremors  SKIN: No rashes.  MUSCULOSKELETAL: No joint pain or swelling; No muscle, back, or extremity pain  PSYCHIATRIC: No depression, anxiety, mood swings, or difficulty sleeping    Allergies  No Known Allergies    Social History:   requires assist  uses [facility]  has 24H aide    FAMILY HISTORY:  Family history of ischemic heart disease    Home Medications:  albuterol 2.5 mg/3 mL (0.083%) inhalation solution: 3 milliliter(s) inhaled every 6 hours (05 Mar 2020 14:03)  alfuzosin 10 mg oral tablet, extended release: 1 tab(s) orally once a day (05 Mar 2020 14:04)  ALPRAZolam 0.25 mg oral tablet: 1 tab(s) orally twice a day at bedtime and 3am (05 Mar 2020 14:03)  amiodarone 200 mg oral tablet: 1 tab(s) orally once a day (05 Mar 2020 14:03)  aspirin 81 mg oral tablet: 1 tab(s) orally once a day (05 Mar 2020 14:04)  docusate sodium 100 mg oral capsule: 2 cap(s) orally 2 times a day (05 Mar 2020 15:18)  furosemide 40 mg oral tablet: 1 tab(s) orally 2 times a day (05 Mar 2020 15:18)  gabapentin 100 mg oral capsule: 2 cap(s) orally 4 times a day (05 Mar 2020 15:18)  lactobacillus acidophilus oral capsule: 1 cap(s) orally once a day (05 Mar 2020 15:18)  levothyroxine 100 mcg (0.1 mg) oral tablet: 1 tab(s) orally once a day (05 Mar 2020 14:03)  Metoprolol Succinate ER 25 mg oral tablet, extended release: 1 tab(s) orally once a day (05 Mar 2020 15:18)  ocular lubricant ophthalmic solution: 1 drop(s) to each affected eye 2 times a day (05 Mar 2020 14:03)  Senna 8.6 mg oral tablet: 1 tab(s) orally once a day, As Needed (05 Mar 2020 15:18)    MEDICATIONS  (STANDING):  albuterol/ipratropium for Nebulization 3 milliLiter(s) Nebulizer every 6 hours  ALPRAZolam 0.25 milliGRAM(s) Oral daily  aMIOdarone    Tablet 200 milliGRAM(s) Oral daily  artificial  tears Solution 1 Drop(s) Both EYES three times a day  aspirin enteric coated 81 milliGRAM(s) Oral daily  buDESOnide    Inhalation Suspension 0.25 milliGRAM(s) Inhalation every 12 hours  furosemide    Tablet 40 milliGRAM(s) Oral two times a day  gabapentin 200 milliGRAM(s) Oral four times a day  levothyroxine 112 MICROGram(s) Oral daily  metoprolol succinate ER 25 milliGRAM(s) Oral daily  phenazopyridine 100 milliGRAM(s) Oral every 8 hours  piperacillin/tazobactam IVPB.. 3.375 Gram(s) IV Intermittent every 12 hours  vancomycin  IVPB 1000 milliGRAM(s) IV Intermittent once              GENERAL: NAD, frail appearing  HEAD:  Atraumatic, Normocephalic  EYES: EOMI, PERRLA, conjunctiva and sclera clear  ENT: normal hearing, no nasal discharge, throat clear, dentition +dentures  NECK: Supple, No JVD, no LAD, no thyromegaly   CHEST/LUNG: b/l rhonchi and rales, respirations unlabored  HEART: Regular rate and rhythm; No murmurs, rubs, or gallops  ABDOMEN: Soft, Nontender, Nondistended; Bowel sounds present, no HSM  EXTREMITIES:  2+ Peripheral Pulses, No clubbing, cyanosis, or LE edema, LUE edema (previously checked doppler neg for DVT)   PSYCH: AAOx3, normal behavior  NEUROLOGY: non-focal, sensory and cn 2-12 intact, speech/language intact  SKIN: No visible rashes or lesions    LABS:                        8.3    5.66  )-----------( 113      ( 05 Mar 2020 13:41 )             27.3                         7.9    6.25  )-----------( 108      ( 07 Mar 2020 07:22 )             25.2       03-05    143  |  107  |  44<H>  ----------------------------<  107<H>  4.4   |  30  |  2.46<H>    Ca    8.9      05 Mar 2020 13:41  Mg     2.2     03-05    TPro  5.5<L>  /  Alb  3.0<L>  /  TBili  0.6  /  DBili  x   /  AST  32  /  ALT  13  /  AlkPhos  54  03-05    03-08    144  |  108  |  54<H>  ----------------------------<  88  3.4<L>   |  30  |  2.44<H>    Ca    8.3<L>      08 Mar 2020 07:58    TPro  5.0<L>  /  Alb  2.5<L>  /  TBili  0.6  /  DBili  x   /  AST  20  /  ALT  11<L>  /  AlkPhos  49  03-07      PT/INR - ( 05 Mar 2020 13:41 )   PT: 31.8 sec;   INR: 2.78 ratio         PTT - ( 05 Mar 2020 13:41 )  PTT:40.4 sec  CARDIAC MARKERS ( 05 Mar 2020 13:41 )  0.059 ng/mL / x     / x     / x     / x              RADIOLOGY & ADDITIONAL TESTS:    Imaging Personally Reviewed:  cxre- patchy Rt lung pna     EKG Personally Reviewed:  paced (05 Mar 2020 17:03)      PMHx: PAST MEDICAL & SURGICAL HISTORY:  CKD (chronic kidney disease): Stage 4  BPH (benign prostatic hyperplasia)  Postherpetic neuralgia: Treated  Chronic diarrhea  Prostate CA  Basal cell carcinoma  Lymphoma: Latent  Anxiety  Hypothyroid  Hypertension  A-fib: Pacemaker  S/P TAVR (transcatheter aortic valve replacement)  Artificial cardiac pacemaker  H/O shoulder replacement  S/P bilateral unicompartmental knee replacement        Soc Hx:     FAMILY HISTORY:  Family history of ischemic heart disease      Allergies: Allergies    No Known Allergies    Intolerances          REVIEW OF SYSTEMS:    As above  No chest pain + shortness of breath  No lightheadeness or syncope  No leg swelling  No palpitations  No claudication-like symptoms    MEDICATIONS  (STANDING):  albuterol/ipratropium for Nebulization 3 milliLiter(s) Nebulizer every 6 hours  ALPRAZolam 0.25 milliGRAM(s) Oral daily  aMIOdarone    Tablet 200 milliGRAM(s) Oral daily  artificial  tears Solution 1 Drop(s) Both EYES three times a day  aspirin enteric coated 81 milliGRAM(s) Oral daily  buDESOnide    Inhalation Suspension 0.25 milliGRAM(s) Inhalation every 12 hours  furosemide    Tablet 40 milliGRAM(s) Oral two times a day  gabapentin 200 milliGRAM(s) Oral four times a day  levothyroxine 112 MICROGram(s) Oral daily  metoprolol succinate ER 25 milliGRAM(s) Oral daily  phenazopyridine 100 milliGRAM(s) Oral every 8 hours  piperacillin/tazobactam IVPB.. 3.375 Gram(s) IV Intermittent every 12 hours  vancomycin  IVPB 1000 milliGRAM(s) IV Intermittent once        I&O's Summary      CAPILLARY BLOOD GLUCOSE          PHYSICAL EXAM:   Patient in NAD  Neck: No JVD; Carotids:  2+ without bruits  Respiratory:  Clear to A&P  Cardiovascular: S1 and S2; syst m  Gastrointestinal:  Soft, non-tender; BS positive  Extremities: No peripheral edema  Vascular: 2+ peripheral pulses  Neurological: A/O x 3, no focal deficits      MEDICATIONS  (STANDING):  albuterol/ipratropium for Nebulization 3 milliLiter(s) Nebulizer every 6 hours  ALPRAZolam 0.25 milliGRAM(s) Oral daily  aMIOdarone    Tablet 200 milliGRAM(s) Oral daily  artificial  tears Solution 1 Drop(s) Both EYES three times a day  aspirin enteric coated 81 milliGRAM(s) Oral daily  buDESOnide    Inhalation Suspension 0.25 milliGRAM(s) Inhalation every 12 hours  furosemide    Tablet 40 milliGRAM(s) Oral two times a day  gabapentin 200 milliGRAM(s) Oral four times a day  levothyroxine 112 MICROGram(s) Oral daily  metoprolol succinate ER 25 milliGRAM(s) Oral daily  phenazopyridine 100 milliGRAM(s) Oral every 8 hours  piperacillin/tazobactam IVPB.. 3.375 Gram(s) IV Intermittent every 12 hours  vancomycin  IVPB 1000 milliGRAM(s) IV Intermittent once          LABS: All Labs Reviewed:  Blood Culture: Organism Blood Culture PCR  Gram Stain Blood -- Gram Stain   Growth in aerobic bottle: Gram Positive Cocci in Pairs and Chains  Growth in anaerobic bottle: Gram Positive Cocci in Pairs and Chains  Specimen Source .Blood None  Culture-Blood --    Culture - Blood (03.05.20 @ 13:41)    Gram Stain:   Growth in aerobic bottle: Gram Positive Cocci in Pairs and Chains  Growth in anaerobic bottle: Gram Positive Cocci in Pairs and Chains    Specimen Source: .Blood None    Culture Results:   Growth in aerobic bottle: Gram Positive Cocci in Pairs and Chains  Growth in anaerobic bottle: Gram Positive Cocci in Pairs and Chains    Culture - Blood (03.05.20 @ 13:41)    Gram Stain:   Growth in aerobic bottle: Gram Positive Cocci in Pairs and Chains  Growth in anaerobic bottle: Gram Positive Cocci in Pairs and Chains    Specimen Source: .Blood None    Culture Results:   Growth in aerobic and anaerobic bottles: Enterococcus faecalis  See previous culture [id number]            BNP Serum Pro-Brain Natriuretic Peptide: 15142 pg/mL (03-05 @ 13:41)    CBC             WBC Count: 5.66 K/uL (03-05 @ 13:41)              Hemoglobin: 8.3 g/dL (03-05 @ 13:41)              Hematocrit: 27.3 % (03-05 @ 13:41)              Mean Cell Volume: 117.7 fl (03-05 @ 13:41)              Platelet Count - Automated: 113 K/uL (03-05 @ 13:41)                            Cardiac markers             Troponin I, Serum: 0.062 ng/mL (03-06 @ 02:18)  Troponin I, Serum: 0.063 ng/mL (03-05 @ 23:19)  Troponin I, Serum: 0.057 ng/mL (03-05 @ 19:51)  Troponin I, Serum: 0.059 ng/mL (03-05 @ 13:41)                             Chems        Sodium, Serum: 143 mmol/L (03-05 @ 13:41)          Potassium, Serum: 4.4 mmol/L (03-05 @ 13:41)          Blood Urea Nitrogen, Serum: 44 mg/dL (03-05 @ 13:41)          Creatinine 2.46          Magnesium, Serum: 2.2 mg/dL (03-05 @ 13:41)          Protein Total, Serum: 5.5 gm/dL (03-05 @ 13:41)                  Calcium, Total Serum: 8.9 mg/dL (03-05 @ 13:41)                  Bilirubin Total, Serum: 0.6 mg/dL (03-05 @ 13:41)          Alanine Aminotransferase (ALT/SGPT): 13 U/L (03-05 @ 13:41)          Aspartate Aminotransferase (AST/SGOT): 32 U/L (03-05 @ 13:41)                 INR: 2.78 ratio (03-05 @ 13:41)       Culture Results:   Growth in aerobic bottle: Gram Positive Cocci in Pairs and Chains  Growth in anaerobic bottle: Gram Positive Cocci in Pairs and Chains  ***Blood Panel PCR results on this specimen are available  approximately 3 hours after the Gram stain result.***  Gram stain, PCR, and/or culture results may not always  correspond due to difference in methodologies.  ************************************************************  This PCR assay was performed using [employer].  The following targets are tested for: Enterococcus,  vancomycin resistant enterococci, Listeria monocytogenes,  coagulase negative staphylococci, S. aureus,  methicillin resistant S. aureus, Streptococcus agalactiae  (Group B), S. pneumoniae, S. pyogenes (Group A),  Acinetobacter baumannii, Enterobacter cloacae, E. coli,  Klebsiella oxytoca, K. pneumoniae, Proteus sp.,  Serratia marcescens, Haemophilus influenzae,  Neisseria meningitidis, Pseudomonas aeruginosa, Candida  albicans, C. glabrata, C krusei, C parapsilosis,  C. tropicalis and the KPC resistance gene. (03-05 @ 13:41)  Culture Results:   Growth in aerobic bottle: Gram Positive Cocci in Pairs and Chains  Growth in anaerobic bottle: Gram Positive Cocci in Pairs and Chains (03-05 @ 13:41)        RADIOLOGY:    EKG:  Paced    Telemetry: Paced    ECHO:  < from: TTE Echo Complete w/o contrast w/ Doppler (03.06.20 @ 09:39) >  Summary     Mild to moderate mitral regurgitation is present.   Mild mitral annular calcification is present.   Well seated TAVR prosthetic valve in the aortic position. Peak   trans-prosthetic gradient is within normal limitations for this type of   prosthesis.   Peak transaortic gradient is 13 mmHg;   Moderate (2+) tricuspid valve regurgitation is present.   Normal appearing pulmonic valve structure and function.   The left atrium is mildly dilated.   The left ventricle is normal in size, wall motion and contractility.   Mild concentric left ventricular hypertrophy is present.   Estimated left ventricular ejection fraction is 55-60 %.   Normal appearing right atrium.   A device wire is seenin the RV and RA.   Normal appearing right ventricle structure and function.     Signature     ----------------------------------------------------------------   Electronically signed by Lilliana Kennedy MD(Interpreting   physician) on 03/07/2020 10:18 AM    < end of copied text >

## 2020-03-08 NOTE — PROGRESS NOTE ADULT - ASSESSMENT
89 year old man with the above history admitted with shortness of breath and a recurrent right sided pneumonia.  The mildly elevated flat troponins are probably demand related.  His elevated BNP is probably multifactorial.  He is on antibiotics.  His blood cultures are apparently positive.  Will await identification.  Check TTE regarding his TAVR valve.  May need a CLEMENTINA.  Continue diuretics as well.  Will follow.    3/7/20:  As per above echocardiogram patient's cardiac status appears stable with a normally functioning TAVR aortic valve and normal LV systolic function.  No vegetations seen.  Awaiting culture identification-will discuss with ID.  Continue antibiotics and diuretics.    3/8/20:  + Enterococcus Faecalis in blood.  Will discuss with ID if they have high concern for endocarditis.  If so then CLEMENTINA.

## 2020-03-08 NOTE — PROGRESS NOTE ADULT - ASSESSMENT
O2 as needed, nebs, budesonide nebs added.    Enterococcus sepsis, prob of urinary source.  also treat for PNA.  IV Abx's as per ID.    Positve entero/rhino virus.     Pulmonary nodules on previous CT of chest 1/2019  (1 cm LLL and 9x11mm RLL, latter prob infectious). Will need following CT of chest.  Plan to do 3-4 weeks after treatment of PNA as outpatient.  Also, had stable adenopathy neck, axillae, and mediastinum c/w 12/2018 CT.     On lasix for CHF. O2 as needed, nebs, budesonide nebs. incentive spirometry added.     Enterococcus sepsis, prob of urinary source.  also treat for PNA.  IV Abx's as per ID.    Positve entero/rhino virus.     Pulmonary nodules on previous CT of chest 1/2019  (1 cm LLL and 9x11mm RLL, latter prob infectious). Will need following CT of chest.  Plan to do 3-4 weeks after treatment of PNA as outpatient.  Also, had stable adenopathy neck, axillae, and mediastinum c/w 12/2018 CT.     On lasix for CHF.

## 2020-03-09 LAB
ADD ON TEST-SPECIMEN IN LAB: SIGNIFICANT CHANGE UP
ANION GAP SERPL CALC-SCNC: 5 MMOL/L — SIGNIFICANT CHANGE UP (ref 5–17)
BUN SERPL-MCNC: 54 MG/DL — HIGH (ref 7–23)
CALCIUM SERPL-MCNC: 8.5 MG/DL — SIGNIFICANT CHANGE UP (ref 8.5–10.1)
CHLORIDE SERPL-SCNC: 107 MMOL/L — SIGNIFICANT CHANGE UP (ref 96–108)
CO2 SERPL-SCNC: 31 MMOL/L — SIGNIFICANT CHANGE UP (ref 22–31)
CREAT SERPL-MCNC: 2.46 MG/DL — HIGH (ref 0.5–1.3)
GLUCOSE SERPL-MCNC: 84 MG/DL — SIGNIFICANT CHANGE UP (ref 70–99)
HCT VFR BLD CALC: 26.8 % — LOW (ref 39–50)
HGB BLD-MCNC: 8.2 G/DL — LOW (ref 13–17)
INR BLD: 2.46 RATIO — HIGH (ref 0.88–1.16)
MCHC RBC-ENTMCNC: 30.6 GM/DL — LOW (ref 32–36)
MCHC RBC-ENTMCNC: 35.3 PG — HIGH (ref 27–34)
MCV RBC AUTO: 115.5 FL — HIGH (ref 80–100)
PLATELET # BLD AUTO: 124 K/UL — LOW (ref 150–400)
POTASSIUM SERPL-MCNC: 3.4 MMOL/L — LOW (ref 3.5–5.3)
POTASSIUM SERPL-SCNC: 3.4 MMOL/L — LOW (ref 3.5–5.3)
PROTHROM AB SERPL-ACNC: 28.1 SEC — HIGH (ref 10–12.9)
RBC # BLD: 2.32 M/UL — LOW (ref 4.2–5.8)
RBC # FLD: 13.7 % — SIGNIFICANT CHANGE UP (ref 10.3–14.5)
SODIUM SERPL-SCNC: 143 MMOL/L — SIGNIFICANT CHANGE UP (ref 135–145)
WBC # BLD: 6.46 K/UL — SIGNIFICANT CHANGE UP (ref 3.8–10.5)
WBC # FLD AUTO: 6.46 K/UL — SIGNIFICANT CHANGE UP (ref 3.8–10.5)

## 2020-03-09 PROCEDURE — 71045 X-RAY EXAM CHEST 1 VIEW: CPT | Mod: 26

## 2020-03-09 PROCEDURE — 76770 US EXAM ABDO BACK WALL COMP: CPT | Mod: 26

## 2020-03-09 PROCEDURE — 99232 SBSQ HOSP IP/OBS MODERATE 35: CPT

## 2020-03-09 RX ORDER — POTASSIUM CHLORIDE 20 MEQ
40 PACKET (EA) ORAL ONCE
Refills: 0 | Status: COMPLETED | OUTPATIENT
Start: 2020-03-09 | End: 2020-03-09

## 2020-03-09 RX ORDER — ALPRAZOLAM 0.25 MG
0.25 TABLET ORAL ONCE
Refills: 0 | Status: DISCONTINUED | OUTPATIENT
Start: 2020-03-09 | End: 2020-03-10

## 2020-03-09 RX ADMIN — Medication 0.25 MILLIGRAM(S): at 08:35

## 2020-03-09 RX ADMIN — Medication 25 MILLIGRAM(S): at 05:44

## 2020-03-09 RX ADMIN — Medication 3 MILLILITER(S): at 02:20

## 2020-03-09 RX ADMIN — Medication 650 MILLIGRAM(S): at 19:18

## 2020-03-09 RX ADMIN — WARFARIN SODIUM 2 MILLIGRAM(S): 2.5 TABLET ORAL at 21:37

## 2020-03-09 RX ADMIN — Medication 1 DROP(S): at 05:46

## 2020-03-09 RX ADMIN — Medication 600 MILLIGRAM(S): at 05:43

## 2020-03-09 RX ADMIN — GABAPENTIN 200 MILLIGRAM(S): 400 CAPSULE ORAL at 17:04

## 2020-03-09 RX ADMIN — Medication 112 MICROGRAM(S): at 05:44

## 2020-03-09 RX ADMIN — Medication 0.25 MILLIGRAM(S): at 21:37

## 2020-03-09 RX ADMIN — Medication 40 MILLIGRAM(S): at 05:44

## 2020-03-09 RX ADMIN — Medication 40 MILLIEQUIVALENT(S): at 12:38

## 2020-03-09 RX ADMIN — Medication 100 MILLIGRAM(S): at 21:37

## 2020-03-09 RX ADMIN — Medication 81 MILLIGRAM(S): at 12:36

## 2020-03-09 RX ADMIN — Medication 650 MILLIGRAM(S): at 09:37

## 2020-03-09 RX ADMIN — Medication 3 MILLILITER(S): at 14:09

## 2020-03-09 RX ADMIN — PIPERACILLIN AND TAZOBACTAM 25 GRAM(S): 4; .5 INJECTION, POWDER, LYOPHILIZED, FOR SOLUTION INTRAVENOUS at 17:05

## 2020-03-09 RX ADMIN — Medication 650 MILLIGRAM(S): at 20:18

## 2020-03-09 RX ADMIN — Medication 100 MILLIGRAM(S): at 05:43

## 2020-03-09 RX ADMIN — GABAPENTIN 200 MILLIGRAM(S): 400 CAPSULE ORAL at 23:15

## 2020-03-09 RX ADMIN — Medication 10 MILLIEQUIVALENT(S): at 12:35

## 2020-03-09 RX ADMIN — Medication 100 MILLIGRAM(S): at 15:00

## 2020-03-09 RX ADMIN — Medication 600 MILLIGRAM(S): at 17:05

## 2020-03-09 RX ADMIN — AMIODARONE HYDROCHLORIDE 200 MILLIGRAM(S): 400 TABLET ORAL at 05:43

## 2020-03-09 RX ADMIN — Medication 1 DROP(S): at 21:39

## 2020-03-09 RX ADMIN — Medication 40 MILLIGRAM(S): at 17:05

## 2020-03-09 RX ADMIN — Medication 1 DROP(S): at 15:01

## 2020-03-09 RX ADMIN — GABAPENTIN 200 MILLIGRAM(S): 400 CAPSULE ORAL at 05:46

## 2020-03-09 RX ADMIN — Medication 0.25 MILLIGRAM(S): at 22:13

## 2020-03-09 RX ADMIN — Medication 3 MILLILITER(S): at 22:11

## 2020-03-09 RX ADMIN — GABAPENTIN 200 MILLIGRAM(S): 400 CAPSULE ORAL at 12:35

## 2020-03-09 RX ADMIN — PIPERACILLIN AND TAZOBACTAM 25 GRAM(S): 4; .5 INJECTION, POWDER, LYOPHILIZED, FOR SOLUTION INTRAVENOUS at 05:43

## 2020-03-09 RX ADMIN — Medication 3 MILLILITER(S): at 08:35

## 2020-03-09 NOTE — PROGRESS NOTE ADULT - SUBJECTIVE AND OBJECTIVE BOX
LIZBETH MADDOX, 89y Male  MRN: [id number]  ATTENDING: Thomas D'Amico    HPI:  89M w/PMH CKD4, BPH, Postherpetic neuralgia, Prostate CA (2008), SCC, CLL, COPD, hypothyroid, HTN, Afib s/p PPM, on AC, s/p TAVR (2019), recent admit 2/3-6, d/c'd in improved and stable condition after treated for Rt lung pna and +rhinovirus, presents today c/o worsening sob since yesterday w/ new productive cough, increased weakness, and per home aide at bedside, fever last night.  Pt otherwise denies cp, n/v/d, abd pain.  He does endorse dysuria x 1 day.     In ED, Hg 8.3 (stable), , Plt 113, Inr 2.7, Cr 2.4 (baseline), TSH 5.2 (14.3 on 2/4), Trop 0,059, BNP 19k, cxr patchy Rt lung pna, given IV vanco/zosyn and cultures sent.   	    MEDICATIONS:  acetaminophen   Tablet .. 650 milliGRAM(s) Oral every 6 hours PRN  albuterol/ipratropium for Nebulization 3 milliLiter(s) Nebulizer every 6 hours  ALPRAZolam 0.25 milliGRAM(s) Oral daily  aMIOdarone    Tablet 200 milliGRAM(s) Oral daily  artificial  tears Solution 1 Drop(s) Both EYES three times a day  aspirin enteric coated 81 milliGRAM(s) Oral daily  buDESOnide    Inhalation Suspension 0.25 milliGRAM(s) Inhalation every 12 hours  furosemide    Tablet 40 milliGRAM(s) Oral two times a day  gabapentin 200 milliGRAM(s) Oral four times a day  guaiFENesin  milliGRAM(s) Oral every 12 hours  levothyroxine 112 MICROGram(s) Oral daily  metoprolol succinate ER 25 milliGRAM(s) Oral daily  phenazopyridine 100 milliGRAM(s) Oral every 8 hours  piperacillin/tazobactam IVPB.. 3.375 Gram(s) IV Intermittent every 12 hours  potassium chloride    Tablet ER 10 milliEquivalent(s) Oral daily  vancomycin  IVPB 1000 milliGRAM(s) IV Intermittent once  warfarin 2 milliGRAM(s) Oral daily    All other medications reviewed.    SUBJECTIVE:    VITALS:  T(C): 37 (03-09-20 @ 11:09), Max: 37 (03-09-20 @ 11:09)  T(F): 98.6 (03-09-20 @ 11:09), Max: 98.6 (03-09-20 @ 11:09)  HR: 109 (03-09-20 @ 17:10) (61 - 109)  BP: 97/37 (03-09-20 @ 17:10) (97/37 - 113/43)      PHYSICAL EXAM:  Respiratory: bilateral clear to auscultation  Cardiovascular : S1, S2 rhythmic  Abdomen: soft, distended, + BS  Extremities: no tenderness;  -c/c/e    LABS:  (03-09) WBC: 6.46 K/uL,Hemoglobin: 8.2 g/dL, Hematocrit: 26.8 %,    Platelet: 124 K/uL    (03-09) Na: 143 mmol/L ; K: 3.4 mmol/L ; BUN: 54 mg/dL ; Cr: 2.46 mg/dL.        RADIOLOGY: LIZBETH MADDOX, 89y Male  MRN: [id number]  ATTENDING: Thomas D'Amico    HPI:  89M w/PMH CKD4, BPH, Postherpetic neuralgia, Prostate CA (2008), SCC, CLL, COPD, hypothyroid, HTN, Afib s/p PPM, on AC, s/p TAVR (2019), recent admit 2/3-6, d/c'd in improved and stable condition after treated for Rt lung pna and +rhinovirus, presents today c/o worsening sob since yesterday w/ new productive cough, increased weakness, and per home aide at bedside, fever last night.  Pt otherwise denies cp, n/v/d, abd pain.  He does endorse dysuria x 1 day.  In ED, Hg 8.3 (stable), , Plt 113, Inr 2.7, Cr 2.4 (baseline), TSH 5.2 (14.3 on 2/4), Trop 0,059, BNP 19k, cxr patchy Rt lung pna, given IV vanco/ zosyn and cultures sent.   	    MEDICATIONS:  acetaminophen   Tablet .. 650 milliGRAM(s) Oral every 6 hours PRN  albuterol/ipratropium for Nebulization 3 milliLiter(s) Nebulizer every 6 hours  ALPRAZolam 0.25 milliGRAM(s) Oral daily  aMIOdarone    Tablet 200 milliGRAM(s) Oral daily  artificial  tears Solution 1 Drop(s) Both EYES three times a day  aspirin enteric coated 81 milliGRAM(s) Oral daily  buDESOnide    Inhalation Suspension 0.25 milliGRAM(s) Inhalation every 12 hours  furosemide    Tablet 40 milliGRAM(s) Oral two times a day  gabapentin 200 milliGRAM(s) Oral four times a day  guaiFENesin  milliGRAM(s) Oral every 12 hours  levothyroxine 112 MICROGram(s) Oral daily  metoprolol succinate ER 25 milliGRAM(s) Oral daily  phenazopyridine 100 milliGRAM(s) Oral every 8 hours  piperacillin/tazobactam IVPB.. 3.375 Gram(s) IV Intermittent every 12 hours  potassium chloride    Tablet ER 10 milliEquivalent(s) Oral daily  vancomycin  IVPB 1000 milliGRAM(s) IV Intermittent once  warfarin 2 milliGRAM(s) Oral daily    All other medications reviewed.    SUBJECTIVE:  Weak, appears non-toxic; continues to cough.  Denies abdominal pain.    VITALS:  T(C): 37 (03-09-20 @ 11:09), Max: 37 (03-09-20 @ 11:09)  T(F): 98.6 (03-09-20 @ 11:09), Max: 98.6 (03-09-20 @ 11:09)  HR: 109 (03-09-20 @ 17:10) (61 - 109)  BP: 97/37 (03-09-20 @ 17:10) (97/37 - 113/43)      PHYSICAL EXAM:  Alert, weak  Respiratory: bilateral crackles to auscultation  Cardiovascular : S1, S2 rhythmic  Abdomen: soft, distended, + BS  Extremities: no tenderness;  -c/c/e    LABS:  (03-09) WBC: 6.46 K/uL, Hemoglobin: 8.2 g/dL, Hematocrit: 26.8 %,  Platelet: 124 K/uL  (03-09) Na: 143 mmol/L ; K: 3.4 mmol/L ; BUN: 54 mg/dL ; Cr: 2.46 mg/dL.    RADIOLOGY:  HISTORY:  Shortness of breath  Comparison: Chest x-ray 2/3/2020  Left dual-lead pacer.  Status post TAVR.   Right shoulder arthroplasty.  The cardiac silhouette is rotated. Patchy right mid to lower lung airspace disease.. No pleural abnormality.    IMPRESSION: Patchy right lung pneumonia

## 2020-03-09 NOTE — PHYSICAL THERAPY INITIAL EVALUATION ADULT - MODALITIES TREATMENT COMMENTS
pt left seated in chair post Eval; chair alarm donned; O2 3L/min nc in place; L UE elevated on pillow; private aide present; callbell in reach; pt instructed not to get up alone; call nursing for assist; cy well; denied pain; RN Heidi made aware pt OOB

## 2020-03-09 NOTE — PROGRESS NOTE ADULT - SUBJECTIVE AND OBJECTIVE BOX
Date of service: 03-09-20 @ 16:32      Patient lying in bed; still with cough, no abdominal pain, though reports burning with urination      ROS unable to obtain secondary to patient medical condition     MEDICATIONS  (STANDING):  albuterol/ipratropium for Nebulization 3 milliLiter(s) Nebulizer every 6 hours  ALPRAZolam 0.25 milliGRAM(s) Oral daily  aMIOdarone    Tablet 200 milliGRAM(s) Oral daily  artificial  tears Solution 1 Drop(s) Both EYES three times a day  aspirin enteric coated 81 milliGRAM(s) Oral daily  buDESOnide    Inhalation Suspension 0.25 milliGRAM(s) Inhalation every 12 hours  furosemide    Tablet 40 milliGRAM(s) Oral two times a day  gabapentin 200 milliGRAM(s) Oral four times a day  guaiFENesin  milliGRAM(s) Oral every 12 hours  levothyroxine 112 MICROGram(s) Oral daily  metoprolol succinate ER 25 milliGRAM(s) Oral daily  phenazopyridine 100 milliGRAM(s) Oral every 8 hours  piperacillin/tazobactam IVPB.. 3.375 Gram(s) IV Intermittent every 12 hours  potassium chloride    Tablet ER 10 milliEquivalent(s) Oral daily  vancomycin  IVPB 1000 milliGRAM(s) IV Intermittent once  warfarin 2 milliGRAM(s) Oral daily    MEDICATIONS  (PRN):  acetaminophen   Tablet .. 650 milliGRAM(s) Oral every 6 hours PRN Mild Pain (1 - 3)      Vital Signs Last 24 Hrs  T(C): 37 (09 Mar 2020 11:09), Max: 37 (09 Mar 2020 11:09)  T(F): 98.6 (09 Mar 2020 11:09), Max: 98.6 (09 Mar 2020 11:09)  HR: 76 (09 Mar 2020 14:10) (61 - 78)  BP: 106/42 (09 Mar 2020 11:09) (106/42 - 113/43)  BP(mean): --  RR: 18 (09 Mar 2020 11:09) (16 - 18)  SpO2: 97% (09 Mar 2020 11:09) (97% - 100%)    Physical Exam:          PE:    Constitutional: frail looking  HEENT: NC/AT, EOMI, PERRLA, conjunctivae clear; ears and nose atraumatic; pharynx clear  Neck: supple; thyroid not palpable  Back: no tenderness  Respiratory: respiratory effort normal; scattered coarse breath sounds with wheeze  Cardiovascular: S1S2 regular, 2/6 systolic murmur  Abdomen: soft, not tender, difusely distended, positive BS; no liver or spleen organomegaly  Genitourinary: no suprapubic tenderness  Musculoskeletal: no muscle tenderness, no joint swelling or tenderness  Neurological/ Psychiatric:  moving all extremities  Skin: no rashes; no palpable lesions; ecchymosis of skin    Labs: all available labs reviewed                  Labs:                        8.2    6.46  )-----------( 124      ( 09 Mar 2020 07:54 )             26.8     03-09    143  |  107  |  54<H>  ----------------------------<  84  3.4<L>   |  31  |  2.46<H>    Ca    8.5      09 Mar 2020 07:54             Cultures:       Culture - Blood (collected 03-08-20 @ 07:58)  Source: .Blood None  Preliminary Report (03-09-20 @ 13:02):    No growth to date.    Culture - Blood (collected 03-08-20 @ 07:58)  Source: .Blood None  Preliminary Report (03-09-20 @ 13:02):    No growth to date.    Culture - Urine (collected 03-05-20 @ 18:01)  Source: .Urine None  Final Report (03-07-20 @ 07:37):    <10,000 CFU/mL Normal Urogenital Deanna    Culture - Blood (collected 03-05-20 @ 13:41)  Source: .Blood None  Gram Stain (03-06-20 @ 06:01):    Growth in aerobic bottle: Gram Positive Cocci in Pairs and Chains    Growth in anaerobic bottle: Gram Positive Cocci in Pairs and Chains  Final Report (03-08-20 @ 14:48):    Growth in aerobic and anaerobic bottles: Enterococcus faecalis    See previous culture [id number]    Culture - Blood (collected 03-05-20 @ 13:41)  Source: .Blood None  Gram Stain (03-06-20 @ 04:26):    Growth in aerobic bottle: Gram Positive Cocci in Pairs and Chains    Growth in anaerobic bottle: Gram Positive Cocci in Pairs and Chains  Final Report (03-08-20 @ 14:48):    Growth in aerobic and anaerobic bottles: Enterococcus faecalis    ***Blood Panel PCR results on this specimen are available    approximately 3 hours after the Gram stain result.***    Gram stain, PCR, and/or culture results may not always    correspond dueto difference in methodologies.    ************************************************************    This PCR assay was performed using [employer].    The following targets are tested for: Enterococcus,    vancomycin resistant enterococci, Listeria monocytogenes,    coagulase negative staphylococci, S. aureus,    methicillin resistant S. aureus, Streptococcus agalactiae    (Group B), S. pneumoniae, S. pyogenes (Group A),    Acinetobacter baumannii, Enterobacter cloacae, E. coli,    Klebsiella oxytoca, K. pneumoniae, Proteus sp.,    Serratia marcescens, Haemophilus influenzae,    Neisseria meningitidis, Pseudomonas aeruginosa, Candida    albicans, C. glabrata, C krusei, C parapsilosis,    C. tropicalis and the KPC resistance gene.  Organism: Blood Culture PCR  Enterococcus faecalis (03-08-20 @ 14:48)  Organism: Enterococcus faecalis (03-08-20 @ 14:48)      -  Ampicillin: S <=2 Predicts results to ampicillin/sulbactam, amoxacillin-clavulanate and  piperacillin-tazobactam.      -  Gentamicin synergy: S      -  Vancomycin: S 2      Method Type: GREG  Organism: Blood Culture PCR (03-08-20 @ 14:48)      -  Enterococcus species: Detec      Method Type: PCR              < from: TTE Echo Complete w/o contrast w/ Doppler (03.06.20 @ 09:39) >  Impression     Summary     Mild to moderate mitral regurgitation is present.   Mild mitral annular calcification is present.   Well seated TAVR prosthetic valve in the aortic position. Peak   trans-prosthetic gradient is within normal limitations for this type of   prosthesis.   Peak transaortic gradient is 13 mmHg;   Moderate (2+) tricuspid valve regurgitation is present.   Normal appearing pulmonic valve structure and function.   The left atrium is mildly dilated.   The left ventricle is normal in size, wall motion and contractility.   Mild concentric left ventricular hypertrophy is present.   Estimated left ventricular ejection fraction is 55-60 %.   Normal appearing right atrium.   A device wire is seenin the RV and RA.   Normal appearing right ventricle structure and function.    < end of copied text >            < from: Xray Chest 1 View- PORTABLE-Urgent (03.05.20 @ 14:51) >    EXAM:  XR CHEST PORTABLE URGENT 1V                            PROCEDURE DATE:  03/05/2020          INTERPRETATION:  XR CHEST URGENT    Single AP view    HISTORY:  Shortness of breath    Comparison: Chest x-ray 2/3/2020      Left dual-lead pacer.    Status post TAVR.   Right shoulder arthroplasty.  The cardiac silhouette is rotated. Patchy right mid to lower lung airspace disease.. No pleural abnormality.    IMPRESSION: Patchy right lung pneumonia    < end of copied text >        Radiology: all available radiological tests reviewed    Advanced directives addressed: full resuscitation

## 2020-03-09 NOTE — PROGRESS NOTE ADULT - ASSESSMENT
89M w/PMH CKD4, BPH, Postherpetic neuralgia, Prostate CA (2008), SCC, CLL, COPD, hypothyroid, HTN, Afib s/p PPM, on AC, s/p TAVR (2019),  admitted on 3/5 for evaluation of shortness of breath associated with productive cough; also notes he has painful urination as well as frequency of urination; history per medical record as patient is poor historian.    1. Patient admitted with sepsis secondary to Enterococcus probably or urinary origin; also noted with pneumonia superimposed on Enteroviral URI  - follow up cultures to identification and sensitivity  - urine culture is pending-- and no growth  - serial cbc and monitor temperature   - oxygen and nebs as needed   - reviewed prior medical records to evaluate for resistant or atypical pathogens   - day #5 zosyn  - tolerating antibiotics without rashes or side effects   - hold on further vancomycin given renal function in this frail elderly male  - echocardiogram does not reveal any vegetations  - source of enterococcus may be break in skin, at linear skin defect  - repeat blood cultures are pending  2. other issues; per medicine

## 2020-03-09 NOTE — PROGRESS NOTE ADULT - ASSESSMENT
* Enterococcus Bacteremia  * Pneumonia- will treat for gram negative ibrahima and other resistant bacteria  *Rhinovirus  - on Zosyn IV D#5 and vancomycin x1  - ID consult  - rpt blood cultures ngtd (3/8)  - TTE negative for vegetation- will likely need CLEMENTINA  - monitor CBC  - monitor for s/e of antibiotics.  - cardiology consulted  - c/w home dose lasix  - monitor sats   - c/w nebs  - pulm eval- patient to follow up 3-4 weeks after treatment for evaluation of pulm nodules    *dysuria  - UA with mild LE- UC negative  - bladder scan to check for retention  - on zosyn  - urology consult    *macrocytic anemia  - monitor  - heme consult    *hypothyroid  - TSH 5.22   - increased Synthroid from 100 mcg to 112 mcg on this admission check TSH in 4-6 weeks   - monitor TSH    *CKD- monitor creatinine    *chronic AF  - continue with coumadin  - jone      Case d/w team and MD on IDR. Plan explained to patient and all of their concerns were addressed.

## 2020-03-09 NOTE — PROGRESS NOTE ADULT - ASSESSMENT
89M w/PMH CKD4, BPH, Postherpetic neuralgia, Prostate CA (2008), SCC, CLL, COPD, hypothyroid, HTN, Afib s/p PPM, on AC, s/p TAVR (2019), recent admit 2/3-6, d/c'd in improved and stable condition after treated for Rt lung pna and +rhinovirus, presents today c/o worsening sob since yesterday w/ new productive cough, increased weakness, and per home aide at bedside, fever last night.  Pt otherwise denies cp, n/v/d, abd pain.  He does endorse dysuria x 1 day.     In ED, Hg 8.3 (stable), , Plt 113, Inr 2.7, Cr 2.4 (baseline), TSH 5.2 (14.3 on 2/4), Trop 0,059, BNP 19k, cxr patchy Rt lung pna, given IV vanco/zosyn and cultures sent. 89M w/PMH CKD4, BPH, Postherpetic neuralgia, Prostate CA (2008), SCC, CLL, COPD, hypothyroid, HTN, Afib s/p PPM, on AC, s/p TAVR (2019), recent admit 2/3-6, d/c'd in improved and stable condition after treated for Rt lung pna and +rhinovirus, presents today c/o worsening sob since yesterday w/ new productive cough, increased weakness, and per home aide at bedside, fever last night.  Pt otherwise denies cp, n/v/d, abd pain.  He does endorse dysuria x 1 day. In ED, Hg 8.3 (stable), , Plt 113, Inr 2.7, Cr 2.4 (baseline), TSH 5.2 (14.3 on 2/4), Trop 0,059, BNP 19k, cxr patchy Rt lung pna, given IV vanco/zosyn and cultures sent.

## 2020-03-09 NOTE — CDI QUERY NOTE - NSCDIOTHERTXTBX_GEN_ALL_CORE_HH
Patient presents with PNA and positive Blood Cultures w/ gram + cocci. Clinical documentation by ID and Pulmonology consultants indicate that this patient has _____enterococcus sepsis_______.  Please agree or disagree with sepsis in your progress notes. If you agree with sepsis, please specify if sepsis was present on admission in the medical record.  A sepsis present on admission  B. pt does not have sepsis  C. other (specify)  D. not clinically significant    CLINICAL INDICATORS  Blood Cultures w/ gram + cocci, LA 2.8, HR 60 with PPM, Oral temp: 98.3, RR 22, /51  latent lymphoma    Present on Admission:Was the severity of the condition present on admission?  If so, please document in the chart that “(the condition) was present on admission.” In responding to this request, please exercise your independent professional judgment.  The fact that a question is asked does not imply that any particular answer is desired or expected. Documentation clarification is required for compliance, accuracy in coding and billing, and reporting severity of illness, quality data   and risk of mortality.

## 2020-03-09 NOTE — PROGRESS NOTE ADULT - ASSESSMENT
89 yo man with HTN, A FIB , COPD, CLL and CKD admitted with SOB and Cough.  Started on abtx for recurrent PNA and positive bc for Enterococcus.  --CKD appears stable at this point.  No recent USG.     --Bacteremia /PNA : continue abtx.  --SOB: more due to infectious process.  Continue maintenance lasix.  --Anemia : more due to acute and recurrent infections.  Continue to monitor.    3/9 SY  --CKD : slight increase in creat.  continue diuretics for now due to compromised resp status.  --Bacteremia /PNA ;continue abtx.  --Anemia : Heme evaluation appreciated.

## 2020-03-09 NOTE — PROGRESS NOTE ADULT - SUBJECTIVE AND OBJECTIVE BOX
89M w/PMH CKD4, BPH, Postherpetic neuralgia, Prostate CA (2008), SCC, CLL, COPD, hypothyroid, HTN, Afib s/p PPM, on AC, s/p TAVR (2019), recent admit 2/3-6, d/c'd in improved and stable condition after treated for Rt lung pna and +rhinovirus, presents today c/o worsening sob since yesterday w/ new productive cough, increased weakness, and per home aide at bedside, fever.  In ED, Hg 8.3 (stable), , Plt 113, Inr 2.7, Cr 2.4 (baseline), TSH 5.2 (14.3 on 2/4), Trop 0,059, BNP 19k, cxr patchy Rt lung pna, given IV vanco/zosyn.     3/9- patient was seen and examined. No acute overnight events, cough is improved. No SOB.     Vital Signs Last 24 Hrs  T(C): 37 (09 Mar 2020 11:09), Max: 37 (09 Mar 2020 11:09)  T(F): 98.6 (09 Mar 2020 11:09), Max: 98.6 (09 Mar 2020 11:09)  HR: 63 (09 Mar 2020 11:09) (61 - 80)  BP: 106/42 (09 Mar 2020 11:09) (106/42 - 113/43)  BP(mean): --  RR: 18 (09 Mar 2020 11:09) (16 - 18)  SpO2: 97% (09 Mar 2020 11:09) (97% - 100%)    GEN: lying in bed, NAD  HEENT:   NC/AT, pupils equal and reactive, EOMI  CV:  +S1, +S2, RRR  RESP:  b/l rales no wheeze, rales, rhonchi   BREAST:  not examined  GI:  abdomen soft, non-tender, non-distended, normoactive BS  RECTAL:  not examined  :  not examined  MSK:   normal muscle tone  EXT:  no edema  NEURO:  AAOX3, no focal neurological deficits  SKIN:  no rashes      03-09    143  |  107  |  54<H>  ----------------------------<  84  3.4<L>   |  31  |  2.46<H>    Ca    8.5      09 Mar 2020 07:54                          8.2    6.46  )-----------( 124      ( 09 Mar 2020 07:54 )             26.8       EXAM:  XR CHEST PORTABLE URGENT 1V                        PROCEDURE DATE:  03/05/2020      INTERPRETATION:  XR CHEST URGENT    Single AP view    HISTORY:  Shortness of breath    Comparison: Chest x-ray 2/3/2020      Left dual-lead pacer.    Status post TAVR.   Right shoulder arthroplasty.  The cardiac silhouette is rotated. Patchy right mid to lower lung airspace disease.. No pleural abnormality.    IMPRESSION: Patchy right lung pneumonia    Culture - Blood (03.05.20 @ 13:41)    Gram Stain:   Growth in aerobic bottle: Gram Positive Cocci in Pairs and Chains  Growth in anaerobic bottle: Gram Positive Cocci in Pairs and Chains    Specimen Source: .Blood None    Culture Results:   Growth in aerobic and anaerobic bottles: Enterococcus faecalis  See previous culture [id number]      MEDICATIONS  (STANDING):  albuterol/ipratropium for Nebulization 3 milliLiter(s) Nebulizer every 6 hours  ALPRAZolam 0.25 milliGRAM(s) Oral daily  aMIOdarone    Tablet 200 milliGRAM(s) Oral daily  artificial  tears Solution 1 Drop(s) Both EYES three times a day  aspirin enteric coated 81 milliGRAM(s) Oral daily  buDESOnide    Inhalation Suspension 0.25 milliGRAM(s) Inhalation every 12 hours  furosemide    Tablet 40 milliGRAM(s) Oral two times a day  gabapentin 200 milliGRAM(s) Oral four times a day  guaiFENesin  milliGRAM(s) Oral every 12 hours  levothyroxine 112 MICROGram(s) Oral daily  metoprolol succinate ER 25 milliGRAM(s) Oral daily  phenazopyridine 100 milliGRAM(s) Oral every 8 hours  piperacillin/tazobactam IVPB.. 3.375 Gram(s) IV Intermittent every 12 hours  potassium chloride    Tablet ER 10 milliEquivalent(s) Oral daily  vancomycin  IVPB 1000 milliGRAM(s) IV Intermittent once  warfarin 2 milliGRAM(s) Oral daily    MEDICATIONS  (PRN):  acetaminophen   Tablet .. 650 milliGRAM(s) Oral every 6 hours PRN Mild Pain (1 - 3)

## 2020-03-10 LAB
ANION GAP SERPL CALC-SCNC: 5 MMOL/L — SIGNIFICANT CHANGE UP (ref 5–17)
APTT BLD: 35.3 SEC — SIGNIFICANT CHANGE UP (ref 27.5–36.3)
BUN SERPL-MCNC: 56 MG/DL — HIGH (ref 7–23)
CALCIUM SERPL-MCNC: 8.5 MG/DL — SIGNIFICANT CHANGE UP (ref 8.5–10.1)
CHLORIDE SERPL-SCNC: 107 MMOL/L — SIGNIFICANT CHANGE UP (ref 96–108)
CO2 SERPL-SCNC: 31 MMOL/L — SIGNIFICANT CHANGE UP (ref 22–31)
CREAT SERPL-MCNC: 2.52 MG/DL — HIGH (ref 0.5–1.3)
GLUCOSE SERPL-MCNC: 83 MG/DL — SIGNIFICANT CHANGE UP (ref 70–99)
HCT VFR BLD CALC: 25 % — LOW (ref 39–50)
HGB BLD-MCNC: 7.5 G/DL — LOW (ref 13–17)
INR BLD: 2.17 RATIO — HIGH (ref 0.88–1.16)
MCHC RBC-ENTMCNC: 30 GM/DL — LOW (ref 32–36)
MCHC RBC-ENTMCNC: 34.7 PG — HIGH (ref 27–34)
MCV RBC AUTO: 115.7 FL — HIGH (ref 80–100)
PLATELET # BLD AUTO: 121 K/UL — LOW (ref 150–400)
POTASSIUM SERPL-MCNC: 3.6 MMOL/L — SIGNIFICANT CHANGE UP (ref 3.5–5.3)
POTASSIUM SERPL-SCNC: 3.6 MMOL/L — SIGNIFICANT CHANGE UP (ref 3.5–5.3)
PROTHROM AB SERPL-ACNC: 24.7 SEC — HIGH (ref 10–12.9)
RBC # BLD: 2.16 M/UL — LOW (ref 4.2–5.8)
RBC # FLD: 13.8 % — SIGNIFICANT CHANGE UP (ref 10.3–14.5)
SODIUM SERPL-SCNC: 143 MMOL/L — SIGNIFICANT CHANGE UP (ref 135–145)
WBC # BLD: 6.24 K/UL — SIGNIFICANT CHANGE UP (ref 3.8–10.5)
WBC # FLD AUTO: 6.24 K/UL — SIGNIFICANT CHANGE UP (ref 3.8–10.5)

## 2020-03-10 PROCEDURE — 99233 SBSQ HOSP IP/OBS HIGH 50: CPT

## 2020-03-10 PROCEDURE — 99232 SBSQ HOSP IP/OBS MODERATE 35: CPT

## 2020-03-10 PROCEDURE — 99221 1ST HOSP IP/OBS SF/LOW 40: CPT

## 2020-03-10 RX ORDER — BUDESONIDE AND FORMOTEROL FUMARATE DIHYDRATE 160; 4.5 UG/1; UG/1
2 AEROSOL RESPIRATORY (INHALATION)
Refills: 0 | Status: DISCONTINUED | OUTPATIENT
Start: 2020-03-10 | End: 2020-03-12

## 2020-03-10 RX ORDER — WARFARIN SODIUM 2.5 MG/1
3 TABLET ORAL DAILY
Refills: 0 | Status: DISCONTINUED | OUTPATIENT
Start: 2020-03-10 | End: 2020-03-12

## 2020-03-10 RX ADMIN — GABAPENTIN 200 MILLIGRAM(S): 400 CAPSULE ORAL at 17:17

## 2020-03-10 RX ADMIN — PIPERACILLIN AND TAZOBACTAM 25 GRAM(S): 4; .5 INJECTION, POWDER, LYOPHILIZED, FOR SOLUTION INTRAVENOUS at 05:21

## 2020-03-10 RX ADMIN — Medication 1 DROP(S): at 22:42

## 2020-03-10 RX ADMIN — Medication 1 DROP(S): at 06:18

## 2020-03-10 RX ADMIN — WARFARIN SODIUM 3 MILLIGRAM(S): 2.5 TABLET ORAL at 22:41

## 2020-03-10 RX ADMIN — Medication 81 MILLIGRAM(S): at 12:25

## 2020-03-10 RX ADMIN — GABAPENTIN 200 MILLIGRAM(S): 400 CAPSULE ORAL at 12:24

## 2020-03-10 RX ADMIN — Medication 40 MILLIGRAM(S): at 05:22

## 2020-03-10 RX ADMIN — Medication 100 MILLIGRAM(S): at 16:15

## 2020-03-10 RX ADMIN — Medication 3 MILLILITER(S): at 09:46

## 2020-03-10 RX ADMIN — Medication 0.25 MILLIGRAM(S): at 09:47

## 2020-03-10 RX ADMIN — Medication 10 MILLIEQUIVALENT(S): at 12:25

## 2020-03-10 RX ADMIN — Medication 100 MILLIGRAM(S): at 22:41

## 2020-03-10 RX ADMIN — PIPERACILLIN AND TAZOBACTAM 25 GRAM(S): 4; .5 INJECTION, POWDER, LYOPHILIZED, FOR SOLUTION INTRAVENOUS at 17:17

## 2020-03-10 RX ADMIN — GABAPENTIN 200 MILLIGRAM(S): 400 CAPSULE ORAL at 23:56

## 2020-03-10 RX ADMIN — Medication 100 MILLIGRAM(S): at 05:22

## 2020-03-10 RX ADMIN — Medication 600 MILLIGRAM(S): at 05:21

## 2020-03-10 RX ADMIN — Medication 600 MILLIGRAM(S): at 17:17

## 2020-03-10 RX ADMIN — GABAPENTIN 200 MILLIGRAM(S): 400 CAPSULE ORAL at 06:18

## 2020-03-10 RX ADMIN — Medication 0.25 MILLIGRAM(S): at 22:41

## 2020-03-10 RX ADMIN — Medication 25 MILLIGRAM(S): at 05:22

## 2020-03-10 RX ADMIN — Medication 650 MILLIGRAM(S): at 16:16

## 2020-03-10 RX ADMIN — Medication 112 MICROGRAM(S): at 05:22

## 2020-03-10 RX ADMIN — Medication 40 MILLIGRAM(S): at 17:17

## 2020-03-10 RX ADMIN — Medication 3 MILLILITER(S): at 14:12

## 2020-03-10 RX ADMIN — Medication 0.25 MILLIGRAM(S): at 01:48

## 2020-03-10 RX ADMIN — Medication 1 DROP(S): at 16:16

## 2020-03-10 RX ADMIN — AMIODARONE HYDROCHLORIDE 200 MILLIGRAM(S): 400 TABLET ORAL at 05:22

## 2020-03-10 NOTE — PROGRESS NOTE ADULT - ASSESSMENT
89 yo man with HTN, A FIB , COPD, CLL and CKD admitted with SOB and Cough.  Started on abtx for recurrent PNA and positive bc for Enterococcus.  --CKD appears stable at this point.  No recent USG.     --Bacteremia /PNA : continue abtx.  --SOB: more due to infectious process.  Continue maintenance lasix.  --Anemia : more due to acute and recurrent infections.  Continue to monitor.    3/9 SY  --CKD : slight increase in creat.  continue diuretics for now due to compromised resp status.  --Bacteremia /PNA ;continue abtx.  --Anemia : Heme evaluation appreciated.    3/10  enterococcus bacteremia, repeat c/s negative  creat stable  cont meds as rxd

## 2020-03-10 NOTE — PROGRESS NOTE ADULT - SUBJECTIVE AND OBJECTIVE BOX
CHIEF COMPLAINT: Patient is a 89y old  Male who presents with a chief complaint of sob, cough (05 Mar 2020 17:03)      HPI: HPI:  HPI:  89M w/PMH CKD4, BPH, Postherpetic neuralgia, Prostate CA (2008), SCC, CLL, COPD, hypothyroid, HTN, Afib s/p PPM, on AC, s/p TAVR (2019), recent admit 2/3-6, d/c'd in improved and stable condition after treated for Rt lung pna and +rhinovirus, presents today c/o worsening sob since yesterday w/ new productive cough, increased weakness, and per home aide at bedside, fever last night.  Pt otherwise denies cp, n/v/d, abd pain.  He does endorse dysuria x 1 day.     In ED, Hg 8.3 (stable), , Plt 113, Inr 2.7, Cr 2.4 (baseline), TSH 5.2 (14.3 on 2/4), Trop 0,059, BNP 19k, cxr patchy Rt lung pna, given IV vanco/zosyn and cultures sent.     PAST MEDICAL & SURGICAL HISTORY:  CKD (chronic kidney disease): Stage 4  BPH (benign prostatic hyperplasia)  Postherpetic neuralgia: Treated  Prostate CA  Basal cell carcinoma  Lymphoma: Latent  Hypothyroid  Hypertension  A-fib: Pacemaker  S/P TAVR (transcatheter aortic valve replacement)  Artificial cardiac pacemaker  H/O shoulder replacement  S/P bilateral unicompartmental knee replacement      Review of Systems:   CONSTITUTIONAL: ++ fever.  EYES: No eye pain or discharge.  ENMT:  No sinus or throat pain  NECK: No pain or stiffness  RESPIRATORY: ++ cough, NO wheezing, chills or hemoptysis; ++shortness of breath  CARDIOVASCULAR: No chest pain, palpitations, dizziness, or leg swelling  GASTROINTESTINAL: No abdominal or epigastric pain. No nausea, vomiting, or hematemesis; No diarrhea or constipation. No melena or hematochezia.  GENITOURINARY: ++ dysuria   NEUROLOGICAL: ++ headaches, No memory loss, loss of strength, numbness, or tremors  SKIN: No rashes.  MUSCULOSKELETAL: No joint pain or swelling; No muscle, back, or extremity pain  PSYCHIATRIC: No depression, anxiety, mood swings, or difficulty sleeping    Allergies  No Known Allergies    Social History:   requires assist  uses FWW  has 24H aide    FAMILY HISTORY:  Family history of ischemic heart disease    Home Medications:  albuterol 2.5 mg/3 mL (0.083%) inhalation solution: 3 milliliter(s) inhaled every 6 hours (05 Mar 2020 14:03)  alfuzosin 10 mg oral tablet, extended release: 1 tab(s) orally once a day (05 Mar 2020 14:04)  ALPRAZolam 0.25 mg oral tablet: 1 tab(s) orally twice a day at bedtime and 3am (05 Mar 2020 14:03)  amiodarone 200 mg oral tablet: 1 tab(s) orally once a day (05 Mar 2020 14:03)  aspirin 81 mg oral tablet: 1 tab(s) orally once a day (05 Mar 2020 14:04)  docusate sodium 100 mg oral capsule: 2 cap(s) orally 2 times a day (05 Mar 2020 15:18)  furosemide 40 mg oral tablet: 1 tab(s) orally 2 times a day (05 Mar 2020 15:18)  gabapentin 100 mg oral capsule: 2 cap(s) orally 4 times a day (05 Mar 2020 15:18)  lactobacillus acidophilus oral capsule: 1 cap(s) orally once a day (05 Mar 2020 15:18)  levothyroxine 100 mcg (0.1 mg) oral tablet: 1 tab(s) orally once a day (05 Mar 2020 14:03)  Metoprolol Succinate ER 25 mg oral tablet, extended release: 1 tab(s) orally once a day (05 Mar 2020 15:18)  ocular lubricant ophthalmic solution: 1 drop(s) to each affected eye 2 times a day (05 Mar 2020 14:03)  Senna 8.6 mg oral tablet: 1 tab(s) orally once a day, As Needed (05 Mar 2020 15:18)    MEDICATIONS  (STANDING):  albuterol/ipratropium for Nebulization 3 milliLiter(s) Nebulizer every 6 hours  ALPRAZolam 0.25 milliGRAM(s) Oral daily  aMIOdarone    Tablet 200 milliGRAM(s) Oral daily  artificial  tears Solution 1 Drop(s) Both EYES three times a day  aspirin enteric coated 81 milliGRAM(s) Oral daily  buDESOnide    Inhalation Suspension 0.25 milliGRAM(s) Inhalation every 12 hours  furosemide    Tablet 40 milliGRAM(s) Oral two times a day  gabapentin 200 milliGRAM(s) Oral four times a day  levothyroxine 112 MICROGram(s) Oral daily  metoprolol succinate ER 25 milliGRAM(s) Oral daily  phenazopyridine 100 milliGRAM(s) Oral every 8 hours  piperacillin/tazobactam IVPB.. 3.375 Gram(s) IV Intermittent every 12 hours  vancomycin  IVPB 1000 milliGRAM(s) IV Intermittent once              GENERAL: NAD, frail appearing  HEAD:  Atraumatic, Normocephalic  EYES: EOMI, PERRLA, conjunctiva and sclera clear  ENT: normal hearing, no nasal discharge, throat clear, dentition +dentures  NECK: Supple, No JVD, no LAD, no thyromegaly   CHEST/LUNG: b/l rhonchi and rales, respirations unlabored  HEART: Regular rate and rhythm; No murmurs, rubs, or gallops  ABDOMEN: Soft, Nontender, Nondistended; Bowel sounds present, no HSM  EXTREMITIES:  2+ Peripheral Pulses, No clubbing, cyanosis, or LE edema, LUE edema (previously checked doppler neg for DVT)   PSYCH: AAOx3, normal behavior  NEUROLOGY: non-focal, sensory and cn 2-12 intact, speech/language intact  SKIN: No visible rashes or lesions    LABS:                        8.3    5.66  )-----------( 113      ( 05 Mar 2020 13:41 )             27.3                         7.9    6.25  )-----------( 108      ( 07 Mar 2020 07:22 )             25.2                         7.5    6.24  )-----------( 121      ( 10 Mar 2020 06:38 )             25.0       03-05    143  |  107  |  44<H>  ----------------------------<  107<H>  4.4   |  30  |  2.46<H>    Ca    8.9      05 Mar 2020 13:41  Mg     2.2     03-05    TPro  5.5<L>  /  Alb  3.0<L>  /  TBili  0.6  /  DBili  x   /  AST  32  /  ALT  13  /  AlkPhos  54  03-05      03-08    144  |  108  |  54<H>  ----------------------------<  88  3.4<L>   |  30  |  2.44<H>    Ca    8.3<L>      08 Mar 2020 07:58    TPro  5.0<L>  /  Alb  2.5<L>  /  TBili  0.6  /  DBili  x   /  AST  20  /  ALT  11<L>  /  AlkPhos  49  03-07    03-10    143  |  107  |  56<H>  ----------------------------<  83  3.6   |  31  |  2.52<H>    Ca    8.5      10 Mar 2020 06:38          PT/INR - ( 05 Mar 2020 13:41 )   PT: 31.8 sec;   INR: 2.78 ratio         PTT - ( 05 Mar 2020 13:41 )  PTT:40.4 sec  CARDIAC MARKERS ( 05 Mar 2020 13:41 )  0.059 ng/mL / x     / x     / x     / x              RADIOLOGY & ADDITIONAL TESTS:    Imaging Personally Reviewed:  cxre- patchy Rt lung pna     EKG Personally Reviewed:  paced (05 Mar 2020 17:03)      PMHx: PAST MEDICAL & SURGICAL HISTORY:  CKD (chronic kidney disease): Stage 4  BPH (benign prostatic hyperplasia)  Postherpetic neuralgia: Treated  Chronic diarrhea  Prostate CA  Basal cell carcinoma  Lymphoma: Latent  Anxiety  Hypothyroid  Hypertension  A-fib: Pacemaker  S/P TAVR (transcatheter aortic valve replacement)  Artificial cardiac pacemaker  H/O shoulder replacement  S/P bilateral unicompartmental knee replacement        Soc Hx:     FAMILY HISTORY:  Family history of ischemic heart disease      Allergies: Allergies    No Known Allergies    Intolerances          REVIEW OF SYSTEMS:    As above  No chest pain + shortness of breath  No lightheadeness or syncope  No leg swelling  No palpitations  No claudication-like symptoms    MEDICATIONS  (STANDING):  albuterol/ipratropium for Nebulization 3 milliLiter(s) Nebulizer every 6 hours  ALPRAZolam 0.25 milliGRAM(s) Oral daily  aMIOdarone    Tablet 200 milliGRAM(s) Oral daily  artificial  tears Solution 1 Drop(s) Both EYES three times a day  aspirin enteric coated 81 milliGRAM(s) Oral daily  buDESOnide    Inhalation Suspension 0.25 milliGRAM(s) Inhalation every 12 hours  furosemide    Tablet 40 milliGRAM(s) Oral two times a day  gabapentin 200 milliGRAM(s) Oral four times a day  levothyroxine 112 MICROGram(s) Oral daily  metoprolol succinate ER 25 milliGRAM(s) Oral daily  phenazopyridine 100 milliGRAM(s) Oral every 8 hours  piperacillin/tazobactam IVPB.. 3.375 Gram(s) IV Intermittent every 12 hours  vancomycin  IVPB 1000 milliGRAM(s) IV Intermittent once        I&O's Summary      CAPILLARY BLOOD GLUCOSE    ICU Vital Signs Last 24 Hrs  T(C): 36.7 (10 Mar 2020 05:33), Max: 37.5 (09 Mar 2020 21:41)  T(F): 98 (10 Mar 2020 05:33), Max: 99.5 (09 Mar 2020 21:41)  HR: 60 (10 Mar 2020 05:33) (60 - 109)  BP: 112/42 (10 Mar 2020 05:33) (97/37 - 131/79)  BP(mean): --  ABP: --  ABP(mean): --  RR: 17 (10 Mar 2020 05:33) (17 - 618)  SpO2: 99% (10 Mar 2020 05:33) (97% - 99%)        PHYSICAL EXAM:   Patient in NAD  Neck: No JVD; Carotids:  2+ without bruits  Respiratory:  Clear to A&P  Cardiovascular: S1 and S2; syst m  Gastrointestinal:  Soft, non-tender; BS positive  Extremities: No peripheral edema  Vascular: 2+ peripheral pulses  Neurological: A/O x 3, no focal deficits      MEDICATIONS  (STANDING):  albuterol/ipratropium for Nebulization 3 milliLiter(s) Nebulizer every 6 hours  ALPRAZolam 0.25 milliGRAM(s) Oral daily  aMIOdarone    Tablet 200 milliGRAM(s) Oral daily  artificial  tears Solution 1 Drop(s) Both EYES three times a day  aspirin enteric coated 81 milliGRAM(s) Oral daily  buDESOnide    Inhalation Suspension 0.25 milliGRAM(s) Inhalation every 12 hours  furosemide    Tablet 40 milliGRAM(s) Oral two times a day  gabapentin 200 milliGRAM(s) Oral four times a day  guaiFENesin  milliGRAM(s) Oral every 12 hours  levothyroxine 112 MICROGram(s) Oral daily  metoprolol succinate ER 25 milliGRAM(s) Oral daily  phenazopyridine 100 milliGRAM(s) Oral every 8 hours  piperacillin/tazobactam IVPB.. 3.375 Gram(s) IV Intermittent every 12 hours  potassium chloride    Tablet ER 10 milliEquivalent(s) Oral daily  vancomycin  IVPB 1000 milliGRAM(s) IV Intermittent once  warfarin 2 milliGRAM(s) Oral daily            LABS: All Labs Reviewed:  Blood Culture: Organism Blood Culture PCR  Gram Stain Blood -- Gram Stain   Growth in aerobic bottle: Gram Positive Cocci in Pairs and Chains  Growth in anaerobic bottle: Gram Positive Cocci in Pairs and Chains  Specimen Source .Blood None  Culture-Blood --    Culture - Blood (03.05.20 @ 13:41)    Gram Stain:   Growth in aerobic bottle: Gram Positive Cocci in Pairs and Chains  Growth in anaerobic bottle: Gram Positive Cocci in Pairs and Chains    Specimen Source: .Blood None    Culture Results:   Growth in aerobic bottle: Gram Positive Cocci in Pairs and Chains  Growth in anaerobic bottle: Gram Positive Cocci in Pairs and Chains    Culture - Blood (03.05.20 @ 13:41)    Gram Stain:   Growth in aerobic bottle: Gram Positive Cocci in Pairs and Chains  Growth in anaerobic bottle: Gram Positive Cocci in Pairs and Chains    Specimen Source: .Blood None    Culture Results:   Growth in aerobic and anaerobic bottles: Enterococcus faecalis  See previous culture [id number]            BNP Serum Pro-Brain Natriuretic Peptide: 53628 pg/mL (03-05 @ 13:41)    CBC             WBC Count: 5.66 K/uL (03-05 @ 13:41)              Hemoglobin: 8.3 g/dL (03-05 @ 13:41)              Hematocrit: 27.3 % (03-05 @ 13:41)              Mean Cell Volume: 117.7 fl (03-05 @ 13:41)              Platelet Count - Automated: 113 K/uL (03-05 @ 13:41)                            Cardiac markers             Troponin I, Serum: 0.062 ng/mL (03-06 @ 02:18)  Troponin I, Serum: 0.063 ng/mL (03-05 @ 23:19)  Troponin I, Serum: 0.057 ng/mL (03-05 @ 19:51)  Troponin I, Serum: 0.059 ng/mL (03-05 @ 13:41)                             Chems        Sodium, Serum: 143 mmol/L (03-05 @ 13:41)          Potassium, Serum: 4.4 mmol/L (03-05 @ 13:41)          Blood Urea Nitrogen, Serum: 44 mg/dL (03-05 @ 13:41)          Creatinine 2.46          Magnesium, Serum: 2.2 mg/dL (03-05 @ 13:41)          Protein Total, Serum: 5.5 gm/dL (03-05 @ 13:41)                  Calcium, Total Serum: 8.9 mg/dL (03-05 @ 13:41)                  Bilirubin Total, Serum: 0.6 mg/dL (03-05 @ 13:41)          Alanine Aminotransferase (ALT/SGPT): 13 U/L (03-05 @ 13:41)          Aspartate Aminotransferase (AST/SGOT): 32 U/L (03-05 @ 13:41)                 INR: 2.78 ratio (03-05 @ 13:41)       Culture Results:   Growth in aerobic bottle: Gram Positive Cocci in Pairs and Chains  Growth in anaerobic bottle: Gram Positive Cocci in Pairs and Chains  ***Blood Panel PCR results on this specimen are available  approximately 3 hours after the Gram stain result.***  Gram stain, PCR, and/or culture results may not always  correspond due to difference in methodologies.  ************************************************************  This PCR assay was performed using [employer].  The following targets are tested for: Enterococcus,  vancomycin resistant enterococci, Listeria monocytogenes,  coagulase negative staphylococci, S. aureus,  methicillin resistant S. aureus, Streptococcus agalactiae  (Group B), S. pneumoniae, S. pyogenes (Group A),  Acinetobacter baumannii, Enterobacter cloacae, E. coli,  Klebsiella oxytoca, K. pneumoniae, Proteus sp.,  Serratia marcescens, Haemophilus influenzae,  Neisseria meningitidis, Pseudomonas aeruginosa, Candida  albicans, C. glabrata, C krusei, C parapsilosis,  C. tropicalis and the KPC resistance gene. (03-05 @ 13:41)  Culture Results:   Growth in aerobic bottle: Gram Positive Cocci in Pairs and Chains  Growth in anaerobic bottle: Gram Positive Cocci in Pairs and Chains (03-05 @ 13:41)        RADIOLOGY:    EKG:  Paced    Telemetry: Paced    ECHO:  < from: TTE Echo Complete w/o contrast w/ Doppler (03.06.20 @ 09:39) >  Summary     Mild to moderate mitral regurgitation is present.   Mild mitral annular calcification is present.   Well seated TAVR prosthetic valve in the aortic position. Peak   trans-prosthetic gradient is within normal limitations for this type of   prosthesis.   Peak transaortic gradient is 13 mmHg;   Moderate (2+) tricuspid valve regurgitation is present.   Normal appearing pulmonic valve structure and function.   The left atrium is mildly dilated.   The left ventricle is normal in size, wall motion and contractility.   Mild concentric left ventricular hypertrophy is present.   Estimated left ventricular ejection fraction is 55-60 %.   Normal appearing right atrium.   A device wire is seenin the RV and RA.   Normal appearing right ventricle structure and function.     Signature     ----------------------------------------------------------------   Electronically signed by Lilliana Kennedy MD(Interpreting   physician) on 03/07/2020 10:18 AM    < end of copied text >

## 2020-03-10 NOTE — PROGRESS NOTE ADULT - ASSESSMENT
* Enterococcus Bacteremia  * Pneumonia- will treat for gram negative ibrahima and other resistant bacteria  *Rhinovirus  - on Zosyn IV D and vancomycin   - ID consult  - rpt blood cultures ngtd (3/8)  - TTE negative for vegetation- will likely need CLEMENTINA  - monitor CBC  - monitor for s/e of antibiotics.  - cardiology consulted  - c/w home dose lasix  - monitor sats   - c/w nebs  - pulm eval- patient to follow up 3-4 weeks after treatment for evaluation of pulm nodules    *dysuria  - UA with mild LE- UC negative  - bladder scan to check for retention  - on zosyn  - urology consult    *macrocytic anemia  - monitor  - heme consult    *hypothyroid  - TSH 5.22   - increased Synthroid from 100 mcg to 112 mcg on this admission check TSH in 4-6 weeks   - monitor TSH    *CKD- monitor creatinine*chronic AF  - continue with coumadin  - jone      Case d/w team and MD on IDR. Plan explained to patient and all of their concerns were addressed. * Enterococcus Bacteremia  * Pneumonia- will treat for gram negative ibrahima and other resistant bacteria  *Rhinovirus  - on Zosyn IV D and vancomycin   - ID consult  - rpt blood cultures ngtd (3/8)  - TTE negative for vegetation- will likely need CLEMENTINA  - monitor CBC  - monitor for s/e of antibiotics.  - cardiology consulted  - c/w home dose lasix  - monitor sats   - c/w nebs  - pulm eval- patient to follow up 3-4 weeks after treatment for evaluation of pulm nodules    *dysuria  - UA with mild LE- UC negative  - bladder scan to check for retention  - on zosyn  - urology consult    *macrocytic anemia Hb 7.5, which is his baseline 7-8  if further drop would consider 1 unit PRBC transfusion    - monitor  - heme consult    *hypothyroid  - TSH 5.22   - increased Synthroid from 100 mcg to 112 mcg on this admission check TSH in 4-6 weeks   - monitor TSH    *CKD- monitor creatinine*chronic AF  - continue with coumadin  - jone      Case d/w team and MD on IDR. Plan explained to patient and all of their concerns were addressed.

## 2020-03-10 NOTE — PROGRESS NOTE ADULT - ASSESSMENT
89M w/PMH CKD4, BPH, Postherpetic neuralgia, Prostate CA (2008), SCC, CLL, COPD, hypothyroid, HTN, Afib s/p PPM, on AC, s/p TAVR (2019), recent admit 2/3-6, d/c'd in improved and stable condition after treated for Rt lung pna and +rhinovirus, presents today c/o worsening sob since yesterday w/ new productive cough, increased weakness, and per home aide at bedside, fever last night.  Pt otherwise denies cp, n/v/d, abd pain.  He does endorse dysuria x 1 day. In ED, Hg 8.3 (stable), , Plt 113, Inr 2.7, Cr 2.4 (baseline), TSH 5.2 (14.3 on 2/4), Trop 0,059, BNP 19k, cxr patchy Rt lung pna, given IV vanco/zosyn and cultures sent.

## 2020-03-10 NOTE — PROGRESS NOTE ADULT - ASSESSMENT
89 year old man with the above history admitted with shortness of breath and a recurrent right sided pneumonia.  The mildly elevated flat troponins are probably demand related.  His elevated BNP is probably multifactorial.  He is on antibiotics.  His blood cultures are apparently positive.  Will await identification.  Check TTE regarding his TAVR valve.  May need a CLEMENTINA.  Continue diuretics as well.  Will follow.    3/7/20:  As per above echocardiogram patient's cardiac status appears stable with a normally functioning TAVR aortic valve and normal LV systolic function.  No vegetations seen.  Awaiting culture identification-will discuss with ID.  Continue antibiotics and diuretics.    3/8/20:  + Enterococcus Faecalis in blood.  Will discuss with ID if they have high concern for endocarditis.  If so then CLEMENTINA.    3/10/20:  Cardiac status unchanged.  I am setting patient up for a CLEMENTINA

## 2020-03-10 NOTE — ADVANCED PRACTICE NURSE CONSULT - RECOMMEDATIONS
1) Continue to turn and position every 2 hours  2) Continue to elevate heels off the mattress  3) Apply Triad wound paste to inner buttocks daily and cover with foam dressing  4) Albumin 2.5 on 3/7/2020. Due to poor healing of wound, nutrition consult placed.

## 2020-03-10 NOTE — PROGRESS NOTE ADULT - SUBJECTIVE AND OBJECTIVE BOX
89M w/PMH CKD4, BPH, Postherpetic neuralgia, Prostate CA (2008), SCC, CLL, COPD, hypothyroid, HTN, Afib s/p PPM, on AC, s/p TAVR (2019), recent admit 2/3-6, d/c'd in improved and stable condition after treated for Rt lung pna and +rhinovirus, presents today c/o worsening sob since yesterday w/ new productive cough, increased weakness, and per home aide at bedside, fever.  In ED, Hg 8.3 (stable), , Plt 113, Inr 2.7, Cr 2.4 (baseline), TSH 5.2 (14.3 on ), Trop 0,059, BNP 19k, cxr patchy Rt lung pna, given IV vanco/zosyn.     3/9- patient was seen and examined. No acute overnight events, cough is improved. No SOB.  3/10 c/u dysuria/reports cough , no SOB , plan for CLEMENTINA per cardio     GEN: lying in bed, NAD  HEENT:   NC/AT, pupils equal and reactive, EOMI  CV:  +S1, +S2, RRR  RESP:  b/l rales no wheeze, rales, rhonchi   BREAST:  not examined  GI:  abdomen soft, non-tender, non-distended, normoactive BS  RECTAL:  not examined  :  not examined  MSK:   normal muscle tone  EXT:  no edema  NEURO:  AAOX3, no focal neurological deficits  SKIN:  no rashes      PHYSICAL EXAM:    Daily     Daily Weight in k.4 (10 Mar 2020 05:33)    ICU Vital Signs Last 24 Hrs  T(C): 36.7 (10 Mar 2020 05:33), Max: 37.5 (09 Mar 2020 21:41)  T(F): 98 (10 Mar 2020 05:33), Max: 99.5 (09 Mar 2020 21:41)  HR: 60 (10 Mar 2020 05:33) (60 - 109)  BP: 112/42 (10 Mar 2020 05:33) (97/37 - 131/79)  BP(mean): --  ABP: --  ABP(mean): --  RR: 17 (10 Mar 2020 05:33) (17 - 618)  SpO2: 99% (10 Mar 2020 05:33) (97% - 99%)                              7.5    6.24  )-----------( 121      ( 10 Mar 2020 06:38 )             25.0       CBC Full  -  ( 10 Mar 2020 06:38 )  WBC Count : 6.24 K/uL  RBC Count : 2.16 M/uL  Hemoglobin : 7.5 g/dL  Hematocrit : 25.0 %  Platelet Count - Automated : 121 K/uL  Mean Cell Volume : 115.7 fl  Mean Cell Hemoglobin : 34.7 pg  Mean Cell Hemoglobin Concentration : 30.0 gm/dL  Auto Neutrophil # : x  Auto Lymphocyte # : x  Auto Monocyte # : x  Auto Eosinophil # : x  Auto Basophil # : x  Auto Neutrophil % : x  Auto Lymphocyte % : x  Auto Monocyte % : x  Auto Eosinophil % : x  Auto Basophil % : x      03-10    143  |  107  |  56<H>  ----------------------------<  83  3.6   |  31  |  2.52<H>    Ca    8.5      10 Mar 2020 06:38            PT/INR - ( 10 Mar 2020 06:38 )   PT: 24.7 sec;   INR: 2.17 ratio         PTT - ( 10 Mar 2020 06:38 )  PTT:35.3 sec    CARDIAC MARKERS ( 09 Mar 2020 07:54 )  0.028 ng/mL / x     / x     / x     / x                    MEDICATIONS  (STANDING):  albuterol/ipratropium for Nebulization 3 milliLiter(s) Nebulizer every 6 hours  ALPRAZolam 0.25 milliGRAM(s) Oral daily  aMIOdarone    Tablet 200 milliGRAM(s) Oral daily  artificial  tears Solution 1 Drop(s) Both EYES three times a day  aspirin enteric coated 81 milliGRAM(s) Oral daily  buDESOnide    Inhalation Suspension 0.25 milliGRAM(s) Inhalation every 12 hours  furosemide    Tablet 40 milliGRAM(s) Oral two times a day  gabapentin 200 milliGRAM(s) Oral four times a day  guaiFENesin  milliGRAM(s) Oral every 12 hours  levothyroxine 112 MICROGram(s) Oral daily  metoprolol succinate ER 25 milliGRAM(s) Oral daily  phenazopyridine 100 milliGRAM(s) Oral every 8 hours  piperacillin/tazobactam IVPB.. 3.375 Gram(s) IV Intermittent every 12 hours  potassium chloride    Tablet ER 10 milliEquivalent(s) Oral daily  vancomycin  IVPB 1000 milliGRAM(s) IV Intermittent once  warfarin 2 milliGRAM(s) Oral daily

## 2020-03-10 NOTE — PROGRESS NOTE ADULT - SUBJECTIVE AND OBJECTIVE BOX
NEPHROLOGY INTERVAL HPI/OVERNIGHT EVENTS:        3/10  in bed feels well  no new events  creat stable  aide in room  + cough, productive    3/9 SY  No acute distress.  Complains of cough and inability to expectorate.  No SOB at rest.    HPI:  89 yo man with PMHX of CKD (recent creat ~2.2-2.5), HTN, A FIB , COPD and hx of CLL and TAVR (2019).  Pt was recently admitted ( early February)  for PNA and Viral URI.  Pt admitted with complaints of increased cough, SOB and weakness.  Noted with Anemia and stable CKD on admission.    PMHX and PSHX.  1.CKD (baseline crear in low 2's)  2.A FIB  3.COPD  4.Hx of Prostate CA ( Post RT)(2008)  5.Hx of Lymphoma Post Rituxan therapy.  6.Hx of Postherpetic Neuralgia  7.Hypothyroidism  8.Hx of Chronic Diarrhea.  9.Hx of CHF.  10.S/p TAVR (2019)  11.HTN    MEDICATIONS  (STANDING):  ALPRAZolam 0.25 milliGRAM(s) Oral daily  aMIOdarone    Tablet 200 milliGRAM(s) Oral daily  artificial  tears Solution 1 Drop(s) Both EYES three times a day  aspirin enteric coated 81 milliGRAM(s) Oral daily  budesonide 160 MICROgram(s)/formoterol 4.5 MICROgram(s) Inhaler 2 Puff(s) Inhalation two times a day  furosemide    Tablet 40 milliGRAM(s) Oral two times a day  gabapentin 200 milliGRAM(s) Oral four times a day  guaiFENesin  milliGRAM(s) Oral every 12 hours  levothyroxine 112 MICROGram(s) Oral daily  metoprolol succinate ER 25 milliGRAM(s) Oral daily  phenazopyridine 100 milliGRAM(s) Oral every 8 hours  piperacillin/tazobactam IVPB.. 3.375 Gram(s) IV Intermittent every 12 hours  potassium chloride    Tablet ER 10 milliEquivalent(s) Oral daily  vancomycin  IVPB 1000 milliGRAM(s) IV Intermittent once  warfarin 3 milliGRAM(s) Oral daily    MEDICATIONS  (PRN):  acetaminophen   Tablet .. 650 milliGRAM(s) Oral every 6 hours PRN Mild Pain (1 - 3)    Vital Signs Last 24 Hrs  T(C): 36.9 (10 Mar 2020 11:17), Max: 37.5 (09 Mar 2020 21:41)  T(F): 98.5 (10 Mar 2020 11:17), Max: 99.5 (09 Mar 2020 21:41)  HR: 89 (10 Mar 2020 17:18) (60 - 89)  BP: 107/71 (10 Mar 2020 17:18) (101/37 - 125/50)  BP(mean): --  RR: 20 (10 Mar 2020 11:17) (17 - 20)      SpO2: 96% (10 Mar 2020 11:17) (96% - 99%)    PHYSICAL : alert and appropriate  HEENT : unremarkable  CHEST/LUNG: bilateral rhonchi    HEART: S1S2 RRR  ABDOMEN: soft  EXTREMITIES: trace edema  SKIN: multiple ecchymosis,     LABS:      143    |  107    |  56     ----------------------------<  83        10 Mar 2020 06:38  3.6     |  31     |  2.52     143    |  107    |  54     ----------------------------<  84        09 Mar 2020 07:54  3.4     |  31     |  2.46     144    |  108    |  54     ----------------------------<  88        08 Mar 2020 07:58  3.4     |  30     |  2.44     Ca    8.5        10 Mar 2020 06:38  Ca    8.5        09 Mar 2020 07:54  Ca    8.3        08 Mar 2020 07:58                              7.5    6.24  )-----------( 121      ( 10 Mar 2020 06:38 )             25.0                         8.2    6.46  )-----------( 124      ( 09 Mar 2020 07:54 )             26.8

## 2020-03-10 NOTE — PROGRESS NOTE ADULT - SUBJECTIVE AND OBJECTIVE BOX
LIZBETH MADDOX, 89y Male  MRN: [id number]  ATTENDING: Thomas D'Amico    HPI:  89M w/PMH CKD4, BPH, Postherpetic neuralgia, Prostate CA (2008), SCC, CLL, COPD, hypothyroid, HTN, Afib s/p PPM, on AC, s/p TAVR (2019), recent admit 2/3-6, d/c'd in improved and stable condition after treated for Rt lung pna and +rhinovirus, presents today c/o worsening sob since yesterday w/ new productive cough, increased weakness, and per home aide at bedside, fever last night.  Pt otherwise denies cp, n/v/d, abd pain.  He does endorse dysuria x 1 day.  In ED, Hg 8.3 (stable), , Plt 113, Inr 2.7, Cr 2.4 (baseline), TSH 5.2 (14.3 on 2/4), Trop 0,059, BNP 19k, cxr patchy Rt lung pna, given IV vanco/ zosyn and cultures sent.   	    MEDICATIONS:  acetaminophen   Tablet .. 650 milliGRAM(s) Oral every 6 hours PRN  albuterol/ipratropium for Nebulization 3 milliLiter(s) Nebulizer every 6 hours  ALPRAZolam 0.25 milliGRAM(s) Oral daily  aMIOdarone    Tablet 200 milliGRAM(s) Oral daily  artificial  tears Solution 1 Drop(s) Both EYES three times a day  aspirin enteric coated 81 milliGRAM(s) Oral daily  buDESOnide    Inhalation Suspension 0.25 milliGRAM(s) Inhalation every 12 hours  furosemide    Tablet 40 milliGRAM(s) Oral two times a day  gabapentin 200 milliGRAM(s) Oral four times a day  guaiFENesin  milliGRAM(s) Oral every 12 hours  levothyroxine 112 MICROGram(s) Oral daily  metoprolol succinate ER 25 milliGRAM(s) Oral daily  phenazopyridine 100 milliGRAM(s) Oral every 8 hours  piperacillin/tazobactam IVPB.. 3.375 Gram(s) IV Intermittent every 12 hours  potassium chloride    Tablet ER 10 milliEquivalent(s) Oral daily  vancomycin  IVPB 1000 milliGRAM(s) IV Intermittent once  warfarin 2 milliGRAM(s) Oral daily    All other medications reviewed.    SUBJECTIVE:  OOB to chair, having breakfast; aid at bedside.  Patient c/o dry cough, but states he is feeling stronger otherwise.  Continues IV antibiotics.    VITALS:  T(C): 37 (03-09-20 @ 11:09), Max: 37 (03-09-20 @ 11:09)  T(F): 98.6 (03-09-20 @ 11:09), Max: 98.6 (03-09-20 @ 11:09)  HR: 109 (03-09-20 @ 17:10) (61 - 109)  BP: 97/37 (03-09-20 @ 17:10) (97/37 - 113/43)    PHYSICAL EXAM:  Alert, weak  Respiratory: bilateral crackles to auscultation  Cardiovascular : S1, S2 rhythmic  Abdomen: soft, distended, + BS  Extremities: no tenderness;  -c/c/e    LABS:  (03-09) WBC: 6.46 K/uL, Hemoglobin: 8.2 g/dL, Hematocrit: 26.8 %,  Platelet: 124 K/uL  (03-09) Na: 143 mmol/L ; K: 3.4 mmol/L ; BUN: 54 mg/dL ; Cr: 2.46 mg/dL.  Hemoglobin: 7.5 g/dL (03-10-20 @ 06:38)  Hemoglobin: 8.2 g/dL (03-09-20 @ 07:54)  Hemoglobin: 7.9 g/dL (03-07-20 @ 07:22)    RADIOLOGY:  HISTORY:  Shortness of breath  Comparison: Chest x-ray 2/3/2020  Left dual-lead pacer.  Status post TAVR.   Right shoulder arthroplasty.  The cardiac silhouette is rotated. Patchy right mid to lower lung airspace disease.. No pleural abnormality.    IMPRESSION: Patchy right lung pneumonia

## 2020-03-10 NOTE — ADVANCED PRACTICE NURSE CONSULT - ASSESSMENT
This is a 89 year old male that was admitted to the hospital on 3/5/2020 for shortness of breath.  PMH-  CKD4, BPH, Postherpetic neuralgia, Prostate CA (2008), SCC, CLL, COPD, hypothyroid, HTN, Afib s/p PPM, on AC, s/p TAVR (2019), recent admit 2/3-6, d/c'd in improved and stable condition after treated for Rt lung pna and +rhinovirus, presents today c/o worsening sob since yesterday w/ new productive cough, increased weakness, and per home aide at bedside, fever.    Consulted to evaluate patient's left inner buttock. Patient presents resting on an [employer] 9000 MRS on his backside with heels elevated off mattress. Patient alert and oriented to person, place and situation during conversation. Aide at bedside. Patient reports he has had this wound for several months and attributes it to sitting in a chair at home for too long. Upon assessment 0.4cmx0.4cmx0.2cm wound noted. Wound bed with 100% thin yellow slough. No odor. Periwound with erythema and excoriation. Patient denies being incontinent of urine and stool. Due to its location, this wound is most likely from friction and shearing. This is not on a pressure point in general or when sitting. Area cleansed with normal saline and Triad wound paste applied and covered with foam dressing. Patient positioned to his left side with a pillow and both heels remain elevated off mattress.

## 2020-03-11 LAB
ANION GAP SERPL CALC-SCNC: 6 MMOL/L — SIGNIFICANT CHANGE UP (ref 5–17)
BASOPHILS # BLD AUTO: 0.07 K/UL — SIGNIFICANT CHANGE UP (ref 0–0.2)
BASOPHILS NFR BLD AUTO: 1 % — SIGNIFICANT CHANGE UP (ref 0–2)
BUN SERPL-MCNC: 55 MG/DL — HIGH (ref 7–23)
CALCIUM SERPL-MCNC: 8.3 MG/DL — LOW (ref 8.5–10.1)
CHLORIDE SERPL-SCNC: 108 MMOL/L — SIGNIFICANT CHANGE UP (ref 96–108)
CO2 SERPL-SCNC: 31 MMOL/L — SIGNIFICANT CHANGE UP (ref 22–31)
CREAT SERPL-MCNC: 2.5 MG/DL — HIGH (ref 0.5–1.3)
EOSINOPHIL # BLD AUTO: 0.2 K/UL — SIGNIFICANT CHANGE UP (ref 0–0.5)
EOSINOPHIL NFR BLD AUTO: 2.8 % — SIGNIFICANT CHANGE UP (ref 0–6)
GLUCOSE SERPL-MCNC: 111 MG/DL — HIGH (ref 70–99)
HCT VFR BLD CALC: 26.7 % — LOW (ref 39–50)
HGB BLD-MCNC: 8.3 G/DL — LOW (ref 13–17)
IMM GRANULOCYTES NFR BLD AUTO: 1.4 % — SIGNIFICANT CHANGE UP (ref 0–1.5)
LYMPHOCYTES # BLD AUTO: 2.83 K/UL — SIGNIFICANT CHANGE UP (ref 1–3.3)
LYMPHOCYTES # BLD AUTO: 39.9 % — SIGNIFICANT CHANGE UP (ref 13–44)
MCHC RBC-ENTMCNC: 31.1 GM/DL — LOW (ref 32–36)
MCHC RBC-ENTMCNC: 33.9 PG — SIGNIFICANT CHANGE UP (ref 27–34)
MCV RBC AUTO: 109 FL — HIGH (ref 80–100)
MONOCYTES # BLD AUTO: 0.75 K/UL — SIGNIFICANT CHANGE UP (ref 0–0.9)
MONOCYTES NFR BLD AUTO: 10.6 % — SIGNIFICANT CHANGE UP (ref 2–14)
NEUTROPHILS # BLD AUTO: 3.15 K/UL — SIGNIFICANT CHANGE UP (ref 1.8–7.4)
NEUTROPHILS NFR BLD AUTO: 44.3 % — SIGNIFICANT CHANGE UP (ref 43–77)
PLATELET # BLD AUTO: 125 K/UL — LOW (ref 150–400)
POTASSIUM SERPL-MCNC: 3.4 MMOL/L — LOW (ref 3.5–5.3)
POTASSIUM SERPL-SCNC: 3.4 MMOL/L — LOW (ref 3.5–5.3)
RBC # BLD: 2.45 M/UL — LOW (ref 4.2–5.8)
RBC # FLD: 17.7 % — HIGH (ref 10.3–14.5)
SODIUM SERPL-SCNC: 145 MMOL/L — SIGNIFICANT CHANGE UP (ref 135–145)
WBC # BLD: 7.1 K/UL — SIGNIFICANT CHANGE UP (ref 3.8–10.5)
WBC # FLD AUTO: 7.1 K/UL — SIGNIFICANT CHANGE UP (ref 3.8–10.5)

## 2020-03-11 PROCEDURE — 74176 CT ABD & PELVIS W/O CONTRAST: CPT | Mod: 26

## 2020-03-11 PROCEDURE — 71045 X-RAY EXAM CHEST 1 VIEW: CPT | Mod: 26

## 2020-03-11 PROCEDURE — 99232 SBSQ HOSP IP/OBS MODERATE 35: CPT

## 2020-03-11 RX ORDER — POTASSIUM CHLORIDE 20 MEQ
40 PACKET (EA) ORAL ONCE
Refills: 0 | Status: COMPLETED | OUTPATIENT
Start: 2020-03-11 | End: 2020-03-11

## 2020-03-11 RX ORDER — ALBUTEROL 90 UG/1
2 AEROSOL, METERED ORAL EVERY 6 HOURS
Refills: 0 | Status: DISCONTINUED | OUTPATIENT
Start: 2020-03-11 | End: 2020-03-12

## 2020-03-11 RX ORDER — AMPICILLIN SODIUM AND SULBACTAM SODIUM 250; 125 MG/ML; MG/ML
3 INJECTION, POWDER, FOR SUSPENSION INTRAMUSCULAR; INTRAVENOUS DAILY
Refills: 0 | Status: DISCONTINUED | OUTPATIENT
Start: 2020-03-11 | End: 2020-03-12

## 2020-03-11 RX ORDER — FUROSEMIDE 40 MG
40 TABLET ORAL DAILY
Refills: 0 | Status: DISCONTINUED | OUTPATIENT
Start: 2020-03-11 | End: 2020-03-12

## 2020-03-11 RX ORDER — ACETYLCYSTEINE 200 MG/ML
3 VIAL (ML) MISCELLANEOUS THREE TIMES A DAY
Refills: 0 | Status: DISCONTINUED | OUTPATIENT
Start: 2020-03-11 | End: 2020-03-12

## 2020-03-11 RX ADMIN — Medication 40 MILLIEQUIVALENT(S): at 11:42

## 2020-03-11 RX ADMIN — AMIODARONE HYDROCHLORIDE 200 MILLIGRAM(S): 400 TABLET ORAL at 06:05

## 2020-03-11 RX ADMIN — PIPERACILLIN AND TAZOBACTAM 25 GRAM(S): 4; .5 INJECTION, POWDER, LYOPHILIZED, FOR SOLUTION INTRAVENOUS at 06:05

## 2020-03-11 RX ADMIN — ALBUTEROL 2 PUFF(S): 90 AEROSOL, METERED ORAL at 14:22

## 2020-03-11 RX ADMIN — Medication 650 MILLIGRAM(S): at 18:32

## 2020-03-11 RX ADMIN — Medication 112 MICROGRAM(S): at 06:05

## 2020-03-11 RX ADMIN — Medication 10 MILLIEQUIVALENT(S): at 11:42

## 2020-03-11 RX ADMIN — Medication 25 MILLIGRAM(S): at 06:05

## 2020-03-11 RX ADMIN — Medication 100 MILLIGRAM(S): at 22:12

## 2020-03-11 RX ADMIN — Medication 600 MILLIGRAM(S): at 18:29

## 2020-03-11 RX ADMIN — Medication 600 MILLIGRAM(S): at 06:05

## 2020-03-11 RX ADMIN — WARFARIN SODIUM 3 MILLIGRAM(S): 2.5 TABLET ORAL at 22:11

## 2020-03-11 RX ADMIN — Medication 100 MILLIGRAM(S): at 14:43

## 2020-03-11 RX ADMIN — Medication 250 MILLIGRAM(S): at 18:29

## 2020-03-11 RX ADMIN — Medication 100 MILLIGRAM(S): at 06:05

## 2020-03-11 RX ADMIN — Medication 650 MILLIGRAM(S): at 10:23

## 2020-03-11 RX ADMIN — GABAPENTIN 200 MILLIGRAM(S): 400 CAPSULE ORAL at 06:07

## 2020-03-11 RX ADMIN — Medication 40 MILLIGRAM(S): at 06:05

## 2020-03-11 RX ADMIN — Medication 1 DROP(S): at 14:48

## 2020-03-11 RX ADMIN — AMPICILLIN SODIUM AND SULBACTAM SODIUM 200 GRAM(S): 250; 125 INJECTION, POWDER, FOR SUSPENSION INTRAMUSCULAR; INTRAVENOUS at 18:29

## 2020-03-11 RX ADMIN — Medication 81 MILLIGRAM(S): at 14:42

## 2020-03-11 RX ADMIN — Medication 0.25 MILLIGRAM(S): at 22:11

## 2020-03-11 RX ADMIN — Medication 650 MILLIGRAM(S): at 03:00

## 2020-03-11 RX ADMIN — ALBUTEROL 2 PUFF(S): 90 AEROSOL, METERED ORAL at 05:24

## 2020-03-11 RX ADMIN — Medication 1 DROP(S): at 06:07

## 2020-03-11 RX ADMIN — Medication 40 MILLIGRAM(S): at 14:43

## 2020-03-11 RX ADMIN — GABAPENTIN 200 MILLIGRAM(S): 400 CAPSULE ORAL at 22:11

## 2020-03-11 RX ADMIN — GABAPENTIN 200 MILLIGRAM(S): 400 CAPSULE ORAL at 18:29

## 2020-03-11 RX ADMIN — GABAPENTIN 200 MILLIGRAM(S): 400 CAPSULE ORAL at 11:43

## 2020-03-11 NOTE — DIETITIAN INITIAL EVALUATION ADULT. - PHYSICAL APPEARANCE
other (specify) NFPE Findings:   (  ) No Muscle/fat wasting at this time  MUSCLE WASTING: Severe: (  )Temporalis (  ) Clavicle (  ) Deltoid (  )Scapula (  ) Interosseous (  ) Quads (  ) Patellar (  ) Calves    FAT LOSS: Severe: (  ) Ocular (  ) Buccal (  ) Ribs (  ) Triceps   Skin: Buttocks stage 2 with +4 left arm edema documented. Alfie score= 16 (high risk for further skin breakdown) NFPE Findings:   MUSCLE WASTING: Moderate : ( x )Temporalis ( x) Clavicle ( x ) Deltoid ( x )Scapula ( x ) Interosseous  FAT LOSS: moderate: (x ) Ocular  Mild: (  x) Ribs ( x ) Triceps   Skin: Buttocks stage 2 with +4 left arm edema documented. Alfie score= 16 (high risk for further skin breakdown)

## 2020-03-11 NOTE — DIETITIAN INITIAL EVALUATION ADULT. - OTHER INFO
89M w/PMH CKD4, BPH, Postherpetic neuralgia, Prostate CA (2008), SCC, CLL, COPD, hypothyroid, HTN, Afib s/p PPM, on AC, s/p TAVR (2019), recent admit 2/3-6, d/c'd in improved and stable condition after treated for Rt lung pna and +rhinovirus, presents today c/o worsening sob since yesterday w/ new productive cough, increased weakness, and per home aide at bedside, fever. 89M w/PMH CKD4, BPH, Postherpetic neuralgia, Prostate CA (2008), SCC, CLL, COPD, hypothyroid, HTN, Afib s/p PPM, on AC, s/p TAVR (2019), recent admit 2/3-6, d/c'd in improved and stable condition after treated for Rt lung pna and +rhinovirus, presents today c/o worsening sob since yesterday w/ new productive cough, increased weakness, and per home aide at bedside, fever. Pt reports po good and tolerating meals. Private aide at bedside and reports po intake good at home. No significant weight changes noted. since last hospital weight x 1.5 months (2.8#^/1.8%). Discussed additional protein to promote wound healing (stage 2 PU) pt refuses all po supplements at this time. Pt stated eats enough protein and does not want any supplements. Suggest additional MVI w/minerals, Vit C 500 mg BID, add Zinc Sulfate 220 mg x 10 days to promote wound healing. Current diet rx restriction not warranted due to advanced age (89yo) and nutritional status. Will continue to monitor and follow up prn.

## 2020-03-11 NOTE — PROGRESS NOTE ADULT - ASSESSMENT
Plan    ** Enterococcus Bacteremia  * Pneumonia- will treat for gram negative ibrahima and other resistant bacteria  * Rhinovirus  - on Zosyn IV D and vancomycin   - ID consult appreciated  - rpt blood cultures ngtd (3/8)  - TTE negative for vegetation  - CLEMENTINA negative for vegetation  - monitor CBC  - monitor for s/e of antibiotics.  - cardiology consulted  - c/w home dose lasix  - monitor sats   - c/w nebs  - family wanted Pulmonary second opinion- Dr Franklin called for consult  - pulm eval- patient to follow up 3-4 weeks after treatment for evaluation of pulm nodules    *dysuria  - UA with mild LE- UC negative  - bladder scan to check for retention  - on zosyn  - urology consult- pending    *macrocytic anemia Hb 7.5, which is his baseline 7-8  if further drop would consider  PRBC transfusion  - monitor  - heme consult    *hypothyroid  - TSH 5.22   - increased Synthroid from 100 mcg to 112 mcg on this admission check TSH in 4-6 weeks   - monitor TSH    *CKD- monitor creatinine*chronic AF  - continue with coumadin  - jone      Case d/w team and MD on IDR. Plan explained to patient and all of their concerns were addressed.

## 2020-03-11 NOTE — PROGRESS NOTE ADULT - ASSESSMENT
89 year old man with the above history admitted with shortness of breath and a recurrent right sided pneumonia.  The mildly elevated flat troponins are probably demand related.  His elevated BNP is probably multifactorial.  He is on antibiotics.  His blood cultures are apparently positive.  Will await identification.  Check TTE regarding his TAVR valve.  May need a CLEMENTINA.  Continue diuretics as well.  Will follow.    3/7/20:  As per above echocardiogram patient's cardiac status appears stable with a normally functioning TAVR aortic valve and normal LV systolic function.  No vegetations seen.  Awaiting culture identification-will discuss with ID.  Continue antibiotics and diuretics.    3/8/20:  + Enterococcus Faecalis in blood.  Will discuss with ID if they have high concern for endocarditis.  If so then CLEMENTINA.    3/10/20:  Cardiac status unchanged.  I am setting patient up for a CLEMENTINA    3/11/20:  No evidence for endocarditis.  Continue present management.

## 2020-03-11 NOTE — DIETITIAN INITIAL EVALUATION ADULT. - PERTINENT MEDS FT
yes MEDICATIONS  (STANDING):  acetylcysteine 10%  Inhalation 3 milliLiter(s) Inhalation three times a day  ALPRAZolam 0.25 milliGRAM(s) Oral daily  aMIOdarone    Tablet 200 milliGRAM(s) Oral daily  artificial  tears Solution 1 Drop(s) Both EYES three times a day  aspirin enteric coated 81 milliGRAM(s) Oral daily  budesonide 160 MICROgram(s)/formoterol 4.5 MICROgram(s) Inhaler 2 Puff(s) Inhalation two times a day  furosemide    Tablet 40 milliGRAM(s) Oral two times a day  gabapentin 200 milliGRAM(s) Oral four times a day  guaiFENesin  milliGRAM(s) Oral every 12 hours  levothyroxine 112 MICROGram(s) Oral daily  metoprolol succinate ER 25 milliGRAM(s) Oral daily  phenazopyridine 100 milliGRAM(s) Oral every 8 hours  piperacillin/tazobactam IVPB.. 3.375 Gram(s) IV Intermittent every 12 hours  potassium chloride    Tablet ER 10 milliEquivalent(s) Oral daily  potassium chloride    Tablet ER 40 milliEquivalent(s) Oral once  vancomycin  IVPB 1000 milliGRAM(s) IV Intermittent once  warfarin 3 milliGRAM(s) Oral daily    MEDICATIONS  (PRN):  acetaminophen   Tablet .. 650 milliGRAM(s) Oral every 6 hours PRN Mild Pain (1 - 3)  ALBUTerol    90 MICROgram(s) HFA Inhaler 2 Puff(s) Inhalation every 6 hours PRN Shortness of Breath and/or Wheezing

## 2020-03-11 NOTE — PROGRESS NOTE ADULT - SUBJECTIVE AND OBJECTIVE BOX
CHIEF COMPLAINT: Patient is a 89y old  Male who presents with a chief complaint of sob, cough (05 Mar 2020 17:03)      HPI: HPI:  HPI:  89M w/PMH CKD4, BPH, Postherpetic neuralgia, Prostate CA (2008), SCC, CLL, COPD, hypothyroid, HTN, Afib s/p PPM, on AC, s/p TAVR (2019), recent admit 2/3-6, d/c'd in improved and stable condition after treated for Rt lung pna and +rhinovirus, presents today c/o worsening sob since yesterday w/ new productive cough, increased weakness, and per home aide at bedside, fever last night.  Pt otherwise denies cp, n/v/d, abd pain.  He does endorse dysuria x 1 day.     In ED, Hg 8.3 (stable), , Plt 113, Inr 2.7, Cr 2.4 (baseline), TSH 5.2 (14.3 on 2/4), Trop 0,059, BNP 19k, cxr patchy Rt lung pna, given IV vanco/zosyn and cultures sent.     3/11/20:  CLEMENTINA performed today    PAST MEDICAL & SURGICAL HISTORY:  CKD (chronic kidney disease): Stage 4  BPH (benign prostatic hyperplasia)  Postherpetic neuralgia: Treated  Prostate CA  Basal cell carcinoma  Lymphoma: Latent  Hypothyroid  Hypertension  A-fib: Pacemaker  S/P TAVR (transcatheter aortic valve replacement)  Artificial cardiac pacemaker  H/O shoulder replacement  S/P bilateral unicompartmental knee replacement      Review of Systems:   CONSTITUTIONAL: ++ fever.  EYES: No eye pain or discharge.  ENMT:  No sinus or throat pain  NECK: No pain or stiffness  RESPIRATORY: ++ cough, NO wheezing, chills or hemoptysis; ++shortness of breath  CARDIOVASCULAR: No chest pain, palpitations, dizziness, or leg swelling  GASTROINTESTINAL: No abdominal or epigastric pain. No nausea, vomiting, or hematemesis; No diarrhea or constipation. No melena or hematochezia.  GENITOURINARY: ++ dysuria   NEUROLOGICAL: ++ headaches, No memory loss, loss of strength, numbness, or tremors  SKIN: No rashes.  MUSCULOSKELETAL: No joint pain or swelling; No muscle, back, or extremity pain  PSYCHIATRIC: No depression, anxiety, mood swings, or difficulty sleeping    Allergies  No Known Allergies    Social History:   requires assist  uses FWW  has 24H aide    FAMILY HISTORY:  Family history of ischemic heart disease    Home Medications:  albuterol 2.5 mg/3 mL (0.083%) inhalation solution: 3 milliliter(s) inhaled every 6 hours (05 Mar 2020 14:03)  alfuzosin 10 mg oral tablet, extended release: 1 tab(s) orally once a day (05 Mar 2020 14:04)  ALPRAZolam 0.25 mg oral tablet: 1 tab(s) orally twice a day at bedtime and 3am (05 Mar 2020 14:03)  amiodarone 200 mg oral tablet: 1 tab(s) orally once a day (05 Mar 2020 14:03)  aspirin 81 mg oral tablet: 1 tab(s) orally once a day (05 Mar 2020 14:04)  docusate sodium 100 mg oral capsule: 2 cap(s) orally 2 times a day (05 Mar 2020 15:18)  furosemide 40 mg oral tablet: 1 tab(s) orally 2 times a day (05 Mar 2020 15:18)  gabapentin 100 mg oral capsule: 2 cap(s) orally 4 times a day (05 Mar 2020 15:18)  lactobacillus acidophilus oral capsule: 1 cap(s) orally once a day (05 Mar 2020 15:18)  levothyroxine 100 mcg (0.1 mg) oral tablet: 1 tab(s) orally once a day (05 Mar 2020 14:03)  Metoprolol Succinate ER 25 mg oral tablet, extended release: 1 tab(s) orally once a day (05 Mar 2020 15:18)  ocular lubricant ophthalmic solution: 1 drop(s) to each affected eye 2 times a day (05 Mar 2020 14:03)  Senna 8.6 mg oral tablet: 1 tab(s) orally once a day, As Needed (05 Mar 2020 15:18)    MEDICATIONS  (STANDING):  ALPRAZolam 0.25 milliGRAM(s) Oral daily  aMIOdarone    Tablet 200 milliGRAM(s) Oral daily  artificial  tears Solution 1 Drop(s) Both EYES three times a day  aspirin enteric coated 81 milliGRAM(s) Oral daily  budesonide 160 MICROgram(s)/formoterol 4.5 MICROgram(s) Inhaler 2 Puff(s) Inhalation two times a day  furosemide    Tablet 40 milliGRAM(s) Oral two times a day  gabapentin 200 milliGRAM(s) Oral four times a day  guaiFENesin  milliGRAM(s) Oral every 12 hours  levothyroxine 112 MICROGram(s) Oral daily  metoprolol succinate ER 25 milliGRAM(s) Oral daily  phenazopyridine 100 milliGRAM(s) Oral every 8 hours  piperacillin/tazobactam IVPB.. 3.375 Gram(s) IV Intermittent every 12 hours  potassium chloride    Tablet ER 10 milliEquivalent(s) Oral daily  vancomycin  IVPB 1000 milliGRAM(s) IV Intermittent once  warfarin 3 milliGRAM(s) Oral daily                GENERAL: NAD, frail appearing  HEAD:  Atraumatic, Normocephalic  EYES: EOMI, PERRLA, conjunctiva and sclera clear  ENT: normal hearing, no nasal discharge, throat clear, dentition +dentures  NECK: Supple, No JVD, no LAD, no thyromegaly   CHEST/LUNG: b/l rhonchi and rales, respirations unlabored  HEART: Regular rate and rhythm; No murmurs, rubs, or gallops  ABDOMEN: Soft, Nontender, Nondistended; Bowel sounds present, no HSM  EXTREMITIES:  2+ Peripheral Pulses, No clubbing, cyanosis, or LE edema, LUE edema (previously checked doppler neg for DVT)   PSYCH: AAOx3, normal behavior  NEUROLOGY: non-focal, sensory and cn 2-12 intact, speech/language intact  SKIN: No visible rashes or lesions    LABS:                        8.3    5.66  )-----------( 113      ( 05 Mar 2020 13:41 )             27.3                         7.9    6.25  )-----------( 108      ( 07 Mar 2020 07:22 )             25.2                         7.5    6.24  )-----------( 121      ( 10 Mar 2020 06:38 )             25.0       03-05    143  |  107  |  44<H>  ----------------------------<  107<H>  4.4   |  30  |  2.46<H>    Ca    8.9      05 Mar 2020 13:41  Mg     2.2     03-05    TPro  5.5<L>  /  Alb  3.0<L>  /  TBili  0.6  /  DBili  x   /  AST  32  /  ALT  13  /  AlkPhos  54  03-05 03-08    144  |  108  |  54<H>  ----------------------------<  88  3.4<L>   |  30  |  2.44<H>    Ca    8.3<L>      08 Mar 2020 07:58    TPro  5.0<L>  /  Alb  2.5<L>  /  TBili  0.6  /  DBili  x   /  AST  20  /  ALT  11<L>  /  AlkPhos  49  03-07    03-10    143  |  107  |  56<H>  ----------------------------<  83  3.6   |  31  |  2.52<H>    Ca    8.5      10 Mar 2020 06:38          PT/INR - ( 05 Mar 2020 13:41 )   PT: 31.8 sec;   INR: 2.78 ratio         PTT - ( 05 Mar 2020 13:41 )  PTT:40.4 sec  CARDIAC MARKERS ( 05 Mar 2020 13:41 )  0.059 ng/mL / x     / x     / x     / x              RADIOLOGY & ADDITIONAL TESTS:    Imaging Personally Reviewed:  cxre- patchy Rt lung pna     EKG Personally Reviewed:  paced (05 Mar 2020 17:03)      PMHx: PAST MEDICAL & SURGICAL HISTORY:  CKD (chronic kidney disease): Stage 4  BPH (benign prostatic hyperplasia)  Postherpetic neuralgia: Treated  Chronic diarrhea  Prostate CA  Basal cell carcinoma  Lymphoma: Latent  Anxiety  Hypothyroid  Hypertension  A-fib: Pacemaker  S/P TAVR (transcatheter aortic valve replacement)  Artificial cardiac pacemaker  H/O shoulder replacement  S/P bilateral unicompartmental knee replacement        Soc Hx:     FAMILY HISTORY:  Family history of ischemic heart disease      Allergies: Allergies    No Known Allergies    Intolerances          REVIEW OF SYSTEMS:    As above  No chest pain + shortness of breath  No lightheadeness or syncope  No leg swelling  No palpitations  No claudication-like symptoms    MEDICATIONS  (STANDING):  albuterol/ipratropium for Nebulization 3 milliLiter(s) Nebulizer every 6 hours  ALPRAZolam 0.25 milliGRAM(s) Oral daily  aMIOdarone    Tablet 200 milliGRAM(s) Oral daily  artificial  tears Solution 1 Drop(s) Both EYES three times a day  aspirin enteric coated 81 milliGRAM(s) Oral daily  buDESOnide    Inhalation Suspension 0.25 milliGRAM(s) Inhalation every 12 hours  furosemide    Tablet 40 milliGRAM(s) Oral two times a day  gabapentin 200 milliGRAM(s) Oral four times a day  levothyroxine 112 MICROGram(s) Oral daily  metoprolol succinate ER 25 milliGRAM(s) Oral daily  phenazopyridine 100 milliGRAM(s) Oral every 8 hours  piperacillin/tazobactam IVPB.. 3.375 Gram(s) IV Intermittent every 12 hours  vancomycin  IVPB 1000 milliGRAM(s) IV Intermittent once        I&O's Summary      CAPILLARY BLOOD GLUCOSE    ICU Vital Signs Last 24 Hrs  T(C): 36.7 (10 Mar 2020 05:33), Max: 37.5 (09 Mar 2020 21:41)  T(F): 98 (10 Mar 2020 05:33), Max: 99.5 (09 Mar 2020 21:41)  HR: 60 (10 Mar 2020 05:33) (60 - 109)  BP: 112/42 (10 Mar 2020 05:33) (97/37 - 131/79)  BP(mean): --  ABP: --  ABP(mean): --  RR: 17 (10 Mar 2020 05:33) (17 - 618)  SpO2: 99% (10 Mar 2020 05:33) (97% - 99%)        PHYSICAL EXAM:   Patient in NAD  Neck: No JVD; Carotids:  2+ without bruits  Respiratory:  Clear to A&P  Cardiovascular: S1 and S2; syst m  Gastrointestinal:  Soft, non-tender; BS positive  Extremities: No peripheral edema  Vascular: 2+ peripheral pulses  Neurological: A/O x 3, no focal deficits      MEDICATIONS  (STANDING):  albuterol/ipratropium for Nebulization 3 milliLiter(s) Nebulizer every 6 hours  ALPRAZolam 0.25 milliGRAM(s) Oral daily  aMIOdarone    Tablet 200 milliGRAM(s) Oral daily  artificial  tears Solution 1 Drop(s) Both EYES three times a day  aspirin enteric coated 81 milliGRAM(s) Oral daily  buDESOnide    Inhalation Suspension 0.25 milliGRAM(s) Inhalation every 12 hours  furosemide    Tablet 40 milliGRAM(s) Oral two times a day  gabapentin 200 milliGRAM(s) Oral four times a day  guaiFENesin  milliGRAM(s) Oral every 12 hours  levothyroxine 112 MICROGram(s) Oral daily  metoprolol succinate ER 25 milliGRAM(s) Oral daily  phenazopyridine 100 milliGRAM(s) Oral every 8 hours  piperacillin/tazobactam IVPB.. 3.375 Gram(s) IV Intermittent every 12 hours  potassium chloride    Tablet ER 10 milliEquivalent(s) Oral daily  vancomycin  IVPB 1000 milliGRAM(s) IV Intermittent once  warfarin 2 milliGRAM(s) Oral daily            LABS: All Labs Reviewed:  Blood Culture: Organism Blood Culture PCR  Gram Stain Blood -- Gram Stain   Growth in aerobic bottle: Gram Positive Cocci in Pairs and Chains  Growth in anaerobic bottle: Gram Positive Cocci in Pairs and Chains  Specimen Source .Blood None  Culture-Blood --    Culture - Blood (03.05.20 @ 13:41)    Gram Stain:   Growth in aerobic bottle: Gram Positive Cocci in Pairs and Chains  Growth in anaerobic bottle: Gram Positive Cocci in Pairs and Chains    Specimen Source: .Blood None    Culture Results:   Growth in aerobic bottle: Gram Positive Cocci in Pairs and Chains  Growth in anaerobic bottle: Gram Positive Cocci in Pairs and Chains    Culture - Blood (03.05.20 @ 13:41)    Gram Stain:   Growth in aerobic bottle: Gram Positive Cocci in Pairs and Chains  Growth in anaerobic bottle: Gram Positive Cocci in Pairs and Chains    Specimen Source: .Blood None    Culture Results:   Growth in aerobic and anaerobic bottles: Enterococcus faecalis  See previous culture [id number]            BNP Serum Pro-Brain Natriuretic Peptide: 03093 pg/mL (03-05 @ 13:41)    CBC             WBC Count: 5.66 K/uL (03-05 @ 13:41)              Hemoglobin: 8.3 g/dL (03-05 @ 13:41)              Hematocrit: 27.3 % (03-05 @ 13:41)              Mean Cell Volume: 117.7 fl (03-05 @ 13:41)              Platelet Count - Automated: 113 K/uL (03-05 @ 13:41)                            Cardiac markers             Troponin I, Serum: 0.062 ng/mL (03-06 @ 02:18)  Troponin I, Serum: 0.063 ng/mL (03-05 @ 23:19)  Troponin I, Serum: 0.057 ng/mL (03-05 @ 19:51)  Troponin I, Serum: 0.059 ng/mL (03-05 @ 13:41)                             Chems        Sodium, Serum: 143 mmol/L (03-05 @ 13:41)          Potassium, Serum: 4.4 mmol/L (03-05 @ 13:41)          Blood Urea Nitrogen, Serum: 44 mg/dL (03-05 @ 13:41)          Creatinine 2.46          Magnesium, Serum: 2.2 mg/dL (03-05 @ 13:41)          Protein Total, Serum: 5.5 gm/dL (03-05 @ 13:41)                  Calcium, Total Serum: 8.9 mg/dL (03-05 @ 13:41)                  Bilirubin Total, Serum: 0.6 mg/dL (03-05 @ 13:41)          Alanine Aminotransferase (ALT/SGPT): 13 U/L (03-05 @ 13:41)          Aspartate Aminotransferase (AST/SGOT): 32 U/L (03-05 @ 13:41)                 INR: 2.78 ratio (03-05 @ 13:41)       Culture Results:   Growth in aerobic bottle: Gram Positive Cocci in Pairs and Chains  Growth in anaerobic bottle: Gram Positive Cocci in Pairs and Chains  ***Blood Panel PCR results on this specimen are available  approximately 3 hours after the Gram stain result.***  Gram stain, PCR, and/or culture results may not always  correspond due to difference in methodologies.  ************************************************************  This PCR assay was performed using [email or web address].  The following targets are tested for: Enterococcus,  vancomycin resistant enterococci, Listeria monocytogenes,  coagulase negative staphylococci, S. aureus,  methicillin resistant S. aureus, Streptococcus agalactiae  (Group B), S. pneumoniae, S. pyogenes (Group A),  Acinetobacter baumannii, Enterobacter cloacae, E. coli,  Klebsiella oxytoca, K. pneumoniae, Proteus sp.,  Serratia marcescens, Haemophilus influenzae,  Neisseria meningitidis, Pseudomonas aeruginosa, Candida  albicans, C. glabrata, C krusei, C parapsilosis,  C. tropicalis and the KPC resistance gene. (03-05 @ 13:41)  Culture Results:   Growth in aerobic bottle: Gram Positive Cocci in Pairs and Chains  Growth in anaerobic bottle: Gram Positive Cocci in Pairs and Chains (03-05 @ 13:41)        RADIOLOGY:    EKG:  Paced    Telemetry: Paced    ECHO:  < from: TTE Echo Complete w/o contrast w/ Doppler (03.06.20 @ 09:39) >  Summary     Mild to moderate mitral regurgitation is present.   Mild mitral annular calcification is present.   Well seated TAVR prosthetic valve in the aortic position. Peak   trans-prosthetic gradient is within normal limitations for this type of   prosthesis.   Peak transaortic gradient is 13 mmHg;   Moderate (2+) tricuspid valve regurgitation is present.   Normal appearing pulmonic valve structure and function.   The left atrium is mildly dilated.   The left ventricle is normal in size, wall motion and contractility.   Mild concentric left ventricular hypertrophy is present.   Estimated left ventricular ejection fraction is 55-60 %.   Normal appearing right atrium.   A device wire is seenin the RV and RA.   Normal appearing right ventricle structure and function.     Signature     ----------------------------------------------------------------   Electronically signed by Lilliana Kennedy MD(Interpreting   physician) on 03/07/2020 10:18 AM    < end of copied text >    CLEMENTINA(3/11/20):  No evidence for endocarditis on either the bio-prosthetic aortic valve or PPM; normal LV systolic function; mitral sclerosis/mild MR; moderate TR

## 2020-03-11 NOTE — DIETITIAN INITIAL EVALUATION ADULT. - PERTINENT LABORATORY DATA
03-11 Na145 mmol/L Glu 111 mg/dL<H> K+ 3.4 mmol/L<L> Cr  2.50 mg/dL<H> BUN 55 mg/dL<H> Phos n/a   Alb n/a   PAB n/a

## 2020-03-11 NOTE — PROGRESS NOTE ADULT - ASSESSMENT
89M w/PMH CKD4, BPH, Postherpetic neuralgia, Prostate CA (2008), SCC, CLL, COPD, hypothyroid, HTN, Afib s/p PPM, on AC, s/p TAVR (2019),  admitted on 3/5 for evaluation of shortness of breath associated with productive cough; also notes he has painful urination as well as frequency of urination; history per medical record as patient is poor historian.    1. Patient admitted with sepsis secondary to Enterococcus probably or urinary origin; also noted with pneumonia superimposed on Enteroviral URI  - follow up cultures to identification and sensitivity---- sensitive to ampicillin  - urine culture is pending-- and no growth  - serial cbc and monitor temperature   - oxygen and nebs as needed   - reviewed prior medical records to evaluate for resistant or atypical pathogens   - day #7 zosyn  - tolerating antibiotics without rashes or side effects   - hold on further vancomycin given renal function in this frail elderly male  - echocardiogram does not reveal any vegetations on both TTE and CLEMENTINA  - source of enterococcus may be break in skin, at linear skin defect  - repeat blood cultures are no growth; will discuss with hospitalist long term antibiotics  - consideration for CT abdomen and pelvis to exclude GI source of Enterococcus, given negative urine culture  - will change zosyn to unasyn  2. other issues; per medicine

## 2020-03-11 NOTE — PROGRESS NOTE ADULT - SUBJECTIVE AND OBJECTIVE BOX
Date of service: 03-11-20 @ 15:46    Patient lying in bed; afebrile, notes improvement in cough as well as dysuria        ROS: no fever or chills; denies dizziness, no HA, no SOB or cough, no abdominal pain, no diarrhea or constipation; no dysuria, no urinary frequency, no legs pain, no rashes    MEDICATIONS  (STANDING):  acetylcysteine 10%  Inhalation 3 milliLiter(s) Inhalation three times a day  ALPRAZolam 0.25 milliGRAM(s) Oral daily  aMIOdarone    Tablet 200 milliGRAM(s) Oral daily  artificial  tears Solution 1 Drop(s) Both EYES three times a day  aspirin enteric coated 81 milliGRAM(s) Oral daily  budesonide 160 MICROgram(s)/formoterol 4.5 MICROgram(s) Inhaler 2 Puff(s) Inhalation two times a day  furosemide    Tablet 40 milliGRAM(s) Oral daily  gabapentin 200 milliGRAM(s) Oral four times a day  guaiFENesin  milliGRAM(s) Oral every 12 hours  levothyroxine 112 MICROGram(s) Oral daily  metoprolol succinate ER 25 milliGRAM(s) Oral daily  phenazopyridine 100 milliGRAM(s) Oral every 8 hours  piperacillin/tazobactam IVPB.. 3.375 Gram(s) IV Intermittent every 12 hours  potassium chloride    Tablet ER 10 milliEquivalent(s) Oral daily  vancomycin  IVPB 1000 milliGRAM(s) IV Intermittent once  warfarin 3 milliGRAM(s) Oral daily    MEDICATIONS  (PRN):  acetaminophen   Tablet .. 650 milliGRAM(s) Oral every 6 hours PRN Mild Pain (1 - 3)  ALBUTerol    90 MICROgram(s) HFA Inhaler 2 Puff(s) Inhalation every 6 hours PRN Shortness of Breath and/or Wheezing      Vital Signs Last 24 Hrs  T(C): 36.4 (11 Mar 2020 12:12), Max: 37.2 (10 Mar 2020 20:23)  T(F): 97.5 (11 Mar 2020 12:12), Max: 99 (10 Mar 2020 20:23)  HR: 66 (11 Mar 2020 12:12) (59 - 90)  BP: 137/90 (11 Mar 2020 12:12) (107/71 - 142/64)  BP(mean): 77 (11 Mar 2020 08:03) (77 - 77)  RR: 18 (11 Mar 2020 12:12) (16 - 18)  SpO2: 100% (11 Mar 2020 12:12) (94% - 100%)    Physical Exam:          PE:    Constitutional: frail looking  HEENT: NC/AT, EOMI, PERRLA, conjunctivae clear; ears and nose atraumatic; pharynx clear  Neck: supple; thyroid not palpable  Back: no tenderness  Respiratory: respiratory effort normal; scattered coarse breath sounds with wheeze improved  Cardiovascular: S1S2 regular, 2/6 systolic murmur  Abdomen: soft, not tender, difusely distended, positive BS; no liver or spleen organomegaly  Genitourinary: no suprapubic tenderness  Musculoskeletal: no muscle tenderness, no joint swelling or tenderness  Neurological/ Psychiatric:  moving all extremities  Skin: no rashes; no palpable lesions; ecchymosis of skin    Labs: all available labs reviewed                  Labs:                         Labs:                        8.3    7.10  )-----------( 125      ( 11 Mar 2020 06:31 )             26.7     03-11    145  |  108  |  55<H>  ----------------------------<  111<H>  3.4<L>   |  31  |  2.50<H>    Ca    8.3<L>      11 Mar 2020 06:31             Cultures:       Culture - Blood (collected 03-08-20 @ 07:58)  Source: .Blood None  Preliminary Report (03-09-20 @ 13:02):    No growth to date.    Culture - Blood (collected 03-08-20 @ 07:58)  Source: .Blood None  Preliminary Report (03-09-20 @ 13:02):    No growth to date.    Culture - Urine (collected 03-05-20 @ 18:01)  Source: .Urine None  Final Report (03-07-20 @ 07:37):    <10,000 CFU/mL Normal Urogenital Deanna    Culture - Blood (collected 03-05-20 @ 13:41)  Source: .Blood None  Gram Stain (03-06-20 @ 06:01):    Growth in aerobic bottle: Gram Positive Cocci in Pairs and Chains    Growth in anaerobic bottle: Gram Positive Cocci in Pairs and Chains  Final Report (03-08-20 @ 14:48):    Growth in aerobic and anaerobic bottles: Enterococcus faecalis    See previous culture [id number]    Culture - Blood (collected 03-05-20 @ 13:41)  Source: .Blood None  Gram Stain (03-06-20 @ 04:26):    Growth in aerobic bottle: Gram Positive Cocci in Pairs and Chains    Growth in anaerobic bottle: Gram Positive Cocci in Pairs and Chains  Final Report (03-08-20 @ 14:48):    Growth in aerobic and anaerobic bottles: Enterococcus faecalis    ***Blood Panel PCR results on this specimen are available    approximately 3 hours after the Gram stain result.***    Gram stain, PCR, and/or culture results may not always    correspond dueto difference in methodologies.    ************************************************************    This PCR assay was performed using [employer].    The following targets are tested for: Enterococcus,    vancomycin resistant enterococci, Listeria monocytogenes,    coagulase negative staphylococci, S. aureus,    methicillin resistant S. aureus, Streptococcus agalactiae    (Group B), S. pneumoniae, S. pyogenes (Group A),    Acinetobacter baumannii, Enterobacter cloacae, E. coli,    Klebsiella oxytoca, K. pneumoniae, Proteus sp.,    Serratia marcescens, Haemophilus influenzae,    Neisseria meningitidis, Pseudomonas aeruginosa, Candida    albicans, C. glabrata, C krusei, C parapsilosis,    C. tropicalis and the KPC resistance gene.  Organism: Blood Culture PCR  Enterococcus faecalis (03-08-20 @ 14:48)  Organism: Enterococcus faecalis (03-08-20 @ 14:48)      -  Ampicillin: S <=2 Predicts results to ampicillin/sulbactam, amoxacillin-clavulanate and  piperacillin-tazobactam.      -  Gentamicin synergy: S      -  Vancomycin: S 2      Method Type: GREG  Organism: Blood Culture PCR (03-08-20 @ 14:48)      -  Enterococcus species: Detec      Method Type: PCR            < from: CLEMENTINA Complete w/Spect and Color (03.11.20 @ 08:19) >     Impression     Summary     Normal left ventricular systolic function.   Normal left ventricular systolic function.   No left atrial thrombus or mass is seen.   A saline contrast study was performed and showed no evidence of a patent   foramen ovale.   Normal appearing bio-prosthetic aortic valve without vegetations seen. The   mild grey-prosthetic aortic insufficiency   There is moderate mitral valve sclerosis with mild MR   Moderate (2+) tricuspid valve regurgitation is present.   A pericardial effusion is not present.   Mild non-protruding aortic atherosclerosis is seen   No vegetations seen on the PPM     No evidence for endocarditis    < end of copied text >            < from: TTE Echo Complete w/o contrast w/ Doppler (03.06.20 @ 09:39) >  Impression     Summary     Mild to moderate mitral regurgitation is present.   Mild mitral annular calcification is present.   Well seated TAVR prosthetic valve in the aortic position. Peak   trans-prosthetic gradient is within normal limitations for this type of   prosthesis.   Peak transaortic gradient is 13 mmHg;   Moderate (2+) tricuspid valve regurgitation is present.   Normal appearing pulmonic valve structure and function.   The left atrium is mildly dilated.   The left ventricle is normal in size, wall motion and contractility.   Mild concentric left ventricular hypertrophy is present.   Estimated left ventricular ejection fraction is 55-60 %.   Normal appearing right atrium.   A device wire is seenin the RV and RA.   Normal appearing right ventricle structure and function.    < end of copied text >            < from: Xray Chest 1 View- PORTABLE-Urgent (03.05.20 @ 14:51) >    EXAM:  XR CHEST PORTABLE URGENT 1V                            PROCEDURE DATE:  03/05/2020          INTERPRETATION:  XR CHEST URGENT    Single AP view    HISTORY:  Shortness of breath    Comparison: Chest x-ray 2/3/2020      Left dual-lead pacer.    Status post TAVR.   Right shoulder arthroplasty.  The cardiac silhouette is rotated. Patchy right mid to lower lung airspace disease.. No pleural abnormality.    IMPRESSION: Patchy right lung pneumonia    < end of copied text >        Radiology: all available radiological tests reviewed    Advanced directives addressed: full resuscitation

## 2020-03-11 NOTE — PROGRESS NOTE ADULT - SUBJECTIVE AND OBJECTIVE BOX
NEPHROLOGY INTERVAL HPI/OVERNIGHT EVENTS:    Date of Service: 20 @ 12:02  3/11 SY  Feeling fair.  Post CLEMENTINA this morning.  Reportedly negative.  Continues with cough but No SOB.     3/10  in bed feels well  no new events  creat stable  aide in room  + cough, productive    3/9 SY  No acute distress.  Complains of cough and inability to expectorate.  No SOB at rest.    HPI:  89 yo man with PMHX of CKD (recent creat ~2.2-2.5), HTN, A FIB , COPD and hx of CLL and TAVR (2019).  Pt was recently admitted ( early February)  for PNA and Viral URI.  Pt admitted with complaints of increased cough, SOB and weakness.  Noted with Anemia and stable CKD on admission.    PMHX and PSHX.  1.CKD (baseline crear in low 2's)  2.A FIB  3.COPD  4.Hx of Prostate CA ( Post RT)()  5.Hx of Lymphoma Post Rituxan therapy.  6.Hx of Postherpetic Neuralgia  7.Hypothyroidism  8.Hx of Chronic Diarrhea.  9.Hx of CHF.  10.S/p TAVR (2019)  11.HTN    MEDICATIONS  (STANDING):  acetylcysteine 10%  Inhalation 3 milliLiter(s) Inhalation three times a day  ALPRAZolam 0.25 milliGRAM(s) Oral daily  aMIOdarone    Tablet 200 milliGRAM(s) Oral daily  artificial  tears Solution 1 Drop(s) Both EYES three times a day  aspirin enteric coated 81 milliGRAM(s) Oral daily  budesonide 160 MICROgram(s)/formoterol 4.5 MICROgram(s) Inhaler 2 Puff(s) Inhalation two times a day  furosemide    Tablet 40 milliGRAM(s) Oral daily  gabapentin 200 milliGRAM(s) Oral four times a day  guaiFENesin  milliGRAM(s) Oral every 12 hours  levothyroxine 112 MICROGram(s) Oral daily  metoprolol succinate ER 25 milliGRAM(s) Oral daily  phenazopyridine 100 milliGRAM(s) Oral every 8 hours  piperacillin/tazobactam IVPB.. 3.375 Gram(s) IV Intermittent every 12 hours  potassium chloride    Tablet ER 10 milliEquivalent(s) Oral daily  vancomycin  IVPB 1000 milliGRAM(s) IV Intermittent once  warfarin 3 milliGRAM(s) Oral daily    MEDICATIONS  (PRN):  acetaminophen   Tablet .. 650 milliGRAM(s) Oral every 6 hours PRN Mild Pain (1 - 3)  ALBUTero    Vital Signs Last 24 Hrs  T(C): 36.3 (11 Mar 2020 08:25), Max: 37.2 (10 Mar 2020 20:23)  T(F): 97.4 (11 Mar 2020 08:25), Max: 99 (10 Mar 2020 20:23)  HR: 60 (11 Mar 2020 09:30) (59 - 90)  BP: 142/64 (11 Mar 2020 09:30) (107/71 - 142/64)  BP(mean): 77 (11 Mar 2020 08:03) (77 - 77)  RR: 16 (11 Mar 2020 09:30) (16 - 18)  SpO2: 96% (11 Mar 2020 09:30) (94% - 99%)  Daily     Daily Weight in k.2 (11 Mar 2020 10:34)    10 @ 07:  -  11 @ 07:00  --------------------------------------------------------  IN: 517 mL / OUT: 0 mL / NET: 517 mL     @ 07:01  -  11 @ 12:02  --------------------------------------------------------  IN: 360 mL / OUT: 0 mL / NET: 360 mL    PHYSICAL EXAM: Alert and comfrotable  GENERAL: no acute distress  CHEST/LUNG: fair air entry  HEART: S1S2 RRR  ABDOMEN: soft   EXTREMITIES: no edema  SKIN:     LABS:                        8.3    7.10  )-----------( 125      ( 11 Mar 2020 06:31 )             26.7         145  |  108  |  55<H>  ----------------------------<  111<H>  3.4<L>   |  31  |  2.50<H>    Ca    8.3<L>      11 Mar 2020 06:31      PT/INR - ( 10 Mar 2020 06:38 )   PT: 24.7 sec;   INR: 2.17 ratio         PTT - ( 10 Mar 2020 06:38 )  PTT:35.3 sec            RADIOLOGY & ADDITIONAL TESTS:

## 2020-03-11 NOTE — PROGRESS NOTE ADULT - SUBJECTIVE AND OBJECTIVE BOX
89M w/PMH CKD4, BPH, Postherpetic neuralgia, Prostate CA (2008), SCC, CLL, COPD, hypothyroid, HTN, Afib s/p PPM, on AC, s/p TAVR (2019), recent admit 2/3-6, d/c'd in improved and stable condition after treated for Rt lung pna and +rhinovirus, presents today c/o worsening sob since yesterday w/ new productive cough, increased weakness, and per home aide at bedside, fever.  In ED, Hg 8.3 (stable), , Plt 113, Inr 2.7, Cr 2.4 (baseline), TSH 5.2 (14.3 on 2/4), Trop 0,059, BNP 19k, cxr patchy Rt lung pna, given IV vanco/zosyn.     3/11- patient was seen and examined. still complaining of dysuria +cough- tolerated CLEMENTINA this monirng-     Vital Signs Last 24 Hrs  T(C): 36.4 (11 Mar 2020 12:12), Max: 37.2 (10 Mar 2020 20:23)  T(F): 97.5 (11 Mar 2020 12:12), Max: 99 (10 Mar 2020 20:23)  HR: 66 (11 Mar 2020 12:12) (59 - 90)  BP: 137/90 (11 Mar 2020 12:12) (107/71 - 142/64)  BP(mean): 77 (11 Mar 2020 08:03) (77 - 77)  RR: 18 (11 Mar 2020 12:12) (16 - 18)  SpO2: 100% (11 Mar 2020 12:12) (94% - 100%)  REVIEW OF SYSTEMS:    CONSTITUTIONAL: No weakness, fevers or chills  EYES/ENT: No visual changes;  No vertigo or throat pain   NECK: No pain or stiffness  RESPIRATORY: No cough, wheezing, hemoptysis; No shortness of breath  CARDIOVASCULAR: No chest pain or palpitations  GASTROINTESTINAL: No abdominal or epigastric pain. No nausea, vomiting, or hematemesis; No diarrhea or constipation. No melena or hematochezia.  GENITOURINARY: No dysuria, frequency or hematuria  NEUROLOGICAL: No numbness or weakness  SKIN: No itching, burning, rashes, or lesions   All other review of systems is negative unless indicated above.    GEN: frail, deconditioned, lying in bed, NAD  HEENT:   NC/AT, pupils equal and reactive, EOMI  CV:  +S1, +S2, RRR  RESP:  b/l rales no wheeze, rales, rhonchi   GI:  abdomen soft, non-tender, distended, normoactive BS  RECTAL:  not examined  :  not examined  MSK:   normal muscle tone  EXT:  no edema  NEURO:  AAOX3, no focal neurological deficits  SKIN:  multiple echymotic area on arms L>R    03-11    145  |  108  |  55<H>  ----------------------------<  111<H>  3.4<L>   |  31  |  2.50<H>    Ca    8.3<L>      11 Mar 2020 06:31                            8.3    7.10  )-----------( 125      ( 11 Mar 2020 06:31 )             26.7       MEDICATIONS  (STANDING):  acetylcysteine 10%  Inhalation 3 milliLiter(s) Inhalation three times a day  ALPRAZolam 0.25 milliGRAM(s) Oral daily  aMIOdarone    Tablet 200 milliGRAM(s) Oral daily  artificial  tears Solution 1 Drop(s) Both EYES three times a day  aspirin enteric coated 81 milliGRAM(s) Oral daily  budesonide 160 MICROgram(s)/formoterol 4.5 MICROgram(s) Inhaler 2 Puff(s) Inhalation two times a day  furosemide    Tablet 40 milliGRAM(s) Oral daily  gabapentin 200 milliGRAM(s) Oral four times a day  guaiFENesin  milliGRAM(s) Oral every 12 hours  levothyroxine 112 MICROGram(s) Oral daily  metoprolol succinate ER 25 milliGRAM(s) Oral daily  phenazopyridine 100 milliGRAM(s) Oral every 8 hours  piperacillin/tazobactam IVPB.. 3.375 Gram(s) IV Intermittent every 12 hours  potassium chloride    Tablet ER 10 milliEquivalent(s) Oral daily  vancomycin  IVPB 1000 milliGRAM(s) IV Intermittent once  warfarin 3 milliGRAM(s) Oral daily    MEDICATIONS  (PRN):  acetaminophen   Tablet .. 650 milliGRAM(s) Oral every 6 hours PRN Mild Pain (1 - 3)  ALBUTerol    90 MICROgram(s) HFA Inhaler 2 Puff(s) Inhalation every 6 hours PRN Shortness of Breath and/or Wheezing      < from: Xray Chest 1 View- PORTABLE-Urgent (03.09.20 @ 18:27) >  EXAM:  XR CHEST PORTABLE URGENT 1V                        PROCEDURE DATE:  03/09/2020    INTERPRETATION:  Portable chest radiograph        CLINICAL INFORMATION:   Short of breath.    TECHNIQUE:  Portable  AP view of the chest was obtained.    COMPARISON: 3/5/2020 available for review.    FINDINGS:   The lungs  show RIGHT lower lobe atelectasis and/or effusion. I there is LEFT medial basilar airspace consolidation and/or effusion obscuring LEFT diaphragmatic contour. Upper lobes clear.  The  heart is enlarged in transverse diameter. No hilar mass. Trachea midline.    Status post cardiac valve replacement. Cardiac device wire leads are within right atrium and right ventricle.   Visualized osseous structures are intact.    MEDICATIONS  (STANDING):  acetylcysteine 10%  Inhalation 3 milliLiter(s) Inhalation three times a day  ALPRAZolam 0.25 milliGRAM(s) Oral daily  aMIOdarone    Tablet 200 milliGRAM(s) Oral daily  artificial  tears Solution 1 Drop(s) Both EYES three times a day  aspirin enteric coated 81 milliGRAM(s) Oral daily  budesonide 160 MICROgram(s)/formoterol 4.5 MICROgram(s) Inhaler 2 Puff(s) Inhalation two times a day  furosemide    Tablet 40 milliGRAM(s) Oral daily  gabapentin 200 milliGRAM(s) Oral four times a day  guaiFENesin  milliGRAM(s) Oral every 12 hours  levothyroxine 112 MICROGram(s) Oral daily  metoprolol succinate ER 25 milliGRAM(s) Oral daily  phenazopyridine 100 milliGRAM(s) Oral every 8 hours  piperacillin/tazobactam IVPB.. 3.375 Gram(s) IV Intermittent every 12 hours  potassium chloride    Tablet ER 10 milliEquivalent(s) Oral daily  vancomycin  IVPB 1000 milliGRAM(s) IV Intermittent once  warfarin 3 milliGRAM(s) Oral daily    MEDICATIONS  (PRN):  acetaminophen   Tablet .. 650 milliGRAM(s) Oral every 6 hours PRN Mild Pain (1 - 3)  ALBUTerol    90 MICROgram(s) HFA Inhaler 2 Puff(s) Inhalation every 6 hours PRN Shortness of Breath and/or Wheezing

## 2020-03-11 NOTE — DIETITIAN INITIAL EVALUATION ADULT. - ADD RECOMMEND
1) 1) liberalize diet to low sodium 2) Suggest add MVI w/minerals, Vit C 500 mg BID, add Zinc Sulfate 220 mg x 10 days to promote wound healing. 3) encourage pt to order meals for food preferences and optimal intake. 4) monitor labs/lytes replete if needed. 5) monitor daily weights

## 2020-03-11 NOTE — CONSULT NOTE ADULT - SUBJECTIVE AND OBJECTIVE BOX
Pulmonary Consult    HPI:    PAST MEDICAL & SURGICAL HISTORY:  CKD (chronic kidney disease): Stage 4  BPH (benign prostatic hyperplasia)  Postherpetic neuralgia: Treated  Chronic diarrhea  Prostate CA  Basal cell carcinoma  Lymphoma: Latent  Anxiety  Hypothyroid  Hypertension  A-fib: Pacemaker  S/P TAVR (transcatheter aortic valve replacement)  Artificial cardiac pacemaker  H/O shoulder replacement  S/P bilateral unicompartmental knee replacement        FAMILY HISTORY:  Family history of ischemic heart disease      SOCIAL HISTORY:    No smoking no drug or alcohol abuse .     Allergies    No Known Allergies    Intolerances              REVIEW OF SYSTEMS:  Constitutional: No fevers or chills or weight loss.   Eyes: No itching or discharge from the eyes  ENT:  No post nasal drip. No epistaxis. No throat pain. . No difficulty swallowing.   CV: No chest pain. No palpitations.  or dizziness.   Resp: No dyspnea  No wheezing. No cough. No stridor No sputum production. No chest pain with breathing .  GI: No nausea. No vomiting. No diarrhea or abdominal pain   MSK: No joint pain or pain in any extremities  Integumentary: No skin lesions. No pedal edema.  Neurological: No gross motor weakness. No sensory changes.      OBJECTIVE:  Vital Signs Last 24 Hrs  T(C): 36.3 (11 Mar 2020 08:25), Max: 37.2 (10 Mar 2020 20:23)  T(F): 97.4 (11 Mar 2020 08:25), Max: 99 (10 Mar 2020 20:23)  HR: 60 (11 Mar 2020 09:00) (59 - 90)  BP: 126/- (11 Mar 2020 09:00) (101/37 - 135/58)  BP(mean): 77 (11 Mar 2020 08:03) (77 - 77)  RR: 16 (11 Mar 2020 09:00) (16 - 20)  SpO2: 96% (11 Mar 2020 09:00) (94% - 99%)      PHYSICAL EXAM:  General: Awake, alert, oriented X 3.   HEENT: Atraumatic, normocephalic.   Neck: No JVD no lymphadenopathy   Respiratory: normal vesicular breathing with no wheeze or rales on the exam .  Cardiovascular: S1 S2 normal. No murmurs, rubs or gallops.   Abdomen: Soft, non-tender, non-distended. No organomegaly.  Extremities: Warm to touch. Peripheral pulse palpable. No pedal edema.   Skin: No rashes or skin lesions  Neurological: Motor and sensory examination equal and normal in all four extremities.  Psychiatry: Appropriate mood and affect.    HOSPITAL MEDICATIONS:  MEDICATIONS  (STANDING):  ALPRAZolam 0.25 milliGRAM(s) Oral daily  aMIOdarone    Tablet 200 milliGRAM(s) Oral daily  artificial  tears Solution 1 Drop(s) Both EYES three times a day  aspirin enteric coated 81 milliGRAM(s) Oral daily  budesonide 160 MICROgram(s)/formoterol 4.5 MICROgram(s) Inhaler 2 Puff(s) Inhalation two times a day  furosemide    Tablet 40 milliGRAM(s) Oral two times a day  gabapentin 200 milliGRAM(s) Oral four times a day  guaiFENesin  milliGRAM(s) Oral every 12 hours  levothyroxine 112 MICROGram(s) Oral daily  metoprolol succinate ER 25 milliGRAM(s) Oral daily  phenazopyridine 100 milliGRAM(s) Oral every 8 hours  piperacillin/tazobactam IVPB.. 3.375 Gram(s) IV Intermittent every 12 hours  potassium chloride    Tablet ER 10 milliEquivalent(s) Oral daily  vancomycin  IVPB 1000 milliGRAM(s) IV Intermittent once  warfarin 3 milliGRAM(s) Oral daily    MEDICATIONS  (PRN):  acetaminophen   Tablet .. 650 milliGRAM(s) Oral every 6 hours PRN Mild Pain (1 - 3)  ALBUTerol    90 MICROgram(s) HFA Inhaler 2 Puff(s) Inhalation every 6 hours PRN Shortness of Breath and/or Wheezing      LABS:                        8.3    7.10  )-----------( 125      ( 11 Mar 2020 06:31 )             26.7     03-11    145  |  108  |  55<H>  ----------------------------<  111<H>  3.4<L>   |  31  |  2.50<H>    Ca    8.3<L>      11 Mar 2020 06:31      PT/INR - ( 10 Mar 2020 06:38 )   PT: 24.7 sec;   INR: 2.17 ratio         PTT - ( 10 Mar 2020 06:38 )  PTT:35.3 sec    < from: Xray Chest 1 View- PORTABLE-Urgent (03.09.20 @ 18:27) >  EXAM:  XR CHEST PORTABLE URGENT 1V                            PROCEDURE DATE:  03/09/2020          INTERPRETATION:  Portable chest radiograph        CLINICAL INFORMATION:   Short of breath.    TECHNIQUE:  Portable  AP view of the chest was obtained.    COMPARISON: 3/5/2020 available for review.    FINDINGS:   The lungs  show RIGHT lower lobe atelectasis and/or effusion. I there is LEFT medial basilar airspace consolidation and/or effusion obscuring LEFT diaphragmatic contour. Upper lobes clear.    The  heart is enlarged in transverse diameter. No hilar mass. Trachea midline.    Status post cardiac valve replacement. Cardiac device wire leads are within right atrium and right ventricle.           Visualized osseous structures are intact.        IMPRESSION:   RIGHT lower lobe atelectasis.  LEFT retrocardiac airspace consolidation.. Pulmonary Consult     patient with past medical history of CKD and BPH prostate CA CLL initially admitted to the floors with enterococcal sepsis and possible right lower lobe pneumonia being treated for pneumonia and enterococcal sepsis with broad-spectrum antibiotic therapy continue to have spasms of the cough and pulmonary consult was requested regarding the 2nd opinion.    Patient was seen following  CLEMENTINA  and somewhat poor historian says continued to have cough spasms predominantly at night and has been using oxygen for the last 3-4 years with no clear documented history of COPD in the past and he declines any history of smoking seems to be having history of lung nodules which was followed up with Dr Morales .   currently breathing comfortably on the nasal cannula not in distress with no new fever spikes while on antibiotics      PAST MEDICAL & SURGICAL HISTORY:  CKD (chronic kidney disease): Stage 4  BPH (benign prostatic hyperplasia)  Postherpetic neuralgia: Treated  Chronic diarrhea  Prostate CA  Basal cell carcinoma  Lymphoma: Latent  Anxiety  Hypothyroid  Hypertension  A-fib: Pacemaker  S/P TAVR (transcatheter aortic valve replacement)  Artificial cardiac pacemaker  H/O shoulder replacement  S/P bilateral unicompartmental knee replacement        FAMILY HISTORY:  Family history of ischemic heart disease      SOCIAL HISTORY:    No smoking no drug or alcohol abuse .     Allergies    No Known Allergies    Intolerances              REVIEW OF SYSTEMS:   poor historian unable to evaluate fully does mention with coughing and chest congestion and feels difficulty in bringing the sputum.    OBJECTIVE:  Vital Signs Last 24 Hrs  T(C): 36.3 (11 Mar 2020 08:25), Max: 37.2 (10 Mar 2020 20:23)  T(F): 97.4 (11 Mar 2020 08:25), Max: 99 (10 Mar 2020 20:23)  HR: 60 (11 Mar 2020 09:00) (59 - 90)  BP: 126/- (11 Mar 2020 09:00) (101/37 - 135/58)  BP(mean): 77 (11 Mar 2020 08:03) (77 - 77)  RR: 16 (11 Mar 2020 09:00) (16 - 20)  SpO2: 96% (11 Mar 2020 09:00) (94% - 99%)      PHYSICAL EXAM:  General: Awake, alert,  and partially ovarian  HEENT: Atraumatic, normocephalic.   Neck: No JVD no lymphadenopathy   Respiratory: normal vesicular breathing with  decreased breath sounds in the right base with occasional wheeze  Cardiovascular: S1 S2 normal.  short systolic murmur at the aortic area  Abdomen: Soft, non-tender, non-distended. No organomegaly.  Extremities:  no edema of the legs left upper extremity is swollen with history of previous surgery  Skin: No rashes or skin lesions  Neurological: Motor and sensory examination equal and normal in all four extremities.  Psychiatry: Appropriate mood and affect.    HOSPITAL MEDICATIONS:  MEDICATIONS  (STANDING):  ALPRAZolam 0.25 milliGRAM(s) Oral daily  aMIOdarone    Tablet 200 milliGRAM(s) Oral daily  artificial  tears Solution 1 Drop(s) Both EYES three times a day  aspirin enteric coated 81 milliGRAM(s) Oral daily  budesonide 160 MICROgram(s)/formoterol 4.5 MICROgram(s) Inhaler 2 Puff(s) Inhalation two times a day  furosemide    Tablet 40 milliGRAM(s) Oral two times a day  gabapentin 200 milliGRAM(s) Oral four times a day  guaiFENesin  milliGRAM(s) Oral every 12 hours  levothyroxine 112 MICROGram(s) Oral daily  metoprolol succinate ER 25 milliGRAM(s) Oral daily  phenazopyridine 100 milliGRAM(s) Oral every 8 hours  piperacillin/tazobactam IVPB.. 3.375 Gram(s) IV Intermittent every 12 hours  potassium chloride    Tablet ER 10 milliEquivalent(s) Oral daily  vancomycin  IVPB 1000 milliGRAM(s) IV Intermittent once  warfarin 3 milliGRAM(s) Oral daily    MEDICATIONS  (PRN):  acetaminophen   Tablet .. 650 milliGRAM(s) Oral every 6 hours PRN Mild Pain (1 - 3)  ALBUTerol    90 MICROgram(s) HFA Inhaler 2 Puff(s) Inhalation every 6 hours PRN Shortness of Breath and/or Wheezing      LABS:                        8.3    7.10  )-----------( 125      ( 11 Mar 2020 06:31 )             26.7     03-11    145  |  108  |  55<H>  ----------------------------<  111<H>  3.4<L>   |  31  |  2.50<H>    Ca    8.3<L>      11 Mar 2020 06:31      PT/INR - ( 10 Mar 2020 06:38 )   PT: 24.7 sec;   INR: 2.17 ratio         PTT - ( 10 Mar 2020 06:38 )  PTT:35.3 sec    < from: Xray Chest 1 View- PORTABLE-Urgent (03.09.20 @ 18:27) >  EXAM:  XR CHEST PORTABLE URGENT 1V                            PROCEDURE DATE:  03/09/2020          INTERPRETATION:  Portable chest radiograph        CLINICAL INFORMATION:   Short of breath.    TECHNIQUE:  Portable  AP view of the chest was obtained.    COMPARISON: 3/5/2020 available for review.    FINDINGS:   The lungs  show RIGHT lower lobe atelectasis and/or effusion. I there is LEFT medial basilar airspace consolidation and/or effusion obscuring LEFT diaphragmatic contour. Upper lobes clear.    The  heart is enlarged in transverse diameter. No hilar mass. Trachea midline.    Status post cardiac valve replacement. Cardiac device wire leads are within right atrium and right ventricle.           Visualized osseous structures are intact.        IMPRESSION:   RIGHT lower lobe atelectasis.  LEFT retrocardiac airspace consolidation..

## 2020-03-11 NOTE — PACU DISCHARGE NOTE - COMMENTS
Report to patient's RN. Patient is awake and alert. Speech clear. Transported back to 5S via stretcher.

## 2020-03-11 NOTE — DIETITIAN INITIAL EVALUATION ADULT. - PROBLEM SELECTOR PLAN 7
in the same range as prior admits  check serial trops  doubt ACS, no cp  cardio cs for input  c/w asa, bb

## 2020-03-11 NOTE — PROGRESS NOTE ADULT - ATTENDING COMMENTS
PNA   enteroccocal bacteremia likely from sacral ulcers  on IV abx   will discuss with ID regarding CLEMENTINA  repeat CXR as pt with persistent cough   urology consult for persitent dysuria
will order CT Abd/pelvis to check source of enterococcus bacteremia
patient seen and examined with PA student Pricila Thakur. I was physically present for the key portions of the evaluation and management (E/M) service provided.  I agree with the above history, physical, and plan which I have reviewed and edited where appropriate.   - as per family request pt was seen by renal and heme  - plan to transfuse with hgb goal of 8 as per heme onc will check CBC in AM  - f/u repeat bld cx sent today and continue IV abx as per ID
patient seen and examined with PA student Pricila Thakur. I was physically present for the key portions of the evaluation and management (E/M) service provided.  I agree with the above history, physical, and plan which I have reviewed and edited where appropriate.  - ID eval noted and will f/u cultures will need Echo  - s/s eval noted  - tsh elevated synthroid increased

## 2020-03-11 NOTE — CHART NOTE - NSCHARTNOTEFT_GEN_A_CORE
Upon Nutritional Assessment by the Registered Dietitian your patient was determined to meet criteria / has evidence of the following diagnosis/diagnoses:          [ ]  Mild Protein Calorie Malnutrition        [ ]  Moderate Protein Calorie Malnutrition        [x ] Severe Protein Calorie Malnutrition        [ ] Unspecified Protein Calorie Malnutrition        [ ] Underweight / BMI <19        [ ] Morbid Obesity / BMI > 40      Findings as based on:  •  Comprehensive nutrition assessment and consultation  •  Calorie counts (nutrient intake analysis)  •  Food acceptance and intake status from observations by staff  •  Follow up  •  Patient education  •  Intervention secondary to interdisciplinary rounds  •   concerns      *********Malnutrition severe Protein/calorie malnutrition in context of acute on chronic illness.   Etiology AEB increased nutrient needs 2/2 PNA/mets CA/ unhealing wound.   Signs/Symptoms fluctuating po intake, severe edema, moderate/mild muscle/fat wasting, Stage 2 Buttock.   Goal/Expected Outcome optimal po intake to meet >80% of ENN. Reduced s/s of malnutrition, Wound healing.      · Additional Recommendations  1) liberalize diet to low sodium   2) Suggest add MVI w/minerals, Vit C 500 mg BID, add Zinc Sulfate 220 mg x 10 days to promote wound healing.   3) encourage pt to order meals for food preferences and optimal intake.   4) monitor labs/lytes replete if needed.   5) monitor daily weights        PROVIDER Section:     By signing this assessment you are acknowledging and agree with the diagnosis/diagnoses assigned by the Registered Dietitian    Comments:

## 2020-03-11 NOTE — CONSULT NOTE ADULT - ASSESSMENT
- right lower lobe atelectasis with the effusion seen with the last cxr with the mild patchy air space disease in the left base with the probable pneumonia   - poor cough reflux with inability to clear the secretions   - positive preceding viral infection with the rhino and enterovirus infection   - enlarge mediastinal lymph nodes with the CLL  - enterococcal sepsis on broad spectrum therapy with the repeat cultures were negative   - afib on  anticoagulation  - other medical issues include ckd prostate cancer anemia macrocytic  and recent hospitalization for the pneumonia     PLAN     - would repeat cxr for the ongoing cough and follow up of the atelectasis   - continue with the antibiotic therapy for the pneumonia   - chest physical therapy and symptomatic relief for the cough incentive spirometry   - continue with the nebs and 02 supplementation for the cough   - follow up cxr for any significant effusion   - keep in negative balance with the continued diuretics

## 2020-03-12 ENCOUNTER — TRANSCRIPTION ENCOUNTER (OUTPATIENT)
Age: 85
End: 2020-03-12

## 2020-03-12 VITALS
HEART RATE: 60 BPM | SYSTOLIC BLOOD PRESSURE: 103 MMHG | OXYGEN SATURATION: 99 % | TEMPERATURE: 98 F | DIASTOLIC BLOOD PRESSURE: 46 MMHG | RESPIRATION RATE: 18 BRPM

## 2020-03-12 LAB
ANION GAP SERPL CALC-SCNC: 5 MMOL/L — SIGNIFICANT CHANGE UP (ref 5–17)
APTT BLD: 35.6 SEC — SIGNIFICANT CHANGE UP (ref 27.5–36.3)
BASOPHILS # BLD AUTO: 0.06 K/UL — SIGNIFICANT CHANGE UP (ref 0–0.2)
BASOPHILS NFR BLD AUTO: 1 % — SIGNIFICANT CHANGE UP (ref 0–2)
BUN SERPL-MCNC: 49 MG/DL — HIGH (ref 7–23)
CALCIUM SERPL-MCNC: 8.3 MG/DL — LOW (ref 8.5–10.1)
CHLORIDE SERPL-SCNC: 107 MMOL/L — SIGNIFICANT CHANGE UP (ref 96–108)
CO2 SERPL-SCNC: 32 MMOL/L — HIGH (ref 22–31)
CREAT SERPL-MCNC: 2.29 MG/DL — HIGH (ref 0.5–1.3)
EOSINOPHIL # BLD AUTO: 0.18 K/UL — SIGNIFICANT CHANGE UP (ref 0–0.5)
EOSINOPHIL NFR BLD AUTO: 3.1 % — SIGNIFICANT CHANGE UP (ref 0–6)
GLUCOSE SERPL-MCNC: 79 MG/DL — SIGNIFICANT CHANGE UP (ref 70–99)
HCT VFR BLD CALC: 28.8 % — LOW (ref 39–50)
HGB BLD-MCNC: 8.9 G/DL — LOW (ref 13–17)
IMM GRANULOCYTES NFR BLD AUTO: 1.7 % — HIGH (ref 0–1.5)
INR BLD: 2.06 RATIO — HIGH (ref 0.88–1.16)
LYMPHOCYTES # BLD AUTO: 1.63 K/UL — SIGNIFICANT CHANGE UP (ref 1–3.3)
LYMPHOCYTES # BLD AUTO: 28 % — SIGNIFICANT CHANGE UP (ref 13–44)
MCHC RBC-ENTMCNC: 30.9 GM/DL — LOW (ref 32–36)
MCHC RBC-ENTMCNC: 33.7 PG — SIGNIFICANT CHANGE UP (ref 27–34)
MCV RBC AUTO: 109.1 FL — HIGH (ref 80–100)
MONOCYTES # BLD AUTO: 0.6 K/UL — SIGNIFICANT CHANGE UP (ref 0–0.9)
MONOCYTES NFR BLD AUTO: 10.3 % — SIGNIFICANT CHANGE UP (ref 2–14)
NEUTROPHILS # BLD AUTO: 3.26 K/UL — SIGNIFICANT CHANGE UP (ref 1.8–7.4)
NEUTROPHILS NFR BLD AUTO: 55.9 % — SIGNIFICANT CHANGE UP (ref 43–77)
PLATELET # BLD AUTO: 133 K/UL — LOW (ref 150–400)
POTASSIUM SERPL-MCNC: 3.5 MMOL/L — SIGNIFICANT CHANGE UP (ref 3.5–5.3)
POTASSIUM SERPL-SCNC: 3.5 MMOL/L — SIGNIFICANT CHANGE UP (ref 3.5–5.3)
PROTHROM AB SERPL-ACNC: 23.4 SEC — HIGH (ref 10–12.9)
RBC # BLD: 2.64 M/UL — LOW (ref 4.2–5.8)
RBC # FLD: 17.2 % — HIGH (ref 10.3–14.5)
SODIUM SERPL-SCNC: 144 MMOL/L — SIGNIFICANT CHANGE UP (ref 135–145)
WBC # BLD: 5.83 K/UL — SIGNIFICANT CHANGE UP (ref 3.8–10.5)
WBC # FLD AUTO: 5.83 K/UL — SIGNIFICANT CHANGE UP (ref 3.8–10.5)

## 2020-03-12 PROCEDURE — 99239 HOSP IP/OBS DSCHRG MGMT >30: CPT

## 2020-03-12 PROCEDURE — 99232 SBSQ HOSP IP/OBS MODERATE 35: CPT

## 2020-03-12 RX ORDER — FUROSEMIDE 40 MG
1 TABLET ORAL
Qty: 0 | Refills: 0 | DISCHARGE
Start: 2020-03-12

## 2020-03-12 RX ORDER — FUROSEMIDE 40 MG
1 TABLET ORAL
Qty: 0 | Refills: 0 | DISCHARGE

## 2020-03-12 RX ORDER — BUDESONIDE AND FORMOTEROL FUMARATE DIHYDRATE 160; 4.5 UG/1; UG/1
0 AEROSOL RESPIRATORY (INHALATION)
Qty: 0 | Refills: 0 | DISCHARGE
Start: 2020-03-12

## 2020-03-12 RX ORDER — BUDESONIDE AND FORMOTEROL FUMARATE DIHYDRATE 160; 4.5 UG/1; UG/1
2 AEROSOL RESPIRATORY (INHALATION)
Qty: 6 | Refills: 0
Start: 2020-03-12 | End: 2020-04-10

## 2020-03-12 RX ORDER — LEVOTHYROXINE SODIUM 125 MCG
1 TABLET ORAL
Qty: 30 | Refills: 0
Start: 2020-03-12 | End: 2020-04-10

## 2020-03-12 RX ORDER — AMPICILLIN TRIHYDRATE 250 MG
1 CAPSULE ORAL
Qty: 28 | Refills: 0
Start: 2020-03-12 | End: 2020-03-25

## 2020-03-12 RX ADMIN — Medication 600 MILLIGRAM(S): at 06:08

## 2020-03-12 RX ADMIN — GABAPENTIN 200 MILLIGRAM(S): 400 CAPSULE ORAL at 11:51

## 2020-03-12 RX ADMIN — Medication 100 MILLIGRAM(S): at 13:29

## 2020-03-12 RX ADMIN — Medication 112 MICROGRAM(S): at 06:08

## 2020-03-12 RX ADMIN — Medication 100 MILLIGRAM(S): at 06:08

## 2020-03-12 RX ADMIN — AMIODARONE HYDROCHLORIDE 200 MILLIGRAM(S): 400 TABLET ORAL at 06:08

## 2020-03-12 RX ADMIN — Medication 1 DROP(S): at 13:29

## 2020-03-12 RX ADMIN — AMPICILLIN SODIUM AND SULBACTAM SODIUM 200 GRAM(S): 250; 125 INJECTION, POWDER, FOR SUSPENSION INTRAMUSCULAR; INTRAVENOUS at 11:51

## 2020-03-12 RX ADMIN — Medication 81 MILLIGRAM(S): at 11:50

## 2020-03-12 RX ADMIN — ALBUTEROL 2 PUFF(S): 90 AEROSOL, METERED ORAL at 00:13

## 2020-03-12 RX ADMIN — Medication 25 MILLIGRAM(S): at 06:08

## 2020-03-12 RX ADMIN — Medication 650 MILLIGRAM(S): at 11:50

## 2020-03-12 RX ADMIN — GABAPENTIN 200 MILLIGRAM(S): 400 CAPSULE ORAL at 06:08

## 2020-03-12 NOTE — PROGRESS NOTE ADULT - REASON FOR ADMISSION
sob, cough

## 2020-03-12 NOTE — DISCHARGE NOTE NURSING/CASE MANAGEMENT/SOCIAL WORK - PATIENT PORTAL LINK FT
You can access the FollowMyHealth Patient Portal offered by [facility] by registering at the following website: [email or web address]. By joining [facility]’s FollowMyHealth portal, you will also be able to view your health information using other applications (apps) compatible with our system.

## 2020-03-12 NOTE — DISCHARGE NOTE PROVIDER - CARE PROVIDER_API CALL
Yun, SukHyeon (MD)  Nephrology  [address] [facility], [address]  [place] [address]  Phone: [phone]  Fax: [phone]  Follow Up Time:     Jj Joshi)  Cardiovascular Disease; Internal Medicine  [address] [facility], [address]  [place] [address]  Phone: [phone]  Fax: [phone]  Follow Up Time:     Merry Franklin)  Critical Care Medicine; Internal Medicine; Pulmonary Disease; Sleep Medicine  [address] [place] [address]  Phone: [phone]  Fax: [phone]  Follow Up Time:     Surinder Lehman)  Hematology; Internal Medicine; Medical Oncology  [address] [place] [address]  Phone: [phone]  Fax: [phone]  Follow Up Time:

## 2020-03-12 NOTE — DISCHARGE NOTE PROVIDER - HOSPITAL COURSE
89M w/PMH CKD4, BPH, Postherpetic neuralgia, Prostate CA (2008), SCC, CLL, COPD, hypothyroid, HTN, Afib s/p PPM, on AC, s/p TAVR (2019), recent admit 2/3-6, d/c'd in improved and stable condition after treated for Rt lung pna and +rhinovirus, Admitted  for bacterial PNA superimposed on enteroviral UTI , also found to have enetrococcal bacteremia source likely from sacral skin break vs UTI ./stage 2 decubitus ulcer. Endocarditis ruled out on TTE and CLEMENTINA.  Ucxnegative .  CT Abd /pelvis   showed no evidence of etiology  for enterococcal bacteremia. Repeat blood cultures No growth to date.Remainsed afebrile . Patient was treated with IV zosyn , then at time of discharge switched to IV unasyn  ID recommended 14 days of ampicillin 250BID x14 days         Patient was found to have macrocytic anemia , anemia further worsened likely due to underlying infection and inflammatory anemia -required 1 unit of PRBC, hb  8.7 at time of discharge        For R lung atelectasis - plan for home incentive spirometry and mucinex per pulmonary-I discussed with Dr corral        For Left chronic pulmonary nodules  supicious for malignancy - plan for further outpatient monitoring as per dr corral and Dr shaikh. patient did not want any invasive diagnostic procedures per Dr chavez        mesenteric and retroperitoneal lymphadenopathy  likely from history of underlying lymphoma - f/u with dr chavez as outpatient         Incidental finding of R inguinal hernai with bowel without bowel obstruction/currently asymptomatic- moving bowel, no nausea or vomiting  /-plan for outpatient monitoring with PMD- possible outpatient surgical evaluation          CKD stage 3-Serum Cr slightly increased due to prerenal azotemia , for which nephrology decreased lasix dose to 40mg daily     ID, Pulm, hemonc, nephrology  and cardiology cleared patient for discharge from their respective stand points     patient and family refusing rehab     plan for home discharge with home care     incentive spirometry at home     chest PT BID when away at home     stage 2 sacral decubitus ulcer wound care at home     Plan for INR check monday 3/16 for further coumadin dose adjustment by dr michel                    *hypothyroid    - TSH 5.22     - increased Synthroid from 100 mcg to 112 mcg on this admission check TSH in 4-6 weeks     - monitor TSH as outpatient             *chronic AF    - continue with coumadin    check INR 3/16     - amio            GEN: frail, deconditioned, lying in bed, NAD    HEENT:   NC/AT, pupils equal and reactive, EOMI    CV:  +S1, +S2, RRR    RESP:  decreased breath sounds b/l lung bases     GI:  abdomen soft, non-tender, distended, normoactive BS    EXT:  no edema    NEURO:  AAOX3, no focal neurological deficits    SKIN:  multiple echymotic area on arms L>R/sacral stage 2 decubitus ulcer            discharge time spent 47mins    i discussed with daughter and patient at bedside  and RN team

## 2020-03-12 NOTE — PROGRESS NOTE ADULT - SUBJECTIVE AND OBJECTIVE BOX
NEPHROLOGY INTERVAL HPI/OVERNIGHT EVENTS:    Date of Service: 03-12-20 @ 13:41  3/12 SY  Feeling better.  Adamant he wants to go home today.  No distress.    3/11 SY  Feeling fair.  Post CLEMENTINA this morning.  Reportedly negative.  Continues with cough but No SOB.     3/10  in bed feels well  no new events  creat stable  aide in room  + cough, productive    3/9 SY  No acute distress.  Complains of cough and inability to expectorate.  No SOB at rest.    HPI:  89 yo man with PMHX of CKD (recent creat ~2.2-2.5), HTN, A FIB , COPD and hx of CLL and TAVR (2019).  Pt was recently admitted ( early February)  for PNA and Viral URI.  Pt admitted with complaints of increased cough, SOB and weakness.  Noted with Anemia and stable CKD on admission.    PMHX and PSHX.  1.CKD (baseline crear in low 2's)  2.A FIB  3.COPD  4.Hx of Prostate CA ( Post RT)(2008)  5.Hx of Lymphoma Post Rituxan therapy.  6.Hx of Postherpetic Neuralgia  7.Hypothyroidism  8.Hx of Chronic Diarrhea.  9.Hx of CHF.  10.S/p TAVR (2019)  11.HTN    MEDICATIONS  (STANDING):  ALPRAZolam 0.25 milliGRAM(s) Oral daily  aMIOdarone    Tablet 200 milliGRAM(s) Oral daily  ampicillin/sulbactam  IVPB 3 Gram(s) IV Intermittent daily  artificial  tears Solution 1 Drop(s) Both EYES three times a day  aspirin enteric coated 81 milliGRAM(s) Oral daily  budesonide 160 MICROgram(s)/formoterol 4.5 MICROgram(s) Inhaler 2 Puff(s) Inhalation two times a day  furosemide    Tablet 40 milliGRAM(s) Oral daily  gabapentin 200 milliGRAM(s) Oral four times a day  guaiFENesin  milliGRAM(s) Oral every 12 hours  levothyroxine 112 MICROGram(s) Oral daily  metoprolol succinate ER 25 milliGRAM(s) Oral daily  phenazopyridine 100 milliGRAM(s) Oral every 8 hours  potassium chloride    Tablet ER 10 milliEquivalent(s) Oral daily  warfarin 3 milliGRAM(s) Oral daily    MEDICATIONS  (PRN):  acetaminophen   Tablet .. 650 milliGRAM(s) Oral every 6 hours PRN Mild Pain (1 - 3)  ALBUTerol    90 MICROgram(s) HFA Inhaler 2 Puff(s) Inhalation every 6 hours PRN Shortness of Breath and/or Wheezing    Vital Signs Last 24 Hrs  T(C): 36.9 (12 Mar 2020 11:32), Max: 36.9 (11 Mar 2020 22:49)  T(F): 98.5 (12 Mar 2020 11:32), Max: 98.5 (12 Mar 2020 11:32)  HR: 60 (12 Mar 2020 11:32) (60 - 61)  BP: 103/46 (12 Mar 2020 11:32) (103/46 - 130/61)  BP(mean): --  RR: 18 (12 Mar 2020 11:32) (18 - 18)  SpO2: 99% (12 Mar 2020 11:32) (97% - 100%)  Daily     Daily     03-11 @ 07:01  -  03-12 @ 07:00  --------------------------------------------------------  IN: 360 mL / OUT: 0 mL / NET: 360 mL    PHYSICAL EXAM:  Alert and comfortable.  GENERAL: No distress   CHEST/LUNG: basilar rhonchi  HEART: S1S2 RRR  ABDOMEN: soft  EXTREMITIES: trace edema  SKIN:     LABS:                        8.9    5.83  )-----------( 133      ( 12 Mar 2020 08:29 )             28.8     03-12    144  |  107  |  49<H>  ----------------------------<  79  3.5   |  32<H>  |  2.29<H>    Ca    8.3<L>      12 Mar 2020 08:29      PT/INR - ( 12 Mar 2020 08:29 )   PT: 23.4 sec;   INR: 2.06 ratio         PTT - ( 12 Mar 2020 08:29 )  PTT:35.6 sec            RADIOLOGY & ADDITIONAL TESTS:

## 2020-03-12 NOTE — PROGRESS NOTE ADULT - PROBLEM SELECTOR PLAN 2
Suspect primary malignancy of lung; patient seen in ambulatory, but declined invasive diagnostic procedures, and favored observation only. Advised to follow up in ambulatory.

## 2020-03-12 NOTE — PROGRESS NOTE ADULT - SUBJECTIVE AND OBJECTIVE BOX
LIZBETH MADDOX, 89y Male  MRN: [id number]  ATTENDING: Thomas D'Amico    HPI:  89M w/PMH CKD4, BPH, Postherpetic neuralgia, Prostate CA (2008), SCC, CLL, COPD, hypothyroid, HTN, Afib s/p PPM, on AC, s/p TAVR (2019), recent admit 2/3-6, d/c'd in improved and stable condition after treated for Rt lung pna and +rhinovirus, presents today c/o worsening sob since yesterday w/ new productive cough, increased weakness, and per home aide at bedside, fever last night.  Pt otherwise denies cp, n/v/d, abd pain.  He does endorse dysuria x 1 day.  In ED, Hg 8.3 (stable), , Plt 113, Inr 2.7, Cr 2.4 (baseline), TSH 5.2 (14.3 on 2/4), Trop 0,059, BNP 19k, cxr patchy Rt lung pna, given IV vanco/ zosyn and cultures sent.   	    MEDICATIONS:  acetaminophen   Tablet .. 650 milliGRAM(s) Oral every 6 hours PRN  albuterol/ipratropium for Nebulization 3 milliLiter(s) Nebulizer every 6 hours  ALPRAZolam 0.25 milliGRAM(s) Oral daily  aMIOdarone    Tablet 200 milliGRAM(s) Oral daily  artificial  tears Solution 1 Drop(s) Both EYES three times a day  aspirin enteric coated 81 milliGRAM(s) Oral daily  buDESOnide    Inhalation Suspension 0.25 milliGRAM(s) Inhalation every 12 hours  furosemide    Tablet 40 milliGRAM(s) Oral two times a day  gabapentin 200 milliGRAM(s) Oral four times a day  guaiFENesin  milliGRAM(s) Oral every 12 hours  levothyroxine 112 MICROGram(s) Oral daily  metoprolol succinate ER 25 milliGRAM(s) Oral daily  phenazopyridine 100 milliGRAM(s) Oral every 8 hours  piperacillin/tazobactam IVPB.. 3.375 Gram(s) IV Intermittent every 12 hours  potassium chloride    Tablet ER 10 milliEquivalent(s) Oral daily  vancomycin  IVPB 1000 milliGRAM(s) IV Intermittent once  warfarin 2 milliGRAM(s) Oral daily    All other medications reviewed.    SUBJECTIVE:  Patient alert; breasthing pattern much improved; off antibiotics.  States he is feeling much better.    VITALS:  T(C): 37 (03-09-20 @ 11:09), Max: 37 (03-09-20 @ 11:09)  T(F): 98.6 (03-09-20 @ 11:09), Max: 98.6 (03-09-20 @ 11:09)  HR: 109 (03-09-20 @ 17:10) (61 - 109)  BP: 97/37 (03-09-20 @ 17:10) (97/37 - 113/43)    PHYSICAL EXAM:  Alert, weak  Respiratory: bilateral crackles to auscultation  Cardiovascular : S1, S2 rhythmic  Abdomen: soft, distended, + BS  Extremities: no tenderness;  -c/c/e    LABS:  (03-09) WBC: 6.46 K/uL, Hemoglobin: 8.2 g/dL, Hematocrit: 26.8 %,  Platelet: 124 K/uL  (03-09) Na: 143 mmol/L ; K: 3.4 mmol/L ; BUN: 54 mg/dL ; Cr: 2.46 mg/dL.    Hemoglobin: 8.9 g/dL (03-12-20 @ 08:29)  Hemoglobin: 8.3 g/dL (03-11-20 @ 06:31)  Hemoglobin: 7.5 g/dL (03-10-20 @ 06:38)  Hemoglobin: 8.2 g/dL (03-09-20 @ 07:54)  Hemoglobin: 7.9 g/dL (03-07-20 @ 07:22)    RADIOLOGY:  CT Abdomen and Pelvis w/ Oral Cont (03.11.20 @ 20:58) >  1. Splenomegaly.   2. Numerous enlarged mesenteric lymph nodes.Extensive retroperitoneal   lymphadenopathy, consistent with patient's known lymphoma  3. Colonic diverticulosis without evidence of diverticulitis. Stool is   scattered throughout the colon.   4. Tiny gallstones within the gallbladder.   5. Right inguinal hernia containing short segment of small bowel and trace   fluid. No associated bowel obstruction. Uncomplicated fat containing left   inguinal hernia.   6. Complete right lower lobe atelectasis.   7. 11 x 11 mm lobulated left lower lobe nodule compatible with primary or secondary malignancy.  8. Small bilateral pleural effusions.      HISTORY:  Shortness of breath  Comparison: Chest x-ray 2/3/2020  Left dual-lead pacer.  Status post TAVR.   Right shoulder arthroplasty.  The cardiac silhouette is rotated. Patchy right mid to lower lung airspace disease.. No pleural abnormality.    IMPRESSION: Patchy right lung pneumonia

## 2020-03-12 NOTE — DISCHARGE NOTE PROVIDER - NSDCCPCAREPLAN_GEN_ALL_CORE_FT
PRINCIPAL DISCHARGE DIAGNOSIS  Diagnosis: Bacteremia  Assessment and Plan of Treatment: please complete 14 days of oral antibiotics as prescribed  You were found to have pulmonary nodule for which please follow up with Dr Lehman and Dr corral in 1 to2 weeks  in office  Please have your INR checked for coumadin dose adjustment by Dr michel  on 3/16   your lasix dose has been reduced to 40mg once a day to maintain your kidney function by nephrologist Dr ordonez  please have your primary doctor check your hemoglobin and renal function checked  in 1 week  there was incidental finding of Right inguinal hernia which contains bowel but without any bowel obstruction- please follow up with your primary doctor in 1 week for monitoring and referal to outpatient surgery consult for routine evaluation        SECONDARY DISCHARGE DIAGNOSES  Diagnosis: Elevated troponin  Assessment and Plan of Treatment:     Diagnosis: Pneumonia  Assessment and Plan of Treatment:

## 2020-03-12 NOTE — PROGRESS NOTE ADULT - ASSESSMENT
89 yo man with HTN, A FIB , COPD, CLL and CKD admitted with SOB and Cough.  Started on abtx for recurrent PNA and positive bc for Enterococcus.  --CKD appears stable at this point.  No recent USG.     --Bacteremia /PNA : continue abtx.  --SOB: more due to infectious process.  Continue maintenance lasix.  --Anemia : more due to acute and recurrent infections.  Continue to monitor.    3/9 SY  --CKD : slight increase in creat.  continue diuretics for now due to compromised resp status.  --Bacteremia /PNA ;continue abtx.  --Anemia : Heme evaluation appreciated.    3/10  enterococcus bacteremia, repeat c/s negative  creat stable  cont meds as rxd    3/11 SY  --CKD : fairly stable.  Decrease Lasix as po intake appears decreased.  --Bacteremia /PNA : continue abtx.  --CLEMENTINA : negative.  --Anemia : post one unit PRBC yesterday  Continue to trend HGB.    3/12 SY  --CKD : stable and at baseline.  Continue daily lasix.  --Bactermia /PNA : continue abtx to completion.  --Anemia : stable post transfusion  --Pulmonary mass : Heme/Onc follow up appreciated : conservative follow up for now.

## 2020-03-12 NOTE — PROGRESS NOTE ADULT - ASSESSMENT
89M w/PMH CKD4, BPH, Postherpetic neuralgia, Prostate CA (2008), SCC, CLL, COPD, hypothyroid, HTN, Afib s/p PPM, on AC, s/p TAVR (2019),  admitted on 3/5 for evaluation of shortness of breath associated with productive cough; also notes he has painful urination as well as frequency of urination; history per medical record as patient is poor historian.    1. Patient admitted with sepsis secondary to Enterococcus probably or urinary origin; also noted with pneumonia superimposed on Enteroviral URI  - follow up cultures to identification and sensitivity---- sensitive to ampicillin  - urine culture is pending-- and no growth  - serial cbc and monitor temperature   - oxygen and nebs as needed   - reviewed prior medical records to evaluate for resistant or atypical pathogens   - day #7 zosyn; on unasyn now; total 8 days rx  - tolerating antibiotics without rashes or side effects   - hold on further vancomycin given renal function in this frail elderly male  - echocardiogram does not reveal any vegetations on both TTE and CLEMENTINA  - source of enterococcus may be break in skin, at linear skin defect  - repeat blood cultures are no growth; will discuss with hospitalist long term antibiotics  - consideration for CT abdomen and pelvis to exclude GI source of Enterococcus, given negative urine culture  - when ready for discharge okay to discharge on 250 mg po q 12 hours for 14 more days  2. other issues; per medicine

## 2020-03-12 NOTE — PROGRESS NOTE ADULT - PROBLEM SELECTOR PLAN 1
Hemoglobin 7.5 g/dL, dropping; anemia with complex mechanism. Infectious process contributing likely to drop in hemoglobin. Pt known with CLL off cytoreductive treatment . Consider PRBC support. Repeat chest x-ray pending. Will follow up CBC.
Hemoglobin improved at >8 g/dL. Stable for discharge from hematologic perspective.
Patient with multiple comorbidities, admitted with PNA, and multifactorial anemia. Continue transfusion support. Patient seen in ambulatory for persistent lung nodules. Will follow up CT chest. Meanwhile, agree to continue antibiotics.

## 2020-03-12 NOTE — PROGRESS NOTE ADULT - SUBJECTIVE AND OBJECTIVE BOX
SUBJECTIVE       PAST MEDICAL & SURGICAL HISTORY:  CKD (chronic kidney disease): Stage 4  BPH (benign prostatic hyperplasia)  Postherpetic neuralgia: Treated  Chronic diarrhea  Prostate CA  Basal cell carcinoma  Lymphoma: Latent  Anxiety  Hypothyroid  Hypertension  A-fib: Pacemaker  S/P TAVR (transcatheter aortic valve replacement)  Artificial cardiac pacemaker  H/O shoulder replacement  S/P bilateral unicompartmental knee replacement    OBJECTIVE   Vital Signs Last 24 Hrs  T(C): 36.7 (12 Mar 2020 05:35), Max: 36.9 (11 Mar 2020 22:49)  T(F): 98 (12 Mar 2020 05:35), Max: 98.4 (11 Mar 2020 22:49)  HR: 60 (12 Mar 2020 05:35) (60 - 66)  BP: 123/63 (12 Mar 2020 05:35) (123/63 - 142/64)  BP(mean): --  RR: 18 (11 Mar 2020 22:49) (16 - 18)  SpO2: 100% (12 Mar 2020 05:35) (96% - 100%)    PHYSICAL EXAM:  Constitutional: , awake and alert, not in distress.  HEENT: Normo cephalic atraumatic  Neck: Soft and supple, No J.V.D   Respiratory: vesicular breathing , No wheezing, rales or rhonchi.   Cardiovascular: S1 and S2, regular rate .   Gastrointestinal:  soft, nontender,   Extremities: No  edema or calf tenderness .  Neurological: No new  focal deficits.    MEDICATIONS  (STANDING):  acetylcysteine 10%  Inhalation 3 milliLiter(s) Inhalation three times a day  ALPRAZolam 0.25 milliGRAM(s) Oral daily  aMIOdarone    Tablet 200 milliGRAM(s) Oral daily  ampicillin/sulbactam  IVPB 3 Gram(s) IV Intermittent daily  artificial  tears Solution 1 Drop(s) Both EYES three times a day  aspirin enteric coated 81 milliGRAM(s) Oral daily  budesonide 160 MICROgram(s)/formoterol 4.5 MICROgram(s) Inhaler 2 Puff(s) Inhalation two times a day  furosemide    Tablet 40 milliGRAM(s) Oral daily  gabapentin 200 milliGRAM(s) Oral four times a day  guaiFENesin  milliGRAM(s) Oral every 12 hours  levothyroxine 112 MICROGram(s) Oral daily  metoprolol succinate ER 25 milliGRAM(s) Oral daily  phenazopyridine 100 milliGRAM(s) Oral every 8 hours  potassium chloride    Tablet ER 10 milliEquivalent(s) Oral daily  warfarin 3 milliGRAM(s) Oral daily                            8.9    5.83  )-----------( 133      ( 12 Mar 2020 08:29 )             28.8     03-12    144  |  107  |  49<H>  ----------------------------<  79  3.5   |  32<H>  |  2.29<H>    Ca    8.3<L>      12 Mar 2020 08:29         < from: CT Abdomen and Pelvis w/ Oral Cont (03.11.20 @ 20:58) >  FINDINGS:   Lungs: Complete right lower lobe atelectasis. 11 x 11 mm spiculated left lower   lobe nodule.   Pleural space: Small bilateral pleural effusions.   Heart: Cardiomegaly. AVR.     Liver: Left hepatic lobe cyst. Additional indeterminate left hepatic lobe   hypodense lesion.   Gallbladder and bile ducts: Tiny gallstones within the gallbladder.   Pancreas: Normal. No ductal dilation.   Spleen: Splenomegaly.   Adrenals: Normal. No mass.   Kidneys and ureters: Bilateral renal atrophy. Bilateral nephrolithiasis.   Bilateral renal cysts and additional indeterminate renal lesions.   Stomach and bowel: Colonic diverticulosis without evidence of diverticulitis.   No evidence of bowel obstruction or mucosal thickening. Stool is scattered   throughout the colon.   Appendix: The appendix is surgically absent.      Intraperitoneal space: Unremarkable. No free air. No significant fluid   collection.   Vasculature: Aortic atherosclerotic disease without evidence of aneurysm.   Lymph nodes: Numerous enlarged mesenteric lymph nodes.Extensive retroperitoneal   lymphadenopathy. Left para aortic lymph node mass measures 8.1 x 6.4 cm. Left   external iliac lymphadenopathy measures 5.5 x 6.5 cm.     Bladder: Unremarkable as visualized.   Reproductive: Unremarkable as visualized.   Bones/joints: Diffuse osteopenia.Moderate degenerative changs of the lumbar   spine. Moderate bilateral hip joint degenerative changes. Scoliosis.   Soft tissues: Right inguinal hernia containing short segment of small bowel and     < from: CT Abdomen and Pelvis w/ Oral Cont (03.11.20 @ 20:58) >  trace fluid. No associated bowel obstruction. Uncomplicated fat containing left   inguinal hernia.   Other findings: Lumen     IMPRESSION:   1. Splenomegaly.   2. Numerous enlarged mesenteric lymph nodes.Extensive retroperitoneal   lymphadenopathy. Differential includes lymphoma versus metastases.   3. Colonic diverticulosis without evidence of diverticulitis. Stool is   scattered throughout the colon.   4. Tiny gallstones within the gallbladder.   5. Right inguinal hernia containing short segment of small bowel and trace   fluid. No associated bowel obstruction. Uncomplicated fat containing left   inguinal hernia.   6. Complete right lower lobe atelectasis.   7. 11 x 11 mm spiculated left lower lobe nodule compatible with pulmonary   malignancy..  8. Small bilateral pleural effusions.          ******PRELIMINARY REPORT******      < end of copied text >

## 2020-03-12 NOTE — PROGRESS NOTE ADULT - SUBJECTIVE AND OBJECTIVE BOX
Date of service: 03-12-20 @ 15:09      Patient lying in bed; afebrile    ROS unable to obtain secondary to patient medical condition     MEDICATIONS  (STANDING):  ALPRAZolam 0.25 milliGRAM(s) Oral daily  aMIOdarone    Tablet 200 milliGRAM(s) Oral daily  ampicillin/sulbactam  IVPB 3 Gram(s) IV Intermittent daily  artificial  tears Solution 1 Drop(s) Both EYES three times a day  aspirin enteric coated 81 milliGRAM(s) Oral daily  budesonide 160 MICROgram(s)/formoterol 4.5 MICROgram(s) Inhaler 2 Puff(s) Inhalation two times a day  furosemide    Tablet 40 milliGRAM(s) Oral daily  gabapentin 200 milliGRAM(s) Oral four times a day  guaiFENesin  milliGRAM(s) Oral every 12 hours  levothyroxine 112 MICROGram(s) Oral daily  metoprolol succinate ER 25 milliGRAM(s) Oral daily  phenazopyridine 100 milliGRAM(s) Oral every 8 hours  potassium chloride    Tablet ER 10 milliEquivalent(s) Oral daily  warfarin 3 milliGRAM(s) Oral daily    MEDICATIONS  (PRN):  acetaminophen   Tablet .. 650 milliGRAM(s) Oral every 6 hours PRN Mild Pain (1 - 3)  ALBUTerol    90 MICROgram(s) HFA Inhaler 2 Puff(s) Inhalation every 6 hours PRN Shortness of Breath and/or Wheezing      Vital Signs Last 24 Hrs  T(C): 36.9 (12 Mar 2020 11:32), Max: 36.9 (11 Mar 2020 22:49)  T(F): 98.5 (12 Mar 2020 11:32), Max: 98.5 (12 Mar 2020 11:32)  HR: 60 (12 Mar 2020 11:32) (60 - 61)  BP: 103/46 (12 Mar 2020 11:32) (103/46 - 130/61)  BP(mean): --  RR: 18 (12 Mar 2020 11:32) (18 - 18)  SpO2: 99% (12 Mar 2020 11:32) (97% - 100%)    Physical Exam:        PE:    Constitutional: frail looking  HEENT: NC/AT, EOMI, PERRLA, conjunctivae clear; ears and nose atraumatic; pharynx clear  Neck: supple; thyroid not palpable  Back: no tenderness  Respiratory: respiratory effort normal; scattered coarse breath sounds with wheeze improved  Cardiovascular: S1S2 regular, 2/6 systolic murmur  Abdomen: soft, not tender, not distended, positive BS; no liver or spleen organomegaly  Genitourinary: no suprapubic tenderness  Musculoskeletal: no muscle tenderness, no joint swelling or tenderness  Neurological/ Psychiatric:  moving all extremities  Skin: no rashes; no palpable lesions; ecchymosis of skin    Labs: all available labs reviewed                  Labs:                         Labs:                        8.3    7.10  )-----------( 125      ( 11 Mar 2020 06:31 )             26.7     03-11    145  |  108  |  55<H>  ----------------------------<  111<H>  3.4<L>   |  31  |  2.50<H>    Ca    8.3<L>      11 Mar 2020 06:31             Cultures:       Culture - Blood (collected 03-08-20 @ 07:58)  Source: .Blood None  Preliminary Report (03-09-20 @ 13:02):    No growth to date.    Culture - Blood (collected 03-08-20 @ 07:58)  Source: .Blood None  Preliminary Report (03-09-20 @ 13:02):    No growth to date.    Culture - Urine (collected 03-05-20 @ 18:01)  Source: .Urine None  Final Report (03-07-20 @ 07:37):    <10,000 CFU/mL Normal Urogenital Deanna    Culture - Blood (collected 03-05-20 @ 13:41)  Source: .Blood None  Gram Stain (03-06-20 @ 06:01):    Growth in aerobic bottle: Gram Positive Cocci in Pairs and Chains    Growth in anaerobic bottle: Gram Positive Cocci in Pairs and Chains  Final Report (03-08-20 @ 14:48):    Growth in aerobic and anaerobic bottles: Enterococcus faecalis    See previous culture [id number]    Culture - Blood (collected 03-05-20 @ 13:41)  Source: .Blood None  Gram Stain (03-06-20 @ 04:26):    Growth in aerobic bottle: Gram Positive Cocci in Pairs and Chains    Growth in anaerobic bottle: Gram Positive Cocci in Pairs and Chains  Final Report (03-08-20 @ 14:48):    Growth in aerobic and anaerobic bottles: Enterococcus faecalis    ***Blood Panel PCR results on this specimen are available    approximately 3 hours after the Gram stain result.***    Gram stain, PCR, and/or culture results may not always    correspond dueto difference in methodologies.    ************************************************************    This PCR assay was performed using [employer].    The following targets are tested for: Enterococcus,    vancomycin resistant enterococci, Listeria monocytogenes,    coagulase negative staphylococci, S. aureus,    methicillin resistant S. aureus, Streptococcus agalactiae    (Group B), S. pneumoniae, S. pyogenes (Group A),    Acinetobacter baumannii, Enterobacter cloacae, E. coli,    Klebsiella oxytoca, K. pneumoniae, Proteus sp.,    Serratia marcescens, Haemophilus influenzae,    Neisseria meningitidis, Pseudomonas aeruginosa, Candida    albicans, C. glabrata, C krusei, C parapsilosis,    C. tropicalis and the KPC resistance gene.  Organism: Blood Culture PCR  Enterococcus faecalis (03-08-20 @ 14:48)  Organism: Enterococcus faecalis (03-08-20 @ 14:48)      -  Ampicillin: S <=2 Predicts results to ampicillin/sulbactam, amoxacillin-clavulanate and  piperacillin-tazobactam.      -  Gentamicin synergy: S      -  Vancomycin: S 2      Method Type: GREG  Organism: Blood Culture PCR (03-08-20 @ 14:48)      -  Enterococcus species: Detec      Method Type: PCR      < from: CT Abdomen and Pelvis w/ Oral Cont (03.11.20 @ 20:58) >    EXAM:  CT ABDOMEN AND PELVIS OC                            PROCEDURE DATE:  03/11/2020          INTERPRETATION:      PROCEDURE INFORMATION:   Exam: CT Abdomen And Pelvis Without Contrast   Exam date and time: 3/11/2020 8:56 PM   Age: 89 years old   Clinical indication: Abdominal pain. History of lymphoma.    TECHNIQUE:   Imaging protocol: Computed tomography of the abdomen and pelvis without oral or intravenous  contrast.     COMPARISON:   CT dated 04/25/2013.    FINDINGS:   Lungs: Complete right lower lobe atelectasis. 11 x 11 mm lobulated left lower   lobe nodule, increased in size from prior chest CT dated 01/27/2020.   Pleural space: Small bilateral pleural effusions.   Heart: Cardiomegaly. TAVR. Pacemaker.    Liver: Left hepatic lobe cyst. 2 additional subcentimeter sized low density lesions that are not characterized.   Gallbladder and bile ducts: Tiny gallstones within the gallbladder.   Pancreas: Normal. No ductal dilation.   Spleen: Splenomegaly.   Adrenals: Normal. No mass.   Kidneys and ureters: Bilateral renal atrophy. Bilateral nephrolithiasis.   Bilateral renal cysts and additional indeterminate renal lesions.   Stomach and bowel: Colonic diverticulosis without evidence of diverticulitis.   No evidence of bowel obstruction or mucosal thickening. Stool is scattered   throughout the colon.   Appendix: The appendix is surgically absent.      Intraperitoneal space: Unremarkable. No free air. No significant fluid   collection.   Vasculature: Aortic atherosclerotic disease without evidence of aneurysm.   Lymph nodes: Numerous enlarged mesenteric lymph nodes.Extensive retroperitoneal and pelvic  lymphadenopathy. Left para aortic lymph node mass measures 8.1 x 6.4 cm. Left   external iliac lymphadenopathy measures 5.5 x 6.5 cm.     Bladder: Unremarkable as visualized.   Reproductive: Unremarkable as visualized.   Bones/joints: Diffuse osteopenia.Moderate degenerative changes of the lumbar   spine. Moderate bilateral hip joint degenerative changes. Scoliosis.   Softtissues: Right inguinal hernia containing short segment of small bowel and   trace fluid. No associated bowel obstruction. Uncomplicated fat containing left   inguinal hernia.       IMPRESSION:   1. Splenomegaly.   2. Numerous enlarged mesenteric lymph nodes.Extensive retroperitoneal   lymphadenopathy, consistent with patient's known lymphoma  3. Colonic diverticulosis without evidence of diverticulitis. Stool is   scattered throughout the colon.   4. Tiny gallstones within the gallbladder.   5. Right inguinal hernia containing short segment of small bowel and trace   fluid. No associated bowel obstruction. Uncomplicated fat containing left   inguinal hernia.   6. Complete right lower lobe atelectasis.   7. 11 x 11 mm lobulated left lower lobe nodule compatible with primary or secondary malignancy.  8. Small bilateral pleural effusions.      < end of copied text >        < from: CLEMENTINA Complete w/Spect and Color (03.11.20 @ 08:19) >     Impression     Summary     Normal left ventricular systolic function.   Normal left ventricular systolic function.   No left atrial thrombus or mass is seen.   A saline contrast study was performed and showed no evidence of a patent   foramen ovale.   Normal appearing bio-prosthetic aortic valve without vegetations seen. The   mild grey-prosthetic aortic insufficiency   There is moderate mitral valve sclerosis with mild MR   Moderate (2+) tricuspid valve regurgitation is present.   A pericardial effusion is not present.   Mild non-protruding aortic atherosclerosis is seen   No vegetations seen on the PPM     No evidence for endocarditis    < end of copied text >            < from: TTE Echo Complete w/o contrast w/ Doppler (03.06.20 @ 09:39) >  Impression     Summary     Mild to moderate mitral regurgitation is present.   Mild mitral annular calcification is present.   Well seated TAVR prosthetic valve in the aortic position. Peak   trans-prosthetic gradient is within normal limitations for this type of   prosthesis.   Peak transaortic gradient is 13 mmHg;   Moderate (2+) tricuspid valve regurgitation is present.   Normal appearing pulmonic valve structure and function.   The left atrium is mildly dilated.   The left ventricle is normal in size, wall motion and contractility.   Mild concentric left ventricular hypertrophy is present.   Estimated left ventricular ejection fraction is 55-60 %.   Normal appearing right atrium.   A device wire is seenin the RV and RA.   Normal appearing right ventricle structure and function.    < end of copied text >            < from: Xray Chest 1 View- PORTABLE-Urgent (03.05.20 @ 14:51) >    EXAM:  XR CHEST PORTABLE URGENT 1V                            PROCEDURE DATE:  03/05/2020          INTERPRETATION:  XR CHEST URGENT    Single AP view    HISTORY:  Shortness of breath    Comparison: Chest x-ray 2/3/2020      Left dual-lead pacer.    Status post TAVR.   Right shoulder arthroplasty.  The cardiac silhouette is rotated. Patchy right mid to lower lung airspace disease.. No pleural abnormality.    IMPRESSION: Patchy right lung pneumonia    < end of copied text >        Radiology: all available radiological tests reviewed    Advanced directives addressed: full resuscitation

## 2020-03-12 NOTE — DISCHARGE NOTE PROVIDER - NSDCMRMEDTOKEN_GEN_ALL_CORE_FT
albuterol 2.5 mg/3 mL (0.083%) inhalation solution: 3 milliliter(s) inhaled every 6 hours  alfuzosin 10 mg oral tablet, extended release: 1 tab(s) orally once a day  ALPRAZolam 0.25 mg oral tablet: 1 tab(s) orally twice a day at bedtime and 3am  amiodarone 200 mg oral tablet: 1 tab(s) orally once a day  aspirin 81 mg oral tablet: 1 tab(s) orally once a day  budesonide-formoterol 160 mcg-4.5 mcg/inh inhalation aerosol:  inhaled   Coumadin 2 mg oral tablet: 2 tab(s) orally once a day   docusate sodium 100 mg oral capsule: 2 cap(s) orally 2 times a day  ferrous sulfate 325 mg (65 mg elemental iron) oral delayed release tablet: 1 tab(s) orally once a day   furosemide 40 mg oral tablet: 1 tab(s) orally once a day  gabapentin 100 mg oral capsule: 2 cap(s) orally 4 times a day  guaiFENesin 600 mg oral tablet, extended release: 1 tab(s) orally every 12 hours  lactobacillus acidophilus oral capsule: 1 cap(s) orally once a day  levothyroxine 100 mcg (0.1 mg) oral tablet: 1 tab(s) orally once a day  Metoprolol Succinate ER 25 mg oral tablet, extended release: 1 tab(s) orally once a day  ocular lubricant ophthalmic solution: 1 drop(s) to each affected eye 2 times a day  Senna 8.6 mg oral tablet: 1 tab(s) orally once a day, As Needed albuterol 2.5 mg/3 mL (0.083%) inhalation solution: 3 milliliter(s) inhaled every 6 hours  alfuzosin 10 mg oral tablet, extended release: 1 tab(s) orally once a day  ALPRAZolam 0.25 mg oral tablet: 1 tab(s) orally twice a day at bedtime and 3am  amiodarone 200 mg oral tablet: 1 tab(s) orally once a day  ampicillin 250 mg oral capsule: 1 cap(s) orally 2 times a day  aspirin 81 mg oral tablet: 1 tab(s) orally once a day  budesonide-formoterol 160 mcg-4.5 mcg/inh inhalation aerosol: 2 puff(s) inhaled 2 times a day   Coumadin 2 mg oral tablet: 2 tab(s) orally once a day   docusate sodium 100 mg oral capsule: 2 cap(s) orally 2 times a day  ferrous sulfate 325 mg (65 mg elemental iron) oral delayed release tablet: 1 tab(s) orally once a day   furosemide 40 mg oral tablet: 1 tab(s) orally once a day  gabapentin 100 mg oral capsule: 2 cap(s) orally 4 times a day  guaiFENesin 600 mg oral tablet, extended release: 1 tab(s) orally every 12 hours  lactobacillus acidophilus oral capsule: 1 cap(s) orally once a day  levothyroxine 112 mcg (0.112 mg) oral tablet: 1 tab(s) orally once a day  Metoprolol Succinate ER 25 mg oral tablet, extended release: 1 tab(s) orally once a day  ocular lubricant ophthalmic solution: 1 drop(s) to each affected eye 2 times a day  Senna 8.6 mg oral tablet: 1 tab(s) orally once a day, As Needed

## 2020-03-12 NOTE — DISCHARGE NOTE PROVIDER - PROVIDER TOKENS
PROVIDER:[TOKEN:[[id number]]],PROVIDER:[TOKEN:[[id number]]],PROVIDER:[TOKEN:[[id number]]],PROVIDER:[TOKEN:[[id number]]]

## 2020-03-12 NOTE — DISCHARGE NOTE PROVIDER - CARE PROVIDERS DIRECT ADDRESSES
,DirectAddress_Unknown,DirectAddress_Unknown,[email or web address]

## 2020-03-13 ENCOUNTER — APPOINTMENT (OUTPATIENT)
Dept: INTERNAL MEDICINE | Facility: CLINIC | Age: 85
End: 2020-03-13

## 2020-03-13 LAB
CULTURE RESULTS: SIGNIFICANT CHANGE UP
CULTURE RESULTS: SIGNIFICANT CHANGE UP
SPECIMEN SOURCE: SIGNIFICANT CHANGE UP
SPECIMEN SOURCE: SIGNIFICANT CHANGE UP

## 2020-03-25 DIAGNOSIS — N40.0 BENIGN PROSTATIC HYPERPLASIA WITHOUT LOWER URINARY TRACT SYMPTOMS: ICD-10-CM

## 2020-03-25 DIAGNOSIS — J44.0 CHRONIC OBSTRUCTIVE PULMONARY DISEASE WITH (ACUTE) LOWER RESPIRATORY INFECTION: ICD-10-CM

## 2020-03-25 DIAGNOSIS — Z95.3 PRESENCE OF XENOGENIC HEART VALVE: ICD-10-CM

## 2020-03-25 DIAGNOSIS — Z79.51 LONG TERM (CURRENT) USE OF INHALED STEROIDS: ICD-10-CM

## 2020-03-25 DIAGNOSIS — I50.9 HEART FAILURE, UNSPECIFIED: ICD-10-CM

## 2020-03-25 DIAGNOSIS — A41.81 SEPSIS DUE TO ENTEROCOCCUS: ICD-10-CM

## 2020-03-25 DIAGNOSIS — D69.6 THROMBOCYTOPENIA, UNSPECIFIED: ICD-10-CM

## 2020-03-25 DIAGNOSIS — I48.20 CHRONIC ATRIAL FIBRILLATION, UNSPECIFIED: ICD-10-CM

## 2020-03-25 DIAGNOSIS — Z96.653 PRESENCE OF ARTIFICIAL KNEE JOINT, BILATERAL: ICD-10-CM

## 2020-03-25 DIAGNOSIS — Z87.01 PERSONAL HISTORY OF PNEUMONIA (RECURRENT): ICD-10-CM

## 2020-03-25 DIAGNOSIS — N39.0 URINARY TRACT INFECTION, SITE NOT SPECIFIED: ICD-10-CM

## 2020-03-25 DIAGNOSIS — Z85.46 PERSONAL HISTORY OF MALIGNANT NEOPLASM OF PROSTATE: ICD-10-CM

## 2020-03-25 DIAGNOSIS — E03.9 HYPOTHYROIDISM, UNSPECIFIED: ICD-10-CM

## 2020-03-25 DIAGNOSIS — B97.10 UNSPECIFIED ENTEROVIRUS AS THE CAUSE OF DISEASES CLASSIFIED ELSEWHERE: ICD-10-CM

## 2020-03-25 DIAGNOSIS — R91.8 OTHER NONSPECIFIC ABNORMAL FINDING OF LUNG FIELD: ICD-10-CM

## 2020-03-25 DIAGNOSIS — K52.9 NONINFECTIVE GASTROENTERITIS AND COLITIS, UNSPECIFIED: ICD-10-CM

## 2020-03-25 DIAGNOSIS — Z79.01 LONG TERM (CURRENT) USE OF ANTICOAGULANTS: ICD-10-CM

## 2020-03-25 DIAGNOSIS — N18.4 CHRONIC KIDNEY DISEASE, STAGE 4 (SEVERE): ICD-10-CM

## 2020-03-25 DIAGNOSIS — L89.152 PRESSURE ULCER OF SACRAL REGION, STAGE 2: ICD-10-CM

## 2020-03-25 DIAGNOSIS — Z85.72 PERSONAL HISTORY OF NON-HODGKIN LYMPHOMAS: ICD-10-CM

## 2020-03-25 DIAGNOSIS — F41.9 ANXIETY DISORDER, UNSPECIFIED: ICD-10-CM

## 2020-03-25 DIAGNOSIS — K40.90 UNILATERAL INGUINAL HERNIA, WITHOUT OBSTRUCTION OR GANGRENE, NOT SPECIFIED AS RECURRENT: ICD-10-CM

## 2020-03-25 DIAGNOSIS — Z95.0 PRESENCE OF CARDIAC PACEMAKER: ICD-10-CM

## 2020-03-25 DIAGNOSIS — Z96.619 PRESENCE OF UNSPECIFIED ARTIFICIAL SHOULDER JOINT: ICD-10-CM

## 2020-03-25 DIAGNOSIS — E43 UNSPECIFIED SEVERE PROTEIN-CALORIE MALNUTRITION: ICD-10-CM

## 2020-03-25 DIAGNOSIS — Z79.2 LONG TERM (CURRENT) USE OF ANTIBIOTICS: ICD-10-CM

## 2020-03-25 DIAGNOSIS — R30.0 DYSURIA: ICD-10-CM

## 2020-03-25 DIAGNOSIS — Z92.21 PERSONAL HISTORY OF ANTINEOPLASTIC CHEMOTHERAPY: ICD-10-CM

## 2020-03-25 DIAGNOSIS — J15.6 PNEUMONIA DUE TO OTHER GRAM-NEGATIVE BACTERIA: ICD-10-CM

## 2020-03-25 DIAGNOSIS — R79.89 OTHER SPECIFIED ABNORMAL FINDINGS OF BLOOD CHEMISTRY: ICD-10-CM

## 2020-03-25 DIAGNOSIS — B34.8 OTHER VIRAL INFECTIONS OF UNSPECIFIED SITE: ICD-10-CM

## 2020-03-25 DIAGNOSIS — I13.0 HYPERTENSIVE HEART AND CHRONIC KIDNEY DISEASE WITH HEART FAILURE AND STAGE 1 THROUGH STAGE 4 CHRONIC KIDNEY DISEASE, OR UNSPECIFIED CHRONIC KIDNEY DISEASE: ICD-10-CM

## 2020-03-25 DIAGNOSIS — J98.11 ATELECTASIS: ICD-10-CM

## 2020-03-25 DIAGNOSIS — D53.9 NUTRITIONAL ANEMIA, UNSPECIFIED: ICD-10-CM

## 2020-03-25 DIAGNOSIS — C44.91 BASAL CELL CARCINOMA OF SKIN, UNSPECIFIED: ICD-10-CM

## 2020-03-25 DIAGNOSIS — Z79.82 LONG TERM (CURRENT) USE OF ASPIRIN: ICD-10-CM

## 2020-03-30 ENCOUNTER — INPATIENT (INPATIENT)
Facility: HOSPITAL | Age: 85
LOS: 4 days | DRG: 871 | End: 2020-04-04
Attending: FAMILY MEDICINE | Admitting: HOSPITALIST
Payer: MEDICARE

## 2020-03-30 VITALS
DIASTOLIC BLOOD PRESSURE: 78 MMHG | TEMPERATURE: 98 F | HEIGHT: 66 IN | WEIGHT: 147.93 LBS | RESPIRATION RATE: 24 BRPM | SYSTOLIC BLOOD PRESSURE: 149 MMHG | HEART RATE: 64 BPM | OXYGEN SATURATION: 100 %

## 2020-03-30 DIAGNOSIS — N18.4 CHRONIC KIDNEY DISEASE, STAGE 4 (SEVERE): ICD-10-CM

## 2020-03-30 DIAGNOSIS — B02.29 OTHER POSTHERPETIC NERVOUS SYSTEM INVOLVEMENT: ICD-10-CM

## 2020-03-30 DIAGNOSIS — Z79.01 LONG TERM (CURRENT) USE OF ANTICOAGULANTS: ICD-10-CM

## 2020-03-30 DIAGNOSIS — N40.0 BENIGN PROSTATIC HYPERPLASIA WITHOUT LOWER URINARY TRACT SYMPTOMS: ICD-10-CM

## 2020-03-30 DIAGNOSIS — Z79.82 LONG TERM (CURRENT) USE OF ASPIRIN: ICD-10-CM

## 2020-03-30 DIAGNOSIS — L89.156 PRESSURE-INDUCED DEEP TISSUE DAMAGE OF SACRAL REGION: ICD-10-CM

## 2020-03-30 DIAGNOSIS — J96.01 ACUTE RESPIRATORY FAILURE WITH HYPOXIA: ICD-10-CM

## 2020-03-30 DIAGNOSIS — I13.0 HYPERTENSIVE HEART AND CHRONIC KIDNEY DISEASE WITH HEART FAILURE AND STAGE 1 THROUGH STAGE 4 CHRONIC KIDNEY DISEASE, OR UNSPECIFIED CHRONIC KIDNEY DISEASE: ICD-10-CM

## 2020-03-30 DIAGNOSIS — D50.0 IRON DEFICIENCY ANEMIA SECONDARY TO BLOOD LOSS (CHRONIC): ICD-10-CM

## 2020-03-30 DIAGNOSIS — E87.6 HYPOKALEMIA: ICD-10-CM

## 2020-03-30 DIAGNOSIS — Z95.2 PRESENCE OF PROSTHETIC HEART VALVE: Chronic | ICD-10-CM

## 2020-03-30 DIAGNOSIS — I50.33 ACUTE ON CHRONIC DIASTOLIC (CONGESTIVE) HEART FAILURE: ICD-10-CM

## 2020-03-30 DIAGNOSIS — U07.1 COVID-19: ICD-10-CM

## 2020-03-30 DIAGNOSIS — Z95.0 PRESENCE OF CARDIAC PACEMAKER: Chronic | ICD-10-CM

## 2020-03-30 DIAGNOSIS — I48.20 CHRONIC ATRIAL FIBRILLATION, UNSPECIFIED: ICD-10-CM

## 2020-03-30 DIAGNOSIS — E03.9 HYPOTHYROIDISM, UNSPECIFIED: ICD-10-CM

## 2020-03-30 DIAGNOSIS — A41.9 SEPSIS, UNSPECIFIED ORGANISM: ICD-10-CM

## 2020-03-30 DIAGNOSIS — Z96.619 PRESENCE OF UNSPECIFIED ARTIFICIAL SHOULDER JOINT: Chronic | ICD-10-CM

## 2020-03-30 DIAGNOSIS — Z66 DO NOT RESUSCITATE: ICD-10-CM

## 2020-03-30 DIAGNOSIS — Z96.653 PRESENCE OF ARTIFICIAL KNEE JOINT, BILATERAL: Chronic | ICD-10-CM

## 2020-03-30 DIAGNOSIS — Z51.5 ENCOUNTER FOR PALLIATIVE CARE: ICD-10-CM

## 2020-03-30 LAB
ALBUMIN SERPL ELPH-MCNC: 3 G/DL — LOW (ref 3.3–5)
ALP SERPL-CCNC: 59 U/L — SIGNIFICANT CHANGE UP (ref 40–120)
ALT FLD-CCNC: 12 U/L — SIGNIFICANT CHANGE UP (ref 12–78)
ANION GAP SERPL CALC-SCNC: 4 MMOL/L — LOW (ref 5–17)
APTT BLD: 36.9 SEC — SIGNIFICANT CHANGE UP (ref 28.5–37)
AST SERPL-CCNC: 30 U/L — SIGNIFICANT CHANGE UP (ref 15–37)
BILIRUB SERPL-MCNC: 0.7 MG/DL — SIGNIFICANT CHANGE UP (ref 0.2–1.2)
BUN SERPL-MCNC: 53 MG/DL — HIGH (ref 7–23)
CALCIUM SERPL-MCNC: 8.9 MG/DL — SIGNIFICANT CHANGE UP (ref 8.5–10.1)
CHLORIDE SERPL-SCNC: 111 MMOL/L — HIGH (ref 96–108)
CO2 SERPL-SCNC: 29 MMOL/L — SIGNIFICANT CHANGE UP (ref 22–31)
CREAT SERPL-MCNC: 2.7 MG/DL — HIGH (ref 0.5–1.3)
GLUCOSE SERPL-MCNC: 111 MG/DL — HIGH (ref 70–99)
HCT VFR BLD CALC: 27.5 % — LOW (ref 39–50)
HGB BLD-MCNC: 8.4 G/DL — LOW (ref 13–17)
INR BLD: 2.36 RATIO — HIGH (ref 0.88–1.16)
LACTATE SERPL-SCNC: 0.9 MMOL/L — SIGNIFICANT CHANGE UP (ref 0.7–2)
MCHC RBC-ENTMCNC: 30.5 GM/DL — LOW (ref 32–36)
MCHC RBC-ENTMCNC: 34.9 PG — HIGH (ref 27–34)
MCV RBC AUTO: 114.1 FL — HIGH (ref 80–100)
POTASSIUM SERPL-MCNC: 4.7 MMOL/L — SIGNIFICANT CHANGE UP (ref 3.5–5.3)
POTASSIUM SERPL-SCNC: 4.7 MMOL/L — SIGNIFICANT CHANGE UP (ref 3.5–5.3)
PROT SERPL-MCNC: 5.4 G/DL — LOW (ref 6–8.3)
PROTHROM AB SERPL-ACNC: 27.1 SEC — HIGH (ref 10–12.9)
RBC # BLD: 2.41 M/UL — LOW (ref 4.2–5.8)
RBC # FLD: 17.3 % — HIGH (ref 10.3–14.5)
SODIUM SERPL-SCNC: 144 MMOL/L — SIGNIFICANT CHANGE UP (ref 135–145)
WBC # BLD: 3.63 K/UL — LOW (ref 3.8–10.5)
WBC # FLD AUTO: 3.63 K/UL — LOW (ref 3.8–10.5)

## 2020-03-30 PROCEDURE — 99233 SBSQ HOSP IP/OBS HIGH 50: CPT | Mod: GC

## 2020-03-30 PROCEDURE — 71045 X-RAY EXAM CHEST 1 VIEW: CPT | Mod: 26

## 2020-03-30 PROCEDURE — 99285 EMERGENCY DEPT VISIT HI MDM: CPT | Mod: CS

## 2020-03-30 PROCEDURE — 93010 ELECTROCARDIOGRAM REPORT: CPT

## 2020-03-30 RX ORDER — HEPARIN SODIUM 5000 [USP'U]/ML
5000 INJECTION INTRAVENOUS; SUBCUTANEOUS EVERY 8 HOURS
Refills: 0 | Status: DISCONTINUED | OUTPATIENT
Start: 2020-03-30 | End: 2020-03-31

## 2020-03-30 RX ORDER — ACETAMINOPHEN 500 MG
650 TABLET ORAL EVERY 6 HOURS
Refills: 0 | Status: DISCONTINUED | OUTPATIENT
Start: 2020-03-30 | End: 2020-04-04

## 2020-03-30 RX ORDER — SODIUM CHLORIDE 9 MG/ML
2000 INJECTION INTRAMUSCULAR; INTRAVENOUS; SUBCUTANEOUS ONCE
Refills: 0 | Status: COMPLETED | OUTPATIENT
Start: 2020-03-30 | End: 2020-03-30

## 2020-03-30 RX ADMIN — SODIUM CHLORIDE 2000 MILLILITER(S): 9 INJECTION INTRAMUSCULAR; INTRAVENOUS; SUBCUTANEOUS at 22:42

## 2020-03-30 NOTE — H&P ADULT - PROBLEM SELECTOR PLAN 8
- Chronic, stable  - Takes Metoprolol succinate 12.5 qAM, may continue while inpatient with hold parameters  - Monitor routine hemodynamics

## 2020-03-30 NOTE — H&P ADULT - PROBLEM SELECTOR PLAN 6
- Low Hb on admission though appears to be at baseline  - Continue with ferrous sulfate 325mg daily  - Monitor for signs and symptoms of acute bleeding  - Monitor daily CBC

## 2020-03-30 NOTE — H&P ADULT - NSHPREVIEWOFSYSTEMS_GEN_ALL_CORE
CONSTITUTIONAL: denies fever, chills, fatigue, weakness  HEENT: denies blurred vision, sore throat  SKIN: denies new lesions, rash  CARDIOVASCULAR: denies chest pain, chest pressure, palpitations  RESPIRATORY: denies shortness of breath, sputum production  GASTROINTESTINAL: denies nausea, vomiting, diarrhea, abdominal pain  GENITOURINARY: denies dysuria, discharge  NEUROLOGICAL: denies numbness, headache, focal weakness  MUSCULOSKELETAL: denies new joint pain, muscle aches  HEMATOLOGIC: denies gross bleeding, bruising  LYMPHATICS: denies enlarged lymph nodes, extremity swelling  PSYCHIATRIC: denies recent changes in anxiety, depression  ENDOCRINOLOGIC: denies sweating, cold or heat intolerance Unable to obtain as pt is poor historian and unable to reach family at this time.

## 2020-03-30 NOTE — ED PROVIDER NOTE - NS ED ROS FT
Constitutional: - Fever, - Chills, - Anorexia, - Fatigue, - Night sweats  Eyes: - Discharge, - Irritation, - Redness, - Visual changes, - Light sensitivity, - Pain  EARS: - Ear Pain, - Tinnitus, - Decreased hearing  NOSE: - Congestion, - Bloody nose  MOUTH/THROAT: - Vocal Changes, - Drooling, - Sore throat  NECK: - Lumps, - Stiffness, - Pain  CV: - Palpitations, - Chest Pain, - Edema, - Syncope  RESP:  + Cough, +Shortness of Breath, - Dyspnea on Exertion, - Trouble speaking, - Pleuritic pain - Wheezing  GI: - Diarrhea, - Constipation, - Bloody stools, - Nausea, - Vomiting, + Abdominal Pain  : - Dysuria, -Frequency, - Hematuria, - Hesitancy, - Incontinence, - Saddle Anesthesia, - Abnormal discharge  MSK: - Myalgias, - Arthralgias, - Weakness, - Deformities, - Injuries  SKIN: - Color change, - Rash, - Swelling, - Ecchymosis, - Abrasion, - laceration  NEURO: - Change in behavior, - Dec. Alertness, - Headache, - Dizziness, - Change in speech, - Weakness, - Seizure-like activity, - Difficulty ambulating

## 2020-03-30 NOTE — H&P ADULT - PROBLEM SELECTOR PLAN 9
- Chronic  - Continue with home Gabapentin 200mg q4h for eye pain  - May hold for lethargy or sedation

## 2020-03-30 NOTE — ED PROVIDER NOTE - OBJECTIVE STATEMENT
89 yo male hx of afib, pacemaker, bph, ckd, left arm dvt c/o shortness of breath for several days with cough, states no fever, but has been taking tylenol last dose 5 hours ago, with generalized abd crampins.  Denies any +covid contacts.

## 2020-03-30 NOTE — H&P ADULT - PROBLEM SELECTOR PLAN 3
- Last TTE peformed 3/6/20, normal LV function, EF 55-60%  - BNP elevated in setting of LETICIA on CKD, patient appears ________ on exam  - Takes PO Lasix 80mg qAM and 40mg at noon each day depending on weight and SOB/cough  - Continue with PO Lasix ?40mg qDaily with hold parameter  - Monitor daily weights and I/Os - Last TTE peformed 3/6/20, normal LV function, EF 55-60%  - BNP elevated in setting of LETICIA on CKD, patient appears ________ on exam  - Diurese as above  - Monitor daily weights and I/Os - Last TTE peformed 3/6/20, normal LV function, EF 55-60%  - BNP elevated in setting of LETICIA on CKD  - B/l pleural effusions on CXR  - Diurese as above  - Monitor daily weights and I/Os

## 2020-03-30 NOTE — ED ADULT TRIAGE NOTE - CHIEF COMPLAINT QUOTE
feeling sick for last couple of days- had stomach ache and difficulty breathing got worse,- patient on nonrebreather

## 2020-03-30 NOTE — H&P ADULT - PROBLEM SELECTOR PLAN 5
- Known h/o CKD stage 4  - BUN/Cr on admission mildly elevated from baseline ~2.4, likely prerenal in nature  - S/p 2L NS in the ED  - Avoid nephrotoxic meds  - Monitor daily renal function

## 2020-03-30 NOTE — H&P ADULT - PROBLEM SELECTOR PLAN 2
- Originally on NRB titrated down to 3L NC, now satting well  - Likely 2/2 suspected viral illness vs acute on chronic CHF exacerbation evidenced by b/l pleural effusions on CXR  - Continue with supplemental O2 and titrate prn  - Diurese with Lasix IV 40mg  - Monitor continuous pulse ox - Originally on NRB titrated down to 3L NC, now satting well  - Likely 2/2 suspected viral illness vs acute on chronic CHF exacerbation evidenced by b/l pleural effusions on CXR  - Continue with supplemental O2 and titrate prn  - Diurese according to patient's schedule at home depending on daily weights and respiratory status: Lasix 80mg each morning and 40mg at noon daily  - Monitor continuous pulse ox - Originally on NRB titrated down to 3L NC, now satting well  - Likely 2/2 suspected viral illness vs COPD vs acute on chronic CHF exacerbation evidenced by b/l pleural effusions on CXR  - Continue with supplemental O2 and titrate prn  - Continue home Symbicort  - Diurese according to patient's schedule at home depending on daily weights and respiratory status: Lasix 80mg each morning and 40mg at noon daily  - Monitor continuous pulse ox

## 2020-03-30 NOTE — H&P ADULT - HISTORY OF PRESENT ILLNESS
The date the pt first felt unwell:   Fever or chills: yes [  ]   no [  ]   - Tmax:   Fatigue, malaise or generalized weakness: yes [  ]   no [  ]  Shortness of breath/dyspnea: yes [  ]   no [  ]  Cough: yes [  ]   no [  ], sputum production: yes [  ]   no [  ]  Anorexia/po intolerance: yes [  ]   no [  ]  Myalgias: yes [  ]   no [  ]  Headache: yes [  ]   no [  ]  Sore throat: yes [  ]   no [  ]  Rhinorrhea: yes [  ]   no [  ]  Abd pain: yes [  ]   no [  ]  Nausea: yes [  ]   no [  ]  Vomiting: yes [  ]   no [  ]  Diarrhea: yes [  ]   no [  ]  Loss of sense of smell/anosmia: yes [  ]   no [  ]  Conjunctivitis: yes [  ]   no [  ]  Recent travel: yes [  ]   no [  ] - Location:   Any sick contacts: yes [  ]   no [  ]  Close contact with someone confirmed positive with COVID-19 / SARS-CoV2 in the last 14 days: yes [  ]   no [  ]  Other associated complaints:         In the ED  VS: T 97.6 HR 64 /78 RR 24 -> 18 SPO2 100% on NRB -> 100% on 3L NC  Significant labs include WBC 3.63, H/H 8.4/27.5, lymphocytes ,  , BUN/Cr 53/2.7, BNP 8893,   CXR:  EKG:    Patient received 2L NS. 89yo M, with PMH/o COPD (on 2.5L NC qHs home O2), af s/p PPM (Biotronik), HFpEF (last TTE 3/20, EF 55-60%), LUE DVT, h/o PE, HTN, AoS s/p TAVR (2019), CKD stage 4, BPH, prostate ca s/p RT and Casodex, SCC, CLL s/p 6 cycles Rituximab in 2013 now off therapy, postherpetic neuralgia, and hypothyroidism, presenting with SOB for several days. Patient is a poor historian, and attempts were made to reach family members in an effort to gather history though unsuccessful at this time. Per ED provider note, patient additionally endorses cough. Of note, patient was recently discharged from [facility] on 3/12 where he was admitted for bacterial PNA with additional enterococcal bacteremia suspected from sacral decubitus ulcer.    In the ED  VS: T 97.6 HR 64 /78 RR 24 -> 18 SPO2 100% on NRB -> 100% on 3L NC  Significant labs include WBC 3.63, H/H 8.4/27.5, lymphocytes ,  , BUN/Cr 53/2.7, BNP 8893,   CXR: b/l pleural effusions based on my preliminary read, pending official report  EKG: paced, NSR, LAD, vent rate 62    Patient received 2L NS. 89yo M, with PMH/o COPD (on 2.5L NC qHs home O2), af s/p PPM (Biotronik), HFpEF (last TTE 3/20, EF 55-60%), LUE DVT, h/o PE, HTN, AoS s/p TAVR (2019), CKD stage 4, BPH, prostate ca s/p RT and Casodex, SCC, CLL s/p 6 cycles Rituximab in 2013 now off therapy, postherpetic neuralgia, and hypothyroidism, presenting with SOB for several days. Patient is a poor historian, and attempts were made to reach family members in an effort to gather history though unsuccessful at this time. Per ED provider note, patient additionally endorses cough. Of note, patient was recently discharged from [facility] on 3/12 where he was admitted for bacterial PNA with additional enterococcal bacteremia suspected from sacral decubitus ulcer.    In the ED  VS: T 97.6 HR 64 /78 RR 24 -> 18 SPO2 100% on NRB -> 100% on 3L NC  Significant labs include WBC 3.63, H/H 8.4/27.5, NLR 1.18 , BUN/Cr 53/2.7, BNP 8893.  CXR: b/l pleural effusions based on my preliminary read, pending official report  EKG: paced, NSR, LAD, vent rate 62    Patient received 2L NS.

## 2020-03-30 NOTE — ED PROVIDER NOTE - PHYSICAL EXAMINATION
Gen: Alert, NAD, on nonrebreather  Head/eyes: NC/AT, PERRL  ENT: airway patent  Neck: supple, no tenderness/meningismus/JVD, Trachea midline  Pulm/lung: coarse breath sound b/l at bases  CV/heart: RRR, no M/R/G, +2 dist pulses (radial, pedal DP/PT, popliteal)  GI/Abd: soft, NT/ND, +BS, no guarding/rebound tenderness  Musculoskeletal: no edema/erythema/cyanosis, FROM in all extremities, no C/T/L spine ttp  Skin: no rash, no vesicles, no petechaie, no ecchymosis, no swelling  Neuro: AAOx3, CN 2-12 intact, normal sensation, 5/5 motor strength in all extremities

## 2020-03-30 NOTE — H&P ADULT - PROBLEM SELECTOR PLAN 4
- Chronic, stable  - Currently rate-controlled  - EKG on admission ____________  - Continue home Amiodarone 200mg qDaily and Metoprolol 12.5mg qDaily  - Takes Coumadin 4mg Monday then 2mg Tues-Sun  - INR on admission 2.36, f/u daily INRs and adjust Coumadin dose accordingly - Chronic, stable  - Currently rate-controlled  - EKG on admission paced, NSR  - Continue home Amiodarone 200mg qDaily and Metoprolol 12.5mg qDaily  - Takes Coumadin 4mg Monday then 2mg Tues-Sun  - INR on admission 2.36, f/u daily INRs and adjust Coumadin dose accordingly

## 2020-03-30 NOTE — ED ADULT NURSE NOTE - CHPI ED NUR SYMPTOMS NEG
no hemoptysis/no chest pain/no diaphoresis/no chills/no edema/no body aches/no headache/no fever/no wheezing

## 2020-03-30 NOTE — H&P ADULT - PROBLEM SELECTOR PLAN 10
IMPROVE VTE Individual Risk Assessment          RISK                                                          Points  [ x ] Previous VTE                                                3  [  ] Thrombophilia                                             2  [  ] Lower limb paralysis                                   2        (unable to hold up >15 seconds)    [  ] Current Cancer                                             2         (within 6 months)  [  ] Immobilization > 24 hrs                              1  [  ] ICU/CCU stay > 24 hours                             1  [ x ] Age > 60                                                         1    IMPROVE VTE Score: 4    - DVT ppx with Heparin subq  ------------------------------------------------------  PROBLEM 11: Hypothyroidism: Chronic, stable; Continue with Levothyroxine 112mcg daily IMPROVE VTE Individual Risk Assessment          RISK                                                          Points  [ x ] Previous VTE                                                3  [  ] Thrombophilia                                             2  [  ] Lower limb paralysis                                   2        (unable to hold up >15 seconds)    [  ] Current Cancer                                             2         (within 6 months)  [  ] Immobilization > 24 hrs                              1  [  ] ICU/CCU stay > 24 hours                             1  [ x ] Age > 60                                                         1    IMPROVE VTE Score: 4    - DVT ppx with Coumadin  ------------------------------------------------------  PROBLEM 11: Hypothyroidism: Chronic, stable; Continue with Levothyroxine 112mcg daily

## 2020-03-30 NOTE — H&P ADULT - ASSESSMENT
89yo M, with PMH/o COPD (on 2.5L NC qHs home O2), af s/p PPM (Biotronik), HFpEF (last TTE 3/20, EF 55-60%), LUE DVT, h/o PE, HTN, AoS s/p TAVR (2019), CKD stage 4, BPH, prostate ca s/p RT and Casodex, SCC, CLL s/p 6 cycles Rituximab in 2013 now off therapy, postherpetic neuralgia, and hypothyroidism, presenting with SOB and cough, found to have b/l pleural effusions on CXR, admitted to r/o [facility]. 89yo M, with PMH/o COPD (on 2.5L NC qHs home O2), af s/p PPM (Biotronik), HFpEF (last TTE 3/20, EF 55-60%), LUE DVT, h/o PE, HTN, AoS s/p TAVR (2019), CKD stage 4, BPH, prostate ca s/p RT and Casodex, SCC, CLL s/p 6 cycles Rituximab in 2013 now off therapy, postherpetic neuralgia, and hypothyroidism, presenting with SOB and cough, found to have b/l pleural effusions on CXR possibly 2/2 acute on chronic HFpEF, admitted to r/o [facility].

## 2020-03-30 NOTE — ED ADULT NURSE NOTE - NSIMPLEMENTINTERV_GEN_ALL_ED
Implemented All Fall Risk Interventions:  [place] to call system. Call bell, personal items and telephone within reach. Instruct patient to call for assistance. Room bathroom lighting operational. Non-slip footwear when patient is off stretcher. Physically safe environment: no spills, clutter or unnecessary equipment. Stretcher in lowest position, wheels locked, appropriate side rails in place. Provide visual cue, wrist band, yellow gown, etc. Monitor gait and stability. Monitor for mental status changes and reorient to person, place, and time. Review medications for side effects contributing to fall risk. Reinforce activity limits and safety measures with patient and family.

## 2020-03-30 NOTE — H&P ADULT - PROBLEM SELECTOR PLAN 1
- Pt p/w  - Leukopenic and tachypneic on NRB upon ED presentation with likely pulmonary infectious process d/t suspected viral illness  - Admit to medicine with remote tele  - Lactate 0.9  - S/p 2L NS in the ED  - F/u blood cx  - Hold off on abx therapy as clinical picture likely d/t viral etiology, r/o COVID  - Bilateral opacifications/effusions in setting of suspected COVID19 viral illness given clinical presentation  - NLR 1.18, continue to trend daily  - May use O2 NC, Venturi Mask, NRB as needed depending on oxygen demand, titrate supp O2 to SpO2 >93%  - Avoid HFNC/NIPPV/aerosolizing procedures i.e. nebulizations  - Avoid NSAIDs, use tylenol PRN fevers  - Maintain strict isolation precautions, use proper PPE   - Check urine legionella/strep antigens, check procalcitonin  - Daily CBC with diff to follow eosinophils, lymphocytes and neutrophils; daily CK  - Given risk factors (age and comorbidities): check LDH, CRP, ferritin, ESR, PT/PTT, CK, lactate, troponin  - Baseline EKG ______, monitor for cardiac decompensation, monitor telemetry  - Monitor respiratory status closely   - NEWS2 score   - Code Status: - Pt p/w SOB and cough  - Leukopenic and tachypneic on NRB upon ED presentation with likely pulmonary infectious process d/t suspected viral illness  - Admit to medicine with remote tele  - Lactate 0.9  - S/p 2L NS in the ED  - F/u blood cx  - Hold off on abx therapy as clinical picture likely d/t viral etiology, r/o COVID  - Bilateral opacifications/effusions in setting of suspected COVID19 viral illness given clinical presentation  - NLR 1.18, continue to trend daily  - May use O2 NC, Venturi Mask, NRB as needed depending on oxygen demand, titrate supp O2 to SpO2 >93%  - Avoid HFNC/NIPPV/aerosolizing procedures i.e. nebulizations  - Avoid NSAIDs, use tylenol PRN fevers  - Maintain strict isolation precautions, use proper PPE   - Check urine legionella/strep antigens, check procalcitonin  - Daily CBC with diff to follow eosinophils, lymphocytes and neutrophils; daily CK  - Given risk factors (age and comorbidities): check LDH, CRP, ferritin, ESR, PT/PTT, CK, lactate, troponin  - Baseline EKG NSR, monitor for cardiac decompensation, monitor telemetry  - Monitor respiratory status closely   - NEWS2 score  - Code Status: TBD, will reattempt to contact family - Pt p/w SOB and cough  - Leukopenic and tachypneic on NRB upon ED presentation with likely pulmonary infectious process d/t suspected viral illness  - Admit to medicine with remote tele  - Lactate 0.9  - S/p 2L NS in the ED  - F/u blood cx  - Hold off on abx therapy as clinical picture likely d/t viral etiology, r/o COVID  - Bilateral opacifications/effusions in setting of suspected COVID19 viral illness given clinical presentation  - NLR 1.18, continue to trend daily  - May use O2 NC, Venturi Mask, NRB as needed depending on oxygen demand, titrate supp O2 to SpO2 >93%  - Avoid HFNC/NIPPV/aerosolizing procedures i.e. nebulizations  - Avoid NSAIDs, use tylenol PRN fevers  - Maintain strict isolation precautions, use proper PPE   - Check urine legionella/strep antigens, check procalcitonin  - Daily CBC with diff to follow eosinophils, lymphocytes and neutrophils; daily CK  - Given risk factors (age and comorbidities): check LDH, CRP, ferritin, ESR, PT/PTT, CK, lactate, troponin  - Baseline EKG NSR, monitor for cardiac decompensation, monitor telemetry  - Monitor respiratory status closely   - NEWS2 score 4 upon ED presentation, low risk, reassess q4-6h prn  - Code Status: TBD, will reattempt to contact family - Pt p/w SOB and cough  - Leukopenic and tachypneic on NRB upon ED presentation with likely pulmonary infectious process d/t suspected viral illness  - Admit to medicine with remote tele  - Lactate 0.9  - S/p 2L NS in the ED, hold off on further IVF in setting of b/l effusions  - F/u blood cx  - Hold off on abx therapy as clinical picture possibly d/t viral etiology, r/o COVID  - Bilateral opacifications/effusions in setting of suspected COVID19 viral illness  - NLR 1.18, continue to trend daily  - May use O2 NC, Venturi Mask, NRB as needed depending on oxygen demand, titrate supp O2 to SpO2 >93%  - Avoid HFNC/NIPPV/aerosolizing procedures i.e. nebulizations  - Avoid NSAIDs, use tylenol PRN fevers  - Maintain strict isolation precautions, use proper PPE   - Check urine legionella/strep antigens, check procalcitonin  - Daily CBC with diff to follow eosinophils, lymphocytes and neutrophils; daily CK  - Given risk factors (age and comorbidities): check LDH, CRP, ferritin, ESR, PT/PTT, CK, lactate, troponin  - Baseline EKG NSR, monitor for cardiac decompensation, monitor telemetry  - Monitor respiratory status closely   - NEWS2 score 4 upon ED presentation, low risk, reassess q4-6h prn  - Code Status: TBD, will reattempt to contact family - Pt p/w SOB and cough  - Leukopenic and tachypneic on NRB upon ED presentation with likely pulmonary infectious process d/t suspected viral illness  - Admit to medicine  - Lactate 0.9  - S/p 2L NS in the ED, hold off on further IVF in setting of b/l effusions  - F/u blood cx  - Hold off on abx therapy as clinical picture possibly d/t viral etiology, r/o COVID  - Bilateral opacifications/effusions in setting of suspected COVID19 viral illness  - NLR 1.18, continue to trend daily  - May use O2 NC, Venturi Mask, NRB as needed depending on oxygen demand, titrate supp O2 to SpO2 >93%  - Avoid HFNC/NIPPV/aerosolizing procedures i.e. nebulizations  - Avoid NSAIDs, use tylenol PRN fevers  - Maintain strict isolation precautions, use proper PPE   - Check urine legionella/strep antigens, check procalcitonin  - Daily CBC with diff to follow eosinophils, lymphocytes and neutrophils; daily CK  - Given risk factors (age and comorbidities): check LDH, CRP, ferritin, ESR, PT/PTT, CK, lactate, troponin  - Baseline EKG NSR, monitor for cardiac decompensation, monitor telemetry  - Monitor respiratory status closely   - NEWS2 score 4 upon ED presentation, low risk, reassess q4-6h prn  - Code Status: TBD, will reattempt to contact family - Pt p/w SOB and cough  - Leukopenic and tachypneic on NRB upon ED presentation with likely pulmonary infectious process d/t suspected viral illness  - Admit to medicine  - Lactate 0.9  - S/p 2L NS in the ED, hold off on further IVF in setting of b/l effusions  - F/u blood cx  - Hold off on abx therapy as clinical picture possibly d/t viral etiology, r/o COVID  - Bilateral opacifications/effusions in setting of suspected COVID19 viral illness  - NLR 1.18, continue to trend daily  - May use O2 NC, Venturi Mask, NRB as needed depending on oxygen demand, titrate supp O2 to SpO2 >93%  - Avoid HFNC/NIPPV/aerosolizing procedures i.e. nebulizations  - Avoid NSAIDs, use tylenol PRN fevers  - Maintain strict isolation precautions, use proper PPE   - Check procalcitonin  - Daily CBC with diff to follow eosinophils, lymphocytes and neutrophils; daily CK  - Given risk factors (age and comorbidities): check LDH, CRP, ferritin, ESR, PT/PTT, CK, lactate, troponin  - Baseline EKG NSR, monitor for cardiac decompensation, monitor telemetry  - Monitor respiratory status closely   - NEWS2 score 4 upon ED presentation, low risk, reassess q4-6h prn  - Code Status: TBD, will reattempt to contact family

## 2020-03-30 NOTE — ED ADULT NURSE NOTE - OBJECTIVE STATEMENT
Pt comes from home by EMS. Pt reports he has had a cough for the past 4 months, got increased shortness of breath today. Pt breathing easy, unlabored, no signs of distress. Pt states he has a history of pneumonia recently. Pt on a NRB on arrival by EMS, able to maintain saturations on nasal cannula in ED. Pt placed on telemetry. Pt has a pacemaker, states that after pacemaker he had left arm swelling and discoloration.

## 2020-03-31 DIAGNOSIS — N40.0 BENIGN PROSTATIC HYPERPLASIA WITHOUT LOWER URINARY TRACT SYMPTOMS: ICD-10-CM

## 2020-03-31 DIAGNOSIS — I10 ESSENTIAL (PRIMARY) HYPERTENSION: ICD-10-CM

## 2020-03-31 DIAGNOSIS — A41.9 SEPSIS, UNSPECIFIED ORGANISM: ICD-10-CM

## 2020-03-31 DIAGNOSIS — I50.9 HEART FAILURE, UNSPECIFIED: ICD-10-CM

## 2020-03-31 DIAGNOSIS — I48.91 UNSPECIFIED ATRIAL FIBRILLATION: ICD-10-CM

## 2020-03-31 DIAGNOSIS — D64.9 ANEMIA, UNSPECIFIED: ICD-10-CM

## 2020-03-31 DIAGNOSIS — N18.9 CHRONIC KIDNEY DISEASE, UNSPECIFIED: ICD-10-CM

## 2020-03-31 DIAGNOSIS — R06.02 SHORTNESS OF BREATH: ICD-10-CM

## 2020-03-31 DIAGNOSIS — B02.29 OTHER POSTHERPETIC NERVOUS SYSTEM INVOLVEMENT: ICD-10-CM

## 2020-03-31 DIAGNOSIS — R05 COUGH: ICD-10-CM

## 2020-03-31 DIAGNOSIS — Z29.9 ENCOUNTER FOR PROPHYLACTIC MEASURES, UNSPECIFIED: ICD-10-CM

## 2020-03-31 DIAGNOSIS — B97.21 SARS-ASSOCIATED CORONAVIRUS AS THE CAUSE OF DISEASES CLASSIFIED ELSEWHERE: ICD-10-CM

## 2020-03-31 LAB
ALBUMIN SERPL ELPH-MCNC: 3 G/DL — LOW (ref 3.3–5)
ALP SERPL-CCNC: 59 U/L — SIGNIFICANT CHANGE UP (ref 40–120)
ALT FLD-CCNC: 13 U/L — SIGNIFICANT CHANGE UP (ref 12–78)
ANION GAP SERPL CALC-SCNC: 6 MMOL/L — SIGNIFICANT CHANGE UP (ref 5–17)
AST SERPL-CCNC: 21 U/L — SIGNIFICANT CHANGE UP (ref 15–37)
BASOPHILS # BLD AUTO: 0.01 K/UL — SIGNIFICANT CHANGE UP (ref 0–0.2)
BASOPHILS # BLD AUTO: 0.07 K/UL — SIGNIFICANT CHANGE UP (ref 0–0.2)
BASOPHILS NFR BLD AUTO: 0.3 % — SIGNIFICANT CHANGE UP (ref 0–2)
BASOPHILS NFR BLD AUTO: 2 % — SIGNIFICANT CHANGE UP (ref 0–2)
BILIRUB SERPL-MCNC: 0.7 MG/DL — SIGNIFICANT CHANGE UP (ref 0.2–1.2)
BUN SERPL-MCNC: 55 MG/DL — HIGH (ref 7–23)
CALCIUM SERPL-MCNC: 8.6 MG/DL — SIGNIFICANT CHANGE UP (ref 8.5–10.1)
CHLORIDE SERPL-SCNC: 109 MMOL/L — HIGH (ref 96–108)
CO2 SERPL-SCNC: 30 MMOL/L — SIGNIFICANT CHANGE UP (ref 22–31)
CREAT SERPL-MCNC: 2.9 MG/DL — HIGH (ref 0.5–1.3)
CRP SERPL-MCNC: 0.86 MG/DL — HIGH (ref 0–0.4)
D DIMER BLD IA.RAPID-MCNC: <150 NG/ML DDU — SIGNIFICANT CHANGE UP
EOSINOPHIL # BLD AUTO: 0 K/UL — SIGNIFICANT CHANGE UP (ref 0–0.5)
EOSINOPHIL # BLD AUTO: 0.01 K/UL — SIGNIFICANT CHANGE UP (ref 0–0.5)
EOSINOPHIL NFR BLD AUTO: 0 % — SIGNIFICANT CHANGE UP (ref 0–6)
EOSINOPHIL NFR BLD AUTO: 0.3 % — SIGNIFICANT CHANGE UP (ref 0–6)
ERYTHROCYTE [SEDIMENTATION RATE] IN BLOOD: 16 MM/HR — SIGNIFICANT CHANGE UP (ref 0–20)
FERRITIN SERPL-MCNC: 321 NG/ML — SIGNIFICANT CHANGE UP (ref 30–400)
GLUCOSE SERPL-MCNC: 92 MG/DL — SIGNIFICANT CHANGE UP (ref 70–99)
HCT VFR BLD CALC: 25.3 % — LOW (ref 39–50)
HGB BLD-MCNC: 7.5 G/DL — LOW (ref 13–17)
IMM GRANULOCYTES NFR BLD AUTO: 1.4 % — SIGNIFICANT CHANGE UP (ref 0–1.5)
LACTATE SERPL-SCNC: 0.9 MMOL/L — SIGNIFICANT CHANGE UP (ref 0.7–2)
LDH SERPL L TO P-CCNC: 335 U/L — HIGH (ref 50–242)
LYMPHOCYTES # BLD AUTO: 1.36 K/UL — SIGNIFICANT CHANGE UP (ref 1–3.3)
LYMPHOCYTES # BLD AUTO: 1.47 K/UL — SIGNIFICANT CHANGE UP (ref 1–3.3)
LYMPHOCYTES # BLD AUTO: 39 % — SIGNIFICANT CHANGE UP (ref 13–44)
LYMPHOCYTES # BLD AUTO: 40.5 % — SIGNIFICANT CHANGE UP (ref 13–44)
MAGNESIUM SERPL-MCNC: 2.5 MG/DL — SIGNIFICANT CHANGE UP (ref 1.6–2.6)
MCHC RBC-ENTMCNC: 29.6 GM/DL — LOW (ref 32–36)
MCHC RBC-ENTMCNC: 33.9 PG — SIGNIFICANT CHANGE UP (ref 27–34)
MCV RBC AUTO: 114.5 FL — HIGH (ref 80–100)
MONOCYTES # BLD AUTO: 0.28 K/UL — SIGNIFICANT CHANGE UP (ref 0–0.9)
MONOCYTES # BLD AUTO: 0.36 K/UL — SIGNIFICANT CHANGE UP (ref 0–0.9)
MONOCYTES NFR BLD AUTO: 8 % — SIGNIFICANT CHANGE UP (ref 2–14)
MONOCYTES NFR BLD AUTO: 9.9 % — SIGNIFICANT CHANGE UP (ref 2–14)
NEUTROPHILS # BLD AUTO: 1.6 K/UL — LOW (ref 1.8–7.4)
NEUTROPHILS # BLD AUTO: 1.73 K/UL — LOW (ref 1.8–7.4)
NEUTROPHILS NFR BLD AUTO: 43 % — SIGNIFICANT CHANGE UP (ref 43–77)
NEUTROPHILS NFR BLD AUTO: 47.6 % — SIGNIFICANT CHANGE UP (ref 43–77)
NRBC # BLD: 0 /100 WBCS — SIGNIFICANT CHANGE UP (ref 0–0)
NRBC # BLD: SIGNIFICANT CHANGE UP /100 WBCS (ref 0–0)
PHOSPHATE SERPL-MCNC: 3.9 MG/DL — SIGNIFICANT CHANGE UP (ref 2.5–4.5)
PLATELET # BLD AUTO: 104 K/UL — LOW (ref 150–400)
PLATELET # BLD AUTO: 105 K/UL — LOW (ref 150–400)
POTASSIUM SERPL-MCNC: 4 MMOL/L — SIGNIFICANT CHANGE UP (ref 3.5–5.3)
POTASSIUM SERPL-SCNC: 4 MMOL/L — SIGNIFICANT CHANGE UP (ref 3.5–5.3)
PROCALCITONIN SERPL-MCNC: <0.05 — SIGNIFICANT CHANGE UP (ref 0–0.04)
PROT SERPL-MCNC: 5.3 G/DL — LOW (ref 6–8.3)
RBC # BLD: 2.21 M/UL — LOW (ref 4.2–5.8)
RBC # FLD: 17.4 % — HIGH (ref 10.3–14.5)
SARS-COV-2 RNA SPEC QL NAA+PROBE: DETECTED
SODIUM SERPL-SCNC: 145 MMOL/L — SIGNIFICANT CHANGE UP (ref 135–145)
TROPONIN I SERPL-MCNC: 0.07 NG/ML — HIGH (ref 0.01–0.04)
WBC # BLD: 3.48 K/UL — LOW (ref 3.8–10.5)
WBC # FLD AUTO: 3.48 K/UL — LOW (ref 3.8–10.5)

## 2020-03-31 PROCEDURE — 99232 SBSQ HOSP IP/OBS MODERATE 35: CPT

## 2020-03-31 RX ORDER — GABAPENTIN 400 MG/1
2 CAPSULE ORAL
Qty: 0 | Refills: 0 | DISCHARGE

## 2020-03-31 RX ORDER — ASPIRIN/CALCIUM CARB/MAGNESIUM 324 MG
1 TABLET ORAL
Qty: 0 | Refills: 0 | DISCHARGE

## 2020-03-31 RX ORDER — AMIODARONE HYDROCHLORIDE 400 MG/1
200 TABLET ORAL DAILY
Refills: 0 | Status: DISCONTINUED | OUTPATIENT
Start: 2020-03-31 | End: 2020-04-04

## 2020-03-31 RX ORDER — METOPROLOL TARTRATE 50 MG
1 TABLET ORAL
Qty: 0 | Refills: 0 | DISCHARGE

## 2020-03-31 RX ORDER — SENNA PLUS 8.6 MG/1
1 TABLET ORAL
Qty: 0 | Refills: 0 | DISCHARGE

## 2020-03-31 RX ORDER — ALFUZOSIN HYDROCHLORIDE 10 MG/1
1 TABLET, EXTENDED RELEASE ORAL
Qty: 0 | Refills: 0 | DISCHARGE

## 2020-03-31 RX ORDER — GABAPENTIN 400 MG/1
200 CAPSULE ORAL EVERY 4 HOURS
Refills: 0 | Status: DISCONTINUED | OUTPATIENT
Start: 2020-03-31 | End: 2020-04-04

## 2020-03-31 RX ORDER — FUROSEMIDE 40 MG
80 TABLET ORAL
Refills: 0 | Status: DISCONTINUED | OUTPATIENT
Start: 2020-03-31 | End: 2020-04-04

## 2020-03-31 RX ORDER — METOPROLOL TARTRATE 50 MG
12.5 TABLET ORAL DAILY
Refills: 0 | Status: DISCONTINUED | OUTPATIENT
Start: 2020-03-31 | End: 2020-04-04

## 2020-03-31 RX ORDER — ASPIRIN/CALCIUM CARB/MAGNESIUM 324 MG
81 TABLET ORAL DAILY
Refills: 0 | Status: DISCONTINUED | OUTPATIENT
Start: 2020-03-31 | End: 2020-04-04

## 2020-03-31 RX ORDER — LACTOBACILLUS ACIDOPHILUS 100MM CELL
1 CAPSULE ORAL DAILY
Refills: 0 | Status: DISCONTINUED | OUTPATIENT
Start: 2020-03-31 | End: 2020-04-04

## 2020-03-31 RX ORDER — LEVOTHYROXINE SODIUM 125 MCG
112 TABLET ORAL DAILY
Refills: 0 | Status: DISCONTINUED | OUTPATIENT
Start: 2020-03-31 | End: 2020-04-04

## 2020-03-31 RX ORDER — TAMSULOSIN HYDROCHLORIDE 0.4 MG/1
0.8 CAPSULE ORAL AT BEDTIME
Refills: 0 | Status: DISCONTINUED | OUTPATIENT
Start: 2020-03-31 | End: 2020-04-04

## 2020-03-31 RX ORDER — FUROSEMIDE 40 MG
60 TABLET ORAL ONCE
Refills: 0 | Status: COMPLETED | OUTPATIENT
Start: 2020-03-31 | End: 2020-03-31

## 2020-03-31 RX ORDER — FERROUS SULFATE 325(65) MG
325 TABLET ORAL DAILY
Refills: 0 | Status: DISCONTINUED | OUTPATIENT
Start: 2020-03-31 | End: 2020-04-04

## 2020-03-31 RX ORDER — POTASSIUM CHLORIDE 20 MEQ
1 PACKET (EA) ORAL
Qty: 0 | Refills: 0 | DISCHARGE

## 2020-03-31 RX ORDER — BUDESONIDE AND FORMOTEROL FUMARATE DIHYDRATE 160; 4.5 UG/1; UG/1
2 AEROSOL RESPIRATORY (INHALATION)
Refills: 0 | Status: DISCONTINUED | OUTPATIENT
Start: 2020-03-31 | End: 2020-04-04

## 2020-03-31 RX ORDER — DOCUSATE SODIUM 100 MG
2 CAPSULE ORAL
Qty: 0 | Refills: 0 | DISCHARGE

## 2020-03-31 RX ORDER — FUROSEMIDE 40 MG
40 TABLET ORAL
Refills: 0 | Status: DISCONTINUED | OUTPATIENT
Start: 2020-03-31 | End: 2020-04-04

## 2020-03-31 RX ORDER — WARFARIN SODIUM 2.5 MG/1
2 TABLET ORAL ONCE
Refills: 0 | Status: COMPLETED | OUTPATIENT
Start: 2020-03-31 | End: 2020-03-31

## 2020-03-31 RX ORDER — METOPROLOL TARTRATE 50 MG
0.5 TABLET ORAL
Qty: 0 | Refills: 0 | DISCHARGE

## 2020-03-31 RX ADMIN — Medication 1 DROP(S): at 19:00

## 2020-03-31 RX ADMIN — Medication 112 MICROGRAM(S): at 06:54

## 2020-03-31 RX ADMIN — BUDESONIDE AND FORMOTEROL FUMARATE DIHYDRATE 2 PUFF(S): 160; 4.5 AEROSOL RESPIRATORY (INHALATION) at 19:00

## 2020-03-31 RX ADMIN — Medication 12.5 MILLIGRAM(S): at 06:54

## 2020-03-31 RX ADMIN — BUDESONIDE AND FORMOTEROL FUMARATE DIHYDRATE 2 PUFF(S): 160; 4.5 AEROSOL RESPIRATORY (INHALATION) at 06:54

## 2020-03-31 RX ADMIN — Medication 1 TABLET(S): at 18:04

## 2020-03-31 RX ADMIN — TAMSULOSIN HYDROCHLORIDE 0.8 MILLIGRAM(S): 0.4 CAPSULE ORAL at 22:22

## 2020-03-31 RX ADMIN — GABAPENTIN 200 MILLIGRAM(S): 400 CAPSULE ORAL at 08:18

## 2020-03-31 RX ADMIN — Medication 81 MILLIGRAM(S): at 18:04

## 2020-03-31 RX ADMIN — SODIUM CHLORIDE 2000 MILLILITER(S): 9 INJECTION INTRAMUSCULAR; INTRAVENOUS; SUBCUTANEOUS at 02:24

## 2020-03-31 RX ADMIN — Medication 60 MILLIGRAM(S): at 03:35

## 2020-03-31 RX ADMIN — AMIODARONE HYDROCHLORIDE 200 MILLIGRAM(S): 400 TABLET ORAL at 06:53

## 2020-03-31 RX ADMIN — Medication 40 MILLIGRAM(S): at 18:04

## 2020-03-31 RX ADMIN — GABAPENTIN 200 MILLIGRAM(S): 400 CAPSULE ORAL at 22:20

## 2020-03-31 RX ADMIN — GABAPENTIN 200 MILLIGRAM(S): 400 CAPSULE ORAL at 03:42

## 2020-03-31 RX ADMIN — Medication 1 DROP(S): at 06:53

## 2020-03-31 RX ADMIN — Medication 325 MILLIGRAM(S): at 18:04

## 2020-03-31 RX ADMIN — GABAPENTIN 200 MILLIGRAM(S): 400 CAPSULE ORAL at 18:05

## 2020-03-31 RX ADMIN — WARFARIN SODIUM 2 MILLIGRAM(S): 2.5 TABLET ORAL at 22:22

## 2020-03-31 NOTE — DISCHARGE NOTE PROVIDER - NSDCCPCAREPLAN_GEN_ALL_CORE_FT
PRINCIPAL DISCHARGE DIAGNOSIS  Diagnosis: Shortness of breath  Assessment and Plan of Treatment: You were treated for congestive heart failure as well as viral illlness.      SECONDARY DISCHARGE DIAGNOSES  Diagnosis: Hypertension  Assessment and Plan of Treatment: Continue home Metoprolol    Diagnosis: BPH (benign prostatic hyperplasia)  Assessment and Plan of Treatment: Continue home Alfuzosin.    Diagnosis: Anemia  Assessment and Plan of Treatment: Continue home iron supplement and take over the counter stool softener as needed if constipated.    Diagnosis: A-fib  Assessment and Plan of Treatment: Continue home amiodarone, metoprolol and coumadin regimen. Make sure to have your INR checked.    Diagnosis: Postherpetic neuralgia  Assessment and Plan of Treatment: Continue home gabapentin 200mg eveery 4 hours for eye pain. PRINCIPAL DISCHARGE DIAGNOSIS  Diagnosis: SARS-associated coronavirus infection  Assessment and Plan of Treatment: Hypoxic Respiratory Distress 2/2 COVID infection.  Given supportive care and is stable on 5L NC.      SECONDARY DISCHARGE DIAGNOSES  Diagnosis: A-fib  Assessment and Plan of Treatment: Continue home amiodarone, metoprolol and coumadin regimen. Make sure to have your INR checked by PCP.    Diagnosis: Anemia  Assessment and Plan of Treatment: Continue home iron supplement and take over the counter stool softener as needed if constipated.    Diagnosis: CKD (chronic kidney disease)  Assessment and Plan of Treatment: CKD (chronic kidney disease)    Diagnosis: BPH (benign prostatic hyperplasia)  Assessment and Plan of Treatment: Continue home Alfuzosin.    Diagnosis: Hypertension  Assessment and Plan of Treatment: Continue home Metoprolol    Diagnosis: Postherpetic neuralgia  Assessment and Plan of Treatment: Continue home gabapentin 200mg eveery 4 hours for eye pain.    Diagnosis: Skin ulcer of left lower leg, unspecified ulcer stage  Assessment and Plan of Treatment: Continue Lac-Hydrim lotion to b/l lower legs

## 2020-03-31 NOTE — DISCHARGE NOTE PROVIDER - HOSPITAL COURSE
FROM ADMISSION H+P:     HPI:    89yo M, with PMH/o COPD (on 2.5L NC qHs home O2), af s/p PPM (Biotronik), HFpEF (last TTE 3/20, EF 55-60%), LUE DVT, h/o PE, HTN, AoS s/p TAVR (2019), CKD stage 4, BPH, prostate ca s/p RT and Casodex, SCC, CLL s/p 6 cycles Rituximab in 2013 now off therapy, postherpetic neuralgia, and hypothyroidism, presenting with SOB for several days. Patient is a poor historian, and attempts were made to reach family members in an effort to gather history though unsuccessful at this time. Per ED provider note, patient additionally endorses cough. Of note, patient was recently discharged from [facility] on 3/12 where he was admitted for bacterial PNA with additional enterococcal bacteremia suspected from sacral decubitus ulcer.        In the ED    VS: T 97.6 HR 64 /78 RR 24 -> 18 SPO2 100% on NRB -> 100% on 3L NC    Significant labs include WBC 3.63, H/H 8.4/27.5, NLR 1.18 , BUN/Cr 53/2.7, BNP 8893.    CXR: b/l pleural effusions based on my preliminary read, pending official report    EKG: paced, NSR, LAD, vent rate 62        Patient received 2L NS. (30 Mar 2020 23:27)            ---    HOSPITAL COURSE: Patient was admitted for pleural effusions possibly 2/2 acute on chronic HFpEF, and rule out COVID19. He met sepsis criteria on admission, treated with IVF, oxygen delivery prn, tylenol and inflammatory markers were monitored. For HFpEF, patient was diuresed and kidney function was monitored.         ---    CONSULTANTS: FROM ADMISSION H+P:     HPI:    89yo M, with PMH/o COPD (on 2.5L NC qHs home O2), af s/p PPM (Biotronik), HFpEF (last TTE 3/20, EF 55-60%), LUE DVT, h/o PE, HTN, AoS s/p TAVR (2019), CKD stage 4, BPH, prostate ca s/p RT and Casodex, SCC, CLL s/p 6 cycles Rituximab in 2013 now off therapy, postherpetic neuralgia, and hypothyroidism, presenting with SOB for several days. Patient is a poor historian, and attempts were made to reach family members in an effort to gather history though unsuccessful at this time. Per ED provider note, patient additionally endorses cough. Of note, patient was recently discharged from [facility] on 3/12 where he was admitted for bacterial PNA with additional enterococcal bacteremia suspected from sacral decubitus ulcer.        In the ED    VS: T 97.6 HR 64 /78 RR 24 -> 18 SPO2 100% on NRB -> 100% on 3L NC    Significant labs include WBC 3.63, H/H 8.4/27.5, NLR 1.18 , BUN/Cr 53/2.7, BNP 8893.    CXR: b/l pleural effusions based on my preliminary read, pending official report    EKG: paced, NSR, LAD, vent rate 62        Patient received 2L NS. (30 Mar 2020 23:27)            ---    HOSPITAL COURSE: Patient was admitted for pleural effusions possibly 2/2 acute on chronic HFpEF, and rule out COVID19. He met sepsis criteria on admission, treated with IVF, oxygen delivery prn, tylenol and inflammatory markers were monitored. For HFpEF, patient was diuresed and kidney function was monitored.          Patient COVID19+. Bilateral opacifications/effusions in setting of suspected COVID19 viral illness, given supp O2 as needed.    Patients code status: DNR with trial of intubation. Last TTE peformed 3/6/20, normal LV function, EF 55-60%. A-fib remained stable and rate controlled.Continued home Amiodarone 200mg qDaily and Metoprolol 12.5mg qDaily. Coumadin continued last INR ----    Ferrous sulfate given for iron def anemia.                    PT recommended rehab.     ---    CONSULTANTS: FROM ADMISSION H+P:     HPI:    89yo M, with PMH/o COPD (on 2.5L NC qHs home O2), af s/p PPM (Biotronik), HFpEF (last TTE 3/20, EF 55-60%), LUE DVT, h/o PE, HTN, AoS s/p TAVR (2019), CKD stage 4, BPH, prostate ca s/p RT and Casodex, SCC, CLL s/p 6 cycles Rituximab in 2013 now off therapy, postherpetic neuralgia, and hypothyroidism, presenting with SOB for several days. Patient is a poor historian, and attempts were made to reach family members in an effort to gather history though unsuccessful at this time. Per ED provider note, patient additionally endorses cough. Of note, patient was recently discharged from [facility] on 3/12 where he was admitted for bacterial PNA with additional enterococcal bacteremia suspected from sacral decubitus ulcer.        In the ED    VS: T 97.6 HR 64 /78 RR 24 -> 18 SPO2 100% on NRB -> 100% on 3L NC    Significant labs include WBC 3.63, H/H 8.4/27.5, NLR 1.18 , BUN/Cr 53/2.7, BNP 8893.    CXR: b/l pleural effusions based on my preliminary read, pending official report    EKG: paced, NSR, LAD, vent rate 62        Patient received 2L NS. (30 Mar 2020 23:27)            ---    HOSPITAL COURSE: Patient was admitted for pleural effusions possibly 2/2 acute on chronic HFpEF, and rule out COVID19. He met sepsis criteria on admission, treated with IVF, oxygen delivery prn, tylenol and inflammatory markers were monitored, and trended downward. For HFpEF, patient was diuresed and kidney function was monitored, and improved to baseline.          Patient COVID19+. Bilateral opacifications/effusions in setting of suspected COVID19 viral illness, given supp O2 as needed.    Patients code status: DNR with trial of intubation. Last TTE peformed 3/6/20, normal LV function, EF 55-60%. A-fib remained stable and rate controlled.Continued home Amiodarone 200mg qDaily and Metoprolol 12.5mg qDaily. Coumadin continued last INR 2.25    Ferrous sulfate given for iron def anemia.        PT recommended rehab, and patient is agreeable.    ---

## 2020-03-31 NOTE — CHART NOTE - NSCHARTNOTEFT_GEN_A_CORE
D/W daughter via phone ([phone]). Daughter was updated to patient's current status in the hospital. Per daughter, patient is DNR/DNI and MOLST form was filled out at patient's admission to [facility] ~3 weeks ago. Daughter also stated that she is the health care proxy and she has a physical copy of the filled out DNR/DNI MOLST form with her and she could potentially bring it in if needed.

## 2020-03-31 NOTE — GOALS OF CARE CONVERSATION - ADVANCED CARE PLANNING - CONVERSATION DETAILS
spoke to pt daughter prior to entering pt isolation room, pt alert, daughter feels pt makes own decisions, daughter on speaker phone with pt: yury reviewed: pt agrees to dnr, long discussion for ventilation trial or not to intubate: simple lay terms for pt, pt agrees to trial vent, no decision regarding MELA, wants treatment: IV fld & antibiotics, daughter supports pt decisions: Dr Alvares contacted:  pt is a dnr not dni, wants a trial vent. PC RN contact # given to daughter.

## 2020-03-31 NOTE — ED ADULT NURSE REASSESSMENT NOTE - NS ED NURSE REASSESS COMMENT FT1
Pt reports increased shortness of breath, crackles present. MD Bella made aware, to evaluate pt at bedside.

## 2020-03-31 NOTE — CHART NOTE - NSCHARTNOTEFT_GEN_A_CORE
Called by RN because patient complaining of SOB. Patient is 89 yo M PMH COPD, HFpEF, DVT/PE, AS s/p TAVR, CKD stage IV, BPH, CLL currently off therapy here with SOB. Admitted as R/O COVID 19 infection vs HF exacerbation. Patient received approximately half a 1L NS bolus in ED, prior to encounter. Seen at bedside. Feels SOB. Denies CP, fever, dizziness, confusion, headache, abdominal pain, N/V/C/D. On 3L NC satting at 99 percent.    Vital Signs Last 24 Hrs  T(C): 37.3 (31 Mar 2020 03:35), Max: 37.3 (31 Mar 2020 03:35)  T(F): 99.1 (31 Mar 2020 03:35), Max: 99.1 (31 Mar 2020 03:35)  HR: 67 (31 Mar 2020 03:35) (61 - 68)  BP: 149/68 (31 Mar 2020 03:35) (140/63 - 149/78)  BP(mean): --  RR: 20 (31 Mar 2020 03:35) (18 - 24)  SpO2: 99% (31 Mar 2020 03:35) (98% - 100%)    PHYSICAL EXAM:  GENERAL: in mild distress, speaking in full sentences  CHEST/LUNG: Crackles in bilateral lung bases  HEART: Regular rate and rhythm; +systolic murmur  ABDOMEN: Bowel sounds present; Soft, Nontender, Nondistended. No hepatomegally  EXTREMITIES: No clubbing, cyanosis, or edema  NERVOUS SYSTEM:  Oriented to person and place. No focal deficits.    89 YO with COPD, DVT/ PE, AS s/p TAVR, CDK, CLL admitted for R/O COVID vs HF exacerbation.  Based on physical exam findings and worsning SOB after receiving IVF, will treat patient for volume overload status  60 mg IV lasix ordered

## 2020-03-31 NOTE — DISCHARGE NOTE PROVIDER - REASON FOR ADMISSION
SOB, pleural effusions; r/o COVID - Code Status: TBD, unable to contact family at time of admission Hypoxic respiratory failure 2/2 COVID-19 infection

## 2020-03-31 NOTE — PROGRESS NOTE ADULT - SUBJECTIVE AND OBJECTIVE BOX
ADMISSION HPI:  89yo M, with PMH/o COPD (on 2.5L NC qHs home O2), af s/p PPM (Biotronik), HFpEF (last TTE 3/20, EF 55-60%), LUE DVT, h/o PE, HTN, AoS s/p TAVR (2019), CKD stage 4, BPH, prostate ca s/p RT and Casodex, SCC, CLL s/p 6 cycles Rituximab in 2013 now off therapy, postherpetic neuralgia, and hypothyroidism, presenting with SOB for several days. Patient is a poor historian, and attempts were made to reach family members in an effort to gather history though unsuccessful at this time. Per ED provider note, patient additionally endorses cough. Of note, patient was recently discharged from [facility] on 3/12 where he was admitted for bacterial PNA with additional enterococcal bacteremia suspected from sacral decubitus ulcer.    In the ED  VS: T 97.6 HR 64 /78 RR 24 -> 18 SPO2 100% on NRB -> 100% on 3L NC  Significant labs include WBC 3.63, H/H 8.4/27.5, NLR 1.18 , BUN/Cr 53/2.7, BNP 8893.  CXR: b/l pleural effusions based on my preliminary read, pending official report  EKG: paced, NSR, LAD, vent rate 62    Patient received 2L NS. (30 Mar 2020 23:27)    INTERVAL HPI:  Patient seen and examined. Feels ok today. Shortness of breath is a bit improved right now. +cough. Denies chest pain. He does not appear to be an entirely dependable historian.     REVIEW OF SYSTEMS:  As per HPI    VITAL SIGNS:  Vital Signs Last 24 Hrs  T(C): 37 (03-31-20 @ 10:00), Max: 37.4 (03-31-20 @ 06:52)  T(F): 98.6 (03-31-20 @ 10:00), Max: 99.3 (03-31-20 @ 06:52)  HR: 68 (03-31-20 @ 10:00) (61 - 70)  BP: 130/68 (03-31-20 @ 10:00) (130/68 - 149/78)  BP(mean): --  RR: 16 (03-31-20 @ 10:00) (16 - 24)  SpO2: 96% (03-31-20 @ 10:00) (96% - 100%)      PHYSICAL EXAM:     GENERAL: elderly male, no acute distress  HEENT: NC/AT, EOMI, neck supple, MMM  RESPIRATORY: good air movement, few scattered expiratory wheezes  CARDIOVASCULAR: RRR, no murmurs, gallops, rubs  ABDOMINAL: soft, non-tender, non-distended, positive bowel sounds   EXTREMITIES: no clubbing, cyanosis, or edema  NEUROLOGICAL: alert and oriented x 3, but confused at times, grossly non-focal  SKIN: multiple ecchymoses over both upper and lower extremities. left arm pitting edema, noticeably larger that right arm and per patient this is chronic since PPM placement  MUSCULOSKELETAL: no gross joint deformity                          8.4    3.63  )-----------( 105      ( 30 Mar 2020 22:13 )             27.5     03-30    144  |  111<H>  |  53<H>  ----------------------------<  111<H>  4.7   |  29  |  2.70<H>    Ca    8.9      30 Mar 2020 22:34    TPro  5.4<L>  /  Alb  3.0<L>  /  TBili  0.7  /  DBili  x   /  AST  30  /  ALT  12  /  AlkPhos  59  03-30    PT/INR - ( 30 Mar 2020 22:43 )   PT: 27.1 sec;   INR: 2.36 ratio         PTT - ( 30 Mar 2020 22:43 )  PTT:36.9 sec      MEDICATIONS  (STANDING):  aMIOdarone    Tablet 200 milliGRAM(s) Oral daily  artificial tears (preservative free) Ophthalmic Solution 1 Drop(s) Both EYES two times a day  aspirin enteric coated 81 milliGRAM(s) Oral daily  budesonide 160 MICROgram(s)/formoterol 4.5 MICROgram(s) Inhaler 2 Puff(s) Inhalation two times a day  ferrous    sulfate 325 milliGRAM(s) Oral daily  furosemide    Tablet 80 milliGRAM(s) Oral <User Schedule>  furosemide    Tablet 40 milliGRAM(s) Oral <User Schedule>  gabapentin 200 milliGRAM(s) Oral every 4 hours  lactobacillus acidophilus 1 Tablet(s) Oral daily  levothyroxine 112 MICROGram(s) Oral daily  metoprolol succinate ER 12.5 milliGRAM(s) Oral daily  tamsulosin 0.8 milliGRAM(s) Oral at bedtime  warfarin 2 milliGRAM(s) Oral once    MEDICATIONS  (PRN):  acetaminophen   Tablet .. 650 milliGRAM(s) Oral every 6 hours PRN Temp greater or equal to 38.5C (101.3F)

## 2020-03-31 NOTE — PROGRESS NOTE ADULT - PROBLEM SELECTOR PLAN 4
- Known h/o CKD stage 4  - BUN/Cr on admission mildly elevated from baseline ~2.4, likely prerenal in nature  - Got fluids in ED. Continue to monitor chemistry. Avoid nephrotoxic meds.  - Avoid nephrotoxic meds  - Monitor daily renal function

## 2020-03-31 NOTE — PROGRESS NOTE ADULT - PROBLEM SELECTOR PLAN 9
IMPROVE VTE Individual Risk Assessment          RISK                                                          Points  [ x ] Previous VTE                                                3  [  ] Thrombophilia                                             2  [  ] Lower limb paralysis                                   2        (unable to hold up >15 seconds)    [  ] Current Cancer                                             2         (within 6 months)  [  ] Immobilization > 24 hrs                              1  [  ] ICU/CCU stay > 24 hours                             1  [ x ] Age > 60                                                         1    IMPROVE VTE Score: 4    - DVT ppx with Coumadin  ------------------------------------------------------  PROBLEM 11: Hypothyroidism: Chronic, stable; Continue with Levothyroxine 112mcg daily

## 2020-03-31 NOTE — CHART NOTE - NSCHARTNOTEFT_GEN_A_CORE
Palliative care spoke with patient and his daughter, they have altered his MOLST as they would want intubation in the event his respiratory status declines. Order revised. Now DNR, but NOT DNI.

## 2020-03-31 NOTE — DISCHARGE NOTE PROVIDER - CARE PROVIDER_API CALL
Jj Joshi (MD)  Cardiovascular Disease; Internal Medicine  [address], [address]  [place] [address]  Phone: [phone]  Fax: [phone]  Follow Up Time:

## 2020-03-31 NOTE — PROGRESS NOTE ADULT - PROBLEM SELECTOR PLAN 2
- Last TTE peformed 3/6/20, normal LV function, EF 55-60%  - BNP elevated in setting of LETICIA on CKD  - B/l pleural effusions on CXR  - Continue diuresis  - Monitor daily weights and I/Os

## 2020-03-31 NOTE — DISCHARGE NOTE PROVIDER - NSDCMRMEDTOKEN_GEN_ALL_CORE_FT
albuterol 2.5 mg/3 mL (0.083%) inhalation solution: 3 milliliter(s) inhaled every 6 hours  alfuzosin 10 mg oral tablet, extended release: 1 tab(s) orally once a day  ALPRAZolam 0.25 mg oral tablet: 1 tab(s) orally twice a day at bedtime and 3am  amiodarone 200 mg oral tablet: 1 tab(s) orally once a day  aspirin 81 mg oral tablet: 1 tab(s) orally once a day  budesonide-formoterol 160 mcg-4.5 mcg/inh inhalation aerosol: 2 puff(s) inhaled 2 times a day   Coumadin 2 mg oral tablet: 2 tab(s) orally BID on Monday, qdaily Tuesday-Sunday  docusate sodium 100 mg oral capsule: 2 cap(s) orally 2 times a day  ferrous sulfate 325 mg (65 mg elemental iron) oral delayed release tablet: 1 tab(s) orally once a day   furosemide 40 mg oral tablet: 2 tabs in AM and 1 tab at Noon  gabapentin 100 mg oral capsule: 2 cap(s) orally every 4 hours  guaiFENesin 600 mg oral tablet, extended release: 1 tab(s) orally every 12 hours  lactobacillus acidophilus oral capsule: 1 cap(s) orally once a day  levothyroxine 112 mcg (0.112 mg) oral tablet: 1 tab(s) orally once a day  Metoprolol Succinate ER 25 mg oral tablet, extended release: 0.5 tab(s) orally once a day  ocular lubricant ophthalmic solution: 1 drop(s) to each affected eye 2 times a day  potassium chloride 20 mEq oral tablet, extended release: 1 tab(s) orally 2 times a day alfuzosin 10 mg oral tablet, extended release: 1 tab(s) orally once a day  ALPRAZolam 0.25 mg oral tablet: 1 tab(s) orally twice a day at bedtime and 3am  amiodarone 200 mg oral tablet: 1 tab(s) orally once a day  ammonium lactate 12% topical lotion: 1 application topically   aspirin 81 mg oral tablet: 1 tab(s) orally once a day  budesonide-formoterol 160 mcg-4.5 mcg/inh inhalation aerosol: 2 puff(s) inhaled 2 times a day   Coumadin 2 mg oral tablet: 2 tab(s) orally BID on Monday, qdaily Tuesday-Sunday  docusate sodium 100 mg oral capsule: 2 cap(s) orally 2 times a day  ferrous sulfate 325 mg (65 mg elemental iron) oral delayed release tablet: 1 tab(s) orally once a day   furosemide 40 mg oral tablet: 2 tabs in AM and 1 tab at Noon  gabapentin 100 mg oral capsule: 2 cap(s) orally every 4 hours  guaiFENesin 600 mg oral tablet, extended release: 1 tab(s) orally every 12 hours  lactobacillus acidophilus oral capsule: 1 cap(s) orally once a day  levothyroxine 112 mcg (0.112 mg) oral tablet: 1 tab(s) orally once a day  Metoprolol Succinate ER 25 mg oral tablet, extended release: 0.5 tab(s) orally once a day  ocular lubricant ophthalmic solution: 1 drop(s) to each affected eye 2 times a day  potassium chloride 20 mEq oral tablet, extended release: 1 tab(s) orally 2 times a day

## 2020-03-31 NOTE — PROGRESS NOTE ADULT - ASSESSMENT
89yo M, with PMH/o COPD (on 2.5L NC qHs home O2), af s/p PPM (Biotronik), HFpEF (last TTE 3/20, EF 55-60%), LUE DVT, h/o PE, HTN, AoS s/p TAVR (2019), CKD stage 4, BPH, prostate ca s/p RT and Casodex, SCC, CLL s/p 6 cycles Rituximab in 2013 now off therapy, postherpetic neuralgia, and hypothyroidism, presenting with SOB and cough, found to have b/l pleural effusions on CXR possibly 2/2 acute on chronic HFpEF, admitted to r/o [facility].

## 2020-03-31 NOTE — PROGRESS NOTE ADULT - PROBLEM SELECTOR PLAN 1
With acute hypoxic respiratory failure  - Pt p/w SOB and cough  - Leukopenic and tachypneic on NRB upon ED presentation  - Hold off on abx therapy as clinical picture possibly d/t viral etiology, r/o COVID  - Bilateral opacifications/effusions in setting of suspected COVID19 viral illness  - NLR 1.18 on admission, continue to trend.  - May use O2 NC, Venturi Mask, NRB as needed depending on oxygen demand, titrate supp O2 to SpO2 >93%  - Avoid HFNC/NIPPV/aerosolizing procedures i.e. nebulizations  - Avoid NSAIDs, use tylenol PRN fevers  - Maintain strict isolation precautions, use proper PPE   - Check procalcitonin  - Daily CBC with diff to follow eosinophils, lymphocytes and neutrophils; daily CK  - Given risk factors (age and comorbidities): check LDH, CRP, ferritin, ESR, PT/PTT, CK, lactate, troponin  - Baseline EKG NSR, monitor for cardiac decompensation, monitor telemetry  - Monitor respiratory status closely   - NEWS2 score 4 upon ED presentation, continue to reasses  - Code Status: DNR/DNI

## 2020-03-31 NOTE — PROGRESS NOTE ADULT - PROBLEM SELECTOR PLAN 3
- Chronic, stable  - Currently rate-controlled  - EKG on admission paced, NSR  - Continue home Amiodarone 200mg qDaily and Metoprolol 12.5mg qDaily  - Takes Coumadin 4mg Monday then 2mg Tues-Sun  - f/u daily INRs and adjust Coumadin dose accordingly

## 2020-04-01 LAB
ALBUMIN SERPL ELPH-MCNC: 3 G/DL — LOW (ref 3.3–5)
ALBUMIN SERPL ELPH-MCNC: 3.1 G/DL — LOW (ref 3.3–5)
ALP SERPL-CCNC: 59 U/L — SIGNIFICANT CHANGE UP (ref 40–120)
ALP SERPL-CCNC: 62 U/L — SIGNIFICANT CHANGE UP (ref 40–120)
ALT FLD-CCNC: 12 U/L — SIGNIFICANT CHANGE UP (ref 12–78)
ALT FLD-CCNC: 13 U/L — SIGNIFICANT CHANGE UP (ref 12–78)
ANION GAP SERPL CALC-SCNC: 6 MMOL/L — SIGNIFICANT CHANGE UP (ref 5–17)
ANION GAP SERPL CALC-SCNC: 7 MMOL/L — SIGNIFICANT CHANGE UP (ref 5–17)
APTT BLD: 39.3 SEC — HIGH (ref 28.5–37)
AST SERPL-CCNC: 21 U/L — SIGNIFICANT CHANGE UP (ref 15–37)
AST SERPL-CCNC: 25 U/L — SIGNIFICANT CHANGE UP (ref 15–37)
BASOPHILS # BLD AUTO: 0.01 K/UL — SIGNIFICANT CHANGE UP (ref 0–0.2)
BASOPHILS # BLD AUTO: 0.02 K/UL — SIGNIFICANT CHANGE UP (ref 0–0.2)
BASOPHILS NFR BLD AUTO: 0.3 % — SIGNIFICANT CHANGE UP (ref 0–2)
BASOPHILS NFR BLD AUTO: 0.6 % — SIGNIFICANT CHANGE UP (ref 0–2)
BILIRUB SERPL-MCNC: 0.7 MG/DL — SIGNIFICANT CHANGE UP (ref 0.2–1.2)
BILIRUB SERPL-MCNC: 0.8 MG/DL — SIGNIFICANT CHANGE UP (ref 0.2–1.2)
BUN SERPL-MCNC: 57 MG/DL — HIGH (ref 7–23)
BUN SERPL-MCNC: 59 MG/DL — HIGH (ref 7–23)
CALCIUM SERPL-MCNC: 8.5 MG/DL — SIGNIFICANT CHANGE UP (ref 8.5–10.1)
CALCIUM SERPL-MCNC: 8.9 MG/DL — SIGNIFICANT CHANGE UP (ref 8.5–10.1)
CHLORIDE SERPL-SCNC: 109 MMOL/L — HIGH (ref 96–108)
CHLORIDE SERPL-SCNC: 111 MMOL/L — HIGH (ref 96–108)
CK MB BLD-MCNC: 3.6 % — HIGH (ref 0–3.5)
CK MB CFR SERPL CALC: 2.4 NG/ML — SIGNIFICANT CHANGE UP (ref 0–3.6)
CK SERPL-CCNC: 67 U/L — SIGNIFICANT CHANGE UP (ref 26–308)
CO2 SERPL-SCNC: 30 MMOL/L — SIGNIFICANT CHANGE UP (ref 22–31)
CO2 SERPL-SCNC: 32 MMOL/L — HIGH (ref 22–31)
CREAT SERPL-MCNC: 2.8 MG/DL — HIGH (ref 0.5–1.3)
CREAT SERPL-MCNC: 2.8 MG/DL — HIGH (ref 0.5–1.3)
EOSINOPHIL # BLD AUTO: 0.01 K/UL — SIGNIFICANT CHANGE UP (ref 0–0.5)
EOSINOPHIL # BLD AUTO: 0.01 K/UL — SIGNIFICANT CHANGE UP (ref 0–0.5)
EOSINOPHIL NFR BLD AUTO: 0.3 % — SIGNIFICANT CHANGE UP (ref 0–6)
EOSINOPHIL NFR BLD AUTO: 0.3 % — SIGNIFICANT CHANGE UP (ref 0–6)
GLUCOSE SERPL-MCNC: 83 MG/DL — SIGNIFICANT CHANGE UP (ref 70–99)
GLUCOSE SERPL-MCNC: 98 MG/DL — SIGNIFICANT CHANGE UP (ref 70–99)
HCT VFR BLD CALC: 25.8 % — LOW (ref 39–50)
HCT VFR BLD CALC: 26.3 % — LOW (ref 39–50)
HGB BLD-MCNC: 7.9 G/DL — LOW (ref 13–17)
HGB BLD-MCNC: 8 G/DL — LOW (ref 13–17)
IMM GRANULOCYTES NFR BLD AUTO: 0.6 % — SIGNIFICANT CHANGE UP (ref 0–1.5)
IMM GRANULOCYTES NFR BLD AUTO: 1 % — SIGNIFICANT CHANGE UP (ref 0–1.5)
INR BLD: 2.09 RATIO — HIGH (ref 0.88–1.16)
LYMPHOCYTES # BLD AUTO: 1.37 K/UL — SIGNIFICANT CHANGE UP (ref 1–3.3)
LYMPHOCYTES # BLD AUTO: 1.38 K/UL — SIGNIFICANT CHANGE UP (ref 1–3.3)
LYMPHOCYTES # BLD AUTO: 43.5 % — SIGNIFICANT CHANGE UP (ref 13–44)
LYMPHOCYTES # BLD AUTO: 44.2 % — HIGH (ref 13–44)
MCHC RBC-ENTMCNC: 30.4 GM/DL — LOW (ref 32–36)
MCHC RBC-ENTMCNC: 30.6 GM/DL — LOW (ref 32–36)
MCHC RBC-ENTMCNC: 34.9 PG — HIGH (ref 27–34)
MCHC RBC-ENTMCNC: 35.1 PG — HIGH (ref 27–34)
MCV RBC AUTO: 114.7 FL — HIGH (ref 80–100)
MCV RBC AUTO: 114.8 FL — HIGH (ref 80–100)
MONOCYTES # BLD AUTO: 0.38 K/UL — SIGNIFICANT CHANGE UP (ref 0–0.9)
MONOCYTES # BLD AUTO: 0.41 K/UL — SIGNIFICANT CHANGE UP (ref 0–0.9)
MONOCYTES NFR BLD AUTO: 12.2 % — SIGNIFICANT CHANGE UP (ref 2–14)
MONOCYTES NFR BLD AUTO: 13 % — SIGNIFICANT CHANGE UP (ref 2–14)
NEUTROPHILS # BLD AUTO: 1.31 K/UL — LOW (ref 1.8–7.4)
NEUTROPHILS # BLD AUTO: 1.32 K/UL — LOW (ref 1.8–7.4)
NEUTROPHILS NFR BLD AUTO: 42 % — LOW (ref 43–77)
NEUTROPHILS NFR BLD AUTO: 42 % — LOW (ref 43–77)
NRBC # BLD: 0 /100 WBCS — SIGNIFICANT CHANGE UP (ref 0–0)
NRBC # BLD: 0 /100 WBCS — SIGNIFICANT CHANGE UP (ref 0–0)
PLATELET # BLD AUTO: 100 K/UL — LOW (ref 150–400)
PLATELET # BLD AUTO: 96 K/UL — LOW (ref 150–400)
POTASSIUM SERPL-MCNC: 3.6 MMOL/L — SIGNIFICANT CHANGE UP (ref 3.5–5.3)
POTASSIUM SERPL-MCNC: 3.8 MMOL/L — SIGNIFICANT CHANGE UP (ref 3.5–5.3)
POTASSIUM SERPL-SCNC: 3.6 MMOL/L — SIGNIFICANT CHANGE UP (ref 3.5–5.3)
POTASSIUM SERPL-SCNC: 3.8 MMOL/L — SIGNIFICANT CHANGE UP (ref 3.5–5.3)
PROT SERPL-MCNC: 5.3 G/DL — LOW (ref 6–8.3)
PROT SERPL-MCNC: 5.4 G/DL — LOW (ref 6–8.3)
PROTHROM AB SERPL-ACNC: 24 SEC — HIGH (ref 10–12.9)
RBC # BLD: 2.25 M/UL — LOW (ref 4.2–5.8)
RBC # BLD: 2.29 M/UL — LOW (ref 4.2–5.8)
RBC # FLD: 17.1 % — HIGH (ref 10.3–14.5)
RBC # FLD: 17.2 % — HIGH (ref 10.3–14.5)
SODIUM SERPL-SCNC: 147 MMOL/L — HIGH (ref 135–145)
SODIUM SERPL-SCNC: 148 MMOL/L — HIGH (ref 135–145)
TROPONIN I SERPL-MCNC: 0.09 NG/ML — HIGH (ref 0.01–0.04)
WBC # BLD: 3.12 K/UL — LOW (ref 3.8–10.5)
WBC # BLD: 3.15 K/UL — LOW (ref 3.8–10.5)
WBC # FLD AUTO: 3.12 K/UL — LOW (ref 3.8–10.5)
WBC # FLD AUTO: 3.15 K/UL — LOW (ref 3.8–10.5)

## 2020-04-01 PROCEDURE — 99232 SBSQ HOSP IP/OBS MODERATE 35: CPT

## 2020-04-01 RX ORDER — WARFARIN SODIUM 2.5 MG/1
2 TABLET ORAL ONCE
Refills: 0 | Status: COMPLETED | OUTPATIENT
Start: 2020-04-01 | End: 2020-04-02

## 2020-04-01 RX ADMIN — Medication 1 DROP(S): at 18:05

## 2020-04-01 RX ADMIN — Medication 112 MICROGRAM(S): at 06:16

## 2020-04-01 RX ADMIN — Medication 12.5 MILLIGRAM(S): at 05:58

## 2020-04-01 RX ADMIN — Medication 40 MILLIGRAM(S): at 18:07

## 2020-04-01 RX ADMIN — GABAPENTIN 200 MILLIGRAM(S): 400 CAPSULE ORAL at 18:08

## 2020-04-01 RX ADMIN — GABAPENTIN 200 MILLIGRAM(S): 400 CAPSULE ORAL at 05:58

## 2020-04-01 RX ADMIN — Medication 1 DROP(S): at 05:59

## 2020-04-01 RX ADMIN — Medication 80 MILLIGRAM(S): at 05:58

## 2020-04-01 RX ADMIN — BUDESONIDE AND FORMOTEROL FUMARATE DIHYDRATE 2 PUFF(S): 160; 4.5 AEROSOL RESPIRATORY (INHALATION) at 19:30

## 2020-04-01 RX ADMIN — Medication 1 TABLET(S): at 18:07

## 2020-04-01 RX ADMIN — Medication 81 MILLIGRAM(S): at 18:06

## 2020-04-01 RX ADMIN — AMIODARONE HYDROCHLORIDE 200 MILLIGRAM(S): 400 TABLET ORAL at 06:00

## 2020-04-01 RX ADMIN — GABAPENTIN 200 MILLIGRAM(S): 400 CAPSULE ORAL at 18:05

## 2020-04-01 RX ADMIN — GABAPENTIN 200 MILLIGRAM(S): 400 CAPSULE ORAL at 00:43

## 2020-04-01 RX ADMIN — Medication 325 MILLIGRAM(S): at 18:06

## 2020-04-01 NOTE — PROGRESS NOTE ADULT - PROBLEM SELECTOR PLAN 1
With acute hypoxic respiratory failure  COVID19+  - Bilateral opacifications/effusions in setting of suspected COVID19 viral illness  - NLR downtrending  - May use O2 NC, Venturi Mask, NRB as needed depending on oxygen demand, titrate supp O2 to SpO2 >93%  - Avoid HFNC/NIPPV/aerosolizing procedures i.e. nebulizations  - Avoid NSAIDs, use tylenol PRN fevers  - Maintain strict isolation precautions, use proper PPE   - negative procalcitonin  - Daily CBC with diff to follow eosinophils, lymphocytes and neutrophils; daily CK  -  LDH, CRP mildly elevated,  ferritin wnl, lactate wnl, troponin mild elevation likely demand will check one more set of enzymes  - Baseline EKG NSR, monitor for cardiac decompensation, monitor telemetry  - Monitor respiratory status closely   - Code Status: DNR/DNI With acute hypoxic respiratory failure  COVID19+  - Bilateral opacifications/effusions in setting of suspected COVID19 viral illness  - NLR downtrending, patient saturating satisfactorily on NC at 5L/min.  - May use O2 NC, Venturi Mask, NRB as needed depending on oxygen demand, titrate supp O2 to SpO2 >93%  - Avoid HFNC/NIPPV/aerosolizing procedures i.e. nebulizations  - Avoid NSAIDs, use tylenol PRN fevers  - Maintain strict isolation precautions, use proper PPE   - negative procalcitonin  - Daily CBC with diff to follow eosinophils, lymphocytes and neutrophils; daily CK  -  LDH, CRP mildly elevated,  ferritin wnl, lactate wnl, troponin mild elevation likely demand will check one more set of enzymes  - Baseline EKG NSR, monitor for cardiac decompensation, monitor telemetry  - Monitor respiratory status closely   - Code Status: DNR, but ok with trial of intubation.

## 2020-04-01 NOTE — CONSULT NOTE ADULT - SUBJECTIVE AND OBJECTIVE BOX
HPI:  89yo M, with PMH/o COPD (on 2.5L NC qHs home O2), af s/p PPM (Biotronik), HFpEF (last TTE 3/20, EF 55-60%), LUE DVT, h/o PE, HTN, AoS s/p TAVR (2019), CKD stage 4, BPH, prostate ca s/p RT and Casodex, SCC, CLL s/p 6 cycles Rituximab in 2013 now off therapy, postherpetic neuralgia, and hypothyroidism, presenting with SOB for several days. Patient is a poor historian, and attempts were made to reach family members in an effort to gather history though unsuccessful at this time. Per ED provider note, patient additionally endorses cough. Of note, patient was recently discharged from [facility] on 3/12 where he was admitted for bacterial PNA with additional enterococcal bacteremia suspected from sacral decubitus ulcer.    In the ED  VS: T 97.6 HR 64 /78 RR 24 -> 18 SPO2 100% on NRB -> 100% on 3L NC  Significant labs include WBC 3.63, H/H 8.4/27.5, NLR 1.18 , BUN/Cr 53/2.7, BNP 8893.  CXR: b/l pleural effusions based on my preliminary read, pending official report  EKG: paced, NSR, LAD, vent rate 62    Patient received 2L NS. (30 Mar 2020 23:27)      PAST MEDICAL & SURGICAL HISTORY:  CKD (chronic kidney disease): Stage 4  BPH (benign prostatic hyperplasia)  Postherpetic neuralgia: Treated  Chronic diarrhea  Prostate CA  Basal cell carcinoma  Lymphoma: Latent  Anxiety  Hypothyroid  Hypertension  A-fib: Pacemaker  S/P TAVR (transcatheter aortic valve replacement)  Artificial cardiac pacemaker  H/O shoulder replacement  S/P bilateral unicompartmental knee replacement      Antimicrobials      Immunological      Other  acetaminophen   Tablet .. 650 milliGRAM(s) Oral every 6 hours PRN  aMIOdarone    Tablet 200 milliGRAM(s) Oral daily  artificial tears (preservative free) Ophthalmic Solution 1 Drop(s) Both EYES two times a day  aspirin enteric coated 81 milliGRAM(s) Oral daily  budesonide 160 MICROgram(s)/formoterol 4.5 MICROgram(s) Inhaler 2 Puff(s) Inhalation two times a day  ferrous    sulfate 325 milliGRAM(s) Oral daily  furosemide    Tablet 80 milliGRAM(s) Oral <User Schedule>  furosemide    Tablet 40 milliGRAM(s) Oral <User Schedule>  gabapentin 200 milliGRAM(s) Oral every 4 hours  lactobacillus acidophilus 1 Tablet(s) Oral daily  levothyroxine 112 MICROGram(s) Oral daily  metoprolol succinate ER 12.5 milliGRAM(s) Oral daily  tamsulosin 0.8 milliGRAM(s) Oral at bedtime  warfarin 2 milliGRAM(s) Oral once      Allergies    No Known Allergies    Intolerances        SOCIAL HISTORY:  Social History:  Has 24hr HHA, ambulates with walker (30 Mar 2020 23:27)      FAMILY HISTORY:  Family history of ischemic heart disease      ROS:    limited    Vital Signs Last 24 Hrs  T(C): 36.5 (01 Apr 2020 09:05), Max: 37.2 (31 Mar 2020 16:54)  T(F): 97.7 (01 Apr 2020 09:05), Max: 99 (01 Apr 2020 00:23)  HR: 61 (01 Apr 2020 09:05) (61 - 68)  BP: 138/65 (01 Apr 2020 09:05) (138/65 - 167/70)  BP(mean): --  RR: 16 (01 Apr 2020 09:05) (16 - 18)  SpO2: 95% (01 Apr 2020 09:05) (92% - 95%)    PE:    HEENT:  NC, atraumatic  Lungs:  No accessory muscle use, breathing comfortably  Cor:  distant  Abd:  Symmetric, appears normal,   Extrem:  No cyanosis      LABS:                        8.0    3.15  )-----------( 100      ( 01 Apr 2020 09:32 )             26.3       WBC Count: 3.15 K/uL (04-01-20 @ 09:32)  WBC Count: 3.12 K/uL (04-01-20 @ 00:28)  WBC Count: 3.48 K/uL (03-31-20 @ 15:08)  WBC Count: 3.63 K/uL (03-30-20 @ 22:13)      04-01    148<H>  |  109<H>  |  59<H>  ----------------------------<  83  3.6   |  32<H>  |  2.80<H>    Ca    8.5      01 Apr 2020 09:32  Phos  4.7     04-01  Mg     2.5     04-01    TPro  5.4<L>  /  Alb  3.0<L>  /  TBili  0.8  /  DBili  x   /  AST  21  /  ALT  13  /  AlkPhos  62  04-01      Creatinine, Serum: 2.80 mg/dL (04-01-20 @ 09:32)  Creatinine, Serum: 2.80 mg/dL (04-01-20 @ 00:28)  Creatinine, Serum: 2.90 mg/dL (03-31-20 @ 15:08)  Creatinine, Serum: 2.70 mg/dL (03-30-20 @ 22:34)              COVID RISK SCORE:  Auto Neutrophil #: 1.32 K/uL (04-01-20 @ 09:32)  Auto Lymphocyte #: 1.37 K/uL (04-01-20 @ 09:32)  Auto Neutrophil #: 1.31 K/uL (04-01-20 @ 00:28)  Auto Lymphocyte #: 1.38 K/uL (04-01-20 @ 00:28)  Auto Neutrophil #: 1.60 K/uL (03-31-20 @ 15:08)  Auto Lymphocyte #: 1.36 K/uL (03-31-20 @ 15:08)  Auto Neutrophil #: 1.73 K/uL (03-30-20 @ 22:13)  Auto Lymphocyte #: 1.47 K/uL (03-30-20 @ 22:13)    Lactate, Blood: 0.9 mmol/L (03-31-20 @ 15:08)  Lactate, Blood: 0.9 mmol/L (03-30-20 @ 22:35)    Auto Eosinophil #: 0.01 K/uL (04-01-20 @ 09:32)  Auto Eosinophil #: 0.01 K/uL (04-01-20 @ 00:28)  Auto Eosinophil #: 0.00 K/uL (03-31-20 @ 15:08)  Auto Eosinophil #: 0.01 K/uL (03-30-20 @ 22:13)    Lactate Dehydrogenase, Serum: 335 U/L (03-31-20 @ 21:19)    Sedimentation Rate, Erythrocyte: 16 mm/hr (03-31-20 @ 15:08)    Procalcitonin, Serum: <0.05 (03-31-20 @ 15:08)    Troponin I, Serum: .087 ng/mL (04-01-20 @ 11:22)  Troponin I, Serum: .068 ng/mL (03-31-20 @ 15:08)  Troponin I, Serum: .026 ng/mL (03-30-20 @ 22:34)    Creatine Kinase, Serum: 67 U/L (04-01-20 @ 11:22)        Ferritin, Serum: 321 ng/mL (03-31-20 @ 21:19)        INR: 2.09 ratio (04-01-20 @ 09:32)  Activated Partial Thromboplastin Time: 39.3 sec (04-01-20 @ 09:32)  INR: 2.36 ratio (03-30-20 @ 22:43)  Activated Partial Thromboplastin Time: 36.9 sec (03-30-20 @ 22:43)    D-Dimer Assay, Quantitative: <150 ng/mL DDU (03-31-20 @ 15:08)        MICROBIOLOGY:      RADIOLOGY & ADDITIONAL STUDIES:    --

## 2020-04-01 NOTE — PROGRESS NOTE ADULT - SUBJECTIVE AND OBJECTIVE BOX
This progress note was completed in part by resident acting as telephonic scribe during COVID-19 crisis. Physical exam was completed by attending physician, and findings below are those of the attending physician, and have been reviewed in detail.  Patient is a 89y old  Male who presents with a chief complaint of SOB, pleural effusions; r/o COVID - Code Status: DNR/DNI (31 Mar 2020 14:37)      FROM ADMISSION H+P:   HPI:  89yo M, with PMH/o COPD (on 2.5L NC qHs home O2), af s/p PPM (Biotronik), HFpEF (last TTE 3/20, EF 55-60%), LUE DVT, h/o PE, HTN, AoS s/p TAVR (2019), CKD stage 4, BPH, prostate ca s/p RT and Casodex, SCC, CLL s/p 6 cycles Rituximab in 2013 now off therapy, postherpetic neuralgia, and hypothyroidism, presenting with SOB for several days. Patient is a poor historian, and attempts were made to reach family members in an effort to gather history though unsuccessful at this time. Per ED provider note, patient additionally endorses cough. Of note, patient was recently discharged from [facility] on 3/12 where he was admitted for bacterial PNA with additional enterococcal bacteremia suspected from sacral decubitus ulcer.    In the ED  VS: T 97.6 HR 64 /78 RR 24 -> 18 SPO2 100% on NRB -> 100% on 3L NC  Significant labs include WBC 3.63, H/H 8.4/27.5, NLR 1.18 , BUN/Cr 53/2.7, BNP 8893.  CXR: b/l pleural effusions based on my preliminary read, pending official report  EKG: paced, NSR, LAD, vent rate 62    Patient received 2L NS. (30 Mar 2020 23:27)      ----  INTERVAL HPI/OVERNIGHT EVENTS: Pt seen and evaluated at the bedside. No acute overnight events occurred.     Fever or chills: yes [  ]   no [  ]  Fatigue, malaise or generalized weakness: yes [  ]   no [  ]  Chest pain: yes [  ]   no [  ]  Palpitations: yes [  ]   no [  ]  Shortness of breath/dyspnea: yes [  ]   no [  ]  Cough: yes [  ]   no [  ], sputum production: yes [  ]   no [  ]  Anorexia/po intolerance: yes [  ]   no [  ]  Myalgias: yes [  ]   no [  ]  Headache: yes [  ]   no [  ]  Sore throat: yes [  ]   no [  ]  Rhinorrhea: yes [  ]   no [  ]  Nausea: yes [  ]   no [  ]  Vomiting: yes [  ]   no [  ]  Diarrhea: yes [  ]   no [  ]  Loss of sense of smell/anosmia: yes [  ]   no [  ]  Conjunctivitis: yes [  ]   no [  ]  Other associated complaints:     ----  PAST MEDICAL & SURGICAL HISTORY:  CKD (chronic kidney disease): Stage 4  BPH (benign prostatic hyperplasia)  Postherpetic neuralgia: Treated  Chronic diarrhea  Prostate CA  Basal cell carcinoma  Lymphoma: Latent  Anxiety  Hypothyroid  Hypertension  A-fib: Pacemaker  S/P TAVR (transcatheter aortic valve replacement)  Artificial cardiac pacemaker  H/O shoulder replacement  S/P bilateral unicompartmental knee replacement      FAMILY HISTORY:  Family history of ischemic heart disease      Allergies    No Known Allergies    Intolerances        ----  REVIEW OF SYSTEMS:  as per HPI    ----  GENERAL: elderly male, no acute distress  HEENT: NC/AT, EOMI, neck supple, MMM  RESPIRATORY: good air movement, few scattered expiratory wheezes  CARDIOVASCULAR: RRR, no murmurs, gallops, rubs  ABDOMINAL: soft, non-tender, non-distended, positive bowel sounds   EXTREMITIES: no clubbing, cyanosis, or edema  NEUROLOGICAL: alert and oriented x 3, but confused at times, grossly non-focal  SKIN: multiple ecchymoses over both upper and lower extremities. left arm pitting edema, noticeably larger that right arm and per patient this is chronic since PPM placement  MUSCULOSKELETAL: no gross joint deformity    T(C): 36.5 (04-01-20 @ 09:05), Max: 37.2 (03-31-20 @ 16:54)  HR: 61 (04-01-20 @ 09:05) (61 - 68)  BP: 138/65 (04-01-20 @ 09:05) (138/65 - 167/70)  RR: 16 (04-01-20 @ 09:05) (16 - 18)  SpO2: 95% (04-01-20 @ 09:05) (92% - 95%)  Wt(kg): --    ----  I&O's Summary      LABS:                        8.0    3.15  )-----------( 100      ( 01 Apr 2020 09:32 )             26.3     04-01    x   |  x   |  59<H>  ----------------------------<  83  x    |  32<H>  |  x     Ca    8.5      01 Apr 2020 09:32  Phos  3.9     03-31  Mg     2.5     03-31    TPro  5.4<L>  /  Alb  3.0<L>  /  TBili  x   /  DBili  x   /  AST  x   /  ALT  x   /  AlkPhos  62  04-01    PT/INR - ( 30 Mar 2020 22:43 )   PT: 27.1 sec;   INR: 2.36 ratio         PTT - ( 30 Mar 2020 22:43 )  PTT:36.9 sec    CAPILLARY BLOOD GLUCOSE          03-31 @ 08:59   No growth to date.  --  --            ----  Personally reviewed:  Vital sign trends: [  ] yes    [  ] no     [  ] n/a  Laboratory results: [  ] yes    [  ] no     [  ] n/a  Radiology results: [  ] yes    [  ] no     [  ] n/a  Culture results: [  ] yes    [  ] no     [  ] n/a  Consultant recommendations: [  ] yes    [  ] no     [  ] n/a This progress note was completed in part by resident acting as telephonic scribe during COVID-19 crisis. Physical exam was completed by attending physician, and findings below are those of the attending physician, and have been reviewed in detail.  Patient is a 89y old  Male who presents with a chief complaint of SOB, pleural effusions; r/o COVID - Code Status: DNR/DNI (31 Mar 2020 14:37)      FROM ADMISSION H+P:   HPI:  89yo M, with PMH/o COPD (on 2.5L NC qHs home O2), af s/p PPM (Biotronik), HFpEF (last TTE 3/20, EF 55-60%), LUE DVT, h/o PE, HTN, AoS s/p TAVR (2019), CKD stage 4, BPH, prostate ca s/p RT and Casodex, SCC, CLL s/p 6 cycles Rituximab in 2013 now off therapy, postherpetic neuralgia, and hypothyroidism, presenting with SOB for several days. Patient is a poor historian, and attempts were made to reach family members in an effort to gather history though unsuccessful at this time. Per ED provider note, patient additionally endorses cough. Of note, patient was recently discharged from [facility] on 3/12 where he was admitted for bacterial PNA with additional enterococcal bacteremia suspected from sacral decubitus ulcer.    In the ED  VS: T 97.6 HR 64 /78 RR 24 -> 18 SPO2 100% on NRB -> 100% on 3L NC  Significant labs include WBC 3.63, H/H 8.4/27.5, NLR 1.18 , BUN/Cr 53/2.7, BNP 8893.  CXR: b/l pleural effusions based on my preliminary read, pending official report  EKG: paced, NSR, LAD, vent rate 62    Patient received 2L NS. (30 Mar 2020 23:27)      ----  INTERVAL HPI/OVERNIGHT EVENTS: Pt seen and evaluated at the bedside. Doing well today on 5L via NC. He complains of persistent SOB and some cough. No N/V/D. No acute overnight events occurred. No CP/palpitations.     Fever or chills: yes [  ]   no [ x ]  Fatigue, malaise or generalized weakness: yes [x  ]   no [  ]  Chest pain: yes [  ]   no [ x ]  Palpitations: yes [  ]   no [  x]  Shortness of breath/dyspnea: yes [ x ]   no [  ]  Cough: yes [x]   no [  ], sputum production: yes [  ]   no [x  ]  Anorexia/po intolerance: yes [  ]   no [x  ]  Myalgias: yes [  ]   no [ x ]  Nausea: yes [  ]   no [ x ]  Vomiting: yes [  ]   no [ x ]  Diarrhea: yes [  ]   no [ x ]  Conjunctivitis: yes [  ]   no [ x ]  Other associated complaints:     ----  PAST MEDICAL & SURGICAL HISTORY:  CKD (chronic kidney disease): Stage 4  BPH (benign prostatic hyperplasia)  Postherpetic neuralgia: Treated  Chronic diarrhea  Prostate CA  Basal cell carcinoma  Lymphoma: Latent  Anxiety  Hypothyroid  Hypertension  A-fib: Pacemaker  S/P TAVR (transcatheter aortic valve replacement)  Artificial cardiac pacemaker  H/O shoulder replacement  S/P bilateral unicompartmental knee replacement      FAMILY HISTORY:  Family history of ischemic heart disease      Allergies    No Known Allergies    Intolerances        ----  REVIEW OF SYSTEMS:  as per HPI    ----  GENERAL: elderly male, no acute distress  HEENT: NC/AT, EOMI, neck supple, MMM  RESPIRATORY: good air movement, few scattered expiratory wheezes  CARDIOVASCULAR: RRR, no murmurs, gallops, rubs  ABDOMINAL: soft, non-tender, non-distended, positive bowel sounds   EXTREMITIES: no clubbing, cyanosis, or edema  NEUROLOGICAL: alert and oriented x 3, but confused at times, grossly non-focal  SKIN: multiple ecchymoses over both upper and lower extremities. left arm pitting edema, noticeably larger that right arm and per patient this is chronic since PPM placement  MUSCULOSKELETAL: no gross joint deformity    T(C): 36.5 (04-01-20 @ 09:05), Max: 37.2 (03-31-20 @ 16:54)  HR: 61 (04-01-20 @ 09:05) (61 - 68)  BP: 138/65 (04-01-20 @ 09:05) (138/65 - 167/70)  RR: 16 (04-01-20 @ 09:05) (16 - 18)  SpO2: 95% (04-01-20 @ 09:05) (92% - 95%)  Wt(kg): --    ----  I&O's Summary      LABS:                        8.0    3.15  )-----------( 100      ( 01 Apr 2020 09:32 )             26.3     04-01    x   |  x   |  59<H>  ----------------------------<  83  x    |  32<H>  |  x     Ca    8.5      01 Apr 2020 09:32  Phos  3.9     03-31  Mg     2.5     03-31    TPro  5.4<L>  /  Alb  3.0<L>  /  TBili  x   /  DBili  x   /  AST  x   /  ALT  x   /  AlkPhos  62  04-01    PT/INR - ( 30 Mar 2020 22:43 )   PT: 27.1 sec;   INR: 2.36 ratio         PTT - ( 30 Mar 2020 22:43 )  PTT:36.9 sec    CAPILLARY BLOOD GLUCOSE          03-31 @ 08:59   No growth to date.  --  --            ----  Personally reviewed:  Vital sign trends: [  ] yes    [  ] no     [  ] n/a  Laboratory results: [  ] yes    [  ] no     [  ] n/a  Radiology results: [  ] yes    [  ] no     [  ] n/a  Culture results: [  ] yes    [  ] no     [  ] n/a  Consultant recommendations: [  ] yes    [  ] no     [  ] n/a

## 2020-04-01 NOTE — CONSULT NOTE ADULT - ASSESSMENT
89yo M, with PMH/o COPD (on 2.5L NC qHs home O2), af s/p PPM (Biotronik), HFpEF (last TTE 3/20, EF 55-60%), LUE DVT, h/o PE, HTN, AoS s/p TAVR (2019), CKD stage 4, BPH, prostate ca s/p RT and Casodex, SCC, CLL s/p 6 cycles Rituximab in 2013 now off therapy, postherpetic neuralgia, and hypothyroidism, presenting with SOB for several days adm with COVID  COVID-19---high risk period for decompensation  (7-14 days post symptom onset), so critical to determine date of symptom onset, rec avoid aerosolizing procedures,  focus on supportive care, avoid excessive blood draws but do recommend for hospitalized patients  -For all patients: daily CBC w diff to follow eos, lymphocytes and neutrophils and daily   NLR baseline and dynamic calculation (NLR <3 low vs >5 high) -other labs that may be selectively used to risk stratify and predict clinical course,  Procalcitonin (>0.2 associated with more severe disease),    Ferritin (lower risk <450 vs >850),  CRP (low risk <2 and higher risk >6) , D-dimer (<1,000 vs >1,000)   and if prognosis is unclear or if immunomodulators being considered: LDH, ESR, PT/PTT, CK, total, CKMB mass assay, lactate, troponin, IL-6 (and other interleukins as indicated)  -antibiotics only if there is concern for a bacterial process, noted that methylprednisolone (SOLU-medrol) 1mg/kg/day IV at onset of increased oxygen requirement and IL6 inhibition during this critical window may be associated with avoiding intubation and improved outcomes  -no clear date for symptom onset  4/1 NLR =1 and other parameters look good but certainly concerning with age 89, COPD and other comorbidities-rec observation and focus on supportive care.

## 2020-04-01 NOTE — PATIENT PROFILE ADULT - NSTRANSFERBELONGINGSRESP_GEN_A_NUR
[facility]      Patient: Liliam Ferreira Date of Service: 2019   : 1945 MRN: [id number]     SUBJECTIVE:   HISTORY OF PRESENT ILLNESS:  Liliam Ferreira is a 74 year old female who presents today for complaint of left upper eyelid redness and swelling x 4 days.    Associated symptoms: eyelid swelling  Denies: F/C  Home remedies taken/done: Warm compress      PAST MEDICAL HISTORY:  No past medical history on file.    MEDICATIONS:  No current outpatient medications on file.     No current facility-administered medications for this visit.        ALLERGIES:  ALLERGIES:   Allergen Reactions   • Sulfa Antibiotics HIVES     Faints, also.       PAST SURGICAL HISTORY:  No past surgical history on file.    FAMILY HISTORY:  No family history on file.    SOCIAL HISTORY:  Social History     Tobacco Use   • Smoking status: Not on file   Substance Use Topics   • Alcohol use: Not on file   • Drug use: Not on file       Review of Systems   All other systems reviewed and are negative.      OBJECTIVE:     Physical Exam   Constitutional: Vital signs are normal. She appears well-developed and well-nourished.   HENT:   Head: Normocephalic.   Eyes:   Left upper eyelid edema and erythema   Cardiovascular: Normal rate.   Pulmonary/Chest: Effort normal.   Nursing note and vitals reviewed.      Visit Vitals  /79 (BP Location: LUE - Left upper extremity, Patient Position: Sitting)   Pulse 80   Temp 98.1 °F (36.7 °C) (Oral)   Ht 5' 2\" (1.575 m)   Wt 67.3 kg (148 lb 5.9 oz)   SpO2 95%   BMI 27.14 kg/m²       DIAGNOSTIC STUDIES:   LAB RESULTS:    No results found for this visit on 19.  ASSESSMENT AND PLAN:     Blepharitis of left upper eyelid, unspecified type  (primary encounter diagnosis)  Plan:  Erythromycin given  Warm compress    Supportive care discussed as provided in AVS  Follow-up with your PCP in 3-5 days or earlier if symptoms do not improve or worsens  Follow up for any questions, concerns, or worsening  of symptoms.   Medical compliance with plan discussed and risks of noncompliance reviewed    Patient verbalizes understanding of treatment plan    Additional Notes:   Written handout given.    Patient verbalized understanding of the plan.    Medical compliance with plan discussed and risks of non-compliance reviewed.    Proper usage and side effects of medications reviewed & discussed.        Take medication as directed.     Thank you for visiting [facility].      Mini Dickerson CNP  [facility]     yes

## 2020-04-01 NOTE — PROGRESS NOTE ADULT - ASSESSMENT
89yo M, with PMH/o COPD (on 2.5L NC qHs home O2), af s/p PPM (Biotronik), HFpEF (last TTE 3/20, EF 55-60%), LUE DVT, h/o PE, HTN, AoS s/p TAVR (2019), CKD stage 4, BPH, prostate ca s/p RT and Casodex, SCC, CLL s/p 6 cycles Rituximab in 2013 now off therapy, postherpetic neuralgia, and hypothyroidism, presenting with SOB and cough, found to have b/l pleural effusions on CXR possibly 2/2 acute on chronic HFpEF, admitted with COVID19+ associated acute hypoxic resp failure     03/30: NLR 1.18  04/01: NLR 0.96

## 2020-04-02 LAB
ALBUMIN SERPL ELPH-MCNC: 3 G/DL — LOW (ref 3.3–5)
ALP SERPL-CCNC: 59 U/L — SIGNIFICANT CHANGE UP (ref 40–120)
ALT FLD-CCNC: 11 U/L — LOW (ref 12–78)
ANION GAP SERPL CALC-SCNC: 9 MMOL/L — SIGNIFICANT CHANGE UP (ref 5–17)
AST SERPL-CCNC: 30 U/L — SIGNIFICANT CHANGE UP (ref 15–37)
BASOPHILS # BLD AUTO: 0.01 K/UL — SIGNIFICANT CHANGE UP (ref 0–0.2)
BASOPHILS NFR BLD AUTO: 0.3 % — SIGNIFICANT CHANGE UP (ref 0–2)
BILIRUB SERPL-MCNC: 0.8 MG/DL — SIGNIFICANT CHANGE UP (ref 0.2–1.2)
BUN SERPL-MCNC: 60 MG/DL — HIGH (ref 7–23)
CALCIUM SERPL-MCNC: 8.6 MG/DL — SIGNIFICANT CHANGE UP (ref 8.5–10.1)
CHLORIDE SERPL-SCNC: 107 MMOL/L — SIGNIFICANT CHANGE UP (ref 96–108)
CO2 SERPL-SCNC: 29 MMOL/L — SIGNIFICANT CHANGE UP (ref 22–31)
CREAT SERPL-MCNC: 2.5 MG/DL — HIGH (ref 0.5–1.3)
CRP SERPL-MCNC: 1.31 MG/DL — HIGH (ref 0–0.4)
D DIMER BLD IA.RAPID-MCNC: <150 NG/ML DDU — SIGNIFICANT CHANGE UP
EOSINOPHIL # BLD AUTO: 0 K/UL — SIGNIFICANT CHANGE UP (ref 0–0.5)
EOSINOPHIL NFR BLD AUTO: 0 % — SIGNIFICANT CHANGE UP (ref 0–6)
FERRITIN SERPL-MCNC: 364 NG/ML — SIGNIFICANT CHANGE UP (ref 30–400)
GLUCOSE SERPL-MCNC: 88 MG/DL — SIGNIFICANT CHANGE UP (ref 70–99)
HCT VFR BLD CALC: 26.2 % — LOW (ref 39–50)
HGB BLD-MCNC: 8.1 G/DL — LOW (ref 13–17)
IMM GRANULOCYTES NFR BLD AUTO: 0.7 % — SIGNIFICANT CHANGE UP (ref 0–1.5)
INR BLD: 1.88 RATIO — HIGH (ref 0.88–1.16)
LDH SERPL L TO P-CCNC: 330 U/L — HIGH (ref 50–242)
LYMPHOCYTES # BLD AUTO: 1.27 K/UL — SIGNIFICANT CHANGE UP (ref 1–3.3)
LYMPHOCYTES # BLD AUTO: 42.9 % — SIGNIFICANT CHANGE UP (ref 13–44)
MAGNESIUM SERPL-MCNC: 2.4 MG/DL — SIGNIFICANT CHANGE UP (ref 1.6–2.6)
MCHC RBC-ENTMCNC: 30.9 GM/DL — LOW (ref 32–36)
MCHC RBC-ENTMCNC: 35.1 PG — HIGH (ref 27–34)
MCV RBC AUTO: 113.4 FL — HIGH (ref 80–100)
MONOCYTES # BLD AUTO: 0.39 K/UL — SIGNIFICANT CHANGE UP (ref 0–0.9)
MONOCYTES NFR BLD AUTO: 13.2 % — SIGNIFICANT CHANGE UP (ref 2–14)
NEUTROPHILS # BLD AUTO: 1.27 K/UL — LOW (ref 1.8–7.4)
NEUTROPHILS NFR BLD AUTO: 42.9 % — LOW (ref 43–77)
NRBC # BLD: 0 /100 WBCS — SIGNIFICANT CHANGE UP (ref 0–0)
PHOSPHATE SERPL-MCNC: 4.8 MG/DL — HIGH (ref 2.5–4.5)
PLATELET # BLD AUTO: 98 K/UL — LOW (ref 150–400)
POTASSIUM SERPL-MCNC: 3.5 MMOL/L — SIGNIFICANT CHANGE UP (ref 3.5–5.3)
POTASSIUM SERPL-SCNC: 3.5 MMOL/L — SIGNIFICANT CHANGE UP (ref 3.5–5.3)
PROCALCITONIN SERPL-MCNC: <0.05 — SIGNIFICANT CHANGE UP (ref 0–0.04)
PROT SERPL-MCNC: 5.7 G/DL — LOW (ref 6–8.3)
PROTHROM AB SERPL-ACNC: 21.5 SEC — HIGH (ref 10–12.9)
RBC # BLD: 2.31 M/UL — LOW (ref 4.2–5.8)
RBC # FLD: 16.6 % — HIGH (ref 10.3–14.5)
SODIUM SERPL-SCNC: 145 MMOL/L — SIGNIFICANT CHANGE UP (ref 135–145)
WBC # BLD: 2.96 K/UL — LOW (ref 3.8–10.5)
WBC # FLD AUTO: 2.96 K/UL — LOW (ref 3.8–10.5)

## 2020-04-02 PROCEDURE — 99232 SBSQ HOSP IP/OBS MODERATE 35: CPT

## 2020-04-02 RX ORDER — WARFARIN SODIUM 2.5 MG/1
4 TABLET ORAL ONCE
Refills: 0 | Status: COMPLETED | OUTPATIENT
Start: 2020-04-02 | End: 2020-04-02

## 2020-04-02 RX ADMIN — AMIODARONE HYDROCHLORIDE 200 MILLIGRAM(S): 400 TABLET ORAL at 04:56

## 2020-04-02 RX ADMIN — GABAPENTIN 200 MILLIGRAM(S): 400 CAPSULE ORAL at 00:05

## 2020-04-02 RX ADMIN — GABAPENTIN 200 MILLIGRAM(S): 400 CAPSULE ORAL at 18:03

## 2020-04-02 RX ADMIN — Medication 80 MILLIGRAM(S): at 04:56

## 2020-04-02 RX ADMIN — WARFARIN SODIUM 4 MILLIGRAM(S): 2.5 TABLET ORAL at 23:44

## 2020-04-02 RX ADMIN — Medication 40 MILLIGRAM(S): at 10:03

## 2020-04-02 RX ADMIN — Medication 112 MICROGRAM(S): at 04:53

## 2020-04-02 RX ADMIN — GABAPENTIN 200 MILLIGRAM(S): 400 CAPSULE ORAL at 23:45

## 2020-04-02 RX ADMIN — Medication 81 MILLIGRAM(S): at 10:02

## 2020-04-02 RX ADMIN — Medication 325 MILLIGRAM(S): at 10:02

## 2020-04-02 RX ADMIN — TAMSULOSIN HYDROCHLORIDE 0.8 MILLIGRAM(S): 0.4 CAPSULE ORAL at 23:44

## 2020-04-02 RX ADMIN — Medication 12.5 MILLIGRAM(S): at 04:53

## 2020-04-02 RX ADMIN — WARFARIN SODIUM 2 MILLIGRAM(S): 2.5 TABLET ORAL at 02:33

## 2020-04-02 RX ADMIN — GABAPENTIN 200 MILLIGRAM(S): 400 CAPSULE ORAL at 14:13

## 2020-04-02 RX ADMIN — BUDESONIDE AND FORMOTEROL FUMARATE DIHYDRATE 2 PUFF(S): 160; 4.5 AEROSOL RESPIRATORY (INHALATION) at 10:09

## 2020-04-02 RX ADMIN — Medication 1 DROP(S): at 04:54

## 2020-04-02 RX ADMIN — GABAPENTIN 200 MILLIGRAM(S): 400 CAPSULE ORAL at 10:14

## 2020-04-02 RX ADMIN — Medication 1 DROP(S): at 16:40

## 2020-04-02 RX ADMIN — Medication 1 TABLET(S): at 10:16

## 2020-04-02 RX ADMIN — TAMSULOSIN HYDROCHLORIDE 0.8 MILLIGRAM(S): 0.4 CAPSULE ORAL at 00:06

## 2020-04-02 RX ADMIN — GABAPENTIN 200 MILLIGRAM(S): 400 CAPSULE ORAL at 04:54

## 2020-04-02 NOTE — PROGRESS NOTE ADULT - PROBLEM SELECTOR PLAN 4
- Known h/o CKD stage 4  - BUN/Cr on admission mildly elevated from baseline ~2.4, likely prerenal in nature  - Got fluids in ED. Continue to monitor chemistry. Avoid nephrotoxic meds.  - Avoid nephrotoxic meds  - Monitor daily renal function - Known h/o CKD stage 4  - BUN/Cr on admission mildly elevated from baseline ~2.4, likely prerenal in nature  - Got fluids in ED. Continue to monitor chemistry. Avoid nephrotoxic meds.  - Avoid nephrotoxic meds  - Monitor daily renal function - 4/2 at baseline Cr

## 2020-04-02 NOTE — PROGRESS NOTE ADULT - PROBLEM SELECTOR PLAN 1
With acute hypoxic respiratory failure  COVID19+  - Bilateral opacifications/effusions in setting of suspected COVID19 viral illness  - NLR stable, ratio 1.00, patient saturating satisfactorily on NC at 5L/min.  - May use O2 NC, Venturi Mask, NRB as needed depending on oxygen demand, titrate supp O2 to SpO2 >93%  - Avoid HFNC/NIPPV/aerosolizing procedures i.e. nebulizations  - Avoid NSAIDs, use tylenol PRN fevers  - Maintain strict isolation precautions, use proper PPE   - negative procalcitonin  - Daily CBC with diff to follow eosinophils, lymphocytes and neutrophils; daily CK  -  LDH, CRP mildly elevated,  ferritin wnl, lactate wnl, troponin mild elevation likely demand will check one more set of enzymes  - Baseline EKG NSR, monitor for cardiac decompensation, monitor telemetry  - Monitor respiratory status closely   - Code Status: DNR, but ok with trial of intubation.

## 2020-04-02 NOTE — PROGRESS NOTE ADULT - PROBLEM SELECTOR PLAN 3
- Chronic, stable  - Currently rate-controlled  - EKG on admission paced, NSR  - Continue home Amiodarone 200mg qDaily and Metoprolol 12.5mg qDaily  - Takes Coumadin 4mg Monday then 2mg Tues-Sun  - f/u daily INRs and adjust Coumadin dose accordingly - Chronic, stable  - Currently rate-controlled  - EKG on admission paced, NSR  - Continue home Amiodarone 200mg qDaily and Metoprolol 12.5mg qDaily  - Takes Coumadin 4mg Monday then 2mg Tues-Sun  - f/u daily INRs and adjust Coumadin dose accordingly  - 4/2 subtherapeutic INR on 2 mg coumadin, increased today's dose to 4 mg.

## 2020-04-02 NOTE — PROGRESS NOTE ADULT - ASSESSMENT
89yo M, with PMH/o COPD (on 2.5L NC qHs home O2), af s/p PPM (Biotronik), HFpEF (last TTE 3/20, EF 55-60%), LUE DVT, h/o PE, HTN, AoS s/p TAVR (2019), CKD stage 4, BPH, prostate ca s/p RT and Casodex, SCC, CLL s/p 6 cycles Rituximab in 2013 now off therapy, postherpetic neuralgia, and hypothyroidism, presenting with SOB and cough, found to have b/l pleural effusions on CXR possibly 2/2 acute on chronic HFpEF, admitted with COVID19+ associated acute hypoxic resp failure     03/30: NLR 1.18  04/01: NLR 0.96  04/02: NLR 1.00

## 2020-04-02 NOTE — PROGRESS NOTE ADULT - SUBJECTIVE AND OBJECTIVE BOX
This progress note was completed in part by resident acting as telephonic scribe during COVID-19 crisis. Physical exam was completed by attending physician, and findings below are those of the attending physician, and have been reviewed in detail.  Patient is a 89y old  Male who presents with a chief complaint of SOB, pleural effusions; r/o COVID - Code Status: DNR/DNI (31 Mar 2020 14:37)      FROM ADMISSION H+P:   HPI:  89yo M, with PMH/o COPD (on 2.5L NC qHs home O2), af s/p PPM (Biotronik), HFpEF (last TTE 3/20, EF 55-60%), LUE DVT, h/o PE, HTN, AoS s/p TAVR (2019), CKD stage 4, BPH, prostate ca s/p RT and Casodex, SCC, CLL s/p 6 cycles Rituximab in 2013 now off therapy, postherpetic neuralgia, and hypothyroidism, presenting with SOB for several days. Patient is a poor historian, and attempts were made to reach family members in an effort to gather history though unsuccessful at this time. Per ED provider note, patient additionally endorses cough. Of note, patient was recently discharged from [facility] on 3/12 where he was admitted for bacterial PNA with additional enterococcal bacteremia suspected from sacral decubitus ulcer.    In the ED  VS: T 97.6 HR 64 /78 RR 24 -> 18 SPO2 100% on NRB -> 100% on 3L NC  Significant labs include WBC 3.63, H/H 8.4/27.5, NLR 1.18 , BUN/Cr 53/2.7, BNP 8893.  CXR: b/l pleural effusions based on my preliminary read, pending official report  EKG: paced, NSR, LAD, vent rate 62    Patient received 2L NS. (30 Mar 2020 23:27)      ----  INTERVAL HPI/OVERNIGHT EVENTS: Pt seen and evaluated at the bedside. Doing well today on 5L via NC. He complains of persistent SOB and some cough. No N/V/D. No acute overnight events occurred. No CP/palpitations.     Fever or chills: yes [  ]   no [ x ]  Fatigue, malaise or generalized weakness: yes [x  ]   no [  ]  Chest pain: yes [  ]   no [ x ]  Palpitations: yes [  ]   no [  x]  Shortness of breath/dyspnea: yes [ x ]   no [  ]  Cough: yes [x]   no [  ], sputum production: yes [  ]   no [x  ]  Anorexia/po intolerance: yes [  ]   no [x  ]  Myalgias: yes [  ]   no [ x ]  Nausea: yes [  ]   no [ x ]  Vomiting: yes [  ]   no [ x ]  Diarrhea: yes [  ]   no [ x ]  Conjunctivitis: yes [  ]   no [ x ]  Other associated complaints:     ----  PAST MEDICAL & SURGICAL HISTORY:  CKD (chronic kidney disease): Stage 4  BPH (benign prostatic hyperplasia)  Postherpetic neuralgia: Treated  Chronic diarrhea  Prostate CA  Basal cell carcinoma  Lymphoma: Latent  Anxiety  Hypothyroid  Hypertension  A-fib: Pacemaker  S/P TAVR (transcatheter aortic valve replacement)  Artificial cardiac pacemaker  H/O shoulder replacement  S/P bilateral unicompartmental knee replacement      FAMILY HISTORY:  Family history of ischemic heart disease      Allergies    No Known Allergies    Intolerances        ----  REVIEW OF SYSTEMS:  as per HPI    ----  GENERAL: elderly male, no acute distress  HEENT: NC/AT, EOMI, neck supple, MMM  RESPIRATORY: good air movement, few scattered expiratory wheezes, decreased BS b/l base  CARDIOVASCULAR: RRR, no murmurs, gallops, rubs  ABDOMINAL: soft, non-tender, non-distended, positive bowel sounds   EXTREMITIES: no clubbing, cyanosis, or edema  NEUROLOGICAL: alert and oriented x 3, but confused at times, grossly non-focal  SKIN: multiple ecchymoses over both upper and lower extremities. left arm pitting edema, noticeably larger that right arm and per patient this is chronic since PPM placement.  Left leg wound w/ dressing c/d/i  MUSCULOSKELETAL: no gross joint deformity    Vital Signs Last 24 Hrs  T(C): 37.1 (02 Apr 2020 08:26), Max: 37.2 (01 Apr 2020 17:01)  T(F): 98.8 (02 Apr 2020 08:26), Max: 99 (01 Apr 2020 17:01)  HR: 60 (02 Apr 2020 08:26) (60 - 76)  BP: 146/63 (02 Apr 2020 08:26) (143/61 - 167/72)  BP(mean): --  RR: 18 (02 Apr 2020 08:26) (18 - 19)  SpO2: 92% (02 Apr 2020 08:26) (92% - 97%)    ----  I&O's Summary    LABS:                       8.1    2.96  )-----------( 98       ( 02 Apr 2020 09:52 )             26.2     04-02    145  |  107  |  60<H>  ----------------------------<  88  3.5   |  29  |  2.50<H>    Ca    8.6      02 Apr 2020 09:52  Phos  4.8     04-02  Mg     2.4     04-02    TPro  5.7<L>  /  Alb  3.0<L>  /  TBili  0.8  /  DBili  x   /  AST  30  /  ALT  11<L>  /  AlkPhos  59  04-02    LIVER FUNCTIONS - ( 02 Apr 2020 09:52 )  Alb: 3.0 g/dL / Pro: 5.7 g/dL / ALK PHOS: 59 U/L / ALT: 11 U/L / AST: 30 U/L / GGT: x           PT/INR - ( 02 Apr 2020 09:52 )   PT: 21.5 sec;   INR: 1.88 ratio         PTT - ( 01 Apr 2020 09:32 )  PTT:39.3 sec      CAPILLARY BLOOD GLUCOSE    ----  Personally reviewed:  Vital sign trends: [ x ] yes    [  ] no     [  ] n/a  Laboratory results: [ x ] yes    [  ] no     [  ] n/a  Radiology results: [  ] yes    [  ] no     [  ] n/a  Culture results: [  ] yes    [  ] no     [  ] n/a  Consultant recommendations: [  ] yes    [  ] no     [  ] n/a Patient is a 89y old  Male who presents with a chief complaint of SOB, pleural effusions;  COVID positive - Code Status: DNR/trial of intubation (31 Mar 2020 14:37)    FROM ADMISSION H+P:   HPI:  89yo M, with PMH/o COPD (on 2.5L NC qHs home O2), af s/p PPM (Biotronik), HFpEF (last TTE 3/20, EF 55-60%), LUE DVT, h/o PE, HTN, AoS s/p TAVR (2019), CKD stage 4, BPH, prostate ca s/p RT and Casodex, SCC, CLL s/p 6 cycles Rituximab in 2013 now off therapy, postherpetic neuralgia, and hypothyroidism, presenting with SOB for several days. Patient is a poor historian, and attempts were made to reach family members in an effort to gather history though unsuccessful at this time. Per ED provider note, patient additionally endorses cough. Of note, patient was recently discharged from [facility] on 3/12 where he was admitted for bacterial PNA with additional enterococcal bacteremia suspected from sacral decubitus ulcer.    In the ED  VS: T 97.6 HR 64 /78 RR 24 -> 18 SPO2 100% on NRB -> 100% on 3L NC  Significant labs include WBC 3.63, H/H 8.4/27.5, NLR 1.18 , BUN/Cr 53/2.7, BNP 8893.  CXR: b/l pleural effusions based on my preliminary read, pending official report  EKG: paced, NSR, LAD, vent rate 62    Patient received 2L NS. (30 Mar 2020 23:27)    ----  INTERVAL HPI/OVERNIGHT EVENTS: Pt seen and evaluated at the bedside. Doing well today on 5L via NC. He complains of persistent SOB and some cough. No N/V/D. No acute overnight events occurred. No CP/palpitations.     Fever or chills: yes [  ]   no [ x ]  Fatigue, malaise or generalized weakness: yes [x  ]   no [  ]  Chest pain: yes [  ]   no [ x ]  Palpitations: yes [  ]   no [  x]  Shortness of breath/dyspnea: yes [ x ]   no [  ]  Cough: yes [x]   no [  ], sputum production: yes [  ]   no [x  ]  Anorexia/po intolerance: yes [  ]   no [x  ]  Myalgias: yes [  ]   no [ x ]  Nausea: yes [  ]   no [ x ]  Vomiting: yes [  ]   no [ x ]  Diarrhea: yes [  ]   no [ x ]  Conjunctivitis: yes [  ]   no [ x ]  Other associated complaints:     ----  PAST MEDICAL & SURGICAL HISTORY:  CKD (chronic kidney disease): Stage 4  BPH (benign prostatic hyperplasia)  Postherpetic neuralgia: Treated  Chronic diarrhea  Prostate CA  Basal cell carcinoma  Lymphoma: Latent  Anxiety  Hypothyroid  Hypertension  A-fib: Pacemaker  S/P TAVR (transcatheter aortic valve replacement)  Artificial cardiac pacemaker  H/O shoulder replacement  S/P bilateral unicompartmental knee replacement      FAMILY HISTORY:  Family history of ischemic heart disease    Allergies    No Known Allergies    Intolerances    ----  REVIEW OF SYSTEMS:  as per HPI    ----  GENERAL: elderly male, no acute distress  HEENT: NC/AT, EOMI, neck supple, MMM  RESPIRATORY: good air movement, few scattered expiratory wheezes, decreased BS b/l base  CARDIOVASCULAR: RRR, no murmurs, gallops, rubs  ABDOMINAL: soft, non-tender, non-distended, positive bowel sounds   EXTREMITIES: no clubbing, cyanosis, or edema  NEUROLOGICAL: alert and oriented x 3, but confused at times, grossly non-focal  SKIN: multiple ecchymoses over both upper and lower extremities. left arm pitting edema, noticeably larger that right arm and per patient this is chronic since PPM placement.  Left leg wound w/ dressing c/d/i  MUSCULOSKELETAL: no gross joint deformity    Vital Signs Last 24 Hrs  T(C): 37.1 (02 Apr 2020 08:26), Max: 37.2 (01 Apr 2020 17:01)  T(F): 98.8 (02 Apr 2020 08:26), Max: 99 (01 Apr 2020 17:01)  HR: 60 (02 Apr 2020 08:26) (60 - 76)  BP: 146/63 (02 Apr 2020 08:26) (143/61 - 167/72)  BP(mean): --  RR: 18 (02 Apr 2020 08:26) (18 - 19)  SpO2: 92% (02 Apr 2020 08:26) (92% - 97%)    ----  I&O's Summary    LABS:                       8.1    2.96  )-----------( 98       ( 02 Apr 2020 09:52 )             26.2     04-02    145  |  107  |  60<H>  ----------------------------<  88  3.5   |  29  |  2.50<H>    Ca    8.6      02 Apr 2020 09:52  Phos  4.8     04-02  Mg     2.4     04-02    TPro  5.7<L>  /  Alb  3.0<L>  /  TBili  0.8  /  DBili  x   /  AST  30  /  ALT  11<L>  /  AlkPhos  59  04-02    LIVER FUNCTIONS - ( 02 Apr 2020 09:52 )  Alb: 3.0 g/dL / Pro: 5.7 g/dL / ALK PHOS: 59 U/L / ALT: 11 U/L / AST: 30 U/L / GGT: x           PT/INR - ( 02 Apr 2020 09:52 )   PT: 21.5 sec;   INR: 1.88 ratio         PTT - ( 01 Apr 2020 09:32 )  PTT:39.3 sec      CAPILLARY BLOOD GLUCOSE    ----  Personally reviewed:  Vital sign trends: [ x ] yes    [  ] no     [  ] n/a  Laboratory results: [ x ] yes    [  ] no     [  ] n/a  Radiology results: [  ] yes    [  ] no     [  ] n/a  Culture results: [  ] yes    [  ] no     [  ] n/a  Consultant recommendations: [  ] yes    [  ] no     [  ] n/a

## 2020-04-02 NOTE — PHYSICAL THERAPY INITIAL EVALUATION ADULT - ADDITIONAL COMMENTS
Pt lives with his daughter in a house, + chair glide for steps. Pt stated he ambulated independently with RW and was independent with ADLs. Pt stating he feels very weak and is agreeable to go to [facility]

## 2020-04-03 DIAGNOSIS — L97.929 NON-PRESSURE CHRONIC ULCER OF UNSPECIFIED PART OF LEFT LOWER LEG WITH UNSPECIFIED SEVERITY: ICD-10-CM

## 2020-04-03 DIAGNOSIS — E87.6 HYPOKALEMIA: ICD-10-CM

## 2020-04-03 LAB
ALBUMIN SERPL ELPH-MCNC: 3.1 G/DL — LOW (ref 3.3–5)
ALP SERPL-CCNC: 60 U/L — SIGNIFICANT CHANGE UP (ref 40–120)
ALT FLD-CCNC: 14 U/L — SIGNIFICANT CHANGE UP (ref 12–78)
ANION GAP SERPL CALC-SCNC: 10 MMOL/L — SIGNIFICANT CHANGE UP (ref 5–17)
AST SERPL-CCNC: 26 U/L — SIGNIFICANT CHANGE UP (ref 15–37)
BASOPHILS # BLD AUTO: 0.02 K/UL — SIGNIFICANT CHANGE UP (ref 0–0.2)
BASOPHILS NFR BLD AUTO: 0.8 % — SIGNIFICANT CHANGE UP (ref 0–2)
BILIRUB SERPL-MCNC: 1.1 MG/DL — SIGNIFICANT CHANGE UP (ref 0.2–1.2)
BUN SERPL-MCNC: 60 MG/DL — HIGH (ref 7–23)
CALCIUM SERPL-MCNC: 8.2 MG/DL — LOW (ref 8.5–10.1)
CHLORIDE SERPL-SCNC: 105 MMOL/L — SIGNIFICANT CHANGE UP (ref 96–108)
CO2 SERPL-SCNC: 31 MMOL/L — SIGNIFICANT CHANGE UP (ref 22–31)
CREAT SERPL-MCNC: 2.3 MG/DL — HIGH (ref 0.5–1.3)
EOSINOPHIL # BLD AUTO: 0 K/UL — SIGNIFICANT CHANGE UP (ref 0–0.5)
EOSINOPHIL NFR BLD AUTO: 0 % — SIGNIFICANT CHANGE UP (ref 0–6)
GLUCOSE SERPL-MCNC: 121 MG/DL — HIGH (ref 70–99)
HCT VFR BLD CALC: 27.4 % — LOW (ref 39–50)
HGB BLD-MCNC: 8.4 G/DL — LOW (ref 13–17)
IMM GRANULOCYTES NFR BLD AUTO: 1.2 % — SIGNIFICANT CHANGE UP (ref 0–1.5)
INR BLD: 2.25 RATIO — HIGH (ref 0.88–1.16)
LYMPHOCYTES # BLD AUTO: 1.14 K/UL — SIGNIFICANT CHANGE UP (ref 1–3.3)
LYMPHOCYTES # BLD AUTO: 43.8 % — SIGNIFICANT CHANGE UP (ref 13–44)
MCHC RBC-ENTMCNC: 30.7 GM/DL — LOW (ref 32–36)
MCHC RBC-ENTMCNC: 34.7 PG — HIGH (ref 27–34)
MCV RBC AUTO: 113.2 FL — HIGH (ref 80–100)
MONOCYTES # BLD AUTO: 0.39 K/UL — SIGNIFICANT CHANGE UP (ref 0–0.9)
MONOCYTES NFR BLD AUTO: 15 % — HIGH (ref 2–14)
NEUTROPHILS # BLD AUTO: 1.02 K/UL — LOW (ref 1.8–7.4)
NEUTROPHILS NFR BLD AUTO: 39.2 % — LOW (ref 43–77)
NRBC # BLD: 0 /100 WBCS — SIGNIFICANT CHANGE UP (ref 0–0)
PLATELET # BLD AUTO: 100 K/UL — LOW (ref 150–400)
POTASSIUM SERPL-MCNC: 2.8 MMOL/L — CRITICAL LOW (ref 3.5–5.3)
POTASSIUM SERPL-SCNC: 2.8 MMOL/L — CRITICAL LOW (ref 3.5–5.3)
PROT SERPL-MCNC: 5.5 G/DL — LOW (ref 6–8.3)
PROTHROM AB SERPL-ACNC: 25.8 SEC — HIGH (ref 10–12.9)
RBC # BLD: 2.42 M/UL — LOW (ref 4.2–5.8)
RBC # FLD: 16 % — HIGH (ref 10.3–14.5)
SODIUM SERPL-SCNC: 146 MMOL/L — HIGH (ref 135–145)
WBC # BLD: 2.6 K/UL — LOW (ref 3.8–10.5)
WBC # FLD AUTO: 2.6 K/UL — LOW (ref 3.8–10.5)

## 2020-04-03 PROCEDURE — 99232 SBSQ HOSP IP/OBS MODERATE 35: CPT

## 2020-04-03 RX ORDER — ALBUTEROL 90 UG/1
3 AEROSOL, METERED ORAL
Qty: 0 | Refills: 0 | DISCHARGE

## 2020-04-03 RX ORDER — WARFARIN SODIUM 2.5 MG/1
2 TABLET ORAL ONCE
Refills: 0 | Status: COMPLETED | OUTPATIENT
Start: 2020-04-03 | End: 2020-04-03

## 2020-04-03 RX ORDER — SOD,AMMONIUM,POTASSIUM LACTATE
1 CREAM (GRAM) TOPICAL
Refills: 0 | Status: DISCONTINUED | OUTPATIENT
Start: 2020-04-03 | End: 2020-04-04

## 2020-04-03 RX ORDER — SOD,AMMONIUM,POTASSIUM LACTATE
1 CREAM (GRAM) TOPICAL
Qty: 0 | Refills: 0 | DISCHARGE
Start: 2020-04-03

## 2020-04-03 RX ORDER — POTASSIUM CHLORIDE 20 MEQ
10 PACKET (EA) ORAL ONCE
Refills: 0 | Status: COMPLETED | OUTPATIENT
Start: 2020-04-03 | End: 2020-04-03

## 2020-04-03 RX ORDER — POTASSIUM CHLORIDE 20 MEQ
40 PACKET (EA) ORAL ONCE
Refills: 0 | Status: COMPLETED | OUTPATIENT
Start: 2020-04-03 | End: 2020-04-03

## 2020-04-03 RX ADMIN — BUDESONIDE AND FORMOTEROL FUMARATE DIHYDRATE 2 PUFF(S): 160; 4.5 AEROSOL RESPIRATORY (INHALATION) at 10:51

## 2020-04-03 RX ADMIN — GABAPENTIN 200 MILLIGRAM(S): 400 CAPSULE ORAL at 10:50

## 2020-04-03 RX ADMIN — Medication 1 TABLET(S): at 10:56

## 2020-04-03 RX ADMIN — WARFARIN SODIUM 2 MILLIGRAM(S): 2.5 TABLET ORAL at 23:03

## 2020-04-03 RX ADMIN — Medication 80 MILLIGRAM(S): at 04:44

## 2020-04-03 RX ADMIN — BUDESONIDE AND FORMOTEROL FUMARATE DIHYDRATE 2 PUFF(S): 160; 4.5 AEROSOL RESPIRATORY (INHALATION) at 23:05

## 2020-04-03 RX ADMIN — GABAPENTIN 200 MILLIGRAM(S): 400 CAPSULE ORAL at 14:51

## 2020-04-03 RX ADMIN — Medication 40 MILLIGRAM(S): at 10:55

## 2020-04-03 RX ADMIN — GABAPENTIN 200 MILLIGRAM(S): 400 CAPSULE ORAL at 06:11

## 2020-04-03 RX ADMIN — GABAPENTIN 200 MILLIGRAM(S): 400 CAPSULE ORAL at 03:23

## 2020-04-03 RX ADMIN — GABAPENTIN 200 MILLIGRAM(S): 400 CAPSULE ORAL at 23:04

## 2020-04-03 RX ADMIN — Medication 81 MILLIGRAM(S): at 10:56

## 2020-04-03 RX ADMIN — Medication 1 DROP(S): at 23:04

## 2020-04-03 RX ADMIN — Medication 40 MILLIEQUIVALENT(S): at 12:48

## 2020-04-03 RX ADMIN — Medication 325 MILLIGRAM(S): at 10:56

## 2020-04-03 RX ADMIN — GABAPENTIN 200 MILLIGRAM(S): 400 CAPSULE ORAL at 18:44

## 2020-04-03 RX ADMIN — AMIODARONE HYDROCHLORIDE 200 MILLIGRAM(S): 400 TABLET ORAL at 04:43

## 2020-04-03 RX ADMIN — Medication 112 MICROGRAM(S): at 06:14

## 2020-04-03 RX ADMIN — Medication 1 DROP(S): at 04:44

## 2020-04-03 RX ADMIN — TAMSULOSIN HYDROCHLORIDE 0.8 MILLIGRAM(S): 0.4 CAPSULE ORAL at 23:03

## 2020-04-03 RX ADMIN — Medication 12.5 MILLIGRAM(S): at 04:43

## 2020-04-03 NOTE — PROGRESS NOTE ADULT - PROBLEM SELECTOR PLAN 3
- Chronic, stable  - Currently rate-controlled  - EKG on admission paced, NSR  - Continue home Amiodarone 200mg qDaily and Metoprolol 12.5mg qDaily  - Takes Coumadin 4mg Monday then 2mg Tues-Sun  - f/u daily INRs and adjust Coumadin dose accordingly  - 4/3 therapeutic INR, given 4 mg coumadin yesterday, continue with 2 mg

## 2020-04-03 NOTE — ADVANCED PRACTICE NURSE CONSULT - RECOMMEDATIONS
1. Lac-hydrin to BLLE  Md made aware of recommendation  Order written   RN educated in the care of this patients ADELAIDA

## 2020-04-03 NOTE — PROGRESS NOTE ADULT - PROBLEM SELECTOR PLAN 5
- Low Hb on admission though appears to be at baseline  - Continue with ferrous sulfate 325mg daily  - Monitor for signs and symptoms of acute bleeding  - Monitor daily CBC K+ 2.8 with CMP today.    - repleted with oral K and 1K rider.

## 2020-04-03 NOTE — PROGRESS NOTE ADULT - PROBLEM SELECTOR PLAN 9
- Chronic  - Continue with home Gabapentin 200mg q4h for eye pain  - May hold for lethargy or sedation Stable closed ulcer 2/2 chronic vascular dz.  Seen by wound care RN, advised to use lac-hydrin to b/l LE.  No s/sx of infection

## 2020-04-03 NOTE — PROGRESS NOTE ADULT - PROBLEM SELECTOR PLAN 8
Stable closed ulcer 2/2 chronic vascular dz.  Seen by wound care RN, advised to use lac-hydrin to b/l LE.  No s/sx of infection - Chronic, stable  - Takes Metoprolol succinate 12.5 qAM, may continue while inpatient with hold parameters  - Monitor routine hemodynamics

## 2020-04-03 NOTE — PROGRESS NOTE ADULT - PROBLEM SELECTOR PLAN 1
With acute hypoxic respiratory failure  COVID19+  - Bilateral opacifications/effusions in setting of suspected COVID19 viral illness  - NLR stable, ratio 0.89, patient saturating satisfactorily on NC at 5L/min.  - May use O2 NC, Venturi Mask, NRB as needed depending on oxygen demand, titrate supp O2 to SpO2 >93%  - Avoid HFNC/NIPPV/aerosolizing procedures i.e. nebulizations  - Avoid NSAIDs, use tylenol PRN fevers  - Maintain strict isolation precautions, use proper PPE   - negative procalcitonin  - Daily CBC with diff to follow eosinophils, lymphocytes and neutrophils; daily CK  -  LDH, CRP mildly elevated,  ferritin wnl, lactate wnl, troponin mild elevation likely demand will check one more set of enzymes  - Baseline EKG NSR, monitor for cardiac decompensation, monitor telemetry  - Monitor respiratory status closely   - Code Status: DNR, but ok with trial of intubation.

## 2020-04-03 NOTE — PROGRESS NOTE ADULT - ASSESSMENT
89yo M, with PMH/o COPD (on 2.5L NC qHs home O2), af s/p PPM (Biotronik), HFpEF (last TTE 3/20, EF 55-60%), LUE DVT, h/o PE, HTN, AoS s/p TAVR (2019), CKD stage 4, BPH, prostate ca s/p RT and Casodex, SCC, CLL s/p 6 cycles Rituximab in 2013 now off therapy, postherpetic neuralgia, and hypothyroidism, presenting with SOB and cough, found to have b/l pleural effusions on CXR possibly 2/2 acute on chronic HFpEF, admitted with COVID19+ associated acute hypoxic resp failure     03/30: NLR 1.18  04/01: NLR 0.96  04/02: NLR 1.00  04/03: NLR 0.89

## 2020-04-03 NOTE — PROGRESS NOTE ADULT - SUBJECTIVE AND OBJECTIVE BOX
Patient is a 89y old  Male who presents with a chief complaint of SOB, pleural effusions;  COVID positive - Code Status: DNR/trial of intubation (31 Mar 2020 14:37)    FROM ADMISSION H+P:   HPI:  89yo M, with PMH/o COPD (on 2.5L NC qHs home O2), af s/p PPM (Biotronik), HFpEF (last TTE 3/20, EF 55-60%), LUE DVT, h/o PE, HTN, AoS s/p TAVR (2019), CKD stage 4, BPH, prostate ca s/p RT and Casodex, SCC, CLL s/p 6 cycles Rituximab in 2013 now off therapy, postherpetic neuralgia, and hypothyroidism, presenting with SOB for several days. Patient is a poor historian, and attempts were made to reach family members in an effort to gather history though unsuccessful at this time. Per ED provider note, patient additionally endorses cough. Of note, patient was recently discharged from [facility] on 3/12 where he was admitted for bacterial PNA with additional enterococcal bacteremia suspected from sacral decubitus ulcer.    In the ED  VS: T 97.6 HR 64 /78 RR 24 -> 18 SPO2 100% on NRB -> 100% on 3L NC  Significant labs include WBC 3.63, H/H 8.4/27.5, NLR 1.18 , BUN/Cr 53/2.7, BNP 8893.  CXR: b/l pleural effusions based on my preliminary read, pending official report  EKG: paced, NSR, LAD, vent rate 62    Patient received 2L NS. (30 Mar 2020 23:27)    ----  INTERVAL HPI/OVERNIGHT EVENTS: Pt seen and evaluated at the bedside.  patient overnight had episode of SOB with lasted about an hour, and resolved.  Doing well today on 5L via NC. He complains of persistent SOB and some cough. No N/V/D.  No CP/palpitations.     Fever or chills: yes [  ]   no [ x ]  Fatigue, malaise or generalized weakness: yes [x  ]   no [  ]  Chest pain: yes [  ]   no [ x ]  Palpitations: yes [  ]   no [  x]  Shortness of breath/dyspnea: yes [ x ]   no [  ]  Cough: yes [x]   no [  ], sputum production: yes [  ]   no [x  ]  Anorexia/po intolerance: yes [  ]   no [x  ]  Myalgias: yes [  ]   no [ x ]  Nausea: yes [  ]   no [ x ]  Vomiting: yes [  ]   no [ x ]  Diarrhea: yes [  ]   no [ x ]  Conjunctivitis: yes [  ]   no [ x ]  Other associated complaints:     ----  PAST MEDICAL & SURGICAL HISTORY:  CKD (chronic kidney disease): Stage 4  BPH (benign prostatic hyperplasia)  Postherpetic neuralgia: Treated  Chronic diarrhea  Prostate CA  Basal cell carcinoma  Lymphoma: Latent  Anxiety  Hypothyroid  Hypertension  A-fib: Pacemaker  S/P TAVR (transcatheter aortic valve replacement)  Artificial cardiac pacemaker  H/O shoulder replacement  S/P bilateral unicompartmental knee replacement      FAMILY HISTORY:  Family history of ischemic heart disease    Allergies    No Known Allergies    Intolerances    ----  REVIEW OF SYSTEMS:  as per HPI    ----  GENERAL: elderly male, no acute distress  HEENT: NC/AT, EOMI, neck supple, MMM  RESPIRATORY: good air movement, few scattered expiratory wheezes, decreased BS b/l base  CARDIOVASCULAR: RRR, no murmurs, gallops, rubs  ABDOMINAL: soft, non-tender, non-distended, positive bowel sounds   EXTREMITIES: no clubbing, cyanosis, or edema  NEUROLOGICAL: alert and oriented x 3, but confused at times, grossly non-focal  SKIN: multiple ecchymoses over both upper and lower extremities. left arm pitting edema, noticeably larger that right arm and per patient this is chronic since PPM placement.  Left leg wound w/ dressing c/d/i  MUSCULOSKELETAL: no gross joint deformity    Vital Signs Last 24 Hrs  T(C): 36.9 (03 Apr 2020 08:30), Max: 37.6 (03 Apr 2020 01:34)  T(F): 98.4 (03 Apr 2020 08:30), Max: 99.7 (03 Apr 2020 01:34)  HR: 70 (03 Apr 2020 08:30) (61 - 74)  BP: 120/52 (03 Apr 2020 08:30) (120/52 - 159/71)  BP(mean): --  RR: 17 (03 Apr 2020 08:30) (17 - 18)  SpO2: 90% (03 Apr 2020 08:30) (90% - 97%)    ----  I&O's Summary      LABS:                          8.4    2.60  )-----------( 100      ( 03 Apr 2020 10:21 )             27.4     04-03    146<H>  |  105  |  60<H>  ----------------------------<  121<H>  2.8<LL>   |  31  |  2.30<H>    Ca    8.2<L>      03 Apr 2020 10:21  Phos  4.8     04-02  Mg     2.4     04-02    TPro  x   /  Alb  3.1<L>  /  TBili  x   /  DBili  x   /  AST  26  /  ALT  14  /  AlkPhos  x   04-03    LIVER FUNCTIONS - ( 03 Apr 2020 10:21 )  Alb: 3.1 g/dL / Pro: x     / ALK PHOS: x     / ALT: 14 U/L / AST: 26 U/L / GGT: x           PT/INR - ( 03 Apr 2020 10:21 )   PT: 25.8 sec;   INR: 2.25 ratio      ----  Personally reviewed:  Vital sign trends: [ x ] yes    [  ] no     [  ] n/a  Laboratory results: [ x ] yes    [  ] no     [  ] n/a  Radiology results: [  ] yes    [  ] no     [  ] n/a  Culture results: [ x ] yes    [  ] no     [  ] n/a  Consultant recommendations: [  ] yes    [  ] no     [  ] n/a

## 2020-04-03 NOTE — PROGRESS NOTE ADULT - PROBLEM SELECTOR PLAN 4
- Known h/o CKD stage 4  - BUN/Cr on admission mildly elevated from baseline ~2.4, likely prerenal in nature  - Got fluids in ED. Continue to monitor chemistry. Avoid nephrotoxic meds.  - Avoid nephrotoxic meds  - Monitor daily renal function - 4/3 at baseline Cr

## 2020-04-03 NOTE — PROGRESS NOTE ADULT - PROBLEM SELECTOR PLAN 7
- Chronic, stable  - Takes Metoprolol succinate 12.5 qAM, may continue while inpatient with hold parameters  - Monitor routine hemodynamics - Chronic, stable  - Continue with home Alfuzosin 10mg daily

## 2020-04-03 NOTE — PROGRESS NOTE ADULT - PROBLEM SELECTOR PLAN 6
- Chronic, stable  - Continue with home Alfuzosin 10mg daily - Low Hb on admission though appears to be at baseline  - Continue with ferrous sulfate 325mg daily  - Monitor for signs and symptoms of acute bleeding  - Monitor daily CBC

## 2020-04-03 NOTE — ADVANCED PRACTICE NURSE CONSULT - ASSESSMENT
Patient is a 89yo male + covid; PMHX of a-fib, lymphoma, CKD, BPH, BCC, HTN; Presents with bilateral lower extremity vascular disease closed ulcers hemosiderin staining and hyper keratotic skin: patients denies pain,/discomfort

## 2020-04-03 NOTE — PROVIDER CONTACT NOTE (CRITICAL VALUE NOTIFICATION) - ACTION/TREATMENT ORDERED:
no further orders rendered at this time
Adams Thibodeaux made aware of patient's potassium. As per MD he will order replacement.

## 2020-04-03 NOTE — PROVIDER CONTACT NOTE (OTHER) - ACTION/TREATMENT ORDERED:
Labs mag and phos
Dr. Thibodeaux made aware unable to get IV access. as per Dr. Thibodeaux patient is okay with getting oral potassium for now. He will DC K rider. As per Dr. Thibodeaux patient doesn't need IV access at this time.

## 2020-04-04 VITALS
RESPIRATION RATE: 22 BRPM | DIASTOLIC BLOOD PRESSURE: 50 MMHG | TEMPERATURE: 103 F | HEART RATE: 85 BPM | SYSTOLIC BLOOD PRESSURE: 120 MMHG | OXYGEN SATURATION: 98 %

## 2020-04-04 PROCEDURE — 99239 HOSP IP/OBS DSCHRG MGMT >30: CPT

## 2020-04-04 RX ORDER — ACETAMINOPHEN 500 MG
650 TABLET ORAL EVERY 6 HOURS
Refills: 0 | Status: DISCONTINUED | OUTPATIENT
Start: 2020-04-04 | End: 2020-04-04

## 2020-04-04 RX ORDER — MORPHINE SULFATE 50 MG/1
2 CAPSULE, EXTENDED RELEASE ORAL
Refills: 0 | Status: DISCONTINUED | OUTPATIENT
Start: 2020-04-04 | End: 2020-04-04

## 2020-04-04 RX ADMIN — Medication 650 MILLIGRAM(S): at 00:55

## 2020-04-04 RX ADMIN — MORPHINE SULFATE 2 MILLIGRAM(S): 50 CAPSULE, EXTENDED RELEASE ORAL at 05:23

## 2020-04-04 NOTE — DISCHARGE NOTE FOR THE EXPIRED PATIENT - REASON FOR ADMISSION
Pneumonia with acute hypoxic respiratory failure secondary to COVID infection Pneumonia with acute hypoxic respiratory failure secondary to COVID infection.   Sepsis ruled out, not POA Pneumonia with acute hypoxic respiratory failure secondary to COVID infection.   Sepsis POA, resolved

## 2020-04-04 NOTE — DISCHARGE NOTE FOR THE EXPIRED PATIENT - SECONDARY DIAGNOSIS.
A-fib CKD (chronic kidney disease) Congestive heart failure (CHF) Sepsis with acute hypoxic respiratory failure without septic shock, due to unspecified organism

## 2020-04-04 NOTE — DISCHARGE NOTE FOR THE EXPIRED PATIENT - HOSPITAL COURSE
89yo M, with PMH/o COPD (on 2.5L NC qHs home O2), af s/p PPM (Biotronik), HFpEF (last TTE 3/20, EF 55-60%), LUE DVT, h/o PE, HTN, AoS s/p TAVR (), CKD stage 4, BPH, prostate ca s/p RT and Casodex, SCC, CLL s/p 6 cycles Rituximab in  now off therapy, postherpetic neuralgia, and hypothyroidism, presenting with SOB on 3/30 for several days.  Of note, patient was recently discharged from [facility] on 3/12 where he was admitted for bacterial PNA with additional enterococcal bacteremia suspected from sacral decubitus ulcer.    Patient received supplemental oxygen and supportive care.  On the evening of 4/3, developed worsening shortness of breath and oxygen desaturation and was placed on non-rebreather mask.  NOK was contacted to discuss code status, and was made DNR/DNI.  Patient went in to respiratory failure the next morning, and  on  at 6:30 am. 89yo M, with PMH/o COPD (on 2.5L NC qHs home O2), af s/p PPM (Biotronik), HFpEF (last TTE 3/20, EF 55-60%), LUE DVT, h/o PE, HTN, AoS s/p TAVR (), CKD stage 4, BPH, prostate ca s/p RT and Casodex, SCC, CLL s/p 6 cycles Rituximab in  now off therapy, postherpetic neuralgia, and hypothyroidism, presenting with SOB on 3/30 for several days.  Of note, patient was recently discharged from [facility] on 3/12 where he was admitted for bacterial PNA with additional enterococcal bacteremia suspected from sacral decubitus ulcer.  Sepsis not POA    Patient received supplemental oxygen and supportive care.  On the evening of 4/3, developed worsening shortness of breath and oxygen desaturation and was placed on non-rebreather mask.  NOK was contacted to discuss code status, and was made DNR/DNI.  Patient went in to respiratory failure the next morning, and  on  at 6:30 am. 89yo M, with PMH/o COPD (on 2.5L NC qHs home O2), af s/p PPM (Biotronik), HFpEF (last TTE 3/20, EF 55-60%), LUE DVT, h/o PE, HTN, AoS s/p TAVR (), CKD stage 4, BPH, prostate ca s/p RT and Casodex, SCC, CLL s/p 6 cycles Rituximab in  now off therapy, postherpetic neuralgia, and hypothyroidism, presenting with SOB on 3/30 for several days.  Of note, patient was recently discharged from [facility] on 3/12 where he was admitted for bacterial PNA with additional enterococcal bacteremia suspected from sacral decubitus ulcer.      Sepsis POA, resolved.  Patient received supplemental oxygen and supportive care.  On the evening of 4/3, developed worsening shortness of breath and oxygen desaturation and was placed on non-rebreather mask.  NOK was contacted to discuss code status, and was made DNR/DNI.  Patient went in to respiratory failure the next morning, and  on  at 6:30 am.

## 2020-04-04 NOTE — DISCHARGE NOTE FOR THE EXPIRED PATIENT - NS PATIENT DEATH CRITERIA
Patient is dead based on Cardiopulmonary criteria including absent breath sounds, pulselessness and/or asystole/Assessed patient at bedside as patient appears to not be breathing, and is pulseless.

## 2020-04-04 NOTE — PROGRESS NOTE ADULT - REASON FOR ADMISSION
SOB, pleural effusions; r/o COVID - Code Status: TBD, unable to contact family at time of admission
SOB, pleural effusions; r/o COVID - Code Status: DNR/DNI
SOB, pleural effusions; r/o COVID - Code Status: TBD, unable to contact family at time of admission
SOB, pleural effusions;  COVID positive  - Code Status: DNR/trial of intubation
SOB, pleural effusions; r/o COVID - Code Status: DNR/trial of intubation

## 2020-04-04 NOTE — CHART NOTE - NSCHARTNOTEFT_GEN_A_CORE
Expiration Note- PGY3 On Call    Assessed patient at bedside as patient appears to not be breathing, and is pulseless.   Patient found to be unresponsive to voice or pain. Pupils were not reactive.  There was no corneal reflex.  There was no carotid or femoral pulse. There were no breath sounds.     Time of death: 0630  Attending Dr. Nogueira notified.   Family member Eli Freire at bedside.

## 2020-04-04 NOTE — CHART NOTE - NSCHARTNOTEFT_GEN_A_CORE
RRT called for patient w/ desaturations on 100% NRB, with proning in acute respiratory distress.  Patient obtunded, unable to make decisions.  Chart review shows patient DNR w/ trial intubation.      Daughter Eli, emergency contact and HCP contacted and made aware of declining condition.  Given age and comorbidities, GOC readdressed.  In the interest of not prolonging suffering she wishes patient made DNI and placed on comfort measures.    MOLST updated and placed in chart.  Family will be coming to hospital to be with patient.  Nursing admin aware and in agreement.  Attending notified.

## 2020-04-04 NOTE — PROGRESS NOTE ADULT - SUBJECTIVE AND OBJECTIVE BOX
Rapid response called for hypoxia/ hyperthermia. pt found prone with 100 % nrm , Pox 96 %. pt with tmax 104 with event. pt is covid +++ , and is documented a DNR , with a trial of intubation.   per pt hx and present dx , pt would do extremely poorly intubated. pt family called and spoken to daughter , and decision is made to make pt a full dnr/ dni , comfort care  nursing and medical team aware.      COVID 19 specific considerations and therapeuric options based on the available and rapidly changing literature    Goals of care considerations:  Ongoing assessment for patient specific treatment options based on progression or decline.  I have involved the family with updates and requests in guidance for medical decision making.      38 minutes of critical care time spent in the management of this critically ill COVID-19 patient/PUI patient with continuous assessments and interventions based on the interpretation of multiple databases.

## 2020-04-30 PROCEDURE — 96361 HYDRATE IV INFUSION ADD-ON: CPT

## 2020-04-30 PROCEDURE — 99285 EMERGENCY DEPT VISIT HI MDM: CPT | Mod: 25

## 2020-04-30 PROCEDURE — 85027 COMPLETE CBC AUTOMATED: CPT

## 2020-04-30 PROCEDURE — 87635 SARS-COV-2 COVID-19 AMP PRB: CPT

## 2020-04-30 PROCEDURE — 85730 THROMBOPLASTIN TIME PARTIAL: CPT

## 2020-04-30 PROCEDURE — 83735 ASSAY OF MAGNESIUM: CPT

## 2020-04-30 PROCEDURE — 84484 ASSAY OF TROPONIN QUANT: CPT

## 2020-04-30 PROCEDURE — 83615 LACTATE (LD) (LDH) ENZYME: CPT

## 2020-04-30 PROCEDURE — 83880 ASSAY OF NATRIURETIC PEPTIDE: CPT

## 2020-04-30 PROCEDURE — 71045 X-RAY EXAM CHEST 1 VIEW: CPT

## 2020-04-30 PROCEDURE — 80053 COMPREHEN METABOLIC PANEL: CPT

## 2020-04-30 PROCEDURE — 84145 PROCALCITONIN (PCT): CPT

## 2020-04-30 PROCEDURE — 94640 AIRWAY INHALATION TREATMENT: CPT

## 2020-04-30 PROCEDURE — 85379 FIBRIN DEGRADATION QUANT: CPT

## 2020-04-30 PROCEDURE — 82728 ASSAY OF FERRITIN: CPT

## 2020-04-30 PROCEDURE — 86140 C-REACTIVE PROTEIN: CPT

## 2020-04-30 PROCEDURE — 82962 GLUCOSE BLOOD TEST: CPT

## 2020-04-30 PROCEDURE — 84100 ASSAY OF PHOSPHORUS: CPT

## 2020-04-30 PROCEDURE — 93005 ELECTROCARDIOGRAM TRACING: CPT

## 2020-04-30 PROCEDURE — 83605 ASSAY OF LACTIC ACID: CPT

## 2020-04-30 PROCEDURE — 85652 RBC SED RATE AUTOMATED: CPT

## 2020-04-30 PROCEDURE — 82550 ASSAY OF CK (CPK): CPT

## 2020-04-30 PROCEDURE — 82553 CREATINE MB FRACTION: CPT

## 2020-04-30 PROCEDURE — 85610 PROTHROMBIN TIME: CPT

## 2020-04-30 PROCEDURE — 36415 COLL VENOUS BLD VENIPUNCTURE: CPT

## 2020-04-30 PROCEDURE — 87040 BLOOD CULTURE FOR BACTERIA: CPT

## 2020-04-30 PROCEDURE — 97161 PT EVAL LOW COMPLEX 20 MIN: CPT

## 2020-05-19 NOTE — PHYSICAL THERAPY INITIAL EVALUATION ADULT - LEVEL OF CONSCIOUSNESS, REHAB EVAL
Pt is scheduled for colonoscopy with Dr Acosta on Friday 7/3/20. Received call from Scheduling Department at hospital stating that ENDOSCOPY will be closed on 7/3.  Will need to r/s pt.    LVM with pt to call office. Will offer Friday 7/10 at 8 am.    Pt returned call, rescheduled colonoscopy for Friday 7/10/20 at 8 am, arrive 7 am.  Spoke with Catie in scheduling.  
alert

## 2020-08-11 NOTE — DISCHARGE NOTE ADULT - PATIENT PORTAL LINK FT
Latest RX: Lisinopril-hydroCHLOROthiazide 20-12.5 MG Oral Tablet 30 tabs 0 refills on 6/16/20    Per protocol, passed. Rx sent.
You can access the [facility] Patient Portal, offered by [facility], by registering with the following website: [email or web address]

## 2020-08-11 NOTE — ED ADULT NURSE NOTE - SEPSIS REFERENCE DATA CRITERIA 2
-- DO NOT REPLY / DO NOT REPLY ALL --  -- Message is from the [facility]--    COVID-19 Universal Screening: N/A - Not about scheduling    General Patient Message      Reason for Call: Patient stating will be faxing over [facility] paper work to be filled out today  and faxed back to her job by tomorrow the latest any questions please call back .    Caller Information       Type Contact Phone    08/10/2020 06:52 AM Phone (Incoming) Glo Diamond (Self) [phone] (M)          Alternative phone number:     Turnaround time given to caller:   \"This message will be sent to [state Provider's name]. The clinical team will fulfill your request as soon as they review your message.\"    
Patient scheduled for phone visit tomorrow.   
Abnormal Lactate: > 2

## 2020-10-27 NOTE — PHYSICAL THERAPY INITIAL EVALUATION ADULT - REFERRING PHYSICIAN, REHAB EVAL
Dr Nogueira Afib    Anemia Iron Deficiency    Arteriosclerotic heart disease (ASHD)    Arthritis    CAD (coronary artery disease)    CAD (Coronary Artery Disease)    Carpal Tunnel Syndrome    CKD (chronic kidney disease)    CKD (chronic kidney disease)    Dementia    Dementia    Dementia    DVT (deep venous thrombosis)    Dyslipidemia    GI (gastrointestinal bleed)    Glaucoma    Glaucoma    H/o Diverticulosis    HLD (hyperlipidemia)    HTN (hypertension)    HTN (Hypertension)    OA (Osteoarthritis)    OA (osteoarthritis)    Trigger finger

## 2020-12-02 NOTE — PROGRESS NOTE ADULT - PROBLEM SELECTOR PROBLEM 1
We care about your health; the following recommendations(s) have been made today:    Testing ordered today:   Pulmonary Function Testing Instructions    Garry Garcia  Date: ___________________ Time: ___________    What you need to do before your appointment:  · Wear loose fitting clothing so that you may breathe comfortably.  · You may eat a light meal before testing.   · Do not smoke or drink alcohol the day of the test.  · Avoid vigorous exercise within 30 minutes of testing.  · Continue to take all medications except those listed below.  · Do not use your inhalers unless your condition will worsen and you will be unable to complete the testing.     Please Hold for 6 Hours:  · Albuterol (Ventolin, ProAir, Proventil)  · Atrovent  · Xopenex  · Combivent  · Duoneb    Please Hold for 24 Hours:  · Advair / Wixela   · Anoro  · Breo  · Brovana  · Dulera  · Flovent  · Incruse  · Theophylline  · Trelegy   · Pulmicort  · Qvar  · Serevent  · Singulair  · Spiriva   · Symbicort        Please follow up with the above recommendation within the next couple of days, if you have any further questions or concerns, please contact one of our offices:     · [facility] at [phone]  · [facility] at [phone].    May I ask, if you receive a survey in the mail about our visit today, please take the time to complete and return it. It will help us continue to improve our performance and provide you with excellent care.     Thank you for choosing this clinic for your medical care.     Pulmonary and Sleep Medicine  [facility]       Acute on chronic diastolic heart failure

## 2021-01-21 NOTE — DISCHARGE NOTE ADULT - NS AS DC VTE INSTRUCTION
Pt states he has had chronic diarrhea but it has never been worked up  He has never spoken with his PCP about his diarrhea  Pt states he cannot remember the last time he has had solid stools and coffee worsens his diarrhea  Plan  · Loperamide p r n  Coumadin/Warfarin - Compliance.../Coumadin/Warfarin - Potential for adverse drug reactions and interactions/Coumadin/Warfarin - Dietary Advice.../Coumadin/Warfarin - Follow-up monitoring...

## 2021-02-05 NOTE — PROGRESS NOTE ADULT - SUBJECTIVE AND OBJECTIVE BOX
CHIEF COMPLAINT: Patient is a 89y old  Male who presents with a chief complaint of     HPI: HPI:  89 yo male w/ PMH of Afib on coumadin, Anxiety, HTN, hypothyroidism, BPH, hx of prostate ca s/p radiation, hx of chronic diarrhea, basal cell carcinoma and  lymphoma s/p Rituxan, shingles with postherpetic neuralgia  brought to ED by EMS due to SOB and increased weakness past few days. Denies fevers or chills, no cough, no CP. But noticed weight gain about 12lbs and also LE swelling and intermittent palpitations.  As per Pt spoke to PA at PCP office and also had CXR last week, unaware about results, also cant report on lasix dose, daughter not sure as well.   ROS also + for burning pain and itching of scalp and eyes, started since had episode of shingles   Pts daughter at bedside reluctant to go over PMH and meds as " Pt was here just recently, nothing changed!"  Outpatient blood work revealed worsening renal function so diuretics were held.    In ED labs showed decreased Renal Fx and elevated BNP. CXR suggestive of LLL infiltrate.  Pt was given 40mg IV lasix and had good response: 700ml voided (25 Jun 2018 11:26)      PMHx: PAST MEDICAL & SURGICAL HISTORY:  BPH (benign prostatic hyperplasia)  Postherpetic neuralgia  Chronic diarrhea  Prostate CA  Basal cell carcinoma  Lymphoma  Anxiety  Hypothyroid  Hypertension  A-fib  H/O shoulder replacement  S/P bilateral unicompartmental knee replacement        Soc Hx:     FAMILY HISTORY:  Family history of ischemic heart disease (Father, Mother)      Allergies: Allergies    No Known Allergies    Intolerances          REVIEW OF SYSTEMS:    As above  No chest pain  + shortness of breath  No lightheadeness or syncope  No leg swelling  No palpitations  No claudication-like symptoms    ICU Vital Signs Last 24 Hrs  T(C): 36.4 (27 Jun 2018 04:41), Max: 36.8 (26 Jun 2018 19:35)  T(F): 97.6 (27 Jun 2018 04:41), Max: 98.3 (26 Jun 2018 19:35)  HR: 87 (27 Jun 2018 04:41) (59 - 87)  BP: 145/96 (27 Jun 2018 04:41) (118/68 - 146/77)  BP(mean): --  ABP: --  ABP(mean): --  RR: 16 (27 Jun 2018 04:41) (16 - 18)  SpO2: 94% (27 Jun 2018 04:41) (94% - 98%)      I&O's Summary    25 Jun 2018 07:01  -  26 Jun 2018 07:00  --------------------------------------------------------  IN: 0 mL / OUT: 1515 mL / NET: -1515 mL        CAPILLARY BLOOD GLUCOSE          PHYSICAL EXAM:   Patient in NAD  Neck: No JVD; Carotids:  2+ without bruits  Respiratory:  Clear to A&P  Cardiovascular: S1 and S2, irregular rate and rhythm, no Murmurs, gallops or rubs  Gastrointestinal:  Soft, non-tender; BS positive  Extremities: No peripheral edema  Vascular: 2+ peripheral pulses  Neurological: A/O x 3, no focal deficits      MEDICATIONS  (STANDING):  artificial  tears Solution 1 Drop(s) Both EYES two times a day  fluorouracil 5% Cream 1 Application(s) Topical two times a day  folic acid 1 milliGRAM(s) Oral daily  furosemide   Injectable 40 milliGRAM(s) IV Push daily  lactobacillus acidophilus 1 Tablet(s) Oral two times a day with meals  levothyroxine 100 MICROGram(s) Oral daily  metoprolol succinate ER 25 milliGRAM(s) Oral two times a day  PAZEO OPTHALMIC DROPS 1 Drop(s) 1 Drop(s) Both EYES daily  tamsulosin 0.8 milliGRAM(s) Oral at bedtime  vitamin B complex with vitamin C 1 Tablet(s) Oral daily      Home Medications:  alfuzosin 10 mg oral tablet, extended release: 1 tab(s) orally once a day (25 Jun 2018 11:39)  B Complex 50 oral tablet, extended release: 1 tab(s) orally once a day (25 Jun 2018 11:39)  dicyclomine 10 mg oral capsule: 1 cap(s) orally once a day, As Needed (25 Jun 2018 11:39)  Efudex 5% topical cream: Apply topically to affected area once a day (at bedtime) (25 Jun 2018 11:39)  folic acid 1 mg oral tablet: 0.5 tab(s) orally once a day (25 Jun 2018 11:39)  gabapentin 100 mg oral capsule: 2 cap(s) orally 3 times a day (25 Jun 2018 11:39)  Lotemax 0.5% ophthalmic ointment: 1 application in the left eye 2 times a day (25 Jun 2018 11:39)  Probiotic Formula oral capsule: 1 cap(s) orally once a day (25 Jun 2018 11:39)  Refresh ophthalmic solution: 1 drop(s) to each affected eye 2 times a day, As Needed (25 Jun 2018 11:39)  Synthroid 100 mcg (0.1 mg) oral tablet: 1 tab(s) orally once a day (25 Jun 2018 11:39)  Toprol-XL 25 mg oral tablet, extended release: 1 tab(s) orally 2 times a day (25 Jun 2018 11:39)  Valtrex 1 g oral tablet: 1 tab(s) orally once a day (25 Jun 2018 11:39)  warfarin 5 mg oral tablet: 1 tab(s) orally once a day (at bedtime) (25 Jun 2018 11:39)  Xanax 0.25 mg oral tablet: 1 tab(s) orally 4 times a day, As Needed (25 Jun 2018 11:39)        LABS: All Labs Reviewed:  Blood Culture:   BNP Serum Pro-Brain Natriuretic Peptide: 13112 pg/mL (06-24 @ 23:16)    CBC             WBC Count: 5.47 K/uL (06-25 @ 12:11)              Hemoglobin: 11.3 g/dL (06-25 @ 12:11)  Hemoglobin: 12.1 g/dL (06-24 @ 23:16)              Hematocrit: 34.7 % (06-25 @ 12:11)  Hematocrit: 36.8 % (06-24 @ 23:16)              Mean Cell Volume: 108.4 fl (06-25 @ 12:11)  Mean Cell Volume: 107.6 fl (06-24 @ 23:16)              Platelet Count - Automated: 132 K/uL (06-25 @ 12:11)  Platelet Count - Automated: 132 K/uL (06-24 @ 23:16)                        12.0   6.45  )-----------( 154      ( 27 Jun 2018 05:30 )             36.8                               Cardiac markers             Troponin I, Serum: 0.027 ng/mL (06-25 @ 05:40)  Troponin I, Serum: 0.017 ng/mL (06-25 @ 02:12)  Troponin I, Serum: 0.019 ng/mL (06-24 @ 23:16)                             Chems        Sodium, Serum: 145 mmol/L (06-25 @ 12:11)  Sodium, Serum: 145 mmol/L (06-24 @ 23:16)          Potassium, Serum: 3.6 mmol/L (06-25 @ 12:11)  Potassium, Serum: 4.2 mmol/L (06-24 @ 23:16)          Blood Urea Nitrogen, Serum: 40 mg/dL (06-25 @ 12:11)  Blood Urea Nitrogen, Serum: 41 mg/dL (06-24 @ 23:16)          Creatinine 1.88  Creatinine 2.00          Magnesium, Serum: 2.1 mg/dL (06-25 @ 12:11)  Magnesium, Serum: 2.0 mg/dL (06-24 @ 23:16)          Protein Total, Serum: 5.9 gm/dL (06-24 @ 23:16)                  Calcium, Total Serum: 8.3 mg/dL (06-25 @ 12:11)  Calcium, Total Serum: 8.4 mg/dL (06-24 @ 23:16)          Phosphorus Level, Serum: 3.0 mg/dL (06-25 @ 12:11)          Bilirubin Total, Serum: 0.5 mg/dL (06-24 @ 23:16)          Alanine Aminotransferase (ALT/SGPT): 30 U/L (06-24 @ 23:16)          Aspartate Aminotransferase (AST/SGOT): 29 U/L (06-24 @ 23:16)    06-27    146<H>  |  107  |  42<H>  ----------------------------<  106<H>  4.0   |  35<H>  |  1.70<H>    Ca    9.0      27 Jun 2018 05:30  Phos  2.5     06-26  Mg     2.1     06-25    TPro  x   /  Alb  3.2<L>  /  TBili  x   /  DBili  x   /  AST  x   /  ALT  x   /  AlkPhos  x   06-26                   INR: 2.13 ratio (06-24 @ 23:16)             RADIOLOGY:  < from: CT Chest No Cont (06.25.18 @ 11:44) >  IMPRESSION:     Small bilateral pleural effusions and bibasilar atelectasis have   developed.    Stable diffuse lymphadenopathy consistent with lymphoma.      < end of copied text >    < from: Xray Chest 1 View AP/PA. (06.24.18 @ 23:43) >  IMPRESSION: Patchy left lower lobe airspace disease suggestive of   pneumonia.    < end of copied text >        EKG:  Atrial flutter/variable block; RBBB; LAFB    Telemetry:  Atrial flutter/variable block    ECHO: 5

## 2021-05-14 NOTE — ED PROVIDER NOTE - SKIN, MLM
Take gabapentin 600 mg 3 times a day.  Take Motrin Tylenol every 6 hours as needed for pain.    Please follow-up with her primary care physician next week to re-evaluate your neuropathy in her legs.    Return to the ER for any difficulty breathing, chest pain, palpitations, loss of consciousness, or any other concerns.  
Skin normal color for race, warm, dry and intact. No evidence of rash.

## 2021-07-09 NOTE — PATIENT PROFILE ADULT - OVER THE PAST TWO WEEKS HAVE YOU FELT DOWN, DEPRESSED OR HOPELESS?
[place] no

## 2021-08-02 NOTE — PATIENT PROFILE ADULT. - VISION (WITH CORRECTIVE LENSES IF THE PATIENT USUALLY WEARS THEM):
Partially impaired: cannot see medication labels or newsprint, but can see obstacles in path, and the surrounding layout; can count fingers at arm's length
normal saline

## 2021-08-12 NOTE — PROGRESS NOTE ADULT - SUBJECTIVE AND OBJECTIVE BOX
Spoke with patient and advised that her physician gave permission for her to hold her blood thinner.  She is to hold Eliquis 3 days prior to epidural injection on 8/30.  Last dose will be 8/26 and she will restart after procedure in 24 hours.  Patient stated understanding.    NEPHROLOGY INTERVAL HPI/OVERNIGHT EVENTS:    Date of Service: 20 @ 18:32  3/ SY  No acute distress.  Complains of cough and inability to expectorate.  No SOB at rest.    HPI:  89 yo man with PMHX of CKD (recent creat ~2.2-2.5), HTN, A FIB , COPD and hx of CLL and TAVR (2019).  Pt was recently admitted ( early February)  for PNA and Viral URI.  Pt admitted with complaints of increased cough, SOB and weakness.  Noted with Anemia and stable CKD on admission.    PMHX and PSHX.  1.CKD (baseline crear in low 2's)  2.A FIB  3.COPD  4.Hx of Prostate CA ( Post RT)()  5.Hx of Lymphoma Post Rituxan therapy.  6.Hx of Postherpetic Neuralgia  7.Hypothyroidism  8.Hx of Chronic Diarrhea.  9.Hx of CHF.  10.S/p TAVR (2019)  11.HTN    MEDICATIONS  (STANDING):  albuterol/ipratropium for Nebulization 3 milliLiter(s) Nebulizer every 6 hours  ALPRAZolam 0.25 milliGRAM(s) Oral daily  aMIOdarone    Tablet 200 milliGRAM(s) Oral daily  artificial  tears Solution 1 Drop(s) Both EYES three times a day  aspirin enteric coated 81 milliGRAM(s) Oral daily  buDESOnide    Inhalation Suspension 0.25 milliGRAM(s) Inhalation every 12 hours  furosemide    Tablet 40 milliGRAM(s) Oral two times a day  gabapentin 200 milliGRAM(s) Oral four times a day  guaiFENesin  milliGRAM(s) Oral every 12 hours  levothyroxine 112 MICROGram(s) Oral daily  metoprolol succinate ER 25 milliGRAM(s) Oral daily  phenazopyridine 100 milliGRAM(s) Oral every 8 hours  piperacillin/tazobactam IVPB.. 3.375 Gram(s) IV Intermittent every 12 hours  potassium chloride    Tablet ER 10 milliEquivalent(s) Oral daily  vancomycin  IVPB 1000 milliGRAM(s) IV Intermittent once  warfarin 2 milliGRAM(s) Oral daily    MEDICATIONS  (PRN):  acetaminophen   Tablet .. 650 milliGRAM(s) Oral every 6 hours PRN Mild Pain (1 - 3)    Vital Signs Last 24 Hrs  T(C): 37 (09 Mar 2020 11:09), Max: 37 (09 Mar 2020 11:09)  T(F): 98.6 (09 Mar 2020 11:09), Max: 98.6 (09 Mar 2020 11:09)  HR: 67 (09 Mar 2020 17:52) (61 - 109)  BP: 131/79 (09 Mar 2020 17:52) (97/37 - 131/79)  BP(mean): --  RR: 618 (09 Mar 2020 17:52) (16 - 618)  SpO2: 99% (09 Mar 2020 17:52) (97% - 100%)  Daily     Daily Weight in k.2 (09 Mar 2020 06:02)    03-08 @ 07:01  -  03- @ 07:00  --------------------------------------------------------  IN: 0 mL / OUT: 600 mL / NET: -600 mL    PHYSICAL : alert and appropriate  HEENT : unremarkable  CHEST/LUNG: bilateral rhonchi  HEART: S1S2 RRR  ABDOMEN: soft  EXTREMITIES: trace edema  SKIN:     LABS:                        8.2    6.46  )-----------( 124      ( 09 Mar 2020 07:54 )             26.8         143  |  107  |  54<H>  ----------------------------<  84  3.4<L>   |  31  |  2.46<H>    Ca    8.5      09 Mar 2020 07:54      PT/INR - ( 09 Mar 2020 07:54 )   PT: 28.1 sec;   INR: 2.46 ratio      RADIOLOGY & ADDITIONAL TESTS:

## 2021-10-06 PROBLEM — J44.9 OBSTRUCTIVE LUNG DISEASE: Status: ACTIVE | Noted: 2019-12-02

## 2021-10-15 NOTE — ED ADULT NURSE NOTE - PAIN: PRESENCE, MLM
denies pain/discomfort
Additional Notes: Patient consent was obtained to proceed with the visit and recommended plan of care after discussion of all risks and benefits, including the risks of COVID-19 exposure.
Detail Level: Simple

## 2021-10-19 NOTE — PROGRESS NOTE ADULT - PROBLEM SELECTOR PROBLEM 4
Medication(s) Requested: adderall  Last office visit: 5/19, scheduled 10/20  Last refill: 9/18  Is the patient due for refill of this medication(s): Yes  PDMP review: Criteria not met because out of protocol, but is scheduled tomorrow. Forwarded to Physician/ARMANDO for further review and decision on medication(s) requested.      A-fib

## 2021-12-09 NOTE — ED PROVIDER NOTE - NS ED MD DISPO DISCHARGE
Gastroenterology Home Ear Wedge Repair Text: A wedge excision was completed by carrying down an excision through the full thickness of the ear and cartilage with an inward facing Burow's triangle. The wound was then closed in a layered fashion.

## 2022-01-04 NOTE — ED PROVIDER NOTE - MDM PATIENT STATEMENT FOR ADDL TREATMENT
Patient would like to know if she can take any other kind of inflammatory  Medication besides ibuprofen.     Patient with one or more new problems requiring additional work-up/treatment.

## 2022-02-24 NOTE — DIETITIAN INITIAL EVALUATION ADULT. - NUTRITIONGOAL OUTCOME1
Pt not to exceed estimated nutrient needs (Na). Star Wedge Flap Text: The defect edges were debeveled with a #15 scalpel blade.  Given the location of the defect, shape of the defect and the proximity to free margins a star wedge flap was deemed most appropriate.  Using a sterile surgical marker, an appropriate rotation flap was drawn incorporating the defect and placing the expected incisions within the relaxed skin tension lines where possible. The area thus outlined was incised deep to adipose tissue with a #15 scalpel blade.  The skin margins were undermined to an appropriate distance in all directions utilizing iris scissors.

## 2022-03-16 NOTE — ED ADULT TRIAGE NOTE - ESI TRIAGE ACUITY LEVEL, MLM
MEDICARE WELLNESS VISIT NOTE    HISTORY OF PRESENT ILLNESS:   Bony Mcgarry presents for his Subsequent Annual Medicare Wellness Visit.   He has no current complaints or concerns.      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   • Prostate disorder          Past Surgical History:   Procedure Laterality Date   • Laser surgery of eye     • Wrist surgery           Social History     Tobacco Use   • Smoking status: Never Smoker   • Smokeless tobacco: Never Used   Substance Use Topics   • Alcohol use: No   • Drug use: No     Drug use:    Drug Use:    No              Family History   Problem Relation Age of Onset   • High blood pressure Mother    • High blood pressure Father        Current Outpatient Medications   Medication Sig Dispense Refill   • Simbrinza 1-0.2 % ophthalmic suspension INSTILL 1 DROP INTO BOTH  EYES TWICE DAILY 24 mL 0   • tamsulosin (FLOMAX) 0.4 MG Cap TAKE 1 CAPSULE BY MOUTH  DAILY AFTER A MEAL 90 capsule 3   • amLODIPine (NORVASC) 5 MG tablet TAKE 1 TABLET BY MOUTH  DAILY 90 tablet 0   • losartan (COZAAR) 100 MG tablet TAKE 1 TABLET BY MOUTH  DAILY 90 tablet 0   • PARoxetine (PAXIL) 20 MG tablet TAKE 1 TABLET BY MOUTH IN  THE MORNING 90 tablet 3   • zoster vaccine recomb adjuvanted (Shingrix) 50 MCG/0.5ML injection Repeat dose in 2 to 6 months (unless 1 dose already given), for a total of 2 doses. 1 each 0   • TIMOLOL MALEATE OP      •  latanoprost (XALATAN) 0.005 % ophthalmic solution Place 1 drop into both eyes nightly.     • acetaminophen (TYLENOL) 500 MG tablet Take 1,000 mg by mouth every 6 hours as needed for Fever.       No current facility-administered medications for this visit.        The following items on the Medicare Health Risk Assessment were found to be positive            Vision and Hearing screens: No exam data present    Advance Directive:   The patient has the following documents:  Power of  for Health Care    Cognitive/Functional Status: no evidence of cognitive dysfunction by direct observation    Opioid Review: Bony is not taking opioid medications.    Recent PHQ 2/9 Score:    PHQ 2:  Date Adult PHQ 2 Score Adult PHQ 2 Interpretation   12/14/2020 0 No further screening needed       PHQ 9:       DEPRESSION ASSESSMENT/PLAN:  stable/improving, see below     Body mass index is 26.14 kg/m².    BMI ASSESSMENT/PLAN:  Patient is overweight.    20-30 minutes of physical activity a day          Needed follow up:  None    See orders.   See Patient Instructions section.   Return in about 6 months (around 9/16/2022) for Medicare Wellness Visit, CPE, med fuv.    Major depressive disorder, single episode, mild (CMS/HCC)  Monitor: The problem is improving with treatment.  Evaluation: No labs/tests required today.  Assessment/Treatment:  Continue current treatment/monitoring regimen.    Ten minutes spent in depression screening     2

## 2022-03-23 NOTE — PHYSICAL THERAPY INITIAL EVALUATION ADULT - ADL SKILLS, REHAB EVAL
----- Message from Nneka Murray MD sent at 3/23/2022  7:24 AM CDT -----  .Please call   1. Labs continue to suggest pre diabetes or glucose intolerance. Regular cardiovascular exercise with a minimum of 25minutes 5 times per week in addition to carbohydrate restriction is advised. There is a  pre diabetes class offered that I can refer him to if he feels this would be helpful. Blood sugar was 110     2. Cholesterol remains elevated. The ACC risk score is 2.5%.(this is risk for major cardiac event in the next 10 years). It is not at the level that a statin medication would be advised but close monitoring of this along with healthy diet and exercise is advised.     3. Finally there is persistent mild elevation of one liver function test. I am going to suggest that we obtain a few additional tests to evaluate this further. (blood tests) I was able to add them to the tests that were drawn and will contact with results as available. Would avoid alcohol and regular use of NSAIDS such as aleve and ibuprofen at this time     
Spoke with patient, informed of lab results per Doctor Murray, all info provided to patient per written statement Doctor Murray   
needs device/independent

## 2022-04-12 NOTE — PROCEDURAL SAFETY CHECKLIST WITH OR WITHOUT SEDATION - NSPOSTDEBRIEFDT_GEN_ALL_CORE
11-Mar-2020 08:20 Complex Repair And M Plasty Text: The defect edges were debeveled with a #15 scalpel blade.  The primary defect was closed partially with a complex linear closure.  Given the location of the remaining defect, shape of the defect and the proximity to free margins an M plasty was deemed most appropriate for complete closure of the defect.  Using a sterile surgical marker, an appropriate advancement flap was drawn incorporating the defect and placing the expected incisions within the relaxed skin tension lines where possible.    The area thus outlined was incised deep to adipose tissue with a #15 scalpel blade.  The skin margins were undermined to an appropriate distance in all directions utilizing iris scissors.

## 2022-04-28 NOTE — DISCHARGE NOTE ADULT - HAS THE PATIENT RECEIVED THE INFLUENZA VACCINE THIS SEASON?
[Calm] : calm [de-identified] : He  is alert, well-groomed, and in NAD\par   [de-identified] : right back Surgical wound is healing well.   no signs of  inflammation or infection.  yes...

## 2022-07-14 NOTE — DISCUSSION/SUMMARY
[Home] : patient was discharged to home [Follow Up Call to Patient's Family] : follow up call to patient's family [FreeTextEntry3] : discharged with home health No

## 2022-10-15 NOTE — PHYSICAL THERAPY INITIAL EVALUATION ADULT - ASR WT BEARING STATUS EVAL
Patient is an 81 y/o male from home w/ PMH of HTN, HLD, hypothyroidism and incomplete bladder emptying s/p Braun p/w fever x2 days. Admitted for sepsis secondary to UTI    A/P:    #Sepsis   -Patient met sepsis criteria on admission  w/ WBC 18k,, UTI as suspected source  -UA grossly positive w/ + nitrite, LE and WBC   -s/p IVF Bolus and Rocephin   -Follow urine cx and blood cx   -C/w Rocephin   -C/w IVF     #Complicated UTI  -care plan as above     #Peripheral Eosinophilia:   -Pt noted w/ elevated eosinophils of 11%   -No rash, allergies reported.  -Repeat cbc w/ diff  -If persistently elevated, can pursue further work up (strongyloides Ab, ova/parasites, ANCA, cortisol)  -Out-patient heme onc eval if preexistent elevated eos      #HTN  -Pt w/ hx of HTN, on quinapril and HCTZ  at home  -Hold anti-HTN agents as BP at lower side   -Resume as clinically indicated     #Hypothyroidism   -C/w levothyroxine     #HLD  -C/w statin     #Urinary retention  -C/w Braun cathter, advised RN to exchange it   -Urology f/u     #DVT ppx:  Lovenox SC  no weight-bearing restrictions

## 2022-10-20 NOTE — PHYSICAL THERAPY INITIAL EVALUATION ADULT - OCCUPATION
Surgeon Performing The Repair (Optional): Dr. Barrios/ Dr. Valencia pt still works at home on cures for cancer, he is an . Daughter works with him.

## 2023-01-05 NOTE — SWALLOW BEDSIDE ASSESSMENT ADULT - POSITIONING
-----------------------------------------------------------------------------------------------------  [facility]    To promote your recovery after you leave the hospital, your physician has ordered home health. [facility] will contact you prior to or after discharge to set up the first appointment.  If you have any questions please contact [facility] at [phone] or [phone] (select #5 for scheduling).      For your safety, all healthcare providers will be wearing a mask when we visit, and for our safety, we ask that you and your household members wear a mask during our visit.  If you do not have a mask, one will be provided.  Thank you.    Cecille SOTELO-Manish, RN  [facility] Liaison  Phone: [phone]  ----------------------------------------------------------------------------------------------------   
upright (90 degrees)

## 2023-01-29 NOTE — HISTORY OF PRESENT ILLNESS
Edp aware of sirs criteria met.
The patient left the Emergency Department ambulatory, alert and oriented and in no acute distress. The patient was encouraged to call or return to the ED for worsening issues or problems and was encouraged to schedule a follow up appointment for continuing care. The patient verbalized understanding of discharge instructions and prescriptions, all questions were answered. The patient has no further concerns at this time.
[FreeTextEntry1] : This is the first [facility] visit for this 89-year-old who is followed by Dr. Joshi as PCP, cardiologist. 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 He has had cough on and on in the past and especially during the past couple of months. He was treated with a Medrol Dosepak and Cefdinir on September 27 and Augmentin on October 28. He continues to have cough, he is having sputum but swallows this. He has occasional wheezing. He is not having hemoptysis. His breathing is improved since his TAVR procedure earlier this year. He does have to stop after walking about 30 feet. He smoked for only 2 years in the past and quit in 1985. \par \par He has had occasional reflux symptoms in the past and had been on antacid in the past as needed. He is not having bedtime snacks. He does have nasal congestion and postnasal drip. Not taking any medicine for this.\par \par Chest x-ray on November 5 showed small bilateral effusions stable compared with 2018 chest x-ray. Some increased AP diameter.
[FreeTextEntry1] : This is the first [facility] visit for this 89-year-old who is followed by Dr. Joshi as PCP, cardiologist. 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 He has had cough on and on in the past and especially during the past couple of months. He was treated with a Medrol Dosepak and Cefdinir on September 27 and Augmentin on October 28. He continues to have cough, he is having sputum but swallows this. He has occasional wheezing. He is not having hemoptysis. His breathing is improved since his TAVR procedure earlier this year. He does have to stop after walking about 30 feet. He smoked for only 2 years in the past and quit in 1985. \par \par He has had occasional reflux symptoms in the past and had been on antacid in the past as needed. He is not having bedtime snacks. He does have nasal congestion and postnasal drip. Not taking any medicine for this.\par \par Chest x-ray on November 5 showed small bilateral effusions stable compared with 2018 chest x-ray. Some increased AP diameter.
[FreeTextEntry1] : This is the first [facility] visit for this 89-year-old who is followed by Dr. Joshi as PCP, cardiologist. 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 He has had cough on and on in the past and especially during the past couple of months. He was treated with a Medrol Dosepak and Cefdinir on September 27 and Augmentin on October 28. He continues to have cough, he is having sputum but swallows this. He has occasional wheezing. He is not having hemoptysis. His breathing is improved since his TAVR procedure earlier this year. He does have to stop after walking about 30 feet. He smoked for only 2 years in the past and quit in 1985. \par \par He has had occasional reflux symptoms in the past and had been on antacid in the past as needed. He is not having bedtime snacks. He does have nasal congestion and postnasal drip. Not taking any medicine for this.\par \par Chest x-ray on November 5 showed small bilateral effusions stable compared with 2018 chest x-ray. Some increased AP diameter.
[FreeTextEntry1] : This is the first [facility] visit for this 89-year-old who is followed by Dr. Joshi as PCP, cardiologist. 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 He has had cough on and on in the past and especially during the past couple of months. He was treated with a Medrol Dosepak and Cefdinir on September 27 and Augmentin on October 28. He continues to have cough, he is having sputum but swallows this. He has occasional wheezing. He is not having hemoptysis. His breathing is improved since his TAVR procedure earlier this year. He does have to stop after walking about 30 feet. He smoked for only 2 years in the past and quit in 1985. \par \par He has had occasional reflux symptoms in the past and had been on antacid in the past as needed. He is not having bedtime snacks. He does have nasal congestion and postnasal drip. Not taking any medicine for this.\par \par Chest x-ray on November 5 showed small bilateral effusions stable compared with 2018 chest x-ray. Some increased AP diameter.

## 2023-04-04 NOTE — PATIENT PROFILE ADULT. - NS PRO OT REFERRAL QUES 1 YN
Qbrexza Counseling:  I discussed with the patient the risks of Qbrexza including but not limited to headache, mydriasis, blurred vision, dry eyes, nasal dryness, dry mouth, dry throat, dry skin, urinary hesitation, and constipation.  Local skin reactions including erythema, burning, stinging, and itching can also occur. no

## 2023-04-24 NOTE — ED ADULT NURSE NOTE - PAIN: PRESENCE, MLM
Please review and advise on refill request.     Lobo FLORES RN 4/24/2023 at 2:33 PM     denies pain/discomfort

## 2023-05-19 NOTE — ED ADULT TRIAGE NOTE - PAIN: PRESENCE, MLM
denies pain/discomfort [Seizure frequency] : Seizure frequency: Yes [Etiology, seizure type, and epilepsy syndrome] : Etiology, seizure type, and epilepsy syndrome: Yes [Side effects of anti-seizure medications] : Side effects of anti-seizure medications: Yes [Safety and education around seizures] : Safety and education around seizures: Yes [Sudden unexpected death in epilepsy (SUDEP)] : Sudden unexpected death in epilepsy: Yes [Issues around driving] : Issues around driving: Yes [Screening for anxiety, depression] : Screening for anxiety, depression: Yes [Treatment-resistant epilepsy (every visit)] : Treatment-resistant epilepsy (every visit): Not Applicable [Adherence to medication(s)] : Adherence to medication(s): Yes [Counseling for women of childbearing potential with epilepsy (including folic acid supplement)] : Counseling for women of childbearing potential with epilepsy (including folic acid supplement): Not Applicable [Options for adjunctive therapy (Neurostimulation, CBD, Dietary Therapy, Epilepsy Surgery)] : Options for adjunctive therapy (Neurostimulation, CBD, Dietary Therapy, Epilepsy Surgery): Not Applicable [25 Hydroxy Vitamin D level assessed and Vitamin D3 ordered] : 25 Hydroxy Vitamin D level assessed and Vitamin D3 ordered: Yes [Thyroid profile ordered] : Thyroid profile ordered: Not Applicable

## 2024-03-18 NOTE — PHYSICAL THERAPY INITIAL EVALUATION ADULT - ASR WT BEARING STATUS EVAL
Called the pt back. Pt states his pain is the worst at bedtime. States last night he only slept about 2 hours. Pt states he is icing and elevating his knee.  Currently took some Naproxen to help with him. Explained that while the pt is taking the aspirin, he should stay away from NSAIDS such as naproxen and ibuprofen. Writer explained that the pt needs to take 1000 mg of Tylenol three times daily until he sees Dr. Stanton at his first post op appt. Explained that he can switch out the evening dose of Tylenol for Tylenol PM. Writer explained that Dr. Stanton normally doesn't refill an opiate prescription for an ACL repair and wants pt's off pain meds by about 14 days post op. Pt asked if Dr. Stanton might prescribe something for sleep at night.  Will discuss with Dr. Stanton.    no weight-bearing restrictions

## 2024-03-19 NOTE — H&P ADULT - NSHPPHYSICALEXAM_GEN_ALL_CORE
Please schedule the patient for procedure. No OV    Procedure - Colonoscopy and EGD  Prep - Golytely - Split Prep  Location - [facility]  Diagnosis - Diverticulosis, GERD, and Red Esophagus  Time Duration - Next available    Pl update me once patient has been scheduled    Thanks    Stefan Shirley MD     Vital Signs Last 24 Hrs  T(C): 36.4 (02 Jul 2019 06:34), Max: 36.6 (02 Jul 2019 03:39)  T(F): 97.6 (02 Jul 2019 06:34), Max: 97.9 (02 Jul 2019 03:39)  HR: 50 (02 Jul 2019 07:24) (50 - 52)  BP: 111/64 (02 Jul 2019 07:24) (111/64 - 115/71)  RR: 14 (02 Jul 2019 07:24) (14 - 19)  SpO2: 98% (02 Jul 2019 07:24) (97% - 100%)    PHYSICAL EXAM:  GENERAL: elderly, chronically ill appearing male, lying in bed, NAD  HEAD:  atraumatic, normocephalic  EYES: sclera anicteric  ENMT: mucous membranes dry  NECK: supple  HEART: normal S1, S2  CHEST/LUNG: respirations unlabored; BS coarse, decreased bases; no wheezing b/l  ABDOMEN: BS+, soft, mildly distended, NT   EXTREMITIES: trace edema b/l LEs; chronic venostasis changes; few skin tears  NEURO: awake, alert, interactive; moves all extremities

## 2024-04-22 NOTE — CONSULT NOTE ADULT - CONSULT REQUESTED BY NAME
No care due was identified.  [facility] Embedded Care Due Messages. Reference number: [id number].   4/21/2024 7:10:47 AM CDT  
Refill Routing Note   Medication(s) are not appropriate for processing by [facility] for the following reason(s):        Required labs outdated    [facility] action(s):  Defer             Appointments  past 12m or future 3m with PCP    Date Provider   Last Visit   11/14/2023 Mehul Royal MD   Next Visit   5/30/2024 Mehul Royal MD   ED visits in past 90 days: 0        Note composed:12:39 PM 04/22/2024                          
Dr. Price
dr kamara

## 2024-05-01 NOTE — PATIENT PROFILE ADULT. - PAIN CHRONIC, PROFILE
no 63 Azathioprine Counseling:  I discussed with the patient the risks of azathioprine including but not limited to myelosuppression, immunosuppression, hepatotoxicity, lymphoma, and infections.  The patient understands that monitoring is required including baseline LFTs, Creatinine, possible TPMP genotyping and weekly CBCs for the first month and then every 2 weeks thereafter.  The patient verbalized understanding of the proper use and possible adverse effects of azathioprine.  All of the patient's questions and concerns were addressed.

## 2025-03-08 NOTE — DIETITIAN INITIAL EVALUATION ADULT. - ENERGY NEEDS
order request Ht.  66"     Wt.  73.6Kg        BMI 25.8       IBW   64.5Kg      Pt is at    114%  IBW

## 2025-07-02 NOTE — H&P ADULT - NSICDXFAMILYHX_GEN_ALL_CORE_FT
Evaluation:                                             FAMILY HISTORY:  Family history of ischemic heart disease